# Patient Record
Sex: FEMALE | Race: WHITE | Employment: UNEMPLOYED | URBAN - METROPOLITAN AREA
[De-identification: names, ages, dates, MRNs, and addresses within clinical notes are randomized per-mention and may not be internally consistent; named-entity substitution may affect disease eponyms.]

---

## 2017-02-11 ENCOUNTER — HOSPITAL ENCOUNTER (EMERGENCY)
Age: 82
Discharge: HOME | End: 2017-02-11
Payer: MEDICARE

## 2017-02-11 VITALS
BODY MASS INDEX: 28.35 KG/M2 | HEART RATE: 90 BPM | TEMPERATURE: 97.3 F | SYSTOLIC BLOOD PRESSURE: 141 MMHG | DIASTOLIC BLOOD PRESSURE: 78 MMHG | WEIGHT: 160 LBS | HEIGHT: 63 IN | OXYGEN SATURATION: 96 % | RESPIRATION RATE: 16 BRPM

## 2017-02-11 DIAGNOSIS — N39.0 URINARY TRACT INFECTION WITHOUT HEMATURIA, SITE UNSPECIFIED: Primary | ICD-10-CM

## 2017-02-11 LAB
BACTERIA: ABNORMAL /HPF
BILIRUBIN URINE: NEGATIVE
BLOOD, URINE: NEGATIVE
CLARITY: CLEAR
COLOR: YELLOW
EPITHELIAL CELLS, UA: ABNORMAL /HPF
GLUCOSE URINE: NEGATIVE MG/DL
KETONES, URINE: NEGATIVE MG/DL
LEUKOCYTE ESTERASE, URINE: ABNORMAL
NITRITE, URINE: NEGATIVE
PH UA: 7.5 (ref 5–9)
PROTEIN UA: NEGATIVE MG/DL
RBC UA: ABNORMAL /HPF (ref 0–2)
SPECIFIC GRAVITY UA: 1.01 (ref 1–1.03)
UROBILINOGEN, URINE: 0.2 E.U./DL
WBC UA: ABNORMAL /HPF (ref 0–5)

## 2017-02-11 PROCEDURE — 6370000000 HC RX 637 (ALT 250 FOR IP): Performed by: NURSE PRACTITIONER

## 2017-02-11 PROCEDURE — 6370000000 HC RX 637 (ALT 250 FOR IP): Performed by: STUDENT IN AN ORGANIZED HEALTH CARE EDUCATION/TRAINING PROGRAM

## 2017-02-11 PROCEDURE — 99283 EMERGENCY DEPT VISIT LOW MDM: CPT

## 2017-02-11 PROCEDURE — 81001 URINALYSIS AUTO W/SCOPE: CPT

## 2017-02-11 RX ORDER — NITROFURANTOIN 25; 75 MG/1; MG/1
100 CAPSULE ORAL 2 TIMES DAILY
Qty: 20 CAPSULE | Refills: 0 | Status: SHIPPED | OUTPATIENT
Start: 2017-02-11 | End: 2017-02-21

## 2017-02-11 RX ORDER — PHENAZOPYRIDINE HYDROCHLORIDE 200 MG/1
200 TABLET, FILM COATED ORAL ONCE
Status: COMPLETED | OUTPATIENT
Start: 2017-02-11 | End: 2017-02-11

## 2017-02-11 RX ORDER — PHENAZOPYRIDINE HYDROCHLORIDE 200 MG/1
200 TABLET, FILM COATED ORAL ONCE
Qty: 6 TABLET | Refills: 0 | Status: SHIPPED | OUTPATIENT
Start: 2017-02-11 | End: 2017-02-11

## 2017-02-11 RX ORDER — NITROFURANTOIN 25; 75 MG/1; MG/1
100 CAPSULE ORAL ONCE
Status: COMPLETED | OUTPATIENT
Start: 2017-02-11 | End: 2017-02-11

## 2017-02-11 RX ADMIN — NITROFURANTOIN (MONOHYDRATE/MACROCRYSTALS) 100 MG: 75; 25 CAPSULE ORAL at 09:06

## 2017-02-11 RX ADMIN — PHENAZOPYRIDINE HYDROCHLORIDE 200 MG: 200 TABLET ORAL at 08:12

## 2017-02-11 ASSESSMENT — PAIN DESCRIPTION - DESCRIPTORS: DESCRIPTORS: BURNING

## 2017-02-11 ASSESSMENT — ENCOUNTER SYMPTOMS
NAUSEA: 0
VOMITING: 0
COUGH: 0
SHORTNESS OF BREATH: 0
ABDOMINAL PAIN: 0
DIARRHEA: 0
BACK PAIN: 0

## 2017-02-11 ASSESSMENT — PAIN DESCRIPTION - ORIENTATION: ORIENTATION: MID

## 2017-02-11 ASSESSMENT — PAIN SCALES - GENERAL: PAINLEVEL_OUTOF10: 8

## 2017-02-11 ASSESSMENT — PAIN DESCRIPTION - FREQUENCY: FREQUENCY: CONTINUOUS

## 2017-02-11 ASSESSMENT — PAIN DESCRIPTION - ONSET: ONSET: ON-GOING

## 2017-02-11 ASSESSMENT — PAIN DESCRIPTION - LOCATION: LOCATION: PERINEUM

## 2017-02-11 ASSESSMENT — PAIN DESCRIPTION - PAIN TYPE: TYPE: ACUTE PAIN

## 2017-02-18 ENCOUNTER — HOSPITAL ENCOUNTER (EMERGENCY)
Age: 82
Discharge: HOME | End: 2017-02-18
Payer: MEDICARE

## 2017-02-18 VITALS
OXYGEN SATURATION: 94 % | TEMPERATURE: 97.5 F | HEIGHT: 63 IN | DIASTOLIC BLOOD PRESSURE: 82 MMHG | HEART RATE: 85 BPM | BODY MASS INDEX: 28.35 KG/M2 | WEIGHT: 160 LBS | RESPIRATION RATE: 18 BRPM | SYSTOLIC BLOOD PRESSURE: 135 MMHG

## 2017-02-18 DIAGNOSIS — R30.0 DYSURIA: Primary | ICD-10-CM

## 2017-02-18 LAB
BILIRUBIN URINE: NEGATIVE
BLOOD, URINE: NEGATIVE
CLARITY: CLEAR
COLOR: YELLOW
GLUCOSE URINE: NEGATIVE MG/DL
KETONES, URINE: NEGATIVE MG/DL
LEUKOCYTE ESTERASE, URINE: NEGATIVE
NITRITE, URINE: NEGATIVE
PH UA: 6 (ref 5–9)
PROTEIN UA: NEGATIVE MG/DL
SPECIFIC GRAVITY UA: 1 (ref 1–1.03)
URINE REFLEX TO CULTURE: NORMAL
UROBILINOGEN, URINE: 0.2 E.U./DL

## 2017-02-18 PROCEDURE — 81003 URINALYSIS AUTO W/O SCOPE: CPT

## 2017-02-18 PROCEDURE — 99283 EMERGENCY DEPT VISIT LOW MDM: CPT

## 2017-02-18 RX ORDER — CEPHALEXIN 250 MG/1
250 CAPSULE ORAL 2 TIMES DAILY
Qty: 14 CAPSULE | Refills: 0 | Status: SHIPPED | OUTPATIENT
Start: 2017-02-18 | End: 2017-04-13 | Stop reason: ALTCHOICE

## 2017-02-18 ASSESSMENT — ENCOUNTER SYMPTOMS
SHORTNESS OF BREATH: 0
COUGH: 0
BACK PAIN: 0
ABDOMINAL PAIN: 0

## 2017-02-27 ENCOUNTER — OFFICE VISIT (OUTPATIENT)
Dept: UROLOGY | Age: 82
End: 2017-02-27

## 2017-02-27 VITALS
BODY MASS INDEX: 32.78 KG/M2 | HEIGHT: 63 IN | HEART RATE: 80 BPM | SYSTOLIC BLOOD PRESSURE: 138 MMHG | WEIGHT: 185 LBS | DIASTOLIC BLOOD PRESSURE: 72 MMHG

## 2017-02-27 DIAGNOSIS — R30.0 DYSURIA: Primary | ICD-10-CM

## 2017-02-27 LAB
BILIRUBIN, POC: ABNORMAL
BLOOD URINE, POC: ABNORMAL
CLARITY, POC: CLEAR
COLOR, POC: YELLOW
GLUCOSE URINE, POC: ABNORMAL
KETONES, POC: ABNORMAL
LEUKOCYTE EST, POC: ABNORMAL
NITRITE, POC: ABNORMAL
PH, POC: 7
POST VOID RESIDUAL (PVR): 0 ML
PROTEIN, POC: ABNORMAL
SPECIFIC GRAVITY, POC: 1.02
UROBILINOGEN, POC: 0.2

## 2017-02-27 PROCEDURE — 99203 OFFICE O/P NEW LOW 30 MIN: CPT | Performed by: UROLOGY

## 2017-02-27 PROCEDURE — 81003 URINALYSIS AUTO W/O SCOPE: CPT | Performed by: UROLOGY

## 2017-02-27 PROCEDURE — 51798 US URINE CAPACITY MEASURE: CPT | Performed by: UROLOGY

## 2017-02-27 RX ORDER — BRIMONIDINE TARTRATE 0.15 %
1 DROPS OPHTHALMIC (EYE) 2 TIMES DAILY
Refills: 3 | COMMUNITY
Start: 2017-02-07 | End: 2022-02-10 | Stop reason: SDUPTHER

## 2017-02-27 RX ORDER — DORZOLAMIDE HYDROCHLORIDE AND TIMOLOL MALEATE 20; 5 MG/ML; MG/ML
1 SOLUTION/ DROPS OPHTHALMIC 2 TIMES DAILY
COMMUNITY
Start: 2015-04-10 | End: 2022-02-10 | Stop reason: SDUPTHER

## 2017-02-27 RX ORDER — CLOTRIMAZOLE AND BETAMETHASONE DIPROPIONATE 10; .64 MG/G; MG/G
CREAM TOPICAL
Refills: 0 | COMMUNITY
Start: 2017-02-21 | End: 2017-04-13 | Stop reason: ALTCHOICE

## 2017-02-27 RX ORDER — AMLODIPINE BESYLATE 5 MG/1
TABLET ORAL
Refills: 4 | COMMUNITY
Start: 2016-11-28 | End: 2018-06-01 | Stop reason: SDUPTHER

## 2017-02-27 RX ORDER — AMLODIPINE BESYLATE 5 MG/1
5 TABLET ORAL
COMMUNITY
Start: 2015-05-21 | End: 2017-04-13

## 2017-02-27 RX ORDER — BRIMONIDINE TARTRATE 2 MG/ML
SOLUTION/ DROPS OPHTHALMIC
COMMUNITY
Start: 2015-07-17 | End: 2017-04-13

## 2017-02-27 RX ORDER — LATANOPROST 50 UG/ML
1 SOLUTION/ DROPS OPHTHALMIC
COMMUNITY
Start: 2015-07-17 | End: 2022-02-10 | Stop reason: SDUPTHER

## 2017-02-27 RX ORDER — FLUCONAZOLE 100 MG/1
TABLET ORAL
Refills: 1 | COMMUNITY
Start: 2017-02-21 | End: 2017-04-13

## 2017-05-08 ENCOUNTER — HOSPITAL ENCOUNTER (EMERGENCY)
Age: 82
Discharge: HOME | End: 2017-05-08
Attending: EMERGENCY MEDICINE
Payer: MEDICARE

## 2017-05-08 ENCOUNTER — APPOINTMENT (OUTPATIENT)
Dept: GENERAL RADIOLOGY | Age: 82
End: 2017-05-08
Payer: MEDICARE

## 2017-05-08 VITALS
DIASTOLIC BLOOD PRESSURE: 70 MMHG | RESPIRATION RATE: 16 BRPM | OXYGEN SATURATION: 96 % | TEMPERATURE: 98.6 F | HEIGHT: 63 IN | HEART RATE: 78 BPM | SYSTOLIC BLOOD PRESSURE: 137 MMHG | WEIGHT: 175 LBS | BODY MASS INDEX: 31.01 KG/M2

## 2017-05-08 DIAGNOSIS — F41.1 ANXIETY STATE: ICD-10-CM

## 2017-05-08 DIAGNOSIS — R07.89 ATYPICAL CHEST PAIN: Primary | ICD-10-CM

## 2017-05-08 LAB
ANION GAP SERPL CALCULATED.3IONS-SCNC: 12 MEQ/L (ref 7–13)
APTT: 26 SEC (ref 21.6–35.4)
BUN BLDV-MCNC: 16 MG/DL (ref 8–23)
CALCIUM SERPL-MCNC: 9.1 MG/DL (ref 8.6–10.2)
CHLORIDE BLD-SCNC: 104 MEQ/L (ref 98–107)
CO2: 27 MEQ/L (ref 22–29)
CREAT SERPL-MCNC: 0.65 MG/DL (ref 0.5–0.9)
EKG ATRIAL RATE: 79 BPM
EKG P AXIS: 52 DEGREES
EKG P-R INTERVAL: 152 MS
EKG Q-T INTERVAL: 408 MS
EKG QRS DURATION: 82 MS
EKG QTC CALCULATION (BAZETT): 467 MS
EKG R AXIS: 17 DEGREES
EKG T AXIS: 26 DEGREES
EKG VENTRICULAR RATE: 79 BPM
GFR AFRICAN AMERICAN: >60
GFR NON-AFRICAN AMERICAN: >60
GLUCOSE BLD-MCNC: 104 MG/DL (ref 74–109)
HCT VFR BLD CALC: 43.1 % (ref 37–47)
HEMOGLOBIN: 14.5 G/DL (ref 12–16)
INR BLD: 1
MAGNESIUM: 2.5 MG/DL (ref 1.7–2.3)
MCH RBC QN AUTO: 29.7 PG (ref 27–31.3)
MCHC RBC AUTO-ENTMCNC: 33.7 % (ref 33–37)
MCV RBC AUTO: 88.2 FL (ref 82–100)
PDW BLD-RTO: 14.3 % (ref 11.5–14.5)
PLATELET # BLD: 178 K/UL (ref 130–400)
POTASSIUM SERPL-SCNC: 3.6 MEQ/L (ref 3.5–5.1)
PROTHROMBIN TIME: 10.7 SEC (ref 8.1–13.7)
RBC # BLD: 4.88 M/UL (ref 4.2–5.4)
SODIUM BLD-SCNC: 143 MEQ/L (ref 132–144)
TROPONIN: <0.01 NG/ML (ref 0–0.01)
WBC # BLD: 9.5 K/UL (ref 4.8–10.8)

## 2017-05-08 PROCEDURE — 85610 PROTHROMBIN TIME: CPT

## 2017-05-08 PROCEDURE — 85027 COMPLETE CBC AUTOMATED: CPT

## 2017-05-08 PROCEDURE — 85730 THROMBOPLASTIN TIME PARTIAL: CPT

## 2017-05-08 PROCEDURE — 83735 ASSAY OF MAGNESIUM: CPT

## 2017-05-08 PROCEDURE — 71010 XR CHEST PORTABLE: CPT

## 2017-05-08 PROCEDURE — 36415 COLL VENOUS BLD VENIPUNCTURE: CPT

## 2017-05-08 PROCEDURE — 93005 ELECTROCARDIOGRAM TRACING: CPT

## 2017-05-08 PROCEDURE — 80048 BASIC METABOLIC PNL TOTAL CA: CPT

## 2017-05-08 PROCEDURE — 6370000000 HC RX 637 (ALT 250 FOR IP): Performed by: EMERGENCY MEDICINE

## 2017-05-08 PROCEDURE — 99285 EMERGENCY DEPT VISIT HI MDM: CPT

## 2017-05-08 PROCEDURE — 84484 ASSAY OF TROPONIN QUANT: CPT

## 2017-05-08 RX ORDER — LORAZEPAM 1 MG/1
1 TABLET ORAL EVERY 8 HOURS PRN
Qty: 6 TABLET | Refills: 0 | OUTPATIENT
Start: 2017-05-08 | End: 2017-05-08

## 2017-05-08 RX ORDER — LORAZEPAM 1 MG/1
1 TABLET ORAL EVERY 8 HOURS
Qty: 6 TABLET | Refills: 0 | OUTPATIENT
Start: 2017-05-08 | End: 2017-05-08

## 2017-05-08 RX ORDER — LORAZEPAM 1 MG/1
1 TABLET ORAL ONCE
Status: COMPLETED | OUTPATIENT
Start: 2017-05-08 | End: 2017-05-08

## 2017-05-08 RX ORDER — LORAZEPAM 1 MG/1
1 TABLET ORAL EVERY 8 HOURS PRN
Qty: 6 TABLET | Refills: 0 | Status: SHIPPED | OUTPATIENT
Start: 2017-05-08 | End: 2017-06-07

## 2017-05-08 RX ADMIN — LORAZEPAM 1 MG: 1 TABLET ORAL at 12:31

## 2017-05-08 ASSESSMENT — PAIN SCALES - WONG BAKER: WONGBAKER_NUMERICALRESPONSE: 0

## 2017-05-08 ASSESSMENT — PAIN DESCRIPTION - LOCATION: LOCATION: CHEST

## 2017-05-08 ASSESSMENT — ENCOUNTER SYMPTOMS
SHORTNESS OF BREATH: 0
COUGH: 0
VOMITING: 0

## 2017-05-08 ASSESSMENT — PAIN DESCRIPTION - FREQUENCY: FREQUENCY: CONTINUOUS

## 2017-05-08 ASSESSMENT — PAIN SCALES - GENERAL
PAINLEVEL_OUTOF10: 2
PAINLEVEL_OUTOF10: 0

## 2017-05-08 ASSESSMENT — PAIN DESCRIPTION - DESCRIPTORS: DESCRIPTORS: SORE

## 2017-09-18 ENCOUNTER — OFFICE VISIT (OUTPATIENT)
Dept: FAMILY MEDICINE CLINIC | Age: 82
End: 2017-09-18

## 2017-09-18 VITALS
HEIGHT: 63 IN | BODY MASS INDEX: 30.12 KG/M2 | RESPIRATION RATE: 16 BRPM | DIASTOLIC BLOOD PRESSURE: 64 MMHG | HEART RATE: 76 BPM | TEMPERATURE: 97.8 F | WEIGHT: 170 LBS | SYSTOLIC BLOOD PRESSURE: 122 MMHG

## 2017-09-18 DIAGNOSIS — R30.0 DYSURIA: ICD-10-CM

## 2017-09-18 DIAGNOSIS — Z23 NEED FOR STREPTOCOCCUS PNEUMONIAE AND INFLUENZA VACCINATION: ICD-10-CM

## 2017-09-18 DIAGNOSIS — F41.9 ANXIETY: ICD-10-CM

## 2017-09-18 DIAGNOSIS — I10 ESSENTIAL HYPERTENSION: Primary | ICD-10-CM

## 2017-09-18 LAB
BILIRUBIN, POC: ABNORMAL
BLOOD URINE, POC: ABNORMAL
CLARITY, POC: ABNORMAL
COLOR, POC: ABNORMAL
GLUCOSE URINE, POC: ABNORMAL
KETONES, POC: ABNORMAL
LEUKOCYTE EST, POC: ABNORMAL
NITRITE, POC: ABNORMAL
PH, POC: 6
PROTEIN, POC: ABNORMAL
SPECIFIC GRAVITY, POC: 1.02
UROBILINOGEN, POC: ABNORMAL

## 2017-09-18 PROCEDURE — 81003 URINALYSIS AUTO W/O SCOPE: CPT | Performed by: FAMILY MEDICINE

## 2017-09-18 PROCEDURE — 90662 IIV NO PRSV INCREASED AG IM: CPT | Performed by: FAMILY MEDICINE

## 2017-09-18 PROCEDURE — 99203 OFFICE O/P NEW LOW 30 MIN: CPT | Performed by: FAMILY MEDICINE

## 2017-09-18 PROCEDURE — G0008 ADMIN INFLUENZA VIRUS VAC: HCPCS | Performed by: FAMILY MEDICINE

## 2017-09-18 PROCEDURE — G0009 ADMIN PNEUMOCOCCAL VACCINE: HCPCS | Performed by: FAMILY MEDICINE

## 2017-09-18 PROCEDURE — 90670 PCV13 VACCINE IM: CPT | Performed by: FAMILY MEDICINE

## 2017-09-18 RX ORDER — MOMETASONE FUROATE 1 MG/G
CREAM TOPICAL
Qty: 45 G | Refills: 1 | Status: SHIPPED | OUTPATIENT
Start: 2017-09-18 | End: 2019-05-22 | Stop reason: ALTCHOICE

## 2017-09-18 RX ORDER — LORAZEPAM 0.5 MG/1
0.25 TABLET ORAL 2 TIMES DAILY PRN
Qty: 30 TABLET | Refills: 2 | Status: SHIPPED | OUTPATIENT
Start: 2017-09-18 | End: 2019-01-08

## 2017-09-18 RX ORDER — LORAZEPAM 0.5 MG/1
TABLET ORAL
Refills: 1 | COMMUNITY
Start: 2017-07-29 | End: 2017-09-18 | Stop reason: SDUPTHER

## 2017-09-18 ASSESSMENT — PATIENT HEALTH QUESTIONNAIRE - PHQ9
2. FEELING DOWN, DEPRESSED OR HOPELESS: 0
1. LITTLE INTEREST OR PLEASURE IN DOING THINGS: 0
SUM OF ALL RESPONSES TO PHQ QUESTIONS 1-9: 0
SUM OF ALL RESPONSES TO PHQ9 QUESTIONS 1 & 2: 0

## 2017-09-21 LAB
ORGANISM: ABNORMAL
URINE CULTURE, ROUTINE: ABNORMAL

## 2017-09-22 RX ORDER — CIPROFLOXACIN 250 MG/1
250 TABLET, FILM COATED ORAL 2 TIMES DAILY
Qty: 14 TABLET | Refills: 0 | Status: SHIPPED | OUTPATIENT
Start: 2017-09-22 | End: 2017-09-29

## 2018-06-01 RX ORDER — AMLODIPINE BESYLATE 5 MG/1
TABLET ORAL
Qty: 30 TABLET | Refills: 5 | Status: SHIPPED | OUTPATIENT
Start: 2018-06-01 | End: 2018-10-26 | Stop reason: SDUPTHER

## 2018-07-01 ENCOUNTER — APPOINTMENT (OUTPATIENT)
Dept: GENERAL RADIOLOGY | Age: 83
End: 2018-07-01
Payer: MEDICARE

## 2018-07-01 ENCOUNTER — APPOINTMENT (OUTPATIENT)
Dept: ULTRASOUND IMAGING | Age: 83
End: 2018-07-01
Payer: MEDICARE

## 2018-07-01 ENCOUNTER — HOSPITAL ENCOUNTER (EMERGENCY)
Age: 83
Discharge: HOME | End: 2018-07-01
Payer: MEDICARE

## 2018-07-01 VITALS
DIASTOLIC BLOOD PRESSURE: 68 MMHG | HEIGHT: 63 IN | RESPIRATION RATE: 18 BRPM | OXYGEN SATURATION: 94 % | TEMPERATURE: 97.8 F | WEIGHT: 170 LBS | HEART RATE: 82 BPM | BODY MASS INDEX: 30.12 KG/M2 | SYSTOLIC BLOOD PRESSURE: 128 MMHG

## 2018-07-01 DIAGNOSIS — M71.22 BAKER'S CYST OF KNEE, LEFT: ICD-10-CM

## 2018-07-01 DIAGNOSIS — M79.89 LEFT LEG SWELLING: Primary | ICD-10-CM

## 2018-07-01 PROCEDURE — 99284 EMERGENCY DEPT VISIT MOD MDM: CPT

## 2018-07-01 PROCEDURE — 6370000000 HC RX 637 (ALT 250 FOR IP): Performed by: NURSE PRACTITIONER

## 2018-07-01 PROCEDURE — 73502 X-RAY EXAM HIP UNI 2-3 VIEWS: CPT

## 2018-07-01 PROCEDURE — 73562 X-RAY EXAM OF KNEE 3: CPT

## 2018-07-01 PROCEDURE — 93971 EXTREMITY STUDY: CPT

## 2018-07-01 RX ORDER — HYDROCODONE BITARTRATE AND ACETAMINOPHEN 5; 325 MG/1; MG/1
1 TABLET ORAL ONCE
Status: COMPLETED | OUTPATIENT
Start: 2018-07-01 | End: 2018-07-01

## 2018-07-01 RX ADMIN — HYDROCODONE BITARTRATE AND ACETAMINOPHEN 1 TABLET: 5; 325 TABLET ORAL at 11:34

## 2018-07-01 ASSESSMENT — PAIN DESCRIPTION - ORIENTATION
ORIENTATION: LEFT

## 2018-07-01 ASSESSMENT — PAIN SCALES - GENERAL
PAINLEVEL_OUTOF10: 10
PAINLEVEL_OUTOF10: 8
PAINLEVEL_OUTOF10: 4

## 2018-07-01 ASSESSMENT — ENCOUNTER SYMPTOMS
SHORTNESS OF BREATH: 0
NAUSEA: 0
DIARRHEA: 0
ABDOMINAL PAIN: 0
COUGH: 0
VOMITING: 0

## 2018-07-01 ASSESSMENT — PAIN DESCRIPTION - PAIN TYPE
TYPE: ACUTE PAIN
TYPE: CHRONIC PAIN;ACUTE PAIN
TYPE: ACUTE PAIN

## 2018-07-01 ASSESSMENT — PAIN SCALES - WONG BAKER
WONGBAKER_NUMERICALRESPONSE: 2
WONGBAKER_NUMERICALRESPONSE: 0

## 2018-07-01 ASSESSMENT — PAIN DESCRIPTION - LOCATION
LOCATION: ANKLE
LOCATION: LEG

## 2018-07-30 ENCOUNTER — OFFICE VISIT (OUTPATIENT)
Dept: FAMILY MEDICINE CLINIC | Age: 83
End: 2018-07-30
Payer: MEDICARE

## 2018-07-30 VITALS
TEMPERATURE: 97.7 F | BODY MASS INDEX: 31.89 KG/M2 | HEIGHT: 63 IN | DIASTOLIC BLOOD PRESSURE: 82 MMHG | SYSTOLIC BLOOD PRESSURE: 160 MMHG | RESPIRATION RATE: 16 BRPM | HEART RATE: 84 BPM | WEIGHT: 180 LBS

## 2018-07-30 DIAGNOSIS — M25.562 ACUTE PAIN OF LEFT KNEE: ICD-10-CM

## 2018-07-30 DIAGNOSIS — I10 ESSENTIAL HYPERTENSION: ICD-10-CM

## 2018-07-30 DIAGNOSIS — M71.22 SYNOVIAL CYST OF LEFT POPLITEAL SPACE: Primary | ICD-10-CM

## 2018-07-30 DIAGNOSIS — R73.9 HYPERGLYCEMIA: ICD-10-CM

## 2018-07-30 DIAGNOSIS — M25.462 EFFUSION OF LEFT KNEE: ICD-10-CM

## 2018-07-30 DIAGNOSIS — M71.22 SYNOVIAL CYST OF LEFT POPLITEAL SPACE: ICD-10-CM

## 2018-07-30 LAB
ALBUMIN SERPL-MCNC: 4.1 G/DL (ref 3.9–4.9)
ALP BLD-CCNC: 77 U/L (ref 40–130)
ALT SERPL-CCNC: 11 U/L (ref 0–33)
ANION GAP SERPL CALCULATED.3IONS-SCNC: 15 MEQ/L (ref 7–13)
AST SERPL-CCNC: 12 U/L (ref 0–35)
BILIRUB SERPL-MCNC: 0.3 MG/DL (ref 0–1.2)
BUN BLDV-MCNC: 18 MG/DL (ref 8–23)
CALCIUM SERPL-MCNC: 9.1 MG/DL (ref 8.6–10.2)
CHLORIDE BLD-SCNC: 103 MEQ/L (ref 98–107)
CHOLESTEROL, TOTAL: 219 MG/DL (ref 0–199)
CO2: 26 MEQ/L (ref 22–29)
CREAT SERPL-MCNC: 0.78 MG/DL (ref 0.5–0.9)
GFR AFRICAN AMERICAN: >60
GFR NON-AFRICAN AMERICAN: >60
GLOBULIN: 2.7 G/DL (ref 2.3–3.5)
GLUCOSE BLD-MCNC: 91 MG/DL (ref 74–109)
HBA1C MFR BLD: 5.7 % (ref 4.8–5.9)
HCT VFR BLD CALC: 41.8 % (ref 37–47)
HDLC SERPL-MCNC: 36 MG/DL (ref 40–59)
HEMOGLOBIN: 14.2 G/DL (ref 12–16)
LDL CHOLESTEROL CALCULATED: 144 MG/DL (ref 0–129)
MCH RBC QN AUTO: 30.1 PG (ref 27–31.3)
MCHC RBC AUTO-ENTMCNC: 33.8 % (ref 33–37)
MCV RBC AUTO: 88.9 FL (ref 82–100)
PDW BLD-RTO: 14.1 % (ref 11.5–14.5)
PLATELET # BLD: 216 K/UL (ref 130–400)
POTASSIUM SERPL-SCNC: 3.7 MEQ/L (ref 3.5–5.1)
RBC # BLD: 4.7 M/UL (ref 4.2–5.4)
SEDIMENTATION RATE, ERYTHROCYTE: 21 MM (ref 0–30)
SODIUM BLD-SCNC: 144 MEQ/L (ref 132–144)
TOTAL PROTEIN: 6.8 G/DL (ref 6.4–8.1)
TRIGL SERPL-MCNC: 193 MG/DL (ref 0–200)
URIC ACID, SERUM: 8.4 MG/DL (ref 2.4–5.7)
WBC # BLD: 10 K/UL (ref 4.8–10.8)

## 2018-07-30 PROCEDURE — 99214 OFFICE O/P EST MOD 30 MIN: CPT | Performed by: FAMILY MEDICINE

## 2018-07-30 PROCEDURE — 1111F DSCHRG MED/CURRENT MED MERGE: CPT | Performed by: FAMILY MEDICINE

## 2018-07-31 RX ORDER — COLCHICINE 0.6 MG/1
0.6 TABLET ORAL DAILY
Qty: 20 TABLET | Refills: 1 | Status: SHIPPED | OUTPATIENT
Start: 2018-07-31 | End: 2018-08-01 | Stop reason: SDUPTHER

## 2018-08-01 RX ORDER — COLCHICINE 0.6 MG/1
0.6 TABLET, FILM COATED ORAL DAILY
Qty: 20 TABLET | Refills: 1 | Status: SHIPPED | OUTPATIENT
Start: 2018-08-01 | End: 2018-12-18 | Stop reason: SDUPTHER

## 2018-10-26 RX ORDER — AMLODIPINE BESYLATE 5 MG/1
TABLET ORAL
Qty: 30 TABLET | Refills: 5 | Status: SHIPPED | OUTPATIENT
Start: 2018-10-26 | End: 2018-11-26 | Stop reason: SDUPTHER

## 2018-11-26 RX ORDER — AMLODIPINE BESYLATE 5 MG/1
TABLET ORAL
Qty: 30 TABLET | Refills: 3 | Status: SHIPPED | OUTPATIENT
Start: 2018-11-26 | End: 2018-12-18

## 2018-12-18 ENCOUNTER — OFFICE VISIT (OUTPATIENT)
Dept: FAMILY MEDICINE CLINIC | Age: 83
End: 2018-12-18
Payer: MEDICARE

## 2018-12-18 VITALS
RESPIRATION RATE: 16 BRPM | TEMPERATURE: 98.2 F | SYSTOLIC BLOOD PRESSURE: 122 MMHG | DIASTOLIC BLOOD PRESSURE: 74 MMHG | HEART RATE: 90 BPM

## 2018-12-18 DIAGNOSIS — T50.905A ADVERSE EFFECT OF DRUG, INITIAL ENCOUNTER: ICD-10-CM

## 2018-12-18 DIAGNOSIS — M10.072 ACUTE IDIOPATHIC GOUT INVOLVING TOE OF LEFT FOOT: ICD-10-CM

## 2018-12-18 DIAGNOSIS — I10 ESSENTIAL HYPERTENSION: ICD-10-CM

## 2018-12-18 DIAGNOSIS — I10 ESSENTIAL HYPERTENSION: Primary | ICD-10-CM

## 2018-12-18 LAB
ALBUMIN SERPL-MCNC: 4.4 G/DL (ref 3.9–4.9)
ALP BLD-CCNC: 79 U/L (ref 40–130)
ALT SERPL-CCNC: 14 U/L (ref 0–33)
ANION GAP SERPL CALCULATED.3IONS-SCNC: 15 MEQ/L (ref 7–13)
AST SERPL-CCNC: 16 U/L (ref 0–35)
BASOPHILS ABSOLUTE: 0.1 K/UL (ref 0–0.2)
BASOPHILS RELATIVE PERCENT: 1.7 %
BILIRUB SERPL-MCNC: 0.3 MG/DL (ref 0–1.2)
BUN BLDV-MCNC: 19 MG/DL (ref 8–23)
CALCIUM SERPL-MCNC: 9 MG/DL (ref 8.6–10.2)
CHLORIDE BLD-SCNC: 104 MEQ/L (ref 98–107)
CO2: 26 MEQ/L (ref 22–29)
CREAT SERPL-MCNC: 0.88 MG/DL (ref 0.5–0.9)
EOSINOPHILS ABSOLUTE: 0.2 K/UL (ref 0–0.7)
EOSINOPHILS RELATIVE PERCENT: 2.5 %
GFR AFRICAN AMERICAN: >60
GFR NON-AFRICAN AMERICAN: >60
GLOBULIN: 2.7 G/DL (ref 2.3–3.5)
GLUCOSE BLD-MCNC: 89 MG/DL (ref 74–109)
HCT VFR BLD CALC: 42 % (ref 37–47)
HEMOGLOBIN: 14 G/DL (ref 12–16)
LYMPHOCYTES ABSOLUTE: 1 K/UL (ref 1–4.8)
LYMPHOCYTES RELATIVE PERCENT: 15.4 %
MCH RBC QN AUTO: 30.5 PG (ref 27–31.3)
MCHC RBC AUTO-ENTMCNC: 33.4 % (ref 33–37)
MCV RBC AUTO: 91.2 FL (ref 82–100)
MONOCYTES ABSOLUTE: 0.8 K/UL (ref 0.2–0.8)
MONOCYTES RELATIVE PERCENT: 11.5 %
NEUTROPHILS ABSOLUTE: 4.6 K/UL (ref 1.4–6.5)
NEUTROPHILS RELATIVE PERCENT: 68.9 %
PDW BLD-RTO: 13.8 % (ref 11.5–14.5)
PLATELET # BLD: 198 K/UL (ref 130–400)
POTASSIUM SERPL-SCNC: 3.7 MEQ/L (ref 3.5–5.1)
RBC # BLD: 4.6 M/UL (ref 4.2–5.4)
SODIUM BLD-SCNC: 145 MEQ/L (ref 132–144)
TOTAL PROTEIN: 7.1 G/DL (ref 6.4–8.1)
URIC ACID, SERUM: 7 MG/DL (ref 2.4–5.7)
WBC # BLD: 6.6 K/UL (ref 4.8–10.8)

## 2018-12-18 PROCEDURE — 96372 THER/PROPH/DIAG INJ SC/IM: CPT | Performed by: NURSE PRACTITIONER

## 2018-12-18 PROCEDURE — 99214 OFFICE O/P EST MOD 30 MIN: CPT | Performed by: NURSE PRACTITIONER

## 2018-12-18 RX ORDER — TRIAMCINOLONE ACETONIDE 40 MG/ML
80 INJECTION, SUSPENSION INTRA-ARTICULAR; INTRAMUSCULAR ONCE
Status: COMPLETED | OUTPATIENT
Start: 2018-12-18 | End: 2018-12-18

## 2018-12-18 RX ORDER — COLCHICINE 0.6 MG/1
TABLET, FILM COATED ORAL
Qty: 30 TABLET | Refills: 1 | Status: SHIPPED | OUTPATIENT
Start: 2018-12-18 | End: 2020-02-03 | Stop reason: SDUPTHER

## 2018-12-18 RX ADMIN — TRIAMCINOLONE ACETONIDE 80 MG: 40 INJECTION, SUSPENSION INTRA-ARTICULAR; INTRAMUSCULAR at 14:03

## 2018-12-18 ASSESSMENT — PATIENT HEALTH QUESTIONNAIRE - PHQ9
SUM OF ALL RESPONSES TO PHQ9 QUESTIONS 1 & 2: 0
2. FEELING DOWN, DEPRESSED OR HOPELESS: 0
1. LITTLE INTEREST OR PLEASURE IN DOING THINGS: 0
SUM OF ALL RESPONSES TO PHQ QUESTIONS 1-9: 0
SUM OF ALL RESPONSES TO PHQ QUESTIONS 1-9: 0

## 2018-12-24 ASSESSMENT — ENCOUNTER SYMPTOMS
COLOR CHANGE: 0
CHEST TIGHTNESS: 0
SHORTNESS OF BREATH: 0
WHEEZING: 0
CHOKING: 0
COUGH: 0
ABDOMINAL PAIN: 0
APNEA: 0
ABDOMINAL DISTENTION: 0

## 2018-12-25 ENCOUNTER — APPOINTMENT (OUTPATIENT)
Dept: GENERAL RADIOLOGY | Age: 83
DRG: 192 | End: 2018-12-25
Payer: MEDICARE

## 2018-12-25 ENCOUNTER — HOSPITAL ENCOUNTER (INPATIENT)
Age: 83
LOS: 4 days | Discharge: HOME HEALTH | DRG: 192 | End: 2018-12-29
Attending: EMERGENCY MEDICINE | Admitting: INTERNAL MEDICINE
Payer: MEDICARE

## 2018-12-25 ENCOUNTER — APPOINTMENT (OUTPATIENT)
Dept: CT IMAGING | Age: 83
DRG: 192 | End: 2018-12-25
Payer: MEDICARE

## 2018-12-25 DIAGNOSIS — J44.0 COPD (CHRONIC OBSTRUCTIVE PULMONARY DISEASE) WITH ACUTE BRONCHITIS (HCC): ICD-10-CM

## 2018-12-25 DIAGNOSIS — F41.9 ANXIETY: ICD-10-CM

## 2018-12-25 DIAGNOSIS — J20.9 COPD (CHRONIC OBSTRUCTIVE PULMONARY DISEASE) WITH ACUTE BRONCHITIS (HCC): ICD-10-CM

## 2018-12-25 DIAGNOSIS — J44.1 COPD EXACERBATION (HCC): ICD-10-CM

## 2018-12-25 DIAGNOSIS — J40 BRONCHITIS: Primary | ICD-10-CM

## 2018-12-25 DIAGNOSIS — I10 ESSENTIAL HYPERTENSION: ICD-10-CM

## 2018-12-25 LAB
ALBUMIN SERPL-MCNC: 4.1 G/DL (ref 3.9–4.9)
ALP BLD-CCNC: 80 U/L (ref 40–130)
ALT SERPL-CCNC: 15 U/L (ref 0–33)
ANION GAP SERPL CALCULATED.3IONS-SCNC: 15 MEQ/L (ref 7–13)
AST SERPL-CCNC: 20 U/L (ref 0–35)
BASOPHILS ABSOLUTE: 0.1 K/UL (ref 0–0.2)
BASOPHILS RELATIVE PERCENT: 0.8 %
BILIRUB SERPL-MCNC: 0.4 MG/DL (ref 0–1.2)
BUN BLDV-MCNC: 16 MG/DL (ref 8–23)
CALCIUM SERPL-MCNC: 9 MG/DL (ref 8.6–10.2)
CHLORIDE BLD-SCNC: 104 MEQ/L (ref 98–107)
CO2: 24 MEQ/L (ref 22–29)
CREAT SERPL-MCNC: 0.73 MG/DL (ref 0.5–0.9)
EKG ATRIAL RATE: 87 BPM
EKG P AXIS: 69 DEGREES
EKG P-R INTERVAL: 148 MS
EKG Q-T INTERVAL: 394 MS
EKG QRS DURATION: 84 MS
EKG QTC CALCULATION (BAZETT): 474 MS
EKG R AXIS: 4 DEGREES
EKG T AXIS: -7 DEGREES
EKG VENTRICULAR RATE: 87 BPM
EOSINOPHILS ABSOLUTE: 0.2 K/UL (ref 0–0.7)
EOSINOPHILS RELATIVE PERCENT: 3.1 %
GFR AFRICAN AMERICAN: >60
GFR NON-AFRICAN AMERICAN: >60
GLOBULIN: 2.9 G/DL (ref 2.3–3.5)
GLUCOSE BLD-MCNC: 117 MG/DL (ref 74–109)
HCT VFR BLD CALC: 41.7 % (ref 37–47)
HEMOGLOBIN: 14.2 G/DL (ref 12–16)
LACTIC ACID: 1 MMOL/L (ref 0.5–2.2)
LYMPHOCYTES ABSOLUTE: 1.8 K/UL (ref 1–4.8)
LYMPHOCYTES RELATIVE PERCENT: 22.2 %
MCH RBC QN AUTO: 30.2 PG (ref 27–31.3)
MCHC RBC AUTO-ENTMCNC: 34 % (ref 33–37)
MCV RBC AUTO: 89 FL (ref 82–100)
MONOCYTES ABSOLUTE: 0.7 K/UL (ref 0.2–0.8)
MONOCYTES RELATIVE PERCENT: 8.2 %
NEUTROPHILS ABSOLUTE: 5.3 K/UL (ref 1.4–6.5)
NEUTROPHILS RELATIVE PERCENT: 65.7 %
PDW BLD-RTO: 13.9 % (ref 11.5–14.5)
PLATELET # BLD: 186 K/UL (ref 130–400)
POTASSIUM SERPL-SCNC: 3.5 MEQ/L (ref 3.5–5.1)
PRO-BNP: 380 PG/ML
RBC # BLD: 4.68 M/UL (ref 4.2–5.4)
SODIUM BLD-SCNC: 143 MEQ/L (ref 132–144)
TOTAL PROTEIN: 7 G/DL (ref 6.4–8.1)
TROPONIN: <0.01 NG/ML (ref 0–0.01)
WBC # BLD: 8 K/UL (ref 4.8–10.8)

## 2018-12-25 PROCEDURE — 96374 THER/PROPH/DIAG INJ IV PUSH: CPT

## 2018-12-25 PROCEDURE — 93005 ELECTROCARDIOGRAM TRACING: CPT

## 2018-12-25 PROCEDURE — 6360000002 HC RX W HCPCS: Performed by: EMERGENCY MEDICINE

## 2018-12-25 PROCEDURE — 6370000000 HC RX 637 (ALT 250 FOR IP): Performed by: EMERGENCY MEDICINE

## 2018-12-25 PROCEDURE — 84484 ASSAY OF TROPONIN QUANT: CPT

## 2018-12-25 PROCEDURE — 87040 BLOOD CULTURE FOR BACTERIA: CPT

## 2018-12-25 PROCEDURE — 99285 EMERGENCY DEPT VISIT HI MDM: CPT

## 2018-12-25 PROCEDURE — 83605 ASSAY OF LACTIC ACID: CPT

## 2018-12-25 PROCEDURE — 84443 ASSAY THYROID STIM HORMONE: CPT

## 2018-12-25 PROCEDURE — 80053 COMPREHEN METABOLIC PANEL: CPT

## 2018-12-25 PROCEDURE — 71045 X-RAY EXAM CHEST 1 VIEW: CPT

## 2018-12-25 PROCEDURE — 94640 AIRWAY INHALATION TREATMENT: CPT

## 2018-12-25 PROCEDURE — 2060000000 HC ICU INTERMEDIATE R&B

## 2018-12-25 PROCEDURE — 96375 TX/PRO/DX INJ NEW DRUG ADDON: CPT

## 2018-12-25 PROCEDURE — 84439 ASSAY OF FREE THYROXINE: CPT

## 2018-12-25 PROCEDURE — 85025 COMPLETE CBC W/AUTO DIFF WBC: CPT

## 2018-12-25 PROCEDURE — 36415 COLL VENOUS BLD VENIPUNCTURE: CPT

## 2018-12-25 PROCEDURE — 83880 ASSAY OF NATRIURETIC PEPTIDE: CPT

## 2018-12-25 RX ORDER — NITROGLYCERIN 0.4 MG/1
0.4 TABLET SUBLINGUAL EVERY 5 MIN PRN
Status: DISCONTINUED | OUTPATIENT
Start: 2018-12-25 | End: 2018-12-29 | Stop reason: HOSPADM

## 2018-12-25 RX ORDER — IPRATROPIUM BROMIDE AND ALBUTEROL SULFATE 2.5; .5 MG/3ML; MG/3ML
1 SOLUTION RESPIRATORY (INHALATION) PRN
Status: DISCONTINUED | OUTPATIENT
Start: 2018-12-25 | End: 2018-12-26

## 2018-12-25 RX ORDER — ALBUTEROL SULFATE 2.5 MG/3ML
2.5 SOLUTION RESPIRATORY (INHALATION) 3 TIMES DAILY
Status: DISCONTINUED | OUTPATIENT
Start: 2018-12-26 | End: 2018-12-29 | Stop reason: HOSPADM

## 2018-12-25 RX ORDER — AZITHROMYCIN 500 MG/1
500 TABLET, FILM COATED ORAL ONCE
Status: DISCONTINUED | OUTPATIENT
Start: 2018-12-25 | End: 2018-12-25 | Stop reason: ALTCHOICE

## 2018-12-25 RX ORDER — FUROSEMIDE 10 MG/ML
40 INJECTION INTRAMUSCULAR; INTRAVENOUS DAILY
Status: COMPLETED | OUTPATIENT
Start: 2018-12-26 | End: 2018-12-27

## 2018-12-25 RX ORDER — METHYLPREDNISOLONE SODIUM SUCCINATE 125 MG/2ML
125 INJECTION, POWDER, LYOPHILIZED, FOR SOLUTION INTRAMUSCULAR; INTRAVENOUS ONCE
Status: COMPLETED | OUTPATIENT
Start: 2018-12-25 | End: 2018-12-25

## 2018-12-25 RX ORDER — ONDANSETRON 2 MG/ML
4 INJECTION INTRAMUSCULAR; INTRAVENOUS EVERY 6 HOURS PRN
Status: DISCONTINUED | OUTPATIENT
Start: 2018-12-25 | End: 2018-12-29 | Stop reason: HOSPADM

## 2018-12-25 RX ORDER — SODIUM CHLORIDE 0.9 % (FLUSH) 0.9 %
10 SYRINGE (ML) INJECTION PRN
Status: DISCONTINUED | OUTPATIENT
Start: 2018-12-25 | End: 2018-12-29 | Stop reason: HOSPADM

## 2018-12-25 RX ORDER — SODIUM CHLORIDE 0.9 % (FLUSH) 0.9 %
10 SYRINGE (ML) INJECTION EVERY 12 HOURS SCHEDULED
Status: DISCONTINUED | OUTPATIENT
Start: 2018-12-25 | End: 2018-12-29 | Stop reason: HOSPADM

## 2018-12-25 RX ORDER — FUROSEMIDE 10 MG/ML
40 INJECTION INTRAMUSCULAR; INTRAVENOUS ONCE
Status: COMPLETED | OUTPATIENT
Start: 2018-12-25 | End: 2018-12-25

## 2018-12-25 RX ORDER — AZITHROMYCIN 250 MG/1
TABLET, FILM COATED ORAL
Qty: 4 TABLET | Refills: 0 | Status: SHIPPED | OUTPATIENT
Start: 2018-12-25 | End: 2019-01-04

## 2018-12-25 RX ORDER — FUROSEMIDE 10 MG/ML
40 INJECTION INTRAMUSCULAR; INTRAVENOUS DAILY
Status: CANCELLED | OUTPATIENT
Start: 2018-12-26 | End: 2018-12-29

## 2018-12-25 RX ORDER — SODIUM CHLORIDE 0.9 % (FLUSH) 0.9 %
10 SYRINGE (ML) INJECTION PRN
Status: CANCELLED | OUTPATIENT
Start: 2018-12-25

## 2018-12-25 RX ORDER — GUAIFENESIN 600 MG/1
600 TABLET, EXTENDED RELEASE ORAL DAILY
Status: DISCONTINUED | OUTPATIENT
Start: 2018-12-25 | End: 2018-12-29 | Stop reason: HOSPADM

## 2018-12-25 RX ORDER — METHYLPREDNISOLONE SODIUM SUCCINATE 40 MG/ML
40 INJECTION, POWDER, LYOPHILIZED, FOR SOLUTION INTRAMUSCULAR; INTRAVENOUS DAILY
Status: DISCONTINUED | OUTPATIENT
Start: 2018-12-26 | End: 2018-12-27

## 2018-12-25 RX ORDER — SODIUM CHLORIDE 0.9 % (FLUSH) 0.9 %
10 SYRINGE (ML) INJECTION EVERY 12 HOURS SCHEDULED
Status: CANCELLED | OUTPATIENT
Start: 2018-12-25

## 2018-12-25 RX ORDER — LISINOPRIL 5 MG/1
5 TABLET ORAL DAILY
Status: DISCONTINUED | OUTPATIENT
Start: 2018-12-25 | End: 2018-12-28

## 2018-12-25 RX ORDER — IBUPROFEN 200 MG
200 TABLET ORAL EVERY 6 HOURS PRN
Status: ON HOLD | COMMUNITY
End: 2018-12-29 | Stop reason: HOSPADM

## 2018-12-25 RX ADMIN — METHYLPREDNISOLONE SODIUM SUCCINATE 125 MG: 125 INJECTION, POWDER, FOR SOLUTION INTRAMUSCULAR; INTRAVENOUS at 18:43

## 2018-12-25 RX ADMIN — FUROSEMIDE 40 MG: 10 INJECTION, SOLUTION INTRAMUSCULAR; INTRAVENOUS at 18:45

## 2018-12-25 RX ADMIN — IPRATROPIUM BROMIDE AND ALBUTEROL SULFATE 1 AMPULE: .5; 3 SOLUTION RESPIRATORY (INHALATION) at 21:01

## 2018-12-25 RX ADMIN — IPRATROPIUM BROMIDE AND ALBUTEROL SULFATE 1 AMPULE: .5; 3 SOLUTION RESPIRATORY (INHALATION) at 17:57

## 2018-12-25 ASSESSMENT — ENCOUNTER SYMPTOMS
CHEST TIGHTNESS: 0
SORE THROAT: 0
VOMITING: 0
SHORTNESS OF BREATH: 1
CHOKING: 1
EYE PAIN: 0
ABDOMINAL PAIN: 0
WHEEZING: 1
NAUSEA: 0

## 2018-12-25 ASSESSMENT — PULMONARY FUNCTION TESTS: PEFR_L/MIN: 50

## 2018-12-25 NOTE — PROGRESS NOTES
tablet REORDER     Return in about 4 weeks (around 1/15/2019) for review BP log-  [facility] or [place]. Reviewed with the patient: current clinical status, medications, activities and diet. Side effects, adverse effects of the medication prescribed today, as well as treatment plan/ rationale and result expectations have been discussed with the patient who expresses understanding and desires to proceed. Close follow up to evaluate treatment results and for coordination of care. I have reviewed the patient's medical history in detail and updated the computerized patient record.     Elihu Rubinstein, JULIO - CNP

## 2018-12-26 ENCOUNTER — APPOINTMENT (OUTPATIENT)
Dept: CT IMAGING | Age: 83
DRG: 192 | End: 2018-12-26
Payer: MEDICARE

## 2018-12-26 ENCOUNTER — APPOINTMENT (OUTPATIENT)
Dept: ULTRASOUND IMAGING | Age: 83
DRG: 192 | End: 2018-12-26
Payer: MEDICARE

## 2018-12-26 LAB
ANION GAP SERPL CALCULATED.3IONS-SCNC: 17 MEQ/L (ref 7–13)
BASOPHILS ABSOLUTE: 0 K/UL (ref 0–0.2)
BASOPHILS RELATIVE PERCENT: 0.3 %
BUN BLDV-MCNC: 14 MG/DL (ref 8–23)
CALCIUM SERPL-MCNC: 9 MG/DL (ref 8.6–10.2)
CHLORIDE BLD-SCNC: 100 MEQ/L (ref 98–107)
CHOLESTEROL, TOTAL: 193 MG/DL (ref 0–199)
CO2: 24 MEQ/L (ref 22–29)
CREAT SERPL-MCNC: 0.79 MG/DL (ref 0.5–0.9)
EOSINOPHILS ABSOLUTE: 0 K/UL (ref 0–0.7)
EOSINOPHILS RELATIVE PERCENT: 0.2 %
GFR AFRICAN AMERICAN: >60
GFR NON-AFRICAN AMERICAN: >60
GLUCOSE BLD-MCNC: 161 MG/DL (ref 74–109)
HCT VFR BLD CALC: 41.2 % (ref 37–47)
HDLC SERPL-MCNC: 46 MG/DL (ref 40–59)
HEMOGLOBIN: 14.1 G/DL (ref 12–16)
LDL CHOLESTEROL CALCULATED: 128 MG/DL (ref 0–129)
LV EF: 60 %
LVEF MODALITY: NORMAL
LYMPHOCYTES ABSOLUTE: 0.9 K/UL (ref 1–4.8)
LYMPHOCYTES RELATIVE PERCENT: 10.3 %
MAGNESIUM: 2.3 MG/DL (ref 1.7–2.3)
MCH RBC QN AUTO: 30.4 PG (ref 27–31.3)
MCHC RBC AUTO-ENTMCNC: 34.1 % (ref 33–37)
MCV RBC AUTO: 89.3 FL (ref 82–100)
MONOCYTES ABSOLUTE: 0.1 K/UL (ref 0.2–0.8)
MONOCYTES RELATIVE PERCENT: 0.9 %
NEUTROPHILS ABSOLUTE: 7.7 K/UL (ref 1.4–6.5)
NEUTROPHILS RELATIVE PERCENT: 88.3 %
PDW BLD-RTO: 13.3 % (ref 11.5–14.5)
PLATELET # BLD: 193 K/UL (ref 130–400)
PLATELET SLIDE REVIEW: ADEQUATE
POTASSIUM SERPL-SCNC: 3.2 MEQ/L (ref 3.5–5.1)
RAPID INFLUENZA  B AGN: NEGATIVE
RAPID INFLUENZA A AGN: NEGATIVE
RBC # BLD: 4.62 M/UL (ref 4.2–5.4)
RBC # BLD: NORMAL 10*6/UL
SLIDE REVIEW: ABNORMAL
SODIUM BLD-SCNC: 141 MEQ/L (ref 132–144)
T4 FREE: 1.41 NG/DL (ref 0.93–1.7)
TRIGL SERPL-MCNC: 96 MG/DL (ref 0–200)
TROPONIN: <0.01 NG/ML (ref 0–0.01)
TROPONIN: <0.01 NG/ML (ref 0–0.01)
TSH SERPL DL<=0.05 MIU/L-ACNC: 0.63 UIU/ML (ref 0.27–4.2)
WBC # BLD: 8.7 K/UL (ref 4.8–10.8)

## 2018-12-26 PROCEDURE — 93306 TTE W/DOPPLER COMPLETE: CPT

## 2018-12-26 PROCEDURE — 6370000000 HC RX 637 (ALT 250 FOR IP): Performed by: PHYSICIAN ASSISTANT

## 2018-12-26 PROCEDURE — 80048 BASIC METABOLIC PNL TOTAL CA: CPT

## 2018-12-26 PROCEDURE — 99223 1ST HOSP IP/OBS HIGH 75: CPT | Performed by: INTERNAL MEDICINE

## 2018-12-26 PROCEDURE — 6360000004 HC RX CONTRAST MEDICATION: Performed by: RADIOLOGY

## 2018-12-26 PROCEDURE — 2580000003 HC RX 258: Performed by: NURSE PRACTITIONER

## 2018-12-26 PROCEDURE — 6360000002 HC RX W HCPCS: Performed by: NURSE PRACTITIONER

## 2018-12-26 PROCEDURE — 87633 RESP VIRUS 12-25 TARGETS: CPT

## 2018-12-26 PROCEDURE — G8979 MOBILITY GOAL STATUS: HCPCS

## 2018-12-26 PROCEDURE — 94640 AIRWAY INHALATION TREATMENT: CPT

## 2018-12-26 PROCEDURE — 97162 PT EVAL MOD COMPLEX 30 MIN: CPT

## 2018-12-26 PROCEDURE — 6370000000 HC RX 637 (ALT 250 FOR IP): Performed by: NURSE PRACTITIONER

## 2018-12-26 PROCEDURE — 6370000000 HC RX 637 (ALT 250 FOR IP): Performed by: INTERNAL MEDICINE

## 2018-12-26 PROCEDURE — G8987 SELF CARE CURRENT STATUS: HCPCS

## 2018-12-26 PROCEDURE — 87804 INFLUENZA ASSAY W/OPTIC: CPT

## 2018-12-26 PROCEDURE — 71275 CT ANGIOGRAPHY CHEST: CPT

## 2018-12-26 PROCEDURE — 80061 LIPID PANEL: CPT

## 2018-12-26 PROCEDURE — G8978 MOBILITY CURRENT STATUS: HCPCS

## 2018-12-26 PROCEDURE — G8988 SELF CARE GOAL STATUS: HCPCS

## 2018-12-26 PROCEDURE — 85025 COMPLETE CBC W/AUTO DIFF WBC: CPT

## 2018-12-26 PROCEDURE — 99222 1ST HOSP IP/OBS MODERATE 55: CPT | Performed by: INTERNAL MEDICINE

## 2018-12-26 PROCEDURE — 93970 EXTREMITY STUDY: CPT

## 2018-12-26 PROCEDURE — 97166 OT EVAL MOD COMPLEX 45 MIN: CPT

## 2018-12-26 PROCEDURE — 83735 ASSAY OF MAGNESIUM: CPT

## 2018-12-26 PROCEDURE — 94664 DEMO&/EVAL PT USE INHALER: CPT

## 2018-12-26 PROCEDURE — 2060000000 HC ICU INTERMEDIATE R&B

## 2018-12-26 PROCEDURE — 36415 COLL VENOUS BLD VENIPUNCTURE: CPT

## 2018-12-26 PROCEDURE — 93010 ELECTROCARDIOGRAM REPORT: CPT | Performed by: INTERNAL MEDICINE

## 2018-12-26 PROCEDURE — 84484 ASSAY OF TROPONIN QUANT: CPT

## 2018-12-26 PROCEDURE — 2700000000 HC OXYGEN THERAPY PER DAY

## 2018-12-26 RX ORDER — IPRATROPIUM BROMIDE AND ALBUTEROL SULFATE 2.5; .5 MG/3ML; MG/3ML
1 SOLUTION RESPIRATORY (INHALATION) EVERY 4 HOURS PRN
Status: DISCONTINUED | OUTPATIENT
Start: 2018-12-26 | End: 2018-12-29 | Stop reason: HOSPADM

## 2018-12-26 RX ORDER — LATANOPROST 50 UG/ML
1 SOLUTION/ DROPS OPHTHALMIC DAILY
Status: DISCONTINUED | OUTPATIENT
Start: 2018-12-26 | End: 2018-12-29 | Stop reason: HOSPADM

## 2018-12-26 RX ORDER — BRIMONIDINE TARTRATE 2 MG/ML
1 SOLUTION/ DROPS OPHTHALMIC 2 TIMES DAILY
Status: DISCONTINUED | OUTPATIENT
Start: 2018-12-26 | End: 2018-12-29 | Stop reason: HOSPADM

## 2018-12-26 RX ORDER — POTASSIUM CHLORIDE 20 MEQ/1
40 TABLET, EXTENDED RELEASE ORAL 2 TIMES DAILY WITH MEALS
Status: DISCONTINUED | OUTPATIENT
Start: 2018-12-26 | End: 2018-12-28

## 2018-12-26 RX ADMIN — METHYLPREDNISOLONE SODIUM SUCCINATE 40 MG: 40 INJECTION, POWDER, FOR SOLUTION INTRAMUSCULAR; INTRAVENOUS at 09:57

## 2018-12-26 RX ADMIN — GUAIFENESIN 600 MG: 600 TABLET, EXTENDED RELEASE ORAL at 00:43

## 2018-12-26 RX ADMIN — AZITHROMYCIN MONOHYDRATE 500 MG: 500 INJECTION, POWDER, LYOPHILIZED, FOR SOLUTION INTRAVENOUS at 23:30

## 2018-12-26 RX ADMIN — IOPAMIDOL 100 ML: 612 INJECTION, SOLUTION INTRAVENOUS at 00:17

## 2018-12-26 RX ADMIN — ALBUTEROL SULFATE 2.5 MG: 2.5 SOLUTION RESPIRATORY (INHALATION) at 19:23

## 2018-12-26 RX ADMIN — FUROSEMIDE 40 MG: 10 INJECTION, SOLUTION INTRAMUSCULAR; INTRAVENOUS at 09:57

## 2018-12-26 RX ADMIN — ALBUTEROL SULFATE 2.5 MG: 2.5 SOLUTION RESPIRATORY (INHALATION) at 07:10

## 2018-12-26 RX ADMIN — LISINOPRIL 5 MG: 5 TABLET ORAL at 00:43

## 2018-12-26 RX ADMIN — LATANOPROST 1 DROP: 50 SOLUTION/ DROPS OPHTHALMIC at 15:02

## 2018-12-26 RX ADMIN — Medication 10 ML: at 15:03

## 2018-12-26 RX ADMIN — BRIMONIDINE TARTRATE 1 DROP: 2 SOLUTION OPHTHALMIC at 15:02

## 2018-12-26 RX ADMIN — POTASSIUM CHLORIDE 40 MEQ: 20 TABLET, EXTENDED RELEASE ORAL at 16:52

## 2018-12-26 RX ADMIN — ALBUTEROL SULFATE 2.5 MG: 2.5 SOLUTION RESPIRATORY (INHALATION) at 13:47

## 2018-12-26 RX ADMIN — GUAIFENESIN 600 MG: 600 TABLET, EXTENDED RELEASE ORAL at 09:56

## 2018-12-26 RX ADMIN — Medication 10 ML: at 20:15

## 2018-12-26 RX ADMIN — BRIMONIDINE TARTRATE 1 DROP: 2 SOLUTION OPHTHALMIC at 20:15

## 2018-12-26 RX ADMIN — Medication 10 ML: at 00:43

## 2018-12-26 RX ADMIN — LISINOPRIL 5 MG: 5 TABLET ORAL at 09:57

## 2018-12-26 RX ADMIN — AZITHROMYCIN MONOHYDRATE 500 MG: 500 INJECTION, POWDER, LYOPHILIZED, FOR SOLUTION INTRAVENOUS at 00:43

## 2018-12-26 RX ADMIN — POTASSIUM CHLORIDE 40 MEQ: 20 TABLET, EXTENDED RELEASE ORAL at 09:56

## 2018-12-26 ASSESSMENT — ENCOUNTER SYMPTOMS
SHORTNESS OF BREATH: 1
CHEST TIGHTNESS: 0
STRIDOR: 0
GASTROINTESTINAL NEGATIVE: 1
COUGH: 1
BLOOD IN STOOL: 0
EYES NEGATIVE: 1
WHEEZING: 1
NAUSEA: 0

## 2018-12-27 ENCOUNTER — APPOINTMENT (OUTPATIENT)
Dept: GENERAL RADIOLOGY | Age: 83
DRG: 192 | End: 2018-12-27
Payer: MEDICARE

## 2018-12-27 LAB
ADENOVIRUS SPECIES BE: NOT DETECTED
ADENOVIRUS SPECIES C: NOT DETECTED
ANION GAP SERPL CALCULATED.3IONS-SCNC: 14 MEQ/L (ref 7–13)
BUN BLDV-MCNC: 33 MG/DL (ref 8–23)
CALCIUM SERPL-MCNC: 8.9 MG/DL (ref 8.6–10.2)
CHLORIDE BLD-SCNC: 106 MEQ/L (ref 98–107)
CO2: 25 MEQ/L (ref 22–29)
CREAT SERPL-MCNC: 1.11 MG/DL (ref 0.5–0.9)
GFR AFRICAN AMERICAN: 55.8
GFR NON-AFRICAN AMERICAN: 46.1
GLUCOSE BLD-MCNC: 112 MG/DL (ref 74–109)
HCT VFR BLD CALC: 39.2 % (ref 37–47)
HEMOGLOBIN: 13.4 G/DL (ref 12–16)
HUMAN METAPNEUMOVIRUS PCR: NOT DETECTED
INFLUENZA A BY PCR: NOT DETECTED
INFLUENZA A H1 (2009) PCR: NOT DETECTED
INFLUENZA A H1 (2009) PCR: NOT DETECTED
INFLUENZA A H3 PCR: NOT DETECTED
INFLUENZA B BY PCR: NOT DETECTED
MCH RBC QN AUTO: 30.5 PG (ref 27–31.3)
MCHC RBC AUTO-ENTMCNC: 34.3 % (ref 33–37)
MCV RBC AUTO: 89.1 FL (ref 82–100)
PARAINFLUENZA 2: NOT DETECTED
PARAINFLUENZA 3: NOT DETECTED
PARAINFLUENZA1: NOT DETECTED
PDW BLD-RTO: 13.6 % (ref 11.5–14.5)
PLATELET # BLD: 191 K/UL (ref 130–400)
POTASSIUM SERPL-SCNC: 3.5 MEQ/L (ref 3.5–5.1)
RBC # BLD: 4.4 M/UL (ref 4.2–5.4)
RHINOVIRUS RNA XXX PCR: NOT DETECTED
RSV A AB BY PCR: NOT DETECTED
RSV B AB BY PCR: NOT DETECTED
RVP SOURCE: NORMAL
SODIUM BLD-SCNC: 145 MEQ/L (ref 132–144)
WBC # BLD: 10.3 K/UL (ref 4.8–10.8)

## 2018-12-27 PROCEDURE — 6370000000 HC RX 637 (ALT 250 FOR IP): Performed by: INTERNAL MEDICINE

## 2018-12-27 PROCEDURE — 2060000000 HC ICU INTERMEDIATE R&B

## 2018-12-27 PROCEDURE — G8996 SWALLOW CURRENT STATUS: HCPCS

## 2018-12-27 PROCEDURE — 6360000002 HC RX W HCPCS: Performed by: NURSE PRACTITIONER

## 2018-12-27 PROCEDURE — 99232 SBSQ HOSP IP/OBS MODERATE 35: CPT | Performed by: INTERNAL MEDICINE

## 2018-12-27 PROCEDURE — 94640 AIRWAY INHALATION TREATMENT: CPT

## 2018-12-27 PROCEDURE — 71046 X-RAY EXAM CHEST 2 VIEWS: CPT

## 2018-12-27 PROCEDURE — G8997 SWALLOW GOAL STATUS: HCPCS

## 2018-12-27 PROCEDURE — 6370000000 HC RX 637 (ALT 250 FOR IP): Performed by: NURSE PRACTITIONER

## 2018-12-27 PROCEDURE — 92610 EVALUATE SWALLOWING FUNCTION: CPT

## 2018-12-27 PROCEDURE — 2580000003 HC RX 258: Performed by: NURSE PRACTITIONER

## 2018-12-27 PROCEDURE — 6360000002 HC RX W HCPCS: Performed by: INTERNAL MEDICINE

## 2018-12-27 PROCEDURE — 80048 BASIC METABOLIC PNL TOTAL CA: CPT

## 2018-12-27 PROCEDURE — 2700000000 HC OXYGEN THERAPY PER DAY

## 2018-12-27 PROCEDURE — 85027 COMPLETE CBC AUTOMATED: CPT

## 2018-12-27 PROCEDURE — 36415 COLL VENOUS BLD VENIPUNCTURE: CPT

## 2018-12-27 RX ORDER — METHYLPREDNISOLONE SODIUM SUCCINATE 40 MG/ML
40 INJECTION, POWDER, LYOPHILIZED, FOR SOLUTION INTRAMUSCULAR; INTRAVENOUS EVERY 8 HOURS
Status: DISCONTINUED | OUTPATIENT
Start: 2018-12-27 | End: 2018-12-29 | Stop reason: HOSPADM

## 2018-12-27 RX ORDER — MONTELUKAST SODIUM 10 MG/1
10 TABLET ORAL NIGHTLY
Status: DISCONTINUED | OUTPATIENT
Start: 2018-12-27 | End: 2018-12-29 | Stop reason: HOSPADM

## 2018-12-27 RX ADMIN — BRIMONIDINE TARTRATE 1 DROP: 2 SOLUTION OPHTHALMIC at 09:53

## 2018-12-27 RX ADMIN — BRIMONIDINE TARTRATE 1 DROP: 2 SOLUTION OPHTHALMIC at 20:38

## 2018-12-27 RX ADMIN — MONTELUKAST SODIUM 10 MG: 10 TABLET, FILM COATED ORAL at 20:37

## 2018-12-27 RX ADMIN — AZITHROMYCIN MONOHYDRATE 500 MG: 500 INJECTION, POWDER, LYOPHILIZED, FOR SOLUTION INTRAVENOUS at 23:55

## 2018-12-27 RX ADMIN — ALBUTEROL SULFATE 2.5 MG: 2.5 SOLUTION RESPIRATORY (INHALATION) at 13:43

## 2018-12-27 RX ADMIN — METHYLPREDNISOLONE SODIUM SUCCINATE 40 MG: 40 INJECTION, POWDER, FOR SOLUTION INTRAMUSCULAR; INTRAVENOUS at 18:36

## 2018-12-27 RX ADMIN — POTASSIUM CHLORIDE 40 MEQ: 20 TABLET, EXTENDED RELEASE ORAL at 18:36

## 2018-12-27 RX ADMIN — ALBUTEROL SULFATE 2.5 MG: 2.5 SOLUTION RESPIRATORY (INHALATION) at 19:06

## 2018-12-27 RX ADMIN — Medication 10 ML: at 09:58

## 2018-12-27 RX ADMIN — POTASSIUM CHLORIDE 40 MEQ: 20 TABLET, EXTENDED RELEASE ORAL at 09:52

## 2018-12-27 RX ADMIN — FUROSEMIDE 40 MG: 10 INJECTION, SOLUTION INTRAMUSCULAR; INTRAVENOUS at 09:51

## 2018-12-27 RX ADMIN — LATANOPROST 1 DROP: 50 SOLUTION/ DROPS OPHTHALMIC at 09:53

## 2018-12-27 RX ADMIN — Medication 10 ML: at 20:38

## 2018-12-27 RX ADMIN — METHYLPREDNISOLONE SODIUM SUCCINATE 40 MG: 40 INJECTION, POWDER, FOR SOLUTION INTRAMUSCULAR; INTRAVENOUS at 09:51

## 2018-12-27 RX ADMIN — METHYLPREDNISOLONE SODIUM SUCCINATE 40 MG: 40 INJECTION, POWDER, FOR SOLUTION INTRAMUSCULAR; INTRAVENOUS at 23:55

## 2018-12-27 RX ADMIN — LISINOPRIL 5 MG: 5 TABLET ORAL at 09:52

## 2018-12-27 RX ADMIN — GUAIFENESIN 600 MG: 600 TABLET, EXTENDED RELEASE ORAL at 09:52

## 2018-12-27 RX ADMIN — ALBUTEROL SULFATE 2.5 MG: 2.5 SOLUTION RESPIRATORY (INHALATION) at 07:18

## 2018-12-27 RX ADMIN — IPRATROPIUM BROMIDE AND ALBUTEROL SULFATE 1 AMPULE: 2.5; .5 SOLUTION RESPIRATORY (INHALATION) at 09:58

## 2018-12-27 RX ADMIN — METHYLPREDNISOLONE SODIUM SUCCINATE 40 MG: 40 INJECTION, POWDER, FOR SOLUTION INTRAMUSCULAR; INTRAVENOUS at 09:57

## 2018-12-27 ASSESSMENT — ENCOUNTER SYMPTOMS
EYES NEGATIVE: 1
CHEST TIGHTNESS: 0
COUGH: 1
STRIDOR: 0
NAUSEA: 0
WHEEZING: 0
GASTROINTESTINAL NEGATIVE: 1
SHORTNESS OF BREATH: 0
BLOOD IN STOOL: 0

## 2018-12-27 ASSESSMENT — PAIN SCALES - GENERAL: PAINLEVEL_OUTOF10: 0

## 2018-12-28 LAB
ALBUMIN SERPL-MCNC: 4 G/DL (ref 3.9–4.9)
ALP BLD-CCNC: 65 U/L (ref 40–130)
ALT SERPL-CCNC: 14 U/L (ref 0–33)
ANION GAP SERPL CALCULATED.3IONS-SCNC: 12 MEQ/L (ref 7–13)
AST SERPL-CCNC: 11 U/L (ref 0–35)
BILIRUB SERPL-MCNC: 0.4 MG/DL (ref 0–1.2)
BUN BLDV-MCNC: 33 MG/DL (ref 8–23)
CALCIUM SERPL-MCNC: 9.1 MG/DL (ref 8.6–10.2)
CHLORIDE BLD-SCNC: 103 MEQ/L (ref 98–107)
CO2: 25 MEQ/L (ref 22–29)
CREAT SERPL-MCNC: 0.7 MG/DL (ref 0.5–0.9)
GFR AFRICAN AMERICAN: >60
GFR NON-AFRICAN AMERICAN: >60
GLOBULIN: 2.7 G/DL (ref 2.3–3.5)
GLUCOSE BLD-MCNC: 131 MG/DL (ref 74–109)
HCT VFR BLD CALC: 41.6 % (ref 37–47)
HEMOGLOBIN: 14.1 G/DL (ref 12–16)
MCH RBC QN AUTO: 30.3 PG (ref 27–31.3)
MCHC RBC AUTO-ENTMCNC: 33.9 % (ref 33–37)
MCV RBC AUTO: 89.4 FL (ref 82–100)
PDW BLD-RTO: 13.9 % (ref 11.5–14.5)
PLATELET # BLD: 198 K/UL (ref 130–400)
POTASSIUM SERPL-SCNC: 4.6 MEQ/L (ref 3.5–5.1)
RBC # BLD: 4.65 M/UL (ref 4.2–5.4)
SODIUM BLD-SCNC: 140 MEQ/L (ref 132–144)
TOTAL PROTEIN: 6.7 G/DL (ref 6.4–8.1)
WBC # BLD: 10.9 K/UL (ref 4.8–10.8)

## 2018-12-28 PROCEDURE — 6370000000 HC RX 637 (ALT 250 FOR IP): Performed by: INTERNAL MEDICINE

## 2018-12-28 PROCEDURE — 94760 N-INVAS EAR/PLS OXIMETRY 1: CPT

## 2018-12-28 PROCEDURE — 2060000000 HC ICU INTERMEDIATE R&B

## 2018-12-28 PROCEDURE — 94640 AIRWAY INHALATION TREATMENT: CPT

## 2018-12-28 PROCEDURE — 36415 COLL VENOUS BLD VENIPUNCTURE: CPT

## 2018-12-28 PROCEDURE — 99232 SBSQ HOSP IP/OBS MODERATE 35: CPT | Performed by: INTERNAL MEDICINE

## 2018-12-28 PROCEDURE — 97116 GAIT TRAINING THERAPY: CPT

## 2018-12-28 PROCEDURE — 85027 COMPLETE CBC AUTOMATED: CPT

## 2018-12-28 PROCEDURE — 2580000003 HC RX 258: Performed by: NURSE PRACTITIONER

## 2018-12-28 PROCEDURE — 6360000002 HC RX W HCPCS: Performed by: NURSE PRACTITIONER

## 2018-12-28 PROCEDURE — 80053 COMPREHEN METABOLIC PANEL: CPT

## 2018-12-28 PROCEDURE — 2700000000 HC OXYGEN THERAPY PER DAY

## 2018-12-28 PROCEDURE — 97535 SELF CARE MNGMENT TRAINING: CPT

## 2018-12-28 PROCEDURE — 6360000002 HC RX W HCPCS: Performed by: INTERNAL MEDICINE

## 2018-12-28 PROCEDURE — 6370000000 HC RX 637 (ALT 250 FOR IP): Performed by: NURSE PRACTITIONER

## 2018-12-28 PROCEDURE — 94667 MNPJ CHEST WALL 1ST: CPT

## 2018-12-28 RX ORDER — AZITHROMYCIN 250 MG/1
250 TABLET, FILM COATED ORAL SEE ADMIN INSTRUCTIONS
Qty: 6 TABLET | Refills: 0 | Status: SHIPPED | OUTPATIENT
Start: 2018-12-28 | End: 2019-01-02

## 2018-12-28 RX ORDER — GUAIFENESIN 600 MG/1
600 TABLET, EXTENDED RELEASE ORAL DAILY
Qty: 10 TABLET | Refills: 0 | Status: SHIPPED | OUTPATIENT
Start: 2018-12-29 | End: 2019-03-28

## 2018-12-28 RX ORDER — PREDNISONE 10 MG/1
TABLET ORAL
Qty: 30 TABLET | Refills: 0 | Status: SHIPPED | OUTPATIENT
Start: 2018-12-28 | End: 2019-03-28 | Stop reason: ALTCHOICE

## 2018-12-28 RX ORDER — ASPIRIN 81 MG/1
81 TABLET ORAL DAILY
Status: DISCONTINUED | OUTPATIENT
Start: 2018-12-28 | End: 2018-12-29 | Stop reason: HOSPADM

## 2018-12-28 RX ORDER — POTASSIUM CHLORIDE 20 MEQ/1
20 TABLET, EXTENDED RELEASE ORAL
Status: DISCONTINUED | OUTPATIENT
Start: 2018-12-28 | End: 2018-12-29 | Stop reason: HOSPADM

## 2018-12-28 RX ORDER — LOSARTAN POTASSIUM 25 MG/1
25 TABLET ORAL DAILY
Status: DISCONTINUED | OUTPATIENT
Start: 2018-12-28 | End: 2018-12-29 | Stop reason: HOSPADM

## 2018-12-28 RX ORDER — FUROSEMIDE 40 MG/1
40 TABLET ORAL DAILY
Status: DISCONTINUED | OUTPATIENT
Start: 2018-12-28 | End: 2018-12-29 | Stop reason: HOSPADM

## 2018-12-28 RX ORDER — ALBUTEROL SULFATE 90 UG/1
2 AEROSOL, METERED RESPIRATORY (INHALATION) 4 TIMES DAILY PRN
Qty: 1 INHALER | Refills: 0 | Status: SHIPPED | OUTPATIENT
Start: 2018-12-28 | End: 2019-05-22 | Stop reason: SDUPTHER

## 2018-12-28 RX ADMIN — POTASSIUM CHLORIDE 20 MEQ: 20 TABLET, EXTENDED RELEASE ORAL at 09:30

## 2018-12-28 RX ADMIN — ALBUTEROL SULFATE 2.5 MG: 2.5 SOLUTION RESPIRATORY (INHALATION) at 13:24

## 2018-12-28 RX ADMIN — LATANOPROST 1 DROP: 50 SOLUTION/ DROPS OPHTHALMIC at 09:31

## 2018-12-28 RX ADMIN — ASPIRIN 81 MG: 81 TABLET, COATED ORAL at 09:30

## 2018-12-28 RX ADMIN — BRIMONIDINE TARTRATE 1 DROP: 2 SOLUTION OPHTHALMIC at 09:31

## 2018-12-28 RX ADMIN — METHYLPREDNISOLONE SODIUM SUCCINATE 40 MG: 40 INJECTION, POWDER, FOR SOLUTION INTRAMUSCULAR; INTRAVENOUS at 17:53

## 2018-12-28 RX ADMIN — METHYLPREDNISOLONE SODIUM SUCCINATE 40 MG: 40 INJECTION, POWDER, FOR SOLUTION INTRAMUSCULAR; INTRAVENOUS at 09:31

## 2018-12-28 RX ADMIN — ALBUTEROL SULFATE 2.5 MG: 2.5 SOLUTION RESPIRATORY (INHALATION) at 19:53

## 2018-12-28 RX ADMIN — LISINOPRIL 5 MG: 5 TABLET ORAL at 09:30

## 2018-12-28 RX ADMIN — MONTELUKAST SODIUM 10 MG: 10 TABLET, FILM COATED ORAL at 19:43

## 2018-12-28 RX ADMIN — ALBUTEROL SULFATE 2.5 MG: 2.5 SOLUTION RESPIRATORY (INHALATION) at 07:08

## 2018-12-28 RX ADMIN — Medication 10 ML: at 19:43

## 2018-12-28 RX ADMIN — BRIMONIDINE TARTRATE 1 DROP: 2 SOLUTION OPHTHALMIC at 19:43

## 2018-12-28 RX ADMIN — GUAIFENESIN 600 MG: 600 TABLET, EXTENDED RELEASE ORAL at 09:31

## 2018-12-28 RX ADMIN — Medication 10 ML: at 10:11

## 2018-12-28 RX ADMIN — LOSARTAN POTASSIUM 25 MG: 25 TABLET ORAL at 17:53

## 2018-12-28 RX ADMIN — FUROSEMIDE 40 MG: 40 TABLET ORAL at 09:30

## 2018-12-28 ASSESSMENT — ENCOUNTER SYMPTOMS
SHORTNESS OF BREATH: 1
NAUSEA: 0
VOMITING: 0
CHEST TIGHTNESS: 0
WHEEZING: 0
BLOOD IN STOOL: 0
COUGH: 1
GASTROINTESTINAL NEGATIVE: 1
EYES NEGATIVE: 1
STRIDOR: 0

## 2018-12-28 ASSESSMENT — PAIN SCALES - GENERAL: PAINLEVEL_OUTOF10: 0

## 2018-12-29 ENCOUNTER — APPOINTMENT (OUTPATIENT)
Dept: GENERAL RADIOLOGY | Age: 83
DRG: 192 | End: 2018-12-29
Payer: MEDICARE

## 2018-12-29 VITALS
TEMPERATURE: 98.2 F | HEIGHT: 63 IN | BODY MASS INDEX: 32.8 KG/M2 | SYSTOLIC BLOOD PRESSURE: 144 MMHG | HEART RATE: 83 BPM | RESPIRATION RATE: 20 BRPM | OXYGEN SATURATION: 93 % | WEIGHT: 185.1 LBS | DIASTOLIC BLOOD PRESSURE: 70 MMHG

## 2018-12-29 LAB
ANION GAP SERPL CALCULATED.3IONS-SCNC: 13 MEQ/L (ref 7–13)
BUN BLDV-MCNC: 35 MG/DL (ref 8–23)
CALCIUM SERPL-MCNC: 9 MG/DL (ref 8.6–10.2)
CHLORIDE BLD-SCNC: 102 MEQ/L (ref 98–107)
CO2: 25 MEQ/L (ref 22–29)
CREAT SERPL-MCNC: 0.95 MG/DL (ref 0.5–0.9)
GFR AFRICAN AMERICAN: >60
GFR NON-AFRICAN AMERICAN: 55.2
GLUCOSE BLD-MCNC: 128 MG/DL (ref 74–109)
POTASSIUM SERPL-SCNC: 4.4 MEQ/L (ref 3.5–5.1)
SODIUM BLD-SCNC: 140 MEQ/L (ref 132–144)

## 2018-12-29 PROCEDURE — 6370000000 HC RX 637 (ALT 250 FOR IP): Performed by: INTERNAL MEDICINE

## 2018-12-29 PROCEDURE — 94618 PULMONARY STRESS TESTING: CPT

## 2018-12-29 PROCEDURE — 6360000002 HC RX W HCPCS: Performed by: NURSE PRACTITIONER

## 2018-12-29 PROCEDURE — 36415 COLL VENOUS BLD VENIPUNCTURE: CPT

## 2018-12-29 PROCEDURE — 94664 DEMO&/EVAL PT USE INHALER: CPT

## 2018-12-29 PROCEDURE — 94668 MNPJ CHEST WALL SBSQ: CPT

## 2018-12-29 PROCEDURE — 6370000000 HC RX 637 (ALT 250 FOR IP): Performed by: NURSE PRACTITIONER

## 2018-12-29 PROCEDURE — 80048 BASIC METABOLIC PNL TOTAL CA: CPT

## 2018-12-29 PROCEDURE — 2700000000 HC OXYGEN THERAPY PER DAY

## 2018-12-29 PROCEDURE — 71046 X-RAY EXAM CHEST 2 VIEWS: CPT

## 2018-12-29 PROCEDURE — 99232 SBSQ HOSP IP/OBS MODERATE 35: CPT | Performed by: INTERNAL MEDICINE

## 2018-12-29 PROCEDURE — 2580000003 HC RX 258: Performed by: NURSE PRACTITIONER

## 2018-12-29 PROCEDURE — 94640 AIRWAY INHALATION TREATMENT: CPT

## 2018-12-29 PROCEDURE — 6360000002 HC RX W HCPCS: Performed by: INTERNAL MEDICINE

## 2018-12-29 RX ORDER — ALBUTEROL SULFATE 90 UG/1
2 AEROSOL, METERED RESPIRATORY (INHALATION) EVERY 6 HOURS PRN
Qty: 1 INHALER | Refills: 3 | Status: SHIPPED | OUTPATIENT
Start: 2018-12-29 | End: 2019-05-22 | Stop reason: SDUPTHER

## 2018-12-29 RX ORDER — IPRATROPIUM BROMIDE AND ALBUTEROL SULFATE 2.5; .5 MG/3ML; MG/3ML
1 SOLUTION RESPIRATORY (INHALATION) EVERY 6 HOURS PRN
Qty: 360 ML | Refills: 5 | Status: SHIPPED | OUTPATIENT
Start: 2018-12-29 | End: 2021-01-08 | Stop reason: SDUPTHER

## 2018-12-29 RX ORDER — MONTELUKAST SODIUM 10 MG/1
10 TABLET ORAL NIGHTLY
Qty: 30 TABLET | Refills: 3 | Status: SHIPPED | OUTPATIENT
Start: 2018-12-29 | End: 2019-07-11 | Stop reason: SDUPTHER

## 2018-12-29 RX ORDER — FUROSEMIDE 40 MG/1
40 TABLET ORAL DAILY
Qty: 60 TABLET | Refills: 3 | Status: ON HOLD | OUTPATIENT
Start: 2018-12-30 | End: 2020-07-08

## 2018-12-29 RX ORDER — POTASSIUM CHLORIDE 20 MEQ/1
20 TABLET, EXTENDED RELEASE ORAL
Qty: 60 TABLET | Refills: 3 | Status: SHIPPED | OUTPATIENT
Start: 2018-12-30 | End: 2019-08-27 | Stop reason: SDUPTHER

## 2018-12-29 RX ORDER — LOSARTAN POTASSIUM 25 MG/1
25 TABLET ORAL DAILY
Qty: 30 TABLET | Refills: 3 | Status: SHIPPED | OUTPATIENT
Start: 2018-12-30 | End: 2019-04-29 | Stop reason: SDUPTHER

## 2018-12-29 RX ORDER — ASPIRIN 81 MG/1
81 TABLET ORAL DAILY
Qty: 30 TABLET | Refills: 3 | Status: SHIPPED | OUTPATIENT
Start: 2018-12-30 | End: 2021-01-08 | Stop reason: CLARIF

## 2018-12-29 RX ADMIN — ALBUTEROL SULFATE 2.5 MG: 2.5 SOLUTION RESPIRATORY (INHALATION) at 13:39

## 2018-12-29 RX ADMIN — FUROSEMIDE 40 MG: 40 TABLET ORAL at 09:46

## 2018-12-29 RX ADMIN — METHYLPREDNISOLONE SODIUM SUCCINATE 40 MG: 40 INJECTION, POWDER, FOR SOLUTION INTRAMUSCULAR; INTRAVENOUS at 03:44

## 2018-12-29 RX ADMIN — POTASSIUM CHLORIDE 20 MEQ: 20 TABLET, EXTENDED RELEASE ORAL at 09:46

## 2018-12-29 RX ADMIN — LOSARTAN POTASSIUM 25 MG: 25 TABLET ORAL at 09:46

## 2018-12-29 RX ADMIN — METHYLPREDNISOLONE SODIUM SUCCINATE 40 MG: 40 INJECTION, POWDER, FOR SOLUTION INTRAMUSCULAR; INTRAVENOUS at 14:20

## 2018-12-29 RX ADMIN — ALBUTEROL SULFATE 2.5 MG: 2.5 SOLUTION RESPIRATORY (INHALATION) at 08:43

## 2018-12-29 RX ADMIN — LATANOPROST 1 DROP: 50 SOLUTION/ DROPS OPHTHALMIC at 09:45

## 2018-12-29 RX ADMIN — Medication 10 ML: at 14:21

## 2018-12-29 RX ADMIN — BRIMONIDINE TARTRATE 1 DROP: 2 SOLUTION OPHTHALMIC at 09:45

## 2018-12-29 RX ADMIN — GUAIFENESIN 600 MG: 600 TABLET, EXTENDED RELEASE ORAL at 09:46

## 2018-12-29 RX ADMIN — ASPIRIN 81 MG: 81 TABLET, COATED ORAL at 09:46

## 2018-12-29 ASSESSMENT — ENCOUNTER SYMPTOMS
BLOOD IN STOOL: 0
NAUSEA: 0
SHORTNESS OF BREATH: 1
STRIDOR: 0
GASTROINTESTINAL NEGATIVE: 1
COUGH: 0
CHEST TIGHTNESS: 0
EYES NEGATIVE: 1
WHEEZING: 0

## 2018-12-30 LAB
BLOOD CULTURE, ROUTINE: NORMAL
CULTURE, BLOOD 2: NORMAL

## 2018-12-31 ENCOUNTER — TELEPHONE (OUTPATIENT)
Dept: FAMILY MEDICINE CLINIC | Age: 83
End: 2018-12-31

## 2019-01-07 ENCOUNTER — TELEPHONE (OUTPATIENT)
Dept: FAMILY MEDICINE CLINIC | Age: 84
End: 2019-01-07

## 2019-01-08 ENCOUNTER — OFFICE VISIT (OUTPATIENT)
Dept: FAMILY MEDICINE CLINIC | Age: 84
End: 2019-01-08
Payer: MEDICARE

## 2019-01-08 VITALS
WEIGHT: 180 LBS | OXYGEN SATURATION: 96 % | HEIGHT: 63 IN | RESPIRATION RATE: 12 BRPM | DIASTOLIC BLOOD PRESSURE: 62 MMHG | SYSTOLIC BLOOD PRESSURE: 118 MMHG | BODY MASS INDEX: 31.89 KG/M2 | TEMPERATURE: 97.8 F | HEART RATE: 86 BPM

## 2019-01-08 DIAGNOSIS — J44.0 COPD (CHRONIC OBSTRUCTIVE PULMONARY DISEASE) WITH ACUTE BRONCHITIS (HCC): Primary | ICD-10-CM

## 2019-01-08 DIAGNOSIS — J20.9 COPD (CHRONIC OBSTRUCTIVE PULMONARY DISEASE) WITH ACUTE BRONCHITIS (HCC): Primary | ICD-10-CM

## 2019-01-08 PROCEDURE — 99213 OFFICE O/P EST LOW 20 MIN: CPT | Performed by: FAMILY MEDICINE

## 2019-01-08 ASSESSMENT — ENCOUNTER SYMPTOMS
COUGH: 1
SORE THROAT: 0
GASTROINTESTINAL NEGATIVE: 1
HEARTBURN: 0
RHINORRHEA: 0
TROUBLE SWALLOWING: 0

## 2019-01-08 ASSESSMENT — COPD QUESTIONNAIRES: COPD: 1

## 2019-03-19 ENCOUNTER — TELEPHONE (OUTPATIENT)
Dept: FAMILY MEDICINE CLINIC | Age: 84
End: 2019-03-19

## 2019-03-28 ENCOUNTER — OFFICE VISIT (OUTPATIENT)
Dept: FAMILY MEDICINE CLINIC | Age: 84
End: 2019-03-28
Payer: MEDICARE

## 2019-03-28 ENCOUNTER — TELEPHONE (OUTPATIENT)
Dept: FAMILY MEDICINE CLINIC | Age: 84
End: 2019-03-28

## 2019-03-28 VITALS
BODY MASS INDEX: 31.89 KG/M2 | OXYGEN SATURATION: 96 % | HEART RATE: 83 BPM | SYSTOLIC BLOOD PRESSURE: 124 MMHG | HEIGHT: 63 IN | WEIGHT: 180 LBS | DIASTOLIC BLOOD PRESSURE: 78 MMHG | RESPIRATION RATE: 15 BRPM | TEMPERATURE: 97.9 F

## 2019-03-28 DIAGNOSIS — J40 BRONCHITIS: Primary | ICD-10-CM

## 2019-03-28 PROCEDURE — 99213 OFFICE O/P EST LOW 20 MIN: CPT | Performed by: INTERNAL MEDICINE

## 2019-03-28 ASSESSMENT — ENCOUNTER SYMPTOMS
SHORTNESS OF BREATH: 0
BACK PAIN: 0
ABDOMINAL PAIN: 0
EYE PAIN: 0

## 2019-04-01 DIAGNOSIS — J44.0 COPD (CHRONIC OBSTRUCTIVE PULMONARY DISEASE) WITH ACUTE BRONCHITIS (HCC): Primary | ICD-10-CM

## 2019-04-01 DIAGNOSIS — J20.9 COPD (CHRONIC OBSTRUCTIVE PULMONARY DISEASE) WITH ACUTE BRONCHITIS (HCC): Primary | ICD-10-CM

## 2019-04-01 NOTE — TELEPHONE ENCOUNTER
Order for nocturnal O2 testing sent to [address] [place] [address]  Referral created and routed to Dr. Quinten Mercado

## 2019-04-02 NOTE — TELEPHONE ENCOUNTER
On order, for overnight oximetry, needs marked either on room air or O2 with amt of liters. Also needs face sheet with insur info faxed. Stated they did not receive this.     Fax #: [phone]

## 2019-04-18 ENCOUNTER — TELEPHONE (OUTPATIENT)
Dept: FAMILY MEDICINE CLINIC | Age: 84
End: 2019-04-18

## 2019-04-18 NOTE — TELEPHONE ENCOUNTER
I would recommend starting her on 2 liters of continuous oxygen at bed time and then seeing Dr. Jose Manuel Mejia for follow up.

## 2019-04-23 NOTE — TELEPHONE ENCOUNTER
dont see anything done with this message either, can someone please do what he is advising, and or forward on.

## 2019-04-29 DIAGNOSIS — F41.9 ANXIETY: ICD-10-CM

## 2019-04-29 DIAGNOSIS — I10 ESSENTIAL HYPERTENSION: ICD-10-CM

## 2019-04-29 RX ORDER — LOSARTAN POTASSIUM 25 MG/1
25 TABLET ORAL DAILY
Qty: 90 TABLET | Refills: 1 | Status: SHIPPED | OUTPATIENT
Start: 2019-04-29 | End: 2019-10-14 | Stop reason: SDUPTHER

## 2019-05-01 ENCOUNTER — TELEPHONE (OUTPATIENT)
Dept: FAMILY MEDICINE CLINIC | Age: 84
End: 2019-05-01

## 2019-05-02 ENCOUNTER — TELEPHONE (OUTPATIENT)
Dept: FAMILY MEDICINE CLINIC | Age: 84
End: 2019-05-02

## 2019-05-22 ENCOUNTER — OFFICE VISIT (OUTPATIENT)
Dept: PULMONOLOGY | Age: 84
End: 2019-05-22
Payer: MEDICARE

## 2019-05-22 VITALS
HEIGHT: 63 IN | HEART RATE: 86 BPM | DIASTOLIC BLOOD PRESSURE: 64 MMHG | OXYGEN SATURATION: 94 % | TEMPERATURE: 98 F | RESPIRATION RATE: 16 BRPM | WEIGHT: 186 LBS | BODY MASS INDEX: 32.96 KG/M2 | SYSTOLIC BLOOD PRESSURE: 130 MMHG

## 2019-05-22 DIAGNOSIS — J44.0 COPD (CHRONIC OBSTRUCTIVE PULMONARY DISEASE) WITH ACUTE BRONCHITIS (HCC): Primary | ICD-10-CM

## 2019-05-22 DIAGNOSIS — R09.02 HYPOXIA: ICD-10-CM

## 2019-05-22 DIAGNOSIS — J20.9 COPD (CHRONIC OBSTRUCTIVE PULMONARY DISEASE) WITH ACUTE BRONCHITIS (HCC): Primary | ICD-10-CM

## 2019-05-22 PROCEDURE — 99214 OFFICE O/P EST MOD 30 MIN: CPT | Performed by: INTERNAL MEDICINE

## 2019-05-22 RX ORDER — ALBUTEROL SULFATE 90 UG/1
2 AEROSOL, METERED RESPIRATORY (INHALATION) EVERY 6 HOURS PRN
Qty: 1 INHALER | Refills: 3 | Status: SHIPPED | OUTPATIENT
Start: 2019-05-22 | End: 2019-10-14 | Stop reason: SDUPTHER

## 2019-05-22 ASSESSMENT — ENCOUNTER SYMPTOMS
ABDOMINAL PAIN: 0
SORE THROAT: 0
VOMITING: 0
EYE DISCHARGE: 0
CHEST TIGHTNESS: 0
RHINORRHEA: 1
WHEEZING: 0
SHORTNESS OF BREATH: 1
NAUSEA: 0
TROUBLE SWALLOWING: 0
COUGH: 1
DIARRHEA: 0
SINUS PRESSURE: 0
VOICE CHANGE: 0
EYE ITCHING: 0

## 2019-05-22 NOTE — PROGRESS NOTES
Subjective:     Duane Harris is a 80 y.o. female who complains today of:     Chief Complaint   Patient presents with   [address] from [facility]     f/u after hosp. Pt was seen for bronchitis. f/u per Dr. Edmond Quiroga for COPD. HPI  Patient is using  2 lit with sleep. C/o shortness of breath , worse with exertion. No  Wheezing   C/o dry Cough with  Sputum  No Hemoptysis  No Chest tightness   No Chest pain with radiation  or pleuritic pain  No Fever or chills. C/o  Rhinorrhea and postnasal drip in morning . Using bronchodilator with Dulera 2 puff BID and proair HFA   She had CXR in past show COPD , atelectasis or infiltration both base. No PFT donne     Allergies:  Norvasc [amlodipine besylate]  Past Medical History:   Diagnosis Date    Anxiety     Cancer ([facility] [address]     Glaucoma     Gout     left knee    Hypertension      Past Surgical History:   Procedure Laterality Date    CATARACT REMOVAL WITH IMPLANT Bilateral 2016    COLON SURGERY  2009    polyps    COLONOSCOPY  02/2009    SKIN CANCER EXCISION  2002    graft from neck     History reviewed. No pertinent family history. Social History     Socioeconomic History    Marital status:       Spouse name: Not on file    Number of children: Not on file    Years of education: Not on file    Highest education level: Not on file   Occupational History    Not on file   Social Needs    Financial resource strain: Not on file    Food insecurity:     Worry: Not on file     Inability: Not on file    Transportation needs:     Medical: Not on file     Non-medical: Not on file   Tobacco Use    Smoking status: Never Smoker    Smokeless tobacco: Never Used   Substance and Sexual Activity    Alcohol use: No    Drug use: No    Sexual activity: Not on file   Lifestyle    Physical activity:     Days per week: Not on file     Minutes per session: Not on file    Stress: Not on file   Relationships    Social connections:     Talks on phone: Not on Oropharynx is clear and moist.   Eyes: Pupils are equal, round, and reactive to light. EOM are normal.   Neck: No JVD present. No tracheal deviation present. No thyromegaly present. Cardiovascular: Normal rate and regular rhythm. Exam reveals no gallop and no friction rub. No murmur heard. Pulmonary/Chest: No respiratory distress. She has no wheezes. She has rales (bibasilar ). She exhibits no tenderness. diminished Breath sound bilaterally. Abdominal: She exhibits no distension. There is no tenderness. There is no rebound. Musculoskeletal: Normal range of motion. She exhibits edema (trace ankle pitting ). Lymphadenopathy:     She has no cervical adenopathy. Neurological: She is alert and oriented to person, place, and time. Coordination normal.   Skin: Skin is warm and dry. She is not diaphoretic. Psychiatric: She has a normal mood and affect. Current Outpatient Medications   Medication Sig Dispense Refill    mometasone-formoterol (DULERA) 100-5 MCG/ACT inhaler Inhale 2 puffs into the lungs 2 times daily 1 Inhaler 5    albuterol sulfate HFA (PROAIR HFA) 108 (90 Base) MCG/ACT inhaler Inhale 2 puffs into the lungs every 6 hours as needed for Wheezing 1 Inhaler 3    losartan (COZAAR) 25 MG tablet Take 1 tablet by mouth daily 90 tablet 1    ipratropium-albuterol (DUONEB) 0.5-2.5 (3) MG/3ML SOLN nebulizer solution Inhale 3 mLs into the lungs every 6 hours as needed for Shortness of Breath 360 mL 5    aspirin 81 MG EC tablet Take 1 tablet by mouth daily 30 tablet 3    furosemide (LASIX) 40 MG tablet Take 1 tablet by mouth daily 60 tablet 3    montelukast (SINGULAIR) 10 MG tablet Take 1 tablet by mouth nightly 30 tablet 3    potassium chloride (KLOR-CON M) 20 MEQ extended release tablet Take 1 tablet by mouth daily (with breakfast) 60 tablet 3    COLCRYS 0.6 MG tablet Take 2 tablets PO then 1 PO 1 hour later than 1 PO Q 12H til symptoms resolve.  (Patient taking differently: Take 1.2 mg by mouth daily Take 2 tablets PO then 1 PO 1 hour later than 1 PO Q 12H til symptoms resolve.) 30 tablet 1    latanoprost (XALATAN) 0.005 % ophthalmic solution INSTILL 1 DROP INTO BOTH EYES EVERY DAY      dorzolamide-timolol (COSOPT) 22.3-6.8 MG/ML ophthalmic solution Use 1 Drop in both eyes every 12 hours.  brimonidine (ALPHAGAN P) 0.15 % ophthalmic solution INSTILL 1 DROP INTO BOTH EYES TWICE DAILY. 3     No current facility-administered medications for this visit. Results for orders placed during the hospital encounter of 12/25/18   XR CHEST STANDARD (2 VW)    Narrative EXAMINATION: XR CHEST (2 VW)     CLINICAL HISTORY:  COUGH     COMPARISONS: None     FINDINGS:    Two views of the chest are submitted. The cardiac silhouette is of normal size configuration. The mediastinum is unremarkable. Pulmonary vascular attenuated. Right sided trachea. Small areas atelectasis and or infiltrate in both bases again noted. There is blunting of both costophrenic angle suggesting scarring or trace bilateral pleural effusions. .  No effusions. No Pneumothoraces. There is a diffuse generalized osteopenia superimposed upon a moderate dorsal kyphosis with multilevel degenerative changes. Impression SMALL AREAS ATELECTASIS AND OR INFILTRATE IN BOTH BASES AGAIN NOTED. SCARRING OR TRACE BILATERAL PLEURAL EFFUSIONS  RADIOGRAPHIC FINDINGS SUGGEST UNDERLYING COPD. Correlate clinically. XR CHEST STANDARD (2 VW)    Narrative EXAMINATION: XR CHEST (2 VW)    CLINICAL HISTORY: 80year-old with cough and shortness of breath    COMPARISONS: Chest x-ray 5/8/2017 and chest CT 12/26/2018    FINDINGS: Frontal and lateral views the chest were obtained. Cardiac silhouette is mildly enlarged but stable. There is unfolding thoracic aorta which contains atherosclerotic calcification. Mediastinal contours are stable.  Coarse chronic interstitial   markings are again demonstrated throughout both lungs. There are small linear areas of atelectasis or infiltrate in the lung bases, left greater than right. Upper lung zones are clear. No plain film signs of pleural effusion or pneumothorax. Visualized   bones are intact. Impression BIBASILAR LINEAR AREAS OF ATELECTASIS OR INFILTRATE LEFT GREATER THAN RIGHT. COARSE CHRONIC INTERSTITIAL MARKINGS.   ]  Results for orders placed during the hospital encounter of 12/25/18   XR CHEST PORTABLE    Narrative XR CHEST PORTABLE : 12/25/2018    CLINICAL HISTORY:  sob . COMPARISON: 5/8/2017. A portable upright AP radiograph of the chest was obtained. FINDINGS:    A poor inspiratory volume is present with mild probable right basilar atelectasis. Bronchopneumonia should be excluded clinically. There is no cardiomegaly, significant pleural effusion, vascular congestion, pneumothorax, or displaced fractures identified. Impression POOR INSPIRATION WITH MILD PROBABLE RIGHT BASILAR ATELECTASIS. BRONCHOPNEUMONIA SHOULD BE EXCLUDED CLINICALLY. Assessment/Plan:     1. COPD (chronic obstructive pulmonary disease) with acute bronchitis (HCC)  C/o shortness of breath , worse with exertion. No  Wheezing . C/o dry Cough with  Sputum. She is on Using bronchodilator with Dulera 2 puff BID and proair HFA , she has nebulizer but not using it. She had CXR in past show COPD , atelectasis or infiltration both base. No PFT done . I will request both CXR and PFT, refill for dulera and proair done. 2. Hypoxia  Patient is using  2 lit with sleep. she had overnight pulse oxymetry done recently . She has O2 since dec 2018. Advised to us eO2 with sleep . Return in about 1 month (around 6/19/2019) for COPD, CXR result, pft result.       Stefania Davis MD

## 2019-06-18 ENCOUNTER — HOSPITAL ENCOUNTER (OUTPATIENT)
Dept: GENERAL RADIOLOGY | Age: 84
Discharge: HOME | End: 2019-06-20
Payer: MEDICARE

## 2019-06-18 ENCOUNTER — HOSPITAL ENCOUNTER (OUTPATIENT)
Dept: PULMONOLOGY | Age: 84
Discharge: HOME | End: 2019-06-18
Payer: MEDICARE

## 2019-06-18 DIAGNOSIS — J20.9 COPD (CHRONIC OBSTRUCTIVE PULMONARY DISEASE) WITH ACUTE BRONCHITIS (HCC): ICD-10-CM

## 2019-06-18 DIAGNOSIS — J44.0 COPD (CHRONIC OBSTRUCTIVE PULMONARY DISEASE) WITH ACUTE BRONCHITIS (HCC): ICD-10-CM

## 2019-06-18 PROCEDURE — 94726 PLETHYSMOGRAPHY LUNG VOLUMES: CPT | Performed by: INTERNAL MEDICINE

## 2019-06-18 PROCEDURE — 94060 EVALUATION OF WHEEZING: CPT | Performed by: INTERNAL MEDICINE

## 2019-06-18 PROCEDURE — 94060 EVALUATION OF WHEEZING: CPT

## 2019-06-18 PROCEDURE — 94726 PLETHYSMOGRAPHY LUNG VOLUMES: CPT

## 2019-06-18 PROCEDURE — 94729 DIFFUSING CAPACITY: CPT

## 2019-06-18 PROCEDURE — 71046 X-RAY EXAM CHEST 2 VIEWS: CPT

## 2019-06-18 PROCEDURE — 6360000002 HC RX W HCPCS: Performed by: INTERNAL MEDICINE

## 2019-06-18 PROCEDURE — 94729 DIFFUSING CAPACITY: CPT | Performed by: INTERNAL MEDICINE

## 2019-06-18 RX ORDER — ALBUTEROL SULFATE 2.5 MG/3ML
2.5 SOLUTION RESPIRATORY (INHALATION) ONCE
Status: COMPLETED | OUTPATIENT
Start: 2019-06-18 | End: 2019-06-18

## 2019-06-18 RX ADMIN — ALBUTEROL SULFATE 2.5 MG: 2.5 SOLUTION RESPIRATORY (INHALATION) at 14:10

## 2019-06-20 ENCOUNTER — APPOINTMENT (OUTPATIENT)
Dept: GENERAL RADIOLOGY | Age: 84
End: 2019-06-20
Payer: MEDICARE

## 2019-06-20 ENCOUNTER — HOSPITAL ENCOUNTER (EMERGENCY)
Age: 84
Discharge: HOME | End: 2019-06-20
Attending: STUDENT IN AN ORGANIZED HEALTH CARE EDUCATION/TRAINING PROGRAM
Payer: MEDICARE

## 2019-06-20 VITALS
TEMPERATURE: 97.5 F | HEART RATE: 95 BPM | DIASTOLIC BLOOD PRESSURE: 70 MMHG | WEIGHT: 180 LBS | SYSTOLIC BLOOD PRESSURE: 113 MMHG | HEIGHT: 63 IN | OXYGEN SATURATION: 97 % | BODY MASS INDEX: 31.89 KG/M2 | RESPIRATION RATE: 16 BRPM

## 2019-06-20 DIAGNOSIS — S80.12XA LEG HEMATOMA, LEFT, INITIAL ENCOUNTER: ICD-10-CM

## 2019-06-20 DIAGNOSIS — S93.402A SPRAIN OF LEFT ANKLE, UNSPECIFIED LIGAMENT, INITIAL ENCOUNTER: Primary | ICD-10-CM

## 2019-06-20 PROCEDURE — 73610 X-RAY EXAM OF ANKLE: CPT

## 2019-06-20 PROCEDURE — 99283 EMERGENCY DEPT VISIT LOW MDM: CPT

## 2019-06-20 RX ORDER — ACETAMINOPHEN 500 MG
1000 TABLET ORAL EVERY 6 HOURS PRN
Qty: 120 TABLET | Refills: 0 | Status: SHIPPED | OUTPATIENT
Start: 2019-06-20 | End: 2019-10-14

## 2019-06-20 ASSESSMENT — PAIN DESCRIPTION - PROGRESSION
CLINICAL_PROGRESSION: GRADUALLY WORSENING
CLINICAL_PROGRESSION: GRADUALLY WORSENING

## 2019-06-20 ASSESSMENT — PAIN DESCRIPTION - LOCATION
LOCATION: ANKLE
LOCATION: ANKLE

## 2019-06-20 ASSESSMENT — PAIN DESCRIPTION - DESCRIPTORS
DESCRIPTORS: CONSTANT
DESCRIPTORS: CONSTANT

## 2019-06-20 ASSESSMENT — PAIN SCALES - GENERAL
PAINLEVEL_OUTOF10: 8
PAINLEVEL_OUTOF10: 8

## 2019-06-20 ASSESSMENT — PAIN DESCRIPTION - FREQUENCY
FREQUENCY: CONTINUOUS
FREQUENCY: CONTINUOUS

## 2019-06-20 ASSESSMENT — PAIN DESCRIPTION - ORIENTATION
ORIENTATION: LEFT
ORIENTATION: LEFT

## 2019-06-20 ASSESSMENT — ENCOUNTER SYMPTOMS
COUGH: 0
DIARRHEA: 0
CHEST TIGHTNESS: 0
TROUBLE SWALLOWING: 0
ABDOMINAL PAIN: 0
SHORTNESS OF BREATH: 0
VOMITING: 0
SINUS PRESSURE: 0
BACK PAIN: 0

## 2019-06-20 ASSESSMENT — PAIN SCALES - WONG BAKER: WONGBAKER_NUMERICALRESPONSE: 2

## 2019-06-20 ASSESSMENT — PAIN DESCRIPTION - ONSET: ONSET: SUDDEN

## 2019-06-20 ASSESSMENT — PAIN DESCRIPTION - PAIN TYPE
TYPE: ACUTE PAIN
TYPE: ACUTE PAIN

## 2019-06-20 NOTE — ED PROVIDER NOTES
[address] ED  eMERGENCY dEPARTMENT eNCOUnter      Pt Name: Raudel Aguila  MRN: [id number]  [place] 5/19/1928  Date of evaluation: 6/20/2019  Provider: Aneudy Wynn DO    CHIEF COMPLAINT       Chief Complaint   Patient presents with    Fall     fell out of bed this a.m denies hitting head or LOC ankle pain          HISTORY OF PRESENT ILLNESS   (Location/Symptom, Timing/Onset,Context/Setting, Quality, Duration, Modifying Factors, Severity)  Note limiting factors. Raudel Aguila is a 80 y.o. female who presents to the emergency department with pain that she had rolled her left ankle while getting out of bed. She has a large bruise to her ankle. Patient denies any hip pain. Patient denies any head injury. Patient states that the ankle hurts with movement. Patient states that she walks with a walker and that it hurts with walking. HPI    NursingNotes were reviewed. REVIEW OF SYSTEMS    (2-9 systems for level 4, 10 or more for level 5)     Review of Systems   Constitutional: Negative for activity change, appetite change, chills, fever and unexpected weight change. HENT: Negative for drooling, ear pain, nosebleeds, sinus pressure and trouble swallowing. Respiratory: Negative for cough, chest tightness and shortness of breath. Cardiovascular: Negative for chest pain and leg swelling. Gastrointestinal: Negative for abdominal pain, diarrhea and vomiting. Endocrine: Negative for polydipsia and polyphagia. Genitourinary: Negative for dysuria, flank pain and frequency. Musculoskeletal: Positive for arthralgias ( left ankle). Negative for back pain and myalgias. Skin: Negative for pallor and rash. Neurological: Negative for syncope, weakness and headaches. Hematological: Does not bruise/bleed easily. All other systems reviewed and are negative. Except as noted above the remainder of the review of systems was reviewed and negative.        PAST MEDICAL HISTORY Past Medical History:   Diagnosis Date    Anxiety     Cancer ([facility] [address]     Glaucoma     Gout     left knee    Hypertension          SURGICALHISTORY       Past Surgical History:   Procedure Laterality Date    CATARACT REMOVAL WITH IMPLANT Bilateral 2016    COLON SURGERY  2009    polyps    COLONOSCOPY  02/2009    SKIN CANCER EXCISION  2002    graft from neck         CURRENT MEDICATIONS       Previous Medications    ALBUTEROL SULFATE HFA (PROAIR HFA) 108 (90 BASE) MCG/ACT INHALER    Inhale 2 puffs into the lungs every 6 hours as needed for Wheezing    ASPIRIN 81 MG EC TABLET    Take 1 tablet by mouth daily    BRIMONIDINE (ALPHAGAN P) 0.15 % OPHTHALMIC SOLUTION    INSTILL 1 DROP INTO BOTH EYES TWICE DAILY. COLCRYS 0.6 MG TABLET    Take 2 tablets PO then 1 PO 1 hour later than 1 PO Q 12H til symptoms resolve. DORZOLAMIDE-TIMOLOL (COSOPT) 22.3-6.8 MG/ML OPHTHALMIC SOLUTION    Use 1 Drop in both eyes every 12 hours. FUROSEMIDE (LASIX) 40 MG TABLET    Take 1 tablet by mouth daily    IPRATROPIUM-ALBUTEROL (DUONEB) 0.5-2.5 (3) MG/3ML SOLN NEBULIZER SOLUTION    Inhale 3 mLs into the lungs every 6 hours as needed for Shortness of Breath    LATANOPROST (XALATAN) 0.005 % OPHTHALMIC SOLUTION    INSTILL 1 DROP INTO BOTH EYES EVERY DAY    LOSARTAN (COZAAR) 25 MG TABLET    Take 1 tablet by mouth daily    MOMETASONE-FORMOTEROL (DULERA) 100-5 MCG/ACT INHALER    Inhale 2 puffs into the lungs 2 times daily    MONTELUKAST (SINGULAIR) 10 MG TABLET    Take 1 tablet by mouth nightly    POTASSIUM CHLORIDE (KLOR-CON M) 20 MEQ EXTENDED RELEASE TABLET    Take 1 tablet by mouth daily (with breakfast)       ALLERGIES     Norvasc [amlodipine besylate]    FAMILY HISTORY     No family history on file. SOCIAL HISTORY       Social History     Socioeconomic History    Marital status:       Spouse name: Not on file    Number of children: Not on file    Years of education: Not on file    Highest education level: Not on file   Occupational History    Not on file   Social Needs    Financial resource strain: Not on file    Food insecurity:     Worry: Not on file     Inability: Not on file    Transportation needs:     Medical: Not on file     Non-medical: Not on file   Tobacco Use    Smoking status: Never Smoker    Smokeless tobacco: Never Used   Substance and Sexual Activity    Alcohol use: No    Drug use: No    Sexual activity: Not on file   Lifestyle    Physical activity:     Days per week: Not on file     Minutes per session: Not on file    Stress: Not on file   Relationships    Social connections:     Talks on phone: Not on file     Gets together: Not on file     Attends Yazidi service: Not on file     Active member of club or organization: Not on file     Attends meetings of clubs or organizations: Not on file     Relationship status: Not on file    Intimate partner violence:     Fear of current or ex partner: Not on file     Emotionally abused: Not on file     Physically abused: Not on file     Forced sexual activity: Not on file   Other Topics Concern    Not on file   Social History Narrative    Not on file       SCREENINGS    Chris Coma Scale  Eye Opening: Spontaneous  Best Verbal Response: Oriented  Best Motor Response: Obeys commands  Chris Coma Scale Score: 15 @FLOW([id number])@      PHYSICAL EXAM    (up to 7 for level 4, 8 or more for level 5)     ED Triage Vitals   BP Temp Temp Source Pulse Resp SpO2 Height Weight   06/20/19 1302 06/20/19 1302 06/20/19 1302 06/20/19 1302 06/20/19 1302 06/20/19 1312 06/20/19 1302 06/20/19 1302   113/70 97.5 °F (36.4 °C) Temporal 95 16 97 % 5' 3\" (1.6 m) 180 lb (81.6 kg)       Physical Exam   Constitutional: She is oriented to person, place, and time. She appears well-developed and well-nourished. No distress. HENT:   Head: Normocephalic and atraumatic. Head is without Asif's sign.    Right Ear: External ear normal.   Left Ear: External ear normal.   Nose: Nose normal.   Mouth/Throat: Oropharynx is clear and moist. No oropharyngeal exudate. No mccray sign. No raccoon eyes. Eyes: Pupils are equal, round, and reactive to light. Conjunctivae and EOM are normal. No foreign body present in the right eye. Left eye exhibits no exudate. No scleral icterus. Neck: Normal range of motion. Neck supple. No JVD present. No neck rigidity. No tracheal deviation present. Cardiovascular: Normal rate, regular rhythm, normal heart sounds and intact distal pulses. Exam reveals no gallop, no distant heart sounds and no friction rub. No murmur heard. Pulmonary/Chest: Effort normal and breath sounds normal. No stridor. No respiratory distress. She has no wheezes. She has no rales. She exhibits no tenderness. Abdominal: Soft. Bowel sounds are normal. She exhibits no distension, no pulsatile liver and no ascites. There is no hepatosplenomegaly. There is no tenderness. There is no rebound and no guarding. Musculoskeletal: Normal range of motion. She exhibits no edema or tenderness. Right ankle: She exhibits swelling and ecchymosis. She exhibits normal range of motion and no deformity. Feet:    Lymphadenopathy:        Head (right side): No submental adenopathy present. Head (left side): No submental adenopathy present. Neurological: She is alert and oriented to person, place, and time. She has normal reflexes. No cranial nerve deficit or sensory deficit. She exhibits normal muscle tone. Coordination normal.   Skin: Skin is warm and dry. Capillary refill takes less than 2 seconds. No rash noted. She is not diaphoretic. No erythema. Psychiatric: She has a normal mood and affect. Her behavior is normal. Judgment and thought content normal.   Nursing note and vitals reviewed.       DIAGNOSTIC RESULTS     EKG: All EKG's are interpreted by the Emergency Department Physician who either signs or Co-signsthis chart in the absence of a cardiologist.        RADIOLOGY: Non-plain filmimages such as CT, Ultrasound and MRI are read by the radiologist. [facility] radiographic images are visualized and preliminarily interpreted by the emergency physician with the below findings:    X-ray left ankle: No fracture, no dislocation. Interpretation per the Radiologist below, if available at the time ofthis note:    XR ANKLE LEFT (MIN 3 VIEWS)    (Results Pending)   XR TIBIA FIBULA LEFT (2 VIEWS)    (Results Pending)         ED BEDSIDE ULTRASOUND:   Performed by ED Physician - none    LABS:  Labs Reviewed - No data to display    All other labs were within normal range or not returned as of this dictation. EMERGENCY DEPARTMENT COURSE and DIFFERENTIAL DIAGNOSIS/MDM:   Vitals:    Vitals:    06/20/19 1302 06/20/19 1312 06/20/19 1313   BP: 113/70 113/70 113/70   Pulse: 95 95 95   Resp: 16 16 16   Temp: 97.5 °F (36.4 °C) 97.5 °F (36.4 °C) 97.5 °F (36.4 °C)   TempSrc: Temporal Temporal Tympanic   SpO2:  97% 97%   Weight: 180 lb (81.6 kg)  180 lb (81.6 kg)   Height: 5' 3\" (1.6 m)  5' 3\" (1.6 m)         MDM  Patient received ice pack, Ace wrap, and was able to ambulate with a walker. Patient refuses prescriptions. Refuses pain medicine. CONSULTS:  None    PROCEDURES:  Unless otherwise noted below, none     Procedures    FINAL IMPRESSION      1. Sprain of left ankle, unspecified ligament, initial encounter    2.  Leg hematoma, left, initial encounter          DISPOSITION/PLAN   DISPOSITION Decision To Discharge 06/20/2019 03:01:18 PM      PATIENT REFERRED TO:  Mable Brantley MD  [address]   [facility] [address]    Schedule an appointment as soon as possible for a visit in 1 day      Iva Anton MD  [address] [id number] [facility]  [phone]    Schedule an appointment as soon as possible for a visit   As needed      DISCHARGE MEDICATIONS:  New Prescriptions    ACETAMINOPHEN (APAP EXTRA STRENGTH) 500 MG TABLET    Take 2 tablets by mouth every 6 hours as needed for Pain (Please note that portions of this note were completed with a voice recognition program.  Efforts were made to edit the dictations but occasionally words are mis-transcribed.)    Jimbo Hamilton DO (electronically signed)  Attending Emergency Physician         Jimbo Hamilton DO  06/20/19 [id number]

## 2019-06-20 NOTE — ED NOTES
Pt able to ambulate  With walker   Per family  Patient walking the same  As she does at home      [address] Miguel[address] Cadet RN  06/20/19 [id number]

## 2019-06-21 NOTE — PROCEDURES
Complete pulmonary function testing were done on this 80year-old patient who's height is 63 inches and weight is 180 pounds with no significant smoking history    Indications  Shortness of breath and cough    Spirometry  FVC is 1.52 liters which is 77 % of predicted, FEV1 is 1.22 liters which is 84 % of predicted, FEV1/FVC ratio is 80% which is normal  Flow volume loop appeared normal, FEF 25-75% is 1.12 L/Sec, which is 147 % of predicted  Following bronchodilator therapy no significant improvement noted    Lung volumes  Done by body plethysmography and showed a total lung capacity of 4.35 liters which is 88 % of predicted, residual volume is 2.96 liters which is 115 % of predicted  RV/TLC ratio is 68 % which is mildly increased    Diffusion capacity  Is 9.7 ml/Min/mmHg which is significantly decreased at 54 % of predicted    Specific airway resistance is within normal limits, specific airway conductance is normal as well    Overall impression  Severe diffusion impairment without significant obstructive or restrictive abnormality, rule out pulmonary vascular disease or pulmonary hypertension    Electronically signed by Dequan Camejo MD, FCCP on 6/21/2019 at 10:30 AM

## 2019-07-02 ENCOUNTER — HOSPITAL ENCOUNTER (OUTPATIENT)
Dept: ORTHOPEDIC SURGERY | Age: 84
Discharge: HOME | End: 2019-07-04
Payer: MEDICARE

## 2019-07-02 DIAGNOSIS — M25.572 LEFT ANKLE PAIN, UNSPECIFIED CHRONICITY: ICD-10-CM

## 2019-07-02 PROCEDURE — 73610 X-RAY EXAM OF ANKLE: CPT

## 2019-07-11 ENCOUNTER — OFFICE VISIT (OUTPATIENT)
Dept: PULMONOLOGY | Age: 84
End: 2019-07-11
Payer: MEDICARE

## 2019-07-11 VITALS
TEMPERATURE: 97.8 F | WEIGHT: 180 LBS | RESPIRATION RATE: 16 BRPM | HEART RATE: 64 BPM | OXYGEN SATURATION: 94 % | BODY MASS INDEX: 31.89 KG/M2 | SYSTOLIC BLOOD PRESSURE: 114 MMHG | DIASTOLIC BLOOD PRESSURE: 64 MMHG | HEIGHT: 63 IN

## 2019-07-11 DIAGNOSIS — J44.0 COPD (CHRONIC OBSTRUCTIVE PULMONARY DISEASE) WITH ACUTE BRONCHITIS (HCC): ICD-10-CM

## 2019-07-11 DIAGNOSIS — G47.33 OBSTRUCTIVE SLEEP APNEA: ICD-10-CM

## 2019-07-11 DIAGNOSIS — E66.9 OBESITY (BMI 30-39.9): ICD-10-CM

## 2019-07-11 DIAGNOSIS — R09.02 HYPOXIA: Primary | ICD-10-CM

## 2019-07-11 DIAGNOSIS — J20.9 COPD (CHRONIC OBSTRUCTIVE PULMONARY DISEASE) WITH ACUTE BRONCHITIS (HCC): ICD-10-CM

## 2019-07-11 PROCEDURE — 99214 OFFICE O/P EST MOD 30 MIN: CPT | Performed by: INTERNAL MEDICINE

## 2019-07-11 RX ORDER — IBUPROFEN 600 MG/1
TABLET ORAL
Refills: 1 | Status: ON HOLD | COMMUNITY
Start: 2019-07-02 | End: 2020-07-08

## 2019-07-11 RX ORDER — MONTELUKAST SODIUM 10 MG/1
10 TABLET ORAL NIGHTLY
Qty: 30 TABLET | Refills: 3 | Status: SHIPPED | OUTPATIENT
Start: 2019-07-11 | End: 2019-09-21 | Stop reason: SDUPTHER

## 2019-07-11 ASSESSMENT — ENCOUNTER SYMPTOMS
COUGH: 0
EYE DISCHARGE: 0
VOMITING: 0
RHINORRHEA: 0
ABDOMINAL PAIN: 0
SINUS PRESSURE: 0
EYE ITCHING: 0
WHEEZING: 0
SORE THROAT: 0
VOICE CHANGE: 0
CHEST TIGHTNESS: 0
TROUBLE SWALLOWING: 0
SHORTNESS OF BREATH: 1
NAUSEA: 0
DIARRHEA: 0

## 2019-07-11 NOTE — PROGRESS NOTES
Subjective:     Sugar Van is a 80 y.o. female who complains today of:     Chief Complaint   Patient presents with    Follow-up     f/u for PFT and CXR results. HPI  Patient is on 2 lit with sleep but not compliant with O2 ,   Sh has snoring and takes naps , she will have HST but can not go to sleep lab to r/o FRED . PFT show no obstruction or restriction , decreased DLCO and possible pulmonary hypertension, could be due to FRED. CXR show no active disease. C/o shortness of breath , worse with exertion. No Wheezing   No Cough or  Sputum  No Hemoptysis  No Chest tightness   No Chest pain with radiation  or pleuritic pain  No Fever or chills. No Rhinorrhea and postnasal drip. She is on  bronchodilator with Dulera 2 puff BID and proair HFA               Allergies:  Norvasc [amlodipine besylate]  Past Medical History:   Diagnosis Date    Anxiety     Cancer ([address]     Glaucoma     Gout     left knee    Hypertension     Lung disease     Obesity (BMI 30-39. 9) 7/11/2019    Obstructive sleep apnea 7/11/2019     Past Surgical History:   Procedure Laterality Date    CATARACT REMOVAL WITH IMPLANT Bilateral 2016    COLON SURGERY  2009    polyps    COLONOSCOPY  02/2009    SKIN CANCER EXCISION  2002    graft from neck     History reviewed. No pertinent family history. Social History     Socioeconomic History    Marital status:       Spouse name: Not on file    Number of children: Not on file    Years of education: Not on file    Highest education level: Not on file   Occupational History    Not on file   Social Needs    Financial resource strain: Not on file    Food insecurity:     Worry: Not on file     Inability: Not on file    Transportation needs:     Medical: Not on file     Non-medical: Not on file   Tobacco Use    Smoking status: Never Smoker    Smokeless tobacco: Never Used   Substance and Sexual Activity    Alcohol use: No    Drug use: No    Sexual activity: Not on file   Lifestyle    Physical activity:     Days per week: Not on file     Minutes per session: Not on file    Stress: Not on file   Relationships    Social connections:     Talks on phone: Not on file     Gets together: Not on file     Attends Taoism service: Not on file     Active member of club or organization: Not on file     Attends meetings of clubs or organizations: Not on file     Relationship status: Not on file    Intimate partner violence:     Fear of current or ex partner: Not on file     Emotionally abused: Not on file     Physically abused: Not on file     Forced sexual activity: Not on file   Other Topics Concern    Not on file   Social History Narrative    Not on file         Review of Systems   Constitutional: Negative for chills, diaphoresis, fatigue and fever. HENT: Negative for congestion, mouth sores, nosebleeds, postnasal drip, rhinorrhea, sinus pressure, sneezing, sore throat, trouble swallowing and voice change. Eyes: Negative for discharge, itching and visual disturbance. Respiratory: Positive for shortness of breath. Negative for cough, chest tightness and wheezing. Cardiovascular: Negative for chest pain, palpitations and leg swelling. Gastrointestinal: Negative for abdominal pain, diarrhea, nausea and vomiting. Genitourinary: Negative for difficulty urinating and hematuria. Musculoskeletal: Negative for arthralgias, joint swelling and myalgias. Skin: Negative for rash. Allergic/Immunologic: Negative for environmental allergies and food allergies. Neurological: Negative for dizziness, tremors, weakness and headaches. Psychiatric/Behavioral: Positive for sleep disturbance. Negative for behavioral problems.          Objective:     Vitals:    07/11/19 1253   BP: 114/64   Pulse: 64   Resp: 16   Temp: 97.8 °F (36.6 °C)   TempSrc: Tympanic   SpO2: 94%   Weight: 180 lb (81.6 kg)   Height: 5' 3\" (1.6 m)         Physical Exam   Constitutional: She is oriented to by mouth daily 60 tablet 3    montelukast (SINGULAIR) 10 MG tablet Take 1 tablet by mouth nightly 30 tablet 3    potassium chloride (KLOR-CON M) 20 MEQ extended release tablet Take 1 tablet by mouth daily (with breakfast) 60 tablet 3    COLCRYS 0.6 MG tablet Take 2 tablets PO then 1 PO 1 hour later than 1 PO Q 12H til symptoms resolve. (Patient taking differently: Take 1.2 mg by mouth daily Take 2 tablets PO then 1 PO 1 hour later than 1 PO Q 12H til symptoms resolve.) 30 tablet 1    latanoprost (XALATAN) 0.005 % ophthalmic solution INSTILL 1 DROP INTO BOTH EYES EVERY DAY      dorzolamide-timolol (COSOPT) 22.3-6.8 MG/ML ophthalmic solution Use 1 Drop in both eyes every 12 hours.  brimonidine (ALPHAGAN P) 0.15 % ophthalmic solution INSTILL 1 DROP INTO BOTH EYES TWICE DAILY. 3     No current facility-administered medications for this visit. Results for orders placed during the hospital encounter of 06/18/19   XR CHEST STANDARD (2 VW)    Narrative EXAMINATION: XR CHEST (2 VIEWS):     DATE AND TIME: 6/18/2019 at 1:32 PM.     CLINICAL HISTORY: SHORTNESS OF BREATH. COPD (CHRONIC OBSTRUCTIVE PULMONARY DISEASE) WITH ACUTE BRONCHITIS. COMPARISONS: December 29, 2018. FINDINGS: The heart, mediastinum and pulmonary vasculature are within normal limits. Minimal parenchymal scarring. Lungs otherwise clear. Bones unremarkable. Impression NO ACTIVE LUNG DISEASE.           ]  Results for orders placed during the hospital encounter of 12/25/18   XR CHEST PORTABLE    Narrative XR CHEST PORTABLE : 12/25/2018    CLINICAL HISTORY:  sob . COMPARISON: 5/8/2017. A portable upright AP radiograph of the chest was obtained. FINDINGS:    A poor inspiratory volume is present with mild probable right basilar atelectasis. Bronchopneumonia should be excluded clinically. There is no cardiomegaly, significant pleural effusion, vascular congestion, pneumothorax, or displaced fractures identified. Impression POOR INSPIRATION WITH MILD PROBABLE RIGHT BASILAR ATELECTASIS. BRONCHOPNEUMONIA SHOULD BE EXCLUDED CLINICALLY. Indications  Shortness of breath and cough    Spirometry  FVC is 1.52 liters which is 77 % of predicted, FEV1 is 1.22   liters which is 84 % of predicted, FEV1/FVC ratio is 80% which is   normal  Flow volume loop appeared normal, FEF 25-75% is 1.12 L/Sec, which   is 147 % of predicted  Following bronchodilator therapy no significant improvement noted    Lung volumes  Done by body plethysmography and showed a total lung capacity of   4.35 liters which is 88 % of predicted, residual volume is 2.96   liters which is 115 % of predicted  RV/TLC ratio is 68 % which is mildly increased    Diffusion capacity  Is 9.7 ml/Min/mmHg which is significantly decreased at 54 % of   predicted    Specific airway resistance is within normal limits, specific   airway conductance is normal as well    Overall impression  Severe diffusion impairment without significant obstructive or   restrictive abnormality, rule out pulmonary vascular disease or   pulmonary hypertension    Electronically signed by Brodie Pelayo MD, [facility] on 6/21/2019 at   10:30 AM  Assessment/Plan:     1. Hypoxia  Patient is using  2 lit with sleep. Not every night . she had overnight pulse oxymetry done show severe O2 desaturation  . She has O2 since dec 2018. Advised to us O2 with sleep every night. .    she had CXR show no active disease. PFT show Severe diffusion impairment without significant obstructive or restrictive abnormality, rule out pulmonary vascular disease or   pulmonary hypertension. Both test reviewed. 2. Obesity (BMI 30-39. 9)  Patient patient is advised try to lose weight. obesity related risk explained to the patient ,  Current weight:  180 lb (81.6 kg) Lbs. BMI:  Body mass index is 31.89 kg/m². 3. Obstructive sleep apnea  She has symptoms of sleep apnea and have HST for further evaluation.        Return in about 1

## 2019-07-18 ENCOUNTER — HOSPITAL ENCOUNTER (OUTPATIENT)
Dept: ORTHOPEDIC SURGERY | Age: 84
Discharge: HOME | End: 2019-07-20
Payer: MEDICARE

## 2019-07-18 DIAGNOSIS — S93.402A SPRAIN OF LEFT ANKLE, UNSPECIFIED LIGAMENT, INITIAL ENCOUNTER: ICD-10-CM

## 2019-07-18 PROCEDURE — 73610 X-RAY EXAM OF ANKLE: CPT

## 2019-08-07 DIAGNOSIS — G47.33 OBSTRUCTIVE SLEEP APNEA: ICD-10-CM

## 2019-08-27 RX ORDER — POTASSIUM CHLORIDE 1500 MG/1
TABLET, EXTENDED RELEASE ORAL
Qty: 60 TABLET | Refills: 3 | Status: ON HOLD | OUTPATIENT
Start: 2019-08-27 | End: 2020-07-09 | Stop reason: HOSPADM

## 2019-09-21 DIAGNOSIS — J44.0 COPD (CHRONIC OBSTRUCTIVE PULMONARY DISEASE) WITH ACUTE BRONCHITIS (HCC): ICD-10-CM

## 2019-09-21 DIAGNOSIS — J20.9 COPD (CHRONIC OBSTRUCTIVE PULMONARY DISEASE) WITH ACUTE BRONCHITIS (HCC): ICD-10-CM

## 2019-09-27 RX ORDER — MONTELUKAST SODIUM 10 MG/1
TABLET ORAL
Qty: 30 TABLET | Refills: 3 | Status: SHIPPED | OUTPATIENT
Start: 2019-09-27 | End: 2019-10-14 | Stop reason: SDUPTHER

## 2019-10-14 ENCOUNTER — OFFICE VISIT (OUTPATIENT)
Dept: FAMILY MEDICINE CLINIC | Age: 84
End: 2019-10-14
Payer: MEDICARE

## 2019-10-14 VITALS
TEMPERATURE: 98.5 F | RESPIRATION RATE: 15 BRPM | BODY MASS INDEX: 33.84 KG/M2 | WEIGHT: 191 LBS | HEIGHT: 63 IN | HEART RATE: 88 BPM | SYSTOLIC BLOOD PRESSURE: 130 MMHG | DIASTOLIC BLOOD PRESSURE: 84 MMHG | OXYGEN SATURATION: 94 %

## 2019-10-14 DIAGNOSIS — G47.33 OSA (OBSTRUCTIVE SLEEP APNEA): ICD-10-CM

## 2019-10-14 DIAGNOSIS — I10 ESSENTIAL HYPERTENSION: ICD-10-CM

## 2019-10-14 DIAGNOSIS — E55.9 VITAMIN D DEFICIENCY: ICD-10-CM

## 2019-10-14 DIAGNOSIS — F41.9 ANXIETY: ICD-10-CM

## 2019-10-14 DIAGNOSIS — Z23 ENCOUNTER FOR IMMUNIZATION: ICD-10-CM

## 2019-10-14 DIAGNOSIS — I51.89 DIASTOLIC DYSFUNCTION: Primary | ICD-10-CM

## 2019-10-14 DIAGNOSIS — E87.6 HYPOKALEMIA: ICD-10-CM

## 2019-10-14 PROCEDURE — 90732 PPSV23 VACC 2 YRS+ SUBQ/IM: CPT | Performed by: INTERNAL MEDICINE

## 2019-10-14 PROCEDURE — 90653 IIV ADJUVANT VACCINE IM: CPT | Performed by: INTERNAL MEDICINE

## 2019-10-14 PROCEDURE — G0008 ADMIN INFLUENZA VIRUS VAC: HCPCS | Performed by: INTERNAL MEDICINE

## 2019-10-14 PROCEDURE — 99214 OFFICE O/P EST MOD 30 MIN: CPT | Performed by: INTERNAL MEDICINE

## 2019-10-14 PROCEDURE — G0009 ADMIN PNEUMOCOCCAL VACCINE: HCPCS | Performed by: INTERNAL MEDICINE

## 2019-10-14 RX ORDER — ALBUTEROL SULFATE 90 UG/1
1 AEROSOL, METERED RESPIRATORY (INHALATION) EVERY 6 HOURS PRN
Qty: 1 INHALER | Refills: 3 | Status: SHIPPED | OUTPATIENT
Start: 2019-10-14 | End: 2020-05-13 | Stop reason: SDUPTHER

## 2019-10-14 RX ORDER — MONTELUKAST SODIUM 10 MG/1
TABLET ORAL
Qty: 90 TABLET | Refills: 1 | Status: ON HOLD | OUTPATIENT
Start: 2019-10-14 | End: 2020-07-09 | Stop reason: HOSPADM

## 2019-10-14 RX ORDER — LOSARTAN POTASSIUM 25 MG/1
25 TABLET ORAL DAILY
Qty: 90 TABLET | Refills: 1 | Status: SHIPPED | OUTPATIENT
Start: 2019-10-14 | End: 2020-05-13 | Stop reason: SDUPTHER

## 2019-10-14 RX ORDER — IBUPROFEN 600 MG/1
TABLET ORAL
Qty: 120 TABLET | Refills: 1 | Status: CANCELLED | OUTPATIENT
Start: 2019-10-14

## 2019-10-14 ASSESSMENT — PATIENT HEALTH QUESTIONNAIRE - PHQ9
SUM OF ALL RESPONSES TO PHQ9 QUESTIONS 1 & 2: 0
2. FEELING DOWN, DEPRESSED OR HOPELESS: 0
SUM OF ALL RESPONSES TO PHQ QUESTIONS 1-9: 0
SUM OF ALL RESPONSES TO PHQ QUESTIONS 1-9: 0
1. LITTLE INTEREST OR PLEASURE IN DOING THINGS: 0

## 2019-10-14 ASSESSMENT — ENCOUNTER SYMPTOMS
ABDOMINAL PAIN: 0
BACK PAIN: 0
EYE PAIN: 0
SHORTNESS OF BREATH: 0

## 2019-10-29 DIAGNOSIS — F41.9 ANXIETY: ICD-10-CM

## 2019-10-29 DIAGNOSIS — I51.89 DIASTOLIC DYSFUNCTION: ICD-10-CM

## 2019-10-29 DIAGNOSIS — G47.33 OSA (OBSTRUCTIVE SLEEP APNEA): ICD-10-CM

## 2019-10-29 DIAGNOSIS — I10 ESSENTIAL HYPERTENSION: ICD-10-CM

## 2019-10-29 DIAGNOSIS — E55.9 VITAMIN D DEFICIENCY: ICD-10-CM

## 2019-10-29 DIAGNOSIS — E87.6 HYPOKALEMIA: ICD-10-CM

## 2019-10-29 DIAGNOSIS — Z23 ENCOUNTER FOR IMMUNIZATION: ICD-10-CM

## 2019-10-29 LAB
ALBUMIN SERPL-MCNC: 4.2 G/DL (ref 3.5–4.6)
ALP BLD-CCNC: 87 U/L (ref 40–130)
ALT SERPL-CCNC: 7 U/L (ref 0–33)
ANION GAP SERPL CALCULATED.3IONS-SCNC: 16 MEQ/L (ref 9–15)
AST SERPL-CCNC: 12 U/L (ref 0–35)
BASOPHILS ABSOLUTE: 0.1 K/UL (ref 0–0.2)
BASOPHILS RELATIVE PERCENT: 0.6 %
BILIRUB SERPL-MCNC: 0.3 MG/DL (ref 0.2–0.7)
BUN BLDV-MCNC: 16 MG/DL (ref 8–23)
CALCIUM SERPL-MCNC: 9.3 MG/DL (ref 8.5–9.9)
CHLORIDE BLD-SCNC: 104 MEQ/L (ref 95–107)
CHOLESTEROL, TOTAL: 199 MG/DL (ref 0–199)
CO2: 23 MEQ/L (ref 20–31)
CREAT SERPL-MCNC: 0.89 MG/DL (ref 0.5–0.9)
EOSINOPHILS ABSOLUTE: 0.2 K/UL (ref 0–0.7)
EOSINOPHILS RELATIVE PERCENT: 1.7 %
GFR AFRICAN AMERICAN: >60
GFR NON-AFRICAN AMERICAN: 59.4
GLOBULIN: 3.1 G/DL (ref 2.3–3.5)
GLUCOSE BLD-MCNC: 100 MG/DL (ref 70–99)
HCT VFR BLD CALC: 40.9 % (ref 37–47)
HDLC SERPL-MCNC: 43 MG/DL (ref 40–59)
HEMOGLOBIN: 13.4 G/DL (ref 12–16)
LDL CHOLESTEROL CALCULATED: 128 MG/DL (ref 0–129)
LYMPHOCYTES ABSOLUTE: 1.7 K/UL (ref 1–4.8)
LYMPHOCYTES RELATIVE PERCENT: 16.2 %
MCH RBC QN AUTO: 29.1 PG (ref 27–31.3)
MCHC RBC AUTO-ENTMCNC: 32.7 % (ref 33–37)
MCV RBC AUTO: 88.7 FL (ref 82–100)
MONOCYTES ABSOLUTE: 0.9 K/UL (ref 0.2–0.8)
MONOCYTES RELATIVE PERCENT: 8.2 %
NEUTROPHILS ABSOLUTE: 7.8 K/UL (ref 1.4–6.5)
NEUTROPHILS RELATIVE PERCENT: 73.3 %
PDW BLD-RTO: 15.5 % (ref 11.5–14.5)
PLATELET # BLD: 234 K/UL (ref 130–400)
PLATELET SLIDE REVIEW: ADEQUATE
POTASSIUM SERPL-SCNC: 4.1 MEQ/L (ref 3.4–4.9)
PRO-BNP: 442 PG/ML
RBC # BLD: 4.61 M/UL (ref 4.2–5.4)
SLIDE REVIEW: ABNORMAL
SODIUM BLD-SCNC: 143 MEQ/L (ref 135–144)
TOTAL PROTEIN: 7.3 G/DL (ref 6.3–8)
TRIGL SERPL-MCNC: 139 MG/DL (ref 0–150)
TSH REFLEX: 1.02 UIU/ML (ref 0.44–3.86)
VITAMIN D 25-HYDROXY: 7.4 NG/ML (ref 30–100)
WBC # BLD: 10.7 K/UL (ref 4.8–10.8)

## 2019-11-04 DIAGNOSIS — E55.9 VITAMIN D DEFICIENCY: Primary | ICD-10-CM

## 2019-11-04 RX ORDER — ERGOCALCIFEROL 1.25 MG/1
50000 CAPSULE ORAL WEEKLY
Qty: 12 CAPSULE | Refills: 0 | Status: ON HOLD | OUTPATIENT
Start: 2019-11-04 | End: 2020-07-08

## 2020-02-03 RX ORDER — COLCHICINE 0.6 MG/1
TABLET, FILM COATED ORAL
Qty: 30 TABLET | Refills: 1 | Status: SHIPPED | OUTPATIENT
Start: 2020-02-03 | End: 2020-06-16

## 2020-02-10 ENCOUNTER — TELEPHONE (OUTPATIENT)
Dept: FAMILY MEDICINE CLINIC | Age: 85
End: 2020-02-10

## 2020-02-11 NOTE — TELEPHONE ENCOUNTER
Daughter called, checking on rx for pt. I spoke to the pharmacy and they ran the rx for mitigare. I left message with daughter to let pt know that the script will be filled.

## 2020-02-18 ENCOUNTER — TELEPHONE (OUTPATIENT)
Dept: FAMILY MEDICINE CLINIC | Age: 85
End: 2020-02-18

## 2020-05-13 RX ORDER — ALBUTEROL SULFATE 90 UG/1
1 AEROSOL, METERED RESPIRATORY (INHALATION) EVERY 6 HOURS PRN
Qty: 1 INHALER | Refills: 3 | Status: CANCELLED | OUTPATIENT
Start: 2020-05-13

## 2020-05-13 RX ORDER — ALBUTEROL SULFATE 90 UG/1
1 AEROSOL, METERED RESPIRATORY (INHALATION) EVERY 6 HOURS PRN
Qty: 1 INHALER | Refills: 11 | Status: SHIPPED | OUTPATIENT
Start: 2020-05-13 | End: 2022-02-10 | Stop reason: SDUPTHER

## 2020-05-13 RX ORDER — LOSARTAN POTASSIUM 25 MG/1
25 TABLET ORAL DAILY
Qty: 90 TABLET | Refills: 1 | Status: ON HOLD | OUTPATIENT
Start: 2020-05-13 | End: 2020-11-02

## 2020-06-01 ENCOUNTER — VIRTUAL VISIT (OUTPATIENT)
Dept: FAMILY MEDICINE CLINIC | Age: 85
End: 2020-06-01
Payer: MEDICARE

## 2020-06-01 PROCEDURE — G0438 PPPS, INITIAL VISIT: HCPCS | Performed by: NURSE PRACTITIONER

## 2020-06-01 ASSESSMENT — LIFESTYLE VARIABLES: HOW OFTEN DO YOU HAVE A DRINK CONTAINING ALCOHOL: 0

## 2020-06-01 ASSESSMENT — PATIENT HEALTH QUESTIONNAIRE - PHQ9
SUM OF ALL RESPONSES TO PHQ QUESTIONS 1-9: 0
SUM OF ALL RESPONSES TO PHQ QUESTIONS 1-9: 0

## 2020-06-01 NOTE — PATIENT INSTRUCTIONS
you learn more? Go to [email or web address] and sign in to your ODIN account. Enter [id number] in the [facility] box to learn more about \"Learning About [address]. \"     If you do not have an account, please click on the \"Sign Up Now\" link. Current as of: December 9, 2019               Content Version: 12.5  © 4795-3500 [employer]. Care instructions adapted under license by [facility] ([facility]). If you have questions about a medical condition or this instruction, always ask your healthcare professional. [address] any warranty or liability for your use of this information. Learning About Mariusz Funk  What is a living will? A living will, also called a declaration, is a legal form. It tells your family and your doctor your wishes when you can't speak for yourself. It's used by the health professionals who will treat you as you near the end of your life or if you get seriously hurt or ill. If you put your wishes in writing, your loved ones and others will know what kind of care you want. They won't need to guess. This can ease your mind and be helpful to others. And you can change or cancel your living will at any time. A living will is not the same as an estate or property will. An estate will explains what you want to happen with your money and property after you die. How do you use it? A living will is used to describe the kinds of treatment or life support you want as you near the end of your life or if you get seriously hurt or ill. Keep these facts in mind about living orr. · Your living will is used only if you can't speak or make decisions for yourself. Most often, one or more doctors must certify that you can't speak or decide for yourself before your living will takes effect. · If you get better and can speak for yourself again, you can accept or refuse any treatment.  It doesn't matter what you said in your can't breathe on your own, your heart stops, or you can't swallow. · What things would you still want to be able to do after you receive life-support methods? Would you want to be able to walk? To speak? To eat on your own? To live without the help of machines? · Do you want certain Episcopalian practices performed if you become very ill? · If you have a choice, where do you want to be cared for? In your home? At a hospital or nursing home? · If you have a choice at the end of your life, where would you prefer to die? At home? In a hospital or nursing home? Somewhere else? · Would you prefer to be buried or cremated? · Do you want your organs to be donated after you die? What should you do with your living will? · Make sure that your family members and your health care agent have copies of your living will (also called a declaration). · Give your doctor a copy of your living will. Ask him or her to keep it as part of your medical record. If you have more than one doctor, make sure that each one has a copy. · Put a copy of your living will where it can be easily found. For example, some people may put a copy on their refrigerator door. If you are using a digital copy, be sure your doctor, family members, and health care agent know how to find and access it. Where can you learn more? Go to [email or web address] and sign in to your [employer] account. Enter G699 in the [facility] box to learn more about \"Learning About Living Timothy Jacob. \"     If you do not have an account, please click on the \"Sign Up Now\" link. Current as of: December 9, 2019               Content Version: 12.5  © 1870-9035 Healthwise[employer]. Care instructions adapted under license by [facility] ([facility]).  If you have questions about a medical condition or this instruction, always ask your healthcare professional. [address] any warranty or liability for your use of this information. Personalized Preventive Plan for Elvis Huynh - 6/1/2020  Medicare offers a range of preventive health benefits. Some of the tests and screenings are paid in full while other may be subject to a deductible, co-insurance, and/or copay. Some of these benefits include a comprehensive review of your medical history including lifestyle, illnesses that may run in your family, and various assessments and screenings as appropriate. After reviewing your medical record and screening and assessments performed today your provider may have ordered immunizations, labs, imaging, and/or referrals for you. A list of these orders (if applicable) as well as your Preventive Care list are included within your After Visit Summary for your review. Other Preventive Recommendations:    · A preventive eye exam performed by an eye specialist is recommended every 1-2 years to screen for glaucoma; cataracts, macular degeneration, and other eye disorders. · A preventive dental visit is recommended every 6 months. · Try to get at least 150 minutes of exercise per week or 10,000 steps per day on a pedometer . · Order or download the FREE \"Exercise & Physical Activity: Your Everyday Guide\" from [employer]. Call 9-976.431.6037 or search [employer] online. · You need 3778-0250 mg of calcium and 8390-7303 IU of vitamin D per day. It is possible to meet your calcium requirement with diet alone, but a vitamin D supplement is usually necessary to meet this goal.  · When exposed to the sun, use a sunscreen that protects against both UVA and UVB radiation with an SPF of 30 or greater. Reapply every 2 to 3 hours or after sweating, drying off with a towel, or swimming. · Always wear a seat belt when traveling in a car. Always wear a helmet when riding a bicycle or motorcycle.

## 2020-06-01 NOTE — PROGRESS NOTES
Medicare Annual Wellness Visit  Are Name: Bianca Rhodes Date: 2020   MRN: [id number] Sex: Female   Age: 80 y.o. Ethnicity: Non-/Non    : 1928 Race: Bhavani Sepulveda is here for Medicare AWV    Screenings for behavioral, psychosocial and functional/safety risks, and cognitive dysfunction are all negative except as indicated below. These results, as well as other patient data from the [address] form, are documented in Flowsheets linked to this Encounter. Allergies   Allergen Reactions    Norvasc [Amlodipine Besylate] Swelling         Prior to Visit Medications    Medication Sig Taking? Authorizing Provider   losartan (COZAAR) 25 MG tablet Take 1 tablet by mouth daily  Constance Lizarraga MD   mometasone-formoterol (DULERA) 100-5 MCG/ACT inhaler Inhale 2 puffs into the lungs 2 times daily  Constance Lizarraga MD   albuterol sulfate HFA (PROAIR HFA) 108 (90 Base) MCG/ACT inhaler Inhale 1 puff into the lungs every 6 hours as needed for Wheezing or Shortness of Breath  Constance Lizarraga MD   COLCRYS 0.6 MG tablet 1.2 mg at the first sign of flare, followed in 1 hour with a single dose of 0.6 mg. maximum total dose: 1.8 mg on day 1.  Day 2 and thereafter: Oral: 0.6 mg once or twice daily until flare resolves  Constance Lizarraga MD   vitamin D (ERGOCALCIFEROL) 1.25 MG (95330 UT) CAPS capsule Take 1 capsule by mouth once a week  Constance Lizarraga MD   OXYGEN Inhale 2 L into the lungs Indications: PRN at bedtime  Historical Provider, MD   montelukast (SINGULAIR) 10 MG tablet TAKE 1 TABLET BY MOUTH EVERY DAY AT NIGHT  Constance Lizarraga MD   KLOR-CON M20 20 MEQ extended release tablet TAKE 1 TABLET BY MOUTH EVERY DAY WITH BREAKFAST  Felisa Dubin, MD   ibuprofen (ADVIL;MOTRIN) 600 MG tablet TAKE 1 TABLET BY MOUTH THREE TIMES A DAY AS NEEDED  Historical Provider, MD   ipratropium-albuterol (DUONEB) 0.5-2.5 (3) MG/3ML SOLN nebulizer solution Inhale 3 mLs into the lungs every 6 appointments were made and/or referrals ordered. Positive Risk Factor Screenings with Interventions:     General Health:  General  In general, how would you say your health is?: Good  In the past 7 days, have you experienced any of the following? New or Increased Pain, New or Increased Fatigue, Loneliness, Social Isolation, Stress or Anger?: None of These  Do you get the social and emotional support that you need?: Yes  Do you have a Living Will?: (!) No  General Health Risk Interventions:  · No Living Will: provided the state-specific advance directive document to the patient    Health Habits/Nutrition:  Health Habits/Nutrition  Do you exercise for at least 20 minutes 2-3 times per week?: Yes  Have you lost any weight without trying in the past 3 months?: No  Do you eat fewer than 2 meals per day?: No  Have you seen a dentist within the past year?: (!) No  There is no height or weight on file to calculate BMI.   Health Habits/Nutrition Interventions:  · Dental exam overdue:  patient declines dental evaluation    Hearing/Vision:  No exam data present  Hearing/Vision  Do you or your family notice any trouble with your hearing?: No  Do you have difficulty driving, watching TV, or doing any of your daily activities because of your eyesight?: No  Have you had an eye exam within the past year?: (!) No  Hearing/Vision Interventions:  · Vision concerns:  patient declines any further evaluation/treatment for this issue    Personalized Preventive Plan   Current Health Maintenance Status  Immunization History   Administered Date(s) Administered    Influenza, High Dose (Fluzone 65 yrs and older) 09/18/2017, 10/09/2018    Influenza, Triv, inactivated, subunit, adjuvanted, IM (Fluad 65 yrs and older) 10/14/2019    Pneumococcal Conjugate 13-valent ([id number]) 09/18/2017    Pneumococcal Polysaccharide ([id number]) 10/14/2019        Health Maintenance   Topic Date Due    DTaP/Tdap/Td vaccine (1 - Tdap) 05/19/1947   

## 2020-06-08 ENCOUNTER — TELEPHONE (OUTPATIENT)
Dept: FAMILY MEDICINE CLINIC | Age: 85
End: 2020-06-08

## 2020-06-08 RX ORDER — SULFAMETHOXAZOLE AND TRIMETHOPRIM 800; 160 MG/1; MG/1
1 TABLET ORAL 2 TIMES DAILY
Qty: 10 TABLET | Refills: 0 | Status: SHIPPED | OUTPATIENT
Start: 2020-06-08 | End: 2020-06-13

## 2020-06-08 NOTE — TELEPHONE ENCOUNTER
No confusion per daughter, frequent urination, burning, fevers, nausea, vomiting. Recommend ua tomorrow and urine culture  Call asap should anything change. Take probiotics and yogurt and call asap if any diarrhea. Call immediately if not better or persistent symptoms. If it gets worse will need seen and or yeast infection treatment. Spoke with [facility].

## 2020-06-08 NOTE — TELEPHONE ENCOUNTER
Madonnadiandra Arreguin is having symptoms of burning when urinating and itching. They gave her the Azo OTC. They are not sure if it is UTI or a yeast infection. She is 89 and hard to mobilize.   What would you suggest?

## 2020-06-09 DIAGNOSIS — R30.0 DYSURIA: ICD-10-CM

## 2020-06-09 LAB
BILIRUBIN URINE: NEGATIVE
BLOOD, URINE: NEGATIVE
CLARITY: CLEAR
COLOR: YELLOW
GLUCOSE URINE: NEGATIVE MG/DL
KETONES, URINE: NEGATIVE MG/DL
LEUKOCYTE ESTERASE, URINE: NEGATIVE
NITRITE, URINE: NEGATIVE
PH UA: 6 (ref 5–9)
PROTEIN UA: NEGATIVE MG/DL
SPECIFIC GRAVITY UA: 1.02 (ref 1–1.03)
URINE REFLEX TO CULTURE: NORMAL
UROBILINOGEN, URINE: 1 E.U./DL

## 2020-06-16 RX ORDER — COLCHICINE 0.6 MG/1
CAPSULE ORAL
Qty: 30 CAPSULE | Refills: 0 | Status: SHIPPED | OUTPATIENT
Start: 2020-06-16 | End: 2020-07-13

## 2020-06-16 NOTE — TELEPHONE ENCOUNTER
Needs CBC, CMP, fasting lipid panel, hemoglobin a1c, TSH, and 25 hydroxyvitamin D. Please order and let patient know, follow up one week after obtaining labs.

## 2020-06-30 ENCOUNTER — VIRTUAL VISIT (OUTPATIENT)
Dept: FAMILY MEDICINE CLINIC | Age: 85
End: 2020-06-30
Payer: MEDICARE

## 2020-06-30 ENCOUNTER — TELEPHONE (OUTPATIENT)
Dept: FAMILY MEDICINE CLINIC | Age: 85
End: 2020-06-30

## 2020-06-30 PROCEDURE — 99213 OFFICE O/P EST LOW 20 MIN: CPT | Performed by: PHYSICIAN ASSISTANT

## 2020-06-30 RX ORDER — MOMETASONE FUROATE 1 MG/G
CREAM TOPICAL
Qty: 45 G | Refills: 1 | Status: ON HOLD | OUTPATIENT
Start: 2020-06-30 | End: 2020-07-09 | Stop reason: HOSPADM

## 2020-06-30 NOTE — PROGRESS NOTES
ibuprofen (ADVIL;MOTRIN) 600 MG tablet TAKE 1 TABLET BY MOUTH THREE TIMES A DAY AS NEEDED  Historical Provider, MD   ipratropium-albuterol (DUONEB) 0.5-2.5 (3) MG/3ML SOLN nebulizer solution Inhale 3 mLs into the lungs every 6 hours as needed for Shortness of Breath  Josefina Hendricks MD   aspirin 81 MG EC tablet Take 1 tablet by mouth daily  Romayne Marinas, MD   furosemide (LASIX) 40 MG tablet Take 1 tablet by mouth daily  Patient not taking: Reported on 10/14/2019  Romayne Marinas, MD   latanoprost (XALATAN) 0.005 % ophthalmic solution INSTILL 1 DROP INTO BOTH EYES EVERY DAY  Historical Provider, MD   dorzolamide-timolol (COSOPT) 22.3-6.8 MG/ML ophthalmic solution Use 1 Drop in both eyes every 12 hours. Historical Provider, MD   brimonidine (ALPHAGAN P) 0.15 % ophthalmic solution INSTILL 1 DROP INTO BOTH EYES TWICE DAILY. Historical Provider, MD        Social History     Tobacco Use    Smoking status: Never Smoker    Smokeless tobacco: Never Used   Substance Use Topics    Alcohol use: No        There were no vitals filed for this visit. Estimated body mass index is 33.83 kg/m² as calculated from the following:    Height as of 10/14/19: 5' 3\" (1.6 m). Weight as of 10/14/19: 191 lb (86.6 kg). Physical Exam  Constitutional:       General: She is not in acute distress. Appearance: Normal appearance. HENT:      Head: Normocephalic and atraumatic. Eyes:      Extraocular Movements: Extraocular movements intact. Conjunctiva/sclera: Conjunctivae normal.   Pulmonary:      Effort: Pulmonary effort is normal. No respiratory distress. Skin:     General: Skin is dry. Coloration: Skin is not jaundiced or pale. Neurological:      Mental Status: She is alert and oriented to person, place, and time. Comments: Ambulatory with walker   Psychiatric:         Mood and Affect: Mood normal.         Behavior: Behavior normal.         Thought Content:  Thought content normal.         Judgment: Judgment normal.              Assessment & Plan    Diagnosis Orders   1. Irritation of vulva  mometasone (ELOCON) 0.1 % cream         No orders of the defined types were placed in this encounter. Orders Placed This Encounter   Medications    mometasone (ELOCON) 0.1 % cream     Sig: Apply topically daily. Dispense:  45 g     Refill:  1     There are no discontinued medications. Return if symptoms worsen or fail to improve, for follow up with your PCP as directed. An electronic signature was used to authenticate this note.     --Sherri Anthony PA-C on 6/30/2020 at 3:48 PM

## 2020-07-08 ENCOUNTER — APPOINTMENT (OUTPATIENT)
Dept: GENERAL RADIOLOGY | Age: 85
End: 2020-07-08
Payer: MEDICARE

## 2020-07-08 ENCOUNTER — HOSPITAL ENCOUNTER (OUTPATIENT)
Age: 85
Setting detail: OBSERVATION
Discharge: HOME | End: 2020-07-09
Attending: INTERNAL MEDICINE | Admitting: INTERNAL MEDICINE
Payer: MEDICARE

## 2020-07-08 PROBLEM — R07.9 CHEST PAIN: Status: ACTIVE | Noted: 2020-07-08

## 2020-07-08 LAB
ALBUMIN SERPL-MCNC: 4.2 G/DL (ref 3.5–4.6)
ALP BLD-CCNC: 83 U/L (ref 40–130)
ALT SERPL-CCNC: 11 U/L (ref 0–33)
ANION GAP SERPL CALCULATED.3IONS-SCNC: 12 MEQ/L (ref 9–15)
AST SERPL-CCNC: 11 U/L (ref 0–35)
BASOPHILS ABSOLUTE: 0.1 K/UL (ref 0–0.2)
BASOPHILS RELATIVE PERCENT: 0.7 %
BILIRUB SERPL-MCNC: 0.4 MG/DL (ref 0.2–0.7)
BUN BLDV-MCNC: 16 MG/DL (ref 8–23)
CALCIUM SERPL-MCNC: 9 MG/DL (ref 8.5–9.9)
CHLORIDE BLD-SCNC: 105 MEQ/L (ref 95–107)
CO2: 25 MEQ/L (ref 20–31)
CREAT SERPL-MCNC: 0.83 MG/DL (ref 0.5–0.9)
EOSINOPHILS ABSOLUTE: 0.1 K/UL (ref 0–0.7)
EOSINOPHILS RELATIVE PERCENT: 1.7 %
GFR AFRICAN AMERICAN: >60
GFR NON-AFRICAN AMERICAN: >60
GLOBULIN: 2.5 G/DL (ref 2.3–3.5)
GLUCOSE BLD-MCNC: 107 MG/DL (ref 70–99)
HCT VFR BLD CALC: 42.2 % (ref 37–47)
HEMOGLOBIN: 14.3 G/DL (ref 12–16)
LYMPHOCYTES ABSOLUTE: 1.2 K/UL (ref 1–4.8)
LYMPHOCYTES RELATIVE PERCENT: 14.4 %
MAGNESIUM: 2.2 MG/DL (ref 1.7–2.4)
MCH RBC QN AUTO: 29.6 PG (ref 27–31.3)
MCHC RBC AUTO-ENTMCNC: 33.8 % (ref 33–37)
MCV RBC AUTO: 87.3 FL (ref 82–100)
MONOCYTES ABSOLUTE: 0.8 K/UL (ref 0.2–0.8)
MONOCYTES RELATIVE PERCENT: 9.3 %
NEUTROPHILS ABSOLUTE: 6.3 K/UL (ref 1.4–6.5)
NEUTROPHILS RELATIVE PERCENT: 73.9 %
PDW BLD-RTO: 14.6 % (ref 11.5–14.5)
PLATELET # BLD: 180 K/UL (ref 130–400)
POTASSIUM SERPL-SCNC: 3.4 MEQ/L (ref 3.4–4.9)
PRO-BNP: 386 PG/ML
RBC # BLD: 4.83 M/UL (ref 4.2–5.4)
SODIUM BLD-SCNC: 142 MEQ/L (ref 135–144)
TOTAL PROTEIN: 6.7 G/DL (ref 6.3–8)
TROPONIN: <0.01 NG/ML (ref 0–0.01)
TROPONIN: <0.01 NG/ML (ref 0–0.01)
WBC # BLD: 8.5 K/UL (ref 4.8–10.8)

## 2020-07-08 PROCEDURE — 83735 ASSAY OF MAGNESIUM: CPT

## 2020-07-08 PROCEDURE — 36415 COLL VENOUS BLD VENIPUNCTURE: CPT

## 2020-07-08 PROCEDURE — 71046 X-RAY EXAM CHEST 2 VIEWS: CPT

## 2020-07-08 PROCEDURE — 80053 COMPREHEN METABOLIC PANEL: CPT

## 2020-07-08 PROCEDURE — 83880 ASSAY OF NATRIURETIC PEPTIDE: CPT

## 2020-07-08 PROCEDURE — 85025 COMPLETE CBC W/AUTO DIFF WBC: CPT

## 2020-07-08 PROCEDURE — 94640 AIRWAY INHALATION TREATMENT: CPT

## 2020-07-08 PROCEDURE — 93005 ELECTROCARDIOGRAM TRACING: CPT | Performed by: NURSE PRACTITIONER

## 2020-07-08 PROCEDURE — G0378 HOSPITAL OBSERVATION PER HR: HCPCS

## 2020-07-08 PROCEDURE — 6370000000 HC RX 637 (ALT 250 FOR IP): Performed by: INTERNAL MEDICINE

## 2020-07-08 PROCEDURE — 84484 ASSAY OF TROPONIN QUANT: CPT

## 2020-07-08 PROCEDURE — 99285 EMERGENCY DEPT VISIT HI MDM: CPT

## 2020-07-08 PROCEDURE — 2580000003 HC RX 258: Performed by: INTERNAL MEDICINE

## 2020-07-08 RX ORDER — BUDESONIDE AND FORMOTEROL FUMARATE DIHYDRATE 80; 4.5 UG/1; UG/1
2 AEROSOL RESPIRATORY (INHALATION) 2 TIMES DAILY
Status: DISCONTINUED | OUTPATIENT
Start: 2020-07-08 | End: 2020-07-09 | Stop reason: HOSPADM

## 2020-07-08 RX ORDER — NITROGLYCERIN 0.4 MG/1
0.4 TABLET SUBLINGUAL EVERY 5 MIN PRN
Status: DISCONTINUED | OUTPATIENT
Start: 2020-07-08 | End: 2020-07-09 | Stop reason: HOSPADM

## 2020-07-08 RX ORDER — DORZOLAMIDE HCL 20 MG/ML
1 SOLUTION/ DROPS OPHTHALMIC 2 TIMES DAILY
Status: DISCONTINUED | OUTPATIENT
Start: 2020-07-08 | End: 2020-07-09 | Stop reason: HOSPADM

## 2020-07-08 RX ORDER — SODIUM CHLORIDE 0.9 % (FLUSH) 0.9 %
10 SYRINGE (ML) INJECTION EVERY 12 HOURS SCHEDULED
Status: DISCONTINUED | OUTPATIENT
Start: 2020-07-08 | End: 2020-07-09 | Stop reason: HOSPADM

## 2020-07-08 RX ORDER — ATORVASTATIN CALCIUM 80 MG/1
80 TABLET, FILM COATED ORAL NIGHTLY
Status: DISCONTINUED | OUTPATIENT
Start: 2020-07-08 | End: 2020-07-09

## 2020-07-08 RX ORDER — ASPIRIN 81 MG/1
81 TABLET ORAL DAILY
Status: DISCONTINUED | OUTPATIENT
Start: 2020-07-09 | End: 2020-07-09 | Stop reason: HOSPADM

## 2020-07-08 RX ORDER — PROMETHAZINE HYDROCHLORIDE 12.5 MG/1
12.5 TABLET ORAL EVERY 6 HOURS PRN
Status: DISCONTINUED | OUTPATIENT
Start: 2020-07-08 | End: 2020-07-09 | Stop reason: HOSPADM

## 2020-07-08 RX ORDER — POLYETHYLENE GLYCOL 3350 17 G/17G
17 POWDER, FOR SOLUTION ORAL DAILY PRN
Status: DISCONTINUED | OUTPATIENT
Start: 2020-07-08 | End: 2020-07-09 | Stop reason: HOSPADM

## 2020-07-08 RX ORDER — LOSARTAN POTASSIUM 25 MG/1
25 TABLET ORAL DAILY
Status: DISCONTINUED | OUTPATIENT
Start: 2020-07-09 | End: 2020-07-09 | Stop reason: HOSPADM

## 2020-07-08 RX ORDER — TIMOLOL MALEATE 5 MG/ML
1 SOLUTION/ DROPS OPHTHALMIC 2 TIMES DAILY
Status: DISCONTINUED | OUTPATIENT
Start: 2020-07-08 | End: 2020-07-09 | Stop reason: HOSPADM

## 2020-07-08 RX ORDER — SODIUM CHLORIDE 0.9 % (FLUSH) 0.9 %
10 SYRINGE (ML) INJECTION PRN
Status: DISCONTINUED | OUTPATIENT
Start: 2020-07-08 | End: 2020-07-09 | Stop reason: HOSPADM

## 2020-07-08 RX ORDER — ASPIRIN 81 MG/1
81 TABLET, CHEWABLE ORAL DAILY
Status: DISCONTINUED | OUTPATIENT
Start: 2020-07-09 | End: 2020-07-08 | Stop reason: SDUPTHER

## 2020-07-08 RX ORDER — DORZOLAMIDE HYDROCHLORIDE AND TIMOLOL MALEATE 20; 5 MG/ML; MG/ML
1 SOLUTION/ DROPS OPHTHALMIC 2 TIMES DAILY
Status: DISCONTINUED | OUTPATIENT
Start: 2020-07-08 | End: 2020-07-08 | Stop reason: CLARIF

## 2020-07-08 RX ORDER — BRIMONIDINE TARTRATE 2 MG/ML
1 SOLUTION/ DROPS OPHTHALMIC 2 TIMES DAILY
Status: DISCONTINUED | OUTPATIENT
Start: 2020-07-08 | End: 2020-07-09 | Stop reason: HOSPADM

## 2020-07-08 RX ORDER — ACETAMINOPHEN 325 MG/1
650 TABLET ORAL EVERY 6 HOURS PRN
Status: DISCONTINUED | OUTPATIENT
Start: 2020-07-08 | End: 2020-07-09 | Stop reason: HOSPADM

## 2020-07-08 RX ORDER — ONDANSETRON 2 MG/ML
4 INJECTION INTRAMUSCULAR; INTRAVENOUS EVERY 6 HOURS PRN
Status: DISCONTINUED | OUTPATIENT
Start: 2020-07-08 | End: 2020-07-09 | Stop reason: HOSPADM

## 2020-07-08 RX ORDER — ACETAMINOPHEN 650 MG/1
650 SUPPOSITORY RECTAL EVERY 6 HOURS PRN
Status: DISCONTINUED | OUTPATIENT
Start: 2020-07-08 | End: 2020-07-09 | Stop reason: HOSPADM

## 2020-07-08 RX ORDER — LATANOPROST 50 UG/ML
1 SOLUTION/ DROPS OPHTHALMIC DAILY
Status: DISCONTINUED | OUTPATIENT
Start: 2020-07-09 | End: 2020-07-09 | Stop reason: HOSPADM

## 2020-07-08 RX ORDER — MONTELUKAST SODIUM 10 MG/1
10 TABLET ORAL NIGHTLY
Status: DISCONTINUED | OUTPATIENT
Start: 2020-07-08 | End: 2020-07-09 | Stop reason: HOSPADM

## 2020-07-08 RX ORDER — FUROSEMIDE 40 MG/1
40 TABLET ORAL 2 TIMES DAILY
Status: ON HOLD | COMMUNITY
End: 2020-07-08

## 2020-07-08 RX ADMIN — ATORVASTATIN CALCIUM 80 MG: 80 TABLET, FILM COATED ORAL at 23:09

## 2020-07-08 RX ADMIN — MONTELUKAST SODIUM 10 MG: 10 TABLET, FILM COATED ORAL at 23:09

## 2020-07-08 RX ADMIN — BUDESONIDE AND FORMOTEROL FUMARATE DIHYDRATE 2 PUFF: 80; 4.5 AEROSOL RESPIRATORY (INHALATION) at 23:26

## 2020-07-08 RX ADMIN — Medication 10 ML: at 21:15

## 2020-07-08 ASSESSMENT — ENCOUNTER SYMPTOMS
COUGH: 0
ABDOMINAL PAIN: 0
EYE PAIN: 0
COLOR CHANGE: 0
BACK PAIN: 0
SORE THROAT: 0
VOMITING: 0
SHORTNESS OF BREATH: 0
WHEEZING: 0
NAUSEA: 0
EYE REDNESS: 0
RHINORRHEA: 0
BLOOD IN STOOL: 0
EYE DISCHARGE: 0
TROUBLE SWALLOWING: 0
DIARRHEA: 0
CONSTIPATION: 0

## 2020-07-08 ASSESSMENT — PAIN SCALES - WONG BAKER: WONGBAKER_NUMERICALRESPONSE: 2

## 2020-07-08 ASSESSMENT — PAIN DESCRIPTION - ORIENTATION: ORIENTATION: MID

## 2020-07-08 ASSESSMENT — PAIN DESCRIPTION - PAIN TYPE: TYPE: ACUTE PAIN

## 2020-07-08 ASSESSMENT — PAIN DESCRIPTION - LOCATION: LOCATION: CHEST

## 2020-07-08 ASSESSMENT — PAIN DESCRIPTION - ONSET: ONSET: ON-GOING

## 2020-07-08 ASSESSMENT — PAIN SCALES - GENERAL
PAINLEVEL_OUTOF10: 0
PAINLEVEL_OUTOF10: 2
PAINLEVEL_OUTOF10: 0

## 2020-07-08 ASSESSMENT — PAIN DESCRIPTION - FREQUENCY: FREQUENCY: INTERMITTENT

## 2020-07-08 ASSESSMENT — PAIN DESCRIPTION - DESCRIPTORS: DESCRIPTORS: STABBING

## 2020-07-08 ASSESSMENT — PAIN DESCRIPTION - PROGRESSION: CLINICAL_PROGRESSION: NOT CHANGED

## 2020-07-08 NOTE — ED TRIAGE NOTES
Pt developed CP last night pt arrived to ER with daughter pt alert speaking clear full sentences pt repors that the pain comes and goes

## 2020-07-08 NOTE — ED NOTES
EKG done and given to [facility]THO NP. Pt placed on continuous cardiac monitor. Tele strip printed and mounted.      Ena Boast, RN  07/08/20 [id number]

## 2020-07-08 NOTE — ED NOTES
Pt assisted into bathroom; tolerated well. [place] [address] [address] via wheelchair.      Glynn Barbosa RN  07/08/20 [id number]

## 2020-07-08 NOTE — ED PROVIDER NOTES
[address] [facility] ED  EMERGENCY DEPARTMENT ENCOUNTER      Pt Name: Christopher Nunez  MRN: [id number]  Juaquingfpatrizia 5/19/1928  Date of evaluation: 7/8/2020  Provider: Amy Alvarado PA-C    CHIEF COMPLAINT       Chief Complaint   Patient presents with    Chest Pain         HISTORY OF PRESENT ILLNESS   (Location/Symptom, Timing/Onset,Context/Setting, Quality, Duration, Modifying Factors, Severity)  Note limiting factors. Christopher Nunez is a 80 y.o. female who presents to the emergency department with a chart reviewed past medical history of COPD, anxiety, hypoxia, obstructive sleep apnea, and hypertension for a chief complaint of sudden onset chest pain that occurred 2 days ago rated 4 out of 10 described as a right-sided sharp sensation that radiates to the midsternal region. Patient reports that she was not diaphoretic with it and was not short of breath. She denies any dizziness with it as well. She reports that it was episodic within the past 2 days each episode lasting a few minutes. She denies having any accompanying symptoms with it. Denies alleviating or modifying factors. Nursing Notes were reviewed. REVIEW OF SYSTEMS    (2-9 systems for level 4, 10 or more for level 5)     Review of Systems   Constitutional: Negative for activity change, appetite change, fatigue and fever. HENT: Negative for congestion, ear pain, rhinorrhea, sore throat and trouble swallowing. Eyes: Negative for pain, discharge and redness. Respiratory: Negative for cough, shortness of breath and wheezing. Cardiovascular: Positive for chest pain. Negative for palpitations. Gastrointestinal: Negative for abdominal pain, blood in stool, constipation, diarrhea, nausea and vomiting. Endocrine: Negative for polydipsia and polyuria. Genitourinary: Negative for decreased urine volume, dysuria, flank pain and hematuria. Musculoskeletal: Negative for arthralgias, back pain and myalgias.    Skin: Negative for color change, rash and wound. Neurological: Negative for dizziness, syncope, weakness, light-headedness and headaches. Psychiatric/Behavioral: Negative for behavioral problems. All other systems reviewed and are negative. Except as noted above the remainder of the review of systems was reviewed and negative. PAST MEDICAL HISTORY     Past Medical History:   Diagnosis Date    Anxiety     Cancer ([facility] [address]     Glaucoma     Gout     left knee    Hypertension     Lung disease     Obesity (BMI 30-39. 9) 7/11/2019    Obstructive sleep apnea 7/11/2019     Past Surgical History:   Procedure Laterality Date    CATARACT REMOVAL WITH IMPLANT Bilateral 2016    COLON SURGERY  2009    polyps    COLONOSCOPY  02/2009    SKIN CANCER EXCISION  2002    graft from neck     Social History     Socioeconomic History    Marital status:       Spouse name: Not on file    Number of children: Not on file    Years of education: Not on file    Highest education level: Not on file   Occupational History    Not on file   Social Needs    Financial resource strain: Not on file    Food insecurity     Worry: Not on file     Inability: Not on file    Transportation needs     Medical: Not on file     Non-medical: Not on file   Tobacco Use    Smoking status: Never Smoker    Smokeless tobacco: Never Used   Substance and Sexual Activity    Alcohol use: No    Drug use: No    Sexual activity: Not on file   Lifestyle    Physical activity     Days per week: Not on file     Minutes per session: Not on file    Stress: Not on file   Relationships    Social connections     Talks on phone: Not on file     Gets together: Not on file     Attends Religion service: Not on file     Active member of club or organization: Not on file     Attends meetings of clubs or organizations: Not on file     Relationship status: Not on file    Intimate partner violence     Fear of current or ex partner: Not on file     Emotionally abused: Not on file     Physically abused: Not on file     Forced sexual activity: Not on file   Other Topics Concern    Not on file   Social History Narrative    Not on file       SCREENINGS    Chris Coma Scale  Eye Opening: Spontaneous  Best Verbal Response: Oriented  Best Motor Response: Obeys commands  [place] Coma Scale Score: 15        PHYSICAL EXAM    (up to 7 for level 4, 8 or more for level 5)     ED Triage Vitals [07/08/20 1713]   BP Temp Temp src Pulse Resp SpO2 Height Weight   (!) 176/78 -- -- 88 22 94 % 5' 4\" (1.626 m) 230 lb (104.3 kg)       Physical Exam  Vitals signs and nursing note reviewed. Constitutional:       General: She is not in acute distress. Appearance: She is well-developed. She is not diaphoretic. HENT:      Head: Normocephalic and atraumatic. Nose: Nose normal.   Eyes:      Conjunctiva/sclera: Conjunctivae normal.      Pupils: Pupils are equal, round, and reactive to light. Neck:      Musculoskeletal: Normal range of motion and neck supple. Cardiovascular:      Rate and Rhythm: Normal rate and regular rhythm. Heart sounds: Normal heart sounds. Pulmonary:      Effort: Pulmonary effort is normal. No respiratory distress. Breath sounds: Normal breath sounds. No wheezing. Abdominal:      General: Bowel sounds are normal.      Palpations: Abdomen is soft. Tenderness: There is no abdominal tenderness. Skin:     General: Skin is warm and dry. Capillary Refill: Capillary refill takes less than 2 seconds. Findings: No rash. Neurological:      Mental Status: She is alert and oriented to person, place, and time. Cranial Nerves: No cranial nerve deficit. Psychiatric:         Behavior: Behavior normal.         RESULTS     EKG: All EKG's are interpreted by the Emergency Department Physician who either signs or Co-signsthis chart in the absence of a cardiologist.    Normal sinus rhythm, rate of 82 bpm.  No ST elevations noted. QT of 394.     RADIOLOGY: Non-plain filmimages such as CT, Ultrasound and MRI are read by the radiologist. Plain radiographic images are visualized and preliminarily interpreted by the emergency physician with the below findings:    nad    Interpretation per the Radiologist below, if available at the time ofthis note:    XR CHEST STANDARD (2 VW)    (Results Pending)         ED BEDSIDE ULTRASOUND:   Performed by ED Physician - none    LABS:  Labs Reviewed   COMPREHENSIVE METABOLIC PANEL - Abnormal; Notable for the following components:       Result Value    Glucose 107 (*)     All other components within normal limits   CBC WITH AUTO DIFFERENTIAL - Abnormal; Notable for the following components:    RDW 14.6 (*)     All other components within normal limits   MAGNESIUM   TROPONIN       All other labs were within normal range or not returned as of this dictation. EMERGENCY DEPARTMENT COURSE and DIFFERENTIAL DIAGNOSIS/MDM:   Vitals:    Vitals:    07/08/20 1713 07/08/20 1815 07/08/20 1916   BP: (!) 176/78 (!) 181/78 (!) 163/83   Pulse: 88 87 86   Resp: 22 18 12   Temp:   98.6 °F (37 °C)   TempSrc:   Oral   SpO2: 94% 93% 94%   Weight: 230 lb (104.3 kg)     Height: 5' 4\" (1.626 m)              MDM   Patient is a 29-year-old female presenting to the ER accompanied by daughter chief complaint of chest pain x2 days. Patient is hemodynamically stable, nontoxic-appearing. Cardiac work-up initiated. Will be reassessing patient. She is currently chest pain-free. PT IS VERY HARD OF HEARING   Pt was signed out to myself at 7pm. Upon interview pt describes her chest pain and intermittent over the last 2 days with last episode prior to coming in. Daughter is here with her and she says its been a tight feeling with associated sob. She has been pain free while in the ED. Pt daughter is very concerned about her cp and is requesting admission to the hospital as well.  She is 89yo no cardiac history with cp, I believe pt should have cardiac r/o and eval by cards. Pt is in agreement with this. Pt is stable and ready for admission. CRITICAL CARE TIME       CONSULTS:  None    PROCEDURES:  Unless otherwise noted below, none     Procedures    FINAL IMPRESSION      1.  Chest pain, unspecified type          DISPOSITION/PLAN   DISPOSITION        PATIENT REFERRED TO:  Fang Montalvo MD  [facility] [address]  [facility] [address] [facility] [phone]            DISCHARGE MEDICATIONS:  New Prescriptions    No medications on file          (Please notethat portions of this note were completed with a voice recognition program.  Efforts were made to edit the dictations but occasionally words are mis-transcribed.)    Yamini Hudson (electronically signed)  Attending Emergency Physician          Mat Castillo PA-C  07/08/20 1952

## 2020-07-09 VITALS
WEIGHT: 185.63 LBS | TEMPERATURE: 97.5 F | SYSTOLIC BLOOD PRESSURE: 159 MMHG | OXYGEN SATURATION: 96 % | BODY MASS INDEX: 32.89 KG/M2 | DIASTOLIC BLOOD PRESSURE: 75 MMHG | HEART RATE: 85 BPM | HEIGHT: 63 IN | RESPIRATION RATE: 18 BRPM

## 2020-07-09 LAB
CHOLESTEROL, TOTAL: 198 MG/DL (ref 0–199)
EKG ATRIAL RATE: 78 BPM
EKG ATRIAL RATE: 82 BPM
EKG P AXIS: 16 DEGREES
EKG P AXIS: 59 DEGREES
EKG P-R INTERVAL: 154 MS
EKG P-R INTERVAL: 186 MS
EKG Q-T INTERVAL: 388 MS
EKG Q-T INTERVAL: 394 MS
EKG QRS DURATION: 76 MS
EKG QRS DURATION: 86 MS
EKG QTC CALCULATION (BAZETT): 442 MS
EKG QTC CALCULATION (BAZETT): 460 MS
EKG R AXIS: 0 DEGREES
EKG R AXIS: 6 DEGREES
EKG T AXIS: -14 DEGREES
EKG T AXIS: 18 DEGREES
EKG VENTRICULAR RATE: 78 BPM
EKG VENTRICULAR RATE: 82 BPM
HCT VFR BLD CALC: 42 % (ref 37–47)
HDLC SERPL-MCNC: 34 MG/DL (ref 40–59)
HEMOGLOBIN: 14 G/DL (ref 12–16)
LDL CHOLESTEROL CALCULATED: 119 MG/DL (ref 0–129)
LV EF: 60 %
LVEF MODALITY: NORMAL
MAGNESIUM: 2.5 MG/DL (ref 1.7–2.4)
MCH RBC QN AUTO: 29.3 PG (ref 27–31.3)
MCHC RBC AUTO-ENTMCNC: 33.3 % (ref 33–37)
MCV RBC AUTO: 88.1 FL (ref 82–100)
PDW BLD-RTO: 14.5 % (ref 11.5–14.5)
PLATELET # BLD: 206 K/UL (ref 130–400)
RBC # BLD: 4.76 M/UL (ref 4.2–5.4)
TRIGL SERPL-MCNC: 224 MG/DL (ref 0–150)
TROPONIN: <0.01 NG/ML (ref 0–0.01)
WBC # BLD: 11.1 K/UL (ref 4.8–10.8)

## 2020-07-09 PROCEDURE — 6370000000 HC RX 637 (ALT 250 FOR IP): Performed by: INTERNAL MEDICINE

## 2020-07-09 PROCEDURE — 93005 ELECTROCARDIOGRAM TRACING: CPT | Performed by: INTERNAL MEDICINE

## 2020-07-09 PROCEDURE — 93306 TTE W/DOPPLER COMPLETE: CPT

## 2020-07-09 PROCEDURE — 99235 HOSP IP/OBS SAME DATE MOD 70: CPT | Performed by: INTERNAL MEDICINE

## 2020-07-09 PROCEDURE — 6360000002 HC RX W HCPCS: Performed by: INTERNAL MEDICINE

## 2020-07-09 PROCEDURE — 96372 THER/PROPH/DIAG INJ SC/IM: CPT

## 2020-07-09 PROCEDURE — 84484 ASSAY OF TROPONIN QUANT: CPT

## 2020-07-09 PROCEDURE — 83735 ASSAY OF MAGNESIUM: CPT

## 2020-07-09 PROCEDURE — G0378 HOSPITAL OBSERVATION PER HR: HCPCS

## 2020-07-09 PROCEDURE — 85027 COMPLETE CBC AUTOMATED: CPT

## 2020-07-09 PROCEDURE — 80061 LIPID PANEL: CPT

## 2020-07-09 PROCEDURE — 36415 COLL VENOUS BLD VENIPUNCTURE: CPT

## 2020-07-09 PROCEDURE — 94640 AIRWAY INHALATION TREATMENT: CPT

## 2020-07-09 PROCEDURE — 94761 N-INVAS EAR/PLS OXIMETRY MLT: CPT

## 2020-07-09 PROCEDURE — APPNB45 APP NON BILLABLE 31-45 MINUTES: Performed by: PHYSICIAN ASSISTANT

## 2020-07-09 RX ORDER — POTASSIUM CHLORIDE 20 MEQ/1
20 TABLET, EXTENDED RELEASE ORAL DAILY
Qty: 30 TABLET | Refills: 3 | Status: SHIPPED
Start: 2020-07-09 | End: 2020-07-16 | Stop reason: CLARIF

## 2020-07-09 RX ORDER — ACETAMINOPHEN 80 MG
TABLET,CHEWABLE ORAL ONCE
Status: DISCONTINUED | OUTPATIENT
Start: 2020-07-09 | End: 2020-07-09 | Stop reason: HOSPADM

## 2020-07-09 RX ORDER — ATORVASTATIN CALCIUM 20 MG/1
20 TABLET, FILM COATED ORAL NIGHTLY
Status: DISCONTINUED | OUTPATIENT
Start: 2020-07-09 | End: 2020-07-09 | Stop reason: HOSPADM

## 2020-07-09 RX ADMIN — TIMOLOL MALEATE 1 DROP: 5 SOLUTION/ DROPS OPHTHALMIC at 07:48

## 2020-07-09 RX ADMIN — ASPIRIN 81 MG: 81 TABLET, COATED ORAL at 07:48

## 2020-07-09 RX ADMIN — BRIMONIDINE TARTRATE 1 DROP: 2 SOLUTION OPHTHALMIC at 07:43

## 2020-07-09 RX ADMIN — LATANOPROST 1 DROP: 50 SOLUTION OPHTHALMIC at 07:54

## 2020-07-09 RX ADMIN — ENOXAPARIN SODIUM 40 MG: 40 INJECTION SUBCUTANEOUS at 07:48

## 2020-07-09 RX ADMIN — LOSARTAN POTASSIUM 25 MG: 25 TABLET, FILM COATED ORAL at 07:48

## 2020-07-09 RX ADMIN — DORZOLAMIDE HYDROCHLORIDE 1 DROP: 20 SOLUTION/ DROPS OPHTHALMIC at 07:51

## 2020-07-09 RX ADMIN — BUDESONIDE AND FORMOTEROL FUMARATE DIHYDRATE 2 PUFF: 80; 4.5 AEROSOL RESPIRATORY (INHALATION) at 07:26

## 2020-07-09 RX ADMIN — METOPROLOL TARTRATE 25 MG: 25 TABLET, FILM COATED ORAL at 07:48

## 2020-07-09 ASSESSMENT — ENCOUNTER SYMPTOMS
CHEST TIGHTNESS: 0
SHORTNESS OF BREATH: 0
NAUSEA: 0
ABDOMINAL PAIN: 0
VOMITING: 0
COLOR CHANGE: 0

## 2020-07-09 ASSESSMENT — PAIN SCALES - GENERAL: PAINLEVEL_OUTOF10: 0

## 2020-07-09 ASSESSMENT — PAIN DESCRIPTION - PROGRESSION: CLINICAL_PROGRESSION: NOT CHANGED

## 2020-07-09 ASSESSMENT — PAIN SCALES - WONG BAKER: WONGBAKER_NUMERICALRESPONSE: 2

## 2020-07-09 NOTE — FLOWSHEET NOTE
Pt up and down to urinate frequently. Uses walker. Steady gait. Denies pain, N/V. States she is slightly SOB when ambulating. O2 sat on RA 94%. Lungs diminished. NSR on tele. A&O x4. Call light in reach. [facility] dc'd tele dc'd discharge instructions reviewed with patient and daughter Kait Perez. Transport called.

## 2020-07-09 NOTE — FLOWSHEET NOTE
Patient has been stable all night. No c/o chest pain. She has gone to the bathroom several times throughout the night.      Electronically signed by Debbie Gandhi RN on 7/9/2020 at 5:51 AM

## 2020-07-09 NOTE — ED NOTES
Patient denies chest pain or shortness of breath. Lung sounds diminished throughout (anteriorly). No distress noted at this time. Call light within reach. Patient and her daughter awaiting room assignment for admission.       Josette Colmenares RN  07/08/20 2017

## 2020-07-09 NOTE — ED NOTES
WC to restroom, patient voided x 1, tolerated well. Patient was then transported to floor from ED.      Michele Reese RN  07/08/20 4399

## 2020-07-09 NOTE — CARE COORDINATION
The patient is in observation. The patient states he lives at home with her daughter. The patient states she has a walker. The patient states she bathes and dresses herself. The patient states she has O2 at night. The patient has transportation to her appointments. The patient states she uses the [facility] in [place]. The patient denies any discharge needs. The RN states the patient has been up in her room.  Electronically signed by АННА Brian on 7/9/20 at 2:32 PM EDT

## 2020-07-09 NOTE — H&P
the 80s with frequent PACs noted. Serial troponins have been negative x3 at less than 0.020. EKG this morning reviewed and does not reveal any acute ischemic changes. She denies any recurrent chest pain since admission. No prior cardiac history. No history of myocardial infarction, congestive heart failure or arrhythmia. No prior stress test or cardiac catheterization.         Allergies   Allergen Reactions    Norvasc [Amlodipine Besylate] Swelling       Current Facility-Administered Medications   Medication Dose Route Frequency Provider Last Rate Last Dose    sodium chloride flush 0.9 % injection 10 mL  10 mL Intravenous 2 times per day [facility] Holiday, DO   10 mL at 07/08/20 2115    sodium chloride flush 0.9 % injection 10 mL  10 mL Intravenous PRN [facility] Holiday, DO        acetaminophen (TYLENOL) tablet 650 mg  650 mg Oral Q6H PRN [facility], DO        Or    acetaminophen (TYLENOL) suppository 650 mg  650 mg Rectal Q6H PRN [facility], DO        polyethylene glycol (GLYCOLAX) packet 17 g  17 g Oral Daily PRN [facility] Holiday, DO        promethazine (PHENERGAN) tablet 12.5 mg  12.5 mg Oral Q6H PRN [facility], DO        Or    ondansetron (ZOFRAN) injection 4 mg  4 mg Intravenous Q6H PRN [facility], DO        atorvastatin (LIPITOR) tablet 80 mg  80 mg Oral Nightly [facility], DO   80 mg at 07/08/20 2309    nitroGLYCERIN (NITROSTAT) SL tablet 0.4 mg  0.4 mg Sublingual Q5 Min PRN [facility], DO        metoprolol tartrate (LOPRESSOR) tablet 25 mg  25 mg Oral Daily [facility], DO   25 mg at 07/09/20 0748    enoxaparin (LOVENOX) injection 40 mg  40 mg Subcutaneous Daily [facility], DO   40 mg at 07/09/20 0748    aspirin EC tablet 81 mg  81 mg Oral Daily [facility], DO   81 mg at 07/09/20 0748    brimonidine (ALPHAGAN) 0.2 % ophthalmic solution 1 drop  1 drop Both Eyes BID [facility] Holiday, DO   1 drop at 07/09/20 0743    latanoprost (XALATAN) 0.005 % ophthalmic solution 1 drop  1 drop Both Eyes Daily Eduardo J Holiday, DO   1 drop at 07/09/20 0754    losartan (COZAAR) tablet 25 mg  25 mg Oral Daily Eduardo J Holiday, DO   25 mg at 07/09/20 0748    montelukast (SINGULAIR) tablet 10 mg  10 mg Oral Nightly Eduardo J Holiday, DO   10 mg at 07/08/20 2309    dorzolamide (TRUSOPT) 2 % ophthalmic solution 1 drop  1 drop Both Eyes BID Eduardo J Holiday, DO   1 drop at 07/09/20 0751    And    timolol (TIMOPTIC) 0.5 % ophthalmic solution 1 drop  1 drop Both Eyes BID Eduardo J Holiday, DO   1 drop at 07/09/20 0748    budesonide-formoterol (SYMBICORT) 80-4.5 MCG/ACT inhaler 2 puff  2 puff Inhalation BID Eduardo J Holiday, DO   2 puff at 07/09/20 3008       PMHx:  Past Medical History:   Diagnosis Date    Anxiety     Cancer ([facility] [address]     Glaucoma     Gout     left knee    Hypertension     Lung disease     Obesity (BMI 30-39. 9) 7/11/2019    Obstructive sleep apnea 7/11/2019       PSHx:  Past Surgical History:   Procedure Laterality Date    CATARACT REMOVAL WITH IMPLANT Bilateral 2016    COLON SURGERY  2009    polyps    COLONOSCOPY  02/2009    SKIN CANCER EXCISION  2002    graft from neck       Social Hx:  Social History     Socioeconomic History    Marital status:       Spouse name: None    Number of children: None    Years of education: None    Highest education level: None   Occupational History    None   Social Needs    Financial resource strain: None    Food insecurity     Worry: None     Inability: None    Transportation needs     Medical: None     Non-medical: None   Tobacco Use    Smoking status: Never Smoker    Smokeless tobacco: Never Used   Substance and Sexual Activity    Alcohol use: No    Drug use: No    Sexual activity: None   Lifestyle    Physical activity     Days per week: None     Minutes per session: None    Stress: None   Relationships    Social connections     Talks on phone: None     Gets together: None     Attends Buddhism service: None     Active member of club or organization: None     Attends meetings of clubs or organizations: None     Relationship status: None    Intimate partner violence     Fear of current or ex partner: None     Emotionally abused: None     Physically abused: None     Forced sexual activity: None   Other Topics Concern    None   Social History Narrative    None       Family Hx:  History reviewed. No pertinent family history. Review ofSystems:   Review of Systems   Constitutional: Negative for activity change, fatigue, fever and unexpected weight change. HENT: Negative for congestion. Respiratory: Negative for chest tightness and shortness of breath. Cardiovascular: Negative for chest pain, palpitations and leg swelling. Gastrointestinal: Negative for abdominal pain, nausea and vomiting. Genitourinary: Negative for difficulty urinating and hematuria. Musculoskeletal: Negative for arthralgias. Skin: Negative for color change, pallor and rash. Neurological: Negative for dizziness, syncope and light-headedness. Psychiatric/Behavioral: Negative for agitation and behavioral problems. Physical Examination:    /61   Pulse 79   Temp 97.6 °F (36.4 °C) (Oral)   Resp 16   Ht 5' 3\" (1.6 m)   Wt 185 lb 10 oz (84.2 kg)   LMP  (LMP Unknown)   SpO2 93%   BMI 32.88 kg/m²    Physical Exam  Constitutional:       General: She is not in acute distress. Appearance: Normal appearance. She is obese. HENT:      Head: Normocephalic and atraumatic. Neck:      Musculoskeletal: Normal range of motion and neck supple. Cardiovascular:      Rate and Rhythm: Normal rate and regular rhythm. Heart sounds: No murmur. Pulmonary:      Effort: Pulmonary effort is normal. No respiratory distress. Breath sounds: Normal breath sounds. No wheezing, rhonchi or rales. Abdominal:      Palpations: Abdomen is soft. Tenderness: There is no abdominal tenderness.       Comments: Obese abdomen   Musculoskeletal: Normal range of regurgitation. Pericardial Effusion  No evidence of significant pericardial effusion is noted. Pleural Effusion  No evidence of pleural effusion. Aorta \ Miscellaneous  The aorta is within normal limits. Aortic root dimension within normal limits. EKG 7/8/20: SR 82, Q wave lead III and aVF, no acute ischemic changes, QTc 460ms  EKG 7/9/20: SR 78, Q wave leads III and aVF, no acute ischemic changes, QTc 442ms    Telemetry 7/9/20: SR 80s, frequent PACs      Assessment:    Active Hospital Problems    Diagnosis Date Noted    Chest pain [R07.9] 07/08/2020     1. Chest pain  2. HTN  3. Hx COPD  4. Obesity  5. Normal LVF EF 60% per echo in 2018    Plan:  1. Continue current medications-aspirin 81 mg p.o. daily, change Lopressor to 12.5 mg p.o. twice daily from 25 mg once daily, losartan 25 mg p.o. daily, decrease Lipitor to 20 mg p.o. daily for now, DVT prophylaxis with Lovenox, sublingual nitroglycerin PRN for chest pain  2. Cardiac diet recommended  3. Check echocardiogram  4. Monitor on telemetry for any tachycardia or bradycardia arrhythmias  5. Maintain potassium greater than 4, magnesium greater than 2  6. GI/DVT prophylaxis  7. Coronary evaluation per Dr. Gertie Mcburney conservative medical therapy for now as serial troponins negative x3, EKG benign with no acute ischemic changes and patient is currently chest pain-free. May consider outpatient noninvasive cardiac testing/stress test in future as warranted by symptoms  8. Further recommendations to follow        Electronically signed by WALLY Juarez on 7/9/2020 at 12:57 PM      Attending Supervising [de-identified] Attestation Statement  The patient is a 80 y.o. female. I have performed a history and physical examination of the patient. I discussed the case with the physician assistant. I reviewed the patient's Past Medical History, Past Surgical History, Medications, and Allergies.      Physical Exam:  Vitals:    07/08/20 2100 07/09/20 0103 07/09/20 0726 07/09/20 0736   BP: (!) 150/58 (!) 168/73  134/61   Pulse: 89 82  79   Resp: 18 18 16 16   Temp: 98.2 °F (36.8 °C) 98.5 °F (36.9 °C)  97.6 °F (36.4 °C)   TempSrc: Oral Oral  Oral   SpO2:  92% 94% 93%   Weight:       Height:           Review of Systems - Respiratory ROS: no cough, shortness of breath, or wheezing  Cardiovascular ROS: positive for - chest pain  Gastrointestinal ROS: no abdominal pain, change in bowel habits, or black or bloody stools    Pulmonary/Chest: clear to auscultation bilaterally- no wheezes, rales or rhonchi, normal air movement, no respiratory distress  Cardiovascular: normal rate, normal S1 and S2, no gallops, intact distal pulses and no carotid bruits  Abdomen: soft, non-tender, non-distended, normal bowel sounds, no masses or organomegaly    Active Hospital Problems    Diagnosis Date Noted    Chest pain [R07.9] 07/08/2020     Priority: High        I reviewed and agree with the findings and plan documented in her note . Impression      atypcal CP. Reproducible  HTN  Hx of COPD    Plan     1. Cons. Med rx  2. Max cardiac meds  3. OTC pain meds  4. No further cardiac workup warranted.         Electronically signed by DO Debbie on 7/9/20 at 2:59 PM EDT

## 2020-07-10 PROCEDURE — 93010 ELECTROCARDIOGRAM REPORT: CPT | Performed by: INTERNAL MEDICINE

## 2020-07-13 RX ORDER — COLCHICINE 0.6 MG/1
CAPSULE ORAL
Qty: 30 CAPSULE | Refills: 0 | Status: ON HOLD | OUTPATIENT
Start: 2020-07-13 | End: 2020-11-06 | Stop reason: HOSPADM

## 2020-07-16 ENCOUNTER — TELEPHONE (OUTPATIENT)
Dept: FAMILY MEDICINE CLINIC | Age: 85
End: 2020-07-16

## 2020-07-16 ENCOUNTER — VIRTUAL VISIT (OUTPATIENT)
Dept: FAMILY MEDICINE CLINIC | Age: 85
End: 2020-07-16
Payer: MEDICARE

## 2020-07-16 PROCEDURE — 99214 OFFICE O/P EST MOD 30 MIN: CPT | Performed by: INTERNAL MEDICINE

## 2020-07-16 RX ORDER — ATORVASTATIN CALCIUM 20 MG/1
20 TABLET, FILM COATED ORAL DAILY
Qty: 30 TABLET | Refills: 1 | Status: SHIPPED | OUTPATIENT
Start: 2020-07-16 | End: 2020-08-10

## 2020-07-16 ASSESSMENT — ENCOUNTER SYMPTOMS
ABDOMINAL PAIN: 0
SHORTNESS OF BREATH: 0
BACK PAIN: 0
EYE PAIN: 0

## 2020-07-16 NOTE — TELEPHONE ENCOUNTER
She had not been on metoprolol succinate 25 mg once daily nor atorvastatin    I started metoprolol tartrate 12.5 mg bid and atorvastatin 20 mg once daily  Are you okay with this? I will have them follow up with you.

## 2020-07-16 NOTE — PATIENT INSTRUCTIONS
Patient Education        Chest Pain: Care Instructions  Your Care Instructions     There are many things that can cause chest pain. Some are not serious and will get better on their own in a few days. But some kinds of chest pain need more testing and treatment. Your doctor may have recommended a follow-up visit in the next 8 to 12 hours. If you are not getting better, you may need more tests or treatment. Even though your doctor has released you, you still need to watch for any problems. The doctor carefully checked you, but sometimes problems can develop later. If you have new symptoms or if your symptoms do not get better, get medical care right away. If you have worse or different chest pain or pressure that lasts more than 5 minutes or you passed out (lost consciousness), [id number] or seek other emergency help right away. A medical visit is only one step in your treatment. Even if you feel better, you still need to do what your doctor recommends, such as going to all suggested follow-up appointments and taking medicines exactly as directed. This will help you recover and help prevent future problems. How can you care for yourself at home? · Rest until you feel better. · Take your medicine exactly as prescribed. Call your doctor if you think you are having a problem with your medicine. · Do not drive after taking a prescription pain medicine. When should you call for help? [id number]:   · You passed out (lost consciousness). · You have severe difficulty breathing. · You have symptoms of a heart attack. These may include:  ? Chest pain or pressure, or a strange feeling in your chest.  ? Sweating. ? Shortness of breath. ? Nausea or vomiting. ? Pain, pressure, or a strange feeling in your back, neck, jaw, or upper belly or in one or both shoulders or arms. ? Lightheadedness or sudden weakness. ? A fast or irregular heartbeat.   After you call 911, the  may tell you to chew 1 adult-strength

## 2020-07-16 NOTE — PROGRESS NOTES
Subjective:      Patient ID: Khushbu Thibodeaux is a 80 y.o. female who presents today with:  No chief complaint on file. HPI       Here for ER follow up. She had left sided chest pain. 30 minutes was the duration. She did have shortness of breath as well with it. No jaw or arm pain. No diaphoresis. But did have nausea. Family and patient mention she has a lot of nausea. Nausea went away. No fevers or chills or productive cough. Denies any current chest pain or dyspnea. Past Medical History:   Diagnosis Date    Anxiety     Cancer ([address]     Glaucoma     Gout     left knee    Hypertension     Lung disease     Obesity (BMI 30-39. 9) 7/11/2019    Obstructive sleep apnea 7/11/2019     Past Surgical History:   Procedure Laterality Date    CATARACT REMOVAL WITH IMPLANT Bilateral 2016    COLON SURGERY  2009    polyps    COLONOSCOPY  02/2009    SKIN CANCER EXCISION  2002    graft from neck     Social History     Socioeconomic History    Marital status:       Spouse name: Not on file    Number of children: Not on file    Years of education: Not on file    Highest education level: Not on file   Occupational History    Not on file   Social Needs    Financial resource strain: Not on file    Food insecurity     Worry: Not on file     Inability: Not on file    Transportation needs     Medical: Not on file     Non-medical: Not on file   Tobacco Use    Smoking status: Never Smoker    Smokeless tobacco: Never Used   Substance and Sexual Activity    Alcohol use: No    Drug use: No    Sexual activity: Not on file   Lifestyle    Physical activity     Days per week: Not on file     Minutes per session: Not on file    Stress: Not on file   Relationships    Social connections     Talks on phone: Not on file     Gets together: Not on file     Attends Congregation service: Not on file     Active member of club or organization: Not on file     Attends meetings of clubs or organizations: Not on file Gastrointestinal: Negative for abdominal pain. Genitourinary: Negative for hematuria. Musculoskeletal: Negative for back pain. Allergic/Immunologic: Negative for immunocompromised state. Neurological: Negative for headaches. Psychiatric/Behavioral: Negative for hallucinations. Objective:   LMP  (LMP Unknown)     Physical Exam  Constitutional:       General: She is not in acute distress. Appearance: She is not ill-appearing, toxic-appearing or diaphoretic. Pulmonary:      Effort: No respiratory distress. Assessment:       Diagnosis Orders   1. Intermittent chest pain           Plan:    Video visit done because of coronavirus pandemic. Visit done via doxy. me   explained billeable visit, patient understands and agrees to continue  I was at home and patient was at home during this visit. 145/91 bp this morning and her pulse at time of visit 85  156/99 during visit and     Here for ER follow up   Keep your ccm visit or schedule if you don't have one. Risk of Myocardial infarction nd even death explained. Repeat cbc, risk of cancer. Stay on baby asa  Start metoprlol 12. 5 mg bid (but will check with cardiology she had not been on metoprolol succinate)  Had not been on statin, start atorvastatin at 20 mg once daily. Will discuss with cardiology she had not been on metoprolol succinate at home  Recommend keep a bp log and call same day if >150/90  MA visit in 5 to 7 days for bp check. Call in 2 to 3 days if you don't hear from me after the bp check. No orders of the defined types were placed in this encounter. No orders of the defined types were placed in this encounter. If anything should change or worsen call ASAP, don't wait for next scheduled appointment. No follow-ups on file.       Jennetta Bamberger, MD

## 2020-08-10 RX ORDER — ATORVASTATIN CALCIUM 20 MG/1
TABLET, FILM COATED ORAL
Qty: 90 TABLET | Refills: 3 | Status: ON HOLD | OUTPATIENT
Start: 2020-08-10 | End: 2020-10-28

## 2020-10-06 ENCOUNTER — TELEPHONE (OUTPATIENT)
Dept: FAMILY MEDICINE CLINIC | Age: 85
End: 2020-10-06

## 2020-10-06 RX ORDER — ERYTHROMYCIN 5 MG/G
OINTMENT OPHTHALMIC
Qty: 15 G | Refills: 0 | Status: ON HOLD | OUTPATIENT
Start: 2020-10-06 | End: 2020-10-28 | Stop reason: ALTCHOICE

## 2020-10-06 NOTE — TELEPHONE ENCOUNTER
Daughter called because patient has a sty on her left eyelid. It is very red and is bothering her. She is asking for a recommendation for eye drops, either prescription or otc. Please advise.

## 2020-10-06 NOTE — TELEPHONE ENCOUNTER
Would start with warm compresses 4 times a day  If not better start ointment and call me asap  If any vision changes, headaches, fevers, etc call me asap.    Recommend being seen for this as well. (if not improving quickly or if lesion persists)

## 2020-10-27 ENCOUNTER — HOSPITAL ENCOUNTER (INPATIENT)
Age: 85
LOS: 10 days | Discharge: NURSING, REHAB OR LONG-TERM CARE | DRG: 870 | End: 2020-11-06
Attending: STUDENT IN AN ORGANIZED HEALTH CARE EDUCATION/TRAINING PROGRAM | Admitting: INTERNAL MEDICINE
Payer: MEDICARE

## 2020-10-27 ENCOUNTER — APPOINTMENT (OUTPATIENT)
Dept: GENERAL RADIOLOGY | Age: 85
DRG: 870 | End: 2020-10-27
Payer: MEDICARE

## 2020-10-27 ENCOUNTER — APPOINTMENT (OUTPATIENT)
Dept: CT IMAGING | Age: 85
DRG: 870 | End: 2020-10-27
Payer: MEDICARE

## 2020-10-27 PROBLEM — I47.1 SVT (SUPRAVENTRICULAR TACHYCARDIA) (HCC): Status: ACTIVE | Noted: 2020-10-27

## 2020-10-27 PROBLEM — I47.10 SVT (SUPRAVENTRICULAR TACHYCARDIA): Status: ACTIVE | Noted: 2020-10-27

## 2020-10-27 LAB
ABO/RH: NORMAL
ALBUMIN SERPL-MCNC: 3.8 G/DL (ref 3.5–4.6)
ALP BLD-CCNC: 148 U/L (ref 40–130)
ALT SERPL-CCNC: 12 U/L (ref 0–33)
ANION GAP SERPL CALCULATED.3IONS-SCNC: 11 MEQ/L (ref 9–15)
ANTIBODY SCREEN: NORMAL
APTT: 40.2 SEC (ref 24.4–36.8)
AST SERPL-CCNC: 21 U/L (ref 0–35)
BACTERIA: ABNORMAL /HPF
BASE EXCESS ARTERIAL: -3 (ref -3–3)
BASE EXCESS ARTERIAL: -5 (ref -3–3)
BASOPHILS ABSOLUTE: 0 K/UL (ref 0–0.2)
BASOPHILS RELATIVE PERCENT: 0.4 %
BILIRUB SERPL-MCNC: 0.6 MG/DL (ref 0.2–0.7)
BILIRUBIN URINE: NEGATIVE
BLOOD, URINE: ABNORMAL
BUN BLDV-MCNC: 21 MG/DL (ref 8–23)
C-REACTIVE PROTEIN, HIGH SENSITIVITY: 7.7 MG/L (ref 0–5)
CALCIUM IONIZED: 1.08 MMOL/L (ref 1.12–1.32)
CALCIUM IONIZED: 1.15 MMOL/L (ref 1.12–1.32)
CALCIUM SERPL-MCNC: 8.6 MG/DL (ref 8.5–9.9)
CHLORIDE BLD-SCNC: 105 MEQ/L (ref 95–107)
CLARITY: CLEAR
CO2: 24 MEQ/L (ref 20–31)
COLOR: YELLOW
CREAT SERPL-MCNC: 1.25 MG/DL (ref 0.5–0.9)
D DIMER: >20 MG/L FEU (ref 0–0.5)
EOSINOPHILS ABSOLUTE: 0 K/UL (ref 0–0.7)
EOSINOPHILS RELATIVE PERCENT: 0.5 %
EPITHELIAL CELLS, UA: ABNORMAL /HPF (ref 0–5)
GFR AFRICAN AMERICAN: 32
GFR AFRICAN AMERICAN: 36
GFR AFRICAN AMERICAN: 48.5
GFR NON-AFRICAN AMERICAN: 26
GFR NON-AFRICAN AMERICAN: 30
GFR NON-AFRICAN AMERICAN: 40
GLOBULIN: 2.2 G/DL (ref 2.3–3.5)
GLUCOSE BLD-MCNC: 103 MG/DL (ref 70–99)
GLUCOSE BLD-MCNC: 113 MG/DL (ref 60–115)
GLUCOSE BLD-MCNC: 129 MG/DL (ref 60–115)
GLUCOSE BLD-MCNC: 130 MG/DL (ref 60–115)
GLUCOSE BLD-MCNC: 149 MG/DL
GLUCOSE URINE: NEGATIVE MG/DL
HCO3 ARTERIAL: 20.4 MMOL/L (ref 21–29)
HCO3 ARTERIAL: 22.8 MMOL/L (ref 21–29)
HCT VFR BLD CALC: 39.8 % (ref 37–47)
HEMOGLOBIN: 12 GM/DL (ref 12–16)
HEMOGLOBIN: 13.2 G/DL (ref 12–16)
HEMOGLOBIN: 14 GM/DL (ref 12–16)
HYALINE CASTS: ABNORMAL /HPF (ref 0–5)
INR BLD: 1.4
KETONES, URINE: NEGATIVE MG/DL
LACTATE: 2.76 MMOL/L (ref 0.4–2)
LACTATE: 2.99 MMOL/L (ref 0.4–2)
LEUKOCYTE ESTERASE, URINE: ABNORMAL
LYMPHOCYTES ABSOLUTE: 0.3 K/UL (ref 1–4.8)
LYMPHOCYTES RELATIVE PERCENT: 4.9 %
MAGNESIUM: 1.8 MG/DL (ref 1.7–2.4)
MCH RBC QN AUTO: 29.7 PG (ref 27–31.3)
MCHC RBC AUTO-ENTMCNC: 33.2 % (ref 33–37)
MCV RBC AUTO: 89.6 FL (ref 82–100)
MONOCYTES ABSOLUTE: 0 K/UL (ref 0.2–0.8)
MONOCYTES RELATIVE PERCENT: 0.5 %
NEUTROPHILS ABSOLUTE: 5 K/UL (ref 1.4–6.5)
NEUTROPHILS RELATIVE PERCENT: 93.7 %
NITRITE, URINE: NEGATIVE
O2 SAT, ARTERIAL: 99 % (ref 93–100)
O2 SAT, ARTERIAL: 99 % (ref 93–100)
PCO2 ARTERIAL: 35 MM HG (ref 35–45)
PCO2 ARTERIAL: 46 MM HG (ref 35–45)
PDW BLD-RTO: 14.2 % (ref 11.5–14.5)
PERFORMED ON: ABNORMAL
PH ARTERIAL: 7.31 (ref 7.35–7.45)
PH ARTERIAL: 7.38 (ref 7.35–7.45)
PH UA: 5.5 (ref 5–9)
PLATELET # BLD: 131 K/UL (ref 130–400)
PO2 ARTERIAL: 121 MM HG (ref 75–108)
PO2 ARTERIAL: 162 MM HG (ref 75–108)
POC CHLORIDE: 106 MEQ/L (ref 99–110)
POC CHLORIDE: 108 MEQ/L (ref 99–110)
POC CREATININE: 1.6 MG/DL (ref 0.6–1.2)
POC CREATININE: 1.8 MG/DL (ref 0.6–1.2)
POC FIO2: 100
POC FIO2: 100
POC HEMATOCRIT: 35 % (ref 36–48)
POC HEMATOCRIT: 41 % (ref 36–48)
POC POTASSIUM: 3.8 MEQ/L (ref 3.5–5.1)
POC POTASSIUM: 4.2 MEQ/L (ref 3.5–5.1)
POC SAMPLE TYPE: ABNORMAL
POC SAMPLE TYPE: ABNORMAL
POC SODIUM: 140 MEQ/L (ref 136–145)
POC SODIUM: 142 MEQ/L (ref 136–145)
POTASSIUM SERPL-SCNC: 4.3 MEQ/L (ref 3.4–4.9)
PRO-BNP: 522 PG/ML
PROTEIN UA: NEGATIVE MG/DL
PROTHROMBIN TIME: 17.5 SEC (ref 12.3–14.9)
RBC # BLD: 4.45 M/UL (ref 4.2–5.4)
RBC UA: ABNORMAL /HPF (ref 0–5)
SARS-COV-2, NAAT: NOT DETECTED
SODIUM BLD-SCNC: 140 MEQ/L (ref 135–144)
SPECIFIC GRAVITY UA: 1.01 (ref 1–1.03)
TCO2 ARTERIAL: 21 (ref 22–29)
TCO2 ARTERIAL: 24 (ref 22–29)
TOTAL CK: 94 U/L (ref 0–170)
TOTAL PROTEIN: 6 G/DL (ref 6.3–8)
TROPONIN: <0.01 NG/ML (ref 0–0.01)
URINE REFLEX TO CULTURE: YES
UROBILINOGEN, URINE: 0.2 E.U./DL
WBC # BLD: 5.4 K/UL (ref 4.8–10.8)
WBC UA: ABNORMAL /HPF (ref 0–5)

## 2020-10-27 PROCEDURE — 71045 X-RAY EXAM CHEST 1 VIEW: CPT

## 2020-10-27 PROCEDURE — 36600 WITHDRAWAL OF ARTERIAL BLOOD: CPT

## 2020-10-27 PROCEDURE — 2580000003 HC RX 258

## 2020-10-27 PROCEDURE — 2500000003 HC RX 250 WO HCPCS: Performed by: INTERNAL MEDICINE

## 2020-10-27 PROCEDURE — 94761 N-INVAS EAR/PLS OXIMETRY MLT: CPT

## 2020-10-27 PROCEDURE — U0002 COVID-19 LAB TEST NON-CDC: HCPCS

## 2020-10-27 PROCEDURE — 2500000003 HC RX 250 WO HCPCS

## 2020-10-27 PROCEDURE — 85610 PROTHROMBIN TIME: CPT

## 2020-10-27 PROCEDURE — 93005 ELECTROCARDIOGRAM TRACING: CPT | Performed by: NURSE PRACTITIONER

## 2020-10-27 PROCEDURE — 6360000002 HC RX W HCPCS: Performed by: INTERNAL MEDICINE

## 2020-10-27 PROCEDURE — 71275 CT ANGIOGRAPHY CHEST: CPT

## 2020-10-27 PROCEDURE — 2580000003 HC RX 258: Performed by: INTERNAL MEDICINE

## 2020-10-27 PROCEDURE — 85379 FIBRIN DEGRADATION QUANT: CPT

## 2020-10-27 PROCEDURE — 87040 BLOOD CULTURE FOR BACTERIA: CPT

## 2020-10-27 PROCEDURE — 87149 DNA/RNA DIRECT PROBE: CPT

## 2020-10-27 PROCEDURE — 87077 CULTURE AEROBIC IDENTIFY: CPT

## 2020-10-27 PROCEDURE — 82565 ASSAY OF CREATININE: CPT

## 2020-10-27 PROCEDURE — 6360000002 HC RX W HCPCS: Performed by: STUDENT IN AN ORGANIZED HEALTH CARE EDUCATION/TRAINING PROGRAM

## 2020-10-27 PROCEDURE — 82948 REAGENT STRIP/BLOOD GLUCOSE: CPT

## 2020-10-27 PROCEDURE — 31500 INSERT EMERGENCY AIRWAY: CPT

## 2020-10-27 PROCEDURE — 86141 C-REACTIVE PROTEIN HS: CPT

## 2020-10-27 PROCEDURE — 80053 COMPREHEN METABOLIC PANEL: CPT

## 2020-10-27 PROCEDURE — 82330 ASSAY OF CALCIUM: CPT

## 2020-10-27 PROCEDURE — 2000000000 HC ICU R&B

## 2020-10-27 PROCEDURE — 2500000003 HC RX 250 WO HCPCS: Performed by: STUDENT IN AN ORGANIZED HEALTH CARE EDUCATION/TRAINING PROGRAM

## 2020-10-27 PROCEDURE — 70450 CT HEAD/BRAIN W/O DYE: CPT

## 2020-10-27 PROCEDURE — 05HM33Z INSERTION OF INFUSION DEVICE INTO RIGHT INTERNAL JUGULAR VEIN, PERCUTANEOUS APPROACH: ICD-10-PCS | Performed by: STUDENT IN AN ORGANIZED HEALTH CARE EDUCATION/TRAINING PROGRAM

## 2020-10-27 PROCEDURE — 84295 ASSAY OF SERUM SODIUM: CPT

## 2020-10-27 PROCEDURE — 86850 RBC ANTIBODY SCREEN: CPT

## 2020-10-27 PROCEDURE — 5A1955Z RESPIRATORY VENTILATION, GREATER THAN 96 CONSECUTIVE HOURS: ICD-10-PCS | Performed by: STUDENT IN AN ORGANIZED HEALTH CARE EDUCATION/TRAINING PROGRAM

## 2020-10-27 PROCEDURE — 82803 BLOOD GASES ANY COMBINATION: CPT

## 2020-10-27 PROCEDURE — 84132 ASSAY OF SERUM POTASSIUM: CPT

## 2020-10-27 PROCEDURE — 82435 ASSAY OF BLOOD CHLORIDE: CPT

## 2020-10-27 PROCEDURE — 84484 ASSAY OF TROPONIN QUANT: CPT

## 2020-10-27 PROCEDURE — 0BH17EZ INSERTION OF ENDOTRACHEAL AIRWAY INTO TRACHEA, VIA NATURAL OR ARTIFICIAL OPENING: ICD-10-PCS | Performed by: STUDENT IN AN ORGANIZED HEALTH CARE EDUCATION/TRAINING PROGRAM

## 2020-10-27 PROCEDURE — 83605 ASSAY OF LACTIC ACID: CPT

## 2020-10-27 PROCEDURE — 36556 INSERT NON-TUNNEL CV CATH: CPT

## 2020-10-27 PROCEDURE — 36415 COLL VENOUS BLD VENIPUNCTURE: CPT

## 2020-10-27 PROCEDURE — 51702 INSERT TEMP BLADDER CATH: CPT

## 2020-10-27 PROCEDURE — 6360000002 HC RX W HCPCS

## 2020-10-27 PROCEDURE — 83880 ASSAY OF NATRIURETIC PEPTIDE: CPT

## 2020-10-27 PROCEDURE — 87086 URINE CULTURE/COLONY COUNT: CPT

## 2020-10-27 PROCEDURE — 85025 COMPLETE CBC W/AUTO DIFF WBC: CPT

## 2020-10-27 PROCEDURE — 85014 HEMATOCRIT: CPT

## 2020-10-27 PROCEDURE — 86901 BLOOD TYPING SEROLOGIC RH(D): CPT

## 2020-10-27 PROCEDURE — 81001 URINALYSIS AUTO W/SCOPE: CPT

## 2020-10-27 PROCEDURE — 6360000004 HC RX CONTRAST MEDICATION: Performed by: STUDENT IN AN ORGANIZED HEALTH CARE EDUCATION/TRAINING PROGRAM

## 2020-10-27 PROCEDURE — 96365 THER/PROPH/DIAG IV INF INIT: CPT

## 2020-10-27 PROCEDURE — 82550 ASSAY OF CK (CPK): CPT

## 2020-10-27 PROCEDURE — 86900 BLOOD TYPING SEROLOGIC ABO: CPT

## 2020-10-27 PROCEDURE — 96375 TX/PRO/DX INJ NEW DRUG ADDON: CPT

## 2020-10-27 PROCEDURE — 87186 SC STD MICRODIL/AGAR DIL: CPT

## 2020-10-27 PROCEDURE — 85730 THROMBOPLASTIN TIME PARTIAL: CPT

## 2020-10-27 PROCEDURE — 83735 ASSAY OF MAGNESIUM: CPT

## 2020-10-27 PROCEDURE — 2580000003 HC RX 258: Performed by: STUDENT IN AN ORGANIZED HEALTH CARE EDUCATION/TRAINING PROGRAM

## 2020-10-27 PROCEDURE — 99285 EMERGENCY DEPT VISIT HI MDM: CPT

## 2020-10-27 PROCEDURE — 94002 VENT MGMT INPAT INIT DAY: CPT

## 2020-10-27 RX ORDER — SODIUM CHLORIDE, SODIUM LACTATE, POTASSIUM CHLORIDE, CALCIUM CHLORIDE 600; 310; 30; 20 MG/100ML; MG/100ML; MG/100ML; MG/100ML
INJECTION, SOLUTION INTRAVENOUS
Status: COMPLETED
Start: 2020-10-27 | End: 2020-10-27

## 2020-10-27 RX ORDER — PROPOFOL 10 MG/ML
10 INJECTION, EMULSION INTRAVENOUS ONCE
Status: COMPLETED | OUTPATIENT
Start: 2020-10-27 | End: 2020-10-27

## 2020-10-27 RX ORDER — SODIUM CHLORIDE, SODIUM LACTATE, POTASSIUM CHLORIDE, AND CALCIUM CHLORIDE .6; .31; .03; .02 G/100ML; G/100ML; G/100ML; G/100ML
1000 INJECTION, SOLUTION INTRAVENOUS ONCE
Status: COMPLETED | OUTPATIENT
Start: 2020-10-27 | End: 2020-10-27

## 2020-10-27 RX ORDER — ROCURONIUM BROMIDE 10 MG/ML
INJECTION, SOLUTION INTRAVENOUS
Status: COMPLETED
Start: 2020-10-27 | End: 2020-10-27

## 2020-10-27 RX ORDER — SODIUM CHLORIDE, SODIUM LACTATE, POTASSIUM CHLORIDE, AND CALCIUM CHLORIDE .6; .31; .03; .02 G/100ML; G/100ML; G/100ML; G/100ML
1000 INJECTION, SOLUTION INTRAVENOUS ONCE
Status: DISCONTINUED | OUTPATIENT
Start: 2020-10-27 | End: 2020-10-27 | Stop reason: SDUPTHER

## 2020-10-27 RX ORDER — SODIUM CHLORIDE 0.9 % (FLUSH) 0.9 %
10 SYRINGE (ML) INJECTION PRN
Status: DISCONTINUED | OUTPATIENT
Start: 2020-10-27 | End: 2020-11-06 | Stop reason: HOSPADM

## 2020-10-27 RX ORDER — MAGNESIUM SULFATE IN WATER 40 MG/ML
2 INJECTION, SOLUTION INTRAVENOUS PRN
Status: DISCONTINUED | OUTPATIENT
Start: 2020-10-27 | End: 2020-11-06 | Stop reason: HOSPADM

## 2020-10-27 RX ORDER — POLYETHYLENE GLYCOL 3350 17 G/17G
17 POWDER, FOR SOLUTION ORAL DAILY PRN
Status: DISCONTINUED | OUTPATIENT
Start: 2020-10-27 | End: 2020-10-30

## 2020-10-27 RX ORDER — METOPROLOL TARTRATE 5 MG/5ML
5 INJECTION INTRAVENOUS ONCE
Status: COMPLETED | OUTPATIENT
Start: 2020-10-27 | End: 2020-10-27

## 2020-10-27 RX ORDER — POTASSIUM CHLORIDE 7.45 MG/ML
10 INJECTION INTRAVENOUS PRN
Status: DISCONTINUED | OUTPATIENT
Start: 2020-10-27 | End: 2020-11-06 | Stop reason: HOSPADM

## 2020-10-27 RX ORDER — IPRATROPIUM BROMIDE AND ALBUTEROL SULFATE 2.5; .5 MG/3ML; MG/3ML
1 SOLUTION RESPIRATORY (INHALATION) 4 TIMES DAILY
Status: DISCONTINUED | OUTPATIENT
Start: 2020-10-27 | End: 2020-10-30

## 2020-10-27 RX ORDER — ONDANSETRON 2 MG/ML
4 INJECTION INTRAMUSCULAR; INTRAVENOUS EVERY 6 HOURS PRN
Status: DISCONTINUED | OUTPATIENT
Start: 2020-10-27 | End: 2020-11-06 | Stop reason: HOSPADM

## 2020-10-27 RX ORDER — PROPOFOL 10 MG/ML
100 INJECTION, EMULSION INTRAVENOUS
Status: DISCONTINUED | OUTPATIENT
Start: 2020-10-27 | End: 2020-10-27

## 2020-10-27 RX ORDER — ADENOSINE 3 MG/ML
12 INJECTION, SOLUTION INTRAVENOUS ONCE
Status: COMPLETED | OUTPATIENT
Start: 2020-10-27 | End: 2020-10-27

## 2020-10-27 RX ORDER — ACETAMINOPHEN 650 MG/1
650 SUPPOSITORY RECTAL EVERY 6 HOURS PRN
Status: DISCONTINUED | OUTPATIENT
Start: 2020-10-27 | End: 2020-11-06 | Stop reason: HOSPADM

## 2020-10-27 RX ORDER — LORAZEPAM 2 MG/ML
1 INJECTION INTRAMUSCULAR ONCE
Status: COMPLETED | OUTPATIENT
Start: 2020-10-27 | End: 2020-10-27

## 2020-10-27 RX ORDER — METOPROLOL TARTRATE 5 MG/5ML
5 INJECTION INTRAVENOUS EVERY 6 HOURS PRN
Status: DISCONTINUED | OUTPATIENT
Start: 2020-10-27 | End: 2020-11-06 | Stop reason: HOSPADM

## 2020-10-27 RX ORDER — PROMETHAZINE HYDROCHLORIDE 12.5 MG/1
12.5 TABLET ORAL EVERY 6 HOURS PRN
Status: DISCONTINUED | OUTPATIENT
Start: 2020-10-27 | End: 2020-11-06 | Stop reason: HOSPADM

## 2020-10-27 RX ORDER — 0.9 % SODIUM CHLORIDE 0.9 %
1000 INTRAVENOUS SOLUTION INTRAVENOUS ONCE
Status: COMPLETED | OUTPATIENT
Start: 2020-10-27 | End: 2020-10-27

## 2020-10-27 RX ORDER — POTASSIUM CHLORIDE 20 MEQ/1
40 TABLET, EXTENDED RELEASE ORAL PRN
Status: DISCONTINUED | OUTPATIENT
Start: 2020-10-27 | End: 2020-11-06 | Stop reason: HOSPADM

## 2020-10-27 RX ORDER — SODIUM CHLORIDE 0.9 % (FLUSH) 0.9 %
10 SYRINGE (ML) INJECTION EVERY 12 HOURS SCHEDULED
Status: DISCONTINUED | OUTPATIENT
Start: 2020-10-27 | End: 2020-11-06 | Stop reason: HOSPADM

## 2020-10-27 RX ORDER — ROCURONIUM BROMIDE 10 MG/ML
100 INJECTION, SOLUTION INTRAVENOUS ONCE
Status: COMPLETED | OUTPATIENT
Start: 2020-10-27 | End: 2020-10-27

## 2020-10-27 RX ORDER — ACETAMINOPHEN 325 MG/1
650 TABLET ORAL EVERY 6 HOURS PRN
Status: DISCONTINUED | OUTPATIENT
Start: 2020-10-27 | End: 2020-11-06 | Stop reason: HOSPADM

## 2020-10-27 RX ORDER — SODIUM CHLORIDE 9 MG/ML
INJECTION, SOLUTION INTRAVENOUS
Status: DISCONTINUED
Start: 2020-10-27 | End: 2020-10-27 | Stop reason: WASHOUT

## 2020-10-27 RX ORDER — SODIUM CHLORIDE 9 MG/ML
INJECTION, SOLUTION INTRAVENOUS
Status: COMPLETED
Start: 2020-10-27 | End: 2020-10-27

## 2020-10-27 RX ORDER — PROPOFOL 10 MG/ML
INJECTION, EMULSION INTRAVENOUS
Status: COMPLETED
Start: 2020-10-27 | End: 2020-10-27

## 2020-10-27 RX ORDER — ROCURONIUM BROMIDE 10 MG/ML
50 INJECTION, SOLUTION INTRAVENOUS ONCE
Status: COMPLETED | OUTPATIENT
Start: 2020-10-27 | End: 2020-10-27

## 2020-10-27 RX ADMIN — NOREPINEPHRINE BITARTRATE 2 MCG/MIN: 1 INJECTION INTRAVENOUS at 22:05

## 2020-10-27 RX ADMIN — SODIUM CHLORIDE 1000 ML: 9 INJECTION, SOLUTION INTRAVENOUS at 16:00

## 2020-10-27 RX ADMIN — IOPAMIDOL 100 ML: 612 INJECTION, SOLUTION INTRAVENOUS at 16:41

## 2020-10-27 RX ADMIN — LORAZEPAM 1 MG: 2 INJECTION INTRAMUSCULAR; INTRAVENOUS at 15:51

## 2020-10-27 RX ADMIN — VANCOMYCIN HYDROCHLORIDE 1250 MG: 5 INJECTION, POWDER, LYOPHILIZED, FOR SOLUTION INTRAVENOUS at 23:50

## 2020-10-27 RX ADMIN — PROPOFOL 100 MG: 10 INJECTION, EMULSION INTRAVENOUS at 16:17

## 2020-10-27 RX ADMIN — SODIUM CHLORIDE, POTASSIUM CHLORIDE, SODIUM LACTATE AND CALCIUM CHLORIDE 1000 ML: 600; 310; 30; 20 INJECTION, SOLUTION INTRAVENOUS at 21:37

## 2020-10-27 RX ADMIN — METOPROLOL TARTRATE 5 MG: 5 INJECTION, SOLUTION INTRAVENOUS at 15:15

## 2020-10-27 RX ADMIN — SODIUM CHLORIDE, SODIUM LACTATE, POTASSIUM CHLORIDE, AND CALCIUM CHLORIDE 1000 ML: .6; .31; .03; .02 INJECTION, SOLUTION INTRAVENOUS at 21:37

## 2020-10-27 RX ADMIN — Medication 1000 ML: at 16:00

## 2020-10-27 RX ADMIN — ADENOSINE 12 MG: 3 INJECTION INTRAVENOUS at 14:47

## 2020-10-27 RX ADMIN — PROPOFOL 10 MCG/KG/MIN: 10 INJECTION, EMULSION INTRAVENOUS at 17:17

## 2020-10-27 RX ADMIN — ROCURONIUM BROMIDE 50 MG: 10 INJECTION INTRAVENOUS at 16:17

## 2020-10-27 RX ADMIN — DEXMEDETOMIDINE 0.4 MCG/KG/HR: 100 INJECTION, SOLUTION, CONCENTRATE INTRAVENOUS at 23:49

## 2020-10-27 RX ADMIN — ROCURONIUM BROMIDE 100 MG: 10 INJECTION INTRAVENOUS at 17:41

## 2020-10-27 RX ADMIN — PIPERACILLIN AND TAZOBACTAM 3.38 G: 3; .375 INJECTION, POWDER, LYOPHILIZED, FOR SOLUTION INTRAVENOUS at 17:30

## 2020-10-27 RX ADMIN — ROCURONIUM BROMIDE 50 MG: 10 INJECTION, SOLUTION INTRAVENOUS at 16:17

## 2020-10-27 RX ADMIN — Medication 10 ML: at 22:07

## 2020-10-27 RX ADMIN — ADENOSINE 12 MG: 3 INJECTION INTRAVENOUS at 14:48

## 2020-10-27 ASSESSMENT — PULMONARY FUNCTION TESTS
PIF_VALUE: 22
PIF_VALUE: 19
PIF_VALUE: 17
PIF_VALUE: 19
PIF_VALUE: 19
PIF_VALUE: 20
PIF_VALUE: 18
PIF_VALUE: 23
PIF_VALUE: 20
PIF_VALUE: 17
PIF_VALUE: 20
PIF_VALUE: 19
PIF_VALUE: 20
PIF_VALUE: 19

## 2020-10-27 ASSESSMENT — PAIN SCALES - GENERAL
PAINLEVEL_OUTOF10: 0
PAINLEVEL_OUTOF10: 0

## 2020-10-27 NOTE — ED NOTES
Bed: 04  Expected date: 10/27/20  Expected time:   Means of arrival:   Comments:  Kylee       April L Nancy Moreno RN  10/27/20 [id number]

## 2020-10-27 NOTE — H&P
Lifestyle    Physical activity     Days per week: Not on file     Minutes per session: Not on file    Stress: Not on file   Relationships    Social connections     Talks on phone: Not on file     Gets together: Not on file     Attends [employer] service: Not on file     Active member of club or organization: Not on file     Attends meetings of clubs or organizations: Not on file     Relationship status: Not on file    Intimate partner violence     Fear of current or ex partner: Not on file     Emotionally abused: Not on file     Physically abused: Not on file     Forced sexual activity: Not on file   Other Topics Concern    Not on file   Social History Narrative    Not on file       Family History:   No family history on file. Medications Prior to Admission:    Prior to Admission medications    Medication Sig Start Date End Date Taking? Authorizing Provider   erythromycin [facility]) 5 MG/GM ophthalmic ointment Instill ~1 cm ribbon into affected eye/onto affected area/lesion up to 6 times daily 10/6/20   Ping Mike MD   atorvastatin (LIPITOR) 20 MG tablet TAKE 1 TABLET BY MOUTH EVERY DAY 8/10/20   Ping Mike MD   metoprolol tartrate (LOPRESSOR) 25 MG tablet Take 0.5 tablets by mouth 2 times daily 7/16/20   Ping Mike MD   colchicine (MITIGARE) 0.6 MG capsule TAKE 2 CAPSULES po AT THE FIRST SIGN OF a gout FLARE, MAXIMUM TOTAL DOSE: 2 CAPSULES ON DAY 1. THEN TAKE 1 CAPSULE ONCE OR TWICE DAILY UNTIL FLARE RESOLVES. FOLLOW UP FOR MORE REFILLS.  7/13/20   Ping Mike MD   losartan (COZAAR) 25 MG tablet Take 1 tablet by mouth daily 5/13/20   Ping Mike MD   albuterol sulfate HFA (PROAIR HFA) 108 (90 Base) MCG/ACT inhaler Inhale 1 puff into the lungs every 6 hours as needed for Wheezing or Shortness of Breath 5/13/20   Ping Mike MD   OXYGEN Inhale 2 L into the lungs Indications: PRN at bedtime    Historical Provider, MD   ipratropium-albuterol (DUONEB) 0.5-2.5 (3) MG/3ML SOLN nebulizer solution Inhale 3 mLs into the lungs every 6 hours as needed for Shortness of Breath 18   Clari Zaman MD   aspirin 81 MG EC tablet Take 1 tablet by mouth daily 18   Siena Payan MD   latanoprost (XALATAN) 0.005 % ophthalmic solution INSTILL 1 DROP INTO BOTH EYES EVERY DAY 7/17/15   Historical Provider, MD   dorzolamide-timolol (COSOPT) 22.3-6.8 MG/ML ophthalmic solution Use 1 Drop in both eyes every 12 hours. 4/10/15   Historical Provider, MD   brimonidine (ALPHAGAN P) 0.15 % ophthalmic solution INSTILL 1 DROP INTO BOTH EYES TWICE DAILY. 17   Historical Provider, MD       Allergies:  Norvasc [amlodipine besylate]    REVIEW OF SYSTEMS:  Not able to obtain due to intubation      PHYSICAL EXAM:  Vitals:  /61   Pulse 140   Resp 18   Wt 170 lb (77.1 kg)   LMP  (LMP Unknown)   SpO2 97%   BMI 30.11 kg/m²     Pulse Ox: SpO2  Av.8 %  Min: 94 %  Max: 100 %  Supplemental O2:      CONSTITUTIONAL: Intubated and sedated.   ET tube in good positioning  HEENT: Normocephalic, PERRLA  NECK: no JVD, no LAD  HEART: Tachycardic but regular  LUNGS: Bilateral rhonchi  ABDOMEN: soft/NT/ND, positive BS  MUSCULOSKELETAL: negative for edema, +2 pulses  SKIN: intact without rash or jaundice  NEURO:  CN intact and no focal deficits    DATA:  Recent Results (from the past 24 hour(s))   D-Dimer, Quantitative    Collection Time: 10/27/20  3:30 PM   Result Value Ref Range    D-Dimer, Quant >20.00 (HH) 0.00 - 0.50 mg/L FEU   APTT    Collection Time: 10/27/20  3:30 PM   Result Value Ref Range    aPTT 40.2 (H) 24.4 - 36.8 sec   CBC Auto Differential    Collection Time: 10/27/20  3:30 PM   Result Value Ref Range    WBC 5.4 4.8 - 10.8 K/uL    RBC 4.45 4.20 - 5.40 M/uL    Hemoglobin 13.2 12.0 - 16.0 g/dL    Hematocrit 39.8 37.0 - 47.0 %    MCV 89.6 82.0 - 100.0 fL    MCH 29.7 27.0 - 31.3 pg    MCHC 33.2 33.0 - 37.0 %    RDW 14.2 11.5 - 14.5 %    Platelets 468 729 - 659 K/uL    Neutrophils % 93.7 % Lymphocytes % 4.9 %    Monocytes % 0.5 %    Eosinophils % 0.5 %    Basophils % 0.4 %    Neutrophils Absolute 5.0 1.4 - 6.5 K/uL    Lymphocytes Absolute 0.3 (L) 1.0 - 4.8 K/uL    Monocytes Absolute 0.0 (L) 0.2 - 0.8 K/uL    Eosinophils Absolute 0.0 0.0 - 0.7 K/uL    Basophils Absolute 0.0 0.0 - 0.2 K/uL   CK    Collection Time: 10/27/20  3:30 PM   Result Value Ref Range    Total CK 94 0 - 170 U/L   Brain Natriuretic Peptide    Collection Time: 10/27/20  3:30 PM   Result Value Ref Range    Pro- pg/mL   Comprehensive Metabolic Panel    Collection Time: 10/27/20  3:30 PM   Result Value Ref Range    Sodium 140 135 - 144 mEq/L    Potassium 4.3 3.4 - 4.9 mEq/L    Chloride 105 95 - 107 mEq/L    CO2 24 20 - 31 mEq/L    Anion Gap 11 9 - 15 mEq/L    Glucose 103 (H) 70 - 99 mg/dL    BUN 21 8 - 23 mg/dL    CREATININE 1.25 (H) 0.50 - 0.90 mg/dL    GFR Non-African American 40.0 (L) >60    GFR  48.5 (L) >60    Calcium 8.6 8.5 - 9.9 mg/dL    Total Protein 6.0 (L) 6.3 - 8.0 g/dL    Alb 3.8 3.5 - 4.6 g/dL    Total Bilirubin 0.6 0.2 - 0.7 mg/dL    Alkaline Phosphatase 148 (H) 40 - 130 U/L    ALT 12 0 - 33 U/L    AST 21 0 - 35 U/L    Globulin 2.2 (L) 2.3 - 3.5 g/dL   High sensitivity CRP    Collection Time: 10/27/20  3:30 PM   Result Value Ref Range    CRP High Sensitivity 7.7 (H) 0.0 - 5.0 mg/L   Magnesium    Collection Time: 10/27/20  3:30 PM   Result Value Ref Range    Magnesium 1.8 1.7 - 2.4 mg/dL   Protime-INR    Collection Time: 10/27/20  3:30 PM   Result Value Ref Range    Protime 17.5 (H) 12.3 - 14.9 sec    INR 1.4    Troponin    Collection Time: 10/27/20  3:30 PM   Result Value Ref Range    Troponin <0.010 0.000 - 0.010 ng/mL   TYPE AND SCREEN    Collection Time: 10/27/20  3:30 PM   Result Value Ref Range    ABO/Rh O POS     Antibody Screen NEG    Urinalysis Reflex to Culture    Collection Time: 10/27/20  3:58 PM    Specimen: Urine, clean catch   Result Value Ref Range    Color, UA Yellow Straw/Yellow Clarity, UA Clear Clear    Glucose, Ur Negative Negative mg/dL    Bilirubin Urine Negative Negative    Ketones, Urine Negative Negative mg/dL    Specific Gravity, UA 1.008 1.005 - 1.030    Blood, Urine MODERATE (A) Negative    pH, UA 5.5 5.0 - 9.0    Protein, UA Negative Negative mg/dL    Urobilinogen, Urine 0.2 <2.0 E.U./dL    Nitrite, Urine Negative Negative    Leukocyte Esterase, Urine SMALL (A) Negative    Urine Reflex to Culture Yes    Microscopic Urinalysis    Collection Time: 10/27/20  3:58 PM   Result Value Ref Range    Bacteria, UA FEW (A) Negative /HPF    Hyaline Casts, UA 0-1 0 - 5 /HPF    WBC, UA 10-20 (A) 0 - 5 /HPF    RBC, UA 6-10 (A) 0 - 5 /HPF    Epithelial Cells, UA 0-2 0 - 5 /HPF   POCT Glucose    Collection Time: 10/27/20  4:00 PM   Result Value Ref Range    POC Glucose 129 (H) 60 - 115 mg/dl    Performed on ACCU-CHEK    POCT Glucose    Collection Time: 10/27/20  4:15 PM   Result Value Ref Range    Glucose 149 mg/dL   POCT Arterial    Collection Time: 10/27/20  5:08 PM   Result Value Ref Range    POC Sodium 142 136 - 145 mEq/L    POC Potassium 4.2 3.5 - 5.1 mEq/L    POC Chloride 106 99 - 110 mEq/L    POC Glucose 130 (H) 60 - 115 mg/dl    POC Creatinine 1.6 (H) 0.6 - 1.2 mg/dL    GFR Non-African American 30 (A) >60    GFR  36 (A) >60    Calcium, Ion 1.15 1.12 - 1.32 mmol/L    pH, Arterial 7.308 (L) 7.350 - 7.450    pCO2, Arterial 46 (H) 35 - 45 mm Hg    pO2, Arterial 162 (HH) 75 - 108 mm Hg    HCO3, Arterial 22.8 21.0 - 29.0 mmol/L    Base Excess, Arterial -3 -3 - 3    O2 Sat, Arterial 99 (HH) 93 - 100 %    TCO2, Arterial 24 22 - 29    Lactate 2.99 (H) 0.40 - 2.00 mmol/L    POC Hematocrit 41 36 - 48 %    Hemoglobin 14.0 12.0 - 16.0 gm/dL    FIO2 100.000     Sample Type ART     Performed on SEE BELOW    COVID-19    Collection Time: 10/27/20  6:15 PM   Result Value Ref Range    SARS-CoV-2, NAAT Not Detected Not Detected           ASSESSMENT AND PLAN:  1) acute respiratory failure due to bilateral pneumonia       Intubated in the emergency room. Will start vancomycin and Zosyn, follow-up blood culture and obtain sputum culture, consult critical care    2) SVT-was going in the 150s in the ED. Lopressor IV prn with BP parameters. Cardiologist was contacted by emergency room provider, patient received adenosine and Lopressor IV her rate improved but then worsened again with sedation due to drop in her blood pressure. Consult cardiology, tele.      3)  FRED       Intubated, CPAP will be used once extubated     4)  HTN       Currently hypotensive    DVT PPX         DISCHARGE PLANNING  ICU    Code status:    Electronically signed by Dior Cochran DO on 10/27/20 at 6:54 PM EDT

## 2020-10-27 NOTE — CARE COORDINATION
[facility] Case Management Initial Discharge Assessment    Met with Family and THREE DAUGHTERS to discuss discharge plan. PCP: Kwasi Kee MD                  Date of Last Visit: ABOUT 2 MONTHS AGO    If no PCP, list provided? N/A    Discharge Planning    Living Arrangements: at home dependent on family care    Who do you live with? Susy Jang    Who helps you with your care:  family    If lives at home:     Do you have any barriers navigating in your home? yes - SHE CAN ONLY MAKE IT SHORT DISTANCES WITH A WALKER    Patient can perform ADL? No, SHE CAN SHOWER    Current Services (outpatient and in home) :  None    Dialysis: No    Is transportation available to get to your appointments? Yes    DME Equipment:  yes - WALKER,     Respiratory equipment: PRN/HS Oxygen 2 Liters    Respiratory provider:       Pharmacy:  yes - [facility] with Medication Assistance Program?  No      [facility]    Patient agreeable to SNF/Rehab? TBD    Other discharge needs identified? Other TBD    [place] of choice list provided with basic dialogue that supports the patient's individualized plan of care/goals and shares the quality data associated with the providers. No  NEEDS LISTS OF HHC OR REHAB IF NEEDED    Does Patient Have a High-Risk for Readmission Diagnosis (CHF, PN, MI, COPD)? PNEUMONIA      The plan for Transition of Care is related to the following treatment goals:WILL BE DETERMINED ONCE SHE IS EXTUBATED AND BREATHING ON HER OWN. Initial Discharge Plan? (Note: please see concurrent daily documentation for any updates after initial note). WILL MOST LIKELY NEED REHAB. The Patient and/or patient representative:  was provided with choice of any post-acute providers for care and equipment and agrees with discharge plan  N/A.  PT WAS NOT ABLE TO TALK ABOUT DISCHARGE PLANS AT [address] [place] [address]. Electronically signed by Sim Arenas RN on 10/27/2020 at 4:41 PM

## 2020-10-27 NOTE — ED PROVIDER NOTES
[facility] ED  eMERGENCY dEPARTMENT eNCOUnter      Pt Name: Lord Gunderson  MRN: [id number]  Umesh 5/19/1928  Date of evaluation: 10/27/2020  Provider: Sherif Herrera DO    CHIEF COMPLAINT     No chief complaint on file. HISTORY OF PRESENT ILLNESS   (Location/Symptom, Timing/Onset,Context/Setting, Quality, Duration, Modifying Factors, Severity)  Note limiting factors. Lord Gunderson is a 80 y.o. female who presents to the emergency department with complaints of fast heart rate. Patient's heart rate is 166 beats per minute. Patient appears to be confused. Later during the patient's ER visit the family states that she was not wearing her hearing aids. Patient is uncooperative and fighting with nurses pulling out 3 of her IVs.  Adenosine IV was given by EMS 6 mg and 212 mg Talib's were given as well. Patient has a allergy to calcium channel blocker. Patient was given a beta-blocker but her heart rate came back to the 160s. During the patient's ER stay the patient became more more confused and developed labored breathing. The ER physician spoke with the family and the decision was made to intubate the patient. The ER physician was concerned that the patient could have a pneumonia in the left lower lobe and or she could have atelectasis. The ER physician was also concerned that the patient may have a brain bleed with her abnormal behavior. CAT scan of the head and chest were done and the ER physician ct and spoke with the radiologist felt that this was a pneumonia bilaterally. Patient's daughter helps contribute to history. The history is provided by a relative and the EMS personnel. NursingNotes were reviewed. REVIEW OF SYSTEMS    (2-9 systems for level 4, 10 or more for level 5)     Review of Systems   Unable to perform ROS: Mental status change   Cardiovascular: Positive for palpitations. Psychiatric/Behavioral: Positive for confusion. Except as noted above the remainder of the review of systems was reviewed and negative. PAST MEDICAL HISTORY     Past Medical History:   Diagnosis Date    Anxiety     Cancer ([address]     Glaucoma     Gout     left knee    Hypertension     Lung disease     Obesity (BMI 30-39. 9) 7/11/2019    Obstructive sleep apnea 7/11/2019         SURGICALHISTORY       Past Surgical History:   Procedure Laterality Date    CATARACT REMOVAL WITH IMPLANT Bilateral 2016    COLON SURGERY  2009    polyps    COLONOSCOPY  02/2009    SKIN CANCER EXCISION  2002    graft from neck         CURRENT MEDICATIONS       Previous Medications    ALBUTEROL SULFATE HFA (PROAIR HFA) 108 (90 BASE) MCG/ACT INHALER    Inhale 1 puff into the lungs every 6 hours as needed for Wheezing or Shortness of Breath    ASPIRIN 81 MG EC TABLET    Take 1 tablet by mouth daily    ATORVASTATIN (LIPITOR) 20 MG TABLET    TAKE 1 TABLET BY MOUTH EVERY DAY    BRIMONIDINE (ALPHAGAN P) 0.15 % OPHTHALMIC SOLUTION    INSTILL 1 DROP INTO BOTH EYES TWICE DAILY. COLCHICINE (MITIGARE) 0.6 MG CAPSULE    TAKE 2 CAPSULES po AT THE FIRST SIGN OF a gout FLARE, MAXIMUM TOTAL DOSE: 2 CAPSULES ON DAY 1. THEN TAKE 1 CAPSULE ONCE OR TWICE DAILY UNTIL FLARE RESOLVES. FOLLOW UP FOR MORE REFILLS. DORZOLAMIDE-TIMOLOL (COSOPT) 22.3-6.8 MG/ML OPHTHALMIC SOLUTION    Use 1 Drop in both eyes every 12 hours.     ERYTHROMYCIN (ROMYCIN) 5 MG/GM OPHTHALMIC OINTMENT    Instill ~1 cm ribbon into affected eye/onto affected area/lesion up to 6 times daily    IPRATROPIUM-ALBUTEROL (DUONEB) 0.5-2.5 (3) MG/3ML SOLN NEBULIZER SOLUTION    Inhale 3 mLs into the lungs every 6 hours as needed for Shortness of Breath    LATANOPROST (XALATAN) 0.005 % OPHTHALMIC SOLUTION    INSTILL 1 DROP INTO BOTH EYES EVERY DAY    LOSARTAN (COZAAR) 25 MG TABLET    Take 1 tablet by mouth daily    METOPROLOL TARTRATE (LOPRESSOR) 25 MG TABLET    Take 0.5 tablets by mouth 2 times daily    OXYGEN    Inhale 2 L into the lungs Indications: PRN at bedtime       ALLERGIES     Norvasc [amlodipine besylate]    FAMILY HISTORY     No family history on file. SOCIAL HISTORY       Social History     Socioeconomic History    Marital status:      Spouse name: None    Number of children: None    Years of education: None    Highest education level: None   Occupational History    None   Social Needs    Financial resource strain: None    Food insecurity     Worry: None     Inability: None    Transportation needs     Medical: None     Non-medical: None   Tobacco Use    Smoking status: Never Smoker    Smokeless tobacco: Never Used   Substance and Sexual Activity    Alcohol use: No    Drug use: No    Sexual activity: None   Lifestyle    Physical activity     Days per week: None     Minutes per session: None    Stress: None   Relationships    Social connections     Talks on phone: None     Gets together: None     Attends Scientologist service: None     Active member of club or organization: None     Attends meetings of clubs or organizations: None     Relationship status: None    Intimate partner violence     Fear of current or ex partner: None     Emotionally abused: None     Physically abused: None     Forced sexual activity: None   Other Topics Concern    None   Social History Narrative    None       SCREENINGS      @FLOW([id number])@      PHYSICAL EXAM    (up to 7 for level 4, 8 or more for level 5)     ED Triage Vitals   BP Temp Temp src Pulse Resp SpO2 Height Weight   10/27/20 1515 -- -- 10/27/20 1515 -- 10/27/20 1515 -- 10/27/20 1527   122/79   161  100 %  170 lb (77.1 kg)       Physical Exam  Vitals signs and nursing note reviewed. Constitutional:       General: She is awake. She is in acute distress. Appearance: Normal appearance. She is well-developed. She is obese. She is not ill-appearing, toxic-appearing or diaphoretic. Comments: No photophobia. No phonophobia.    HENT:      Head: Normocephalic and atraumatic. No Asif's sign. Right Ear: External ear normal.      Left Ear: External ear normal.      Nose: Nose normal. No congestion or rhinorrhea. Mouth/Throat:      Mouth: Mucous membranes are moist.      Pharynx: Oropharynx is clear. No oropharyngeal exudate or posterior oropharyngeal erythema. Eyes:      General: No scleral icterus. Right eye: No foreign body or discharge. Left eye: No discharge. Extraocular Movements: Extraocular movements intact. Conjunctiva/sclera: Conjunctivae normal.      Left eye: No exudate. Pupils: Pupils are equal, round, and reactive to light. Neck:      Musculoskeletal: Normal range of motion and neck supple. No neck rigidity. Vascular: No JVD. Trachea: No tracheal deviation. Comments: No meningismus. Cardiovascular:      Rate and Rhythm: Regular rhythm. Tachycardia present. Pulses: Normal pulses. Heart sounds: Normal heart sounds. Heart sounds not distant. No murmur. No friction rub. No gallop. Pulmonary:      Effort: Pulmonary effort is normal. Tachypnea present. No accessory muscle usage, prolonged expiration, respiratory distress or retractions. Breath sounds: No stridor. Examination of the right-lower field reveals decreased breath sounds. Examination of the left-lower field reveals decreased breath sounds. Decreased breath sounds present. No wheezing, rhonchi or rales. Chest:      Chest wall: No tenderness. Abdominal:      General: Abdomen is flat. Bowel sounds are normal. There is no distension. Palpations: Abdomen is soft. There is no mass. Tenderness: There is no abdominal tenderness. There is no right CVA tenderness, left CVA tenderness, guarding or rebound. Hernia: No hernia is present. Musculoskeletal: Normal range of motion. General: No swelling, tenderness, deformity or signs of injury.    Lymphadenopathy:      Head:      Right side of head: No submental adenopathy. Left side of head: No submental adenopathy. Skin:     General: Skin is warm and dry. Capillary Refill: Capillary refill takes less than 2 seconds. Coloration: Skin is not jaundiced or pale. Findings: No bruising, erythema, lesion or rash. Neurological:      Mental Status: She is alert. She is disoriented. Cranial Nerves: No cranial nerve deficit. Sensory: No sensory deficit. Motor: No weakness. Coordination: Coordination normal.      Deep Tendon Reflexes: Reflexes are normal and symmetric. Comments: AOX1   Psychiatric:         Mood and Affect: Mood normal.         Behavior: Behavior normal. Behavior is cooperative. Thought Content: Thought content normal.         Judgment: Judgment normal.         DIAGNOSTIC RESULTS     EKG: All EKG's are interpreted by the Emergency Department Physician who either signs or Co-signsthis chart in the absence of a cardiologist.    EKG: SVT at 166 bpm, wavering in the baseline diffuse artifact, QT interval of 300 ms, no PVCs. RADIOLOGY:   Non-plain filmimages such as CT, Ultrasound and MRI are read by the radiologist. Plain radiographic images are visualized and preliminarily interpreted by the emergency physician with the below findings:    Portable chest x-ray #1: Atelectasis versus infiltrate left lung base, no pneumothorax, no free air. Interpretation per the Radiologist below, if available at the time ofthis note:    XR CHEST PORTABLE   Final Result   ATELECTASIS, INFILTRATE RIGHT LOWER LOBE. TRACE RIGHT PLEURAL EFFUSION. LEFT LOWER LOBE INFILTRATE CONSOLIDATION LEFT PLEURAL EFFUSION. CT HEAD WO CONTRAST   Final Result   Impression:      No acute findings. Mild cerebral atrophy. Chronic ischemic white matter disease.          All CT scans at this facility use dose modulation, iterative reconstruction, and/or weight based dosing when appropriate to reduce radiation dose to as low as reasonably achievable. CTA CHEST W WO CONTRAST   Final Result      ENDOTRACHEAL TUBE IN EXPECTED POSITION. MODERATE CONSOLIDATION/ATELECTASIS OF THE POSTERIOR MID TO LOWER LUNG SANZ, SUGGESTIVE OF BRONCHOPNEUMONIA AND/OR ASPIRATION. NO EVIDENCE OF PULMONARY EMBOLI OR OTHER SIGNIFICANT CHANGE FROM 12/26/2018 IDENTIFIED. XR CHEST PORTABLE   Final Result   LEFT LOWER LOBE INFILTRATE CONSOLIDATION AND SMALL LEFT PLEURAL EFFUSION. ..      XR CHEST PORTABLE    (Results Pending)   Portable chest x-ray #2: Lower lobe infiltrate on the left, no pneumothorax.       ED BEDSIDE ULTRASOUND:   Performed by ED Physician - none    LABS:  Labs Reviewed   D-DIMER, QUANTITATIVE - Abnormal; Notable for the following components:       Result Value    D-Dimer, Quant >20.00 (*)     All other components within normal limits    Narrative:     Jennifer Power tel. [phone],  DIMER results called to and read back by Ghanshyam Gomez, 10/27/2020 17:15, by  Junaid Araiza   APTT - Abnormal; Notable for the following components:    aPTT 40.2 (*)     All other components within normal limits   CBC WITH AUTO DIFFERENTIAL - Abnormal; Notable for the following components:    Lymphocytes Absolute 0.3 (*)     Monocytes Absolute 0.0 (*)     All other components within normal limits   COMPREHENSIVE METABOLIC PANEL - Abnormal; Notable for the following components:    Glucose 103 (*)     CREATININE 1.25 (*)     GFR Non- 40.0 (*)     GFR  48.5 (*)     Total Protein 6.0 (*)     Alkaline Phosphatase 148 (*)     Globulin 2.2 (*)     All other components within normal limits   HIGH SENSITIVITY CRP - Abnormal; Notable for the following components:    CRP High Sensitivity 7.7 (*)     All other components within normal limits   PROTIME-INR - Abnormal; Notable for the following components:    Protime 17.5 (*)     All other components within normal limits   URINE RT REFLEX TO CULTURE - Abnormal; Notable for the following components:    Blood, Urine MODERATE (*)     Leukocyte Esterase, Urine SMALL (*)     All other components within normal limits   MICROSCOPIC URINALYSIS - Abnormal; Notable for the following components:    Bacteria, UA FEW (*)     WBC, UA 10-20 (*)     RBC, UA 6-10 (*)     All other components within normal limits   POCT GLUCOSE - Abnormal; Notable for the following components:    POC Glucose 129 (*)     All other components within normal limits   POCT ARTERIAL - Abnormal; Notable for the following components:    POC Glucose 130 (*)     POC Creatinine 1.6 (*)     GFR Non- 30 (*)     GFR  36 (*)     pH, Arterial 7.308 (*)     pCO2, Arterial 46 (*)     pO2, Arterial 162 (*)     O2 Sat, Arterial 99 (*)     Lactate 2.99 (*)     All other components within normal limits   POCT GLUCOSE - Normal   CULTURE, BLOOD 1   CULTURE, BLOOD 2   CULTURE, URINE   CK   BRAIN NATRIURETIC PEPTIDE   MAGNESIUM   TROPONIN   COVID-19   TYPE AND SCREEN       All other labs were within normal range or not returned as of this dictation. EMERGENCY DEPARTMENT COURSE and DIFFERENTIAL DIAGNOSIS/MDM:   Vitals:    Vitals:    10/27/20 1700 10/27/20 1714 10/27/20 1730 10/27/20 1802   BP: 133/68  105/66 103/61   Pulse: 155  154 140   Resp: 22  (!) 33 18   SpO2:  99% 94% 97%   Weight:               MDM  Patient had SVT EMS and given 6 mg of adenosine. SVT not resolved. Adenosine 12 mg was given twice in the emergency room IV with the physician present and the heart rate slowed down but immediately came back up to the 160s. Patient has allergy to a calcium channel blocker which causes swelling so she was given IV beta-blocker metoprolol and heart rate did not substantially go down. During the patient's course became more more confused she was combative hitting it nurses pulled out 3 IVs and the decision was made with speaking with the family to intubate the patient.   Patient's heart rate then came down after intubation into the 140s. CAT scan of the head was unremarkable the ER physician was concerned she might be a bleed but she was probably hypercarbic. CT of the chest was added and it was found that she has bilateral infiltrates. This was not obvious from the chest x-ray #1 there is atelectasis versus infiltrate. Blood cultures x2 were ordered as well as IV Zosyn. The hospitalist did asked the ED physician to place the central line prior to the patient going to the ICU. There is technical difficulty despite using ultrasound and vascular surgery was consulted to follow the patient. Central line was successfully placed in the right groin. The ED attending did talk with the family about line placement and potential complications. CRITICAL CARE TIME   Total Critical Care time was 88 minutes, excluding separately reportableprocedures. There was a high probability of clinicallysignificant/life threatening deterioration in the patient's condition which required my urgent intervention. CONSULTS:  IP CONSULT TO CARDIOLOGY  IP CONSULT TO HOSPITALIST  IP CONSULT TO VASCULAR SURGERY  IP CONSULT TO PULMONOLOGY  PHARMACY TO DOSE VANCOMYCIN  IP CONSULT TO CARDIOLOGY    PROCEDURES:  Unless otherwise noted below, none     Central Line    Date/Time: 10/27/2020 6:15 PM  Performed by: Ceferino Zhao DO  Authorized by: Ceferino Zhao DO     Consent:     Consent obtained:  Verbal    Consent given by: Patient's daughter. Risks discussed:  Arterial puncture, bleeding, infection and pneumothorax    Alternatives discussed:  No treatment  Pre-procedure details:     Hand hygiene: Hand hygiene performed prior to insertion      Sterile barrier technique: All elements of maximal sterile technique followed      Skin preparation:  2% chlorhexidine    Skin preparation agent: Skin preparation agent completely dried prior to procedure    Sedation:     Sedation type:   Moderate (conscious) sedation (Patient was intubated on propofol already)  Anesthesia (see MAR for exact dosages): Anesthesia method:  None  Procedure details:     Location:  L internal jugular, R internal jugular and R femoral    Site selection rationale:  2 failed attempts even with ultrasound at the internal jugular. Right femoral was done in order to accomplish the procedure. Patient position:  Reverse Trendelenburg    Procedural supplies:  Triple lumen    Catheter size:  7 Fr    Landmarks identified: yes      Ultrasound guidance: yes      Sterile ultrasound techniques: Sterile gel and sterile probe covers were used      Number of attempts:  5 or more    Successful placement: yes (Internal jugular attempts. Successful attempt with femoral.)    Post-procedure details:     Post-procedure:  Dressing applied and line sutured    Assessment:  Blood return through all ports    Patient tolerance of procedure: Tolerated well, no immediate complications  Comments:      Ultrasound was used in the placement of the central line. The vessel was identified. On the right there was a compressible vessel that the attending place the needle in.  Pulsatile blood came out ultrasound shows a very small vessel when later pressure was used in that region. Further attempts to place a right internal jugular were abandoned. Ultrasound was used to placement central line of the left internal jugular. The vessel was cannulated but no blood was aspirated. Multiple views and attempts were utilized. Intubation    Date/Time: 10/27/2020 4:11 PM  Performed by: Felipa Phillips DO  Authorized by: Felipa Phillips DO     Consent:     Consent obtained:  Verbal (The patient's daughter)    Consent given by: Patient's daughter. Risks discussed:  Aspiration and hypoxia    Alternatives discussed:  No treatment  Pre-procedure details:     Patient status:  Unresponsive    Mallampati score:  III    Pretreatment meds: Propofol.     Paralytics:  Rocuronium  Procedure details:

## 2020-10-27 NOTE — CARE COORDINATION
Spoke to family before we thought she was going to need to be transferred out because we did not have beds. Then we found out that we do have an ICU bed for her. They were given the HIPPA code for use while in the hopsital and asked to have one family representative to call in and then inform the rest of the family. There were many questions about her care and what will happen from here. Dr Karishma Cerda explained that she presently has bilateral pneumonia. She does not have advanced directives.

## 2020-10-28 ENCOUNTER — APPOINTMENT (OUTPATIENT)
Dept: ULTRASOUND IMAGING | Age: 85
DRG: 870 | End: 2020-10-28
Payer: MEDICARE

## 2020-10-28 ENCOUNTER — APPOINTMENT (OUTPATIENT)
Dept: CT IMAGING | Age: 85
DRG: 870 | End: 2020-10-28
Payer: MEDICARE

## 2020-10-28 LAB
ANION GAP SERPL CALCULATED.3IONS-SCNC: 17 MEQ/L (ref 9–15)
BANDED NEUTROPHILS RELATIVE PERCENT: 12 % (ref 5–11)
BASOPHILS ABSOLUTE: 0 K/UL (ref 0–0.2)
BASOPHILS RELATIVE PERCENT: 0.1 %
BUN BLDV-MCNC: 26 MG/DL (ref 8–23)
CALCIUM SERPL-MCNC: 7.9 MG/DL (ref 8.5–9.9)
CHLORIDE BLD-SCNC: 106 MEQ/L (ref 95–107)
CO2: 20 MEQ/L (ref 20–31)
CREAT SERPL-MCNC: 1.72 MG/DL (ref 0.5–0.9)
EKG ATRIAL RATE: 124 BPM
EKG ATRIAL RATE: 156 BPM
EKG P-R INTERVAL: 160 MS
EKG Q-T INTERVAL: 300 MS
EKG Q-T INTERVAL: 322 MS
EKG QRS DURATION: 72 MS
EKG QRS DURATION: 82 MS
EKG QTC CALCULATION (BAZETT): 462 MS
EKG QTC CALCULATION (BAZETT): 498 MS
EKG R AXIS: 27 DEGREES
EKG R AXIS: 8 DEGREES
EKG T AXIS: 102 DEGREES
EKG T AXIS: 58 DEGREES
EKG VENTRICULAR RATE: 124 BPM
EKG VENTRICULAR RATE: 166 BPM
EOSINOPHILS ABSOLUTE: 0 K/UL (ref 0–0.7)
EOSINOPHILS RELATIVE PERCENT: 0.1 %
GFR AFRICAN AMERICAN: 33.5
GFR NON-AFRICAN AMERICAN: 27.7
GLUCOSE BLD-MCNC: 131 MG/DL (ref 70–99)
HCT VFR BLD CALC: 37.9 % (ref 37–47)
HCT VFR BLD CALC: 38.3 % (ref 37–47)
HEMOGLOBIN: 12.4 G/DL (ref 12–16)
HEMOGLOBIN: 12.5 G/DL (ref 12–16)
LYMPHOCYTES ABSOLUTE: 0 K/UL (ref 1–4.8)
LYMPHOCYTES RELATIVE PERCENT: 1.2 %
MAGNESIUM: 1.8 MG/DL (ref 1.7–2.4)
MCH RBC QN AUTO: 29.2 PG (ref 27–31.3)
MCH RBC QN AUTO: 29.2 PG (ref 27–31.3)
MCHC RBC AUTO-ENTMCNC: 32.6 % (ref 33–37)
MCHC RBC AUTO-ENTMCNC: 32.6 % (ref 33–37)
MCV RBC AUTO: 89.6 FL (ref 82–100)
MCV RBC AUTO: 89.7 FL (ref 82–100)
METAMYELOCYTES RELATIVE PERCENT: 1 %
MONOCYTES ABSOLUTE: 2 K/UL (ref 0.2–0.8)
MONOCYTES RELATIVE PERCENT: 3.8 %
NEUTROPHILS ABSOLUTE: 47.4 K/UL (ref 1.4–6.5)
NEUTROPHILS RELATIVE PERCENT: 84 %
PDW BLD-RTO: 14.6 % (ref 11.5–14.5)
PDW BLD-RTO: 14.7 % (ref 11.5–14.5)
PLATELET # BLD: 144 K/UL (ref 130–400)
PLATELET # BLD: 151 K/UL (ref 130–400)
PLATELET SLIDE REVIEW: NORMAL
POTASSIUM REFLEX MAGNESIUM: 3.9 MEQ/L (ref 3.4–4.9)
RBC # BLD: 4.23 M/UL (ref 4.2–5.4)
RBC # BLD: 4.27 M/UL (ref 4.2–5.4)
RBC # BLD: NORMAL 10*6/UL
SODIUM BLD-SCNC: 143 MEQ/L (ref 135–144)
WBC # BLD: 48.9 K/UL (ref 4.8–10.8)
WBC # BLD: 52.1 K/UL (ref 4.8–10.8)

## 2020-10-28 PROCEDURE — 94003 VENT MGMT INPAT SUBQ DAY: CPT

## 2020-10-28 PROCEDURE — 99223 1ST HOSP IP/OBS HIGH 75: CPT | Performed by: INTERNAL MEDICINE

## 2020-10-28 PROCEDURE — 2500000003 HC RX 250 WO HCPCS: Performed by: INTERNAL MEDICINE

## 2020-10-28 PROCEDURE — 6360000002 HC RX W HCPCS: Performed by: INTERNAL MEDICINE

## 2020-10-28 PROCEDURE — 85027 COMPLETE CBC AUTOMATED: CPT

## 2020-10-28 PROCEDURE — 85025 COMPLETE CBC W/AUTO DIFF WBC: CPT

## 2020-10-28 PROCEDURE — 99291 CRITICAL CARE FIRST HOUR: CPT | Performed by: INTERNAL MEDICINE

## 2020-10-28 PROCEDURE — 2580000003 HC RX 258: Performed by: PHYSICIAN ASSISTANT

## 2020-10-28 PROCEDURE — 2580000003 HC RX 258: Performed by: INTERNAL MEDICINE

## 2020-10-28 PROCEDURE — 36415 COLL VENOUS BLD VENIPUNCTURE: CPT

## 2020-10-28 PROCEDURE — 93005 ELECTROCARDIOGRAM TRACING: CPT | Performed by: PHYSICIAN ASSISTANT

## 2020-10-28 PROCEDURE — 80048 BASIC METABOLIC PNL TOTAL CA: CPT

## 2020-10-28 PROCEDURE — 2700000000 HC OXYGEN THERAPY PER DAY

## 2020-10-28 PROCEDURE — 93880 EXTRACRANIAL BILAT STUDY: CPT

## 2020-10-28 PROCEDURE — 6360000002 HC RX W HCPCS: Performed by: PHYSICIAN ASSISTANT

## 2020-10-28 PROCEDURE — 83735 ASSAY OF MAGNESIUM: CPT

## 2020-10-28 PROCEDURE — 93010 ELECTROCARDIOGRAM REPORT: CPT | Performed by: INTERNAL MEDICINE

## 2020-10-28 PROCEDURE — 2580000003 HC RX 258

## 2020-10-28 PROCEDURE — 2000000000 HC ICU R&B

## 2020-10-28 PROCEDURE — 74176 CT ABD & PELVIS W/O CONTRAST: CPT

## 2020-10-28 PROCEDURE — APPNB45 APP NON BILLABLE 31-45 MINUTES: Performed by: PHYSICIAN ASSISTANT

## 2020-10-28 RX ORDER — SODIUM CHLORIDE 9 MG/ML
INJECTION, SOLUTION INTRAVENOUS
Status: COMPLETED
Start: 2020-10-28 | End: 2020-10-28

## 2020-10-28 RX ORDER — DIGOXIN 0.25 MG/ML
500 INJECTION INTRAMUSCULAR; INTRAVENOUS ONCE
Status: COMPLETED | OUTPATIENT
Start: 2020-10-28 | End: 2020-10-28

## 2020-10-28 RX ORDER — SODIUM CHLORIDE 9 MG/ML
INJECTION, SOLUTION INTRAVENOUS CONTINUOUS
Status: DISCONTINUED | OUTPATIENT
Start: 2020-10-28 | End: 2020-10-31

## 2020-10-28 RX ORDER — PROPOFOL 10 MG/ML
20 INJECTION, EMULSION INTRAVENOUS
Status: DISCONTINUED | OUTPATIENT
Start: 2020-10-28 | End: 2020-11-01

## 2020-10-28 RX ORDER — ATORVASTATIN CALCIUM 20 MG/1
20 TABLET, FILM COATED ORAL DAILY
COMMUNITY
End: 2021-08-30

## 2020-10-28 RX ORDER — 0.9 % SODIUM CHLORIDE 0.9 %
1000 INTRAVENOUS SOLUTION INTRAVENOUS ONCE
Status: COMPLETED | OUTPATIENT
Start: 2020-10-28 | End: 2020-10-29

## 2020-10-28 RX ADMIN — NOREPINEPHRINE BITARTRATE 20 MCG/MIN: 1 INJECTION INTRAVENOUS at 14:46

## 2020-10-28 RX ADMIN — PIPERACILLIN AND TAZOBACTAM 3.38 G: 3; .375 INJECTION, POWDER, LYOPHILIZED, FOR SOLUTION INTRAVENOUS at 18:10

## 2020-10-28 RX ADMIN — PIPERACILLIN AND TAZOBACTAM 3.38 G: 3; .375 INJECTION, POWDER, LYOPHILIZED, FOR SOLUTION INTRAVENOUS at 01:45

## 2020-10-28 RX ADMIN — DEXTROSE MONOHYDRATE 150 MG: 50 INJECTION, SOLUTION INTRAVENOUS at 17:56

## 2020-10-28 RX ADMIN — PROPOFOL 40 MCG/KG/MIN: 10 INJECTION, EMULSION INTRAVENOUS at 21:39

## 2020-10-28 RX ADMIN — DEXMEDETOMIDINE 0.8 MCG/KG/HR: 100 INJECTION, SOLUTION, CONCENTRATE INTRAVENOUS at 05:32

## 2020-10-28 RX ADMIN — DIGOXIN 500 MCG: 250 INJECTION, SOLUTION INTRAMUSCULAR; INTRAVENOUS; PARENTERAL at 20:12

## 2020-10-28 RX ADMIN — FAMOTIDINE 20 MG: 10 INJECTION INTRAVENOUS at 08:57

## 2020-10-28 RX ADMIN — Medication 10 ML: at 10:00

## 2020-10-28 RX ADMIN — SODIUM CHLORIDE: 9 INJECTION, SOLUTION INTRAVENOUS at 15:40

## 2020-10-28 RX ADMIN — PROPOFOL 20 MCG/KG/MIN: 10 INJECTION, EMULSION INTRAVENOUS at 09:59

## 2020-10-28 RX ADMIN — PROPOFOL 30 MCG/KG/MIN: 10 INJECTION, EMULSION INTRAVENOUS at 14:46

## 2020-10-28 RX ADMIN — SODIUM CHLORIDE 1000 ML: 9 INJECTION, SOLUTION INTRAVENOUS at 20:09

## 2020-10-28 RX ADMIN — PIPERACILLIN AND TAZOBACTAM 3.38 G: 3; .375 INJECTION, POWDER, LYOPHILIZED, FOR SOLUTION INTRAVENOUS at 08:56

## 2020-10-28 RX ADMIN — METOPROLOL TARTRATE 5 MG: 5 INJECTION, SOLUTION INTRAVENOUS at 16:28

## 2020-10-28 ASSESSMENT — PULMONARY FUNCTION TESTS
PIF_VALUE: 10
PIF_VALUE: 15
PIF_VALUE: 20
PIF_VALUE: 12
PIF_VALUE: 12
PIF_VALUE: 20
PIF_VALUE: 21
PIF_VALUE: 8.1
PIF_VALUE: 20
PIF_VALUE: 15
PIF_VALUE: 19
PIF_VALUE: 17
PIF_VALUE: 19
PIF_VALUE: 18
PIF_VALUE: 16
PIF_VALUE: 15
PIF_VALUE: 7.2
PIF_VALUE: 17
PIF_VALUE: 13
PIF_VALUE: 12
PIF_VALUE: 20
PIF_VALUE: 20
PIF_VALUE: 21
PIF_VALUE: 12
PIF_VALUE: 20
PIF_VALUE: 18
PIF_VALUE: 21
PIF_VALUE: 8.5
PIF_VALUE: 20
PIF_VALUE: 20
PIF_VALUE: 22
PIF_VALUE: 15
PIF_VALUE: 19
PIF_VALUE: 20
PIF_VALUE: 20
PIF_VALUE: 9.8
PIF_VALUE: 21
PIF_VALUE: 18
PIF_VALUE: 19
PIF_VALUE: 10
PIF_VALUE: 18
PIF_VALUE: 16
PIF_VALUE: 20
PIF_VALUE: 11
PIF_VALUE: 19
PIF_VALUE: 20
PIF_VALUE: 19
PIF_VALUE: 8.9
PIF_VALUE: 21
PIF_VALUE: 20
PIF_VALUE: 10
PIF_VALUE: 19
PIF_VALUE: 9.69
PIF_VALUE: 19
PIF_VALUE: 15
PIF_VALUE: 14
PIF_VALUE: 20
PIF_VALUE: 20

## 2020-10-28 NOTE — PROGRESS NOTES
Renal Adjustment Per Protocol:     Famotidine 20mg BID changed to Famotidine 20mg daily based on CrCl < 50 ml/min. Recent Labs     10/27/20  1708   CREATININE 1.6*    Estimated Creatinine Clearance: 24 mL/min (A) (based on SCr of 1.6 mg/dL (H)). CHEIKH Medrano Ph.  10/27/2020  10:29 PM

## 2020-10-28 NOTE — ED NOTES
Bedside report given to Tanja Strong RN. Awaiting RT for transport.       Ricarda Brunner, RN  10/27/20 2043

## 2020-10-28 NOTE — PROGRESS NOTES
Consolidated note [id number]: Assessments as charted. Medications administered as ordered. Precedex d/c'd and Propofol initiated. Patient HR fluctuating 120-150's. Dr. Tang Hoyos aware. Discussed Toprol and not given after discussing with Dr. Tang Hoyos. Will await cardiology to see patient. Madison Squires and spoke to patient daughter PRESENCE [facility] (works in [address] [place] [address],[address] records here); updated her and answered all questions. Will continue to monitor. 1545: Dr. Robert Sams here, seen and examined patient. Second covid d/c'd and isolation d/c'd per Dr. Robert Sams. CT ABD ordered. Message sent to Dr. Tang Hoyos asking about IVF for patient. Urine output poor; IVF ordered. NS @ 100cc/hr ordered and hung. Will continue to monitor. 1830: Patient to CT scan via bed with this rn and RT. CT abd done, patient back to ICU. IV Amio given per orders.

## 2020-10-28 NOTE — PROGRESS NOTES
if unable to extubate in next 24 hrs)  Nutrition Education/Counseling:  Education not indicated   Coordination of Nutrition Care:  Continue to monitor while inpatient    Goals:  Initiation of TF if unable to extubate in next 24 hrs       Nutrition Monitoring and Evaluation:     Food/Nutrient Intake Outcomes:  Enteral Nutrition Intake/Tolerance  Physical Signs/Symptoms Outcomes:  Biochemical Data, Weight     Electronically signed by Kyler Ly RD, LD on 10/28/20 at 11:34 AM EDT

## 2020-10-28 NOTE — PROGRESS NOTES
2105- patient arrived to ICU bed 9 with ED RN , RT and this RN. Patient is unresponsive on ventilator. No restraints, no sedation infusing. 2120- BP 74/42 (50) perfect sent to Dr. Gabrielle Jimenez   2127 - no response from Dr. Gabrielle Jimenez yet BP now 69/39 (47) . Rapid response called, . 2245- BP now 119/53 on 10 mcg/min levophed gtt. Patient has become more responsive. Is moving upper and lower extremities. Nodding appropriately when asked questions. Patient is very hard of hearing. 2305- report given to [facility]. Admission documentation requirements partially completed as this RN's shift is now over.

## 2020-10-28 NOTE — CARE COORDINATION
Team ICU rounds done and pt on vent with multiple consultants to see her today. She is on Levo Propof and Precedex at present and heart rate is still elevated. Pt has WBC of 52.1 and Dr. Bryan Lange to see today. Will follow to help determine any needs.

## 2020-10-28 NOTE — CONSULTS
Critical Care Medicine  Consult Note      Reason for consultation: Acute respiratory failure, tachycardia    HISTORY OF PRESENT ILLNESS:    This is a 28-year-old female with history of cancer, gout, hypertension, and obstructive sleep apnea who was brought to the emergency department with complaint of fast heart rate. Patient was found to have severe tachycardia with heart rate in the 150s to 160s initially. She did not respond to adenosine boluses, she developed worsening confusional state less responsiveness, and labored breathing for which she was intubated in the emergency department. An attempt was made to place a central line in both internal jugular veins and were not successful, on the right side there was a report of arterial stick but no evidence of significant hematoma or other complications. Since arrival in ICU patient has remained on the vent, she is more responsive and getting anxious, her WBCs have increased markedly since admission and were rechecked, latest CBC showed WBC of 52,000, was just over 5000 on admission. No clear evidence of septic source but she does have consolidation in the left lower lobe suggestive of pneumonia with right lower lobe atelectasis versus pneumonia as well. This is very unusual to induce this degree of leukocytosis. Past Medical History:        Diagnosis Date    Anxiety     Cancer ([facility] [address]     Glaucoma     Gout     left knee    Hypertension     Lung disease     Obesity (BMI 30-39. 9) 7/11/2019    Obstructive sleep apnea 7/11/2019       Past Surgical History:        Procedure Laterality Date    CATARACT REMOVAL WITH IMPLANT Bilateral 2016    COLON SURGERY  2009    polyps    COLONOSCOPY  02/2009    SKIN CANCER EXCISION  2002    graft from neck       Social History:     reports that she has never smoked. She has never used smokeless tobacco. She reports that she does not drink alcohol or use drugs.     Family History:   No family history on file.    Allergies:  Norvasc [amlodipine besylate]        MEDICATIONS during current hospitalization:    Continuous Infusions:   propofol 30 mcg/kg/min (10/28/20 1008)    norepinephrine 20 mcg/min (10/28/20 1027)       Scheduled Meds:   sodium chloride flush  10 mL Intravenous 2 times per day    enoxaparin  30 mg Subcutaneous Daily    famotidine (PEPCID) injection  20 mg Intravenous Daily    [Held by provider] ipratropium-albuterol  1 ampule Inhalation 4x daily    piperacillin-tazobactam  3.375 g Intravenous Q8H    vancomycin (VANCOCIN) intermittent dosing (placeholder)   Other RX Placeholder    vancomycin  15 mg/kg Intravenous Q36H       PRN Meds:sodium chloride flush, acetaminophen **OR** acetaminophen, polyethylene glycol, promethazine **OR** ondansetron, potassium chloride **OR** potassium alternative oral replacement **OR** potassium chloride, magnesium sulfate, metoprolol        REVIEW OF SYSTEMS:  As in history of present illness  Other 14 point review of system is negative. PHYSICAL EXAM:    Vitals:  /64   Pulse 129   Temp 98.5 °F (36.9 °C) (Oral)   Resp 28   Ht 5' 3\" (1.6 m)   Wt 197 lb 1.5 oz (89.4 kg)   LMP  (LMP Unknown)   SpO2 97%   BMI 34.91 kg/m²   General: Patient is currently sedated, intubated orally, on the vent does not appear to be in any significant distress   Head: Atraumatic , Normocephalic   Eyes: PERRL. No icteric sclera. No conjunctival injection. No discharge   ENT: No nasal  discharge. Pharynx clear. Neck:  Trachea midline. No thyromegaly, no JVD, No cervical adenopathy. Chest : Adequate spontaneous effort, symmetric bilateral excursions  Lung : Diminished breath sounds bilaterally, few rhonchi  Heart[de-identified] Regular rhythm tachycardic rate. No mumur ,  Rub or gallop  ABD: Benign. Non-tender. Non-distended. No masses or organmegaly. Normal bowel sounds.    EXT: Mild pitting edema both lower extremities , No Cyanosis No clubbing  Neuro: no focal weakness  Skin: Warm and dry. No erythema or rash on exposed extremities. .      Data Review  Recent Labs     10/27/20  1530  10/27/20  2321 10/28/20  0600 10/28/20  0720   WBC 5.4  --   --  48.9* 52.1*   HGB 13.2   < > 12.0 12.4 12.5   HCT 39.8  --   --  37.9 38.3     --   --  151 144    < > = values in this interval not displayed. Recent Labs     10/27/20  1530 10/27/20  1615 10/27/20  1708 10/27/20  2321 10/28/20  0600     --   --   --  143   K 4.3  --   --   --  3.9     --   --   --  106   CO2 24  --   --   --  20   BUN 21  --   --   --  26*   CREATININE 1.25*  --  1.6* 1.8* 1.72*   GLUCOSE 103* 149  --   --  131*       MV Settings:     Vent Mode: AC/VC  Vt Ordered: 400 mL  Rate Set: 22 bmp  PEEP/CPAP: 5  Peak Inspiratory Pressure: 17 cmH2O  Mean Airway Pressure: 9.9 cmH20  I:E Ratio: 1:2.00    ABGs:   Recent Labs     10/27/20  1708 10/27/20  2321   PHART 7.308* 7.376   HNR3YRJ 46* 35   PO2ART 162* 121*   YLE2CLM 22.8 20.4*   BEART -3 -5*   N1LIYGXR 99* 99*   CAH9WOB 24 21*     O2 Device: Ventilator  Lab Results   Component Value Date    LACTA 1.0 12/25/2018       Radiology  Ct Head Wo Contrast    Result Date: 10/27/2020  CT Brain Contrast medium:  Not utilized. History: Altered mental status. Tachycardia. Comparison:  None. Findings: Extra-axial spaces:  Normal. Intracranial hemorrhage:  None. Ventricular system: Ventricles mildly enlarged. Sulci mildly prominent Basal Cisterns:  Normal. Cerebral Parenchyma: Bilateral symmetric periventricular areas decreased attenuation. Midline Shift:  None. Cerebellum:  Normal. Paranasal sinuses and mastoid air cells:  Normal. Visualized Orbits: Remote bilateral ocular surgery. Impression: No acute findings. Mild cerebral atrophy. Chronic ischemic white matter disease. All CT scans at this facility use dose modulation, iterative reconstruction, and/or weight based dosing when appropriate to reduce radiation dose to as low as reasonably achievable.     Cta Chest W Wo Contrast    Result Date: 10/27/2020  CTA CHEST W WO CONTRAST: 10/27/2020 CLINICAL HISTORY:  R/o PE, SOB, tachycardia . COMPARISON: 12/26/2018. Spiral enhanced images were obtained of the chest during the infusion of approximately 100 mL of Isovue 370 contrast with pulmonary artery  CTA protocol. Routine and volume rendered images were obtained on a 3-dimensional workstation. All CT scans at this facility use dose modulation, iterative reconstruction, and/or weight based dosing when appropriate to reduce radiation dose to as low as reasonably achievable. FINDINGS: An endotracheal tube is noted with its tip approximately 2 cm superior to the devin. There are no filling defects identified within the pulmonary arterial vasculature to suggest pulmonary emboli. The thoracic aorta is tortuous, and ectatic with mild calcific atherosclerotic plaquing of the arch. There is no dissection. The heart is mildly enlarged. Moderate coronary artery calcifications are present. Moderate bibasilar consolidation/atelectasis is noted of the posterior mid to lower lung fields, worsening inferiorly. There are no worrisome nodules, lymphadenopathy, pleural or pericardial effusions identified. A small calcified granuloma within the inferolateral aspect of the left upper lobe appears unchanged. A minimal compression deformity of the superior endplate of X03 and mild vertebral body wedging of the mid thoracic levels appears chronic. There are no acute fractures identified. Mild to moderate degenerative changes of the thoracic spine are noted. The limited images of the upper abdomen are noncontributory. ENDOTRACHEAL TUBE IN EXPECTED POSITION. MODERATE CONSOLIDATION/ATELECTASIS OF THE POSTERIOR MID TO LOWER LUNG SANZ, SUGGESTIVE OF BRONCHOPNEUMONIA AND/OR ASPIRATION. NO EVIDENCE OF PULMONARY EMBOLI OR OTHER SIGNIFICANT CHANGE FROM 12/26/2018 IDENTIFIED.       Xr Chest Portable    Result Date: 10/28/2020  EXAMINATION: XR CHEST PORTABLE CLINICAL HISTORY: CENTRAL LINE COMPARISONS: OCTOBER 27, 2020, AT 1700 HOURS FINDINGS: Endotracheal tube 2.2 cm superior to devin. Nasogastric tube courses beneath diaphragm. Osseous structures intact. Cardiopericardial silhouette normal. Blunting left costophrenic angle, mildly increased since prior study. Ill-defined area increased opacity left lung base. MILD INTERVAL INCREASE LEFT PLEURAL EFFUSION, WITH LEFT LOWER LUNG ATELECTASIS/PNEUMONIA. Xr Chest Portable    Result Date: 10/27/2020  EXAMINATION: CHEST PORTABLE VIEW-1700 hours  CLINICAL HISTORY: Intubation COMPARISONS: October 27, 2020 1431 hours  FINDINGS: Single  views of the chest is submitted. Interval endotracheal intubation since the prior examination. The tip is at the level of the clavicles. The cardiac silhouette enlarged unchanged configuration. . Pulmonary vascular mildly congested. Right sided trachea. Left lower lobe infiltrate consolidation left pleural effusion. Atelectasis, infiltrate right lower lobe Blunting of the right costophrenic angle suggesting trace right pleural effusion. No Pneumothoraces. ATELECTASIS, INFILTRATE RIGHT LOWER LOBE. TRACE RIGHT PLEURAL EFFUSION. LEFT LOWER LOBE INFILTRATE CONSOLIDATION LEFT PLEURAL EFFUSION. Xr Chest Portable    Result Date: 10/27/2020  EXAMINATION: CHEST PORTABLE [id number]-8996 hours  CLINICAL HISTORY: Short of breath COMPARISONS: July 8, 2020  FINDINGS: Single  views of the chest is submitted. The cardiac silhouette is of normal size configuration. Pulmonary vascular congested. The trachea remains deviated to the right. Left lower lobe infiltrate consolidation and small left pleural effusion. .  No Pneumothoraces. LEFT LOWER LOBE INFILTRATE CONSOLIDATION AND SMALL LEFT PLEURAL EFFUSION. .. Assessment, plan:   1.  Acute respiratory failure associated with sepsis and bilateral lower lobe pneumonia  2. Primarily left lower lobe possibly right lower lobe pneumonia as well cannot rule out aspiration, patient is on broad-spectrum antibiotic coverage with vancomycin and Zosyn, cultures are pending  3. Supraventricular tachycardia, sinus tachycardia due to sepsis versus atrial flutter with 2-1 conduction although patient has had rate varying between 120s to 150s which usually means sinus tach awaiting cardiology opinion  4. Septic shock requiring vasopressor support likely due to pneumonia but other sources such as intra-abdominal source should also be considered given the sudden marked increase in white blood cell count  5. Acute kidney injury associated with sepsis, continue close monitoring, nephrology evaluation if worsening  Overall patient is critically ill with multiorgan system failure as detailed above likely with severe septic state, shock, and evidence of pneumonia, other sources of sepsis cannot be excluded as mentioned before, will continue close monitoring, vent support, wean as tolerated, follow-up chest x-ray in a.m., cultures obtained will continue to follow-up on results, she is on adequate broad-spectrum coverage so far, continue with peptic ulcer disease prophylaxis, hold Lovenox until cleared by vascular surgery given the arterial puncture reported in ER. I spent 39 minutes providing critical care. This time is excluding time spent performing procedures.           Electronically signed by Micha Lange MD, FCCP on 10/28/2020 at 11:26 AM

## 2020-10-28 NOTE — PROGRESS NOTES
Pharmacy Vancomycin Consult     Vancomycin Day: 2  Current Dosin,250 mg Q36H    Temp max:  98.6    Recent Labs     10/27/20  1530 10/28/20  0600   BUN 21 26*       Recent Labs     10/27/20  2321 10/28/20  0600   CREATININE 1.8* 1.72*       Recent Labs     10/28/20  0600 10/28/20  0720   WBC 48.9* 52.1*         Intake/Output Summary (Last 24 hours) at 10/28/2020 1049  Last data filed at 10/28/2020 0618  Gross per 24 hour   Intake 2567.22 ml   Output 1500 ml   Net 1067.22 ml       Culture Date      Source                       Results  10/27/20              Blood x2                    Pending    Ht Readings from Last 1 Encounters:   10/27/20 5' 3\" (1.6 m)        Wt Readings from Last 1 Encounters:   10/27/20 197 lb 1.5 oz (89.4 kg)         Body mass index is 34.91 kg/m². Estimated Creatinine Clearance: 22 mL/min (A) (based on SCr of 1.72 mg/dL (H)). Trough:    Assessment/Plan:  Patient clearance remains stable. Will continue to monitor daily, pending     Esha Bernstein PharmD Candidate  10/28/2020  10:52 AM      Reviewed:   Trough re-timed for prior to 3rd dose @ 2300 on 10/30. Dose 1: 10/27 @ 2350  Dose 2: 10/29 @ 1150  Dose 3: 10/20 @ 2350    Will continue to monitor.    Enid Naidu PharmD

## 2020-10-28 NOTE — PROGRESS NOTES
values in this interval not displayed.      Recent Labs     10/27/20  1530   AST 21   ALT 12   BILITOT 0.6   ALKPHOS 148*       Current Facility-Administered Medications   Medication Dose Route Frequency Provider Last Rate Last Dose    propofol injection  20 mcg/kg/min Intravenous Titrated Choco Storm MD 16.1 mL/hr at 10/28/20 1008 30 mcg/kg/min at 10/28/20 1008    sodium chloride flush 0.9 % injection 10 mL  10 mL Intravenous 2 times per day Tellis Moles, DO   10 mL at 10/28/20 1000    sodium chloride flush 0.9 % injection 10 mL  10 mL Intravenous PRN Tellis Moles, DO        acetaminophen (TYLENOL) tablet 650 mg  650 mg Oral Q6H PRN Tellis Moles, DO        Or    acetaminophen (TYLENOL) suppository 650 mg  650 mg Rectal Q6H PRN Forrestine Quill David, DO        polyethylene glycol (GLYCOLAX) packet 17 g  17 g Oral Daily PRN Tellis Moles, DO        promethazine (PHENERGAN) tablet 12.5 mg  12.5 mg Oral Q6H PRN Forrestine Quill David, DO        Or    ondansetron [facility]) injection 4 mg  4 mg Intravenous Q6H PRN Forrestine Quill David, DO        enoxaparin (LOVENOX) injection 30 mg  30 mg Subcutaneous Daily Tellis Moles, DO   Stopped at 10/28/20 1000    potassium chloride (KLOR-CON M) extended release tablet 40 mEq  40 mEq Oral PRN Tellis Moles, DO        Or    potassium bicarb-citric acid (EFFER-K) effervescent tablet 40 mEq  40 mEq Oral PRN Tellis Moles, DO        Or    potassium chloride 10 mEq/100 mL IVPB (Peripheral Line)  10 mEq Intravenous PRN Tellis Moles, DO        magnesium sulfate 2 g in 50 mL IVPB premix  2 g Intravenous PRN Tellis Moles, DO        famotidine (PEPCID) injection 20 mg  20 mg Intravenous Daily Tellis Moles, DO   20 mg at 10/28/20 0857    [Held by provider] ipratropium-albuterol (DUONEB) nebulizer solution 1 ampule  1 ampule Inhalation 4x daily Tellis Moles, DO        piperacillin-tazobactam (ZOSYN) 3.375 g in dextrose 5 % 50 mL IVPB extended infusion (mini-bag) 3.375 g Intravenous Q8H [facility], DO 12.5 mL/hr at 10/28/20 0856 3.375 g at 10/28/20 0856    metoprolol (LOPRESSOR) injection 5 mg  5 mg Intravenous Q6H PRN [facility], DO        norepinephrine (LEVOPHED) 16 mg in dextrose 5 % 250 mL infusion  2 mcg/min Intravenous Continuous [facility], DO 18.8 mL/hr at 10/28/20 1027 20 mcg/min at 10/28/20 1027    vancomycin (VANCOCIN) intermittent dosing (placeholder)   Other RX Placeholder [facility], DO        vancomycin (VANCOCIN) 1,250 mg in dextrose 5 % 250 mL IVPB  15 mg/kg Intravenous Q36H [facility], DO   Stopped at 10/28/20 0131       Additional work up or/and treatment plan may be added today or then after based on clinical progression. I am managing a portion of pt care. Some medical issues are handled by other specialists. Additional work up and treatment should be done in out pt setting by pt PCP and other out pt providers. In addition to examining and evaluating pt, I spent additional time explaining care, normaland abnormal findings, and treatment plan. All of pt questions were answered. Counseling, diet and education were provided. Case will be discussed with nursing staff when appropriate. Family will be updated if and when appropriate.        Electronically signed by [facility] on 10/28/2020 at 12:42 PM

## 2020-10-28 NOTE — PROGRESS NOTES
Pharmacy Note  Vancomycin Consult    Babita Caballero is a 80 y.o. female started on Vancomycin for pneumonia; consult received from Dr. Daryn Griffin to manage therapy. Also receiving the following antibiotics: Zosyn. Patient Active Problem List   Diagnosis    Essential hypertension    Anxiety    COPD (chronic obstructive pulmonary disease) with acute bronchitis (HCC)    COPD exacerbation (HCC)    Hypoxia    Obesity (BMI 30-39. 9)    Obstructive sleep apnea    Chest pain    SVT (supraventricular tachycardia) (HCC)       Allergies:  Norvasc [amlodipine besylate]     Temp max: unknown    Recent Labs     10/27/20  1530   BUN 21       Recent Labs     10/27/20  1530 10/27/20  1708   CREATININE 1.25* 1.6*       Recent Labs     10/27/20  1530   WBC 5.4         Intake/Output Summary (Last 24 hours) at 10/27/2020 2240  Last data filed at 10/27/2020 2207  Gross per 24 hour   Intake 1073.22 ml   Output 1000 ml   Net 73.22 ml       Culture Date      Source                       Results  10/27/20              blood                          in process    Ht Readings from Last 1 Encounters:   10/27/20 5' 3\" (1.6 m)        Wt Readings from Last 1 Encounters:   10/27/20 197 lb 1.5 oz (89.4 kg)         Body mass index is 34.91 kg/m². Estimated Creatinine Clearance: 24 mL/min (A) (based on SCr of 1.6 mg/dL (H)). Goal Trough Level: 15-20 mcg/mL    Assessment/Plan:  Will initiate Vancomycin 1250 mg IV every 36 hours. Trough prior to 3rd dose 10/31 0000. Timing of trough level will be determined based on culture results, renal function, and clinical response. Thank you for the consult. Will continue to follow. Thompson Lee, SARITHA. Ph.  10/27/2020  10:42 PM

## 2020-10-28 NOTE — PROGRESS NOTES
Pharmacy Dose Adjustment Per Protocol    Damon Barrera is a 80 y.o. female. Recent Labs     10/27/20  1530   BUN 21       Recent Labs     10/27/20  1530 10/27/20  1708   CREATININE 1.25* 1.6*       CrCl cannot be calculated (Unknown ideal weight.). Height:   Ht Readings from Last 1 Encounters:   07/08/20 5' 3\" (1.6 m)     Weight:  Wt Readings from Last 1 Encounters:   10/27/20 170 lb (77.1 kg)     CrCl as calculated is 18.56 mL/min     Drug Ordered Therapeutic Interchange (CrCL ?  30 mL/min)   [x] Enoxaparin 40 mg subq daily Enoxaparin 30 mg subq daily   [] Enoxaparin 1 mg/kg subq BID Enoxaparin ________ (1 mg/kg) subq daily    [] Enoxaparin 30 subq BID Enoxaparin 30 mg subq daily     [address] [facility]  Staff Pharmacist, Gus Pan  10/27/2020  9:30 PM

## 2020-10-28 NOTE — CONSULTS
Infectious Diseases Inpatient Consult Note      Reason for Consult:   Septic shock  Requesting Physician:   Dr Espinal Marta  Primary Care Physician:  Jose Luis Ramírez MD  History Obtained From:   Pt, EPIC    Admit Date: 10/27/2020  Hospital Day: 2      HISTORY OF PRESENT ILLNESS:  This is a 80 y.o. female was admitted to [facility]  from home through ER with acute onset shortness of breath and fast heart rate. In ER she became combative and went into acute respiratory failure, became unresponsive, to be intubated and admitted to ICU. Continue on Levophed at 20 cc. Has a white blood cell count of 52,000 low urine output and elevated creatinine. Bilateral basilar infiltrates by CT of the chest.  Darted on IV Vanco and Zosyn. Not much history is available otherwise. Patient does not have any diarrhea. CHIEF COMPLAINT:       Past Medical History:   Diagnosis Date    Anxiety     Cancer ([address]     Glaucoma     Gout     left knee    Hypertension     Lung disease     Obesity (BMI 30-39. 9) 7/11/2019    Obstructive sleep apnea 7/11/2019       Past Surgical History:   Procedure Laterality Date    CATARACT REMOVAL WITH IMPLANT Bilateral 2016    COLON SURGERY  2009    polyps    COLONOSCOPY  02/2009    SKIN CANCER EXCISION  2002    graft from neck       Current Medications:     sodium chloride flush  10 mL Intravenous 2 times per day    enoxaparin  30 mg Subcutaneous Daily    famotidine (PEPCID) injection  20 mg Intravenous Daily    [Held by provider] ipratropium-albuterol  1 ampule Inhalation 4x daily    piperacillin-tazobactam  3.375 g Intravenous Q8H    vancomycin (VANCOCIN) intermittent dosing (placeholder)   Other RX Placeholder    vancomycin  15 mg/kg Intravenous Q36H       Allergies:  Norvasc [amlodipine besylate]    Social History     Socioeconomic History    Marital status:       Spouse name: Not on file    Number of children: Not on file    Years of education: Not on file    Highest education level: Not on file   Occupational History    Not on file   Social Needs    Financial resource strain: Not on file    Food insecurity     Worry: Not on file     Inability: Not on file    Transportation needs     Medical: Not on file     Non-medical: Not on file   Tobacco Use    Smoking status: Never Smoker    Smokeless tobacco: Never Used   Substance and Sexual Activity    Alcohol use: No    Drug use: No    Sexual activity: Not on file   Lifestyle    Physical activity     Days per week: Not on file     Minutes per session: Not on file    Stress: Not on file   Relationships    Social connections     Talks on phone: Not on file     Gets together: Not on file     Attends Faith service: Not on file     Active member of club or organization: Not on file     Attends meetings of clubs or organizations: Not on file     Relationship status: Not on file    Intimate partner violence     Fear of current or ex partner: Not on file     Emotionally abused: Not on file     Physically abused: Not on file     Forced sexual activity: Not on file   Other Topics Concern    Not on file   Social History Narrative    Not on file         Family History:   No family history on file. Review of Systems  unable to provide ROS because of sedation      Physical Exam  Vitals:    10/28/20 1015 10/28/20 1030 10/28/20 1300 10/28/20 1400   BP: (!) 115/48 (!) 109/45 (!) 105/44 (!) 101/48   Pulse: 145 118 128 143   Resp: 28 26 22 25   Temp:   99.7 °F (37.6 °C) 99.5 °F (37.5 °C)   TempSrc:   Rectal Rectal   SpO2: 98% 97% 97% 97%   Weight:       Height:         General Appearance: Sedated and intubated on assist control 80%, well-developed and well-nourished, in no acute distress  Supple neck  Skin:  no rash.    Head: normocephalic and atraumatic  Eyes: pupils equal, round, and reactive to light,extraocular eye movements intact, conjunctivae normal, anicteric sclerae  ENT: OG to suction, intact ET  Lungs: Assisted ventilation with bibasilar Rales  Heart: nl S1/S2, no murmur, tachycardic  Abdomen: soft, no distention, no H-S-megaly, hypoactive BS  NEUROLOGICAL: Sedated and nonresponsive to verbal stimulation  No leg edema  No erythema, no warmth, no tenderness  Jeannie urine with low urine output        DATA:    Lab Results   Component Value Date    WBC 52.1 (HH) 10/28/2020    HGB 12.5 10/28/2020    HCT 38.3 10/28/2020    MCV 89.7 10/28/2020     10/28/2020     Lab Results   Component Value Date    CREATININE 1.72 (H) 10/28/2020    BUN 26 (H) 10/28/2020     10/28/2020    K 3.9 10/28/2020     10/28/2020    CO2 20 10/28/2020       Hepatic Function Panel:   Lab Results   Component Value Date    ALKPHOS 148 10/27/2020    ALT 12 10/27/2020    AST 21 10/27/2020    PROT 6.0 10/27/2020    BILITOT 0.6 10/27/2020    LABALBU 3.8 10/27/2020       Microbiology:   No results for input(s): BC in the last 72 hours. No results for input(s): Mitzi Adan in the last 72 hours. No results for input(s): LABURIN in the last 72 hours. No results for input(s): WNDABS in the last 72 hours. No results for input(s): CULTRESP in the last 72 hours. Imaging:   CT of the chest  Impression ENDOTRACHEAL TUBE IN EXPECTED POSITION. MODERATE CONSOLIDATION/ATELECTASIS OF THE POSTERIOR MID TO LOWER LUNG SANZ, SUGGESTIVE OF BRONCHOPNEUMONIA AND/OR ASPIRATION. NO EVIDENCE OF PULMONARY EMBOLI OR OTHER SIGNIFICANT CHANGE FROM 12/26/2018 IDENTIFIED. IMPRESSION:    · Septic shock due to community-acquired pneumonia/UTI  · Acute respiratory failure  · Acute kidney injury  · Severe leukemoid reaction rule out intra-abdominal process    Patient Active Problem List   Diagnosis    Essential hypertension    Anxiety    COPD (chronic obstructive pulmonary disease) with acute bronchitis (HCC)    COPD exacerbation (HCC)    Hypoxia    Obesity (BMI 30-39. 9)    Obstructive sleep apnea    Chest pain    SVT (supraventricular tachycardia) ([address] PLAN:  · Vanco and Zosyn for now  · Follow blood culture and urine culture  · CT of abdomen pelvis  · Follow-up CBC CMP  · DC isolation and cancel second COVID-19    Discussed with ISRAEL Reece MD

## 2020-10-28 NOTE — CONSULTS
Consult Note  Patient: Jodi Marion  Unit/Bed: ZC77/PJ27-71  YOB: 1928  MRN: [id number]  Acct: [de-identified]   Admitting Diagnosis: SVT (supraventricular tachycardia) ([facility] [address] [I47.1]  Date:  10/27/2020  Hospital Day: 1      Chief Complaint:  Tachycardia/shortness of breath    History of Present Illness: This is a 41-year-old  female with past medical history difficult for hypertension, COPD and obesity who presented to the ER yesterday with complaints of tachycardia. History is obtained from the electronic medical record as patient is currently intubated and sedated. On presentation to ER yesterday, EKG was completed showing SVT with heart rate of 166. Prior to arrival to the emergency room, EMS treated patient with 2 separate doses of IV adenosine initially 6 mg followed by 12 mg dose. While in the emergency room, patient became more confused, combative and exhibited respiratory distress and decision was made to intubate. 2140, potassium 4.3, chloride 105, total CO2 24, BUN 21, creatinine elevated 1.25, GFR low at 40.0, glucose 103. Troponin negative at less than 0.010. proBNP 522. WBC 5.4, hemoglobin 13.2, hematocrit 39.8, platelets 505. INR 1.4. D-dimer elevated at greater than 20. PA of the chest completed and revealed no evidence of pulmonary emboli, moderate consolidation/atelectasis of the posterior mid to lower lung field suggestive of bronchopneumonia and/or aspiration. See the brain showed no acute findings, mild cerebral atrophy, chronic ischemic white matter disease. Subsequently admitted to the ICU for further evaluation. At time of evaluation today, patient is seen in the ICU currently intubated, on mechanical ventilation and sedated. On 80% FiO2 with PEEP of 5. She is currently on Levophed drip at 20 mcg/min. Most recent blood pressure 105/47. She remains tachycardic with heart rate 130s to 150 range questionable sinus tach versus SVT.   She has significant leukocytosis today with WBC count currently 48.9 compared to 5.4 yesterday. Is on IV antibiotics with Zosyn and vancomycin. ID consult pending. There is no known prior cardiac history. She was recently seen by cardiology during hospital admission in July 2020 after presenting with chest pain. Chest pain was atypical and reproducible at that time. Echocardiogram was completed and revealed normal LV systolic function with ejection fraction of 60% no significant valvular abnormalities.     Allergies   Allergen Reactions    Norvasc [Amlodipine Besylate] Swelling       Current Facility-Administered Medications   Medication Dose Route Frequency Provider Last Rate Last Dose    propofol injection  20 mcg/kg/min Intravenous Titrated Leonard Spivey MD 16.1 mL/hr at 10/28/20 1008 30 mcg/kg/min at 10/28/20 1008    sodium chloride flush 0.9 % injection 10 mL  10 mL Intravenous 2 times per day Elridge Null, DO   10 mL at 10/28/20 1000    sodium chloride flush 0.9 % injection 10 mL  10 mL Intravenous PRN Elridge Null, DO        acetaminophen (TYLENOL) tablet 650 mg  650 mg Oral Q6H PRN Elridge Null, DO        Or    acetaminophen (TYLENOL) suppository 650 mg  650 mg Rectal Q6H PRN Harrison Cagey David, DO        polyethylene glycol (GLYCOLAX) packet 17 g  17 g Oral Daily PRN Elridge Null, DO        promethazine (PHENERGAN) tablet 12.5 mg  12.5 mg Oral Q6H PRN Harrison Cagey David, DO        Or    [facility]) injection 4 mg  4 mg Intravenous Q6H PRN Elridge Null, DO        enoxaparin (LOVENOX) injection 30 mg  30 mg Subcutaneous Daily Elridge Null, DO   Stopped at 10/28/20 1000    potassium chloride (KLOR-CON M) extended release tablet 40 mEq  40 mEq Oral PRN Elridge Null, DO        Or    potassium bicarb-citric acid (EFFER-K) effervescent tablet 40 mEq  40 mEq Oral PRN Elridge Null, DO        Or    potassium chloride 10 mEq/100 mL IVPB (Peripheral Line)  10 mEq Intravenous PRN  Smokeless tobacco: Never Used   Substance and Sexual Activity    Alcohol use: No    Drug use: No    Sexual activity: None   Lifestyle    Physical activity     Days per week: None     Minutes per session: None    Stress: None   Relationships    Social connections     Talks on phone: None     Gets together: None     Attends Christianity service: None     Active member of club or organization: None     Attends meetings of clubs or organizations: None     Relationship status: None    Intimate partner violence     Fear of current or ex partner: None     Emotionally abused: None     Physically abused: None     Forced sexual activity: None   Other Topics Concern    None   Social History Narrative    None       Family Hx:  No family history on file. Review of Systems:   Review of Systems   Unable to perform ROS: Intubated         Physical Examination:    BP (!) 105/44   Pulse 128   Temp 99.7 °F (37.6 °C) (Rectal)   Resp 22   Ht 5' 3\" (1.6 m)   Wt 197 lb 1.5 oz (89.4 kg)   LMP  (LMP Unknown)   SpO2 97%   BMI 34.91 kg/m²    Physical Exam  Constitutional:       Comments: Intubated and sedated   HENT:      Head: Normocephalic and atraumatic. Comments: ET and OG tube in place  Cardiovascular:      Rate and Rhythm: Regular rhythm. Tachycardia present. Heart sounds: No murmur. Pulmonary:      Comments: Intubated and mechanically ventilated  Abdominal:      Palpations: Abdomen is soft. Comments: Hypoactive bowel sounds   Genitourinary:     Comments: Cedeno in place--urine concentrated  Musculoskeletal:      Comments: SCDs in place on bilateral LE   Skin:     General: Skin is warm and dry.    Neurological:      Comments: On vent/sedated   Psychiatric:      Comments: sedated         LABS:  CBC:  Lab Results   Component Value Date    WBC 52.1 10/28/2020    RBC 4.27 10/28/2020    HGB 12.5 10/28/2020    HCT 38.3 10/28/2020    MCV 89.7 10/28/2020    MCH 29.2 10/28/2020    MCHC 32.6 10/28/2020    RDW 14.7 10/28/2020     10/28/2020    MPV 11.6 06/17/2014     CBC with Differential:   Lab Results   Component Value Date    WBC 52.1 10/28/2020    RBC 4.27 10/28/2020    HGB 12.5 10/28/2020    HCT 38.3 10/28/2020     10/28/2020    MCV 89.7 10/28/2020    MCH 29.2 10/28/2020    MCHC 32.6 10/28/2020    RDW 14.7 10/28/2020    BANDSPCT 12 10/28/2020    METASPCT 1 10/28/2020    LYMPHOPCT 1.2 10/28/2020    MONOPCT 3.8 10/28/2020    BASOPCT 0.1 10/28/2020    MONOSABS 2.0 10/28/2020    LYMPHSABS 0.0 10/28/2020    EOSABS 0.0 10/28/2020    BASOSABS 0.0 10/28/2020     CMP:    Lab Results   Component Value Date     10/28/2020    K 3.9 10/28/2020     10/28/2020    CO2 20 10/28/2020    BUN 26 10/28/2020    CREATININE 1.72 10/28/2020    GFRAA 33.5 10/28/2020    LABGLOM 27.7 10/28/2020    GLUCOSE 131 10/28/2020    PROT 6.0 10/27/2020    LABALBU 3.8 10/27/2020    CALCIUM 7.9 10/28/2020    BILITOT 0.6 10/27/2020    ALKPHOS 148 10/27/2020    AST 21 10/27/2020    ALT 12 10/27/2020     BMP:    Lab Results   Component Value Date     10/28/2020    K 3.9 10/28/2020     10/28/2020    CO2 20 10/28/2020    BUN 26 10/28/2020    LABALBU 3.8 10/27/2020    CREATININE 1.72 10/28/2020    CALCIUM 7.9 10/28/2020    GFRAA 33.5 10/28/2020    LABGLOM 27.7 10/28/2020    GLUCOSE 131 10/28/2020     Magnesium:    Lab Results   Component Value Date    MG 1.8 10/28/2020     Troponin:    Lab Results   Component Value Date    TROPONINI <0.010 10/27/2020       Radiology:  Ct Head Wo Contrast    Result Date: 10/27/2020  CT Brain Contrast medium:  Not utilized. History: Altered mental status. Tachycardia. Comparison:  None. Findings: Extra-axial spaces:  Normal. Intracranial hemorrhage:  None. Ventricular system: Ventricles mildly enlarged. Sulci mildly prominent Basal Cisterns:  Normal. Cerebral Parenchyma: Bilateral symmetric periventricular areas decreased attenuation. Midline Shift:  None.  Cerebellum:  Normal. Paranasal images of the upper abdomen are noncontributory. ENDOTRACHEAL TUBE IN EXPECTED POSITION. MODERATE CONSOLIDATION/ATELECTASIS OF THE POSTERIOR MID TO LOWER LUNG SANZ, SUGGESTIVE OF BRONCHOPNEUMONIA AND/OR ASPIRATION. NO EVIDENCE OF PULMONARY EMBOLI OR OTHER SIGNIFICANT CHANGE FROM 12/26/2018 IDENTIFIED. Xr Chest Portable    Result Date: 10/28/2020  EXAMINATION: XR CHEST PORTABLE CLINICAL HISTORY: CENTRAL LINE COMPARISONS: OCTOBER 27, 2020, AT 1700 HOURS FINDINGS: Endotracheal tube 2.2 cm superior to devin. Nasogastric tube courses beneath diaphragm. Osseous structures intact. Cardiopericardial silhouette normal. Blunting left costophrenic angle, mildly increased since prior study. Ill-defined area increased opacity left lung base. MILD INTERVAL INCREASE LEFT PLEURAL EFFUSION, WITH LEFT LOWER LUNG ATELECTASIS/PNEUMONIA. Xr Chest Portable    Result Date: 10/27/2020  EXAMINATION: CHEST PORTABLE VIEW-1700 hours  CLINICAL HISTORY: Intubation COMPARISONS: October 27, 2020 1431 hours  FINDINGS: Single  views of the chest is submitted. Interval endotracheal intubation since the prior examination. The tip is at the level of the clavicles. The cardiac silhouette enlarged unchanged configuration. . Pulmonary vascular mildly congested. Right sided trachea. Left lower lobe infiltrate consolidation left pleural effusion. Atelectasis, infiltrate right lower lobe Blunting of the right costophrenic angle suggesting trace right pleural effusion. No Pneumothoraces. ATELECTASIS, INFILTRATE RIGHT LOWER LOBE. TRACE RIGHT PLEURAL EFFUSION. LEFT LOWER LOBE INFILTRATE CONSOLIDATION LEFT PLEURAL EFFUSION. Xr Chest Portable    Result Date: 10/27/2020  EXAMINATION: CHEST PORTABLE MPIL-5921 hours  CLINICAL HISTORY: Short of breath COMPARISONS: July 8, 2020  FINDINGS: Single  views of the chest is submitted.   The cardiac silhouette is of normal size configuration. Pulmonary vascular congested. The trachea remains deviated to the right. Left lower lobe infiltrate consolidation and small left pleural effusion. .  No Pneumothoraces. LEFT LOWER LOBE INFILTRATE CONSOLIDATION AND SMALL LEFT PLEURAL EFFUSION. .. Echocardiogram 7/9/20:  Conclusions   Summary   Normal mitral valve structure and function. Mild (1+) mitral regurgitation is present. Mildly dilated left atrium. Left ventricular ejection fraction is visually estimated at 60%. Impaired relaxation compatible with diastolic dysfunction. ( reversed E/A   ratio)   Signature   ----------------------------------------------------------------   Electronically signed by Tana Goetz(Interpreting physician)   on 07/10/2020 01:47 PM     EKG 10/28/20: , baseline artifact, QTc 498ms    Telemetry 10/28/20: SVT vs ST 140s-150s      Assessment:    Active Hospital Problems    Diagnosis Date Noted    SVT (supraventricular tachycardia) ([facility] [address] [I47.1] 10/27/2020     1. Acute respiratory failure   2. Sepsis/pneumonia  3. SVT   4. Hx HTN  5. Dyslipidemia  6. RICHARD  7. Obesity  8. Normal LVF EF 60% per echo 7/9/20  9. Mild MR per echo 7/9/20        Plan:  1. ICU management  2. Vent management per pulmonary  3. Wean vasopressors as tolerated  4. No negative chronotropic agent at this time secondary to hypotension requiring vasopressor support  5. Continue IV antibiotics-Zosyn 3.375 g IV every 8 hour and vancomycin 1250 mg IV every 36 hours  6. Monitor on telemetry for any tachycardia or bradycardia arrhythmias  7. Maintain potassium greater than 4, magnesium greater than 2  8. ID consult and recommendations  9. Internal medicine recommendations  10. Pulmonary recommendations  11. Conservative medical therapy from cardiac standpoint secondary to multiple comorbidities.   12. Will follow            Electronically signed by Zaira Johnson

## 2020-10-28 NOTE — CONSULTS
Vascular Consult      Name: Iqra Andrade  MRN: [id number]       Physician Requesting Consult:  Dr. Gumaro Burden    Reason for Consult:   Severo Clarke"    Chief Complaint:     Pneumonia, respiratory failure    History of Present Illness:      Iqra Andrade is a 80 y.o.  female who present to ED on 10/27/20 with complaint of SOB and fast heart rate. Her status deteriorated and she became combative, confused and short of breath, eventually was intubated in the ER. While still in ER, attempts were made to place a central line to the jugular but ER physician felt an \"arterial puncture\" was made during one of the attempts, thus the reason for this consult. Duplex of the neck was done today. Past Medical History:     Past Medical History:   Diagnosis Date    Anxiety     Cancer ([facility] [address]     Glaucoma     Gout     left knee    Hypertension     Lung disease     Obesity (BMI 30-39. 9) 7/11/2019    Obstructive sleep apnea 7/11/2019        Past Surgical History:     Past Surgical History:   Procedure Laterality Date    CATARACT REMOVAL WITH IMPLANT Bilateral 2016    COLON SURGERY  2009    polyps    COLONOSCOPY  02/2009    SKIN CANCER EXCISION  2002    graft from neck        Medications Prior to Admission:       Prior to Admission medications    Medication Sig Start Date End Date Taking? Authorizing Provider   atorvastatin (LIPITOR) 20 MG tablet Take 20 mg by mouth daily   Yes Historical Provider, MD   metoprolol tartrate (LOPRESSOR) 25 MG tablet Take 0.5 tablets by mouth 2 times daily 7/16/20  Yes Abdi Hughes MD   colchicine (MITIGARE) 0.6 MG capsule TAKE 2 CAPSULES po AT THE FIRST SIGN OF a gout FLARE, MAXIMUM TOTAL DOSE: 2 CAPSULES ON DAY 1. THEN TAKE 1 CAPSULE ONCE OR TWICE DAILY UNTIL FLARE RESOLVES. FOLLOW UP FOR MORE REFILLS.  7/13/20  Yes Abdi Hughes MD   losartan (COZAAR) 25 MG tablet Take 1 tablet by mouth daily 5/13/20  Yes Abdi Hughes MD   albuterol sulfate HFA (PROAIR HFA) 108 (90 Base) MCG/ACT inhaler Inhale 1 puff into the lungs every 6 hours as needed for Wheezing or Shortness of Breath 5/13/20  Yes Jostin Munoz MD   OXYGEN Inhale 2 L into the lungs Indications: PRN at bedtime   Yes Historical Provider, MD   ipratropium-albuterol (DUONEB) 0.5-2.5 (3) MG/3ML SOLN nebulizer solution Inhale 3 mLs into the lungs every 6 hours as needed for Shortness of Breath 12/29/18  Yes Choco Storm MD   aspirin 81 MG EC tablet Take 1 tablet by mouth daily 12/30/18  Yes Va Wilson MD   latanoprost (XALATAN) 0.005 % ophthalmic solution INSTILL 1 DROP INTO BOTH EYES EVERY DAY 7/17/15  Yes Historical Provider, MD   dorzolamide-timolol (COSOPT) 22.3-6.8 MG/ML ophthalmic solution Use 1 Drop in both eyes every 12 hours. 4/10/15  Yes Historical Provider, MD   brimonidine (ALPHAGAN P) 0.15 % ophthalmic solution INSTILL 1 DROP INTO BOTH EYES TWICE DAILY. 2/7/17  Yes Historical Provider, MD        Allergies:       Norvasc [amlodipine besylate]    Social History:     Tobacco:    reports that she has never smoked. She has never used smokeless tobacco.  Alcohol:      reports no history of alcohol use. Drug Use:  reports no history of drug use. Family History:     No family history on file. Review of Systems:     Patient unable to provide as she is intubated and sedated.      Physical Exam:     Vitals:  BP (!) 101/48   Pulse 143   Temp 99.5 °F (37.5 °C) (Rectal)   Resp 25   Ht 5' 3\" (1.6 m)   Wt 197 lb 1.5 oz (89.4 kg)   LMP  (LMP Unknown)   SpO2 97%   BMI 34.91 kg/m²       General appearance - intubated and sedated female who appears her stated age  Head - normocephalic and atraumatic  Neck - supple, no localized edema  Chest - rales bilaterally, assisted ventilation  Heart - tachycardic but regular rate and rhythm  Abdomen - soft, obese, right groin line intact  Extremities - no edema to the upper extremities, multiple areas of ecchymosis noted  Pulses: normal radial and brachial pulses      Labs     CBC and BMP reviewed with leukocytosis and reduced renal function    Imaging:   Ultrasound imaging of the neck reviewed by Dr. Manpreet Briones- no hematoma or pseudoaneurysm noted. Assessment:     Geovanna Zavala is a 80 y.o.  female who is intubated and sedated with multiple medical problems in the ICU, intact right groin line. Plan: Will sign off. Please re-consult if needed.

## 2020-10-29 ENCOUNTER — APPOINTMENT (OUTPATIENT)
Dept: INTERVENTIONAL RADIOLOGY/VASCULAR | Age: 85
DRG: 870 | End: 2020-10-29
Payer: MEDICARE

## 2020-10-29 LAB
ANION GAP SERPL CALCULATED.3IONS-SCNC: 14 MEQ/L (ref 9–15)
BASOPHILS ABSOLUTE: 0.1 K/UL (ref 0–0.2)
BASOPHILS RELATIVE PERCENT: 0.3 %
BUN BLDV-MCNC: 25 MG/DL (ref 8–23)
CALCIUM SERPL-MCNC: 7.6 MG/DL (ref 8.5–9.9)
CHLORIDE BLD-SCNC: 111 MEQ/L (ref 95–107)
CO2: 21 MEQ/L (ref 20–31)
CREAT SERPL-MCNC: 1.48 MG/DL (ref 0.5–0.9)
EOSINOPHILS ABSOLUTE: 0.4 K/UL (ref 0–0.7)
EOSINOPHILS RELATIVE PERCENT: 0.9 %
GFR AFRICAN AMERICAN: 39.9
GFR NON-AFRICAN AMERICAN: 33
GLUCOSE BLD-MCNC: 147 MG/DL (ref 70–99)
HCT VFR BLD CALC: 34.8 % (ref 37–47)
HEMOGLOBIN: 11.1 G/DL (ref 12–16)
LV EF: 70 %
LVEF MODALITY: NORMAL
LYMPHOCYTES ABSOLUTE: 0.8 K/UL (ref 1–4.8)
LYMPHOCYTES RELATIVE PERCENT: 1.9 %
MAGNESIUM: 1.9 MG/DL (ref 1.7–2.4)
MCH RBC QN AUTO: 28.7 PG (ref 27–31.3)
MCHC RBC AUTO-ENTMCNC: 32 % (ref 33–37)
MCV RBC AUTO: 89.7 FL (ref 82–100)
MONOCYTES ABSOLUTE: 1.7 K/UL (ref 0.2–0.8)
MONOCYTES RELATIVE PERCENT: 4.3 %
NEUTROPHILS ABSOLUTE: 37.2 K/UL (ref 1.4–6.5)
NEUTROPHILS RELATIVE PERCENT: 92.6 %
PDW BLD-RTO: 14.8 % (ref 11.5–14.5)
PLATELET # BLD: 104 K/UL (ref 130–400)
POTASSIUM REFLEX MAGNESIUM: 4.2 MEQ/L (ref 3.4–4.9)
RBC # BLD: 3.88 M/UL (ref 4.2–5.4)
SODIUM BLD-SCNC: 146 MEQ/L (ref 135–144)
WBC # BLD: 40.2 K/UL (ref 4.8–10.8)

## 2020-10-29 PROCEDURE — 85025 COMPLETE CBC W/AUTO DIFF WBC: CPT

## 2020-10-29 PROCEDURE — 6360000002 HC RX W HCPCS: Performed by: INTERNAL MEDICINE

## 2020-10-29 PROCEDURE — 76937 US GUIDE VASCULAR ACCESS: CPT

## 2020-10-29 PROCEDURE — 80048 BASIC METABOLIC PNL TOTAL CA: CPT

## 2020-10-29 PROCEDURE — 2709999900 IR PICC WO SQ PORT/PUMP > 5 YEARS

## 2020-10-29 PROCEDURE — APPNB30 APP NON BILLABLE TIME 0-30 MINS: Performed by: PHYSICIAN ASSISTANT

## 2020-10-29 PROCEDURE — 2580000003 HC RX 258: Performed by: INTERNAL MEDICINE

## 2020-10-29 PROCEDURE — 99232 SBSQ HOSP IP/OBS MODERATE 35: CPT | Performed by: INTERNAL MEDICINE

## 2020-10-29 PROCEDURE — 99233 SBSQ HOSP IP/OBS HIGH 50: CPT | Performed by: INTERNAL MEDICINE

## 2020-10-29 PROCEDURE — 36573 INSJ PICC RS&I 5 YR+: CPT

## 2020-10-29 PROCEDURE — 2700000000 HC OXYGEN THERAPY PER DAY

## 2020-10-29 PROCEDURE — 2500000003 HC RX 250 WO HCPCS: Performed by: INTERNAL MEDICINE

## 2020-10-29 PROCEDURE — 94003 VENT MGMT INPAT SUBQ DAY: CPT

## 2020-10-29 PROCEDURE — 02HV33Z INSERTION OF INFUSION DEVICE INTO SUPERIOR VENA CAVA, PERCUTANEOUS APPROACH: ICD-10-PCS | Performed by: INTERNAL MEDICINE

## 2020-10-29 PROCEDURE — 93306 TTE W/DOPPLER COMPLETE: CPT

## 2020-10-29 PROCEDURE — 83735 ASSAY OF MAGNESIUM: CPT

## 2020-10-29 PROCEDURE — 36592 COLLECT BLOOD FROM PICC: CPT

## 2020-10-29 PROCEDURE — 2000000000 HC ICU R&B

## 2020-10-29 PROCEDURE — 93010 ELECTROCARDIOGRAM REPORT: CPT | Performed by: INTERNAL MEDICINE

## 2020-10-29 RX ORDER — SODIUM CHLORIDE 0.9 % (FLUSH) 0.9 %
10 SYRINGE (ML) INJECTION EVERY 12 HOURS SCHEDULED
Status: DISCONTINUED | OUTPATIENT
Start: 2020-10-29 | End: 2020-11-06 | Stop reason: HOSPADM

## 2020-10-29 RX ORDER — LIDOCAINE HYDROCHLORIDE 20 MG/ML
5 INJECTION, SOLUTION INFILTRATION; PERINEURAL ONCE
Status: COMPLETED | OUTPATIENT
Start: 2020-10-29 | End: 2020-10-29

## 2020-10-29 RX ORDER — SODIUM CHLORIDE 0.9 % (FLUSH) 0.9 %
10 SYRINGE (ML) INJECTION PRN
Status: DISCONTINUED | OUTPATIENT
Start: 2020-10-29 | End: 2020-11-06 | Stop reason: HOSPADM

## 2020-10-29 RX ORDER — SODIUM CHLORIDE 9 MG/ML
250 INJECTION, SOLUTION INTRAVENOUS ONCE
Status: COMPLETED | OUTPATIENT
Start: 2020-10-29 | End: 2020-10-29

## 2020-10-29 RX ADMIN — PROPOFOL 40 MCG/KG/MIN: 10 INJECTION, EMULSION INTRAVENOUS at 11:20

## 2020-10-29 RX ADMIN — Medication 10 ML: at 08:59

## 2020-10-29 RX ADMIN — PROPOFOL 40 MCG/KG/MIN: 10 INJECTION, EMULSION INTRAVENOUS at 15:50

## 2020-10-29 RX ADMIN — FAMOTIDINE 20 MG: 10 INJECTION INTRAVENOUS at 08:59

## 2020-10-29 RX ADMIN — PROPOFOL 35 MCG/KG/MIN: 10 INJECTION, EMULSION INTRAVENOUS at 01:10

## 2020-10-29 RX ADMIN — SODIUM CHLORIDE: 9 INJECTION, SOLUTION INTRAVENOUS at 15:50

## 2020-10-29 RX ADMIN — SODIUM CHLORIDE: 9 INJECTION, SOLUTION INTRAVENOUS at 07:19

## 2020-10-29 RX ADMIN — Medication 10 ML: at 13:10

## 2020-10-29 RX ADMIN — LIDOCAINE HYDROCHLORIDE 5 ML: 20 INJECTION, SOLUTION INFILTRATION; PERINEURAL at 10:44

## 2020-10-29 RX ADMIN — ENOXAPARIN SODIUM 30 MG: 30 INJECTION SUBCUTANEOUS at 08:58

## 2020-10-29 RX ADMIN — VANCOMYCIN HYDROCHLORIDE 1250 MG: 5 INJECTION, POWDER, LYOPHILIZED, FOR SOLUTION INTRAVENOUS at 13:09

## 2020-10-29 RX ADMIN — PIPERACILLIN AND TAZOBACTAM 3.38 G: 3; .375 INJECTION, POWDER, LYOPHILIZED, FOR SOLUTION INTRAVENOUS at 01:15

## 2020-10-29 RX ADMIN — PIPERACILLIN AND TAZOBACTAM 3.38 G: 3; .375 INJECTION, POWDER, LYOPHILIZED, FOR SOLUTION INTRAVENOUS at 08:58

## 2020-10-29 RX ADMIN — METOPROLOL TARTRATE 5 MG: 5 INJECTION, SOLUTION INTRAVENOUS at 04:28

## 2020-10-29 RX ADMIN — SODIUM CHLORIDE 250 ML: 9 INJECTION, SOLUTION INTRAVENOUS at 10:45

## 2020-10-29 RX ADMIN — NOREPINEPHRINE BITARTRATE 12 MCG/MIN: 1 INJECTION INTRAVENOUS at 06:12

## 2020-10-29 RX ADMIN — PROPOFOL 40 MCG/KG/MIN: 10 INJECTION, EMULSION INTRAVENOUS at 06:11

## 2020-10-29 RX ADMIN — PIPERACILLIN AND TAZOBACTAM 3.38 G: 3; .375 INJECTION, POWDER, LYOPHILIZED, FOR SOLUTION INTRAVENOUS at 17:04

## 2020-10-29 RX ADMIN — Medication 10 ML: at 13:09

## 2020-10-29 RX ADMIN — Medication 10 ML: at 20:37

## 2020-10-29 ASSESSMENT — PULMONARY FUNCTION TESTS
PIF_VALUE: 22
PIF_VALUE: 20
PIF_VALUE: 20
PIF_VALUE: 19
PIF_VALUE: 20
PIF_VALUE: 21
PIF_VALUE: 25
PIF_VALUE: 20
PIF_VALUE: 24
PIF_VALUE: 19
PIF_VALUE: 21
PIF_VALUE: 23
PIF_VALUE: 20
PIF_VALUE: 24
PIF_VALUE: 21
PIF_VALUE: 24
PIF_VALUE: 20
PIF_VALUE: 23
PIF_VALUE: 21
PIF_VALUE: 20
PIF_VALUE: 22
PIF_VALUE: 17
PIF_VALUE: 20
PIF_VALUE: 19
PIF_VALUE: 20
PIF_VALUE: 19
PIF_VALUE: 18
PIF_VALUE: 22
PIF_VALUE: 21
PIF_VALUE: 22
PIF_VALUE: 19
PIF_VALUE: 22
PIF_VALUE: 24
PIF_VALUE: 22
PIF_VALUE: 17

## 2020-10-29 ASSESSMENT — PAIN SCALES - GENERAL: PAINLEVEL_OUTOF10: 0

## 2020-10-29 NOTE — CARE COORDINATION
Social work note:  Spoke with ISRAEL SY. Patient is intubated. She reports patient received a PICC line this date and blood cultures positive for ecoli. Per RN, daughter will visit patient after she gets out of work. BISI/АННА following.  Electronically signed by АННА Hyman on 10/29/2020 at 2:51 PM

## 2020-10-29 NOTE — PROGRESS NOTES
Physician Progress Note      PATIENT:               Luke Mcclellan  CSN #:                  [id number]  :                       1928  ADMIT DATE:       10/27/2020 2:25 PM  DISCH DATE:  RESPONDING  PROVIDER #:        Lynn MONTESINOS DO          QUERY TEXT:    Pt admitted with acute respiratory failure and pneumonia. Pt noted to have   sepsis noted by pulmonologist. If possible, please document in the progress   notes and discharge summary if you are evaluating and /or treating any of the   following: The medical record reflects the following:  Risk Factors: advanced age, FRED  Clinical Indicators: WBC 5.4/48.9/52.1, lactate 2.99/2.76, cxray MODERATE   CONSOLIDATION/ATELECTASIS OF THE POSTERIOR MID TO LOWER LUNG SANZ,   SUGGESTIVE OF BRONCHOPNEUMONIA AND/OR ASPIRATION, RR 26-31, -164  Treatment: ICU admission, intubation, NS 1L, adenosine, duoneb, levophed,   zosyn, vancomycin    Nidhi Mixon BSN, RN, CDS  [phone]  Options provided:  -- Sepsis, present on admission  -- Sepsis, now resolved  -- Sepsis, not present on admission,  -- Sepsis was ruled out  -- Other - I will add my own diagnosis  -- Disagree - Not applicable / Not valid  -- Disagree - Clinically unable to determine / Unknown  -- Refer to Clinical Documentation Reviewer    PROVIDER RESPONSE TEXT:    This patient has sepsis which was present on admission.     Query created by: Ayaz Best on 10/28/2020 12:59 PM      Electronically signed by:  Saúl Maldonado DO 10/29/2020 2:21 PM

## 2020-10-29 NOTE — PROGRESS NOTES
Critical Care Medicine  Progress note      Chief complaint: Acute respiratory failure, tachycardia    HISTORY OF PRESENT ILLNESS:    Patient was seen, examined, and discussed, during multidisciplinary critical care rounds this morning  She remains sedated, intubated orally, on the vent, with gradual improvement in her oxygenation as well as hemodynamic stability, she remains tachycardic but with improved heart rate down into the 110's at this point. She had been evaluated and treated by cardiology with addition of amiodarone, digitalis, and beta-blockers. She appears to be in sinus tachycardia as opposed to atrial flutter. Her white blood cell count has decreased from 52,000 40,000 today. Urine and 1 blood cultures are positive for E. coli. Past Medical History:        Diagnosis Date    Anxiety     Cancer ([address]     Glaucoma     Gout     left knee    Hypertension     Lung disease     Obesity (BMI 30-39. 9) 7/11/2019    Obstructive sleep apnea 7/11/2019       Past Surgical History:        Procedure Laterality Date    CATARACT REMOVAL WITH IMPLANT Bilateral 2016    COLON SURGERY  2009    polyps    COLONOSCOPY  02/2009    SKIN CANCER EXCISION  2002    graft from neck       Social History:     reports that she has never smoked. She has never used smokeless tobacco. She reports that she does not drink alcohol or use drugs. Family History:   No family history on file.     Allergies:  Norvasc [amlodipine besylate]        MEDICATIONS during current hospitalization:    Continuous Infusions:   propofol 40 mcg/kg/min (10/29/20 0150)    sodium chloride 100 mL/hr at 10/29/20 0719    norepinephrine 12 mcg/min (10/29/20 0612)       Scheduled Meds:   sodium chloride flush  10 mL Intravenous 2 times per day    sodium chloride flush  10 mL Intravenous 2 times per day    enoxaparin  30 mg Subcutaneous Daily    famotidine (PEPCID) injection  20 mg Intravenous Daily    [Held by provider] --   --   --  106 111*   CO2 24  --   --   --  20 21   BUN 21  --   --   --  26* 25*   CREATININE 1.25*  --    < > 1.8* 1.72* 1.48*   GLUCOSE 103* 149  --   --  131* 147*    < > = values in this interval not displayed. MV Settings:     Vent Mode: AC/VC  Vt Ordered: 450 mL(DR HILTON CHANGED VT )  Rate Set: 22 bmp  PEEP/CPAP: 5  Peak Inspiratory Pressure: 22 cmH2O  Mean Airway Pressure: 11 cmH20  I:E Ratio: 1:2.30    ABGs:   Recent Labs     10/27/20  1708 10/27/20  2321   PHART 7.308* 7.376   XVR7UIU 46* 35   PO2ART 162* 121*   VEA1DBU 22.8 20.4*   BEART -3 -5*   I1VGKDWH 99* 99*   BTD6SKS 24 21*     O2 Device: Ventilator  Lab Results   Component Value Date    LACTA 1.0 12/25/2018       Radiology  Ct Head Wo Contrast    Result Date: 10/27/2020  CT Brain Contrast medium:  Not utilized. History: Altered mental status. Tachycardia. Comparison:  None. Findings: Extra-axial spaces:  Normal. Intracranial hemorrhage:  None. Ventricular system: Ventricles mildly enlarged. Sulci mildly prominent Basal Cisterns:  Normal. Cerebral Parenchyma: Bilateral symmetric periventricular areas decreased attenuation. Midline Shift:  None. Cerebellum:  Normal. Paranasal sinuses and mastoid air cells:  Normal. Visualized Orbits: Remote bilateral ocular surgery. Impression: No acute findings. Mild cerebral atrophy. Chronic ischemic white matter disease. All CT scans at this facility use dose modulation, iterative reconstruction, and/or weight based dosing when appropriate to reduce radiation dose to as low as reasonably achievable. Cta Chest W Wo Contrast    Result Date: 10/27/2020  CTA CHEST W WO CONTRAST: 10/27/2020 CLINICAL HISTORY:  R/o PE, SOB, tachycardia . COMPARISON: 12/26/2018. Spiral enhanced images were obtained of the chest during the infusion of approximately 100 mL of Isovue 370 contrast with pulmonary artery  CTA protocol. Routine and volume rendered images were obtained on a 3-dimensional workstation.   All CT scans at this facility use dose modulation, iterative reconstruction, and/or weight based dosing when appropriate to reduce radiation dose to as low as reasonably achievable. FINDINGS: An endotracheal tube is noted with its tip approximately 2 cm superior to the devin. There are no filling defects identified within the pulmonary arterial vasculature to suggest pulmonary emboli. The thoracic aorta is tortuous, and ectatic with mild calcific atherosclerotic plaquing of the arch. There is no dissection. The heart is mildly enlarged. Moderate coronary artery calcifications are present. Moderate bibasilar consolidation/atelectasis is noted of the posterior mid to lower lung fields, worsening inferiorly. There are no worrisome nodules, lymphadenopathy, pleural or pericardial effusions identified. A small calcified granuloma within the inferolateral aspect of the left upper lobe appears unchanged. A minimal compression deformity of the superior endplate of P95 and mild vertebral body wedging of the mid thoracic levels appears chronic. There are no acute fractures identified. Mild to moderate degenerative changes of the thoracic spine are noted. The limited images of the upper abdomen are noncontributory. ENDOTRACHEAL TUBE IN EXPECTED POSITION. MODERATE CONSOLIDATION/ATELECTASIS OF THE POSTERIOR MID TO LOWER LUNG SANZ, SUGGESTIVE OF BRONCHOPNEUMONIA AND/OR ASPIRATION. NO EVIDENCE OF PULMONARY EMBOLI OR OTHER SIGNIFICANT CHANGE FROM 12/26/2018 IDENTIFIED. Xr Chest Portable    Result Date: 10/28/2020  EXAMINATION: XR CHEST PORTABLE CLINICAL HISTORY: CENTRAL LINE COMPARISONS: OCTOBER 27, 2020, AT 1700 HOURS FINDINGS: Endotracheal tube 2.2 cm superior to devin. Nasogastric tube courses beneath diaphragm. Osseous structures intact. Cardiopericardial silhouette normal. Blunting left costophrenic angle, mildly increased since prior study. Ill-defined area increased opacity left lung base.      MILD INTERVAL INCREASE LEFT PLEURAL EFFUSION, WITH LEFT LOWER LUNG ATELECTASIS/PNEUMONIA. Xr Chest Portable    Result Date: 10/27/2020  EXAMINATION: CHEST PORTABLE VIEW-1700 hours  CLINICAL HISTORY: Intubation COMPARISONS: October 27, 2020 1431 hours  FINDINGS: Single  views of the chest is submitted. Interval endotracheal intubation since the prior examination. The tip is at the level of the clavicles. The cardiac silhouette enlarged unchanged configuration. . Pulmonary vascular mildly congested. Right sided trachea. Left lower lobe infiltrate consolidation left pleural effusion. Atelectasis, infiltrate right lower lobe Blunting of the right costophrenic angle suggesting trace right pleural effusion. No Pneumothoraces. ATELECTASIS, INFILTRATE RIGHT LOWER LOBE. TRACE RIGHT PLEURAL EFFUSION. LEFT LOWER LOBE INFILTRATE CONSOLIDATION LEFT PLEURAL EFFUSION. Xr Chest Portable    Result Date: 10/27/2020  EXAMINATION: CHEST PORTABLE QZOG-7096 hours  CLINICAL HISTORY: Short of breath COMPARISONS: July 8, 2020  FINDINGS: Single  views of the chest is submitted. The cardiac silhouette is of normal size configuration. Pulmonary vascular congested. The trachea remains deviated to the right. Left lower lobe infiltrate consolidation and small left pleural effusion. .  No Pneumothoraces. LEFT LOWER LOBE INFILTRATE CONSOLIDATION AND SMALL LEFT PLEURAL EFFUSION. .. Assessment, plan:   1. Acute hypoxic and hypercapnic respiratory failure associated with sepsis and bilateral lower lobe atelectasis, improved significantly with invasive ventilatory support  2. Bacteremic E. coli urinary tract infection with septic shock, improving gradually  3.  Primarily left lower lobe possibly right lower lobe pneumonia as well cannot rule out aspiration, patient is on broad-spectrum antibiotic coverage with vancomycin and Zosyn  4. Supraventricular tachycardia, sinus tachycardia due to sepsis  5. Acute kidney injury associated with sepsis, improving today, continue close monitoring.   Patient is showing significant improvement today, will continue current supportive measures, vent support, wean down vasopressors as tolerated, broad-spectrum antibiotic coverage, awaiting sensitivity panel on E. coli, consider weaning off the vent tomorrow if she shows adequate improvement and stabilization          Electronically signed by Fernandez Fry MD, FCCP on 10/29/2020 at 11:09 AM

## 2020-10-29 NOTE — PROGRESS NOTES
Spiritual Care Services     Summary of Visit:  PT was intubated and I asked the nurse if I could go in and pray for her. She obliged and I prayed for PT and anointed her     Spiritual Assessment/Intervention/Outcomes:    Encounter Summary  Services provided to[de-identified] Patient  Referral/Consult From[de-identified] Nurse, Rounding  Continue Visiting: Yes  Complexity of Encounter: High  Length of Encounter: 15 minutes  Spiritual Assessment Completed: Yes  Routine  Type: Initial  Assessment: Tearful     Spiritual/Restorationist  Type: Spiritual support  Assessment: Unable to respond  Intervention: Anointing  Outcome: Did not respond  Sacraments  Sacrament of Sick-Anointing: Anointed     Advance Directives (For Healthcare)  Pre-existing DNR Comfort Care/DNR Arrest/DNI Order: No  Healthcare Directive: No, patient does not have an advance directive for healthcare treatment           Values / Beliefs  Do you have any ethnic, cultural, sacramental, or spiritual [facility] needs you would like us to be aware of while you are in the hospital?: No    Care Plan:        [id number] Krystian Hoffman   Electronically signed by Janice Eddy on 10/29/20 at 3:05 PM EDT     To reach a  for emotional and spiritual support, place an [facility] consult request.   If a  is needed immediately, dial 0 and ask to page the on-call .

## 2020-10-29 NOTE — PROGRESS NOTES
Infectious Diseases Inpatient Progress Note          HISTORY OF PRESENT ILLNESS:  Follow up septic shock with gram-negative federico bacteremia  On IV Vanco and Zosyn, well tolerated. Patient dina to be intubated and sedated, on AC 60%, vasopressors are getting weaned down. Improving urine output with dark serafin urine in the [place]   current Medications:     sodium chloride flush  10 mL Intravenous 2 times per day    sodium chloride flush  10 mL Intravenous 2 times per day    enoxaparin  30 mg Subcutaneous Daily    famotidine (PEPCID) injection  20 mg Intravenous Daily    [Held by provider] ipratropium-albuterol  1 ampule Inhalation 4x daily    piperacillin-tazobactam  3.375 g Intravenous Q8H    vancomycin (VANCOCIN) intermittent dosing (placeholder)   Other RX Placeholder    vancomycin  15 mg/kg Intravenous Q36H       Allergies:  Norvasc [amlodipine besylate]      Review of Systems  unable to provide ROS because of sedation      Physical Exam  Vitals:    10/29/20 1500 10/29/20 1509 10/29/20 1530 10/29/20 1600   BP: (!) 127/50  (!) 121/47 (!) 92/40   Pulse: 119  118 114   Resp: 22  22 22   Temp:    99.5 °F (37.5 °C)   TempSrc:    Rectal   SpO2: 93% 95% 93% 92%   Weight:       Height:         General Appearance: Sedated and intubated on assist control 60%, well-developed and well-nourished, in no acute distress  Supple neck  Skin:  no rash.    Head: normocephalic and atraumatic  Eyes: pupils equal, round, and reactive to light,extraocular eye movements intact, conjunctivae normal, anicteric sclerae  ENT: OG to suction, intact ET  Lungs: Assisted ventilation with bibasilar Rales  Heart: nl S1/S2, no murmur, tachycardic  Abdomen: soft, no distention, no H-S-megaly, hypoactive BS  NEUROLOGICAL: Sedated and nonresponsive to verbal stimulation  No leg edema  No erythema, no warmth, no tenderness  Serafin urine with low urine output     DATA:    Lab Results   Component Value Date    WBC 40.2 (H) 10/29/2020    HGB 11.1 (L) 10/29/2020    HCT 34.8 (L) 10/29/2020    MCV 89.7 10/29/2020     (L) 10/29/2020     Lab Results   Component Value Date    CREATININE 1.48 (H) 10/29/2020    BUN 25 (H) 10/29/2020     (H) 10/29/2020    K 4.2 10/29/2020     (H) 10/29/2020    CO2 21 10/29/2020       Hepatic Function Panel:  Lab Results   Component Value Date    ALKPHOS 148 10/27/2020    ALT 12 10/27/2020    AST 21 10/27/2020    PROT 6.0 10/27/2020    BILITOT 0.6 10/27/2020    LABALBU 3.8 10/27/2020       Microbiology:   Recent Labs     10/27/20  2355   BC Gram stain aerobic bottle  Gram negative rods  1 out of 2 blood cultures  Further results to follow  *  The following organisms and resistance markers have been  tested using nucleic acid testing technology. ORGANISMS:  Escherica coli, Klebisella pneumonia, Klebsiella oxytoca,  Pseudomonas aeruginosa, Enterobacter species, Proteus species,  Citrobacter species, Acinetobacter species. CARBAPENEM (CRE) RESISTANCE MARKERS:  kpc, imp, ndm, vim, oxa  ESBL RESISTANCE MARKER:  ctx-m    POSITIVE for  ID and sensitivity to follow       Recent Labs     10/27/20  2355   BLOODCULT2 No Growth to date. Any change in status will be called. Recent Labs     10/27/20  1445   LABURIN >100,000 CFU/ml  Sensitivity to follow       No results for input(s): WNDABS in the last 72 hours. No results for input(s): CULTRESP in the last 72 hours. Imaging:   CT of abdomen and pelvis was reviewed, no acute process      IMPRESSION:    · Septic shock secondary to gram-negative federico UTI with bacteremia/ CAP  · Acute respiratory failure  · Acute kidney injury  · Severe leukemoid reaction    Patient Active Problem List   Diagnosis    Essential hypertension    Anxiety    COPD (chronic obstructive pulmonary disease) with acute bronchitis (HCC)    COPD exacerbation (HCC)    Hypoxia    Obesity (BMI 30-39. 9)    Obstructive sleep apnea    Chest pain    SVT [address]ra[address]ricular tachycardia) ([facility] [address]    Pneumonia of both lower lobes due to infectious organism       PLAN:  · D/C Vanco  · Continue IV Zosyn  · Follow-up CBC BMP  · Check blood culture and urine culture  · Vent and vasopressor support and weaning        Vinita Nichols MD

## 2020-10-29 NOTE — PROGRESS NOTES
Patient had PICC line placed right upper arm today. Right groin CVC line removed at this time. Pressure held and dressing applied to site. Cath tip intact when line removed. Patient tolerated well. Tube feed ordered and initiated.

## 2020-10-29 NOTE — PROGRESS NOTES
Physician Progress Note    10/29/2020   4:15 PM    Name:  Omar Sawant  MRN:    [id number]     IP Day: 2     Admit Date: 10/27/2020  2:25 PM  PCP: Manasa Menendez MD    Code Status:  Full Code      Assessment and Plan: Active Problems/ diagnosis:     # acute respiratory failure due to bilateral pneumonia/ sepsis        Intubated on admission in the ED. resume vancomycin and Zosyn,  critical care on consult. ID on consult     # UTI  POA        - resume zosyn, appreciate ID help.      # SVT/ ? PAF      got dig yesterday. Remains tachy, sinus tach today, on levophed. Resume IVF, TF. Cardiology seen this morning. Monitor on tele.      # FRED       Intubated, CPAP will be used once extubated      # HTN       monitor. On pressors due to septic shock      DVT PPX    Subjective: Intubated and sedated.      Physical Examination:      Vitals:  BP (!) 127/50   Pulse 119   Temp 99.5 °F (37.5 °C) (Rectal)   Resp 22   Ht 5' 3\" (1.6 m)   Wt 197 lb 1.5 oz (89.4 kg)   LMP  (LMP Unknown)   SpO2 95%   BMI 34.91 kg/m²   Temp (24hrs), Av.6 °F (37.6 °C), Min:98.3 °F (36.8 °C), Max:100 °F (37.8 °C)      General appearance: intubated and sedated   Mental Status: sedated   Lungs: ventilated  Heart: tachycardic but regular   Abdomen: soft, nontender, nondistended, bowel sounds present, no masses  Extremities: no edema, redness   Skin: no gross lesions, rashes    Data:     Labs:  Recent Labs     10/28/20  0720 10/29/20  0530   WBC 52.1* 40.2*   HGB 12.5 11.1*    104*     Recent Labs     10/28/20  0600 10/29/20  0535    146*   K 3.9 4.2    111*   CO2 20 21   BUN 26* 25*   CREATININE 1.72* 1.48*   GLUCOSE 131* 147*     Recent Labs     10/27/20  1530   AST 21   ALT 12   BILITOT 0.6   ALKPHOS 148*       Current Facility-Administered Medications   Medication Dose Route Frequency Provider Last Rate Last Dose    sodium chloride flush 0.9 % injection 10 mL  10 mL Intravenous 2 times per day Jose Tavera David, DO   10 mL at 10/29/20 1310    sodium chloride flush 0.9 % injection 10 mL  10 mL Intravenous PRN Juan Ramon Denney, DO        propofol injection  20 mcg/kg/min Intravenous Titrated Andry Mendez MD 21.5 mL/hr at 10/29/20 1550 40 mcg/kg/min at 10/29/20 1550    0.9 % sodium chloride infusion   Intravenous Continuous Andry Mendez  mL/hr at 10/29/20 1550      sodium chloride flush 0.9 % injection 10 mL  10 mL Intravenous 2 times per day Juan Ramon Denney, DO   10 mL at 10/29/20 1309    sodium chloride flush 0.9 % injection 10 mL  10 mL Intravenous PRN Juan Ramon Dumontt, DO        acetaminophen (TYLENOL) tablet 650 mg  650 mg Oral Q6H PRN Yanethn Jumana, DO        Or    acetaminophen (TYLENOL) suppository 650 mg  650 mg Rectal Q6H PRN [place] Bycarolee Hernandez, DO        polyethylene glycol (GLYCOLAX) packet 17 g  17 g Oral Daily PRN Juan Ramon Dumontt, DO        promethazine (PHENERGAN) tablet 12.5 mg  12.5 mg Oral Q6H PRN [place] Bycarolee Hernandez, DO        Or    ondansetron [facility]) injection 4 mg  4 mg Intravenous Q6H PRN [place] Bycarolee Hernandez, DO        enoxaparin (LOVENOX) injection 30 mg  30 mg Subcutaneous Daily Juan Ramon Dumontt, DO   30 mg at 10/29/20 0858    potassium chloride (KLOR-CON M) extended release tablet 40 mEq  40 mEq Oral PRN Laurelyn Jumana, DO        Or    potassium bicarb-citric acid (EFFER-K) effervescent tablet 40 mEq  40 mEq Oral PRN Laurelyn Jumana, DO        Or    potassium chloride 10 mEq/100 mL IVPB (Peripheral Line)  10 mEq Intravenous PRN Laurelyn Jumana, DO        magnesium sulfate 2 g in 50 mL IVPB premix  2 g Intravenous PRN Juan Ramon Diazquet, DO        famotidine (PEPCID) injection 20 mg  20 mg Intravenous Daily Juan Ramon Denney, DO   20 mg at 10/29/20 0859    [Held by provider] ipratropium-albuterol (DUONEB) nebulizer solution 1 ampule  1 ampule Inhalation 4x daily Juan Ramon Denney DO        piperacillin-tazobactam (ZOSYN) 3.375 g in dextrose 5 % 50 mL IVPB extended infusion (mini-bag) 3.375 g Intravenous Q8H [facility], DO   Stopped at 10/29/20 1258    metoprolol (LOPRESSOR) injection 5 mg  5 mg Intravenous Q6H PRN [facility], DO   5 mg at 10/29/20 0428    norepinephrine (LEVOPHED) 16 mg in dextrose 5 % 250 mL infusion  2 mcg/min Intravenous Continuous Kaleigh Heriberto Sedar, DO 7.5 mL/hr at 10/29/20 1555 8 mcg/min at 10/29/20 1555    vancomycin (VANCOCIN) intermittent dosing (placeholder)   Other RX Placeholder [facility], DO        vancomycin (VANCOCIN) 1,250 mg in dextrose 5 % 250 mL IVPB  15 mg/kg Intravenous Q36H [facility], DO   Stopped at 10/29/20 1513       Additional work up or/and treatment plan may be added today or then after based on clinical progression. I am managing a portion of pt care. Some medical issues are handled by other specialists. Additional work up and treatment should be done in out pt setting by pt PCP and other out pt providers. In addition to examining and evaluating pt, I spent additional time explaining care, normaland abnormal findings, and treatment plan. All of pt questions were answered. Counseling, diet and education were provided. Case will be discussed with nursing staff when appropriate. Family will be updated if and when appropriate.        Electronically signed by [facility],  on 10/29/2020 at 4:15 PM

## 2020-10-29 NOTE — PROGRESS NOTES
Progress Note  Patient: Carlyn Campos  Unit/Bed: BU35/IS83-21  YOB: 1928  MRN: [id number]  Acct: [de-identified]   Admitting Diagnosis: SVT (supraventricular tachycardia) ([facility] [address] [I47.1]  Date:  10/27/2020  Hospital Day: 2    Chief Complaint:  Tachycardia/shortness of breath    Subjective      10/29/20: Patient remains intubated, sedated and on mechanical ventilation. Maintaining adequate SPO2 most recently at 97% on 70% FiO2 and PEEP of 5. Given RICHARD and severe sepsis, patient initiated on IV fluids with normal saline at 100 mL/h yesterday. Renal function improving with creatinine this morning of 1.48 compared to 1.72 yesterday. Patient tachycardic yesterday with heart rate consistently in 140s to 150s range questionable SVT versus a flutter RVR. She received amiodarone 150 mg IV x1 yesterday afternoon. Given persistent tachycardia yesterday evening, she was also treated with digoxin 0.5 mg IV x1 and has also received 2 separate doses of as needed IV Lopressor. Currently on telemetry she is sinus tachycardia with heart rate in the 110s. Telemetry alarms reviewed show brief 2.5 to 3-second episodes of what appears to be P A. fib RVR with heart rate in the 160s at 2143 yesterday and again at 1:48 AM this morning. 1 of 2 cultures positive for E. coli. Urine culture also positive for E. coli. ID following and patient is on Zosyn and vancomycin. WBC count mildly improved to 40.2 today compared to 52.1 yesterday. She remains on Levophed which is being weaned down currently at 11mcg/minute. 10/28/20: This is a 70-year-old  female with past medical history difficult for hypertension, COPD and obesity who presented to the ER yesterday with complaints of tachycardia. History is obtained from the electronic medical record as patient is currently intubated and sedated. On presentation to ER yesterday, EKG was completed showing SVT with heart rate of 166.   Prior to arrival to the emergency room, EMS treated patient with 2 separate doses of IV adenosine initially 6 mg followed by 12 mg dose. While in the emergency room, patient became more confused, combative and exhibited respiratory distress and decision was made to intubate. 2140, potassium 4.3, chloride 105, total CO2 24, BUN 21, creatinine elevated 1.25, GFR low at 40.0, glucose 103. Troponin negative at less than 0.010. proBNP 522. WBC 5.4, hemoglobin 13.2, hematocrit 39.8, platelets 707. INR 1.4. D-dimer elevated at greater than 20. PA of the chest completed and revealed no evidence of pulmonary emboli, moderate consolidation/atelectasis of the posterior mid to lower lung field suggestive of bronchopneumonia and/or aspiration. See the brain showed no acute findings, mild cerebral atrophy, chronic ischemic white matter disease. Subsequently admitted to the ICU for further evaluation.     At time of evaluation today, patient is seen in the ICU currently intubated, on mechanical ventilation and sedated. On 80% FiO2 with PEEP of 5. She is currently on Levophed drip at 20 mcg/min. Most recent blood pressure 105/47. She remains tachycardic with heart rate 130s to 150 range questionable sinus tach versus SVT. She has significant leukocytosis today with WBC count currently 48.9 compared to 5.4 yesterday. Is on IV antibiotics with Zosyn and vancomycin. ID consult pending.     There is no known prior cardiac history. She was recently seen by cardiology during hospital admission in July 2020 after presenting with chest pain. Chest pain was atypical and reproducible at that time. Echocardiogram was completed and revealed normal LV systolic function with ejection fraction of 60% no significant valvular abnormalities.     Review of Systems:   Review of Systems   Unable to perform ROS: Intubated         Physical Examination:    BP (!) 139/45   Pulse 116   Temp 99.5 °F (37.5 °C) (Oral)   Resp 22   Ht 5' 3\" (1.6 m)   Wt 197 lb 1.5 oz (89.4 kg)   LMP  (LMP Unknown)   SpO2 96%   BMI 34.91 kg/m²    Physical Exam  Constitutional:       Comments: Intubated and sedated   HENT:      Head: Normocephalic and atraumatic. Comments: ET tube in place  Cardiovascular:      Rate and Rhythm: Regular rhythm. Tachycardia present. Heart sounds: No murmur. Pulmonary:      Effort: No respiratory distress. Breath sounds: No wheezing, rhonchi or rales. Comments: Intubated on vent  Abdominal:      Palpations: Abdomen is soft. Comments: Hypoactive BS   Musculoskeletal:      Comments: SCDs in place on bilateral LE   Skin:     General: Skin is warm and dry.    Neurological:      Comments: sedated         LABS:  CBC:   Lab Results   Component Value Date    WBC 40.2 10/29/2020    RBC 3.88 10/29/2020    HGB 11.1 10/29/2020    HCT 34.8 10/29/2020    MCV 89.7 10/29/2020    MCH 28.7 10/29/2020    MCHC 32.0 10/29/2020    RDW 14.8 10/29/2020     10/29/2020    MPV 11.6 06/17/2014     CBC with Differential:   Lab Results   Component Value Date    WBC 40.2 10/29/2020    RBC 3.88 10/29/2020    HGB 11.1 10/29/2020    HCT 34.8 10/29/2020     10/29/2020    MCV 89.7 10/29/2020    MCH 28.7 10/29/2020    MCHC 32.0 10/29/2020    RDW 14.8 10/29/2020    BANDSPCT 12 10/28/2020    METASPCT 1 10/28/2020    LYMPHOPCT 1.9 10/29/2020    MONOPCT 4.3 10/29/2020    BASOPCT 0.3 10/29/2020    MONOSABS 1.7 10/29/2020    LYMPHSABS 0.8 10/29/2020    EOSABS 0.4 10/29/2020    BASOSABS 0.1 10/29/2020     CMP:    Lab Results   Component Value Date     10/29/2020    K 4.2 10/29/2020     10/29/2020    CO2 21 10/29/2020    BUN 25 10/29/2020    CREATININE 1.48 10/29/2020    GFRAA 39.9 10/29/2020    LABGLOM 33.0 10/29/2020    GLUCOSE 147 10/29/2020    PROT 6.0 10/27/2020    LABALBU 3.8 10/27/2020    CALCIUM 7.6 10/29/2020    BILITOT 0.6 10/27/2020    ALKPHOS 148 10/27/2020    AST 21 10/27/2020    ALT 12 10/27/2020     BMP:    Lab Results   Component Value Date     10/29/2020    K 4.2 10/29/2020     10/29/2020    CO2 21 10/29/2020    BUN 25 10/29/2020    LABALBU 3.8 10/27/2020    CREATININE 1.48 10/29/2020    CALCIUM 7.6 10/29/2020    GFRAA 39.9 10/29/2020    LABGLOM 33.0 10/29/2020    GLUCOSE 147 10/29/2020     Magnesium:    Lab Results   Component Value Date    MG 1.9 10/29/2020     Troponin:    Lab Results   Component Value Date    TROPONINI <0.010 10/27/2020       Radiology:  Ct Abdomen Pelvis Wo Contrast Additional Contrast? Radiologist Recommendation    Result Date: 10/28/2020  EXAMINATION: CT ABDOMEN PELVIS WO CONTRAST HISTORY:  septic shock  COMPARISON: CT abdomen and pelvis from August 17, 2011 TECHNIQUE: Contiguous axial CT sections of the abdomen and pelvis. No IV contrast administered. Unenhanced imaging is limited for the evaluation of some intra-abdominal and pelvic pathologies. All CT scans at this facility use dose modulation, iterative reconstruction, and/or weight based dosing when appropriate to reduce radiation dose to as low as reasonably achievable. FINDINGS  Lung bases: There is atelectasis and consolidation in the visualized portions of the lung bases. The heart and pericardium are unremarkable. There is no pericardial effusion. Liver: The liver is normal in size and attenuation without  focal lesions. There are no focal solid or cystic lesions. There is no intra or extrahepatic bile duct dilatation. Gallbladder: There are numerous calcified stones layering to the dependent portion of the gallbladder without surrounding inflammatory changes. There is distention of the gallbladder. Spleen: There are no focal lesions or calcifications in the spleen. There is no splenomegaly  Pancreas: The pancreas is normal in size and attenuation without focal lesions or dilatation of the pancreatic duct. Kidneys: There are no solid or cystic lesions. There is no hydronephrosis.   There is a 9 mm calcified stone in the inferior pole of the right kidney. There is no hydroureter. Adrenal glands are negative. Bowel: There is no bowel dilatation or evidence of diverticulitis. Appendix: There  is no CT evidence for appendicitis. Nodes: No lymphadenopathy. Aorta: No aneurysm  Peritoneum: No free fluid or free air. Pelvis: There are no solid or cystic lesions. There is a Cedeno catheter in place. Abdominal wall: The abdominal wall is intact. Bones : There are no acute osseous changes. Soft tissues: The soft tissues are unremarkable. No acute intra-abdominal changes. There is no free air or free fluid, there are no fluid collections. The visualized portions of the lung bases demonstrate bilateral atelectasis and consolidations in the dependent portions. There are calcified stones in the gallbladder without evidence of cholecystitis There is a 9 mm right intrarenal stone in the inferior pole, there is no hydronephrosis or hydroureter on either side. Ct Head Wo Contrast    Result Date: 10/27/2020  CT Brain Contrast medium:  Not utilized. History: Altered mental status. Tachycardia. Comparison:  None. Findings: Extra-axial spaces:  Normal. Intracranial hemorrhage:  None. Ventricular system: Ventricles mildly enlarged. Sulci mildly prominent Basal Cisterns:  Normal. Cerebral Parenchyma: Bilateral symmetric periventricular areas decreased attenuation. Midline Shift:  None. Cerebellum:  Normal. Paranasal sinuses and mastoid air cells:  Normal. Visualized Orbits: Remote bilateral ocular surgery. Impression: No acute findings. Mild cerebral atrophy. Chronic ischemic white matter disease. All CT scans at this facility use dose modulation, iterative reconstruction, and/or weight based dosing when appropriate to reduce radiation dose to as low as reasonably achievable. Cta Chest W Wo Contrast    Result Date: 10/27/2020  CTA CHEST W WO CONTRAST: 10/27/2020 CLINICAL HISTORY:  R/o PE, SOB, tachycardia . COMPARISON: 12/26/2018.  Spiral enhanced images were obtained of the chest during the infusion of approximately 100 mL of Isovue 370 contrast with pulmonary artery  CTA protocol. Routine and volume rendered images were obtained on a 3-dimensional workstation. All CT scans at this facility use dose modulation, iterative reconstruction, and/or weight based dosing when appropriate to reduce radiation dose to as low as reasonably achievable. FINDINGS: An endotracheal tube is noted with its tip approximately 2 cm superior to the devin. There are no filling defects identified within the pulmonary arterial vasculature to suggest pulmonary emboli. The thoracic aorta is tortuous, and ectatic with mild calcific atherosclerotic plaquing of the arch. There is no dissection. The heart is mildly enlarged. Moderate coronary artery calcifications are present. Moderate bibasilar consolidation/atelectasis is noted of the posterior mid to lower lung fields, worsening inferiorly. There are no worrisome nodules, lymphadenopathy, pleural or pericardial effusions identified. A small calcified granuloma within the inferolateral aspect of the left upper lobe appears unchanged. A minimal compression deformity of the superior endplate of H80 and mild vertebral body wedging of the mid thoracic levels appears chronic. There are no acute fractures identified. Mild to moderate degenerative changes of the thoracic spine are noted. The limited images of the upper abdomen are noncontributory. ENDOTRACHEAL TUBE IN EXPECTED POSITION. MODERATE CONSOLIDATION/ATELECTASIS OF THE POSTERIOR MID TO LOWER LUNG SANZ, SUGGESTIVE OF BRONCHOPNEUMONIA AND/OR ASPIRATION. NO EVIDENCE OF PULMONARY EMBOLI OR OTHER SIGNIFICANT CHANGE FROM 12/26/2018 IDENTIFIED. Xr Chest Portable    Result Date: 10/28/2020  EXAMINATION: XR CHEST PORTABLE CLINICAL HISTORY: CENTRAL LINE COMPARISONS: OCTOBER 27, 2020, AT 1700 HOURS FINDINGS: Endotracheal tube 2.2 cm superior to devin.  Nasogastric tube courses beneath diaphragm. Osseous structures intact. Cardiopericardial silhouette normal. Blunting left costophrenic angle, mildly increased since prior study. Ill-defined area increased opacity left lung base. MILD INTERVAL INCREASE LEFT PLEURAL EFFUSION, WITH LEFT LOWER LUNG ATELECTASIS/PNEUMONIA. Xr Chest Portable    Result Date: 10/27/2020  EXAMINATION: CHEST PORTABLE VIEW-1700 hours  CLINICAL HISTORY: Intubation COMPARISONS: October 27, 2020 1431 hours  FINDINGS: Single  views of the chest is submitted. Interval endotracheal intubation since the prior examination. The tip is at the level of the clavicles. The cardiac silhouette enlarged unchanged configuration. . Pulmonary vascular mildly congested. Right sided trachea. Left lower lobe infiltrate consolidation left pleural effusion. Atelectasis, infiltrate right lower lobe Blunting of the right costophrenic angle suggesting trace right pleural effusion. No Pneumothoraces. ATELECTASIS, INFILTRATE RIGHT LOWER LOBE. TRACE RIGHT PLEURAL EFFUSION. LEFT LOWER LOBE INFILTRATE CONSOLIDATION LEFT PLEURAL EFFUSION. Xr Chest Portable    Result Date: 10/27/2020  EXAMINATION: CHEST PORTABLE XVDH-1897 hours  CLINICAL HISTORY: Short of breath COMPARISONS: July 8, 2020  FINDINGS: Single  views of the chest is submitted. The cardiac silhouette is of normal size configuration. Pulmonary vascular congested. The trachea remains deviated to the right. Left lower lobe infiltrate consolidation and small left pleural effusion. .  No Pneumothoraces. LEFT LOWER LOBE INFILTRATE CONSOLIDATION AND SMALL LEFT PLEURAL EFFUSION. ..     Us Carotid Artery Bilateral    Result Date: 10/28/2020  History:  Arterial access during central line placement Technique:  US CAROTID ARTERY BILATERAL Comparison: No prior Findings: No pseudoaneurysm seen. Doppler findings: ARTERIAL BLOOD FLOW VELOCITY RIGHT PS                                               Prox CCA 77cm/s             Mid CCA 80cm/s              Dist CCA 61cm/s              Prox ICA cm/s              Mid ICA cm/s              Dist ICA cm/s              Prox ECA cm/s              Dist VERT cm/s              Bulb ICA/CCA LEFT PS Prox CCA 133cm/s              Mid CCA 103cm/s              Dist CCA 107cm/s              Prox ICA 75cm/s              Mid ICA 91cm/s              Dist ICA 90cm/s              Prox ECA 171cm/s              Prox VERT 53cm/s              Bulb ICA/CCA 0.8     Impression: 1. The patient is on a ventilator and the examination is limited. The right external carotid , internal carotid and vertebral arteries are not visualized. 2.  No hemodynamic significant stenosis seen on the left 3. No evidence of pseudoaneurysm Validated velocity measurements with angiographic measurements, velocity criteria are extrapolated from diameter data as defined by the Society of radiologist in ultrasound consensus conference radiology 2003; 229; 340-346.         Echocardiogram 7/9/20:  Conclusions   Summary   Normal mitral valve structure and function.   Mild (1+) mitral regurgitation is present.  Keaton Chand dilated left atrium.   Left ventricular ejection fraction is visually estimated at 60%.  Impaired relaxation compatible with diastolic dysfunction.  ( reversed E/A   ratio)   Signature   ----------------------------------------------------------------   Electronically signed by Tristan Goetz(Interpreting physician)   on 07/10/2020 01:47 PM      EKG 10/28/20: , baseline artifact, QTc 498ms     Telemetry 10/28/20: SVT vs ST 140s-150s  Telemetry 10/29/20: ST 110s; brief 2.5-3 second runs of PAF with HR 160s at 21:43 yesterday and 1:48am today        Assessment:    Active Hospital Problems    Diagnosis Date Noted    Pneumonia of both lower lobes due to infectious organism [J18.9] Priority: High    SVT (supraventricular tachycardia) (HCC) [I47.1] 10/27/2020     1. Acute respiratory failure   2. Sepsis/pneumonia  3. SVT/PA-fib RVR--currently sinus tach   4. Hx HTN--currently hypotensive on levophed  5. Dyslipidemia  6. RICHARD--improving with IV fluids  7. Obesity  8. Normal LVF EF 60% per echo 7/9/20  9. Mild MR per echo 7/9/20  10. Bacteremia--1/2 positive BC for E. Coli  11. UTI     Plan:  1. ICU management  2. Vent management per pulmonary  3. Wean vasopressors as tolerated--Levophed down to 11 mcg/min today  4. PRN IV Lopressor for tachycardia while patient remains on vasopressors  5. Consider initiation of full dose anticoagulation if recurrent/sustained episodes of A. fib in future  6. Continue IV antibiotics-Zosyn 3.375 g IV every 8 hour and vancomycin 1250 mg IV every 36 hours  7. Repeat echocardiogram  8. Monitor on telemetry for any tachycardia or bradycardia arrhythmias  9. Maintain potassium greater than 4, magnesium greater than 2  10. ID consult and recommendations--1 of 2 blood cultures positive for E. coli, urine culture positive for E. coli. Patient on IV antibiotics with Zosyn and vancomycin  11. Internal medicine recommendations  12. Pulmonary recommendations  13. Conservative medical therapy from cardiac standpoint secondary to multiple comorbidities. 15. Will follow      Electronically signed by WALLY Pineda on 10/29/2020 at 10:03 AM        Attending Supervising [de-identified] Attestation Statement  The patient is a 80 y.o. female. I have performed a history and physical examination of the patient. I discussed the case with the physician assistant. I reviewed the patient's Past Medical History, Past Surgical History, Medications, and Allergies.      Physical Exam:  Vitals:    10/29/20 1630 10/29/20 1700 10/29/20 1730 10/29/20 1800   BP: (!) 123/47 (!) 133/49 (!) 120/44 (!) 117/42   Pulse: 117 118 116 115   Resp: 22 22 22 22   Temp:       TempSrc:       SpO2: 94% 93% 92% 91%   Weight:       Height:             Pulmonary/Chest: decreased breath sounds noted  Cardiovascular: normal rate, normal S1 and S2, no gallops, intact distal pulses and no carotid bruits  Abdomen: soft, non-tender, non-distended, normal bowel sounds, no masses or organomegaly    Active Hospital Problems    Diagnosis Date Noted    Pneumonia of both lower lobes due to infectious organism [J18.9]      Priority: High    SVT (supraventricular tachycardia) ([address] [I47.1] 10/27/2020     Priority: Low        I reviewed and agree with the findings and plan documented in her note . Impression     Sinus tach  PSVT  Severe sepsis  PNA  VDRF  Normal LVF  Septic shock  Hx of HTN        Plan     1. IVF  2. Wean pressors off as tolerated. 3. As needed IV Lopressor as tolerated  4. Digoxin PRN  If needed. 5. Monitor on telemetry  6. Keep potassium greater than 4, magnesium greater than 2  7. GI/DVT prophylaxis  8. Consider limited echo. 9. Further recommendations to follow.     Thank you for allowing me to participate in the care of your patient, please don't hesitate to contact me if you have any further questions.     Electronically signed by Joette Sandhoff, DO on 10/29/20 at 6:24 PM EDT

## 2020-10-29 NOTE — PROGRESS NOTES
0730 Assumed care of patient. Received report from ED, RN. Am assessment as charted. 0930 Rounds completed with team; new orders obtained for restraints and PICC line. 1000: Spoke to patient daughter Karon Segal, who works here in medical records. She has HIPAA code; consent obtained for PICC placement. 1015: Patient to specials via bed with this RN and RT for PICC line placement. 1130: Returned to room, IV moved to Right upper arm PICC line. Oral care. 1330: No changes at this time. 1530: Right groin CVC line removed, pressure held. Dressing applied. Patient daughter Maty Ansari at bedside, had HIPAA code. Updated. Dr Melina Pablo rounded. No changes or new orders. Safety maintained.

## 2020-10-29 NOTE — PROGRESS NOTES
Provides: 480 ml, 688 kcals ( + propofoll kcals), 94 g protein, ~800 ml free water       Anthropometric Measures:  · Height: 5' 3\" (160 cm)  · Current Body Weight: 197 lb (89.4 kg)   · Admission Body Weight: 170 lb (77.1 kg)(stated)    · [facility]: 187 lb [facility]84.8 kg[facility]     · Ideal Body Weight: 115 lbs; % Ideal Body Weight   >100%  · BMI: 34.9   · BMI Categories: Obese Class 1 (BMI 30.0-34. 9)       Nutrition Diagnosis:   · Inadequate oral intake related to impaired respiratory function as evidenced by intubation      Nutrition Interventions:   Food and/or Nutrient Delivery:  Start Tube Feeding(Begin low calorie/high protien TF @ 20 ml/hr x 24 hrs via OG, Water flush 100 ml, 4 x daily.)  Nutrition Education/Counseling:  Education not indicated   Coordination of Nutrition Care:  Continue to monitor while inpatient    Goals:  Initiation and tolerance of TF       Nutrition Monitoring and Evaluation:     Food/Nutrient Intake Outcomes:  Enteral Nutrition Intake/Tolerance  Physical Signs/Symptoms Outcomes:  GI Status, Fluid Status or Edema, Hemodynamic Status, Weight, Biochemical Data     Discharge Planning:     Too soon to determine     Electronically signed by Jada Cardona RD, LD on 10/29/20 at 11:06 AM EDT

## 2020-10-30 ENCOUNTER — APPOINTMENT (OUTPATIENT)
Dept: GENERAL RADIOLOGY | Age: 85
DRG: 870 | End: 2020-10-30
Payer: MEDICARE

## 2020-10-30 LAB
ANION GAP SERPL CALCULATED.3IONS-SCNC: 12 MEQ/L (ref 9–15)
BASE EXCESS ARTERIAL: -2 (ref -3–3)
BASOPHILS ABSOLUTE: 0.1 K/UL (ref 0–0.2)
BASOPHILS RELATIVE PERCENT: 0.4 %
BLOOD CULTURE, ROUTINE: ABNORMAL
BUN BLDV-MCNC: 18 MG/DL (ref 8–23)
CALCIUM IONIZED: 1.21 MMOL/L (ref 1.12–1.32)
CALCIUM SERPL-MCNC: 7.4 MG/DL (ref 8.5–9.9)
CHLORIDE BLD-SCNC: 109 MEQ/L (ref 95–107)
CO2: 20 MEQ/L (ref 20–31)
CREAT SERPL-MCNC: 1 MG/DL (ref 0.5–0.9)
EOSINOPHILS ABSOLUTE: 0.1 K/UL (ref 0–0.7)
EOSINOPHILS RELATIVE PERCENT: 0.6 %
GFR AFRICAN AMERICAN: 46
GFR AFRICAN AMERICAN: >60
GFR NON-AFRICAN AMERICAN: 38
GFR NON-AFRICAN AMERICAN: 51.8
GLUCOSE BLD-MCNC: 143 MG/DL (ref 60–115)
GLUCOSE BLD-MCNC: 178 MG/DL (ref 70–99)
HCO3 ARTERIAL: 22.3 MMOL/L (ref 21–29)
HCT VFR BLD CALC: 26.7 % (ref 37–47)
HEMOGLOBIN: 8.5 GM/DL (ref 12–16)
HEMOGLOBIN: 8.8 G/DL (ref 12–16)
LACTATE: 1.23 MMOL/L (ref 0.4–2)
LYMPHOCYTES ABSOLUTE: 0.8 K/UL (ref 1–4.8)
LYMPHOCYTES RELATIVE PERCENT: 3.4 %
MAGNESIUM: 1.9 MG/DL (ref 1.7–2.4)
MCH RBC QN AUTO: 29.5 PG (ref 27–31.3)
MCHC RBC AUTO-ENTMCNC: 32.8 % (ref 33–37)
MCV RBC AUTO: 89.8 FL (ref 82–100)
MONOCYTES ABSOLUTE: 0.6 K/UL (ref 0.2–0.8)
MONOCYTES RELATIVE PERCENT: 2.7 %
NEUTROPHILS ABSOLUTE: 20.5 K/UL (ref 1.4–6.5)
NEUTROPHILS RELATIVE PERCENT: 92.9 %
O2 SAT, ARTERIAL: 99 % (ref 93–100)
ORGANISM: ABNORMAL
PCO2 ARTERIAL: 33 MM HG (ref 35–45)
PDW BLD-RTO: 14.7 % (ref 11.5–14.5)
PERFORMED ON: ABNORMAL
PH ARTERIAL: 7.43 (ref 7.35–7.45)
PLATELET # BLD: 82 K/UL (ref 130–400)
PLATELET SLIDE REVIEW: ABNORMAL
PO2 ARTERIAL: 109 MM HG (ref 75–108)
POC CHLORIDE: 110 MEQ/L (ref 99–110)
POC CREATININE: 1.3 MG/DL (ref 0.6–1.2)
POC FIO2: 60
POC HEMATOCRIT: 25 % (ref 36–48)
POC POTASSIUM: 3.5 MEQ/L (ref 3.5–5.1)
POC SAMPLE TYPE: ABNORMAL
POC SODIUM: 145 MEQ/L (ref 136–145)
POTASSIUM REFLEX MAGNESIUM: 3.3 MEQ/L (ref 3.4–4.9)
RBC # BLD: 2.97 M/UL (ref 4.2–5.4)
RBC # BLD: NORMAL 10*6/UL
SODIUM BLD-SCNC: 141 MEQ/L (ref 135–144)
TCO2 ARTERIAL: 23 (ref 22–29)
URINE CULTURE, ROUTINE: ABNORMAL
WBC # BLD: 22 K/UL (ref 4.8–10.8)

## 2020-10-30 PROCEDURE — 84132 ASSAY OF SERUM POTASSIUM: CPT

## 2020-10-30 PROCEDURE — 6360000002 HC RX W HCPCS: Performed by: INTERNAL MEDICINE

## 2020-10-30 PROCEDURE — 99232 SBSQ HOSP IP/OBS MODERATE 35: CPT | Performed by: INTERNAL MEDICINE

## 2020-10-30 PROCEDURE — 82435 ASSAY OF BLOOD CHLORIDE: CPT

## 2020-10-30 PROCEDURE — 83735 ASSAY OF MAGNESIUM: CPT

## 2020-10-30 PROCEDURE — 94003 VENT MGMT INPAT SUBQ DAY: CPT

## 2020-10-30 PROCEDURE — 2700000000 HC OXYGEN THERAPY PER DAY

## 2020-10-30 PROCEDURE — 84295 ASSAY OF SERUM SODIUM: CPT

## 2020-10-30 PROCEDURE — 2000000000 HC ICU R&B

## 2020-10-30 PROCEDURE — 71045 X-RAY EXAM CHEST 1 VIEW: CPT

## 2020-10-30 PROCEDURE — 82565 ASSAY OF CREATININE: CPT

## 2020-10-30 PROCEDURE — 83605 ASSAY OF LACTIC ACID: CPT

## 2020-10-30 PROCEDURE — 82803 BLOOD GASES ANY COMBINATION: CPT

## 2020-10-30 PROCEDURE — 82330 ASSAY OF CALCIUM: CPT

## 2020-10-30 PROCEDURE — 6370000000 HC RX 637 (ALT 250 FOR IP): Performed by: INTERNAL MEDICINE

## 2020-10-30 PROCEDURE — 85014 HEMATOCRIT: CPT

## 2020-10-30 PROCEDURE — 2500000003 HC RX 250 WO HCPCS: Performed by: INTERNAL MEDICINE

## 2020-10-30 PROCEDURE — 85025 COMPLETE CBC W/AUTO DIFF WBC: CPT

## 2020-10-30 PROCEDURE — 2580000003 HC RX 258: Performed by: INTERNAL MEDICINE

## 2020-10-30 PROCEDURE — 99233 SBSQ HOSP IP/OBS HIGH 50: CPT | Performed by: INTERNAL MEDICINE

## 2020-10-30 PROCEDURE — 36600 WITHDRAWAL OF ARTERIAL BLOOD: CPT

## 2020-10-30 PROCEDURE — APPNB30 APP NON BILLABLE TIME 0-30 MINS: Performed by: PHYSICIAN ASSISTANT

## 2020-10-30 PROCEDURE — 80048 BASIC METABOLIC PNL TOTAL CA: CPT

## 2020-10-30 RX ORDER — POLYETHYLENE GLYCOL 3350 17 G/17G
17 POWDER, FOR SOLUTION ORAL DAILY
Status: DISCONTINUED | OUTPATIENT
Start: 2020-10-30 | End: 2020-11-06 | Stop reason: HOSPADM

## 2020-10-30 RX ORDER — CHLORHEXIDINE GLUCONATE 0.12 MG/ML
15 RINSE ORAL 2 TIMES DAILY
Status: DISCONTINUED | OUTPATIENT
Start: 2020-10-30 | End: 2020-11-06 | Stop reason: HOSPADM

## 2020-10-30 RX ORDER — IPRATROPIUM BROMIDE AND ALBUTEROL SULFATE 2.5; .5 MG/3ML; MG/3ML
1 SOLUTION RESPIRATORY (INHALATION) EVERY 4 HOURS PRN
Status: DISCONTINUED | OUTPATIENT
Start: 2020-10-30 | End: 2020-11-06 | Stop reason: HOSPADM

## 2020-10-30 RX ADMIN — ENOXAPARIN SODIUM 30 MG: 30 INJECTION SUBCUTANEOUS at 08:15

## 2020-10-30 RX ADMIN — Medication 10 ML: at 08:25

## 2020-10-30 RX ADMIN — PIPERACILLIN AND TAZOBACTAM 3.38 G: 3; .375 INJECTION, POWDER, LYOPHILIZED, FOR SOLUTION INTRAVENOUS at 01:42

## 2020-10-30 RX ADMIN — POLYETHYLENE GLYCOL 3350 17 G: 17 POWDER, FOR SOLUTION ORAL at 11:37

## 2020-10-30 RX ADMIN — FAMOTIDINE 20 MG: 10 INJECTION INTRAVENOUS at 08:15

## 2020-10-30 RX ADMIN — SODIUM CHLORIDE: 9 INJECTION, SOLUTION INTRAVENOUS at 15:59

## 2020-10-30 RX ADMIN — CHLORHEXIDINE GLUCONATE 15 ML: 1.2 RINSE ORAL at 11:37

## 2020-10-30 RX ADMIN — SODIUM CHLORIDE: 9 INJECTION, SOLUTION INTRAVENOUS at 01:44

## 2020-10-30 RX ADMIN — PROPOFOL 30 MCG/KG/MIN: 10 INJECTION, EMULSION INTRAVENOUS at 22:33

## 2020-10-30 RX ADMIN — PROPOFOL 30 MCG/KG/MIN: 10 INJECTION, EMULSION INTRAVENOUS at 10:34

## 2020-10-30 RX ADMIN — PROPOFOL 30 MCG/KG/MIN: 10 INJECTION, EMULSION INTRAVENOUS at 15:59

## 2020-10-30 RX ADMIN — CEFTRIAXONE SODIUM 1 G: 1 INJECTION, POWDER, FOR SOLUTION INTRAMUSCULAR; INTRAVENOUS at 11:36

## 2020-10-30 RX ADMIN — DOCUSATE SODIUM 100 MG: 50 LIQUID ORAL at 11:37

## 2020-10-30 RX ADMIN — ACETAMINOPHEN 650 MG: 325 TABLET, FILM COATED ORAL at 20:43

## 2020-10-30 RX ADMIN — PROPOFOL 40 MCG/KG/MIN: 10 INJECTION, EMULSION INTRAVENOUS at 00:36

## 2020-10-30 RX ADMIN — CHLORHEXIDINE GLUCONATE 15 ML: 1.2 RINSE ORAL at 20:43

## 2020-10-30 RX ADMIN — POTASSIUM BICARBONATE 40 MEQ: 782 TABLET, EFFERVESCENT ORAL at 11:28

## 2020-10-30 RX ADMIN — PROPOFOL 40 MCG/KG/MIN: 10 INJECTION, EMULSION INTRAVENOUS at 05:15

## 2020-10-30 RX ADMIN — DOCUSATE SODIUM 100 MG: 50 LIQUID ORAL at 20:43

## 2020-10-30 RX ADMIN — PIPERACILLIN AND TAZOBACTAM 3.38 G: 3; .375 INJECTION, POWDER, LYOPHILIZED, FOR SOLUTION INTRAVENOUS at 08:15

## 2020-10-30 ASSESSMENT — PULMONARY FUNCTION TESTS
PIF_VALUE: 23
PIF_VALUE: 24
PIF_VALUE: 24
PIF_VALUE: 22
PIF_VALUE: 36
PIF_VALUE: 29
PIF_VALUE: 28
PIF_VALUE: 38
PIF_VALUE: 22
PIF_VALUE: 24
PIF_VALUE: 26
PIF_VALUE: 30
PIF_VALUE: 23
PIF_VALUE: 22
PIF_VALUE: 34
PIF_VALUE: 24
PIF_VALUE: 22
PIF_VALUE: 24
PIF_VALUE: 42
PIF_VALUE: 28
PIF_VALUE: 24
PIF_VALUE: 23
PIF_VALUE: 24
PIF_VALUE: 32
PIF_VALUE: 23
PIF_VALUE: 34
PIF_VALUE: 25
PIF_VALUE: 27
PIF_VALUE: 27
PIF_VALUE: 25
PIF_VALUE: 27
PIF_VALUE: 30

## 2020-10-30 ASSESSMENT — PAIN SCALES - GENERAL
PAINLEVEL_OUTOF10: 0

## 2020-10-30 NOTE — PROGRESS NOTES
PHARMACY NOTE:   ICU Rounds Attended (10-15 minutes in patient room):    Pt diagnosis: SVT    IV Fluids: decreased NS to 50mL/mhr    Renal:   Recent Labs     10/29/20  0535 10/30/20  0510 10/30/20  0514   CREATININE 1.48* 1.3* 1.00*    Estimated Creatinine Clearance: 38 mL/min (A) (based on SCr of 1 mg/dL (H)). Antimicrobial Therapy:   Day 1: ceftriaxone 1g IV Q24 hours based, had 4 days of Zosyn, changed d/t thrombocytopenia   Cultures E. Coli in blood, urine    ID on consult: no    Recent Labs     10/28/20  0600 10/28/20  0720 10/29/20  0530 10/30/20  0600   WBC 48.9* 52.1* 40.2* 22.0*           Pressors:   norepinephrine   Sedation:   Propofol   Drips: none     Insulin Therapy (goal: 140-180): no coverage at this time   Recent Labs     10/28/20  0600 10/29/20  0535 10/30/20  0514   GLUCOSE 131* 147* 178*       Steroid Therapy: none   Stress Ulcer Prophylaxis:    Famotidine 20mg IV daily   On at home: no    DVT Prophylaxis/Anticoagulant Therapy: Lovenox 30mg SQ daily (low PLT), monitoring   Recent Labs     10/28/20  0720 10/29/20  0530 10/30/20  0600    104* 82*     No results for input(s): INR in the last 72 hours.       Bowel Regimen:   Colace added BID   Miralax changed to daily     IV to PO: none at this time    Diet (NPO, tube feeds, TPN): tube feeds    Oxygen Therapy: intubated      Follow up/Changes:   Decreased NS to 50mL/lhr   Changed zosyn to rocephin therapy d/t thrombocytopenia   Bowel regimen added   Monitoring PLT with Lovenox        Luz LockeD   10/30/2020 3:59 PM

## 2020-10-30 NOTE — PROGRESS NOTES
Progress Note  Patient: [facility]  Unit/Bed: LC80/VU99-12  YOB: 1928  MRN: [id number]  Acct: [de-identified]   Admitting Diagnosis: SVT (supraventricular tachycardia) ([facility] [address] [I47.1]  Date:  10/27/2020  Hospital Day: 3    Chief Complaint:  Tachycardia/shortness of breath    Subjective      10/30/20: Remains intubated and mechanically ventilated, on sedation. FiO2 down to 55% with PEEP of 5. Maintaining adequate SPO2. Weaning Levophed and patient currently down to 2 mcg/min from as high as 20 mcg/min 2 days ago. Remains on IV fluids with normal saline at 50 mL/h. Has had 1250 mL urine output in the past 24 hours. Hypokalemic this morning with potassium of 3.3 and this was replaced. Renal function improving with creatinine of 1.0 with GFR of 51.8 and BUN of 18. WBC count improving to 22 from as high as 52 on 10/28/2020. Remains on IV antibiotics with Rocephin 1 g IV every 24 hours. Patient anemic with hemoglobin this morning of 8.8 compared to 13.2 on presentation and now thrombocytopenic with platelet count of 82 compared to platelets of 678 on presentation. Heart rate much improved. Currently on telemetry she sinus rhythm with heart rate in the 90s. Telemetry alarms reviewed show approximately 2.2-second episode of PSVT at 2010 yesterday. Questionable episode of PAF versus sinus rhythm with PACs noted on telemetry alarms at 2037 yesterday. 10/29/20: Patient remains intubated, sedated and on mechanical ventilation. Maintaining adequate SPO2 most recently at 97% on 70% FiO2 and PEEP of 5. Given RICHARD and severe sepsis, patient initiated on IV fluids with normal saline at 100 mL/h yesterday. Renal function improving with creatinine this morning of 1.48 compared to 1.72 yesterday. Patient tachycardic yesterday with heart rate consistently in 140s to 150s range questionable SVT versus a flutter RVR. She received amiodarone 150 mg IV x1 yesterday afternoon.  Given persistent tachycardia further evaluation.     At time of evaluation today, patient is seen in the ICU currently intubated, on mechanical ventilation and sedated. On 80% FiO2 with PEEP of 5. She is currently on Levophed drip at 20 mcg/min. Most recent blood pressure 105/47. She remains tachycardic with heart rate 130s to 150 range questionable sinus tach versus SVT. She has significant leukocytosis today with WBC count currently 48.9 compared to 5.4 yesterday. Is on IV antibiotics with Zosyn and vancomycin. ID consult pending.     There is no known prior cardiac history. She was recently seen by cardiology during hospital admission in July 2020 after presenting with chest pain. Chest pain was atypical and reproducible at that time. Echocardiogram was completed and revealed normal LV systolic function with ejection fraction of 60% no significant valvular abnormalities. Review of Systems:   Review of Systems   Unable to perform ROS: Intubated         Physical Examination:    BP (!) 114/37   Pulse 95   Temp 99.7 °F (37.6 °C) (Rectal)   Resp 19   Ht 5' 3\" (1.6 m)   Wt 197 lb 1.5 oz (89.4 kg)   LMP  (LMP Unknown)   SpO2 94%   BMI 34.91 kg/m²    Physical Exam  Constitutional:       Comments: Intubated and sedated   HENT:      Head: Normocephalic and atraumatic. Comments: ET tube in place  Cardiovascular:      Rate and Rhythm: Regular rhythm. Tachycardia present. Heart sounds: No murmur. Pulmonary:      Effort: No respiratory distress. Breath sounds: No wheezing, rhonchi or rales. Comments: Intubated on vent  Abdominal:      Palpations: Abdomen is soft. Comments: Hypoactive BS   Musculoskeletal:      Comments: SCDs in place on bilateral LE   Skin:     General: Skin is warm and dry.    Neurological:      Comments: sedated         LABS:  CBC:   Lab Results   Component Value Date    WBC 22.0 10/30/2020    RBC 2.97 10/30/2020    HGB 8.8 10/30/2020    HCT 26.7 10/30/2020    MCV 89.8 10/30/2020    MCH 29.5 10/30/2020    MCHC 32.8 10/30/2020    RDW 14.7 10/30/2020    PLT 82 10/30/2020    MPV 11.6 06/17/2014     CBC with Differential:   Lab Results   Component Value Date    WBC 22.0 10/30/2020    RBC 2.97 10/30/2020    HGB 8.8 10/30/2020    HCT 26.7 10/30/2020    PLT 82 10/30/2020    MCV 89.8 10/30/2020    MCH 29.5 10/30/2020    MCHC 32.8 10/30/2020    RDW 14.7 10/30/2020    BANDSPCT 12 10/28/2020    METASPCT 1 10/28/2020    LYMPHOPCT 3.4 10/30/2020    MONOPCT 2.7 10/30/2020    BASOPCT 0.4 10/30/2020    MONOSABS 0.6 10/30/2020    LYMPHSABS 0.8 10/30/2020    EOSABS 0.1 10/30/2020    BASOSABS 0.1 10/30/2020     CMP:    Lab Results   Component Value Date     10/30/2020    K 3.3 10/30/2020     10/30/2020    CO2 20 10/30/2020    BUN 18 10/30/2020    CREATININE 1.00 10/30/2020    GFRAA >60.0 10/30/2020    LABGLOM 51.8 10/30/2020    GLUCOSE 178 10/30/2020    PROT 6.0 10/27/2020    LABALBU 3.8 10/27/2020    CALCIUM 7.4 10/30/2020    BILITOT 0.6 10/27/2020    ALKPHOS 148 10/27/2020    AST 21 10/27/2020    ALT 12 10/27/2020     BMP:    Lab Results   Component Value Date     10/30/2020    K 3.3 10/30/2020     10/30/2020    CO2 20 10/30/2020    BUN 18 10/30/2020    LABALBU 3.8 10/27/2020    CREATININE 1.00 10/30/2020    CALCIUM 7.4 10/30/2020    GFRAA >60.0 10/30/2020    LABGLOM 51.8 10/30/2020    GLUCOSE 178 10/30/2020     Magnesium:    Lab Results   Component Value Date    MG 1.9 10/30/2020     Troponin:    Lab Results   Component Value Date    TROPONINI <0.010 10/27/2020       Radiology:  Ct Abdomen Pelvis Wo Contrast Additional Contrast? Radiologist Recommendation    Result Date: 10/28/2020  EXAMINATION: CT ABDOMEN PELVIS WO CONTRAST HISTORY:  septic shock  COMPARISON: CT abdomen and pelvis from August 17, 2011 TECHNIQUE: Contiguous axial CT sections of the abdomen and pelvis. No IV contrast administered.  Unenhanced imaging is limited for the evaluation of some intra-abdominal and pelvic pathologies. All CT scans at this facility use dose modulation, iterative reconstruction, and/or weight based dosing when appropriate to reduce radiation dose to as low as reasonably achievable. FINDINGS  Lung bases: There is atelectasis and consolidation in the visualized portions of the lung bases. The heart and pericardium are unremarkable. There is no pericardial effusion. Liver: The liver is normal in size and attenuation without  focal lesions. There are no focal solid or cystic lesions. There is no intra or extrahepatic bile duct dilatation. Gallbladder: There are numerous calcified stones layering to the dependent portion of the gallbladder without surrounding inflammatory changes. There is distention of the gallbladder. Spleen: There are no focal lesions or calcifications in the spleen. There is no splenomegaly  Pancreas: The pancreas is normal in size and attenuation without focal lesions or dilatation of the pancreatic duct. Kidneys: There are no solid or cystic lesions. There is no hydronephrosis. There is a 9 mm calcified stone in the inferior pole of the right kidney. There is no hydroureter. Adrenal glands are negative. Bowel: There is no bowel dilatation or evidence of diverticulitis. Appendix: There  is no CT evidence for appendicitis. Nodes: No lymphadenopathy. Aorta: No aneurysm  Peritoneum: No free fluid or free air. Pelvis: There are no solid or cystic lesions. There is a Cedeno catheter in place. Abdominal wall: The abdominal wall is intact. Bones : There are no acute osseous changes. Soft tissues: The soft tissues are unremarkable. No acute intra-abdominal changes. There is no free air or free fluid, there are no fluid collections. The visualized portions of the lung bases demonstrate bilateral atelectasis and consolidations in the dependent portions.  There are calcified stones in the gallbladder without evidence of cholecystitis There is a 9 mm right intrarenal stone in the inferior pole, there is no hydronephrosis or hydroureter on either side. Ct Head Wo Contrast    Result Date: 10/27/2020  CT Brain Contrast medium:  Not utilized. History: Altered mental status. Tachycardia. Comparison:  None. Findings: Extra-axial spaces:  Normal. Intracranial hemorrhage:  None. Ventricular system: Ventricles mildly enlarged. Sulci mildly prominent Basal Cisterns:  Normal. Cerebral Parenchyma: Bilateral symmetric periventricular areas decreased attenuation. Midline Shift:  None. Cerebellum:  Normal. Paranasal sinuses and mastoid air cells:  Normal. Visualized Orbits: Remote bilateral ocular surgery. Impression: No acute findings. Mild cerebral atrophy. Chronic ischemic white matter disease. All CT scans at this facility use dose modulation, iterative reconstruction, and/or weight based dosing when appropriate to reduce radiation dose to as low as reasonably achievable. Cta Chest W Wo Contrast    Result Date: 10/27/2020  CTA CHEST W WO CONTRAST: 10/27/2020 CLINICAL HISTORY:  R/o PE, SOB, tachycardia . COMPARISON: 12/26/2018. Spiral enhanced images were obtained of the chest during the infusion of approximately 100 mL of Isovue 370 contrast with pulmonary artery  CTA protocol. Routine and volume rendered images were obtained on a 3-dimensional workstation. All CT scans at this facility use dose modulation, iterative reconstruction, and/or weight based dosing when appropriate to reduce radiation dose to as low as reasonably achievable. FINDINGS: An endotracheal tube is noted with its tip approximately 2 cm superior to the devin. There are no filling defects identified within the pulmonary arterial vasculature to suggest pulmonary emboli. The thoracic aorta is tortuous, and ectatic with mild calcific atherosclerotic plaquing of the arch. There is no dissection. The heart is mildly enlarged. Moderate coronary artery calcifications are present.  Moderate bibasilar Blunting of the right costophrenic angle suggesting trace right pleural effusion. No Pneumothoraces. ATELECTASIS, INFILTRATE RIGHT LOWER LOBE. TRACE RIGHT PLEURAL EFFUSION. LEFT LOWER LOBE INFILTRATE CONSOLIDATION LEFT PLEURAL EFFUSION. Xr Chest Portable    Result Date: 10/27/2020  EXAMINATION: CHEST PORTABLE [id number] hours  CLINICAL HISTORY: Short of breath COMPARISONS: July 8, 2020  FINDINGS: Single  views of the chest is submitted. The cardiac silhouette is of normal size configuration. Pulmonary vascular congested. The trachea remains deviated to the right. Left lower lobe infiltrate consolidation and small left pleural effusion. .  No Pneumothoraces. LEFT LOWER LOBE INFILTRATE CONSOLIDATION AND SMALL LEFT PLEURAL EFFUSION. .. Us Carotid Artery Bilateral    Result Date: 10/28/2020  History:  Arterial access during central line placement Technique:  US CAROTID ARTERY BILATERAL Comparison: No prior Findings: No pseudoaneurysm seen. Doppler findings: ARTERIAL BLOOD FLOW VELOCITY RIGHT PS                                               Prox CCA 77cm/s             Mid CCA 80cm/s              Dist CCA 61cm/s              Prox ICA cm/s              Mid ICA cm/s              Dist ICA cm/s              Prox ECA cm/s              Dist VERT cm/s              Bulb ICA/CCA LEFT PS Prox CCA 133cm/s              Mid CCA 103cm/s              Dist CCA 107cm/s              Prox ICA 75cm/s              Mid ICA 91cm/s              Dist ICA 90cm/s              Prox ECA 171cm/s              Prox VERT 53cm/s              Bulb ICA/CCA 0.8     Impression: 1. The patient is on a ventilator and the examination is limited. The right external carotid , internal carotid and vertebral arteries are not visualized. 2.  No hemodynamic significant stenosis seen on the left 3.  No evidence of pseudoaneurysm Validated velocity measurements with angiographic measurements, velocity criteria are extrapolated from diameter data as defined by the Society of radiologist in ultrasound consensus conference radiology 2003; 229; 340-346.           Echocardiogram 10/29/20:  Conclusions   Summary   Normal left ventricular systolic function, no regional wall motion   abnormalities, estimated ejection fraction of 70%. Normal left ventricular size and function. Mild concentric left ventricular hypertrophy. Impaired relaxation compatible with diastolic dysfunction. ( reversed E/A   ratio)   No evidence of mitral regurgitation. No evidence of mitral valve stenosis. No evidence of aortic valve regurgitation . No evidence of aortic valve stenosis. Signature   ----------------------------------------------------------------   Electronically signed by Royanne Klinefelter.DO(Interpreting   physician) on 10/29/2020 06:35 PM       EKG 10/28/20: , baseline artifact, QTc 498ms     Telemetry 10/28/20: SVT vs ST 140s-150s  Telemetry 10/29/20: ST 110s; brief 2.5-3 second runs of PAF with HR 160s at 21:43 yesterday and 1:48am today  Telemetry 10/30/20: SR 90s; 2.2 seconds of PSVT at 20:10 yesterday; ? SR with PACs vs PAF at 20:37 yesterday        Assessment:    Active Hospital Problems    Diagnosis Date Noted    Pneumonia of both lower lobes due to infectious organism [J18.9]      Priority: High    SVT (supraventricular tachycardia) ([address] [I47.1] 10/27/2020     1. Acute respiratory failure   2. Sepsis/pneumonia  3. SVT/PA-fib RVR--currently SR  4. Hx HTN--currently hypotensive on levophed  5. Dyslipidemia  6. RICHARD--improving with IV fluids  7. Obesity  8. Normal LVF EF 60% per echo 7/9/20  9. Mild MR per echo 7/9/20  10. Bacteremia--1/2 positive BC for E. Coli  11. UTI  12. Normal LVF EF 70% per echo 10/29/20   13. Anemia--Hgb 8.8 on 10/30/20 from 13.2 on 10/27/20  14.  Thrombocytopenia--plts 88 on 10/30/20     Plan:  1. ICU management  2. Vent management per pulmonary  3. Wean vasopressors as tolerated--Levophed down to 2 mcg/min today  4. PRN IV Lopressor for tachycardia while patient remains on vasopressors  5. Consider initiation of full dose anticoagulation if recurrent/sustained episodes of A. fib in future. Not ideal candidate at this time secondary to worsening anemia/thrombocytopenia. 6. Monitor H&H and platelets closely  7. Continue IV antibiotics-Rocephin 1 g IV every 24 hour  8. Monitor on telemetry for any tachycardia or bradycardia arrhythmias  9. Maintain potassium greater than 4, magnesium greater than 2  10. ID consult and recommendations--1 of 2 blood cultures positive for E. coli, urine culture positive for E. coli. Patient on IV antibiotics with Zosyn and vancomycin  11. Internal medicine recommendations  12. Pulmonary recommendations  13. Conservative medical therapy from cardiac standpoint secondary to multiple comorbidities. 15. Will follow      Electronically signed by WALLY Pop on 10/30/2020 at 11:56 AM      Attending Supervising [de-identified] Attestation Statement  The patient is a 80 y.o. female. I have performed a history and physical examination of the patient. I discussed the case with the physician assistant. I reviewed the patient's Past Medical History, Past Surgical History, Medications, and Allergies.      Physical Exam:  Vitals:    10/30/20 1000 10/30/20 1100 10/30/20 1200 10/30/20 1245   BP: (!) 119/35 (!) 114/37 (!) 112/40    Pulse: 96 95 93 94   Resp: 14 19 16    Temp:   99.3 °F (37.4 °C)    TempSrc:   Rectal    SpO2: 94% 94% 91%    Weight:       Height:               Pulmonary/Chest: clear to auscultation bilaterally- no wheezes, rales or rhonchi, normal air movement, no respiratory distress  Cardiovascular: normal rate, normal S1 and S2, no gallops, intact distal pulses and no carotid bruits  Abdomen: soft, non-tender, non-distended, normal bowel sounds, no masses or organomegaly    Active Hospital Problems    Diagnosis Date Noted    Pneumonia of both lower lobes due to infectious organism [J18.9]      Priority: High    SVT (supraventricular tachycardia) ([facility] [address] [I47.1] 10/27/2020     Priority: Low        I reviewed and agree with the findings and plan documented in her note . Impression     Sinus tach  PSVT  No significant Afib episodes. Severe sepsis  PNA  VDRF  Normal LVF  Septic shock  Hx of HTN        Plan     1. ICU supportive care and management. 2. IVF  3. Wean pressors off as tolerated. 4. As needed IV Lopressor as tolerated  5. Digoxin PRN  If needed. 6. Monitor on telemetry  7. Keep potassium greater than 4, magnesium greater than 2  8. GI/DVT prophylaxis  9.  Further recommendations to follow.     Thank you for allowing me to participate in the care of your patient, please don't hesitate to contact me if you have any further questions.     Electronically signed by Ricardo Obrien DO on 10/30/20 at 2:18 PM EDT

## 2020-10-30 NOTE — PROGRESS NOTES
7a-7p Patient remains on vent. Sedated with propofol. Tolerating tube feeding. Portable CxR done this am. Labs drawn and sent.

## 2020-10-30 NOTE — PROGRESS NOTES
Critical Care Medicine  Progress note      Chief complaint: Acute respiratory failure, tachycardia    HISTORY OF PRESENT ILLNESS:    Patient was seen, examined, and discussed, during multidisciplinary critical care rounds this morning  Patient remains sedated, intubated, on the vent, difficult to arouse this morning, we are attempting to wean down sedation to allow arousability. She is stable otherwise, still requiring minimal dose of vasopressor for blood pressure control but generally improving. Oxygenation is gradually improving as well she is down to 55% FiO2. Chest x-ray continued show left lower lobe consolidation atelectasis/pneumonia. Blood culture is growing E. coli, similar to urine culture. Her white cell count is improved significantly, platelet counts is dropping though which could be related to sepsis, she is also on Zosyn which I will change to Rocephin given the sensitivity panel on her E. coli. We will continue with Zosyn before and continue monitoring platelet counts. Past Medical History:        Diagnosis Date    Anxiety     Cancer ([address]     Glaucoma     Gout     left knee    Hypertension     Lung disease     Obesity (BMI 30-39. 9) 7/11/2019    Obstructive sleep apnea 7/11/2019       Past Surgical History:        Procedure Laterality Date    CATARACT REMOVAL WITH IMPLANT Bilateral 2016    COLON SURGERY  2009    polyps    COLONOSCOPY  02/2009    SKIN CANCER EXCISION  2002    graft from neck       Social History:     reports that she has never smoked. She has never used smokeless tobacco. She reports that she does not drink alcohol or use drugs. Family History:   No family history on file.     Allergies:  Norvasc [amlodipine besylate]        MEDICATIONS during current hospitalization:    Continuous Infusions:   propofol 30 mcg/kg/min (10/30/20 1034)    sodium chloride 50 mL/hr at 10/30/20 0942    norepinephrine 2 mcg/min (10/30/20 1035)       Scheduled Meds:  [address] [place] [address]  104*  --  82*      Recent Labs     10/28/20  0600 10/29/20  0535 10/30/20  0510 10/30/20  0514    146*  --  141   K 3.9 4.2  --  3.3*    111*  --  109*   CO2 20 21  --  20   BUN 26* 25*  --  18   CREATININE 1.72* 1.48* 1.3* 1.00*   GLUCOSE 131* 147*  --  178*       MV Settings:     Vent Mode: AC/VC  Vt Ordered: 500 mL  Rate Set: 14 bmp  PEEP/CPAP: 5  Peak Inspiratory Pressure: 24 cmH2O  Mean Airway Pressure: 8.5 cmH20  I:E Ratio: 1:4.40    ABGs:   Recent Labs     10/27/20  1708 10/27/20  2321 10/30/20  0510   PHART 7.308* 7.376 7.435   BQU8FFS 46* 35 33*   PO2ART 162* 121* 109*   ZUF5TDD 22.8 20.4* 22.3   BEART -3 -5* -2   W6WHUUHS 99* 99* 99*   QOP2RZJ 24 21* 23     O2 Device: Ventilator  Lab Results   Component Value Date    LACTA 1.0 12/25/2018       Radiology  Ct Head Wo Contrast    Result Date: 10/27/2020  CT Brain Contrast medium:  Not utilized. History: Altered mental status. Tachycardia. Comparison:  None. Findings: Extra-axial spaces:  Normal. Intracranial hemorrhage:  None. Ventricular system: Ventricles mildly enlarged. Sulci mildly prominent Basal Cisterns:  Normal. Cerebral Parenchyma: Bilateral symmetric periventricular areas decreased attenuation. Midline Shift:  None. Cerebellum:  Normal. Paranasal sinuses and mastoid air cells:  Normal. Visualized Orbits: Remote bilateral ocular surgery. Impression: No acute findings. Mild cerebral atrophy. Chronic ischemic white matter disease. All CT scans at this facility use dose modulation, iterative reconstruction, and/or weight based dosing when appropriate to reduce radiation dose to as low as reasonably achievable. Cta Chest W Wo Contrast    Result Date: 10/27/2020  CTA CHEST W WO CONTRAST: 10/27/2020 CLINICAL HISTORY:  R/o PE, SOB, tachycardia . COMPARISON: 12/26/2018. Spiral enhanced images were obtained of the chest during the infusion of approximately 100 mL of Isovue 370 contrast with pulmonary artery  CTA protocol. Routine and volume rendered images were obtained on a 3-dimensional workstation. All CT scans at this facility use dose modulation, iterative reconstruction, and/or weight based dosing when appropriate to reduce radiation dose to as low as reasonably achievable. FINDINGS: An endotracheal tube is noted with its tip approximately 2 cm superior to the devin. There are no filling defects identified within the pulmonary arterial vasculature to suggest pulmonary emboli. The thoracic aorta is tortuous, and ectatic with mild calcific atherosclerotic plaquing of the arch. There is no dissection. The heart is mildly enlarged. Moderate coronary artery calcifications are present. Moderate bibasilar consolidation/atelectasis is noted of the posterior mid to lower lung fields, worsening inferiorly. There are no worrisome nodules, lymphadenopathy, pleural or pericardial effusions identified. A small calcified granuloma within the inferolateral aspect of the left upper lobe appears unchanged. A minimal compression deformity of the superior endplate of P96 and mild vertebral body wedging of the mid thoracic levels appears chronic. There are no acute fractures identified. Mild to moderate degenerative changes of the thoracic spine are noted. The limited images of the upper abdomen are noncontributory. ENDOTRACHEAL TUBE IN EXPECTED POSITION. MODERATE CONSOLIDATION/ATELECTASIS OF THE POSTERIOR MID TO LOWER LUNG SANZ, SUGGESTIVE OF BRONCHOPNEUMONIA AND/OR ASPIRATION. NO EVIDENCE OF PULMONARY EMBOLI OR OTHER SIGNIFICANT CHANGE FROM 12/26/2018 IDENTIFIED. Xr Chest Portable    Result Date: 10/28/2020  EXAMINATION: XR CHEST PORTABLE CLINICAL HISTORY: CENTRAL LINE COMPARISONS: OCTOBER 27, 2020, AT 1700 HOURS FINDINGS: Endotracheal tube 2.2 cm superior to devin. Nasogastric tube courses beneath diaphragm. Osseous structures intact.  Cardiopericardial silhouette normal. Blunting left costophrenic angle, mildly increased since prior study. Ill-defined area increased opacity left lung base. MILD INTERVAL INCREASE LEFT PLEURAL EFFUSION, WITH LEFT LOWER LUNG ATELECTASIS/PNEUMONIA. Xr Chest Portable    Result Date: 10/27/2020  EXAMINATION: CHEST PORTABLE VIEW-1700 hours  CLINICAL HISTORY: Intubation COMPARISONS: October 27, 2020 1431 hours  FINDINGS: Single  views of the chest is submitted. Interval endotracheal intubation since the prior examination. The tip is at the level of the clavicles. The cardiac silhouette enlarged unchanged configuration. . Pulmonary vascular mildly congested. Right sided trachea. Left lower lobe infiltrate consolidation left pleural effusion. Atelectasis, infiltrate right lower lobe Blunting of the right costophrenic angle suggesting trace right pleural effusion. No Pneumothoraces. ATELECTASIS, INFILTRATE RIGHT LOWER LOBE. TRACE RIGHT PLEURAL EFFUSION. LEFT LOWER LOBE INFILTRATE CONSOLIDATION LEFT PLEURAL EFFUSION. Xr Chest Portable    Result Date: 10/27/2020  EXAMINATION: CHEST PORTABLE EFTI-1503 hours  CLINICAL HISTORY: Short of breath COMPARISONS: July 8, 2020  FINDINGS: Single  views of the chest is submitted. The cardiac silhouette is of normal size configuration. Pulmonary vascular congested. The trachea remains deviated to the right. Left lower lobe infiltrate consolidation and small left pleural effusion. .  No Pneumothoraces. LEFT LOWER LOBE INFILTRATE CONSOLIDATION AND SMALL LEFT PLEURAL EFFUSION. .. Assessment, plan:   1.  Acute hypoxic and hypercapnic respiratory failure associated with sepsis and bilateral lower lobe atelectasis, improved significantly with invasive ventilatory support, adjusted her vent settings further by decreasing her rate to 14 and increasing tidal volume to 500 she tolerated that very well, she is alkalotic at this time and will need to adjust minute volume for that, given her persistent atelectasis I increase the volume to help recruit dependent segments  2. Bacteremic E. coli urinary tract infection with septic shock, improving gradually  3. Thrombocytopenia likely associated with severe sepsis and bacteremia, possible drug-induced more likely antibiotics, this was changed to Rocephin, will observe closely if she continues to drop will consider stopping Lovenox and switching to a different anticoagulant  4. Basilar/mainly left lower lobe consolidation possible pneumonia versus atelectasis or both  5. Supraventricular tachycardia, sinus tachycardia due to sepsis, improved significantly with appropriate management of sepsis  6.  Acute kidney injury associated with sepsis, resolving  Patient is steadily improving but still requiring invasive ventilatory support, will wean down sedation gradually keep comfortable but more arousable, continue with Rocephin for E. coli sepsis, wean off vasopressors, continue weaning down FiO2, repeat gases tomorrow and consider weaning to extubate if she continues to improve          Electronically signed by Fernandez Fry MD, FCCP on 10/30/2020 at 11:03 AM

## 2020-10-30 NOTE — PROGRESS NOTES
0900- Rounds done. Propofol decreased to 35 mcg.  IV fluids decreased to 50 ml/hr  1030- Propofol decreased to 30 mcg

## 2020-10-30 NOTE — PROGRESS NOTES
Physician Progress Note    10/30/2020   1:46 PM    Name:  Alayna Osorio  MRN:    [id number]     IP Day: 3     Admit Date: 10/27/2020  2:25 PM  PCP: Sobeida Silver MD    Code Status:  Full Code      Assessment and Plan: Active Problems/ diagnosis:     # acute respiratory failure due to bilateral pneumonia/ sepsis        Intubated on admission in the ED.   currently on Rocephin,  critical care on consult. ID on consult     #Severe sepsis/septic shock due to E. coli bacteremia due to UTI  POA        - resume Rocephin, appreciate ID help. Requiring only 2 mcg/min of Levophed.     # SVT/ ? PAF      got dig 10/29. Remains tachy, sinus tach today, on levophed. Resume IVF, TF. Cardiology seen this morning. Monitor on tele.      # FRED       Intubated, CPAP will be used once extubated      # HTN       monitor. On pressors due to septic shock      DVT PPX    Subjective: Intubated and sedated.      Physical Examination:      Vitals:  BP (!) 112/40   Pulse 94   Temp 99.3 °F (37.4 °C) (Rectal)   Resp 16   Ht 5' 3\" (1.6 m)   Wt 197 lb 1.5 oz (89.4 kg)   LMP  (LMP Unknown)   SpO2 91%   BMI 34.91 kg/m²   Temp (24hrs), Av.6 °F (37.6 °C), Min:99.3 °F (37.4 °C), Max:100 °F (37.8 °C)      General appearance: intubated and sedated   Mental Status: sedated   Lungs: ventilated  Heart: tachycardic but regular   Abdomen: soft, nontender, nondistended, bowel sounds present, no masses  Extremities: no edema, redness   Skin: no gross lesions, rashes    Data:     Labs:  Recent Labs     10/29/20  0530 10/30/20  0510 10/30/20  0600   WBC 40.2*  --  22.0*   HGB 11.1* 8.5* 8.8*   *  --  82*     Recent Labs     10/29/20  0535 10/30/20  0510 10/30/20  0514   *  --  141   K 4.2  --  3.3*   *  --  109*   CO2 21  --  20   BUN 25*  --  18   CREATININE 1.48* 1.3* 1.00*   GLUCOSE 147*  --  178*     Recent Labs     10/27/20  1530   AST 21   ALT 12   BILITOT 0.6   ALKPHOS 148*       Current Facility-Administered Medications   Medication Dose Route Frequency Provider Last Rate Last Dose    chlorhexidine (PERIDEX) 0.12 % solution 15 mL  15 mL Mouth/Throat BID Sara Ornelas MD   15 mL at 10/30/20 1137    polyethylene glycol (GLYCOLAX) packet 17 g  17 g Oral Daily Sara Ornelas MD   17 g at 10/30/20 1137    docusate (COLACE) 50 MG/5ML liquid 100 mg  100 mg Oral BID Sara Ornelas MD   100 mg at 10/30/20 1137    cefTRIAXone (ROCEPHIN) 1 g IVPB in 50 mL D5W minibag  1 g Intravenous Q24H Sara Ornelas MD   Stopped at 10/30/20 1210    ipratropium-albuterol (DUONEB) nebulizer solution 1 ampule  1 ampule Inhalation Q4H PRN Sara Ornelas MD        sodium chloride flush 0.9 % injection 10 mL  10 mL Intravenous 2 times per day Azalea Priest DO   10 mL at 10/30/20 0825    sodium chloride flush 0.9 % injection 10 mL  10 mL Intravenous PRN Azalea Priest DO        propofol injection  20 mcg/kg/min Intravenous Titrated Sara Ornelas MD 16.1 mL/hr at 10/30/20 1034 30 mcg/kg/min at 10/30/20 1034    0.9 % sodium chloride infusion   Intravenous Continuous Sara Ornelas MD 50 mL/hr at 10/30/20 0942      sodium chloride flush 0.9 % injection 10 mL  10 mL Intravenous 2 times per day Azalea Priest DO   10 mL at 10/29/20 1309    sodium chloride flush 0.9 % injection 10 mL  10 mL Intravenous PRN Azalea Priest DO        acetaminophen (TYLENOL) tablet 650 mg  650 mg Oral Q6H PRN Azalea Priest DO        Or    acetaminophen (TYLENOL) suppository 650 mg  650 mg Rectal Q6H PRN Anupama Hernandez DO        promethazine (PHENERGAN) tablet 12.5 mg  12.5 mg Oral Q6H PRN Anupama Hernandez DO        Or    ondansetron [facility]) injection 4 mg  4 mg Intravenous Q6H PRN Anupama Hernandez DO        enoxaparin (LOVENOX) injection 30 mg  30 mg Subcutaneous Daily Azalea Priest DO   30 mg at 10/30/20 0815    potassium chloride (KLOR-CON M) extended release tablet 40 mEq  40 mEq Oral PRN Azalea Priest DO        Or    potassium bicarb-citric acid (EFFER-K) effervescent tablet 40 mEq  40 mEq Oral PRN Kasia Rivas, DO   40 mEq at 10/30/20 1128    Or    potassium chloride 10 mEq/100 mL IVPB (Peripheral Line)  10 mEq Intravenous PRN Kasia Rivas, DO        magnesium sulfate 2 g in 50 mL IVPB premix  2 g Intravenous PRN Kasia Rivas, DO        famotidine (PEPCID) injection 20 mg  20 mg Intravenous Daily Kasia Rivas, DO   20 mg at 10/30/20 0815    metoprolol (LOPRESSOR) injection 5 mg  5 mg Intravenous Q6H PRN Kasia Rivas, DO   5 mg at 10/29/20 0428    norepinephrine (LEVOPHED) 16 mg in dextrose 5 % 250 mL infusion  2 mcg/min Intravenous Continuous Hermenia Mumtaz Sedar, DO 1.9 mL/hr at 10/30/20 1035 2 mcg/min at 10/30/20 1035       Additional work up or/and treatment plan may be added today or then after based on clinical progression. I am managing a portion of pt care. Some medical issues are handled by other specialists. Additional work up and treatment should be done in out pt setting by pt PCP and other out pt providers. In addition to examining and evaluating pt, I spent additional time explaining care, normaland abnormal findings, and treatment plan. All of pt questions were answered. Counseling, diet and education were provided. Case will be discussed with nursing staff when appropriate. Family will be updated if and when appropriate.        Electronically signed by Kasia Rivas DO on 10/30/2020 at 1:46 PM

## 2020-10-30 NOTE — PROGRESS NOTES
I assumed care f this patient at 1120 from PAGE Jones RN. New medications administered as ordered. Potassium supplemented. Oral care completed. Daughter \"Alla\" updated.  Electronically signed by Addison Carias RN on 10/30/2020 at 12:20 PM

## 2020-10-31 ENCOUNTER — APPOINTMENT (OUTPATIENT)
Dept: GENERAL RADIOLOGY | Age: 85
DRG: 870 | End: 2020-10-31
Payer: MEDICARE

## 2020-10-31 LAB
ANION GAP SERPL CALCULATED.3IONS-SCNC: 9 MEQ/L (ref 9–15)
BASE EXCESS ARTERIAL: 0 (ref -3–3)
BASOPHILS ABSOLUTE: 0.1 K/UL (ref 0–0.2)
BASOPHILS RELATIVE PERCENT: 0.7 %
BUN BLDV-MCNC: 23 MG/DL (ref 8–23)
CALCIUM IONIZED: 1.26 MMOL/L (ref 1.12–1.32)
CALCIUM SERPL-MCNC: 8.1 MG/DL (ref 8.5–9.9)
CHLORIDE BLD-SCNC: 113 MEQ/L (ref 95–107)
CO2: 23 MEQ/L (ref 20–31)
CREAT SERPL-MCNC: 0.94 MG/DL (ref 0.5–0.9)
EOSINOPHILS ABSOLUTE: 0.2 K/UL (ref 0–0.7)
EOSINOPHILS RELATIVE PERCENT: 1.4 %
GFR AFRICAN AMERICAN: 51
GFR AFRICAN AMERICAN: >60
GFR NON-AFRICAN AMERICAN: 42
GFR NON-AFRICAN AMERICAN: 55.6
GLUCOSE BLD-MCNC: 135 MG/DL (ref 70–99)
GLUCOSE BLD-MCNC: 142 MG/DL (ref 60–115)
HCO3 ARTERIAL: 24.6 MMOL/L (ref 21–29)
HCT VFR BLD CALC: 27.5 % (ref 37–47)
HEMOGLOBIN: 8.1 GM/DL (ref 12–16)
HEMOGLOBIN: 8.8 G/DL (ref 12–16)
LACTATE: 0.62 MMOL/L (ref 0.4–2)
LYMPHOCYTES ABSOLUTE: 0.7 K/UL (ref 1–4.8)
LYMPHOCYTES RELATIVE PERCENT: 5.5 %
MAGNESIUM: 2.1 MG/DL (ref 1.7–2.4)
MCH RBC QN AUTO: 28.8 PG (ref 27–31.3)
MCHC RBC AUTO-ENTMCNC: 32 % (ref 33–37)
MCV RBC AUTO: 89.8 FL (ref 82–100)
MONOCYTES ABSOLUTE: 0.7 K/UL (ref 0.2–0.8)
MONOCYTES RELATIVE PERCENT: 5.7 %
NEUTROPHILS ABSOLUTE: 10.4 K/UL (ref 1.4–6.5)
NEUTROPHILS RELATIVE PERCENT: 86.7 %
O2 SAT, ARTERIAL: 99 % (ref 93–100)
PCO2 ARTERIAL: 39 MM HG (ref 35–45)
PDW BLD-RTO: 14.5 % (ref 11.5–14.5)
PERFORMED ON: ABNORMAL
PH ARTERIAL: 7.41 (ref 7.35–7.45)
PLATELET # BLD: 86 K/UL (ref 130–400)
PO2 ARTERIAL: 120 MM HG (ref 75–108)
POC CHLORIDE: 112 MEQ/L (ref 99–110)
POC CREATININE: 1.2 MG/DL (ref 0.6–1.2)
POC FIO2: 55
POC HEMATOCRIT: 24 % (ref 36–48)
POC POTASSIUM: 3.5 MEQ/L (ref 3.5–5.1)
POC SAMPLE TYPE: ABNORMAL
POC SODIUM: 146 MEQ/L (ref 136–145)
POTASSIUM REFLEX MAGNESIUM: 3.7 MEQ/L (ref 3.4–4.9)
RBC # BLD: 3.06 M/UL (ref 4.2–5.4)
SODIUM BLD-SCNC: 145 MEQ/L (ref 135–144)
TCO2 ARTERIAL: 26 (ref 22–29)
WBC # BLD: 12 K/UL (ref 4.8–10.8)

## 2020-10-31 PROCEDURE — 83605 ASSAY OF LACTIC ACID: CPT

## 2020-10-31 PROCEDURE — 82435 ASSAY OF BLOOD CHLORIDE: CPT

## 2020-10-31 PROCEDURE — 83735 ASSAY OF MAGNESIUM: CPT

## 2020-10-31 PROCEDURE — 2580000003 HC RX 258: Performed by: INTERNAL MEDICINE

## 2020-10-31 PROCEDURE — 2700000000 HC OXYGEN THERAPY PER DAY

## 2020-10-31 PROCEDURE — 36600 WITHDRAWAL OF ARTERIAL BLOOD: CPT

## 2020-10-31 PROCEDURE — 99291 CRITICAL CARE FIRST HOUR: CPT | Performed by: INTERNAL MEDICINE

## 2020-10-31 PROCEDURE — 71045 X-RAY EXAM CHEST 1 VIEW: CPT

## 2020-10-31 PROCEDURE — 80048 BASIC METABOLIC PNL TOTAL CA: CPT

## 2020-10-31 PROCEDURE — 2500000003 HC RX 250 WO HCPCS: Performed by: INTERNAL MEDICINE

## 2020-10-31 PROCEDURE — 82330 ASSAY OF CALCIUM: CPT

## 2020-10-31 PROCEDURE — 85025 COMPLETE CBC W/AUTO DIFF WBC: CPT

## 2020-10-31 PROCEDURE — 84295 ASSAY OF SERUM SODIUM: CPT

## 2020-10-31 PROCEDURE — 6370000000 HC RX 637 (ALT 250 FOR IP): Performed by: INTERNAL MEDICINE

## 2020-10-31 PROCEDURE — 94003 VENT MGMT INPAT SUBQ DAY: CPT

## 2020-10-31 PROCEDURE — 6360000002 HC RX W HCPCS: Performed by: INTERNAL MEDICINE

## 2020-10-31 PROCEDURE — 84132 ASSAY OF SERUM POTASSIUM: CPT

## 2020-10-31 PROCEDURE — 99232 SBSQ HOSP IP/OBS MODERATE 35: CPT | Performed by: INTERNAL MEDICINE

## 2020-10-31 PROCEDURE — 2000000000 HC ICU R&B

## 2020-10-31 PROCEDURE — 82803 BLOOD GASES ANY COMBINATION: CPT

## 2020-10-31 PROCEDURE — 85014 HEMATOCRIT: CPT

## 2020-10-31 PROCEDURE — 82565 ASSAY OF CREATININE: CPT

## 2020-10-31 RX ADMIN — FAMOTIDINE 20 MG: 10 INJECTION INTRAVENOUS at 08:20

## 2020-10-31 RX ADMIN — Medication 10 ML: at 08:21

## 2020-10-31 RX ADMIN — PROPOFOL 15 MCG/KG/MIN: 10 INJECTION, EMULSION INTRAVENOUS at 20:00

## 2020-10-31 RX ADMIN — PROPOFOL 30 MCG/KG/MIN: 10 INJECTION, EMULSION INTRAVENOUS at 03:02

## 2020-10-31 RX ADMIN — CEFTRIAXONE SODIUM 1 G: 1 INJECTION, POWDER, FOR SOLUTION INTRAMUSCULAR; INTRAVENOUS at 08:20

## 2020-10-31 RX ADMIN — CHLORHEXIDINE GLUCONATE 15 ML: 1.2 RINSE ORAL at 19:46

## 2020-10-31 RX ADMIN — ENOXAPARIN SODIUM 40 MG: 40 INJECTION SUBCUTANEOUS at 08:20

## 2020-10-31 RX ADMIN — PROPOFOL 10 MCG/KG/MIN: 10 INJECTION, EMULSION INTRAVENOUS at 09:59

## 2020-10-31 RX ADMIN — DOCUSATE SODIUM 100 MG: 50 LIQUID ORAL at 08:20

## 2020-10-31 RX ADMIN — CHLORHEXIDINE GLUCONATE 15 ML: 1.2 RINSE ORAL at 08:20

## 2020-10-31 RX ADMIN — POLYETHYLENE GLYCOL 3350 17 G: 17 POWDER, FOR SOLUTION ORAL at 08:20

## 2020-10-31 RX ADMIN — DOCUSATE SODIUM 100 MG: 50 LIQUID ORAL at 19:46

## 2020-10-31 ASSESSMENT — PAIN SCALES - GENERAL
PAINLEVEL_OUTOF10: 0

## 2020-10-31 ASSESSMENT — PULMONARY FUNCTION TESTS
PIF_VALUE: 31
PIF_VALUE: 31
PIF_VALUE: 27
PIF_VALUE: 32
PIF_VALUE: 30
PIF_VALUE: 29
PIF_VALUE: 6.4
PIF_VALUE: 34
PIF_VALUE: 33
PIF_VALUE: 38
PIF_VALUE: 24
PIF_VALUE: 31
PIF_VALUE: 34
PIF_VALUE: 27
PIF_VALUE: 31
PIF_VALUE: 29
PIF_VALUE: 41
PIF_VALUE: 29
PIF_VALUE: 51
PIF_VALUE: 31
PIF_VALUE: 30

## 2020-10-31 NOTE — PROGRESS NOTES
Pulmonary ICU Progress Note    PRIMARY SERVICE: Pulmonary Disease    INTERVAL HPI: Patient seen and examined at bedside, Interval Notes, orders reviewed. Nursing notes noted    Patient is on vent Support assist control rate of 14 tidal volume 500 FiO2 40% PEEP of 5. She was weaning this morning with CPAP and pressure support. Respiratory rate went up to mid 30s. Patient appears very anxious. So she was placed back on a vent support. She is on 40% FiO2. And O2 saturation is 98%. She is not having any fever. She had ABG shows pH of 7.40 with PCO2 39 PO2 120 saturation 99 bicarb 24. 6. she  is on sedation with diprivan drip. No vomiting or diarrhea. Review of Systems   as above        Intake/Output Summary (Last 24 hours) at 10/31/2020 1059  Last data filed at 10/31/2020 0800  Gross per 24 hour   Intake 3198 ml   Output 1150 ml   Net 2048 ml       Vitals:  BP (!) 142/53   Pulse 90   Temp 98.8 °F (37.1 °C) (Oral)   Resp 18   Ht 5' 3\" (1.6 m)   Wt 197 lb 1.5 oz (89.4 kg)   LMP  (LMP Unknown)   SpO2 98%   BMI 34.91 kg/m²   EXAM:  General: Orally intubated, anxious, tachypneic at times. Head: Atraumatic ,Normocephalic   Eyes: PERRL. No sclera icterus. No conjunctival injection. No discharge   ENT: No nasal  discharge. Pharynx clear. Neck:  Trachea midline. No thyromegaly, no JVD, No cervical adenopathy. Resp : Normal effort,  No accessory muscle use. No Rales. No wheezing. No rhonchi. CV: Normal  rate. Regular rhythm. No mumur ,  Rub or gallop  ABD: Non-tender. Non-distended. No masses. Noorganmegaly. Normal bowel sounds. No hernia.   EXT: No Pitting, No Cyanosis No clubbing    ABG:     Lab Results   Component Value Date    PHART 7.408 10/31/2020    SEL0RSS 39 10/31/2020    PO2ART 120 10/31/2020    WTH4FSF 24.6 10/31/2020    BEART 0 10/31/2020    O4RLXKSR 99 10/31/2020     Lab Results   Component Value Date    LACTA 1.0 12/25/2018     O2 Device: Ventilator    MV Settings:     Vent Mode: (S) AC/VC  Vt liver is normal in size and attenuation without  focal lesions. There are no focal solid or cystic lesions. There is no intra or extrahepatic bile duct dilatation. Gallbladder: There are numerous calcified stones layering to the dependent portion of the gallbladder without surrounding inflammatory changes. There is distention of the gallbladder. Spleen: There are no focal lesions or calcifications in the spleen. There is no splenomegaly  Pancreas: The pancreas is normal in size and attenuation without focal lesions or dilatation of the pancreatic duct. Kidneys: There are no solid or cystic lesions. There is no hydronephrosis. There is a 9 mm calcified stone in the inferior pole of the right kidney. There is no hydroureter. Adrenal glands are negative. Bowel: There is no bowel dilatation or evidence of diverticulitis. Appendix: There  is no CT evidence for appendicitis. Nodes: No lymphadenopathy. Aorta: No aneurysm  Peritoneum: No free fluid or free air. Pelvis: There are no solid or cystic lesions. There is a Cedeno catheter in place. Abdominal wall: The abdominal wall is intact. Bones : There are no acute osseous changes. Soft tissues: The soft tissues are unremarkable. No acute intra-abdominal changes. There is no free air or free fluid, there are no fluid collections. The visualized portions of the lung bases demonstrate bilateral atelectasis and consolidations in the dependent portions. There are calcified stones in the gallbladder without evidence of cholecystitis There is a 9 mm right intrarenal stone in the inferior pole, there is no hydronephrosis or hydroureter on either side. Ct Head Wo Contrast    Result Date: 10/27/2020  CT Brain Contrast medium:  Not utilized. History: Altered mental status. Tachycardia. Comparison:  None. Findings: Extra-axial spaces:  Normal. Intracranial hemorrhage:  None. Ventricular system: Ventricles mildly enlarged.  Sulci mildly prominent Basal Cisterns: appears chronic. There are no acute fractures identified. Mild to moderate degenerative changes of the thoracic spine are noted. The limited images of the upper abdomen are noncontributory. ENDOTRACHEAL TUBE IN EXPECTED POSITION. MODERATE CONSOLIDATION/ATELECTASIS OF THE POSTERIOR MID TO LOWER LUNG SANZ, SUGGESTIVE OF BRONCHOPNEUMONIA AND/OR ASPIRATION. NO EVIDENCE OF PULMONARY EMBOLI OR OTHER SIGNIFICANT CHANGE FROM 12/26/2018 IDENTIFIED. Xr Chest Portable    Result Date: 10/30/2020  Exam: XR CHEST PORTABLE History: Respiratory failure Technique: AP portable view of the chest obtained. Comparison: Portable chest radiograph and CT chest from October 27, 2020 Findings: Suboptimal inspiration. Right upper extremity PICC is present and terminates in the SVC. Endotracheal tube and enteric tube remain. Atherosclerotic calcification of the thoracic aorta. Heart size is at the upper limits of normal. No significant interval change in bilateral infiltrate and/or atelectasis given suboptimal inspiration. No acute osseous abnormality. No significant interval change in bibasilar infiltrate and/or atelectasis. Xr Chest Portable    Result Date: 10/28/2020  EXAMINATION: XR CHEST PORTABLE CLINICAL HISTORY: CENTRAL LINE COMPARISONS: OCTOBER 27, 2020, AT 1700 HOURS FINDINGS: Endotracheal tube 2.2 cm superior to devin. Nasogastric tube courses beneath diaphragm. Osseous structures intact. Cardiopericardial silhouette normal. Blunting left costophrenic angle, mildly increased since prior study. Ill-defined area increased opacity left lung base. MILD INTERVAL INCREASE LEFT PLEURAL EFFUSION, WITH LEFT LOWER LUNG ATELECTASIS/PNEUMONIA. Xr Chest Portable    Result Date: 10/27/2020  EXAMINATION: CHEST PORTABLE VIEW-1700 hours  CLINICAL HISTORY: Intubation COMPARISONS: October 27, 2020 1431 hours  FINDINGS: Single  views of the chest is submitted. Interval endotracheal intubation since the prior examination.  The tip is at the level of the clavicles. The cardiac silhouette enlarged unchanged configuration. . Pulmonary vascular mildly congested. Right sided trachea. Left lower lobe infiltrate consolidation left pleural effusion. Atelectasis, infiltrate right lower lobe Blunting of the right costophrenic angle suggesting trace right pleural effusion. No Pneumothoraces. ATELECTASIS, INFILTRATE RIGHT LOWER LOBE. TRACE RIGHT PLEURAL EFFUSION. LEFT LOWER LOBE INFILTRATE CONSOLIDATION LEFT PLEURAL EFFUSION. Xr Chest Portable    Result Date: 10/27/2020  EXAMINATION: CHEST PORTABLE [facility]-1678 hours  CLINICAL HISTORY: Short of breath COMPARISONS: July 8, 2020  FINDINGS: Single  views of the chest is submitted. The cardiac silhouette is of normal size configuration. Pulmonary vascular congested. The trachea remains deviated to the right. Left lower lobe infiltrate consolidation and small left pleural effusion. .  No Pneumothoraces. LEFT LOWER LOBE INFILTRATE CONSOLIDATION AND SMALL LEFT PLEURAL EFFUSION. .. Ir Picc Wo Sq Port/pump > 5 Years    Result Date: 10/29/2020  PERIPHERALLY INSERTED CENTRAL CATHETER (PICC) PLACEMENT WITH ULTRASOUND GUIDANCE CLINICAL HISTORY:  REQUIRES PROLONGED INTRAVENOUS ANTIBIOTICS FOR PNEUMONIA. After discussing the procedure and possible complications with the patient, informed consent was obtained. The patient was placed on the Special Procedures table. The right upper extremity was sterilely prepared using a maximal sterile barrier technique which includes cap, mask, sterile gown, sterile gloves, sterile full-body drape, hand hygiene and 2% chlorhexidine for cutaneous antisepsis. A sterile ultrasound technique with sterile gel and sterile probe covers was also utilized.  A pre-procedure time out was performed in order to assure the correct patient and procedure. Local anesthetic was administered. A peripheral vein was accessed with sonographic guidance. A sonographic spot image was obtained for documentation. A guidewire was advanced into the vein with fluoroscopic guidance and a sheath was placed over the guidewire. A 5-Macedonian triple-lumen PICC was advanced through the sheath, up the arm and into the central vasculature. It was positioned appropriately. The  sheath was removed. The catheter was shown to aspirate and infuse properly. The flange of the catheter was affixed to the arm using a PICC securement device. A spot image of the chest showed the tip of the PICC line to lie in the superior vena cava. The patient tolerated the procedure well and without complications. Number of films: 4 Fluoroscopy time: 10.9 seconds CONCLUSION: SUCCESSFUL RIGHT PICC PLACEMENT WITHOUT IMMEDIATE COMPLICATIONS. Us Carotid Artery Bilateral    Result Date: 10/28/2020  History:  Arterial access during central line placement Technique:  US CAROTID ARTERY BILATERAL Comparison: No prior Findings: No pseudoaneurysm seen. Doppler findings: ARTERIAL BLOOD FLOW VELOCITY RIGHT PS                                               Prox CCA 77cm/s             Mid CCA 80cm/s              Dist CCA 61cm/s              Prox ICA cm/s              Mid ICA cm/s              Dist ICA cm/s              Prox ECA cm/s              Dist VERT cm/s              Bulb ICA/CCA LEFT PS Prox CCA 133cm/s              Mid CCA 103cm/s              Dist CCA 107cm/s              Prox ICA 75cm/s              Mid ICA 91cm/s              Dist ICA 90cm/s              Prox ECA 171cm/s              Prox VERT 53cm/s              Bulb ICA/CCA 0.8     Impression: 1. The patient is on a ventilator and the examination is limited. The right external carotid , internal carotid and vertebral arteries are not visualized. 2.  No hemodynamic significant stenosis seen on the left 3.  No evidence of pseudoaneurysm Validated velocity measurements with angiographic measurements, velocity criteria are extrapolated from diameter data as defined by the Society of radiologist in ultrasound consensus conference radiology 2003; 229; 340-346. Results:  CBC:   Recent Labs     10/29/20  0530  10/30/20  0600 10/31/20  0526 10/31/20  0600   WBC 40.2*  --  22.0*  --  12.0*   HGB 11.1*   < > 8.8* 8.1* 8.8*   HCT 34.8*  --  26.7*  --  27.5*   MCV 89.7  --  89.8  --  89.8   *  --  82*  --  86*    < > = values in this interval not displayed.     :   Recent Labs     10/29/20  0535  10/30/20  0514 10/31/20  0526 10/31/20  0600   *  --  141  --  145*   K 4.2  --  3.3*  --  3.7   *  --  109*  --  113*   CO2 21  --  20  --  23   BUN 25*  --  18  --  23   CREATININE 1.48*   < > 1.00* 1.2 0.94*    < > = values in this interval not displayed. Assessment:  1. Acute hypoxic and hypercapnic respiratory failure associated with sepsis  2.  E. coli bacteremia with UTI with septic shock  3. Thrombocytopenia  4. Bibasilar atelectasis versus pneumonia  5. Acute kidney injury improving  6. Leukocytosis, improving    Patient is on CPAP and pressure support and respiratory rate increased to mid 30s and patient looks anxious so patient was placed back on a vent support. Will try again weaning this evening or in the morning and extubate if she tolerated. Overall she is clinically improving she was on Levophed which stopped. Discussed with RN chest x-ray done yesterday showed no significant interval change with bibasilar infiltration and atelectasis. Will request chest x-ray today. She  has Cedeno  catheter , t is on DVT and GI prophylaxis  She  has restraints which needed to prevent self-harm  Critical care time spent reviewing labs/films, examining patient, collaborating with otherphysicians but excluding procedures for life threatening organ failure is 38minutes.       SIGNATURE: Rio Joyner MD, FCCP

## 2020-10-31 NOTE — PROGRESS NOTES
7931: stopped propofol to place pt on SBT. No fever, did have fever throught he night for 7p-7a.     0900: pt on cpap mode, tolerating well    0956: placed pt back on a/c reps intot eh lower 30's. Per sunday leave ot on vent one more day. She does get rather anxiety ridden when fully awake.

## 2020-10-31 NOTE — PROGRESS NOTES
Physician Progress Note    10/31/2020   1:41 PM    Name:  Emmett Frankel  MRN:    [id number]     IP Day: 4     Admit Date: 10/27/2020  2:25 PM  PCP: Jhonatan Tadeo MD    Code Status:  Full Code      Assessment and Plan: Active Problems/ diagnosis:     # acute respiratory failure due to bilateral pneumonia/ sepsis        Intubated on admission in the ED.   currently on antibiotic as per ID,  critical care on consult. #Severe sepsis/septic shock due to E. coli bacteremia due to UTI  POA        - resume Rocephin, appreciate ID help. Requiring only 2 mcg/min of Levophed.     # SVT/ ? PAF      got dig 10/29. Remains tachy, sinus tach today, on levophed. Resume IVF, TF. Cardiology seen this morning. Monitor on tele.      # FRED       Intubated, CPAP will be used once extubated      # HTN       monitor. On pressors due to septic shock      DVT PPX    Subjective: Intubated and sedated. Physical Examination:      Vitals:  BP (!) 142/53   Pulse 90   Temp 98.7 °F (37.1 °C) (Oral)   Resp 21   Ht 5' 3\" (1.6 m)   Wt 197 lb 1.5 oz (89.4 kg)   LMP  (LMP Unknown)   SpO2 97%   BMI 34.91 kg/m²   Temp (24hrs), Av.5 °F (37.5 °C), Min:98.6 °F (37 °C), Max:100.6 °F (38.1 °C)      General appearance: intubated and sedated   Mental Status: sedated   Lungs: ventilated  Heart: regular   Abdomen: soft, nontender, nondistended, bowel sounds present, no masses  Extremities: no edema, redness   Skin: no gross lesions, rashes    Data:     Labs:  Recent Labs     10/30/20  0600 10/31/20  0526 10/31/20  06   WBC 22.0*  --  12.0*   HGB 8.8* 8.1* 8.8*   PLT 82*  --  86*     Recent Labs     10/30/20  0514 10/31/20  0526 10/31/20  06     --  145*   K 3.3*  --  3.7   *  --  113*   CO2 20  --  23   BUN 18  --  23   CREATININE 1.00* 1.2 0.94*   GLUCOSE 178*  --  135*     No results for input(s): AST, ALT, ALB, BILITOT, ALKPHOS in the last 72 hours.     Current Facility-Administered Medications   Medication Dose Route Frequency Provider Last Rate Last Dose    enoxaparin (LOVENOX) injection 40 mg  40 mg Subcutaneous Daily Pitney Yaritza, DO   40 mg at 10/31/20 0820    chlorhexidine (PERIDEX) 0.12 % solution 15 mL  15 mL Mouth/Throat BID Robert Corrales MD   15 mL at 10/31/20 0820    polyethylene glycol (GLYCOLAX) packet 17 g  17 g Oral Daily Robert Corrales MD   17 g at 10/31/20 0820    docusate (COLACE) 50 MG/5ML liquid 100 mg  100 mg Oral BID Robert Corrales MD   100 mg at 10/31/20 0820    cefTRIAXone (ROCEPHIN) 1 g IVPB in 50 mL D5W minibag  1 g Intravenous Q24H Robert Corrales MD   Stopped at 10/31/20 0850    ipratropium-albuterol (DUONEB) nebulizer solution 1 ampule  1 ampule Inhalation Q4H PRN Robert Corrales MD        sodium chloride flush 0.9 % injection 10 mL  10 mL Intravenous 2 times per day Pitjerel Vigil, DO   10 mL at 10/31/20 0904    sodium chloride flush 0.9 % injection 10 mL  10 mL Intravenous PRN Tameka Vigil, DO        propofol injection  20 mcg/kg/min Intravenous Titrated Robert Corrales MD 5.4 mL/hr at 10/31/20 0959 10 mcg/kg/min at 10/31/20 0959    sodium chloride flush 0.9 % injection 10 mL  10 mL Intravenous 2 times per day Tameka Vigil, DO   10 mL at 10/31/20 8589    sodium chloride flush 0.9 % injection 10 mL  10 mL Intravenous PRN Pitjerel Yaritza, DO        acetaminophen (TYLENOL) tablet 650 mg  650 mg Oral Q6H PRN Pitney Yaritza, DO   650 mg at 10/30/20 2043    Or    acetaminophen (TYLENOL) suppository 650 mg  650 mg Rectal Q6H PRN Pitney Yaritza, DO        promethazine (PHENERGAN) tablet 12.5 mg  12.5 mg Oral Q6H PRN Alline Pacer David, DO        Or    ondansetron [facility]) injection 4 mg  4 mg Intravenous Q6H PRN Pitney Yaritza, DO        potassium chloride (KLOR-CON M) extended release tablet 40 mEq  40 mEq Oral PRN Pitney Yaritza, DO        Or    potassium bicarb-citric acid (EFFER-K) effervescent tablet 40 mEq  40 mEq Oral PRN Nithin Hernandez DO   40 mEq at 10/30/20 1128 Or    potassium chloride 10 mEq/100 mL IVPB (Peripheral Line)  10 mEq Intravenous PRN Pitney Yaritza, DO        magnesium sulfate 2 g in 50 mL IVPB premix  2 g Intravenous PRN Pitney Yaritza, DO        famotidine (PEPCID) injection 20 mg  20 mg Intravenous Daily Tameka Vigil, DO   20 mg at 10/31/20 0820    metoprolol (LOPRESSOR) injection 5 mg  5 mg Intravenous Q6H PRN Tameka Bhagates, DO   5 mg at 10/29/20 0428    norepinephrine (LEVOPHED) 16 mg in dextrose 5 % 250 mL infusion  2 mcg/min Intravenous Continuous Bethanne Mayor Sedar, DO   Stopped at 10/31/20 0115       Additional work up or/and treatment plan may be added today or then after based on clinical progression. I am managing a portion of pt care. Some medical issues are handled by other specialists. Additional work up and treatment should be done in out pt setting by pt PCP and other out pt providers. In addition to examining and evaluating pt, I spent additional time explaining care, normaland abnormal findings, and treatment plan. All of pt questions were answered. Counseling, diet and education were provided. Case will be discussed with nursing staff when appropriate. Family will be updated if and when appropriate.        Electronically signed by Tameka Vigil DO on 10/31/2020 at 1:41 PM

## 2020-10-31 NOTE — CARE COORDINATION
Anticipate attempt to extubate on 11/1/20. Per patients CMI she lives with the daughter and is dependent on family for her needs. The plan for discharge was to be home with [place] [id number] or Rehab. Will need PT/OT to assess functional needs but do anticipate the need for placement. CM will continue to follow for placement needs.  Electronically signed by Darryn Finley RN on 10/31/2020 at 3:07 PM

## 2020-10-31 NOTE — PROGRESS NOTES
Pharmacy Dose Adjustment Per Protocol    Yoana Andrews is a 80 y.o. female. Recent Labs     10/30/20  0514 10/31/20  0600   BUN 18 23       Recent Labs     10/31/20  0526 10/31/20  0600   CREATININE 1.2 0.94*       Estimated Creatinine Clearance: 41 mL/min (A) (based on SCr of 0.94 mg/dL (H)). Height:   Ht Readings from Last 1 Encounters:   10/27/20 5' 3\" (1.6 m)     Weight:  Wt Readings from Last 1 Encounters:   10/29/20 197 lb 1.5 oz (89.4 kg)         Drug Ordered Therapeutic Interchange (CrCL ?  30 mL/min)   [] Enoxaparin 40 mg subq daily Enoxaparin 30 mg subq daily   [] Enoxaparin 1 mg/kg subq BID Enoxaparin ________ (1 mg/kg) subq daily    [] Enoxaparin 30 subq BID Enoxaparin 30 mg subq daily     Renal function has improved to to CrCl = 41 ml/min   Increase lovenox to 40 mg once daily

## 2020-10-31 NOTE — PROGRESS NOTES
1900- received bedside report and handoff of care from day shift nurse. Repositioned patient and performed skin assessment. patient is sedated on vent at this time. 2220- spoke with Dr. Yesenia Stone about W9N neuro checks, changed to q shift    0100- patient given complete bed bath, all linens changed.

## 2020-11-01 LAB
ANION GAP SERPL CALCULATED.3IONS-SCNC: 10 MEQ/L (ref 9–15)
ANISOCYTOSIS: ABNORMAL
BASE EXCESS ARTERIAL: 1 (ref -3–3)
BASOPHILS ABSOLUTE: 0.1 K/UL (ref 0–0.2)
BASOPHILS RELATIVE PERCENT: 1 %
BUN BLDV-MCNC: 21 MG/DL (ref 8–23)
CALCIUM IONIZED: 1.24 MMOL/L (ref 1.12–1.32)
CALCIUM SERPL-MCNC: 8.2 MG/DL (ref 8.5–9.9)
CHLORIDE BLD-SCNC: 110 MEQ/L (ref 95–107)
CO2: 23 MEQ/L (ref 20–31)
CREAT SERPL-MCNC: 0.82 MG/DL (ref 0.5–0.9)
EOSINOPHILS ABSOLUTE: 0.1 K/UL (ref 0–0.7)
EOSINOPHILS RELATIVE PERCENT: 1 %
GFR AFRICAN AMERICAN: >60
GFR AFRICAN AMERICAN: >60
GFR NON-AFRICAN AMERICAN: 52
GFR NON-AFRICAN AMERICAN: >60
GLUCOSE BLD-MCNC: 127 MG/DL (ref 70–99)
GLUCOSE BLD-MCNC: 131 MG/DL (ref 60–115)
HCO3 ARTERIAL: 25.5 MMOL/L (ref 21–29)
HCT VFR BLD CALC: 28.4 % (ref 37–47)
HEMOGLOBIN: 11.9 GM/DL (ref 12–16)
HEMOGLOBIN: 9.3 G/DL (ref 12–16)
LACTATE: 0.52 MMOL/L (ref 0.4–2)
LYMPHOCYTES ABSOLUTE: 0.7 K/UL (ref 1–4.8)
LYMPHOCYTES RELATIVE PERCENT: 10 %
MAGNESIUM: 2.2 MG/DL (ref 1.7–2.4)
MCH RBC QN AUTO: 29.3 PG (ref 27–31.3)
MCHC RBC AUTO-ENTMCNC: 32.7 % (ref 33–37)
MCV RBC AUTO: 89.6 FL (ref 82–100)
MICROCYTES: ABNORMAL
MONOCYTES ABSOLUTE: 0.6 K/UL (ref 0.2–0.8)
MONOCYTES RELATIVE PERCENT: 9.4 %
NEUTROPHILS ABSOLUTE: 5.5 K/UL (ref 1.4–6.5)
NEUTROPHILS RELATIVE PERCENT: 79 %
O2 SAT, ARTERIAL: 96 % (ref 93–100)
PCO2 ARTERIAL: 40 MM HG (ref 35–45)
PDW BLD-RTO: 14.1 % (ref 11.5–14.5)
PERFORMED ON: ABNORMAL
PH ARTERIAL: 7.41 (ref 7.35–7.45)
PLATELET # BLD: 91 K/UL (ref 130–400)
PLATELET SLIDE REVIEW: ABNORMAL
PO2 ARTERIAL: 82 MM HG (ref 75–108)
POC CHLORIDE: 109 MEQ/L (ref 99–110)
POC CREATININE: 1 MG/DL (ref 0.6–1.2)
POC FIO2: 35
POC HEMATOCRIT: 35 % (ref 36–48)
POC POTASSIUM: 3.4 MEQ/L (ref 3.5–5.1)
POC SAMPLE TYPE: ABNORMAL
POC SODIUM: 144 MEQ/L (ref 136–145)
POTASSIUM REFLEX MAGNESIUM: 3.7 MEQ/L (ref 3.4–4.9)
RBC # BLD: 3.18 M/UL (ref 4.2–5.4)
SODIUM BLD-SCNC: 143 MEQ/L (ref 135–144)
TCO2 ARTERIAL: 27 (ref 22–29)
WBC # BLD: 6.9 K/UL (ref 4.8–10.8)

## 2020-11-01 PROCEDURE — 83735 ASSAY OF MAGNESIUM: CPT

## 2020-11-01 PROCEDURE — 2060000000 HC ICU INTERMEDIATE R&B

## 2020-11-01 PROCEDURE — 94761 N-INVAS EAR/PLS OXIMETRY MLT: CPT

## 2020-11-01 PROCEDURE — 84295 ASSAY OF SERUM SODIUM: CPT

## 2020-11-01 PROCEDURE — 83605 ASSAY OF LACTIC ACID: CPT

## 2020-11-01 PROCEDURE — 6370000000 HC RX 637 (ALT 250 FOR IP): Performed by: INTERNAL MEDICINE

## 2020-11-01 PROCEDURE — 85014 HEMATOCRIT: CPT

## 2020-11-01 PROCEDURE — 84132 ASSAY OF SERUM POTASSIUM: CPT

## 2020-11-01 PROCEDURE — 2500000003 HC RX 250 WO HCPCS: Performed by: INTERNAL MEDICINE

## 2020-11-01 PROCEDURE — 2580000003 HC RX 258: Performed by: INTERNAL MEDICINE

## 2020-11-01 PROCEDURE — 82435 ASSAY OF BLOOD CHLORIDE: CPT

## 2020-11-01 PROCEDURE — 99291 CRITICAL CARE FIRST HOUR: CPT | Performed by: INTERNAL MEDICINE

## 2020-11-01 PROCEDURE — 94003 VENT MGMT INPAT SUBQ DAY: CPT

## 2020-11-01 PROCEDURE — 82803 BLOOD GASES ANY COMBINATION: CPT

## 2020-11-01 PROCEDURE — 80048 BASIC METABOLIC PNL TOTAL CA: CPT

## 2020-11-01 PROCEDURE — 82330 ASSAY OF CALCIUM: CPT

## 2020-11-01 PROCEDURE — 36600 WITHDRAWAL OF ARTERIAL BLOOD: CPT

## 2020-11-01 PROCEDURE — 82565 ASSAY OF CREATININE: CPT

## 2020-11-01 PROCEDURE — 6360000002 HC RX W HCPCS: Performed by: INTERNAL MEDICINE

## 2020-11-01 PROCEDURE — 99232 SBSQ HOSP IP/OBS MODERATE 35: CPT | Performed by: INTERNAL MEDICINE

## 2020-11-01 PROCEDURE — 85025 COMPLETE CBC W/AUTO DIFF WBC: CPT

## 2020-11-01 PROCEDURE — 2700000000 HC OXYGEN THERAPY PER DAY

## 2020-11-01 RX ORDER — MAGNESIUM HYDROXIDE 1200 MG/15ML
LIQUID ORAL
Status: DISPENSED
Start: 2020-11-01 | End: 2020-11-02

## 2020-11-01 RX ORDER — ATORVASTATIN CALCIUM 20 MG/1
20 TABLET, FILM COATED ORAL DAILY
Status: DISCONTINUED | OUTPATIENT
Start: 2020-11-01 | End: 2020-11-06 | Stop reason: HOSPADM

## 2020-11-01 RX ORDER — ALBUTEROL SULFATE 90 UG/1
1 AEROSOL, METERED RESPIRATORY (INHALATION) EVERY 6 HOURS PRN
Status: DISCONTINUED | OUTPATIENT
Start: 2020-11-01 | End: 2020-11-06 | Stop reason: HOSPADM

## 2020-11-01 RX ORDER — ACETAMINOPHEN 80 MG
TABLET,CHEWABLE ORAL ONCE
Status: DISCONTINUED | OUTPATIENT
Start: 2020-11-01 | End: 2020-11-06 | Stop reason: HOSPADM

## 2020-11-01 RX ORDER — ASPIRIN 81 MG/1
81 TABLET ORAL DAILY
Status: DISCONTINUED | OUTPATIENT
Start: 2020-11-01 | End: 2020-11-05

## 2020-11-01 RX ORDER — BRIMONIDINE TARTRATE 2 MG/ML
1 SOLUTION/ DROPS OPHTHALMIC 2 TIMES DAILY
Status: DISCONTINUED | OUTPATIENT
Start: 2020-11-01 | End: 2020-11-06 | Stop reason: HOSPADM

## 2020-11-01 RX ORDER — DORZOLAMIDE HCL 20 MG/ML
1 SOLUTION/ DROPS OPHTHALMIC 2 TIMES DAILY
Status: DISCONTINUED | OUTPATIENT
Start: 2020-11-01 | End: 2020-11-06 | Stop reason: HOSPADM

## 2020-11-01 RX ORDER — LOSARTAN POTASSIUM 50 MG/1
25 TABLET ORAL DAILY
Status: DISCONTINUED | OUTPATIENT
Start: 2020-11-01 | End: 2020-11-06 | Stop reason: HOSPADM

## 2020-11-01 RX ORDER — DORZOLAMIDE HYDROCHLORIDE AND TIMOLOL MALEATE 20; 5 MG/ML; MG/ML
1 SOLUTION/ DROPS OPHTHALMIC 2 TIMES DAILY
Status: DISCONTINUED | OUTPATIENT
Start: 2020-11-01 | End: 2020-11-01 | Stop reason: RX

## 2020-11-01 RX ORDER — TIMOLOL MALEATE 5 MG/ML
1 SOLUTION/ DROPS OPHTHALMIC 2 TIMES DAILY
Status: DISCONTINUED | OUTPATIENT
Start: 2020-11-01 | End: 2020-11-06 | Stop reason: HOSPADM

## 2020-11-01 RX ADMIN — ENOXAPARIN SODIUM 40 MG: 40 INJECTION SUBCUTANEOUS at 08:05

## 2020-11-01 RX ADMIN — LOSARTAN POTASSIUM 25 MG: 50 TABLET, FILM COATED ORAL at 15:13

## 2020-11-01 RX ADMIN — BRIMONIDINE TARTRATE 1 DROP: 2 SOLUTION OPHTHALMIC at 20:49

## 2020-11-01 RX ADMIN — CHLORHEXIDINE GLUCONATE 15 ML: 1.2 RINSE ORAL at 08:04

## 2020-11-01 RX ADMIN — ATORVASTATIN CALCIUM 20 MG: 20 TABLET, FILM COATED ORAL at 15:13

## 2020-11-01 RX ADMIN — Medication 10 ML: at 08:05

## 2020-11-01 RX ADMIN — TIMOLOL MALEATE 1 DROP: 5 SOLUTION/ DROPS OPHTHALMIC at 20:49

## 2020-11-01 RX ADMIN — CEFTRIAXONE SODIUM 1 G: 1 INJECTION, POWDER, FOR SOLUTION INTRAMUSCULAR; INTRAVENOUS at 08:04

## 2020-11-01 RX ADMIN — TIMOLOL MALEATE 1 DROP: 5 SOLUTION/ DROPS OPHTHALMIC at 15:18

## 2020-11-01 RX ADMIN — DOCUSATE SODIUM 100 MG: 50 LIQUID ORAL at 08:04

## 2020-11-01 RX ADMIN — DORZOLAMIDE HYDROCHLORIDE 1 DROP: 20 SOLUTION/ DROPS OPHTHALMIC at 20:49

## 2020-11-01 RX ADMIN — POLYETHYLENE GLYCOL 3350 17 G: 17 POWDER, FOR SOLUTION ORAL at 08:05

## 2020-11-01 RX ADMIN — PROPOFOL 15 MCG/KG/MIN: 10 INJECTION, EMULSION INTRAVENOUS at 04:29

## 2020-11-01 RX ADMIN — Medication 10 ML: at 20:50

## 2020-11-01 RX ADMIN — FAMOTIDINE 20 MG: 10 INJECTION INTRAVENOUS at 08:05

## 2020-11-01 RX ADMIN — DORZOLAMIDE HYDROCHLORIDE 1 DROP: 20 SOLUTION/ DROPS OPHTHALMIC at 15:12

## 2020-11-01 RX ADMIN — ASPIRIN 81 MG: 81 TABLET, COATED ORAL at 15:13

## 2020-11-01 RX ADMIN — METOPROLOL TARTRATE 12.5 MG: 25 TABLET, FILM COATED ORAL at 15:13

## 2020-11-01 RX ADMIN — CHLORHEXIDINE GLUCONATE 15 ML: 1.2 RINSE ORAL at 20:49

## 2020-11-01 RX ADMIN — BRIMONIDINE TARTRATE 1 DROP: 2 SOLUTION OPHTHALMIC at 15:12

## 2020-11-01 ASSESSMENT — PAIN SCALES - WONG BAKER
WONGBAKER_NUMERICALRESPONSE: 0

## 2020-11-01 ASSESSMENT — PAIN SCALES - GENERAL
PAINLEVEL_OUTOF10: 0

## 2020-11-01 ASSESSMENT — PULMONARY FUNCTION TESTS
PIF_VALUE: 27
PIF_VALUE: 24
PIF_VALUE: 24
PIF_VALUE: 25
PIF_VALUE: 24
PIF_VALUE: 26
PIF_VALUE: 27
PIF_VALUE: 24
PIF_VALUE: 26

## 2020-11-01 NOTE — PROGRESS NOTES
cmH20  I:E Ratio: 1:4.40    DIET TUBE FEED CONTINUOUS/CYCLIC NPO; Low Calorie High Protein (Vial HP); Orogastric; 20; 45; 24    IV:    propofol 15 mcg/kg/min (11/01/20 0429)    norepinephrine Stopped (10/31/20 0115)       Medications:  Scheduled Meds:   enoxaparin  40 mg Subcutaneous Daily    chlorhexidine  15 mL Mouth/Throat BID    polyethylene glycol  17 g Oral Daily    docusate  100 mg Oral BID    cefTRIAXone (ROCEPHIN) IV  1 g Intravenous Q24H    sodium chloride flush  10 mL Intravenous 2 times per day    sodium chloride flush  10 mL Intravenous 2 times per day    famotidine (PEPCID) injection  20 mg Intravenous Daily       PRN Meds:  ipratropium-albuterol, sodium chloride flush, sodium chloride flush, acetaminophen **OR** acetaminophen, promethazine **OR** ondansetron, potassium chloride **OR** potassium alternative oral replacement **OR** potassium chloride, magnesium sulfate, metoprolol        Radiology      Ct Abdomen Pelvis Wo Contrast Additional Contrast? Radiologist Recommendation    Result Date: 10/28/2020  EXAMINATION: CT ABDOMEN PELVIS WO CONTRAST HISTORY:  septic shock  COMPARISON: CT abdomen and pelvis from August 17, 2011 TECHNIQUE: Contiguous axial CT sections of the abdomen and pelvis. No IV contrast administered. Unenhanced imaging is limited for the evaluation of some intra-abdominal and pelvic pathologies. All CT scans at this facility use dose modulation, iterative reconstruction, and/or weight based dosing when appropriate to reduce radiation dose to as low as reasonably achievable. FINDINGS  Lung bases: There is atelectasis and consolidation in the visualized portions of the lung bases. The heart and pericardium are unremarkable. There is no pericardial effusion. Liver: The liver is normal in size and attenuation without  focal lesions. There are no focal solid or cystic lesions. There is no intra or extrahepatic bile duct dilatation. Gallbladder:  There are numerous calcified stones layering to the dependent portion of the gallbladder without surrounding inflammatory changes. There is distention of the gallbladder. Spleen: There are no focal lesions or calcifications in the spleen. There is no splenomegaly  Pancreas: The pancreas is normal in size and attenuation without focal lesions or dilatation of the pancreatic duct. Kidneys: There are no solid or cystic lesions. There is no hydronephrosis. There is a 9 mm calcified stone in the inferior pole of the right kidney. There is no hydroureter. Adrenal glands are negative. Bowel: There is no bowel dilatation or evidence of diverticulitis. Appendix: There  is no CT evidence for appendicitis. Nodes: No lymphadenopathy. Aorta: No aneurysm  Peritoneum: No free fluid or free air. Pelvis: There are no solid or cystic lesions. There is a Cedeno catheter in place. Abdominal wall: The abdominal wall is intact. Bones : There are no acute osseous changes. Soft tissues: The soft tissues are unremarkable. No acute intra-abdominal changes. There is no free air or free fluid, there are no fluid collections. The visualized portions of the lung bases demonstrate bilateral atelectasis and consolidations in the dependent portions. There are calcified stones in the gallbladder without evidence of cholecystitis There is a 9 mm right intrarenal stone in the inferior pole, there is no hydronephrosis or hydroureter on either side. Ct Head Wo Contrast    Result Date: 10/27/2020  CT Brain Contrast medium:  Not utilized. History: Altered mental status. Tachycardia. Comparison:  None. Findings: Extra-axial spaces:  Normal. Intracranial hemorrhage:  None. Ventricular system: Ventricles mildly enlarged. Sulci mildly prominent Basal Cisterns:  Normal. Cerebral Parenchyma: Bilateral symmetric periventricular areas decreased attenuation. Midline Shift:  None.  Cerebellum:  Normal. Paranasal sinuses and mastoid air cells:  Normal. Visualized Orbits: Remote bilateral ocular surgery. Impression: No acute findings. Mild cerebral atrophy. Chronic ischemic white matter disease. All CT scans at this facility use dose modulation, iterative reconstruction, and/or weight based dosing when appropriate to reduce radiation dose to as low as reasonably achievable. Cta Chest W Wo Contrast    Result Date: 10/27/2020  CTA CHEST W WO CONTRAST: 10/27/2020 CLINICAL HISTORY:  R/o PE, SOB, tachycardia . COMPARISON: 12/26/2018. Spiral enhanced images were obtained of the chest during the infusion of approximately 100 mL of Isovue 370 contrast with pulmonary artery  CTA protocol. Routine and volume rendered images were obtained on a 3-dimensional workstation. All CT scans at this facility use dose modulation, iterative reconstruction, and/or weight based dosing when appropriate to reduce radiation dose to as low as reasonably achievable. FINDINGS: An endotracheal tube is noted with its tip approximately 2 cm superior to the devin. There are no filling defects identified within the pulmonary arterial vasculature to suggest pulmonary emboli. The thoracic aorta is tortuous, and ectatic with mild calcific atherosclerotic plaquing of the arch. There is no dissection. The heart is mildly enlarged. Moderate coronary artery calcifications are present. Moderate bibasilar consolidation/atelectasis is noted of the posterior mid to lower lung fields, worsening inferiorly. There are no worrisome nodules, lymphadenopathy, pleural or pericardial effusions identified. A small calcified granuloma within the inferolateral aspect of the left upper lobe appears unchanged. A minimal compression deformity of the superior endplate of A25 and mild vertebral body wedging of the mid thoracic levels appears chronic. There are no acute fractures identified. Mild to moderate degenerative changes of the thoracic spine are noted. The limited images of the upper abdomen are noncontributory. ENDOTRACHEAL TUBE IN EXPECTED POSITION. MODERATE CONSOLIDATION/ATELECTASIS OF THE POSTERIOR MID TO LOWER LUNG SANZ, SUGGESTIVE OF BRONCHOPNEUMONIA AND/OR ASPIRATION. NO EVIDENCE OF PULMONARY EMBOLI OR OTHER SIGNIFICANT CHANGE FROM 12/26/2018 IDENTIFIED. Xr Chest Portable    Result Date: 10/30/2020  Exam: XR CHEST PORTABLE History: Respiratory failure Technique: AP portable view of the chest obtained. Comparison: Portable chest radiograph and CT chest from October 27, 2020 Findings: Suboptimal inspiration. Right upper extremity PICC is present and terminates in the SVC. Endotracheal tube and enteric tube remain. Atherosclerotic calcification of the thoracic aorta. Heart size is at the upper limits of normal. No significant interval change in bilateral infiltrate and/or atelectasis given suboptimal inspiration. No acute osseous abnormality. No significant interval change in bibasilar infiltrate and/or atelectasis. Xr Chest Portable    Result Date: 10/28/2020  EXAMINATION: XR CHEST PORTABLE CLINICAL HISTORY: CENTRAL LINE COMPARISONS: OCTOBER 27, 2020, AT 1700 HOURS FINDINGS: Endotracheal tube 2.2 cm superior to devin. Nasogastric tube courses beneath diaphragm. Osseous structures intact. Cardiopericardial silhouette normal. Blunting left costophrenic angle, mildly increased since prior study. Ill-defined area increased opacity left lung base. MILD INTERVAL INCREASE LEFT PLEURAL EFFUSION, WITH LEFT LOWER LUNG ATELECTASIS/PNEUMONIA. Xr Chest Portable    Result Date: 10/27/2020  EXAMINATION: CHEST PORTABLE VIEW-1700 hours  CLINICAL HISTORY: Intubation COMPARISONS: October 27, 2020 1431 hours  FINDINGS: Single  views of the chest is submitted. Interval endotracheal intubation since the prior examination. The tip is at the level of the clavicles. The cardiac silhouette enlarged unchanged configuration. . Pulmonary vascular mildly congested. Right sided trachea.  Left lower lobe infiltrate consolidation left pleural effusion. Atelectasis, infiltrate right lower lobe Blunting of the right costophrenic angle suggesting trace right pleural effusion. No Pneumothoraces. ATELECTASIS, INFILTRATE RIGHT LOWER LOBE. TRACE RIGHT PLEURAL EFFUSION. LEFT LOWER LOBE INFILTRATE CONSOLIDATION LEFT PLEURAL EFFUSION. Xr Chest Portable    Result Date: 10/27/2020  EXAMINATION: CHEST PORTABLE BDVC-0512 hours  CLINICAL HISTORY: Short of breath COMPARISONS: July 8, 2020  FINDINGS: Single  views of the chest is submitted. The cardiac silhouette is of normal size configuration. Pulmonary vascular congested. The trachea remains deviated to the right. Left lower lobe infiltrate consolidation and small left pleural effusion. .  No Pneumothoraces. LEFT LOWER LOBE INFILTRATE CONSOLIDATION AND SMALL LEFT PLEURAL EFFUSION. .. Ir Picc Wo Sq Port/pump > 5 Years    Result Date: 10/29/2020  PERIPHERALLY INSERTED CENTRAL CATHETER (PICC) PLACEMENT WITH ULTRASOUND GUIDANCE CLINICAL HISTORY:  REQUIRES PROLONGED INTRAVENOUS ANTIBIOTICS FOR PNEUMONIA. After discussing the procedure and possible complications with the patient, informed consent was obtained. The patient was placed on the Special Procedures table. The right upper extremity was sterilely prepared using a maximal sterile barrier technique which includes cap, mask, sterile gown, sterile gloves, sterile full-body drape, hand hygiene and 2% chlorhexidine for cutaneous antisepsis. A sterile ultrasound technique with sterile gel and sterile probe covers was also utilized. A pre-procedure time out was performed in order to assure the correct patient and procedure. Local anesthetic was administered. A peripheral vein was accessed with sonographic guidance. A sonographic spot image was obtained for documentation.   A guidewire was advanced into the vein with fluoroscopic guidance and a sheath was placed over the guidewire. A 5-[place] triple-lumen PICC was advanced through the sheath, up the arm and into the central vasculature. It was positioned appropriately. The  sheath was removed. The catheter was shown to aspirate and infuse properly. The flange of the catheter was affixed to the arm using a PICC securement device. A spot image of the chest showed the tip of the PICC line to lie in the superior vena cava. The patient tolerated the procedure well and without complications. Number of films: 4 Fluoroscopy time: 10.9 seconds CONCLUSION: SUCCESSFUL RIGHT PICC PLACEMENT WITHOUT IMMEDIATE COMPLICATIONS. Us Carotid Artery Bilateral    Result Date: 10/28/2020  History:  Arterial access during central line placement Technique:  US CAROTID ARTERY BILATERAL Comparison: No prior Findings: No pseudoaneurysm seen. Doppler findings: ARTERIAL BLOOD FLOW VELOCITY RIGHT PS                                               Prox CCA 77cm/s             Mid CCA 80cm/s              Dist CCA 61cm/s              Prox ICA cm/s              Mid ICA cm/s              Dist ICA cm/s              Prox ECA cm/s              Dist VERT cm/s              Bulb ICA/CCA LEFT PS Prox CCA 133cm/s              Mid CCA 103cm/s              Dist CCA 107cm/s              Prox ICA 75cm/s              Mid ICA 91cm/s              Dist ICA 90cm/s              Prox ECA 171cm/s              Prox VERT 53cm/s              Bulb ICA/CCA 0.8     Impression: 1. The patient is on a ventilator and the examination is limited. The right external carotid , internal carotid and vertebral arteries are not visualized. 2.  No hemodynamic significant stenosis seen on the left 3.  No evidence of pseudoaneurysm Validated velocity measurements with angiographic measurements, velocity criteria are extrapolated from diameter data as defined by the Society of radiologist in ultrasound consensus conference radiology 2003; [phone]. Results:  CBC:   Recent Labs     10/30/20  0600  10/31/20  0600 11/01/20  0600 11/01/20  0903   WBC 22.0*  --  12.0* 6.9  --    HGB 8.8*   < > 8.8* 9.3* 11.9*   HCT 26.7*  --  27.5* 28.4*  --    MCV 89.8  --  89.8 89.6  --    PLT 82*  --  86* 91*  --     < > = values in this interval not displayed.     :   Recent Labs     10/30/20  0514  10/31/20  0600 11/01/20  0600 11/01/20  0903     --  145* 143  --    K 3.3*  --  3.7 3.7  --    *  --  113* 110*  --    CO2 20  --  23 23  --    BUN 18  --  23 21  --    CREATININE 1.00*   < > 0.94* 0.82 1.0    < > = values in this interval not displayed. Assessment: This is a critically ill patient at risk of deterioration / death , needing close ICU monitoring and intervention due to below noted problems     1. Acute hypoxic and hypercapnic respiratory failure associated with sepsis  2.  E. coli bacteremia with UTI with septic shock  3. Thrombocytopenia  4. Bibasilar atelectasis versus pneumonia  5. Acute kidney injury improving  6. Leukocytosis, improving    Patient is on CPAP and pressure support and did well. ABG shows pH 7.41 PCO2 40 PO2 82 saturation 96 bicarb 25.5. Decided to extubate patient today on 4 L. Levophed stopped. Discussed with RN and respiratory therapist.  Chest x-ray yesterday shows small bilateral pleural effusion and likely underlying atelectasis or infiltration. She  has Cedeno  catheter , she is on on DVT and GI prophylaxis. Critical care time spent reviewing labs/films, examining patient, collaborating with otherphysicians but excluding procedures for life threatening organ failure is 36minutes.       SIGNATURE: Bonnie Mullins MD, [facility]

## 2020-11-01 NOTE — PROGRESS NOTES
Melisa Lan   Facility/Department: [facility]  Speech Language Pathology    Flo Carson  5/19/1928  [id number]    Date: 11/1/2020      Speech Therapy attempted to see Flo Carson on this date for a/an:    Clinical Bedside Swallow Evaluation    Pt was unable to be seen due to: Other: Pt was extubated this morning around 9:51. Wait 24 hours for completion of BSE. Dat Paz is aware.         Electronically signed by WILFRID Murillo on 11/1/20 at 12:34 PM EST

## 2020-11-01 NOTE — PROGRESS NOTES
Physician Progress Note    2020   10:22 AM    Name:  Alayna Osorio  MRN:    [id number]     IP Day: 5     Admit Date: 10/27/2020  2:25 PM  PCP: Sobeida Silver MD    Code Status:  Full Code      Assessment and Plan: Active Problems/ diagnosis:     # acute respiratory failure due to bilateral pneumonia/ sepsis      much better extubated .   currently on antibiotic as per ID,  critical care on consult. #Severe sepsis/septic shock due to E. coli bacteremia due to UTI  POA        - resume Rocephin, appreciate ID help. Off pressors. Doing much better     # acute met encephalopathy due to above      - monitor, tx  Above, initiate PT/OT. Redirect asa needed      # SVT/ ? PAF      got dig 10/29. Remains tachy, sinus tach now, off levophed. Cardiology is following. Monitor on tele.      # FRED        CPAP while sleeping     # HTN       monitor. Off pressors. Mildly hypertensive after extubation.      DVT PPX    Subjective:     Confused, dtr at bedside. Updated. Extubated this morning to NC on 4 L.      Physical Examination:      Vitals:  BP (!) 157/55   Pulse 94   Temp 99.4 °F (37.4 °C) (Oral)   Resp 29   Ht 5' 3\" (1.6 m)   Wt 203 lb 7.8 oz (92.3 kg)   LMP  (LMP Unknown)   SpO2 97%   BMI 36.05 kg/m²   Temp (24hrs), Av.9 °F (37.2 °C), Min:98.4 °F (36.9 °C), Max:99.6 °F (37.6 °C)      General appearance:confused, just got extubated   Mental Status: confused   Lungs: clear bilat, no wheezing  Heart: regular rate and rhythm   Abdomen: soft, nontender, nondistended, bowel sounds present, no masses  Extremities: +edema bhoth hands and arms no edema in legs, no  redness   Skin: no gross lesions, rashes  + driver     Data:     Labs:  Recent Labs     10/31/20  0620  0620  0903   WBC 12.0* 6.9  --    HGB 8.8* 9.3* 11.9*   PLT 86* 91*  --      Recent Labs     10/31/20  0620  0620  0903   * 143  --    K 3.7 3.7  --    * 110*  --    CO2 23 23  --    BUN 23 21  --    CREATININE 0.94* 0.82 1.0   GLUCOSE 135* 127*  --      No results for input(s): AST, ALT, ALB, BILITOT, ALKPHOS in the last 72 hours.     Current Facility-Administered Medications   Medication Dose Route Frequency Provider Last Rate Last Dose    enoxaparin (LOVENOX) injection 40 mg  40 mg Subcutaneous Daily Pitney Yaritza, DO   40 mg at 11/01/20 0805    chlorhexidine (PERIDEX) 0.12 % solution 15 mL  15 mL Mouth/Throat BID Sara Ornelas MD   15 mL at 11/01/20 0804    polyethylene glycol (GLYCOLAX) packet 17 g  17 g Oral Daily Sara Ornelas MD   17 g at 11/01/20 0805    docusate (COLACE) 50 MG/5ML liquid 100 mg  100 mg Oral BID Sara Ornelas MD   100 mg at 11/01/20 0804    cefTRIAXone (ROCEPHIN) 1 g IVPB in 50 mL D5W minibag  1 g Intravenous Q24H Sara Ornelas  mL/hr at 11/01/20 0804 1 g at 11/01/20 0804    ipratropium-albuterol (DUONEB) nebulizer solution 1 ampule  1 ampule Inhalation Q4H PRN Sara Ornelas MD        sodium chloride flush 0.9 % injection 10 mL  10 mL Intravenous 2 times per day Tameka Vigil, DO   10 mL at 11/01/20 0805    sodium chloride flush 0.9 % injection 10 mL  10 mL Intravenous PRN Tameka Bhagates, DO        propofol injection  20 mcg/kg/min Intravenous Titrated Saar Ornelas MD 8 mL/hr at 11/01/20 0429 15 mcg/kg/min at 11/01/20 0429    sodium chloride flush 0.9 % injection 10 mL  10 mL Intravenous 2 times per day Tameka Vigil, DO   10 mL at 11/01/20 0805    sodium chloride flush 0.9 % injection 10 mL  10 mL Intravenous PRN Pitney Yaritza, DO        acetaminophen (TYLENOL) tablet 650 mg  650 mg Oral Q6H PRN Pitney Yaritza, DO   650 mg at 10/30/20 2043    Or    acetaminophen (TYLENOL) suppository 650 mg  650 mg Rectal Q6H PRN Pitney Yaritza, DO        promethazine (PHENERGAN) tablet 12.5 mg  12.5 mg Oral Q6H PRN Anupama Hernandez, DO        Or    ondansetron [facility]) injection 4 mg  4 mg Intravenous Q6H PRN Anupama Hernandez, DO        potassium chloride [facility]) extended release tablet 40 mEq  40 mEq Oral PRN Carlisle Barban, DO        Or    potassium bicarb-citric acid (EFFER-K) effervescent tablet 40 mEq  40 mEq Oral PRN Carlisle Barban, DO   40 mEq at 10/30/20 1128    Or    potassium chloride 10 mEq/100 mL IVPB (Peripheral Line)  10 mEq Intravenous PRN Mario Alberto Barban, DO        magnesium sulfate 2 g in 50 mL IVPB premix  2 g Intravenous PRN Mario Alberto Evansan, DO        famotidine (PEPCID) injection 20 mg  20 mg Intravenous Daily Mario Alberto Evansan, DO   20 mg at 11/01/20 0805    metoprolol (LOPRESSOR) injection 5 mg  5 mg Intravenous Q6H PRN Mario Alberto Rocha, DO   5 mg at 10/29/20 0428    norepinephrine (LEVOPHED) 16 mg in dextrose 5 % 250 mL infusion  2 mcg/min Intravenous Continuous Durwood Boards Sedar, DO   Stopped at 10/31/20 0115       Additional work up or/and treatment plan may be added today or then after based on clinical progression. I am managing a portion of pt care. Some medical issues are handled by other specialists. Additional work up and treatment should be done in out pt setting by pt PCP and other out pt providers. In addition to examining and evaluating pt, I spent additional time explaining care, normaland abnormal findings, and treatment plan. All of pt questions were answered. Counseling, diet and education were provided. Case will be discussed with nursing staff when appropriate. Family will be updated if and when appropriate.        Electronically signed by Mario Alberto Rocha DO on 11/1/2020 at 10:22 AM

## 2020-11-01 NOTE — PROGRESS NOTES
0725: stopped sedation for cpap trial    0741: pt awake but slightly lethargic, placed on cpap trial for sbt. 4625: pt tolerating cpap trial well, minimal secretions were suctioned form ETT prior to SBT. Pt is slightly anxiety ridden, called family member to please come sit with her for comfort during the SBT.    0827: perfectserve to dr. Meeta Palumbo to ask if he wanted a cxr for today, notify him she has been on cpap since 0741 am, and if he wants ABG's. Also updated on vitals including tidal volumes. All WNL    0845: call to RT for ABG order by Meeta Palumbo. Family at bedside for comfort. Pt anxiety high    0915: extubated and placed on 4 l nc with humidity d/t stuffy nose, pt breaths through mouth mostly. Is suppose to be on 2l nc at night for apnea. She is noncompliant with this per daughters. Placed majo arms up on 2 pillows each to reduce swelling, pt cleaned from BM was half diarrhea and half formed soft tan bm. Protective barrier and new mepilex applied.

## 2020-11-02 ENCOUNTER — TELEPHONE (OUTPATIENT)
Dept: NEUROLOGY | Age: 85
End: 2020-11-02

## 2020-11-02 ENCOUNTER — APPOINTMENT (OUTPATIENT)
Dept: MRI IMAGING | Age: 85
DRG: 870 | End: 2020-11-02
Payer: MEDICARE

## 2020-11-02 LAB — CULTURE, BLOOD 2: NORMAL

## 2020-11-02 PROCEDURE — 99232 SBSQ HOSP IP/OBS MODERATE 35: CPT | Performed by: INTERNAL MEDICINE

## 2020-11-02 PROCEDURE — 70551 MRI BRAIN STEM W/O DYE: CPT

## 2020-11-02 PROCEDURE — 6360000002 HC RX W HCPCS: Performed by: INTERNAL MEDICINE

## 2020-11-02 PROCEDURE — 2580000003 HC RX 258: Performed by: INTERNAL MEDICINE

## 2020-11-02 PROCEDURE — 97163 PT EVAL HIGH COMPLEX 45 MIN: CPT

## 2020-11-02 PROCEDURE — 99233 SBSQ HOSP IP/OBS HIGH 50: CPT | Performed by: INTERNAL MEDICINE

## 2020-11-02 PROCEDURE — 2700000000 HC OXYGEN THERAPY PER DAY

## 2020-11-02 PROCEDURE — 92610 EVALUATE SWALLOWING FUNCTION: CPT

## 2020-11-02 PROCEDURE — 99221 1ST HOSP IP/OBS SF/LOW 40: CPT | Performed by: PSYCHIATRY & NEUROLOGY

## 2020-11-02 PROCEDURE — 97167 OT EVAL HIGH COMPLEX 60 MIN: CPT

## 2020-11-02 PROCEDURE — 2060000000 HC ICU INTERMEDIATE R&B

## 2020-11-02 PROCEDURE — 2500000003 HC RX 250 WO HCPCS: Performed by: INTERNAL MEDICINE

## 2020-11-02 PROCEDURE — 51702 INSERT TEMP BLADDER CATH: CPT

## 2020-11-02 RX ORDER — LORAZEPAM 2 MG/ML
1 INJECTION INTRAMUSCULAR
Status: ACTIVE | OUTPATIENT
Start: 2020-11-02 | End: 2020-11-02

## 2020-11-02 RX ORDER — LOSARTAN POTASSIUM 25 MG/1
TABLET ORAL
Qty: 90 TABLET | Refills: 0 | Status: SHIPPED | OUTPATIENT
Start: 2020-11-02 | End: 2021-03-10

## 2020-11-02 RX ADMIN — Medication 10 ML: at 09:00

## 2020-11-02 RX ADMIN — BRIMONIDINE TARTRATE 1 DROP: 2 SOLUTION OPHTHALMIC at 09:00

## 2020-11-02 RX ADMIN — DORZOLAMIDE HYDROCHLORIDE 1 DROP: 20 SOLUTION/ DROPS OPHTHALMIC at 09:00

## 2020-11-02 RX ADMIN — AMPICILLIN SODIUM 2 G: 2 INJECTION, POWDER, FOR SOLUTION INTRAMUSCULAR; INTRAVENOUS at 17:36

## 2020-11-02 RX ADMIN — CEFTRIAXONE SODIUM 1 G: 1 INJECTION, POWDER, FOR SOLUTION INTRAMUSCULAR; INTRAVENOUS at 08:59

## 2020-11-02 RX ADMIN — TIMOLOL MALEATE 1 DROP: 5 SOLUTION/ DROPS OPHTHALMIC at 09:00

## 2020-11-02 RX ADMIN — TIMOLOL MALEATE 1 DROP: 5 SOLUTION/ DROPS OPHTHALMIC at 23:14

## 2020-11-02 RX ADMIN — BRIMONIDINE TARTRATE 1 DROP: 2 SOLUTION OPHTHALMIC at 23:14

## 2020-11-02 RX ADMIN — DORZOLAMIDE HYDROCHLORIDE 1 DROP: 20 SOLUTION/ DROPS OPHTHALMIC at 23:14

## 2020-11-02 RX ADMIN — FAMOTIDINE 20 MG: 10 INJECTION INTRAVENOUS at 08:59

## 2020-11-02 RX ADMIN — Medication 10 ML: at 08:59

## 2020-11-02 RX ADMIN — ENOXAPARIN SODIUM 40 MG: 40 INJECTION SUBCUTANEOUS at 08:59

## 2020-11-02 RX ADMIN — Medication 10 ML: at 23:14

## 2020-11-02 ASSESSMENT — PAIN SCALES - GENERAL
PAINLEVEL_OUTOF10: 0
PAINLEVEL_OUTOF10: 0
PAINLEVEL_OUTOF10: 3
PAINLEVEL_OUTOF10: 0

## 2020-11-02 ASSESSMENT — PAIN SCALES - WONG BAKER: WONGBAKER_NUMERICALRESPONSE: 2

## 2020-11-02 ASSESSMENT — ENCOUNTER SYMPTOMS
GASTROINTESTINAL NEGATIVE: 1
RESPIRATORY NEGATIVE: 1

## 2020-11-02 NOTE — PROGRESS NOTES
Physical Therapy Med Surg Initial Assessment  [facility]Department[facility]  [facility]       NAME: Karie Urrutia  : 1928 (79 y.o.)  MRN: [id number]  CODE STATUS: Full Code    Date of Service: 2020    Patient Diagnosis(es): SVT (supraventricular tachycardia) ([facility] [I47.1]   No chief complaint on file. Patient Active Problem List    Diagnosis Date Noted    Pneumonia of both lower lobes due to infectious organism      Priority: High    SVT (supraventricular tachycardia) ([address] 10/27/2020    Chest pain 2020    Obesity (BMI 30-39.9) 2019    Obstructive sleep apnea 2019    Hypoxia 2019    COPD exacerbation ([facility]     COPD [facility]chronic obstructive pulmonary disease[facility]    Anxiety 2017    Essential hypertension         Past Medical History:   Diagnosis Date    Anxiety     [facility]     Glaucoma     Gout     left knee    Hypertension     Lung disease     Obesity (BMI 30-39. 9) 2019    Obstructive sleep apnea 2019     Past Surgical History:   Procedure Laterality Date    CATARACT REMOVAL WITH IMPLANT Bilateral 2016    COLON SURGERY      polyps    COLONOSCOPY  2009    SKIN CANCER EXCISION      graft from neck       Chart Reviewed: Yes  Patient assessed for rehabilitation services?: Yes  Family / Caregiver Present: No  General Comment  Comments: Pt supine in bed upon arrival; PT/OT co-eval; pt soiled and therapy staff completed care; pt exhibiting inattention to L side demonstrated by pt decreased reps of looking toward the L when therapist talking from L side of pt and demonstrated by decreased responses to light tough to L LE    Restrictions:  Restrictions/Precautions: Fall Risk(NPO)     SUBJECTIVE: Subjective: pt with minimal verbalizations- pt stated \"where did she go\" and \"I didn't get anything to eat\"    Pain  Pre Treatment Pain Screening  Pain at present: 0  Intervention List: Patient able to continue with treatment;Nurse/physician notified    Post Treatment Pain Screening:   Pain Assessment  Pain Level: 3(pt making grimicing faces during LE PROM/ AAROM- smiling facial expressions when movement stopped)    Prior Level of Function:  Social/Functional History  Lives With: Daughter(pt unable to verbalize PLOF and prior living setting- information obtained per  notes)  Receives Help From: Family  ADL Assistance: Needs assistance  Homemaking Assistance: Needs assistance  Homemaking Responsibilities: No  Ambulation Assistance: Needs assistance  Transfer Assistance: Needs assistance  Active : No    OBJECTIVE:        Cognition:  Overall Orientation Status: Impaired  Orientation Level: Disoriented to place, Disoriented to time, Disoriented to situation(pt made eye contact when caregivers state her name)  Follows Commands: Impaired(Pt requires max assistance to follow 1 step commands)    Observation/Palpation  Observation: hematomas B UEs; dry skin B LEs  Edema: 3+ B UEs; 2+ B LEs    ROM:  RLE General AROM: impaired due to prolonged time in bed and soft tissue approximation  LLE General AROM: impaired due to prolonged time in bed and soft tissue approximation    Strength:  Strength RLE  Comment: grossly 2-/5 after several attempts to completed and with increased effort exhibited by pt  Strength LLE  Comment: grossly 2-/5 after several attempts to completed and with increased effort exhibited by pt    Neuro:  Balance  Sitting - Static: (unable to assess due to lack of active trunk movement and increased assistance to complete rolling in bed)  Sitting - Dynamic: (unable to assess due to lack of active trunk movement and increased assistance to complete rolling in bed)     Motor Control  Gross Motor?: WFL  Sensation  Overall Sensation Status: WFL(difficult to assess due to minimal verbalizations from pt- pt responsive to light touch B LEs)    Bed mobility  Rolling to Left: 2 Person assistance;Maximum assistance  Rolling to Right: 2 Person assistance;Maximum assistance  Supine to Sit: Unable to assess(unable to assess sit to supine and supine to sit transfers due to pt with difficulty following instructions and maximal assistance of 2 to complete rolling in bed and due to pt exhibiting minimal active trunk and LE movements)  Sit to Supine: Unable to assess    Transfers  Sit to Stand: Unable to assess(unsafe to assess at eval)  Stand to sit: Unable to assess(unsafe to assess at eval)    Ambulation  Ambulation?: No(unsafe to assess at eval)              Activity Tolerance  Activity Tolerance: Patient Tolerated treatment well;Patient limited by fatigue;Patient limited by endurance; Patient limited by cognitive status  PT Equipment Recommendations  Equipment Needed: Yes  Mobility Devices: Wheelchair;Hospital Bed          PT Education  PT Education: Goals;Transfer Training;Equipment;PT Role;Plan of Care;General Safety    ASSESSMENT:   Body structures, Functions, Activity limitations: Decreased functional mobility ; Decreased safe awareness;Decreased balance;Decreased ADL status; Decreased cognition;Decreased coordination;Decreased ROM; Decreased endurance;Decreased strength;Decreased sensation;Decreased posture  History: high  Exam: high  Clinical Presentation: high    Barriers to Learning: increased processing time    DISCHARGE RECOMMENDATIONS:  Discharge Recommendations: Patient would benefit from continued therapy after discharge    Assessment: Pt exhibiting poor ability to follow instructions, minimal verbalizations, inattention to L side, decreased trunk and B LE ROM/ strength, and requiring increased assistance for all active movement and for functional mobility of rolling. PT recommending continued therapy for safe return to PLOF at home with family.   REQUIRES PT FOLLOW UP: Yes      PLAN OF CARE:  Plan  Times per week: 3-6  Current Treatment Recommendations: Strengthening, Transfer Training, Endurance Training, Neuromuscular Re-education, Cognitive Reorientation, Patient/Caregiver Education & Training, ROM, Wheelchair Mobility Training, Manual Therapy - Soft Tissue Mobilization, Pain Management, Equipment Evaluation, Education, & procurement, Balance Training, Home Exercise Program, Modalities, Functional Mobility Training, Safety Education & Training, Positioning  Safety Devices  Type of devices: Bed alarm in place, Call light within reach    Goals:  Short term goals  Short term goal 1: Pt will demonstrate rolling R/L in bed mod indep with use of rails to allow for pressure relief to avoid wounds and for care. Short term goal 2: Pt will demonstrate sitting edge  of bed without % of the time with good steadiness to prepare pt for sit to stand transfers  Long term goals  Long term goal 1: Pt will demonstrate bed mobility SBA  Long term goal 2: Pt will demonstrate transfers SBA to allow for safe return home with family  Long term goal 3: Pt and pt's family with demonstrate indep with HEP for carryover to increased strength for return to OF. [facility] (6 CLICK) BASIC MOBILITY  AM-PAC Inpatient Mobility Raw Score : 6    Therapy Time:   Individual   Time In 1350   Time Out 1407   Minutes 17           [id number], 11/02/20 at 4:24 PM         Definitions for assistance levels  Independent = pt does not require any physical supervision or assistance from another person for activity completion. Device may be needed.   Stand by assistance = pt requires verbal cues or instructions from another person, close to but not touching, to perform the activity  Minimal assistance= pt performs 75% or more of the activity; assistance is required to complete the activity  Moderate assistance= pt performs 50% of the activity; assistance is required to complete the activity  Maximal assistance = pt performs 25% of the activity; assistance is required to complete the activity  Dependent = pt requires total physical assistance to accomplish the task

## 2020-11-02 NOTE — PROGRESS NOTES
Pulmonary Progress Note    PRIMARY SERVICE: Pulmonary Disease    INTERVAL HPI: Patient seen and examined at bedside, Interval Notes, orders reviewed. Nursing notes noted    Patient was on vent Support extubated and transferred to floor yesterday. .  She is doing okay on O2 via nasal cannula. She is on 4 L O2 and O2 saturation is 99%. She is not having any fever. No cough or sputum production. No vomiting or diarrhea. She is comfortable in bed. Not answering much of any question    Review of Systems   as above        Intake/Output Summary (Last 24 hours) at 11/2/2020 0836  Last data filed at 11/2/2020 5592  Gross per 24 hour   Intake 11 ml   Output 1800 ml   Net -1789 ml       Vitals:  BP (!) 131/55   Pulse 85   Temp 98.5 °F (36.9 °C) (Oral)   Resp 16   Ht 5' 3\" (1.6 m)   Wt 205 lb 0.4 oz (93 kg)   LMP  (LMP Unknown)   SpO2 99%   BMI 36.32 kg/m²   EXAM:  General: Alert awake, comfortable in bed   head: Atraumatic ,Normocephalic   Eyes: PERRL. No sclera icterus. No conjunctival injection. No discharge   ENT: No nasal  discharge. Pharynx clear. Oral thrush+  Neck:  Trachea midline. No thyromegaly, no JVD, No cervical adenopathy. Resp : Normal effort,  No accessory muscle use. No Rales. No wheezing. No rhonchi. CV: Normal  rate. Regular rhythm. No mumur ,  Rub or gallop  ABD: Non-tender. Non-distended. No masses. Noorganmegaly. Normal bowel sounds. No hernia.   EXT: No Pitting, No Cyanosis No clubbing    ABG:     Lab Results   Component Value Date    PHART 7.413 11/01/2020    UIP1ADR 40 11/01/2020    PO2ART 82 11/01/2020    UIE6ARN 25.5 11/01/2020    BEART 1 11/01/2020    Q0MZBVGZ 96 11/01/2020     Lab Results   Component Value Date    LACTA 1.0 12/25/2018     O2 Device: Nasal cannula  O2 Flow Rate (L/min): 4 L/min    MV Settings:     Vent Mode: (S) CPAP  Vt Ordered: 500 mL  Rate Set: 14 bmp  FiO2 : 40 %  PEEP/CPAP: (S) 5  Pressure Support: (S) 5 cmH20  Peak Inspiratory Pressure: 27 cmH2O  Mean Airway Pressure: 11 cmH20  I:E Ratio: 1:4.40    DIET TUBE FEED CONTINUOUS/CYCLIC NPO; Low Calorie High Protein (Vial HP); Orogastric; 20; 45; 24    IV:       Medications:  Scheduled Meds:   aspirin  81 mg Oral Daily    atorvastatin  20 mg Oral Daily    brimonidine  1 drop Both Eyes BID    losartan  25 mg Oral Daily    metoprolol tartrate  12.5 mg Oral BID    pill splitter   Does not apply Once    dorzolamide  1 drop Both Eyes BID    timolol  1 drop Both Eyes BID    enoxaparin  40 mg Subcutaneous Daily    chlorhexidine  15 mL Mouth/Throat BID    polyethylene glycol  17 g Oral Daily    docusate  100 mg Oral BID    cefTRIAXone (ROCEPHIN) IV  1 g Intravenous Q24H    sodium chloride flush  10 mL Intravenous 2 times per day    sodium chloride flush  10 mL Intravenous 2 times per day    famotidine (PEPCID) injection  20 mg Intravenous Daily       PRN Meds:  albuterol sulfate HFA, ipratropium-albuterol, sodium chloride flush, sodium chloride flush, acetaminophen **OR** acetaminophen, promethazine **OR** ondansetron, potassium chloride **OR** potassium alternative oral replacement **OR** potassium chloride, magnesium sulfate, metoprolol        Radiology      Ct Abdomen Pelvis Wo Contrast Additional Contrast? Radiologist Recommendation    Result Date: 10/28/2020  EXAMINATION: CT ABDOMEN PELVIS WO CONTRAST HISTORY:  septic shock  COMPARISON: CT abdomen and pelvis from August 17, 2011 TECHNIQUE: Contiguous axial CT sections of the abdomen and pelvis. No IV contrast administered. Unenhanced imaging is limited for the evaluation of some intra-abdominal and pelvic pathologies. All CT scans at this facility use dose modulation, iterative reconstruction, and/or weight based dosing when appropriate to reduce radiation dose to as low as reasonably achievable. FINDINGS  Lung bases: There is atelectasis and consolidation in the visualized portions of the lung bases. The heart and pericardium are unremarkable.  There is no mildly prominent Basal Cisterns:  Normal. Cerebral Parenchyma: Bilateral symmetric periventricular areas decreased attenuation. Midline Shift:  None. Cerebellum:  Normal. Paranasal sinuses and mastoid air cells:  Normal. Visualized Orbits: Remote bilateral ocular surgery. Impression: No acute findings. Mild cerebral atrophy. Chronic ischemic white matter disease. All CT scans at this facility use dose modulation, iterative reconstruction, and/or weight based dosing when appropriate to reduce radiation dose to as low as reasonably achievable. Cta Chest W Wo Contrast    Result Date: 10/27/2020  CTA CHEST W WO CONTRAST: 10/27/2020 CLINICAL HISTORY:  R/o PE, SOB, tachycardia . COMPARISON: 12/26/2018. Spiral enhanced images were obtained of the chest during the infusion of approximately 100 mL of Isovue 370 contrast with pulmonary artery  CTA protocol. Routine and volume rendered images were obtained on a 3-dimensional workstation. All CT scans at this facility use dose modulation, iterative reconstruction, and/or weight based dosing when appropriate to reduce radiation dose to as low as reasonably achievable. FINDINGS: An endotracheal tube is noted with its tip approximately 2 cm superior to the devin. There are no filling defects identified within the pulmonary arterial vasculature to suggest pulmonary emboli. The thoracic aorta is tortuous, and ectatic with mild calcific atherosclerotic plaquing of the arch. There is no dissection. The heart is mildly enlarged. Moderate coronary artery calcifications are present. Moderate bibasilar consolidation/atelectasis is noted of the posterior mid to lower lung fields, worsening inferiorly. There are no worrisome nodules, lymphadenopathy, pleural or pericardial effusions identified. A small calcified granuloma within the inferolateral aspect of the left upper lobe appears unchanged.  A minimal compression deformity of the superior endplate of F24 and mild vertebral body wedging of the mid thoracic levels appears chronic. There are no acute fractures identified. Mild to moderate degenerative changes of the thoracic spine are noted. The limited images of the upper abdomen are noncontributory. ENDOTRACHEAL TUBE IN EXPECTED POSITION. MODERATE CONSOLIDATION/ATELECTASIS OF THE POSTERIOR MID TO LOWER LUNG SANZ, SUGGESTIVE OF BRONCHOPNEUMONIA AND/OR ASPIRATION. NO EVIDENCE OF PULMONARY EMBOLI OR OTHER SIGNIFICANT CHANGE FROM 12/26/2018 IDENTIFIED. Xr Chest Portable    Result Date: 10/30/2020  Exam: XR CHEST PORTABLE History: Respiratory failure Technique: AP portable view of the chest obtained. Comparison: Portable chest radiograph and CT chest from October 27, 2020 Findings: Suboptimal inspiration. Right upper extremity PICC is present and terminates in the SVC. Endotracheal tube and enteric tube remain. Atherosclerotic calcification of the thoracic aorta. Heart size is at the upper limits of normal. No significant interval change in bilateral infiltrate and/or atelectasis given suboptimal inspiration. No acute osseous abnormality. No significant interval change in bibasilar infiltrate and/or atelectasis. Xr Chest Portable    Result Date: 10/28/2020  EXAMINATION: XR CHEST PORTABLE CLINICAL HISTORY: CENTRAL LINE COMPARISONS: OCTOBER 27, 2020, AT 1700 HOURS FINDINGS: Endotracheal tube 2.2 cm superior to devin. Nasogastric tube courses beneath diaphragm. Osseous structures intact. Cardiopericardial silhouette normal. Blunting left costophrenic angle, mildly increased since prior study. Ill-defined area increased opacity left lung base. MILD INTERVAL INCREASE LEFT PLEURAL EFFUSION, WITH LEFT LOWER LUNG ATELECTASIS/PNEUMONIA. Xr Chest Portable    Result Date: 10/27/2020  EXAMINATION: CHEST PORTABLE VIEW-1700 hours  CLINICAL HISTORY: Intubation COMPARISONS: October 27, 2020 1431 hours  FINDINGS: Single  views of the chest is submitted.  Interval endotracheal intubation since the prior examination. The tip is at the level of the clavicles. The cardiac silhouette enlarged unchanged configuration. . Pulmonary vascular mildly congested. Right sided trachea. Left lower lobe infiltrate consolidation left pleural effusion. Atelectasis, infiltrate right lower lobe Blunting of the right costophrenic angle suggesting trace right pleural effusion. No Pneumothoraces. ATELECTASIS, INFILTRATE RIGHT LOWER LOBE. TRACE RIGHT PLEURAL EFFUSION. LEFT LOWER LOBE INFILTRATE CONSOLIDATION LEFT PLEURAL EFFUSION. Xr Chest Portable    Result Date: 10/27/2020  EXAMINATION: CHEST PORTABLE [id number]-9709 hours  CLINICAL HISTORY: Short of breath COMPARISONS: July 8, 2020  FINDINGS: Single  views of the chest is submitted. The cardiac silhouette is of normal size configuration. Pulmonary vascular congested. The trachea remains deviated to the right. Left lower lobe infiltrate consolidation and small left pleural effusion. .  No Pneumothoraces. LEFT LOWER LOBE INFILTRATE CONSOLIDATION AND SMALL LEFT PLEURAL EFFUSION. .. Ir Picc Wo Sq Port/pump > 5 Years    Result Date: 10/29/2020  PERIPHERALLY INSERTED CENTRAL CATHETER (PICC) PLACEMENT WITH ULTRASOUND GUIDANCE CLINICAL HISTORY:  REQUIRES PROLONGED INTRAVENOUS ANTIBIOTICS FOR PNEUMONIA. After discussing the procedure and possible complications with the patient, informed consent was obtained. The patient was placed on the Special Procedures table. The right upper extremity was sterilely prepared using a maximal sterile barrier technique which includes cap, mask, sterile gown, sterile gloves, sterile full-body drape, hand hygiene and 2% chlorhexidine for cutaneous antisepsis. A sterile ultrasound technique with sterile gel and sterile probe covers was also utilized.  A pre-procedure time out was performed in order to assure the correct patient and procedure. Local anesthetic was administered. A peripheral vein was accessed with sonographic guidance. A sonographic spot image was obtained for documentation. A guidewire was advanced into the vein with fluoroscopic guidance and a sheath was placed over the guidewire. A 5-Mexican triple-lumen PICC was advanced through the sheath, up the arm and into the central vasculature. It was positioned appropriately. The  sheath was removed. The catheter was shown to aspirate and infuse properly. The flange of the catheter was affixed to the arm using a PICC securement device. A spot image of the chest showed the tip of the PICC line to lie in the superior vena cava. The patient tolerated the procedure well and without complications. Number of films: 4 Fluoroscopy time: 10.9 seconds CONCLUSION: SUCCESSFUL RIGHT PICC PLACEMENT WITHOUT IMMEDIATE COMPLICATIONS. Us Carotid Artery Bilateral    Result Date: 10/28/2020  History:  Arterial access during central line placement Technique:  US CAROTID ARTERY BILATERAL Comparison: No prior Findings: No pseudoaneurysm seen. Doppler findings: ARTERIAL BLOOD FLOW VELOCITY RIGHT PS                                               Prox CCA 77cm/s             Mid CCA 80cm/s              Dist CCA 61cm/s              Prox ICA cm/s              Mid ICA cm/s              Dist ICA cm/s              Prox ECA cm/s              Dist VERT cm/s              Bulb ICA/CCA LEFT PS Prox CCA 133cm/s              Mid CCA 103cm/s              Dist CCA 107cm/s              Prox ICA 75cm/s              Mid ICA 91cm/s              Dist ICA 90cm/s              Prox ECA 171cm/s              Prox VERT 53cm/s              Bulb ICA/CCA 0.8     Impression: 1. The patient is on a ventilator and the examination is limited. The right external carotid , internal carotid and vertebral arteries are not visualized.  2.  No hemodynamic significant stenosis seen on the left 3. No evidence of pseudoaneurysm Validated velocity measurements with angiographic measurements, velocity criteria are extrapolated from diameter data as defined by the Society of radiologist in ultrasound consensus conference radiology 2003; 229; 340-346. Results:  CBC:   Recent Labs     10/31/20  0600 11/01/20  0600 11/01/20  0903   WBC 12.0* 6.9  --    HGB 8.8* 9.3* 11.9*   HCT 27.5* 28.4*  --    MCV 89.8 89.6  --    PLT 86* 91*  --      :   Recent Labs     10/31/20  0600 11/01/20  0600 11/01/20  0903   * 143  --    K 3.7 3.7  --    * 110*  --    CO2 23 23  --    BUN 23 21  --    CREATININE 0.94* 0.82 1.0         Assessment    1. Acute hypoxic and hypercapnic respiratory failure associated with sepsis, improved  2.  E. coli bacteremia with UTI with septic shock  3. Thrombocytopenia  4. Bibasilar atelectasis versus pneumonia  5. Acute kidney injury improving  6. Leukocytosis, improving    Patient is on 4 L O2 via nasal cannula O2 saturation 99%. She has no new problem post extubation. Chest x-ray yesterday shows small bilateral pleural effusion and likely underlying atelectasis or infiltration. She is on IV Rocephin 1 g every 24 hourly.   Antibiotic as per ID.    SIGNATURE: Larene Favre, MD, [facility]

## 2020-11-02 NOTE — DISCHARGE INSTR - COC
Continuity of Care Form    Patient Name: Sarai Shaffer   :  1928  MRN:  [id number]    Admit date:  10/27/2020  Discharge date:  20    Code Status Order: Full Code   Advance Directives:   Advance Care Flowsheet Documentation       Date/Time Healthcare Directive Type of Healthcare Directive Copy in [address] Agent's Name Healthcare Agent's Phone Number    10/27/20 3827  No, patient does not have an advance directive for healthcare treatment -- -- -- -- --            Admitting Physician:  No admitting provider for patient encounter. PCP: Zan Kim MD    Discharging Nurse: Richie Manning RN    [address] Unit/Room#: T585/K610-08  Discharging Unit Phone Number: [phone]    Emergency Contact:   Extended Emergency Contact Information  Primary Emergency Contact: Blima [facility] [address] [facility] Phone: [phone]  Relation: Child  Secondary Emergency Contact:  Marlyn Aguilar  Address: [facility] [address], [address] [place] [address] [address] [facility] Phone: [phone]  Mobile Phone: [phone]  Relation: Child    Past Surgical History:  Past Surgical History:   Procedure Laterality Date    CATARACT REMOVAL WITH IMPLANT Bilateral 2016    COLON SURGERY  2009    polyps    COLONOSCOPY  2009    SKIN CANCER EXCISION      graft from neck       Immunization History:   Immunization History   Administered Date(s) Administered    Influenza, High Dose (Fluzone 65 yrs and older) 2017, 10/09/2018    Influenza, Triv, inactivated, subunit, adjuvanted, IM (Fluad 65 yrs and older) 10/14/2019    Pneumococcal Conjugate 13-valent ([id number]) 2017    Pneumococcal Polysaccharide ([id number]) 10/14/2019       Active Problems:  Patient Active Problem List   Diagnosis Code    Essential hypertension I10    Anxiety F41.9    COPD (chronic obstructive pulmonary disease) with acute bronchitis ([facility] [address] J44.0, J20.9    COPD exacerbation ([facility] [address] J44.1    Hypoxia R09.02    Obesity (BMI 30-39. 9) E66.9    Obstructive sleep apnea G47.33    Chest pain R07.9    SVT (supraventricular tachycardia) ([facility]) I47.1    Pneumonia of both lower lobes due to infectious organism J18.9       Isolation/Infection:   Isolation            No Isolation          Patient Infection Status       Infection Onset Added Last Indicated Last Indicated By Review Planned Expiration Resolved Resolved By    None active    Resolved    COVID-19 Rule Out 10/28/20 10/28/20 10/28/20 COVID-19 (Ordered)   10/28/20 Isaiah Nunn RN    2 negative COVID's. Electronically signed by Isaiah Nunn RN on 10/28/20 at 3:48 PM EDT       COVID-19 Rule Out 10/27/20 10/27/20 10/27/20 COVID-19 (Ordered)   10/27/20 Rule-Out Test Resulted            Nurse Assessment:  Last Vital Signs: BP (!) 131/55   Pulse 85   Temp 98.5 °F (36.9 °C) (Oral)   Resp 16   Ht 5' 3\" (1.6 m)   Wt 205 lb 0.4 oz (93 kg)   LMP  (LMP Unknown)   SpO2 99%   BMI 36.32 kg/m²     Last documented pain score (0-10 scale): Pain Level: 0  Last Weight:   Wt Readings from Last 1 Encounters:   11/02/20 205 lb 0.4 oz (93 kg)     Mental Status:  alert and nonverbal speaks occationally    IV Access:  - None    Nursing Mobility/ADLs:  Walking   Dependent  Transfer  Dependent  Bathing  Dependent  Dressing  Dependent  Toileting  Dependent  Feeding  Dependent  Med Admin  Dependent  Med Delivery   NPO Peg only for medication    Wound Care Documentation and Therapy:        Elimination:  Continence:   · Bowel: Yes and No  · Bladder: driver  Urinary Catheter: Insertion Date: 10/22/20 and ***   Colostomy/Ileostomy/Ileal Conduit: {YES / XD:[id number]}       Date of Last BM: 11/6/20      Intake/Output Summary (Last 24 hours) at 11/2/2020 1213  Last data filed at 11/2/2020 0900  Gross per 24 hour   Intake 31 ml   Output 1800 ml   Net -1769 ml     I/O last 3 completed shifts:   In: 286 [I.V.:41; NG/GT:245]  Out: 1800 [Urine:1800]    Safety EST      PHYSICIAN SIGNATURE:  {Esignature:[id number]}

## 2020-11-02 NOTE — TELEPHONE ENCOUNTER
Daughter called and missed you during rounds, she would like to speak with you about her.  Please call her at [phone] Lobo)     Thanks Krupa Dunne

## 2020-11-02 NOTE — PROGRESS NOTES
Progress Note  Date:2020       TYSN:[id number]  Patient Name:Trudy Limon     Date of Birth:[birth year]     Age:89 y.o. Patient was seen and evaluated. Spoke with nursing and daughter at bedside. Will get speech and swallow evaluation for possible dysphagia. Will get neurology evaluation for encephalopathy. ROS: could not be obtained bc of acuity of medical condition  Subjective    Subjective   Review of Systems  Objective         Vitals Last 24 Hours:  TEMPERATURE:  Temp  Av.6 °F (37 °C)  Min: 98.5 °F (36.9 °C)  Max: 98.8 °F (37.1 °C)  RESPIRATIONS RANGE: Resp  Av.8  Min: 16  Max: 28  PULSE OXIMETRY RANGE: SpO2  Av.1 %  Min: 96 %  Max: 100 %  PULSE RANGE: Pulse  Av  Min: 83  Max: 91  BLOOD PRESSURE RANGE: Systolic (17IYZ), GRACIE:130 , Min:125 , ZVN:163   ; Diastolic (02CRE), DLM:59, Min:44, Max:56    I/O (24Hr): Intake/Output Summary (Last 24 hours) at 2020 1100  Last data filed at 2020 0900  Gross per 24 hour   Intake 31 ml   Output 1800 ml   Net -1769 ml     Objective:  General Appearance:  Ill-appearing. Vital signs: (most recent): Blood pressure (!) 131/55, pulse 85, temperature 98.5 °F (36.9 °C), temperature source Oral, resp. rate 16, height 5' 3\" (1.6 m), weight 205 lb 0.4 oz (93 kg), SpO2 99 %, not currently breastfeeding. HEENT: Normal HEENT exam.    Lungs:  (Decrease BS)  Heart: S1 normal and S2 normal.    Abdomen: Abdomen is soft. Bowel sounds are normal.     Extremities: There is no deformity. (+ trace edema)  Pulses: Distal pulses are intact. Neurological: Patient is alert. Skin:  Warm and dry.       Labs/Imaging/Diagnostics    Labs:  CBC:  Recent Labs     10/31/20  0600 20  0600 20  0903   WBC 12.0* 6.9  --    RBC 3.06* 3.18*  --    HGB 8.8* 9.3* 11.9*   HCT 27.5* 28.4*  --    MCV 89.8 89.6  --    RDW 14.5 14.1  --    PLT 86* 91*  --      CHEMISTRIES:  Recent Labs     10/31/20  0600 20  0600 20  0903   * 143 --    K 3.7 3.7  --    * 110*  --    CO2 23 23  --    BUN 23 21  --    CREATININE 0.94* 0.82 1.0   GLUCOSE 135* 127*  --    MG 2.1 2.2  --      PT/INR:No results for input(s): PROTIME, INR in the last 72 hours. APTT:No results for input(s): APTT in the last 72 hours. LIVER PROFILE:No results for input(s): AST, ALT, BILIDIR, BILITOT, ALKPHOS in the last 72 hours. Imaging Last 24 Hours:  Xr Chest Portable    Result Date: 10/31/2020  Exam: XR CHEST PORTABLE History:  Bibasilar pneumonia Technique: AP portable view of the chest obtained. Comparison: Chest x-ray from October 30, 2020 and chest CT from October 27, 2020 Findings: There is an NG tube coursing towards the stomach. The patient remains intubated, and there is dilatation of the trachea towards the right side by the ectatic aorta known from the CT angiogram from October 27, 2020. The cardiomediastinal silhouette is within normal limits. There are mild increased markings throughout both lungs without areas of consolidation likely due to chronic interstitial pulmonary disease. There are small bilateral pleural effusions. Bones of the thorax appear intact. There are small bilateral pleural effusions and likely underlying atelectasis versus infiltrate.      Assessment//Plan           Hospital Problems           Last Modified POA    Pneumonia of both lower lobes due to infectious organism 10/28/2020 Yes    SVT (supraventricular tachycardia) ([address] 10/27/2020 Yes        Assessment & Plan  1) acute respiratory failure due to b/l PNA and sepsis  2) severe sepsis due tto UTI  3) encephalopathy metabolic  Neuro eval  4) HTN stable  5) dysphagia  Speech and swallow eval  6) SVT   Resolved  Needs SNIF for rehab  Spoke with care coordination about the plan  Electronically signed by Jurgen Patricio MD on 11/2/20 at 11:00 AM EST

## 2020-11-02 NOTE — CONSULTS
Subjective:      Patient ID: Karie Urrutia is a 80 y.o. female who presents today for encephalopathy. HPI 80 a right-handed female was admitted to the emergency room and then into the intensive care unit and intubated. Patient appeared to have respiratory dysfunction and diagnosed with E. coli sepsis. We are consulted as patient is quite encephalopathic at this time. She is not able to perform review of system and I was told that she was functional before. No seizures are reported. Medical records from the ICU were reviewed. Review of system could not be done as patient is very encephalopathic and very hard of hearing  Past Medical History:   Diagnosis Date    Anxiety     Cancer ([address]     Glaucoma     Gout     left knee    Hypertension     Lung disease     Obesity (BMI 30-39. 9) 7/11/2019    Obstructive sleep apnea 7/11/2019     Past Surgical History:   Procedure Laterality Date    CATARACT REMOVAL WITH IMPLANT Bilateral 2016    COLON SURGERY  2009    polyps    COLONOSCOPY  02/2009    SKIN CANCER EXCISION  2002    graft from neck     Social History     Socioeconomic History    Marital status:       Spouse name: Not on file    Number of children: Not on file    Years of education: Not on file    Highest education level: Not on file   Occupational History    Not on file   Social Needs    Financial resource strain: Not on file    Food insecurity     Worry: Not on file     Inability: Not on file    Transportation needs     Medical: Not on file     Non-medical: Not on file   Tobacco Use    Smoking status: Never Smoker    Smokeless tobacco: Never Used   Substance and Sexual Activity    Alcohol use: No    Drug use: No    Sexual activity: Not on file   Lifestyle    Physical activity     Days per week: Not on file     Minutes per session: Not on file    Stress: Not on file   Relationships    Social connections     Talks on phone: Not on file     Gets together: Not on file Henri Gilbert MD   Stopped at 11/02/20 0858    polyethylene glycol (GLYCOLAX) packet 17 g  17 g Oral Daily Guadalupe Stewart MD   Stopped at 11/02/20 0858    docusate (COLACE) 50 MG/5ML liquid 100 mg  100 mg Oral BID Guadalupe Stewart MD   Stopped at 11/02/20 0858    cefTRIAXone (ROCEPHIN) 1 g IVPB in 50 mL D5W minibag  1 g Intravenous Q24H Guadalupe Stewart MD   Stopped at 11/02/20 0930    ipratropium-albuterol (DUONEB) nebulizer solution 1 ampule  1 ampule Inhalation Q4H PRN Guadalupe Stewart MD        sodium chloride flush 0.9 % injection 10 mL  10 mL Intravenous 2 times per day [facility], DO   10 mL at 11/02/20 0859    sodium chloride flush 0.9 % injection 10 mL  10 mL Intravenous PRN [facility], DO        sodium chloride flush 0.9 % injection 10 mL  10 mL Intravenous 2 times per day [facility], DO   10 mL at 11/02/20 0900    sodium chloride flush 0.9 % injection 10 mL  10 mL Intravenous PRN [facility], DO        acetaminophen (TYLENOL) tablet 650 mg  650 mg Oral Q6H PRN [facility], DO   650 mg at 10/30/20 2043    Or    acetaminophen (TYLENOL) suppository 650 mg  650 mg Rectal Q6H PRN [facility], DO        promethazine (PHENERGAN) tablet 12.5 mg  12.5 mg Oral Q6H PRN Jose Hernandez, DO        Or    ondansetron [facility]) injection 4 mg  4 mg Intravenous Q6H PRN [facility], DO        potassium chloride (KLOR-CON M) extended release tablet 40 mEq  40 mEq Oral PRN [facility], DO        Or    potassium bicarb-citric acid (EFFER-K) effervescent tablet 40 mEq  40 mEq Oral PRN [facility], DO   40 mEq at 10/30/20 1128    Or    potassium chloride 10 mEq/100 mL IVPB (Peripheral Line)  10 mEq Intravenous PRN [facility], DO        magnesium sulfate 2 g in 50 mL IVPB premix  2 g Intravenous PRN [facility], DO        famotidine (PEPCID) injection 20 mg  20 mg Intravenous Daily [facility], DO   20 mg at 11/02/20 0859    metoprolol (LOPRESSOR) injection 5 mg  5 mg Intravenous Q6H PRN Orlena Bevel, DO   5 mg at 10/29/20 0428        Objective:   BP (!) 131/55   Pulse 85   Temp 98.5 °F (36.9 °C) (Oral)   Resp 16   Ht 5' 3\" (1.6 m)   Wt 205 lb 0.4 oz (93 kg)   LMP  (LMP Unknown)   SpO2 99%   BMI 36.32 kg/m²     Physical Exam patient is awake and aphasic and would not answer any questions. Neck is supple there are meningeal signs. She does gestures but would not follow any other commands. Pupils are equal reactive. She is hard of hearing so I am not quite sure how much this is contributing. She does withdraw her extremities to deep pain uppers more the lowers she is areflexic throughout. We were not able to walk her. Cardiovascular system S1 and S2 are normal no murmurs appreciated no carotid bruits    Ct Abdomen Pelvis Wo Contrast Additional Contrast? Radiologist Recommendation    Result Date: 10/28/2020  EXAMINATION: CT ABDOMEN PELVIS WO CONTRAST HISTORY:  septic shock  COMPARISON: CT abdomen and pelvis from August 17, 2011 TECHNIQUE: Contiguous axial CT sections of the abdomen and pelvis. No IV contrast administered. Unenhanced imaging is limited for the evaluation of some intra-abdominal and pelvic pathologies. All CT scans at this facility use dose modulation, iterative reconstruction, and/or weight based dosing when appropriate to reduce radiation dose to as low as reasonably achievable. FINDINGS  Lung bases: There is atelectasis and consolidation in the visualized portions of the lung bases. The heart and pericardium are unremarkable. There is no pericardial effusion. Liver: The liver is normal in size and attenuation without  focal lesions. There are no focal solid or cystic lesions. There is no intra or extrahepatic bile duct dilatation. Gallbladder: There are numerous calcified stones layering to the dependent portion of the gallbladder without surrounding inflammatory changes. There is distention of the gallbladder. Spleen:  There are no focal modulation, iterative reconstruction, and/or weight based dosing when appropriate to reduce radiation dose to as low as reasonably achievable. Cta Chest W Wo Contrast    Result Date: 10/27/2020  CTA CHEST W WO CONTRAST: 10/27/2020 CLINICAL HISTORY:  R/o PE, SOB, tachycardia . COMPARISON: 12/26/2018. Spiral enhanced images were obtained of the chest during the infusion of approximately 100 mL of Isovue 370 contrast with pulmonary artery  CTA protocol. Routine and volume rendered images were obtained on a 3-dimensional workstation. All CT scans at this facility use dose modulation, iterative reconstruction, and/or weight based dosing when appropriate to reduce radiation dose to as low as reasonably achievable. FINDINGS: An endotracheal tube is noted with its tip approximately 2 cm superior to the devin. There are no filling defects identified within the pulmonary arterial vasculature to suggest pulmonary emboli. The thoracic aorta is tortuous, and ectatic with mild calcific atherosclerotic plaquing of the arch. There is no dissection. The heart is mildly enlarged. Moderate coronary artery calcifications are present. Moderate bibasilar consolidation/atelectasis is noted of the posterior mid to lower lung fields, worsening inferiorly. There are no worrisome nodules, lymphadenopathy, pleural or pericardial effusions identified. A small calcified granuloma within the inferolateral aspect of the left upper lobe appears unchanged. A minimal compression deformity of the superior endplate of L49 and mild vertebral body wedging of the mid thoracic levels appears chronic. There are no acute fractures identified. Mild to moderate degenerative changes of the thoracic spine are noted. The limited images of the upper abdomen are noncontributory. ENDOTRACHEAL TUBE IN EXPECTED POSITION. MODERATE CONSOLIDATION/ATELECTASIS OF THE POSTERIOR MID TO LOWER LUNG SANZ, SUGGESTIVE OF BRONCHOPNEUMONIA AND/OR ASPIRATION.  NO EVIDENCE OF PULMONARY EMBOLI OR OTHER SIGNIFICANT CHANGE FROM 12/26/2018 IDENTIFIED. Xr Chest Portable    Result Date: 10/28/2020  EXAMINATION: XR CHEST PORTABLE CLINICAL HISTORY: CENTRAL LINE COMPARISONS: OCTOBER 27, 2020, AT 1700 HOURS FINDINGS: Endotracheal tube 2.2 cm superior to devin. Nasogastric tube courses beneath diaphragm. Osseous structures intact. Cardiopericardial silhouette normal. Blunting left costophrenic angle, mildly increased since prior study. Ill-defined area increased opacity left lung base. MILD INTERVAL INCREASE LEFT PLEURAL EFFUSION, WITH LEFT LOWER LUNG ATELECTASIS/PNEUMONIA. Xr Chest Portable    Result Date: 10/27/2020  EXAMINATION: CHEST PORTABLE VIEW-1700 hours  CLINICAL HISTORY: Intubation COMPARISONS: October 27, 2020 1431 hours  FINDINGS: Single  views of the chest is submitted. Interval endotracheal intubation since the prior examination. The tip is at the level of the clavicles. The cardiac silhouette enlarged unchanged configuration. . Pulmonary vascular mildly congested. Right sided trachea. Left lower lobe infiltrate consolidation left pleural effusion. Atelectasis, infiltrate right lower lobe Blunting of the right costophrenic angle suggesting trace right pleural effusion. No Pneumothoraces. ATELECTASIS, INFILTRATE RIGHT LOWER LOBE. TRACE RIGHT PLEURAL EFFUSION. LEFT LOWER LOBE INFILTRATE CONSOLIDATION LEFT PLEURAL EFFUSION. Xr Chest Portable    Result Date: 10/27/2020  EXAMINATION: CHEST PORTABLE [id number] hours  CLINICAL HISTORY: Short of breath COMPARISONS: July 8, 2020  FINDINGS: Single  views of the chest is submitted. The cardiac silhouette is of normal size configuration. Pulmonary vascular congested. The trachea remains deviated to the right. Left lower lobe infiltrate consolidation and small left pleural effusion. .  No Pneumothoraces. LEFT LOWER LOBE INFILTRATE CONSOLIDATION AND SMALL LEFT PLEURAL EFFUSION. .. Us Carotid Artery Bilateral    Result Date: 10/28/2020  History:  Arterial access during central line placement Technique:  US CAROTID ARTERY BILATERAL Comparison: No prior Findings: No pseudoaneurysm seen. Doppler findings: ARTERIAL BLOOD FLOW VELOCITY RIGHT PS                                               Prox CCA 77cm/s             Mid CCA 80cm/s              Dist CCA 61cm/s              Prox ICA cm/s              Mid ICA cm/s              Dist ICA cm/s              Prox ECA cm/s              Dist VERT cm/s              Bulb ICA/CCA LEFT PS Prox CCA 133cm/s              Mid CCA 103cm/s              Dist CCA 107cm/s              Prox ICA 75cm/s              Mid ICA 91cm/s              Dist ICA 90cm/s              Prox ECA 171cm/s              Prox VERT 53cm/s              Bulb ICA/CCA 0.8     Impression: 1. The patient is on a ventilator and the examination is limited. The right external carotid , internal carotid and vertebral arteries are not visualized. 2.  No hemodynamic significant stenosis seen on the left 3. No evidence of pseudoaneurysm Validated velocity measurements with angiographic measurements, velocity criteria are extrapolated from diameter data as defined by the Society of radiologist in ultrasound consensus conference radiology 2003; 229; 340-346.        Lab Results   Component Value Date    WBC 6.9 11/01/2020    RBC 3.18 11/01/2020    HGB 11.9 11/01/2020    HCT 28.4 11/01/2020    MCV 89.6 11/01/2020    MCH 29.3 11/01/2020    MCHC 32.7 11/01/2020    RDW 14.1 11/01/2020    PLT 91 11/01/2020    MPV 11.6 06/17/2014     Lab Results   Component Value Date     11/01/2020    K 3.7 11/01/2020     11/01/2020    CO2 23 11/01/2020    BUN 21 11/01/2020    CREATININE 1.0 11/01/2020    CREATININE 0.82 11/01/2020    GFRAA >60 11/01/2020    LABGLOM 52 11/01/2020    GLUCOSE 127 11/01/2020

## 2020-11-02 NOTE — PROGRESS NOTES
Infectious Disease Progress Note       11/1/2020    Patient is a followup regarding  · Septic shock secondary to gram-negative federico UTI with bacteremia/ CAP  · Acute respiratory failure  · Acute kidney injury  · Severe leukemoid reaction    Better today. Extubated. Moving to [facility]    Lab Results   Component Value Date    WBC 6.9 11/01/2020    HGB 11.9 (L) 11/01/2020    HCT 28.4 (L) 11/01/2020    MCV 89.6 11/01/2020    PLT 91 (L) 11/01/2020     Lab Results   Component Value Date     11/01/2020    K 3.7 11/01/2020     11/01/2020    CO2 23 11/01/2020    BUN 21 11/01/2020    CREATININE 1.0 11/01/2020    CREATININE 0.82 11/01/2020    GLUCOSE 127 11/01/2020    CALCIUM 8.2 11/01/2020        WBC trends are being monitored. Antibiotic doses are being adjusted per most recent renal labs. Vitals:    11/01/20 2157   BP: (!) 133/56   Pulse: 84   Resp: 19   Temp:    SpO2: 99%           Patient Active Problem List   Diagnosis    Essential hypertension    Anxiety    COPD (chronic obstructive pulmonary disease) with acute bronchitis (HCC)    COPD exacerbation (HCC)    Hypoxia    Obesity (BMI 30-39. 9)    Obstructive sleep apnea    Chest pain    SVT (supraventricular tachycardia) (HCC)    Pneumonia of both lower lobes due to infectious organism     Skin: no new rashes   HEENT: Neck is supple  Heart: S1 S2  Lungs: clear bilaterally  Abdomen: soft, ND, NTTP, +BS  Extrem:+no asymmetry of the upper or lower extremities        ASSESSMENT:  · Septic shock secondary to gram-negative federico E coli UTI with bacteremia/ CAP  · Acute respiratory failure  · Acute kidney injury  · Severe leukemoid reaction - better      PLAN:  · Continue IV Zosyn  · Ok for [facility]      Imaging and labs were reviewed per medical records and any ID pertinent labs were also addressed  Time spent in total on this chart >25 min    Kae Benoit DO

## 2020-11-02 NOTE — PROGRESS NOTES
yesterday evening, she was also treated with digoxin 0.5 mg IV x1 and has also received 2 separate doses of as needed IV Lopressor. Currently on telemetry she is sinus tachycardia with heart rate in the 110s. Telemetry alarms reviewed show brief 2.5 to 3-second episodes of what appears to be P A. fib RVR with heart rate in the 160s at 2143 yesterday and again at 1:48 AM this morning. 1 of 2 cultures positive for E. coli. Urine culture also positive for E. coli. ID following and patient is on Zosyn and vancomycin. WBC count mildly improved to 40.2 today compared to 52.1 yesterday. She remains on Levophed which is being weaned down currently at 11mcg/minute. 10/28/20: This is a 20-year-old  female with past medical history difficult for hypertension, COPD and obesity who presented to the ER yesterday with complaints of tachycardia. History is obtained from the electronic medical record as patient is currently intubated and sedated. On presentation to ER yesterday, EKG was completed showing SVT with heart rate of 166. Prior to arrival to the emergency room, EMS treated patient with 2 separate doses of IV adenosine initially 6 mg followed by 12 mg dose. While in the emergency room, patient became more confused, combative and exhibited respiratory distress and decision was made to intubate. 2140, potassium 4.3, chloride 105, total CO2 24, BUN 21, creatinine elevated 1.25, GFR low at 40.0, glucose 103. Troponin negative at less than 0.010. proBNP 522. WBC 5.4, hemoglobin 13.2, hematocrit 39.8, platelets 602. INR 1.4. D-dimer elevated at greater than 20. PA of the chest completed and revealed no evidence of pulmonary emboli, moderate consolidation/atelectasis of the posterior mid to lower lung field suggestive of bronchopneumonia and/or aspiration. See the brain showed no acute findings, mild cerebral atrophy, chronic ischemic white matter disease.   Subsequently admitted to the ICU for further evaluation.     At time of evaluation today, patient is seen in the ICU currently intubated, on mechanical ventilation and sedated. On 80% FiO2 with PEEP of 5. She is currently on Levophed drip at 20 mcg/min. Most recent blood pressure 105/47. She remains tachycardic with heart rate 130s to 150 range questionable sinus tach versus SVT. She has significant leukocytosis today with WBC count currently 48.9 compared to 5.4 yesterday. Is on IV antibiotics with Zosyn and vancomycin. ID consult pending.     There is no known prior cardiac history. She was recently seen by cardiology during hospital admission in July 2020 after presenting with chest pain. Chest pain was atypical and reproducible at that time. Echocardiogram was completed and revealed normal LV systolic function with ejection fraction of 60% no significant valvular abnormalities. 11-2-20: non verbal to my questions. HD stable. Hgb is better. Review of Systems:   Review of Systems   Unable to perform ROS: Intubated         Physical Examination:    BP (!) 131/55   Pulse 85   Temp 98.5 °F (36.9 °C) (Oral)   Resp 16   Ht 5' 3\" (1.6 m)   Wt 205 lb 0.4 oz (93 kg)   LMP  (LMP Unknown)   SpO2 99%   BMI 36.32 kg/m²    Physical Exam  Constitutional:       Comments: Intubated and sedated   HENT:      Head: Normocephalic and atraumatic. Comments: ET tube in place  Cardiovascular:      Rate and Rhythm: Regular rhythm. Tachycardia present. Heart sounds: No murmur. Pulmonary:      Effort: No respiratory distress. Breath sounds: No wheezing, rhonchi or rales. Comments: Intubated on vent  Abdominal:      Palpations: Abdomen is soft. Comments: Hypoactive BS   Musculoskeletal:      Comments: SCDs in place on bilateral LE   Skin:     General: Skin is warm and dry.    Neurological:      Comments: sedated         LABS:  CBC:   Lab Results   Component Value Date    WBC 6.9 11/01/2020    RBC 3.18 11/01/2020    HGB 11.9 11/01/2020    HCT 28.4 11/01/2020    MCV 89.6 11/01/2020    MCH 29.3 11/01/2020    MCHC 32.7 11/01/2020    RDW 14.1 11/01/2020    PLT 91 11/01/2020    MPV 11.6 06/17/2014     CBC with Differential:   Lab Results   Component Value Date    WBC 6.9 11/01/2020    RBC 3.18 11/01/2020    HGB 11.9 11/01/2020    HCT 28.4 11/01/2020    PLT 91 11/01/2020    MCV 89.6 11/01/2020    MCH 29.3 11/01/2020    MCHC 32.7 11/01/2020    RDW 14.1 11/01/2020    BANDSPCT 12 10/28/2020    METASPCT 1 10/28/2020    LYMPHOPCT 10.0 11/01/2020    MONOPCT 9.4 11/01/2020    BASOPCT 1.0 11/01/2020    MONOSABS 0.6 11/01/2020    LYMPHSABS 0.7 11/01/2020    EOSABS 0.1 11/01/2020    BASOSABS 0.1 11/01/2020     CMP:    Lab Results   Component Value Date     11/01/2020    K 3.7 11/01/2020     11/01/2020    CO2 23 11/01/2020    BUN 21 11/01/2020    CREATININE 1.0 11/01/2020    CREATININE 0.82 11/01/2020    GFRAA >60 11/01/2020    LABGLOM 52 11/01/2020    GLUCOSE 127 11/01/2020    PROT 6.0 10/27/2020    LABALBU 3.8 10/27/2020    CALCIUM 8.2 11/01/2020    BILITOT 0.6 10/27/2020    ALKPHOS 148 10/27/2020    AST 21 10/27/2020    ALT 12 10/27/2020     BMP:    Lab Results   Component Value Date     11/01/2020    K 3.7 11/01/2020     11/01/2020    CO2 23 11/01/2020    BUN 21 11/01/2020    LABALBU 3.8 10/27/2020    CREATININE 1.0 11/01/2020    CREATININE 0.82 11/01/2020    CALCIUM 8.2 11/01/2020    GFRAA >60 11/01/2020    LABGLOM 52 11/01/2020    GLUCOSE 127 11/01/2020     Magnesium:    Lab Results   Component Value Date    MG 2.2 11/01/2020     Troponin:    Lab Results   Component Value Date    TROPONINI <0.010 10/27/2020       Radiology:  Ct Abdomen Pelvis Wo Contrast Additional Contrast? Radiologist Recommendation    Result Date: 10/28/2020  EXAMINATION: CT ABDOMEN PELVIS WO CONTRAST HISTORY:  septic shock  COMPARISON: CT abdomen and pelvis from August 17, 2011 TECHNIQUE: Contiguous axial CT sections of the abdomen and pelvis.   No IV contrast administered. Unenhanced imaging is limited for the evaluation of some intra-abdominal and pelvic pathologies. All CT scans at this facility use dose modulation, iterative reconstruction, and/or weight based dosing when appropriate to reduce radiation dose to as low as reasonably achievable. FINDINGS  Lung bases: There is atelectasis and consolidation in the visualized portions of the lung bases. The heart and pericardium are unremarkable. There is no pericardial effusion. Liver: The liver is normal in size and attenuation without  focal lesions. There are no focal solid or cystic lesions. There is no intra or extrahepatic bile duct dilatation. Gallbladder: There are numerous calcified stones layering to the dependent portion of the gallbladder without surrounding inflammatory changes. There is distention of the gallbladder. Spleen: There are no focal lesions or calcifications in the spleen. There is no splenomegaly  Pancreas: The pancreas is normal in size and attenuation without focal lesions or dilatation of the pancreatic duct. Kidneys: There are no solid or cystic lesions. There is no hydronephrosis. There is a 9 mm calcified stone in the inferior pole of the right kidney. There is no hydroureter. Adrenal glands are negative. Bowel: There is no bowel dilatation or evidence of diverticulitis. Appendix: There  is no CT evidence for appendicitis. Nodes: No lymphadenopathy. Aorta: No aneurysm  Peritoneum: No free fluid or free air. Pelvis: There are no solid or cystic lesions. There is a Cedeno catheter in place. Abdominal wall: The abdominal wall is intact. Bones : There are no acute osseous changes. Soft tissues: The soft tissues are unremarkable. No acute intra-abdominal changes. There is no free air or free fluid, there are no fluid collections. The visualized portions of the lung bases demonstrate bilateral atelectasis and consolidations in the dependent portions.  There are calcified stones in the gallbladder without evidence of cholecystitis There is a 9 mm right intrarenal stone in the inferior pole, there is no hydronephrosis or hydroureter on either side. Ct Head Wo Contrast    Result Date: 10/27/2020  CT Brain Contrast medium:  Not utilized. History: Altered mental status. Tachycardia. Comparison:  None. Findings: Extra-axial spaces:  Normal. Intracranial hemorrhage:  None. Ventricular system: Ventricles mildly enlarged. Sulci mildly prominent Basal Cisterns:  Normal. Cerebral Parenchyma: Bilateral symmetric periventricular areas decreased attenuation. Midline Shift:  None. Cerebellum:  Normal. Paranasal sinuses and mastoid air cells:  Normal. Visualized Orbits: Remote bilateral ocular surgery. Impression: No acute findings. Mild cerebral atrophy. Chronic ischemic white matter disease. All CT scans at this facility use dose modulation, iterative reconstruction, and/or weight based dosing when appropriate to reduce radiation dose to as low as reasonably achievable. Cta Chest W Wo Contrast    Result Date: 10/27/2020  CTA CHEST W WO CONTRAST: 10/27/2020 CLINICAL HISTORY:  R/o PE, SOB, tachycardia . COMPARISON: 12/26/2018. Spiral enhanced images were obtained of the chest during the infusion of approximately 100 mL of Isovue 370 contrast with pulmonary artery  CTA protocol. Routine and volume rendered images were obtained on a 3-dimensional workstation. All CT scans at this facility use dose modulation, iterative reconstruction, and/or weight based dosing when appropriate to reduce radiation dose to as low as reasonably achievable. FINDINGS: An endotracheal tube is noted with its tip approximately 2 cm superior to the devin. There are no filling defects identified within the pulmonary arterial vasculature to suggest pulmonary emboli. The thoracic aorta is tortuous, and ectatic with mild calcific atherosclerotic plaquing of the arch. There is no dissection.  The heart is infiltrate consolidation left pleural effusion. Atelectasis, infiltrate right lower lobe Blunting of the right costophrenic angle suggesting trace right pleural effusion. No Pneumothoraces. ATELECTASIS, INFILTRATE RIGHT LOWER LOBE. TRACE RIGHT PLEURAL EFFUSION. LEFT LOWER LOBE INFILTRATE CONSOLIDATION LEFT PLEURAL EFFUSION. Xr Chest Portable    Result Date: 10/27/2020  EXAMINATION: CHEST PORTABLE [id number] hours  CLINICAL HISTORY: Short of breath COMPARISONS: July 8, 2020  FINDINGS: Single  views of the chest is submitted. The cardiac silhouette is of normal size configuration. Pulmonary vascular congested. The trachea remains deviated to the right. Left lower lobe infiltrate consolidation and small left pleural effusion. .  No Pneumothoraces. LEFT LOWER LOBE INFILTRATE CONSOLIDATION AND SMALL LEFT PLEURAL EFFUSION. .. Us Carotid Artery Bilateral    Result Date: 10/28/2020  History:  Arterial access during central line placement Technique:  US CAROTID ARTERY BILATERAL Comparison: No prior Findings: No pseudoaneurysm seen. Doppler findings: ARTERIAL BLOOD FLOW VELOCITY RIGHT PS                                               Prox CCA 77cm/s             Mid CCA 80cm/s              Dist CCA 61cm/s              Prox ICA cm/s              Mid ICA cm/s              Dist ICA cm/s              Prox ECA cm/s              Dist VERT cm/s              Bulb ICA/CCA LEFT PS Prox CCA 133cm/s              Mid CCA 103cm/s              Dist CCA 107cm/s              Prox ICA 75cm/s              Mid ICA 91cm/s              Dist ICA 90cm/s              Prox ECA 171cm/s              Prox VERT 53cm/s              Bulb ICA/CCA 0.8     Impression: 1. The patient is on a ventilator and the examination is limited.  The right external carotid , internal carotid and vertebral arteries are not visualized. 2.  No hemodynamic significant stenosis seen on the left 3. No evidence of pseudoaneurysm Validated velocity measurements with angiographic measurements, velocity criteria are extrapolated from diameter data as defined by the Society of radiologist in ultrasound consensus conference radiology 2003; 229; 340-346.           Echocardiogram 10/29/20:  Conclusions   Summary   Normal left ventricular systolic function, no regional wall motion   abnormalities, estimated ejection fraction of 70%. Normal left ventricular size and function. Mild concentric left ventricular hypertrophy. Impaired relaxation compatible with diastolic dysfunction. ( reversed E/A   ratio)   No evidence of mitral regurgitation. No evidence of mitral valve stenosis. No evidence of aortic valve regurgitation . No evidence of aortic valve stenosis. Signature   ----------------------------------------------------------------   Electronically signed by [place].DO(Interpreting   physician) on 10/29/2020 06:35 PM       EKG 10/28/20: , baseline artifact, QTc 498ms     Telemetry 10/28/20: SVT vs ST 140s-150s  Telemetry 10/29/20: ST 110s; brief 2.5-3 second runs of PAF with HR 160s at 21:43 yesterday and 1:48am today  Telemetry 10/30/20: SR 90s; 2.2 seconds of PSVT at 20:10 yesterday; ? SR with PACs vs PAF at 20:37 yesterday      Impression     Sinus tach- resolved. PSVT  No significant Afib episodes. Severe sepsis  PNA  VDRF  Normal LVF  Septic shock  Hx of HTN        Plan         1. As needed IV Lopressor as tolerated  2. Monitor on telemetry  3. Keep potassium greater than 4, magnesium greater than 2  4. GI/DVT prophylaxis  5. No further cardiac recs at this time. Will sign off.  Please call with questions.      Thank you for allowing me to participate in the care of your patient, please don't hesitate to contact me if you have any further questions.     Electronically signed by Rola Bruce DO on 11/2/2020 at 1:44 PM

## 2020-11-02 NOTE — PROGRESS NOTES
Comprehensive Nutrition Assessment    Type and Reason for Visit:  Reassess, Consult(TF order and manage)    Nutrition Recommendations/Plan:   If in alignment with goals of care, obtain EN access to start tube feeding. Recommend 1.5 with fiber formula (Jevity 1.5) at 20 ml/hr  Increase rate as tolerated to a goal of 40 ml/hr x 24 hrs with 120 ml water flush Q 4 hrs    Nutrition Assessment:  Pt was extubated and remains at high nutrition risk. Encephalopic and not verbalizing, faled BSE. Recommend obtain EN access to start TF in alignment with goal of care, recommendations provided    Malnutrition Assessment:  Malnutrition Status: At risk for malnutrition (Comment)    Context:  Acute Illness     Findings of the 6 clinical characteristics of malnutrition:  Energy Intake:  Unable to assess  Weight Loss:  No significant weight loss     Body Fat Loss:  Unable to assess     Muscle Mass Loss:  Unable to assess    Fluid Accumulation:  Unable to assess     Strength:  Not Performed    Estimated Daily Nutrient Needs:  Energy (kcal):  2677-9390 (kg x 15-18); Weight Used for Energy Requirements:  Usual(84.8 kg)     Protein (g):  62-73 gm (kg IBW x 1.2-1.4); Weight Used for Protein Requirements:  Ideal(52.2 kg)        Fluid (ml/day):  ~1500 ml; Method Used for Fluid Requirements:  1 ml/kcal      Nutrition Related Findings:  vent (10/28-11/1), Tf at goal (10/29-11/1).  encephalopic, not verbalizing, failed BSE. labsmeds reviewed, colace BID, I/O: 286/1800 (+ 4 L), 1-2+ generalized edema, PICC line, BM 11/2      Wounds:  None       Current Nutrition Therapies:    Diet NPO Effective Now    Anthropometric Measures:  · Height: 5' 3\" (160 cm)  · Current Body Weight: 205 lb (93 kg)(11/2-bedscale)   · Admission Body Weight: 170 lb (77.1 kg)(stated)    · Usual Body Weight: 187 lb (84.8 kg)(7/8/20-bedscale)     · Ideal Body Weight: 115 lbs; % Ideal Body Weight  > 100%   · BMI: 36.3  · BMI Categories: Obese Class 2 (BMI 35.0 -39.9)       Nutrition Diagnosis:   · Inadequate oral intake related to cognitive or neurological impairment as evidenced by NPO or clear liquid status due to medical condition, swallow study results    Nutrition Interventions:   Food and/or Nutrient Delivery:  (If in alignment with goals of care, obtain EN access to start tube feeding. Recommend 1.5 with fiber formula (Jevity 1.5) at 20 ml/hr, increase rate as tolerated to a goal of 40 ml/hr x 24 hrs with 120 ml water flush Q 4 hrs)  Nutrition Education/Counseling:  Education not indicated   Coordination of Nutrition Care:  Continue to monitor while inpatient    Goals:  New goal with extubation: EN vs ability to start po       Nutrition Monitoring and Evaluation:   Food/Nutrient Intake Outcomes:  Diet Advancement/Tolerance  Physical Signs/Symptoms Outcomes:  Chewing or Swallowing, Biochemical Data, Weight     Discharge Planning:     Too soon to determine     Electronically signed by Areli Palmer RD, LD on 11/2/20 at 2:45 PM EST

## 2020-11-02 NOTE — CARE COORDINATION
Per the hospitalist, the patient will need a SNF. The LSW left a message for the patient's daughter, Khurram Johnston, regarding the discharge plan. Electronically signed by АННА Noriega on 11/2/20 at 11:59 AM EST    The patient's daughter, Khurram Johnston, called the LSW back. The patient's family would like [facility] as the discharge plan. The LSW faxed the face sheet to Samara Gomez.  Electronically signed by АННА Noriega on 11/2/20 at 12:14 PM EST

## 2020-11-02 NOTE — PLAN OF CARE
BSE completed.
Nutrition Problem #1: Inadequate oral intake  Intervention: Food and/or Nutrient Delivery: (If in alignment with goals of care, obtain EN access to start tube feeding.   Recommend 1.5 with fiber formula (Jevity 1.5) at 20 ml/hr, increase rate as tolerated to a goal of 40 ml/hr x 24 hrs with 120 ml water flush Q 4 hrs)  Nutritional Goals: New goal with extubation: EN vs ability to start po
Nutrition Problem #1: Inadequate oral intake  Intervention: Food and/or Nutrient Delivery: (Recommend start TF if unable to extubate in next 24 hrs)  Nutritional Goals: Initiation of TF if unable to extubate in next 24 hrs
Nutrition Problem #1: Inadequate oral intake  Intervention: Food and/or Nutrient Delivery: Start Tube Feeding(Begin low calorie/high protien TF @ 20 ml/hr x 24 hrs via OG, Water flush 100 ml, 4 x daily.)  Nutritional Goals: Initiation and tolerance of TF
Ongoing.
Reviewed, ongoing. Continue present plan of care.
improve  Outcome: Ongoing     Problem: Pain:  Goal: Pain level will decrease  Description: Pain level will decrease  Outcome: Ongoing  Goal: Recognizes and communicates pain  Description: Recognizes and communicates pain  Outcome: Ongoing  Goal: Control of acute pain  Description: Control of acute pain  Outcome: Ongoing  Goal: Control of chronic pain  Description: Control of chronic pain  Outcome: Ongoing     Problem: Skin Integrity - Impaired:  Goal: Will show no infection signs and symptoms  Description: Will show no infection signs and symptoms  Outcome: Ongoing  Goal: Absence of new skin breakdown  Description: Absence of new skin breakdown  Outcome: Ongoing     Problem: Sleep Pattern Disturbance:  Goal: Appears well-rested  Description: Appears well-rested  Outcome: Ongoing     Problem: Tissue Perfusion, Altered:  Goal: Circulatory function within specified parameters  Description: Circulatory function within specified parameters  Outcome: Ongoing     Problem: Tissue Perfusion - Cardiopulmonary, Altered:  Goal: Absence of angina  Description: Absence of angina  Outcome: Ongoing  Goal: Hemodynamic stability will improve  Description: Hemodynamic stability will improve  Outcome: Ongoing

## 2020-11-02 NOTE — PROGRESS NOTES
[facility]   [facility]  Speech Language Pathology  Clinical Bedside Swallow Evaluation    NAME:Trudy Amaya  : 1928 (63 y.o.)   MRN: [id number]  ROOM: [facility]/A000-11  ADMISSION DATE: 10/27/2020  PATIENT DIAGNOSIS(ES): SVT (supraventricular tachycardia) ([facility] [I47.1]  No chief complaint on file. Patient Active Problem List    Diagnosis Date Noted    Pneumonia of both lower lobes due to infectious organism      Priority: High    SVT (supraventricular tachycardia) ([address] 10/27/2020    Chest pain 2020    Obesity (BMI 30-39.9) 2019    Obstructive sleep apnea 2019    Hypoxia 2019    COPD exacerbation ([facility]     COPD (chronic obstructive pulmonary disease) with acute bronchitis ([address] 2018    Anxiety 2017    Essential hypertension      Past Medical History:   Diagnosis Date    Anxiety     Cancer ([facility]     Glaucoma     Gout     left knee    Hypertension     Lung disease     Obesity (BMI 30-39. 9) 2019    Obstructive sleep apnea 2019     Past Surgical History:   Procedure Laterality Date    CATARACT REMOVAL WITH IMPLANT Bilateral 2016    COLON SURGERY  2009    polyps    COLONOSCOPY  2009    SKIN CANCER EXCISION      graft from neck     Allergies   Allergen Reactions    Norvasc [Amlodipine Besylate] Swelling       DATE ONSET: 10/27/2020    Date of Evaluation: 2020   Evaluating Therapist: Maricruz Conway SLP    Recommended Diet and Intervention  Diet Solids Recommendation: NPO  Liquid Consistency Recommendation: NPO  Recommended Form of Meds: Via alternative means of nutrition  Recommendations: NPO;Dysphagia treatment; Modified barium swallow study;Consider alternative nutrition(Paticipated in MBS when pt is deemed more appropriate by treating SLP.)  Therapeutic Interventions: Oral care, Therapeutic PO trials with SLP, Oral motor exercises, Tongue base strengthening, Patient/Family education, Effortful swallow, Pharyngeal exercises, Laryngeal exercises, Stephanie    Compensatory Swallowing Strategies  Compensatory Swallowing Strategies: Effortful swallow    Reason for Referral  Cihlango Luo was referred for a bedside swallow evaluation to assess the efficiency of her swallow function, identify signs and symptoms of aspiration and make recommendations regarding safe dietary consistencies, effective compensatory strategies, and safe eating environment. General  Chart Reviewed: Yes  Subjective  Subjective: RN stated she was lethargic and weak this morning. Behavior/Cognition: Alert; Doesn't follow directions  Respiratory Status: O2 via nasual cannula  O2 Device: Nasal cannula  Liters of Oxygen: 4 L  Communication Observation: Non-verbal  Follows Directions: None  Dentition: Adequate  Patient Positioning: Upright in bed  Consistencies Administered: Dysphagia Pureed (Dysphagia I); Honey - teaspoon    Current Diet level:  Current Diet : NPO  Current Liquid Diet : NPO    Oral Motor Deficits  Oral/Motor  Oral Motor: Exceptions(Pt unable to follow commands. Noted generalized oral motor weakness.)  Labial Strength: Reduced(Unable to close mouth around spoon.)  Labial Sensation: Reduced  Labial Coordination: Reduced  Lingual Strength: Reduced  Lingual Sensation: Reduced  Lingual Coordination: Reduced    Oral Phase Dysfunction  Oral Phase  Oral Phase: Exceptions  Oral Phase Dysfunction  Decreased Anterior to Posterior Transit: All  Suspected Premature Bolus Loss: Honey - teaspoon  Lingual/Palatal Residue: Puree  Oral Phase  Oral Phase - Comment: Pt presents with severe oral phase dysphagia characterized by general oral motor weakness, decreased A-P transit, suspected premature bolus loss, and lingual residue of pureed consistencies. Pt given 1/8 of teaspoon for both pureed and HTL. Pt held pureed bolus with no lingual manipulation in mouth. SLP required to perform oral care in order to remove bolus.   SLP used moistened toothette in order to clean oral cavity. Indicators of Pharyngeal Phase Dysfunction   Pharyngeal Phase  Pharyngeal Phase: Exceptions  Indicators of Pharyngeal Phase Dysfunction  Delayed Swallow: All  Decreased Laryngeal Elevation: All  Pharyngeal Phase   Pharyngeal: Suspected sever pharyngeal stage dysphagia characterized by a delayed swallow and decreased hyolaryngeal elevation. Impression  Dysphagia Diagnosis: Severe pharyngeal stage dysphagia; Severe oral stage dysphagia  Dysphagia Impression : Pt presents with severe oral dysphagia and suspected severe pharyngeal dysphagia. Dysphagia Outcome Severity Scale: Level 1: Severe dysphagia- NPO. Unable to tolerate any PO safely     Treatment Plan  Requires SLP Intervention: Yes  Duration/Frequency of Treatment: reassess daily  D/C Recommendations: To be determined       Treatment/Goals  Short-term Goals  Timeframe for Short-term Goals: 2 weeks  Goal 1: Pt will complete oral motor ROM and strengthening exercises with 70% accuracy, given cues as needed, in order to strengthen lingual/labial/buccal musculature to promote safety and efficiency of oral phase of swallow and decrease risk for pocketing. Goal 2: Pt will complete lingual exercises that promote anterior to posterior propulsion of bolus and improve tongue base retraction with 80% accuracy in order to strengthen the muscles of the swallow to decrease risk of aspiration and to increase ability to safely handle the least restrictive diet level. Goal 3: Pt will complete pharyngeal strengthening exercises (such as the Stephanie, Effortful swallow, etc) with __% acc in order to strengthen and establish and more effective swallow. Goal 4: Pt will tolerate 5/5 PO trials of puree and HTL with no overt s/s of aspiration observed. Goal 5: Pt will participated in [facility] when deemed appropriate by treating SLP, to assess swallowing structures to determine possible upgrade of diet and future goals.   Long-term Goals  Timeframe for Long-term Goals: 2 weeks  Goal 1: Pt will tolerate the recommended diet level with no s/s of aspiration. Dysphagia Goals: The patient will tolerate recommended diet without observed clinical signs of aspiration, The patient will tolerate instrumental swallowing procedure    Prognosis  Prognosis  Prognosis for safe diet advancement: poor  Barriers to reach goals: severity of dysphagia  Individuals consulted  Consulted and agree with results and recommendations: Patient;RN(Willian)    Education  Patient Education: Pt educated on results of BSE. Patient Education Response: Needs reinforcement; No evidence of learning  Safety Devices in place: Yes  Type of devices: Bed alarm in place;Call light within reach    Pain Assessment:  Initial Assessment:  Patient demonstrated no s/s of pain. Re-assessment:  Patient demonstrated no s/s of pain.        Therapy Time  SLP Individual Minutes  Time In: 1005  Time Out: [id number] [address]  Minutes: 15              Signature: Electronically signed by WILFRID Desai on 11/2/2020 at 10:44 AM

## 2020-11-02 NOTE — PROGRESS NOTES
[facility] OCCUPATIONAL THERAPY EVALUATION - ACUTE     NAME: Sarai hSaffer  : 1928 (22 y.o.)  MRN: [id number]  CODE STATUS: Full Code  Room: B4/V269-35    Date of Service: 2020    Patient Diagnosis(es): [facility]cardia) [facility] [I47.1]   No chief complaint on file. Patient Active Problem List    Diagnosis Date Noted    Pneumonia of both lower lobes due to infectious organism      Priority: High    SVT (supraventricular tachycardia) ([facility] 10/27/2020    Chest pain 2020    Obesity (BMI 30-39.9) 2019    Obstructive sleep apnea 2019    Hypoxia 2019    COPD exacerbation ([facility]     COPD (chronic obstructive pulmonary disease) with acute bronchitis [facility] 2018    Anxiety 2017    Essential hypertension         Past Medical History:   Diagnosis Date    Anxiety     Cancer ([facility]     Glaucoma     Gout     left knee    Hypertension     Lung disease     Obesity (BMI 30-39. 9) 2019    Obstructive sleep apnea 2019     Past Surgical History:   Procedure Laterality Date    CATARACT REMOVAL WITH IMPLANT Bilateral 2016    COLON SURGERY  2009    polyps    COLONOSCOPY  2009    SKIN CANCER EXCISION      graft from neck        Restrictions  Restrictions/Precautions: Fall Risk(NPO)     Safety Devices: Safety Devices  Safety Devices in place: Yes  Type of devices: All fall risk precautions in place        Subjective  Pre Treatment Pain Screening  Pain at present: 2  Scale Used:  Faces  Intervention List: Patient able to continue with treatment, Nurse/Physician notified  Comments / Details: RN notified of discomfort with mobility, as well as pt's one statement 'I haven't eaten yet'    Pain Reassessment:   Pain Assessment  Patient Currently in Pain: Other (comment)(Pt. unable to verbalize pain; does state 'ow' with some LE movements but otherwise appears comfortable without grimacing)  Pain Assessment: Faces  Brady-Baker Pain Rating: Hurts a little bit(Grimacing with movements, smiling at rest)       Prior Level of Function:  Social/Functional History  Lives With: Daughter(Information per chart; pt. unable to state any PLOF)  Receives Help From: Family  ADL Assistance: Needs assistance  Homemaking Assistance: Needs assistance  Homemaking Responsibilities: No  Ambulation Assistance: Needs assistance  Transfer Assistance: Needs assistance  Active : No  Additional Comments: Information gathered per chart; daughter not present and pt. unable to verbalize    OBJECTIVE:     Orientation Status:  Orientation  Overall Orientation Status: Impaired  Orientation Level: (Pt. unable to answer orientation questions)    Observation:  Observation/Palpation  Posture: Fair(Kyphotic, forward flexed neck)  Observation: hematomas B UEs; dry skin B LEs, pleasant and attentive but unable to answer questions or converse    Cognition Status:  Cognition  Overall Cognitive Status: Exceptions  Arousal/Alertness: Delayed responses to stimuli  Following Commands: Inconsistently follows commands  Attention Span: Attends with cues to redirect, Difficulty attending to directions, Difficulty dividing attention  Memory: Decreased recall of biographical Information, Decreased recall of precautions, Decreased recall of recent events, Decreased short term memory, Decreased long term memory(Based on inability to answer questions, and vague, nonsensical speech when talking)  Safety Judgement: Decreased awareness of need for safety, Decreased awareness of need for assistance  Problem Solving: Assistance required to generate solutions, Assistance required to correct errors made, Decreased awareness of errors, Assistance required to identify errors made, Assistance required to implement solutions  Insights: Not aware of deficits  Initiation: Requires cues for all  Sequencing: Requires cues for all  Cognition Comment: Significant increased processing time, decreased awareness of deficits. Unable to state that she was soiled on therapist arrival    Perception Status:  Perception  Overall Perceptual Status: WFL    Sensation Status:  Sensation  Overall Sensation Status: WFL(difficult to assess due to minimal verbalizations from pt- Appears to be WFL, reactive to light touch BUE and BLE)    Vision and Hearing Status:  Vision  Vision: Within Functional Limits(Appears WFL)  Hearing  Hearing: Within functional limits(Appears WFL)     ROM:   LUE PROM (degrees)  LUE PROM: WFL  LUE AROM (degrees)  LUE General AROM: Limited AROM; appears to be [facility] at shoulder but pt. unable to actively assist with lifting UEs to demonstrate except briefly functionally during mobility. Left Hand AROM (degrees)  Left Hand AROM: WFL  Left Hand General AROM: Limited by edema  RUE PROM (degrees)  RUE PROM: WFL  RUE AROM (degrees)  RUE General AROM: Limited AROM; appears to be [facility] at shoulder but pt. unable to actively assist with lifting UEs to demonstrate except briefly functionally during mobility. Right Hand AROM (degrees)  Right Hand AROM: WFL  Right Hand General AROM: Limited by edema    Strength:  LUE Strength  Gross LUE Strength: Exceptions to [facility]  L Hand General: 2+/5  LUE Strength Comment: Decreased strength functionally, unable to actively follow commands for MMT  RUE Strength  Gross RUE Strength: Exceptions to [facility]  R Hand General: 2+/5  RUE Strength Comment: Decreased strength functionally, unable to actively follow commands for MMT    Coordination, Tone, Quality of Movement:    Tone RUE  RUE Tone: Normotonic  Tone LUE  LUE Tone: Normotonic  Coordination  Movements Are Fluid And Coordinated: No  Coordination and Movement description: Fine motor impairments, Gross motor impairments, Ataxia, Decreased speed, Decreased accuracy, Right UE, Left UE    Hand Dominance:  Hand Dominance  Hand Dominance: (Pt. unable to state)    ADL Status:  ADL  Feeding: NPO, Dependent/Total  Grooming: Dependent/Total  UE Bathing: Dependent/Total  LE Bathing: Dependent/Total  UE Dressing: Dependent/Total  LE Dressing: Dependent/Total  Toileting: Dependent/Total  Additional Comments: Simulated ADLs as above. Pt. significantly limited in her ability to sequence or follow commands. Therapist performs hygiene following bowel accident; decreased ability to sequence rolling for hygiene. Toilet Transfers  Toilet Transfer: Unable to assess  Toilet Transfers Comments: Anticipate dependent based on other mobility       Therapy key for assistance levels -   Independent = Pt. is able to perform task with no assistance but may require a device   Stand by assistance = Pt. does not perform task at an independent level but does not need physical assistance, requires verbal cues  Minimal, Moderate, Maximal Assistance = Pt. requires physical assistance (25%, 50%, 75% assist from helper) for task but is able to actively participate in task   Dependent = Pt. requires total assistance with task and is not able to actively participate with task completion     Functional Mobility:  Functional Mobility  Functional Mobility Comments: Unable to assess  Transfers  Sit to stand: Unable to assess  Stand to sit: Unable to assess  Transfer Comments: Pt unable to come to EOB to attempt standing    Bed Mobility  Bed mobility  Rolling to Left: Maximum assistance  Rolling to Right: Maximum assistance  Supine to Sit: Unable to assess  Sit to Supine: Unable to assess  Comment: Significant assistance needed to initiate mobility as well as cues and encouragement.  Does attempt to assist but is significantly limited in grasp and sequencing    Seated and Standing Balance:  Balance  Sitting Balance: Unable to assess(comment)(Unable to come to EOB this date)  Standing Balance: Unable to assess(comment)    Functional Endurance:  Activity Tolerance  Activity Tolerance: Patient Tolerated treatment well    D/C Recommendations:  OT D/C RECOMMENDATIONS  REQUIRES OT FOLLOW UP: Home Management Training, Cognitive/Perceptual Training, Positioning, Neuromuscular Re-education    Goals:   Patient will:    - Improve functional endurance to tolerate/complete 30 mins of ADL's  - Be Mod A in UB ADLs   - Be Mod A in LB ADLs  - Be Mod A in ADL transfers without LOB  - Be Mod A in toileting tasks  - Improve B hand fine motor coordination to [facility] in order to manage clothing fasteners/self-care containers in a timely manner  - Improve B UE strength and endurance to 3/5 in order to participate in self-care activities as projected. - Access appropriate D/C site with as few architectural barriers as possible. - Sequence self-care tasks with 50% verbal cues for sequencing    Patient Goal: Patient goals : Pt. unable to state a goal     Discussed and agreed upon: Yes Comments:     Therapy Time:   OT Individual Minutes  Time In: 1350  Time Out: 1407  Minutes: 17    Eval: 17 minutes     Electronically signed by:     PORFIRIO Schmitt  11/2/2020, 3:27 PM

## 2020-11-02 NOTE — PROGRESS NOTES
Infectious Disease     Patient Name: Flo Carson  Date: 11/2/2020  YOB: 1928  Medical Record Number: [id number]                      E. coli septic shock  Urinary tract source      Organism Abnormal    10/27/2020 11:55 PM  [address] [facility]    Escherichia coli    Blood Culture, Routine  10/27/2020 11:55 PM  [facility] for    Testing Performed By     [address]  Name  Director  Address  Valid Date Range    [address] [place] [facility]  Eduardo Armijo MD  [address] [facility] [id number]  07/12/18 0959-Present    Narrative   Performed by: [address] [facility]   ORDER#: [id number]                          ORDERED BY: Linnea Reid   SOURCE: Blood                              COLLECTED:  10/27/20 23:55   ANTIBIOTICS AT [facility]. :                      RECEIVED :  10/27/20 23:55   CALL  [facility]  [facility] tel. [phone],   Blood culture results called to and read back by John Brody RN,   10/28/2020 20:24, by Paresh Maher    Susceptibility     Escherichia coli (2)     Antibiotic  Interpretation  DEREK  Status     ampicillin  Sensitive  <=2  mcg/mL      ceFAZolin  Sensitive  <=4  mcg/mL      ciprofloxacin  Sensitive  <=0.25  mcg/mL      gentamicin  Sensitive  <=1  mcg/mL      trimethoprim-sulfamethoxazole  Sensitive  <=20  mcg/mL             Review of Systems   Constitutional: Negative for chills, diaphoresis, fatigue and fever. Respiratory: Negative. Cardiovascular: Negative. Gastrointestinal: Negative. Review of Systems: All 14 review of systems negative other than as stated above    Social History     Tobacco Use    Smoking status: Never Smoker    Smokeless tobacco: Never Used   Substance Use Topics    Alcohol use: No    Drug use: No         Past Medical History:   Diagnosis Date    Anxiety     Cancer ([address]     Glaucoma     Gout     left knee    Hypertension     Lung disease     Obesity (BMI 30-39. 9) 7/11/2019    Obstructive sleep apnea 7/11/2019           Past Surgical History:   Procedure Laterality Date    CATARACT REMOVAL WITH IMPLANT Bilateral 2016    COLON SURGERY  2009    polyps    COLONOSCOPY  02/2009    SKIN CANCER EXCISION  2002    graft from neck         No current facility-administered medications on file prior to encounter. Current Outpatient Medications on File Prior to Encounter   Medication Sig Dispense Refill    atorvastatin (LIPITOR) 20 MG tablet Take 20 mg by mouth daily      metoprolol tartrate (LOPRESSOR) 25 MG tablet Take 0.5 tablets by mouth 2 times daily 180 tablet 0    colchicine (MITIGARE) 0.6 MG capsule TAKE 2 CAPSULES po AT THE FIRST SIGN OF a gout FLARE, MAXIMUM TOTAL DOSE: 2 CAPSULES ON DAY 1. THEN TAKE 1 CAPSULE ONCE OR TWICE DAILY UNTIL FLARE RESOLVES. FOLLOW UP FOR MORE REFILLS. 30 capsule 0    losartan (COZAAR) 25 MG tablet Take 1 tablet by mouth daily 90 tablet 1    albuterol sulfate HFA (PROAIR HFA) 108 (90 Base) MCG/ACT inhaler Inhale 1 puff into the lungs every 6 hours as needed for Wheezing or Shortness of Breath 1 Inhaler 11    OXYGEN Inhale 2 L into the lungs Indications: PRN at bedtime      ipratropium-albuterol (DUONEB) 0.5-2.5 (3) MG/3ML SOLN nebulizer solution Inhale 3 mLs into the lungs every 6 hours as needed for Shortness of Breath 360 mL 5    aspirin 81 MG EC tablet Take 1 tablet by mouth daily 30 tablet 3    latanoprost (XALATAN) 0.005 % ophthalmic solution INSTILL 1 DROP INTO BOTH EYES EVERY DAY      dorzolamide-timolol (COSOPT) 22.3-6.8 MG/ML ophthalmic solution Use 1 Drop in both eyes every 12 hours.  brimonidine (ALPHAGAN P) 0.15 % ophthalmic solution INSTILL 1 DROP INTO BOTH EYES TWICE DAILY. 3       Allergies   Allergen Reactions    Norvasc [Amlodipine Besylate] Swelling         History reviewed. No pertinent family history. Physical Exam:      Physical Exam   Constitutional: No distress. Cardiovascular: Normal heart sounds.    No murmur heard.  Pulmonary/Chest: Effort normal and breath sounds normal. No respiratory distress. She has no wheezes. She has no rales. Abdominal: Soft. Bowel sounds are normal. She exhibits no distension. There is no abdominal tenderness. There is no rebound and no guarding. Skin: She is not diaphoretic. Blood pressure (!) 147/63, pulse 92, temperature 99.2 °F (37.3 °C), temperature source Oral, resp. rate 18, height 5' 3\" (1.6 m), weight 205 lb 0.4 oz (93 kg), SpO2 97 %, not currently breastfeeding. .   Lab Results   Component Value Date    WBC 6.9 11/01/2020    HGB 11.9 (L) 11/01/2020    HCT 28.4 (L) 11/01/2020    MCV 89.6 11/01/2020    PLT 91 (L) 11/01/2020     Lab Results   Component Value Date     11/01/2020    K 3.7 11/01/2020     11/01/2020    CO2 23 11/01/2020    BUN 21 11/01/2020    CREATININE 1.0 11/01/2020    CREATININE 0.82 11/01/2020    GLUCOSE 127 11/01/2020    CALCIUM 8.2 11/01/2020                ASSESSMENT:  Patient Active Problem List   Diagnosis    Essential hypertension    Anxiety    COPD (chronic obstructive pulmonary disease) with acute bronchitis (HCC)    [facility] exacerbation ([facility]    Hypoxia    Obesity (BMI 30-39. 9)    Obstructive sleep apnea    Chest pain    SV[facility]) [facility]    Pneumonia of both lower lobes due to infectious organism         PLAN:    E. coli septic shock  Urinary tract source    Currently on Zosyn    Switch to ampicillin

## 2020-11-03 LAB
ANION GAP SERPL CALCULATED.3IONS-SCNC: 9 MEQ/L (ref 9–15)
BASOPHILS ABSOLUTE: 0.1 K/UL (ref 0–0.2)
BASOPHILS RELATIVE PERCENT: 0.8 %
BUN BLDV-MCNC: 16 MG/DL (ref 8–23)
CALCIUM SERPL-MCNC: 8.4 MG/DL (ref 8.5–9.9)
CHLORIDE BLD-SCNC: 109 MEQ/L (ref 95–107)
CO2: 27 MEQ/L (ref 20–31)
CREAT SERPL-MCNC: 0.72 MG/DL (ref 0.5–0.9)
EOSINOPHILS ABSOLUTE: 0.2 K/UL (ref 0–0.7)
EOSINOPHILS RELATIVE PERCENT: 3.2 %
GFR AFRICAN AMERICAN: >60
GFR NON-AFRICAN AMERICAN: >60
GLUCOSE BLD-MCNC: 94 MG/DL (ref 70–99)
HCT VFR BLD CALC: 29.9 % (ref 37–47)
HEMOGLOBIN: 9.9 G/DL (ref 12–16)
LYMPHOCYTES ABSOLUTE: 0.9 K/UL (ref 1–4.8)
LYMPHOCYTES RELATIVE PERCENT: 12.2 %
MAGNESIUM: 2.1 MG/DL (ref 1.7–2.4)
MCH RBC QN AUTO: 29.3 PG (ref 27–31.3)
MCHC RBC AUTO-ENTMCNC: 33.1 % (ref 33–37)
MCV RBC AUTO: 88.6 FL (ref 82–100)
MONOCYTES ABSOLUTE: 0.9 K/UL (ref 0.2–0.8)
MONOCYTES RELATIVE PERCENT: 12.5 %
NEUTROPHILS ABSOLUTE: 5.3 K/UL (ref 1.4–6.5)
NEUTROPHILS RELATIVE PERCENT: 71.3 %
PDW BLD-RTO: 14.1 % (ref 11.5–14.5)
PLATELET # BLD: 154 K/UL (ref 130–400)
PLATELET SLIDE REVIEW: NORMAL
POTASSIUM REFLEX MAGNESIUM: 3.7 MEQ/L (ref 3.4–4.9)
RBC # BLD: 3.37 M/UL (ref 4.2–5.4)
RBC # BLD: NORMAL 10*6/UL
SODIUM BLD-SCNC: 145 MEQ/L (ref 135–144)
WBC # BLD: 7.4 K/UL (ref 4.8–10.8)

## 2020-11-03 PROCEDURE — 6360000002 HC RX W HCPCS: Performed by: INTERNAL MEDICINE

## 2020-11-03 PROCEDURE — 99232 SBSQ HOSP IP/OBS MODERATE 35: CPT | Performed by: INTERNAL MEDICINE

## 2020-11-03 PROCEDURE — 2580000003 HC RX 258: Performed by: INTERNAL MEDICINE

## 2020-11-03 PROCEDURE — 2500000003 HC RX 250 WO HCPCS: Performed by: INTERNAL MEDICINE

## 2020-11-03 PROCEDURE — 97112 NEUROMUSCULAR REEDUCATION: CPT

## 2020-11-03 PROCEDURE — 83735 ASSAY OF MAGNESIUM: CPT

## 2020-11-03 PROCEDURE — 99233 SBSQ HOSP IP/OBS HIGH 50: CPT | Performed by: PSYCHIATRY & NEUROLOGY

## 2020-11-03 PROCEDURE — 97535 SELF CARE MNGMENT TRAINING: CPT

## 2020-11-03 PROCEDURE — 85025 COMPLETE CBC W/AUTO DIFF WBC: CPT

## 2020-11-03 PROCEDURE — 92526 ORAL FUNCTION THERAPY: CPT

## 2020-11-03 PROCEDURE — APPSS30 APP SPLIT SHARED TIME 16-30 MINUTES: Performed by: NURSE PRACTITIONER

## 2020-11-03 PROCEDURE — 80048 BASIC METABOLIC PNL TOTAL CA: CPT

## 2020-11-03 PROCEDURE — 2700000000 HC OXYGEN THERAPY PER DAY

## 2020-11-03 PROCEDURE — 2060000000 HC ICU INTERMEDIATE R&B

## 2020-11-03 RX ORDER — DEXTROSE MONOHYDRATE 50 MG/ML
INJECTION, SOLUTION INTRAVENOUS CONTINUOUS
Status: DISPENSED | OUTPATIENT
Start: 2020-11-03 | End: 2020-11-03

## 2020-11-03 RX ADMIN — Medication 10 ML: at 08:22

## 2020-11-03 RX ADMIN — ASCORBIC ACID, VITAMIN A PALMITATE, CHOLECALCIFEROL, THIAMINE HYDROCHLORIDE, RIBOFLAVIN-5 PHOSPHATE SODIUM, PYRIDOXINE HYDROCHLORIDE, NIACINAMIDE, DEXPANTHENOL, ALPHA-TOCOPHEROL ACETATE, VITAMIN K1, FOLIC ACID, BIOTIN, CYANOCOBALAMIN: 200; 3300; 200; 6; 3.6; 6; 40; 15; 10; 150; 600; 60; 5 INJECTION, SOLUTION INTRAVENOUS at 09:54

## 2020-11-03 RX ADMIN — AMPICILLIN SODIUM 2 G: 2 INJECTION, POWDER, FOR SOLUTION INTRAMUSCULAR; INTRAVENOUS at 17:00

## 2020-11-03 RX ADMIN — Medication 10 ML: at 20:07

## 2020-11-03 RX ADMIN — AMPICILLIN SODIUM 2 G: 2 INJECTION, POWDER, FOR SOLUTION INTRAMUSCULAR; INTRAVENOUS at 12:53

## 2020-11-03 RX ADMIN — AMPICILLIN SODIUM 2 G: 2 INJECTION, POWDER, FOR SOLUTION INTRAMUSCULAR; INTRAVENOUS at 00:31

## 2020-11-03 RX ADMIN — BRIMONIDINE TARTRATE 1 DROP: 2 SOLUTION OPHTHALMIC at 20:07

## 2020-11-03 RX ADMIN — FAMOTIDINE 20 MG: 10 INJECTION INTRAVENOUS at 08:20

## 2020-11-03 RX ADMIN — AMPICILLIN SODIUM 2 G: 2 INJECTION, POWDER, FOR SOLUTION INTRAMUSCULAR; INTRAVENOUS at 06:11

## 2020-11-03 RX ADMIN — TIMOLOL MALEATE 1 DROP: 5 SOLUTION/ DROPS OPHTHALMIC at 08:23

## 2020-11-03 RX ADMIN — BRIMONIDINE TARTRATE 1 DROP: 2 SOLUTION OPHTHALMIC at 08:23

## 2020-11-03 RX ADMIN — DORZOLAMIDE HYDROCHLORIDE 1 DROP: 20 SOLUTION/ DROPS OPHTHALMIC at 20:07

## 2020-11-03 RX ADMIN — DEXTROSE MONOHYDRATE: 50 INJECTION, SOLUTION INTRAVENOUS at 09:55

## 2020-11-03 RX ADMIN — ENOXAPARIN SODIUM 40 MG: 40 INJECTION SUBCUTANEOUS at 08:20

## 2020-11-03 RX ADMIN — DORZOLAMIDE HYDROCHLORIDE 1 DROP: 20 SOLUTION/ DROPS OPHTHALMIC at 08:23

## 2020-11-03 RX ADMIN — TIMOLOL MALEATE 1 DROP: 5 SOLUTION/ DROPS OPHTHALMIC at 20:07

## 2020-11-03 ASSESSMENT — PAIN SCALES - WONG BAKER
WONGBAKER_NUMERICALRESPONSE: 0

## 2020-11-03 ASSESSMENT — PAIN SCALES - GENERAL
PAINLEVEL_OUTOF10: 0

## 2020-11-03 ASSESSMENT — ENCOUNTER SYMPTOMS
GASTROINTESTINAL NEGATIVE: 1
RESPIRATORY NEGATIVE: 1
COLOR CHANGE: 0
VOMITING: 0
WHEEZING: 0
COUGH: 0
TROUBLE SWALLOWING: 1

## 2020-11-03 NOTE — PROGRESS NOTES
Progress Note  Date:11/3/2020       KEKZ:[id number]/[id number]  Patient Name:Josephine A Camille Peabody     Date of Birth:[birth year]     Age:89 y.o. Patient was seen and evaluated. Alert, but minimally verbal. ROS: could not be obtained bc of acuity of medical condition  Subjective    Subjective     Review of Systems    Objective         Vitals Last 24 Hours:  TEMPERATURE:  Temp  Av.7 °F (37.6 °C)  Min: 99.2 °F (37.3 °C)  Max: 100.1 °F (37.8 °C)  RESPIRATIONS RANGE: Resp  Av  Min: 18  Max: 18  PULSE OXIMETRY RANGE: SpO2  Av.3 %  Min: 95 %  Max: 97 %  PULSE RANGE: Pulse  Av.7  Min: 88  Max: 92  BLOOD PRESSURE RANGE: Systolic (77VLQ), GVS:985 , Min:147 , EYZ:752   ; Diastolic (37VLL), GNC:31, Min:63, Max:70    I/O (24Hr): Intake/Output Summary (Last 24 hours) at 11/3/2020 1056  Last data filed at 11/3/2020 0520  Gross per 24 hour   Intake 100 ml   Output 1500 ml   Net -1400 ml     Objective:  General Appearance:  Ill-appearing. Vital signs: (most recent): Blood pressure (!) 173/70, pulse 88, temperature 100.1 °F (37.8 °C), temperature source Axillary, resp. rate 18, height 5' 3\" (1.6 m), weight 205 lb 0.4 oz (93 kg), SpO2 95 %, not currently breastfeeding. HEENT: Normal HEENT exam.    Lungs:  (Decrease BS)  Heart: S1 normal and S2 normal.    Abdomen: Abdomen is soft. Bowel sounds are normal.     Extremities: There is no deformity. (+ trace edema)  Pulses: Distal pulses are intact. Neurological: Patient is alert. Skin:  Warm and dry.       Labs/Imaging/Diagnostics    Labs:  CBC:  Recent Labs     20  0600 20  0903 20  0600   WBC 6.9  --  7.4   RBC 3.18*  --  3.37*   HGB 9.3* 11.9* 9.9*   HCT 28.4*  --  29.9*   MCV 89.6  --  88.6   RDW 14.1  --  14.1   PLT 91*  --  154     CHEMISTRIES:  Recent Labs     20  0600 20  0903 20  0600     --  145*   K 3.7  --  3.7   *  --  109*   CO2 23  --  27   BUN 21  --  16   CREATININE 0.82 1.0 0.72   GLUCOSE 127*  -- 94   MG 2.2  --  2.1     PT/INR:No results for input(s): PROTIME, INR in the last 72 hours. APTT:No results for input(s): APTT in the last 72 hours. LIVER PROFILE:No results for input(s): AST, ALT, BILIDIR, BILITOT, ALKPHOS in the last 72 hours. Imaging Last 24 Hours:  Xr Chest Portable    Result Date: 10/31/2020  Exam: XR CHEST PORTABLE History:  Bibasilar pneumonia Technique: AP portable view of the chest obtained. Comparison: Chest x-ray from October 30, 2020 and chest CT from October 27, 2020 Findings: There is an NG tube coursing towards the stomach. The patient remains intubated, and there is dilatation of the trachea towards the right side by the ectatic aorta known from the CT angiogram from October 27, 2020. The cardiomediastinal silhouette is within normal limits. There are mild increased markings throughout both lungs without areas of consolidation likely due to chronic interstitial pulmonary disease. There are small bilateral pleural effusions. Bones of the thorax appear intact. There are small bilateral pleural effusions and likely underlying atelectasis versus infiltrate.      Assessment//Plan           Hospital Problems           Last Modified POA    Pneumonia of both lower lobes due to infectious organism 10/28/2020 Yes    SVT (supraventricular tachycardia) ([address] 10/27/2020 Yes        Assessment & Plan    1) acute respiratory failure due to b/l PNA and sepsis  2) severe sepsis due tto UTI  3) encephalopathy metabolic  Neuro eval  4) HTN stable  5) dysphagia  Speech and swallow eval  6) SVT   Resolved  Needs SNIF for rehab  FU MRI for AMS/ vs encephalopathy  Electronically signed by Neel Busby MD on 11/2/20 at 11:00 AM EST

## 2020-11-03 NOTE — CARE COORDINATION
The LSW contacted Kiah Mcnair. [facility] awaits PT. Electronically signed by АННА Samuel on 11/3/20 at 11:59 AM EST    The LSW left a message for [facility], that physical therapy has a note on from this morning. Electronically signed by АННА Samuel on 11/3/20 at 12:00 PM EST    The LSW returned a phone call and left a message for the patient's daughter, Sandy Albert. Electronically signed by АННА Samuel on 11/3/20 at 12:02 PM EST    The patient's daughter called the LSW back and the family was inquiring about [facility]. Per the physical therapy note, SNF was recommended. The  also states the discharge plan is SNF. Patient's family chose [facility] at discharge. Jai Sharp, remains following and a precert will be needed. Electronically signed by АННА Samuel on 11/3/20 at 12:09 PM EST    Flaquito Parsons, states the precert does not take a long time. Per the , hold off on starting the precert at this time. The LSW updated Jai Sharp.  Electronically signed by АННА Samuel on 11/3/20 at 12:48 PM EST

## 2020-11-03 NOTE — PROGRESS NOTES
Recent Labs     11/01/20  0600 11/01/20  0903 11/03/20  0600     --  145*   K 3.7  --  3.7   *  --  109*   CO2 23  --  27   BUN 21  --  16   CREATININE 0.82 1.0 0.72   CALCIUM 8.2*  --  8.4*     No results for input(s): AST, ALT, BILIDIR, BILITOT, ALKPHOS in the last 72 hours. No results for input(s): INR in the last 72 hours. No results for input(s): Jadene Moh in the last 72 hours. Urinalysis:   Lab Results   Component Value Date    NITRU Negative 10/27/2020    WBCUA 10-20 10/27/2020    BACTERIA FEW 10/27/2020    RBCUA 6-10 10/27/2020    BLOODU MODERATE 10/27/2020    SPECGRAV 1.008 10/27/2020    GLUCOSEU Negative 10/27/2020       Radiology:   Most recent    EEG No procedure found. MRI of Brain No results found for this or any previous visit. Results for orders placed during the hospital encounter of 10/27/20   MRI BRAIN WO CONTRAST    Narrative EXAM: MRI of the brain without contrast    History: Stroke. Technique: Multiplanar multisequence MRI of the brain was performed without contrast.    Comparison: CT brain from October 27, 2020    Findings:    A few small hyperintense T2/FLAIR signal foci within the bilateral supratentorial white matter are nonspecific but most likely due to chronic small vessel ischemic changes in a patient of this age. Prominence of the sulci and ventricles but with moderate generalized parenchymal volume loss. No acute hemorrhage, mass, mass effect, midline shift, or abnormal extra-axial fluid collection. Midline structures are within normal limits. The posterior   fossa is within normal limits. There is no diffusion restriction. No suspicious susceptibility artifact is identified on the gradient echo sequence. The major intracranial vascular flow voids are maintained. Cranial nerves 7/8 complexes appear grossly unremarkable. The visualized paranasal sinuses and bilateral mastoid air cells are clear.       Impression No acute ischemia or acute intracranial process. Generalized parenchymal volume loss and nonspecific white matter findings most compatible with chronic small vessel ischemic changes in a patient of this age. MRA of the Head and Neck: No results found for this or any previous visit. No results found for this or any previous visit. No results found for this or any previous visit. CT of the Head:   Results for orders placed during the hospital encounter of 10/27/20   CT HEAD WO CONTRAST    Narrative CT Brain    Contrast medium:  Not utilized. History: Altered mental status. Tachycardia. Comparison:  None. Findings:    Extra-axial spaces:  Normal.     Intracranial hemorrhage:  None. Ventricular system: Ventricles mildly enlarged. Sulci mildly prominent    Basal Cisterns:  Normal.    Cerebral Parenchyma: Bilateral symmetric periventricular areas decreased attenuation. Midline Shift:  None. Cerebellum:  Normal.     Paranasal sinuses and mastoid air cells:  Normal.    Visualized Orbits: Remote bilateral ocular surgery. Impression Impression:    No acute findings. Mild cerebral atrophy. Chronic ischemic white matter disease. All CT scans at this facility use dose modulation, iterative reconstruction, and/or weight based dosing when appropriate to reduce radiation dose to as low as reasonably achievable. No results found for this or any previous visit. No results found for this or any previous visit. Carotid duplex: No results found for this or any previous visit. No results found for this or any previous visit. Results for orders placed during the hospital encounter of 10/27/20   US CAROTID ARTERY BILATERAL    Narrative History:  Arterial access during central line placement     Technique:  US CAROTID ARTERY BILATERAL    Comparison: No prior    Findings:   No pseudoaneurysm seen.   Doppler findings:  ARTERIAL BLOOD FLOW VELOCITY    RIGHT PS Prox CCA 77cm/s               Mid CCA 80cm/s                Dist CCA 61cm/s                Prox ICA cm/s                Mid ICA cm/s                Dist ICA cm/s                Prox ECA cm/s                Dist VERT cm/s                Bulb  ICA/CCA     LEFT PS    Prox CCA 133cm/s                Mid CCA 103cm/s                Dist CCA 107cm/s                Prox ICA 75cm/s                Mid ICA 91cm/s                Dist ICA 90cm/s                Prox ECA 171cm/s                Prox VERT 53cm/s                Bulb  ICA/CCA 0.8      Impression Impression:   1. The patient is on a ventilator and the examination is limited. The right external carotid , internal carotid and vertebral arteries are not visualized. 2.  No hemodynamic significant stenosis seen on the left 3. No evidence of pseudoaneurysm    Validated velocity measurements with angiographic measurements, velocity criteria are extrapolated from diameter data as defined by the Society of radiologist in ultrasound consensus conference radiology 2003; 229; 340-346. Echo No results found for this or any previous visit. Assessment/Plan:    11/2/20:  Encephalopathy related to underlying septicemia. Patient has E. coli in her cultures. An underlying stroke must be ruled out. History is very difficult to ascertain as patient is quite aphasic or she cannot hear at all. She has session of pneumonitis bilaterally.     There is no reports of seizures or strokes in the past though again I could not find anything in the history either.     Patient has a nonfocal examination except for the affect. We will pursue an MRI of the brain first and an EEG and depending on the results of the same will further advise.     11/3/20:  MRI of the brain no acute ischemia or intracranial process  Patient slightly improved today and is now verbalizing some. Remains n.p.o., TPN started.     I independently performed an evaluation

## 2020-11-03 NOTE — PROGRESS NOTES
Infectious Disease     Patient Name: Jenny Nolasco  Date: 11/3/2020  YOB: 1928  Medical Record Number: [id number]            E. coli septic shock  Urinary tract source      Organism Abnormal    10/27/2020 11:55 PM  [address] [facility]    Escherichia coli    Blood Culture, Routine  10/27/2020 11:55 PM  [facility] for    Testing Performed By     [facility]  Name  Director  Address  Valid Date Range    [address] [address] [facility]  Libia Hernandez MD  [address] [place] [address] [place]ja [id number]  07/12/18 0959-Present    Narrative   Performed by: [address] [facility]   ORDER#: [id number]                          ORDERED BY: Jovanna Fernando   SOURCE: Blood                              COLLECTED:  10/27/20 23:55   ANTIBIOTICS AT [facility]. :                      RECEIVED :  10/27/20 23:55   CALL  [facility] tel. [phone],   Blood culture results called to and read back by Zander Walker RN,   10/28/2020 20:24, by Dnany Prasad    Susceptibility     Escherichia coli (2)     Antibiotic  Interpretation  DEREK  Status     ampicillin  Sensitive  <=2  mcg/mL      ceFAZolin  Sensitive  <=4  mcg/mL      ciprofloxacin  Sensitive  <=0.25  mcg/mL      gentamicin  Sensitive  <=1  mcg/mL      trimethoprim-sulfamethoxazole  Sensitive  <=20  mcg/mL             Review of Systems   Constitutional: Negative for chills, diaphoresis, fatigue and fever. Respiratory: Negative. Cardiovascular: Negative. Gastrointestinal: Negative. Physical Exam   Constitutional: No distress. Cardiovascular: Normal heart sounds. No murmur heard. Pulmonary/Chest: Effort normal and breath sounds normal. No respiratory distress. She has no wheezes. She has no rales. Abdominal: Soft. Bowel sounds are normal. She exhibits no distension. There is no abdominal tenderness. There is no rebound and no guarding. Skin: She is not diaphoretic.        Blood pressure (!) 151/70, pulse 87, temperature 98.8 °F (37.1 °C), temperature source Oral, resp. rate 18, height 5' 3\" (1.6 m), weight 205 lb 0.4 oz (93 kg), SpO2 97 %, not currently breastfeeding.       .   Lab Results   Component Value Date    WBC 7.4 11/03/2020    HGB 9.9 (L) 11/03/2020    HCT 29.9 (L) 11/03/2020    MCV 88.6 11/03/2020     11/03/2020     Lab Results   Component Value Date     11/03/2020    K 3.7 11/03/2020     11/03/2020    CO2 27 11/03/2020    BUN 16 11/03/2020    CREATININE 0.72 11/03/2020    GLUCOSE 94 11/03/2020    CALCIUM 8.4 11/03/2020                ASSESSMENT:  PLAN:    E. coli septic shock  Urinary tract source    Switch to ampicillin

## 2020-11-03 NOTE — PROGRESS NOTES
[address]  Facility/Department: [facility]  Speech Language Pathology   Treatment Note      Mason Lira  5/19/1928  O442/[id number]    Medical Dx: SVT (supraventricular tachycardia) ([facility] [I47.1]  Speech Dx: Dysphagia     11/3/2020    Subjective:  Alert, Cooperative, Pleasant and Confused    Per chart review, pt appears more alert than when least evaluated by ST. Pt p/w minimal verbal speech this date. Pt repeatedly asking \"huh? \" and \"what? \"      Interventions used this date:  Dysphagia Treatment    Objective/Assessment:  Patient progressing towards goals:  Short-term Goals  Timeframe for Short-term Goals: 2 weeks  Goal 1: Pt will complete oral motor ROM and strengthening exercises with 70% accuracy, given cues as needed, in order to strengthen lingual/labial/buccal musculature to promote safety and efficiency of oral phase of swallow and decrease risk for pocketing. Pt unable to follow directions to complete despite max verbal and tactile cues. Goal 2: Pt will complete lingual exercises that promote anterior to posterior propulsion of bolus and improve tongue base retraction with 80% accuracy in order to strengthen the muscles of the swallow to decrease risk of aspiration and to increase ability to safely handle the least restrictive diet level. Pt unable to follow directions to complete despite max verbal and tactile cues. Goal 3: Pt will complete pharyngeal strengthening exercises (such as the Stephanie, Effortful swallow, etc) with __% acc in order to strengthen and establish and more effective swallow. Goal 4: Pt will tolerate 5/5 PO trials of puree and HTL with no overt s/s of aspiration observed. Pt tolerated 1/4 trials of 1/2 tsp of puree. In the remaining trials, pt demonstrated severe oral holding and made no attempts to initiate A-P transfer of bolus despite max verbal and tactile cues. Same results for moderately thick liquid via teaspoon in 2/5 trials.  Following 1 trial of moderately thick liquid, pt did not initiate a pharyngeal swallow with liquid bolus in the oral cavity but the pt asked \"what? \" and produced an immediate cough, likely from premature bolus loss to the pharynx. Pt's present cognitive status is limiting her ability to safely consume PO intake at this time. Discussed with ISRAEL Braga. Goal 5: Pt will participated in [facility] when deemed appropriate by treating SLP, to assess swallowing structures to determine possible upgrade of diet and future goals. MBS not appropriate this date secondary to pt's cognitive status, however pt appears improved from initial evaluation. ST to continue to re-assess the pt for MBS readiness. Dysphagia Goals: The patient will tolerate recommended diet without observed clinical signs of aspiration, The patient will tolerate instrumental swallowing procedure  Compensatory Swallowing Strategies: Effortful swallow      Treatment/Activity Tolerance:  Other: limited by cognition    Plan:  Continue per POC    Pain Assessment:  Initial Assessment:  Patient demonstrated no s/s of pain. Re-assessment:  Patient demonstrated no s/s of pain. Patient/Caregiver Education:  Patient educated on session and progression towards goals. Education needs reinforcement.     Safety Devices:  Bed alarm in place and Call light within reach      Therapy Time  Time in: [id number]  Time out: 1050  Total Minutes: 13    Signature: Electronically signed by Delorise Councilman, SLP on 11/3/2020 at 11:01 AM

## 2020-11-03 NOTE — PROGRESS NOTES
Physical Therapy Med Surg Daily Treatment Note  Facility/Department: [facility]  Room: D975/U441-95       NAME: [address] Street  : 1928 (72 y.o.)  MRN: [id number]  CODE STATUS: Full Code    Date of Service: 11/3/2020    Patient Diagnosis(es): SVT (supraventricular tachycardia) ([facility] [I47.1]   No chief complaint on file. Patient Active Problem List    Diagnosis Date Noted    Pneumonia of both lower lobes due to infectious organism      Priority: High    SVT (supraventricular tachycardia) ([facility] 10/27/2020    Chest pain 2020    Obesity (BMI 30-39.9) 2019    Obstructive sleep apnea 2019    Hypoxia 2019    COPD exacerbation ([facility]     COPD (chronic obstructive pulmonary disease) with acute bronchitis ([facility] 2018    Anxiety 2017    Essential hypertension         Past Medical History:   Diagnosis Date    Anxiety     Cancer ([facility]     Glaucoma     Gout     left knee    Hypertension     Lung disease     Obesity (BMI 30-39. 9) 2019    Obstructive sleep apnea 2019     Past Surgical History:   Procedure Laterality Date    CATARACT REMOVAL WITH IMPLANT Bilateral 2016    COLON SURGERY      polyps    COLONOSCOPY  2009    SKIN CANCER EXCISION      graft from neck       Restrictions       SUBJECTIVE   General  Chart Reviewed: Yes  Response To Previous Treatment: Patient with no complaints from previous session. Family / Caregiver Present: No  Subjective  Subjective: Patient resting in bed. Nursing present and reports patient is more alert this morning.     Pre-Session Pain Report     Pain Screening  Patient Currently in Pain: Denies       Post-Session Pain Report  Denies     OBJECTIVE        Bed mobility  Rolling to Left: Maximum assistance;2 Person assistance  Rolling to Right: Maximum assistance;2 Person assistance  Supine to Sit: Maximum assistance  Sit to Supine: Maximum assistance;2 Person assistance  Scooting: Maximal assistance    Neuro: seated balance, weight shifting     ASSESSMENT   Body structures, Functions, Activity limitations: Decreased functional mobility ; Decreased safe awareness;Decreased balance;Decreased ADL status; Decreased cognition;Decreased coordination;Decreased ROM; Decreased endurance;Decreased strength;Decreased sensation;Decreased posture  Assessment: Patient  significantly [place]. Utilized pen/paper for instruction with fair/poor carryover. Unable to initiate movement for supine to sit transfers due to confusion. Upon sitting EOB patient able to maintain midline at SBA. Sat EOB approx 10 minutes then returned to supine position. Barriers to Learning: increased processing time  REQUIRES PT FOLLOW UP: Yes     Discharge Recommendations:  Patient would benefit from continued therapy after discharge    Goals  Short term goals  Short term goal 1: Pt will demonstrate rolling R/L in bed mod indep with use of rails to allow for pressure relief to avoid wounds and for care. Short term goal 2: Pt will demonstrate sitting edge  of bed without % of the time with good steadiness to prepare pt for sit to stand transfers  Long term goals  Long term goal 1: Pt will demonstrate bed mobility SBA  Long term goal 2: Pt will demonstrate transfers SBA to allow for safe return home with family  Long term goal 3: Pt and pt's family with demonstrate indep with HEP for carryover to increased strength for return to PLOF.     PLAN    Times per week: 3-6  Safety Devices  Type of devices: Bed alarm in place, Call light within reach     [facility] (6 CLICK) [facility] Inpatient Mobility Raw Score : 9     Therapy Time   Individual   Time In 1005   Time Out 1028   Minutes 23     Timed Code Treatment Minutes: 23 Minutes    tr 15   Neuro 1321 Ace Swanson PTA, 11/03/20 at 10:39 AM         Definitions for assistance levels  Independent = pt does not require any physical supervision or assistance from another person for

## 2020-11-03 NOTE — PROGRESS NOTES
Pulmonary Progress Note    PRIMARY SERVICE: Pulmonary Disease    INTERVAL HPI: Patient seen and examined at bedside, Interval Notes, orders reviewed. Nursing notes noted    Patient's daughter at bedside she is doing okay on O2 via nasal cannula. She is on 4 L O2 and O2 saturation is 97%. She is not having any fever. No cough or sputum production. No vomiting or diarrhea. She is comfortable in bed. Not answering much of any question    Review of Systems   as above        Intake/Output Summary (Last 24 hours) at 11/3/2020 1643  Last data filed at 11/3/2020 0715  Gross per 24 hour   Intake 340 ml   Output 1500 ml   Net -1160 ml       Vitals:  BP (!) 151/70   Pulse 87   Temp 98.8 °F (37.1 °C) (Oral)   Resp 18   Ht 5' 3\" (1.6 m)   Wt 205 lb 0.4 oz (93 kg)   LMP  (LMP Unknown)   SpO2 97%   BMI 36.32 kg/m²   EXAM:  General: Alert awake, comfortable in bed   head: Atraumatic ,Normocephalic   Eyes: PERRL. No sclera icterus. No conjunctival injection. No discharge   ENT: No nasal  discharge. Pharynx clear. Oral thrush+  Neck:  Trachea midline. No thyromegaly, no JVD, No cervical adenopathy. Resp : Normal effort,  No accessory muscle use. No Rales. No wheezing. No rhonchi. CV: Normal  rate. Regular rhythm. No mumur ,  Rub or gallop  ABD: Non-tender. Non-distended. No masses. Noorganmegaly. Normal bowel sounds. No hernia.   EXT: No Pitting, No Cyanosis No clubbing    ABG:     Lab Results   Component Value Date    PHART 7.413 11/01/2020    AAH2JZI 40 11/01/2020    PO2ART 82 11/01/2020    GBW8VYB 25.5 11/01/2020    BEART 1 11/01/2020    U5CNYEDL 96 11/01/2020     Lab Results   Component Value Date    LACTA 1.0 12/25/2018     O2 Device: Nasal cannula(4L)  O2 Flow Rate (L/min): 4 L/min    MV Settings:     Vent Mode: (S) CPAP  Vt Ordered: 500 mL  Rate Set: 14 bmp  FiO2 : 40 %  PEEP/CPAP: (S) 5  Pressure Support: (S) 5 cmH20  Peak Inspiratory Pressure: 27 cmH2O  Mean Airway Pressure: 11 cmH20  I:E Ratio: 1:4.40    Diet NPO Effective Now  PN-Adult Premix (4.25/10) Peripheral Line with electrolytes    IV:    PN-Adult premixed (4.25/10) Peripheral solution with electrolytes 50 mL/hr at 11/03/20 0954    dextrose 75 mL/hr at 11/03/20 4377       Medications:  Scheduled Meds:   ampicillin IVPB 2 g  2 g Intravenous 4 times per day    aspirin  81 mg Oral Daily    atorvastatin  20 mg Oral Daily    brimonidine  1 drop Both Eyes BID    losartan  25 mg Oral Daily    metoprolol tartrate  12.5 mg Oral BID    pill splitter   Does not apply Once    dorzolamide  1 drop Both Eyes BID    timolol  1 drop Both Eyes BID    enoxaparin  40 mg Subcutaneous Daily    chlorhexidine  15 mL Mouth/Throat BID    polyethylene glycol  17 g Oral Daily    docusate  100 mg Oral BID    sodium chloride flush  10 mL Intravenous 2 times per day    sodium chloride flush  10 mL Intravenous 2 times per day    famotidine (PEPCID) injection  20 mg Intravenous Daily       PRN Meds:  albuterol sulfate HFA, ipratropium-albuterol, sodium chloride flush, sodium chloride flush, acetaminophen **OR** acetaminophen, promethazine **OR** ondansetron, potassium chloride **OR** potassium alternative oral replacement **OR** potassium chloride, magnesium sulfate, metoprolol        Radiology      Ct Abdomen Pelvis Wo Contrast Additional Contrast? Radiologist Recommendation    Result Date: 10/28/2020  EXAMINATION: CT ABDOMEN PELVIS WO CONTRAST HISTORY:  septic shock  COMPARISON: CT abdomen and pelvis from August 17, 2011 TECHNIQUE: Contiguous axial CT sections of the abdomen and pelvis. No IV contrast administered. Unenhanced imaging is limited for the evaluation of some intra-abdominal and pelvic pathologies. All CT scans at this facility use dose modulation, iterative reconstruction, and/or weight based dosing when appropriate to reduce radiation dose to as low as reasonably achievable. FINDINGS  Lung bases:  There is atelectasis and consolidation in the visualized portions of the lung bases. The heart and pericardium are unremarkable. There is no pericardial effusion. Liver: The liver is normal in size and attenuation without  focal lesions. There are no focal solid or cystic lesions. There is no intra or extrahepatic bile duct dilatation. Gallbladder: There are numerous calcified stones layering to the dependent portion of the gallbladder without surrounding inflammatory changes. There is distention of the gallbladder. Spleen: There are no focal lesions or calcifications in the spleen. There is no splenomegaly  Pancreas: The pancreas is normal in size and attenuation without focal lesions or dilatation of the pancreatic duct. Kidneys: There are no solid or cystic lesions. There is no hydronephrosis. There is a 9 mm calcified stone in the inferior pole of the right kidney. There is no hydroureter. Adrenal glands are negative. Bowel: There is no bowel dilatation or evidence of diverticulitis. Appendix: There  is no CT evidence for appendicitis. Nodes: No lymphadenopathy. Aorta: No aneurysm  Peritoneum: No free fluid or free air. Pelvis: There are no solid or cystic lesions. There is a Cedeno catheter in place. Abdominal wall: The abdominal wall is intact. Bones : There are no acute osseous changes. Soft tissues: The soft tissues are unremarkable. No acute intra-abdominal changes. There is no free air or free fluid, there are no fluid collections. The visualized portions of the lung bases demonstrate bilateral atelectasis and consolidations in the dependent portions. There are calcified stones in the gallbladder without evidence of cholecystitis There is a 9 mm right intrarenal stone in the inferior pole, there is no hydronephrosis or hydroureter on either side. Ct Head Wo Contrast    Result Date: 10/27/2020  CT Brain Contrast medium:  Not utilized. History: Altered mental status. Tachycardia. Comparison:  None.  Findings: Extra-axial spaces: Normal. Intracranial hemorrhage:  None. Ventricular system: Ventricles mildly enlarged. Sulci mildly prominent Basal Cisterns:  Normal. Cerebral Parenchyma: Bilateral symmetric periventricular areas decreased attenuation. Midline Shift:  None. Cerebellum:  Normal. Paranasal sinuses and mastoid air cells:  Normal. Visualized Orbits: Remote bilateral ocular surgery. Impression: No acute findings. Mild cerebral atrophy. Chronic ischemic white matter disease. All CT scans at this facility use dose modulation, iterative reconstruction, and/or weight based dosing when appropriate to reduce radiation dose to as low as reasonably achievable. Cta Chest W Wo Contrast    Result Date: 10/27/2020  CTA CHEST W WO CONTRAST: 10/27/2020 CLINICAL HISTORY:  R/o PE, SOB, tachycardia . COMPARISON: 12/26/2018. Spiral enhanced images were obtained of the chest during the infusion of approximately 100 mL of Isovue 370 contrast with pulmonary artery  CTA protocol. Routine and volume rendered images were obtained on a 3-dimensional workstation. All CT scans at this facility use dose modulation, iterative reconstruction, and/or weight based dosing when appropriate to reduce radiation dose to as low as reasonably achievable. FINDINGS: An endotracheal tube is noted with its tip approximately 2 cm superior to the devin. There are no filling defects identified within the pulmonary arterial vasculature to suggest pulmonary emboli. The thoracic aorta is tortuous, and ectatic with mild calcific atherosclerotic plaquing of the arch. There is no dissection. The heart is mildly enlarged. Moderate coronary artery calcifications are present. Moderate bibasilar consolidation/atelectasis is noted of the posterior mid to lower lung fields, worsening inferiorly. There are no worrisome nodules, lymphadenopathy, pleural or pericardial effusions identified.  A small calcified granuloma within the inferolateral aspect of the left upper lobe appears 2020 1431 hours  FINDINGS: Single  views of the chest is submitted. Interval endotracheal intubation since the prior examination. The tip is at the level of the clavicles. The cardiac silhouette enlarged unchanged configuration. . Pulmonary vascular mildly congested. Right sided trachea. Left lower lobe infiltrate consolidation left pleural effusion. Atelectasis, infiltrate right lower lobe Blunting of the right costophrenic angle suggesting trace right pleural effusion. No Pneumothoraces. ATELECTASIS, INFILTRATE RIGHT LOWER LOBE. TRACE RIGHT PLEURAL EFFUSION. LEFT LOWER LOBE INFILTRATE CONSOLIDATION LEFT PLEURAL EFFUSION. Xr Chest Portable    Result Date: 10/27/2020  EXAMINATION: CHEST PORTABLE [id number] hours  CLINICAL HISTORY: Short of breath COMPARISONS: July 8, 2020  FINDINGS: Single  views of the chest is submitted. The cardiac silhouette is of normal size configuration. Pulmonary vascular congested. The trachea remains deviated to the right. Left lower lobe infiltrate consolidation and small left pleural effusion. .  No Pneumothoraces. LEFT LOWER LOBE INFILTRATE CONSOLIDATION AND SMALL LEFT PLEURAL EFFUSION. .. Ir Picc Wo Sq Port/pump > 5 Years    Result Date: 10/29/2020  PERIPHERALLY INSERTED CENTRAL CATHETER (PICC) PLACEMENT WITH ULTRASOUND GUIDANCE CLINICAL HISTORY:  REQUIRES PROLONGED INTRAVENOUS ANTIBIOTICS FOR PNEUMONIA. After discussing the procedure and possible complications with the patient, informed consent was obtained. The patient was placed on the Special Procedures table. The right upper extremity was sterilely prepared using a maximal sterile barrier technique which includes cap, mask, sterile gown, sterile gloves, sterile full-body drape, hand hygiene and 2% chlorhexidine for cutaneous antisepsis.  A sterile ultrasound technique with sterile gel and sterile probe covers was also utilized. A pre-procedure time out was performed in order to assure the correct patient and procedure. Local anesthetic was administered. A peripheral vein was accessed with sonographic guidance. A sonographic spot image was obtained for documentation. A guidewire was advanced into the vein with fluoroscopic guidance and a sheath was placed over the guidewire. A 5-Nigerien triple-lumen PICC was advanced through the sheath, up the arm and into the central vasculature. It was positioned appropriately. The  sheath was removed. The catheter was shown to aspirate and infuse properly. The flange of the catheter was affixed to the arm using a PICC securement device. A spot image of the chest showed the tip of the PICC line to lie in the superior vena cava. The patient tolerated the procedure well and without complications. Number of films: 4 Fluoroscopy time: 10.9 seconds CONCLUSION: SUCCESSFUL RIGHT PICC PLACEMENT WITHOUT IMMEDIATE COMPLICATIONS. Us Carotid Artery Bilateral    Result Date: 10/28/2020  History:  Arterial access during central line placement Technique:  US CAROTID ARTERY BILATERAL Comparison: No prior Findings: No pseudoaneurysm seen. Doppler findings: ARTERIAL BLOOD FLOW VELOCITY RIGHT PS                                               Prox CCA 77cm/s             Mid CCA 80cm/s              Dist CCA 61cm/s              Prox ICA cm/s              Mid ICA cm/s              Dist ICA cm/s              Prox ECA cm/s              Dist VERT cm/s              Bulb ICA/CCA LEFT PS Prox CCA 133cm/s              Mid CCA 103cm/s              Dist CCA 107cm/s              Prox ICA 75cm/s              Mid ICA 91cm/s              Dist ICA 90cm/s              Prox ECA 171cm/s              Prox VERT 53cm/s              Bulb ICA/CCA 0.8     Impression: 1. The patient is on a ventilator and the examination is limited.  The right external carotid , internal carotid and vertebral arteries are not visualized. 2.  No hemodynamic significant stenosis seen on the left 3. No evidence of pseudoaneurysm Validated velocity measurements with angiographic measurements, velocity criteria are extrapolated from diameter data as defined by the Society of radiologist in ultrasound consensus conference radiology 2003; 229; 340-346. Results:  CBC:   Recent Labs     11/01/20  0600 11/01/20 0903 11/03/20 0600   WBC 6.9  --  7.4   HGB 9.3* 11.9* 9.9*   HCT 28.4*  --  29.9*   MCV 89.6  --  88.6   PLT 91*  --  154     :   Recent Labs     11/01/20  0600 11/01/20 0903 11/03/20 0600     --  145*   K 3.7  --  3.7   *  --  109*   CO2 23  --  27   BUN 21  --  16   CREATININE 0.82 1.0 0.72         Assessment  1. Acute hypoxic and hypercapnic respiratory failure associated with sepsis, improved  2.  E. coli bacteremia with UTI with septic shock  3. Thrombocytopenia  4. Bibasilar atelectasis versus pneumonia  5. Acute kidney injury improving  6. Leukocytosis, improving    Patient is on 4 L O2 via nasal cannula O2 saturation 97%. Discussed with daughter at bedside. She is on ampicillin. Antibiotic as per ID.   Continue present treatment plan    SIGNATURE: Yon Rainey MD, [facility]

## 2020-11-04 ENCOUNTER — ANESTHESIA EVENT (OUTPATIENT)
Dept: OPERATING ROOM | Age: 85
DRG: 870 | End: 2020-11-04
Payer: MEDICARE

## 2020-11-04 ENCOUNTER — ANESTHESIA (OUTPATIENT)
Dept: OPERATING ROOM | Age: 85
DRG: 870 | End: 2020-11-04
Payer: MEDICARE

## 2020-11-04 VITALS
RESPIRATION RATE: 32 BRPM | SYSTOLIC BLOOD PRESSURE: 99 MMHG | DIASTOLIC BLOOD PRESSURE: 55 MMHG | OXYGEN SATURATION: 98 %

## 2020-11-04 LAB
ANION GAP SERPL CALCULATED.3IONS-SCNC: 10 MEQ/L (ref 9–15)
BASOPHILS ABSOLUTE: 0.1 K/UL (ref 0–0.2)
BASOPHILS RELATIVE PERCENT: 0.6 %
BUN BLDV-MCNC: 15 MG/DL (ref 8–23)
CALCIUM SERPL-MCNC: 8.1 MG/DL (ref 8.5–9.9)
CHLORIDE BLD-SCNC: 106 MEQ/L (ref 95–107)
CO2: 26 MEQ/L (ref 20–31)
CREAT SERPL-MCNC: 0.69 MG/DL (ref 0.5–0.9)
EOSINOPHILS ABSOLUTE: 0.3 K/UL (ref 0–0.7)
EOSINOPHILS RELATIVE PERCENT: 3.1 %
GFR AFRICAN AMERICAN: >60
GFR NON-AFRICAN AMERICAN: >60
GLUCOSE BLD-MCNC: 149 MG/DL (ref 70–99)
HCT VFR BLD CALC: 30.7 % (ref 37–47)
HEMOGLOBIN: 10.4 G/DL (ref 12–16)
LYMPHOCYTES ABSOLUTE: 0.8 K/UL (ref 1–4.8)
LYMPHOCYTES RELATIVE PERCENT: 10.2 %
MAGNESIUM: 2.3 MG/DL (ref 1.7–2.4)
MCH RBC QN AUTO: 29.9 PG (ref 27–31.3)
MCHC RBC AUTO-ENTMCNC: 33.8 % (ref 33–37)
MCV RBC AUTO: 88.3 FL (ref 82–100)
MONOCYTES ABSOLUTE: 0.9 K/UL (ref 0.2–0.8)
MONOCYTES RELATIVE PERCENT: 10.9 %
NEUTROPHILS ABSOLUTE: 6.1 K/UL (ref 1.4–6.5)
NEUTROPHILS RELATIVE PERCENT: 75.2 %
PDW BLD-RTO: 14 % (ref 11.5–14.5)
PLATELET # BLD: 204 K/UL (ref 130–400)
POTASSIUM REFLEX MAGNESIUM: 3.3 MEQ/L (ref 3.4–4.9)
RBC # BLD: 3.48 M/UL (ref 4.2–5.4)
SODIUM BLD-SCNC: 142 MEQ/L (ref 135–144)
WBC # BLD: 8.2 K/UL (ref 4.8–10.8)

## 2020-11-04 PROCEDURE — 80048 BASIC METABOLIC PNL TOTAL CA: CPT

## 2020-11-04 PROCEDURE — 6360000002 HC RX W HCPCS: Performed by: COLON & RECTAL SURGERY

## 2020-11-04 PROCEDURE — 99232 SBSQ HOSP IP/OBS MODERATE 35: CPT | Performed by: INTERNAL MEDICINE

## 2020-11-04 PROCEDURE — 2580000003 HC RX 258: Performed by: COLON & RECTAL SURGERY

## 2020-11-04 PROCEDURE — 6360000002 HC RX W HCPCS: Performed by: NURSE ANESTHETIST, CERTIFIED REGISTERED

## 2020-11-04 PROCEDURE — 6360000002 HC RX W HCPCS: Performed by: INTERNAL MEDICINE

## 2020-11-04 PROCEDURE — 3700000001 HC ADD 15 MINUTES (ANESTHESIA): Performed by: COLON & RECTAL SURGERY

## 2020-11-04 PROCEDURE — 2700000000 HC OXYGEN THERAPY PER DAY

## 2020-11-04 PROCEDURE — 99221 1ST HOSP IP/OBS SF/LOW 40: CPT | Performed by: COLON & RECTAL SURGERY

## 2020-11-04 PROCEDURE — 43246 EGD PLACE GASTROSTOMY TUBE: CPT | Performed by: COLON & RECTAL SURGERY

## 2020-11-04 PROCEDURE — 83735 ASSAY OF MAGNESIUM: CPT

## 2020-11-04 PROCEDURE — 3609013300 HC EGD TUBE PLACEMENT: Performed by: COLON & RECTAL SURGERY

## 2020-11-04 PROCEDURE — 85025 COMPLETE CBC W/AUTO DIFF WBC: CPT

## 2020-11-04 PROCEDURE — 0DH63UZ INSERTION OF FEEDING DEVICE INTO STOMACH, PERCUTANEOUS APPROACH: ICD-10-PCS | Performed by: COLON & RECTAL SURGERY

## 2020-11-04 PROCEDURE — 2580000003 HC RX 258: Performed by: NURSE ANESTHETIST, CERTIFIED REGISTERED

## 2020-11-04 PROCEDURE — 7100000000 HC PACU RECOVERY - FIRST 15 MIN: Performed by: COLON & RECTAL SURGERY

## 2020-11-04 PROCEDURE — 2060000000 HC ICU INTERMEDIATE R&B

## 2020-11-04 PROCEDURE — 2580000003 HC RX 258: Performed by: INTERNAL MEDICINE

## 2020-11-04 PROCEDURE — 36415 COLL VENOUS BLD VENIPUNCTURE: CPT

## 2020-11-04 PROCEDURE — 2500000003 HC RX 250 WO HCPCS: Performed by: INTERNAL MEDICINE

## 2020-11-04 PROCEDURE — 3E0G76Z INTRODUCTION OF NUTRITIONAL SUBSTANCE INTO UPPER GI, VIA NATURAL OR ARTIFICIAL OPENING: ICD-10-PCS | Performed by: COLON & RECTAL SURGERY

## 2020-11-04 PROCEDURE — 3700000000 HC ANESTHESIA ATTENDED CARE: Performed by: COLON & RECTAL SURGERY

## 2020-11-04 PROCEDURE — 6370000000 HC RX 637 (ALT 250 FOR IP): Performed by: COLON & RECTAL SURGERY

## 2020-11-04 PROCEDURE — 99285 EMERGENCY DEPT VISIT HI MDM: CPT

## 2020-11-04 PROCEDURE — 7100000001 HC PACU RECOVERY - ADDTL 15 MIN: Performed by: COLON & RECTAL SURGERY

## 2020-11-04 RX ORDER — SODIUM CHLORIDE 0.9 % (FLUSH) 0.9 %
10 SYRINGE (ML) INJECTION EVERY 12 HOURS SCHEDULED
Status: DISCONTINUED | OUTPATIENT
Start: 2020-11-04 | End: 2020-11-04 | Stop reason: HOSPADM

## 2020-11-04 RX ORDER — HYDROCODONE BITARTRATE AND ACETAMINOPHEN 5; 325 MG/1; MG/1
2 TABLET ORAL PRN
Status: DISCONTINUED | OUTPATIENT
Start: 2020-11-04 | End: 2020-11-04 | Stop reason: HOSPADM

## 2020-11-04 RX ORDER — HYDROCODONE BITARTRATE AND ACETAMINOPHEN 5; 325 MG/1; MG/1
1 TABLET ORAL PRN
Status: DISCONTINUED | OUTPATIENT
Start: 2020-11-04 | End: 2020-11-04 | Stop reason: HOSPADM

## 2020-11-04 RX ORDER — ONDANSETRON 2 MG/ML
4 INJECTION INTRAMUSCULAR; INTRAVENOUS
Status: DISCONTINUED | OUTPATIENT
Start: 2020-11-04 | End: 2020-11-04 | Stop reason: HOSPADM

## 2020-11-04 RX ORDER — SODIUM CHLORIDE 9 MG/ML
INJECTION, SOLUTION INTRAVENOUS CONTINUOUS PRN
Status: DISCONTINUED | OUTPATIENT
Start: 2020-11-04 | End: 2020-11-04 | Stop reason: SDUPTHER

## 2020-11-04 RX ORDER — FENTANYL CITRATE 50 UG/ML
50 INJECTION, SOLUTION INTRAMUSCULAR; INTRAVENOUS EVERY 10 MIN PRN
Status: DISCONTINUED | OUTPATIENT
Start: 2020-11-04 | End: 2020-11-06 | Stop reason: HOSPADM

## 2020-11-04 RX ORDER — SODIUM CHLORIDE 9 MG/ML
INJECTION, SOLUTION INTRAVENOUS CONTINUOUS
Status: DISCONTINUED | OUTPATIENT
Start: 2020-11-04 | End: 2020-11-04

## 2020-11-04 RX ORDER — METOCLOPRAMIDE HYDROCHLORIDE 5 MG/ML
10 INJECTION INTRAMUSCULAR; INTRAVENOUS
Status: DISCONTINUED | OUTPATIENT
Start: 2020-11-04 | End: 2020-11-04 | Stop reason: HOSPADM

## 2020-11-04 RX ORDER — LIDOCAINE HYDROCHLORIDE 10 MG/ML
1 INJECTION, SOLUTION EPIDURAL; INFILTRATION; INTRACAUDAL; PERINEURAL
Status: DISCONTINUED | OUTPATIENT
Start: 2020-11-04 | End: 2020-11-04 | Stop reason: HOSPADM

## 2020-11-04 RX ORDER — SODIUM CHLORIDE 0.9 % (FLUSH) 0.9 %
10 SYRINGE (ML) INJECTION PRN
Status: DISCONTINUED | OUTPATIENT
Start: 2020-11-04 | End: 2020-11-04 | Stop reason: HOSPADM

## 2020-11-04 RX ORDER — POTASSIUM CHLORIDE 7.45 MG/ML
10 INJECTION INTRAVENOUS
Status: COMPLETED | OUTPATIENT
Start: 2020-11-04 | End: 2020-11-04

## 2020-11-04 RX ORDER — SODIUM CHLORIDE, SODIUM LACTATE, POTASSIUM CHLORIDE, CALCIUM CHLORIDE 600; 310; 30; 20 MG/100ML; MG/100ML; MG/100ML; MG/100ML
INJECTION, SOLUTION INTRAVENOUS
Status: DISPENSED
Start: 2020-11-04 | End: 2020-11-05

## 2020-11-04 RX ORDER — DIPHENHYDRAMINE HYDROCHLORIDE 50 MG/ML
12.5 INJECTION INTRAMUSCULAR; INTRAVENOUS
Status: ACTIVE | OUTPATIENT
Start: 2020-11-04 | End: 2020-11-04

## 2020-11-04 RX ORDER — MEPERIDINE HYDROCHLORIDE 25 MG/ML
12.5 INJECTION INTRAMUSCULAR; INTRAVENOUS; SUBCUTANEOUS EVERY 5 MIN PRN
Status: DISCONTINUED | OUTPATIENT
Start: 2020-11-04 | End: 2020-11-04 | Stop reason: HOSPADM

## 2020-11-04 RX ORDER — PROPOFOL 10 MG/ML
INJECTION, EMULSION INTRAVENOUS PRN
Status: DISCONTINUED | OUTPATIENT
Start: 2020-11-04 | End: 2020-11-04 | Stop reason: SDUPTHER

## 2020-11-04 RX ORDER — SODIUM CHLORIDE 9 MG/ML
INJECTION, SOLUTION INTRAVENOUS
Status: COMPLETED
Start: 2020-11-04 | End: 2020-11-04

## 2020-11-04 RX ADMIN — BRIMONIDINE TARTRATE 1 DROP: 2 SOLUTION OPHTHALMIC at 19:55

## 2020-11-04 RX ADMIN — TIMOLOL MALEATE 1 DROP: 5 SOLUTION/ DROPS OPHTHALMIC at 19:55

## 2020-11-04 RX ADMIN — DOCUSATE SODIUM 100 MG: 50 LIQUID ORAL at 19:55

## 2020-11-04 RX ADMIN — Medication 10 ML: at 19:56

## 2020-11-04 RX ADMIN — SODIUM CHLORIDE: 9 INJECTION, SOLUTION INTRAVENOUS at 14:15

## 2020-11-04 RX ADMIN — AMPICILLIN SODIUM 2 G: 2 INJECTION, POWDER, FOR SOLUTION INTRAMUSCULAR; INTRAVENOUS at 00:32

## 2020-11-04 RX ADMIN — FAMOTIDINE 20 MG: 10 INJECTION INTRAVENOUS at 09:47

## 2020-11-04 RX ADMIN — AMPICILLIN SODIUM 2 G: 2 INJECTION, POWDER, FOR SOLUTION INTRAMUSCULAR; INTRAVENOUS at 05:37

## 2020-11-04 RX ADMIN — DORZOLAMIDE HYDROCHLORIDE 1 DROP: 20 SOLUTION/ DROPS OPHTHALMIC at 09:48

## 2020-11-04 RX ADMIN — AMPICILLIN SODIUM 2 G: 2 INJECTION, POWDER, FOR SOLUTION INTRAMUSCULAR; INTRAVENOUS at 23:43

## 2020-11-04 RX ADMIN — POTASSIUM CHLORIDE 10 MEQ: 7.46 INJECTION, SOLUTION INTRAVENOUS at 09:47

## 2020-11-04 RX ADMIN — AMPICILLIN SODIUM 2 G: 2 INJECTION, POWDER, FOR SOLUTION INTRAMUSCULAR; INTRAVENOUS at 17:05

## 2020-11-04 RX ADMIN — DORZOLAMIDE HYDROCHLORIDE 1 DROP: 20 SOLUTION/ DROPS OPHTHALMIC at 19:55

## 2020-11-04 RX ADMIN — PROPOFOL 150 MG: 10 INJECTION, EMULSION INTRAVENOUS at 14:21

## 2020-11-04 RX ADMIN — BRIMONIDINE TARTRATE 1 DROP: 2 SOLUTION OPHTHALMIC at 09:48

## 2020-11-04 RX ADMIN — TIMOLOL MALEATE 1 DROP: 5 SOLUTION/ DROPS OPHTHALMIC at 09:48

## 2020-11-04 RX ADMIN — AMPICILLIN SODIUM 2 G: 2 INJECTION, POWDER, FOR SOLUTION INTRAMUSCULAR; INTRAVENOUS at 12:20

## 2020-11-04 RX ADMIN — METOPROLOL TARTRATE 12.5 MG: 25 TABLET, FILM COATED ORAL at 19:55

## 2020-11-04 RX ADMIN — POTASSIUM CHLORIDE 10 MEQ: 7.46 INJECTION, SOLUTION INTRAVENOUS at 10:43

## 2020-11-04 ASSESSMENT — PULMONARY FUNCTION TESTS
PIF_VALUE: 0
PIF_VALUE: 1
PIF_VALUE: 0
PIF_VALUE: 1
PIF_VALUE: 0
PIF_VALUE: 1
PIF_VALUE: 1

## 2020-11-04 ASSESSMENT — PAIN SCALES - WONG BAKER
WONGBAKER_NUMERICALRESPONSE: 0

## 2020-11-04 ASSESSMENT — ENCOUNTER SYMPTOMS
RESPIRATORY NEGATIVE: 1
GASTROINTESTINAL NEGATIVE: 1

## 2020-11-04 NOTE — PROGRESS NOTES
Infectious Disease     Patient Name: Juan Kirkpatrick  Date: 11/4/2020  YOB: 1928  Medical Record Number: [id number]            E. coli septic shock  Urinary tract source      Organism Abnormal    10/27/2020 11:55 PM  [address] [facility]    Escherichia coli    Blood Culture, Routine  10/27/2020 11:55 PM  [address] [facility] for      Escherichia coli (2)     Antibiotic  Interpretation  DEREK  Status     ampicillin  Sensitive  <=2  mcg/mL      ceFAZolin  Sensitive  <=4  mcg/mL      ciprofloxacin  Sensitive  <=0.25  mcg/mL      gentamicin  Sensitive  <=1  mcg/mL      trimethoprim-sulfamethoxazole  Sensitive  <=20  mcg/mL             Review of Systems   Respiratory: Negative. Cardiovascular: Negative. Gastrointestinal: Negative. Physical Exam   Constitutional: No distress. Cardiovascular: Normal heart sounds. No murmur heard. Pulmonary/Chest: Effort normal and breath sounds normal. No respiratory distress. She has no wheezes. She has no rales. Abdominal: Soft. Bowel sounds are normal. She exhibits no distension. There is no abdominal tenderness. There is no rebound and no guarding. Skin: She is not diaphoretic. Blood pressure (!) 130/52, pulse 90, temperature 98.2 °F (36.8 °C), temperature source Oral, resp. rate 15, height 5' 3\" (1.6 m), weight 205 lb 0.4 oz (93 kg), SpO2 96 %, not currently breastfeeding.       .   Lab Results   Component Value Date    WBC 8.2 11/04/2020    HGB 10.4 (L) 11/04/2020    HCT 30.7 (L) 11/04/2020    MCV 88.3 11/04/2020     11/04/2020     Lab Results   Component Value Date     11/04/2020    K 3.3 11/04/2020     11/04/2020    CO2 26 11/04/2020    BUN 15 11/04/2020    CREATININE 0.69 11/04/2020    GLUCOSE 149 11/04/2020    CALCIUM 8.1 11/04/2020                ASSESSMENT:  PLAN:    E. coli septic shock  Urinary tract source     ampicillin

## 2020-11-04 NOTE — PROGRESS NOTES
1615 Patient returned from surgery with PEG tube in place. Dressing and binder clean, dry, & intact. Abdomen soft and non-distended. VS stable. Patient awake and alert. Difficult to assess orientation as it has been all day due to cognitive status and Kashia.

## 2020-11-04 NOTE — BRIEF OP NOTE
Department of General Surgery - Adult  Surgical Service general surgery  Brief Operative Report      Pre-operative Diagnosis: Inability to eat    Post-operative Diagnosis: Same    Procedure: EGD, percutaneous endoscopic gastrostomy tube    Surgeon: Nhi Bauman     Assistant(s): Alton Barrera    Anesthesia:  Local anesthesia with sedation    Estimated blood loss: 25    Total Urine Output: Not recorded     Drains: None    Specimens: None    Implants: 20 Armenian gastrostomy tube    Findings: Normal upper endoscopic anatomy    Complications: None    Condition:  stable    See dictated operative report for full details.

## 2020-11-04 NOTE — PROGRESS NOTES
[address] patient's daughter Julia Natarajan to let her know that Dr. Yajaira Timmons can do PEG placement today and we will need consents signed. Lovenox was held for anticipation of procedure today. 1200 Obtained consent from patient's daughters for PEG placement. 1315 Pt. To pre-op via bed.

## 2020-11-04 NOTE — CONSULTS
Department of General Surgery - Adult  Surgical Service general surgery  Attending Consult Note      Reason for Consult: PEG tube      CHIEF COMPLAINT: Inability to eat    History Obtained From:  patient, family member -daughter, electronic medical record    HISTORY OF PRESENT ILLNESS:                The patient is a 80 y.o. female who presents with inability to eat. She has failed modified barium swallows. I was asked to place a PEG tube for assistance with long-term feeding. Risks and benefits were explained to her daughter who wished to proceed. Consent obtained. Past Medical History:        Diagnosis Date    Anxiety     Cancer ([address]     Glaucoma     Gout     left knee    Hypertension     Lung disease     Obesity (BMI 30-39. 9) 7/11/2019    Obstructive sleep apnea 7/11/2019     Past Surgical History:        Procedure Laterality Date    CATARACT REMOVAL WITH IMPLANT Bilateral 2016    COLON SURGERY  2009    polyps    COLONOSCOPY  02/2009    SKIN CANCER EXCISION  2002    graft from neck     Current Medications:   Current Facility-Administered Medications: PN-Adult Premix (4.25/10) Peripheral Line with electrolytes, , Intravenous, Continuous TPN  0.9 % sodium chloride infusion, , Intravenous, Continuous  sodium chloride flush 0.9 % injection 10 mL, 10 mL, Intravenous, 2 times per day  sodium chloride flush 0.9 % injection 10 mL, 10 mL, Intravenous, PRN  lidocaine PF 1 % injection 1 mL, 1 mL, Intradermal, Once PRN  fentaNYL (SUBLIMAZE) injection 50 mcg, 50 mcg, Intravenous, Q10 Min PRN  HYDROmorphone (DILAUDID) injection 0.5 mg, 0.5 mg, Intravenous, Q10 Min PRN  HYDROcodone-acetaminophen (NORCO) 5-325 MG per tablet 1 tablet, 1 tablet, Oral, PRN **OR** HYDROcodone-acetaminophen (NORCO) 5-325 MG per tablet 2 tablet, 2 tablet, Oral, PRN  diphenhydrAMINE (BENADRYL) injection 12.5 mg, 12.5 mg, Intravenous, Once PRN  ondansetron (ZOFRAN) injection 4 mg, 4 mg, Intravenous, Once PRN  metoclopramide effervescent tablet 40 mEq, 40 mEq, Oral, PRN **OR** potassium chloride 10 mEq/100 mL IVPB (Peripheral Line), 10 mEq, Intravenous, PRN  magnesium sulfate 2 g in 50 mL IVPB premix, 2 g, Intravenous, PRN  famotidine (PEPCID) injection 20 mg, 20 mg, Intravenous, Daily  metoprolol (LOPRESSOR) injection 5 mg, 5 mg, Intravenous, Q6H PRN  Allergies:  Norvasc [amlodipine besylate]    Social History:   TOBACCO:   reports that she has never smoked. She has never used smokeless tobacco.  ETOH:   reports no history of alcohol use. DRUGS:   reports no history of drug use. Family History:   History reviewed. No pertinent family history. REVIEW OF SYSTEMS:    Review of systems not obtained due to patient factors - mental status    PHYSICAL EXAM:    VITALS:  /62   Pulse 88   Temp 97.5 °F (36.4 °C) (Temporal)   Resp 22   Ht 5' 3\" (1.6 m)   Wt 205 lb 0.4 oz (93 kg)   LMP  (LMP Unknown)   SpO2 98%   BMI 36.32 kg/m²   CONSTITUTIONAL: Awake alert  ENT:  Normocephalic, without obvious abnormality, atraumatic, sinuses nontender on palpation, external ears without lesions, oral pharynx with moist mucus membranes, tonsils without erythema or exudates, gums normal and good dentition. NECK:  Supple, symmetrical, trachea midline, no adenopathy, thyroid symmetric, not enlarged and no tenderness, skin normal  HEMATOLOGIC/LYMPHATICS:  no cervical lymphadenopathy  BACK:  Symmetric, no curvature, spinous processes are non-tender on palpation, paraspinous muscles are non-tender on palpation, no costal vertebral tenderness  LUNGS:  No increased work of breathing, good air exchange, clear to auscultation bilaterally, no crackles or wheezing  CARDIOVASCULAR: Normal rate  ABDOMEN: Abdomen soft and nondistended no abdominal wall hernias  MUSCULOSKELETAL:  There is no redness, warmth, or swelling of the joints. Full range of motion noted. Motor strength is 5 out of 5 all extremities bilaterally.   Tone is normal.  NEUROLOGIC: Chivo Muhammad

## 2020-11-04 NOTE — ANESTHESIA PRE PROCEDURE
Department of Anesthesiology  Preprocedure Note       Name:  Argentina Villeda   Age:  80 y.o.  :  1928                                          MRN:  [id number]         Date:  2020      Surgeon: Beni De Souza):  Nicolas Zavala MD    Procedure: Procedure(s):  EGD PEG TUBE PLACEMENT    Medications prior to admission:   Prior to Admission medications    Medication Sig Start Date End Date Taking? Authorizing Provider   atorvastatin (LIPITOR) 20 MG tablet Take 20 mg by mouth daily   Yes Historical Provider, MD   metoprolol tartrate (LOPRESSOR) 25 MG tablet Take 0.5 tablets by mouth 2 times daily 20  Yes Rosaura Khanna MD   colchicine (MITIGARE) 0.6 MG capsule TAKE 2 CAPSULES po AT THE FIRST SIGN OF a gout FLARE, MAXIMUM TOTAL DOSE: 2 CAPSULES ON DAY 1. THEN TAKE 1 CAPSULE ONCE OR TWICE DAILY UNTIL FLARE RESOLVES. FOLLOW UP FOR MORE REFILLS. 20  Yes Rosaura Khanna MD   albuterol sulfate HFA (PROAIR HFA) 108 (90 Base) MCG/ACT inhaler Inhale 1 puff into the lungs every 6 hours as needed for Wheezing or Shortness of Breath 20  Yes Rosaura Khanna MD   OXYGEN Inhale 2 L into the lungs Indications: PRN at bedtime   Yes Historical Provider, MD   ipratropium-albuterol (DUONEB) 0.5-2.5 (3) MG/3ML SOLN nebulizer solution Inhale 3 mLs into the lungs every 6 hours as needed for Shortness of Breath 18  Yes Gonzalo Milan MD   aspirin 81 MG EC tablet Take 1 tablet by mouth daily 18  Yes Efra Gaspar MD   latanoprost (XALATAN) 0.005 % ophthalmic solution INSTILL 1 DROP INTO BOTH EYES EVERY DAY 7/17/15  Yes Historical Provider, MD   dorzolamide-timolol (COSOPT) 22.3-6.8 MG/ML ophthalmic solution Use 1 Drop in both eyes every 12 hours. 4/10/15  Yes Historical Provider, MD   brimonidine (ALPHAGAN P) 0.15 % ophthalmic solution INSTILL 1 DROP INTO BOTH EYES TWICE DAILY.  17  Yes Historical Provider, MD   losartan (COZAAR) 25 MG tablet TAKE 1 TABLET BY MOUTH EVERY DAY 20   Dimitrios Honeycutt Lucio Seay MD       Current medications:    Current Facility-Administered Medications   Medication Dose Route Frequency Provider Last Rate Last Dose    PN-Adult Premix (4.25/10) Peripheral Line with electrolytes   Intravenous Continuous TPN Neel Busby MD 75 mL/hr at 11/04/20 0949      fentaNYL (SUBLIMAZE) injection 50 mcg  50 mcg Intravenous Q10 Min PRN Rand León MD        HYDROmorphone (DILAUDID) injection 0.5 mg  0.5 mg Intravenous Q10 Min PRN Rand León MD        HYDROcodone-acetaminophen [facility]) 5-325 MG per tablet 1 tablet  1 tablet Oral PRN Rand León MD        Or    HYDROcodone-acetaminophen [facility]) 5-325 MG per tablet 2 tablet  2 tablet Oral PRN Rand León MD        diphenhydrAMINE (BENADRYL) injection 12.5 mg  12.5 mg Intravenous Once PRN Rand León MD        ondansetron [facility]) injection 4 mg  4 mg Intravenous Once PRN Rand León MD        metoclopramide [facility]) injection 10 mg  10 mg Intravenous Once PRN Rand León MD        meperidine (DEMEROL) injection 12.5 mg  12.5 mg Intravenous Q5 Min PRN Rand León MD        ampicillin (OMNIPEN) 2 g in sodium chloride 0.9 % 100 mL IVPB  2 g Intravenous 4 times per day Ирина Miguel MD   Stopped at 11/04/20 1253    albuterol sulfate  (90 Base) MCG/ACT inhaler 1 puff  1 puff Inhalation Q6H PRN Fabiola Perch, DO        aspirin EC tablet 81 mg  81 mg Oral Daily Fabiola Perch, DO   Stopped at 11/02/20 0857    atorvastatin (LIPITOR) tablet 20 mg  20 mg Oral Daily Fabiola Perch, DO   Stopped at 11/02/20 0857    brimonidine (ALPHAGAN) 0.2 % ophthalmic solution 1 drop  1 drop Both Eyes BID Fabiola Perch, DO   1 drop at 11/04/20 0948    losartan (COZAAR) tablet 25 mg  25 mg Oral Daily Fabiola Perch, DO   Stopped at 11/02/20 0858    metoprolol tartrate (LOPRESSOR) tablet 12.5 mg  12.5 mg Oral BID Fabiola Perch, DO   Stopped at 11/01/20 2020    pill splitter   Does not apply Once Juan Ramon Denney, DO        dorzolamide (TRUSOPT) 2 % ophthalmic solution 1 drop  1 drop Both Eyes BID Juan Rmaon Denney, DO   1 drop at 11/04/20 0948    timolol (TIMOPTIC) 0.5 % ophthalmic solution 1 drop  1 drop Both Eyes BID Juan Ramon Denney, DO   1 drop at 11/04/20 0948    enoxaparin (LOVENOX) injection 40 mg  40 mg Subcutaneous Daily Juan Ramon Denney DO   Stopped at 11/04/20 0951    chlorhexidine (PERIDEX) 0.12 % solution 15 mL  15 mL Mouth/Throat BID Andry Mendez MD   Stopped at 11/02/20 0858    polyethylene glycol (GLYCOLAX) packet 17 g  17 g Oral Daily Andry Mendez MD   Stopped at 11/02/20 0858    docusate (COLACE) 50 MG/5ML liquid 100 mg  100 mg Oral BID Andry Mendez MD   Stopped at 11/02/20 0858    ipratropium-albuterol (DUONEB) nebulizer solution 1 ampule  1 ampule Inhalation Q4H PRN Andry Mendez MD        sodium chloride flush 0.9 % injection 10 mL  10 mL Intravenous 2 times per day Juan Ramon Denney, DO   10 mL at 11/03/20 2007    sodium chloride flush 0.9 % injection 10 mL  10 mL Intravenous PRN Juan Ramon Denney, DO        sodium chloride flush 0.9 % injection 10 mL  10 mL Intravenous 2 times per day Juan Ramon Denney DO   10 mL at 11/03/20 2391    sodium chloride flush 0.9 % injection 10 mL  10 mL Intravenous PRN Juan Ramon Denney, DO        acetaminophen (TYLENOL) tablet 650 mg  650 mg Oral Q6H PRN Juan Ramon Denney, DO   650 mg at 10/30/20 2043    Or    acetaminophen (TYLENOL) suppository 650 mg  650 mg Rectal Q6H PRN Juan Ramon Denney, DO        promethazine (PHENERGAN) tablet 12.5 mg  12.5 mg Oral Q6H PRN Snyder Scottie Hernandez DO        Or    ondansetron [facility]) injection 4 mg  4 mg Intravenous Q6H PRN Laurelyn Jumana, DO        potassium chloride (KLOR-CON M) extended release tablet 40 mEq  40 mEq Oral PRN Juan Ramon Denney, DO        Or    potassium bicarb-citric acid (EFFER-K) effervescent tablet 40 mEq  40 mEq Oral PRN Kei Hernandez, DO   40 mEq at 10/30/20 1128    Or    potassium chloride 10 mEq/100 mL IVPB (Peripheral Line)  10 mEq Intravenous PRN Pitney Yaritza, DO        magnesium sulfate 2 g in 50 mL IVPB premix  2 g Intravenous PRN Pitney Yaritza, DO        famotidine (PEPCID) injection 20 mg  20 mg Intravenous Daily Pitney Yaritza, DO   20 mg at 11/04/20 0947    metoprolol (LOPRESSOR) injection 5 mg  5 mg Intravenous Q6H PRN Pitney Yaritza, DO   5 mg at 10/29/20 7324       Allergies: Allergies   Allergen Reactions    Norvasc [Amlodipine Besylate] Swelling       Problem List:    Patient Active Problem List   Diagnosis Code    Essential hypertension I10    Anxiety F41.9    COPD (chronic obstructive pulmonary disease) with acute bronchitis ([facility] [address] J44.0, J20.9    COPD exacerbation ([facility] [address] J44.1    Hypoxia R09.02    Obesity (BMI 30-39. 9) E66.9    Obstructive sleep apnea G47.33    Chest pain R07.9    SVT (supraventricular tachycardia) (HCC) I47.1    Pneumonia of both lower lobes due to infectious organism J18.9       Past Medical History:        Diagnosis Date    Anxiety     Cancer ([facility] [address]     Glaucoma     Gout     left knee    Hypertension     Lung disease     Obesity (BMI 30-39. 9) 7/11/2019    Obstructive sleep apnea 7/11/2019       Past Surgical History:        Procedure Laterality Date    CATARACT REMOVAL WITH IMPLANT Bilateral 2016    COLON SURGERY  2009    polyps    COLONOSCOPY  02/2009    SKIN CANCER EXCISION  2002    graft from neck       Social History:    Social History     Tobacco Use    Smoking status: Never Smoker    Smokeless tobacco: Never Used   Substance Use Topics    Alcohol use:  No                                Counseling given: Not Answered      Vital Signs (Current):   Vitals:    11/03/20 0830 11/03/20 1330 11/03/20 1949 11/04/20 0659   BP:  (!) 151/70 (!) 161/75 (!) 156/82   Pulse: 86 87 91 94   Resp:  18 18 18   Temp:  98.8 °F (37.1 °C) 98.8 °F (37.1 °C) 98.1 °F (36.7 °C)   TempSrc:  Oral Oral Oral SpO2:  97% 95% 96%   Weight:       Height:                                                  BP Readings from Last 3 Encounters:   11/04/20 (!) 156/82   07/09/20 (!) 159/75   10/14/19 130/84       NPO Status:                                                                                 BMI:   Wt Readings from Last 3 Encounters:   11/02/20 205 lb 0.4 oz (93 kg)   07/08/20 185 lb 10 oz (84.2 kg)   10/14/19 191 lb (86.6 kg)     Body mass index is 36.32 kg/m². CBC:   Lab Results   Component Value Date    WBC 8.2 11/04/2020    RBC 3.48 11/04/2020    HGB 10.4 11/04/2020    HCT 30.7 11/04/2020    MCV 88.3 11/04/2020    RDW 14.0 11/04/2020     11/04/2020       CMP:   Lab Results   Component Value Date     11/04/2020    K 3.3 11/04/2020     11/04/2020    CO2 26 11/04/2020    BUN 15 11/04/2020    CREATININE 0.69 11/04/2020    GFRAA >60.0 11/04/2020    LABGLOM >60.0 11/04/2020    GLUCOSE 149 11/04/2020    PROT 6.0 10/27/2020    CALCIUM 8.1 11/04/2020    BILITOT 0.6 10/27/2020    ALKPHOS 148 10/27/2020    AST 21 10/27/2020    ALT 12 10/27/2020       POC Tests: No results for input(s): POCGLU, POCNA, POCK, POCCL, POCBUN, POCHEMO, POCHCT in the last 72 hours.     Coags:   Lab Results   Component Value Date    PROTIME 17.5 10/27/2020    INR 1.4 10/27/2020    APTT 40.2 10/27/2020       HCG (If Applicable): No results found for: PREGTESTUR, PREGSERUM, HCG, HCGQUANT     ABGs:   Lab Results   Component Value Date    PHART 7.413 11/01/2020    PO2ART 82 11/01/2020    TZH0VMB 40 11/01/2020    FCX8ETU 25.5 11/01/2020    BEART 1 11/01/2020    L7RXVWYV 96 11/01/2020        Type & Screen (If Applicable):  No results found for: LABABO, LABRH    Drug/Infectious Status (If Applicable):  No results found for: HIV, HEPCAB    COVID-19 Screening (If Applicable):   Lab Results   Component Value Date    COVID19 Not Detected 10/27/2020         Anesthesia Evaluation  Patient summary reviewed and Nursing notes reviewed no history of anesthetic complications:   Airway: Mallampati: II  TM distance: >3 FB   Neck ROM: full  Mouth opening: > = 3 FB Dental: normal exam         Pulmonary:normal exam    (+) pneumonia:  COPD:                             Cardiovascular:    (+) hypertension:,       ECG reviewed                        Neuro/Psych:   Negative Neuro/Psych ROS              GI/Hepatic/Renal:   (+) morbid obesity          Endo/Other: Negative Endo/Other ROS                    Abdominal:           Vascular: negative vascular ROS. Anesthesia Plan      MAC     ASA 3           MIPS: Prophylactic antiemetics administered. Anesthetic plan and risks discussed with patient. Plan discussed with CRNA.     Attending anesthesiologist reviewed and agrees with Pre Eval content            Adrian Lott MD   11/4/2020

## 2020-11-04 NOTE — CARE COORDINATION
Per the RN, the patient to obtain a peg tube possibly later today. The LSW updated [place], [address]. The patient will need a precert for [facility]. The LSW faxed updated notes to [facility].  Electronically signed by АННА Coronel on 11/4/20 at 10:42 AM EST

## 2020-11-04 NOTE — PROGRESS NOTES
[facility]   Facility/Department: [address]  Speech Language Pathology    Karie Urrutia  5/19/1928  [id number]    Date: 11/4/2020      Speech Therapy attempted to see Karie Urrutia on this date for a/an:    Treatment    Pt was unable to be seen due to: Other: Pt had PEG tube placed this afternoon.  Re-attempt 11/5/20        Electronically signed by WILFRID Sparks on 11/4/20 at 3:57 PM EST

## 2020-11-04 NOTE — PROGRESS NOTES
Scant white secrections suctioned from mouth following moist cough. Pt awake, SAO2 100% on 3L NC. Abdomen soft, PEG intact.

## 2020-11-04 NOTE — PROGRESS NOTES
Progress Note  Date:2020       WXPV:[id number]  Patient Name:Trudy Sanchez     Date of Birth:[birth year]     Age:89 y.o. Patient was seen and evaluated. Alert, but minimally verbal. Spoke with nursing about the plan ROS: could not be obtained bc of acuity of medical condition  Subjective    Subjective     Review of Systems    Objective         Vitals Last 24 Hours:  TEMPERATURE:  Temp  Av.5 °F (36.9 °C)  Min: 98.1 °F (36.7 °C)  Max: 98.8 °F (37.1 °C)  RESPIRATIONS RANGE: Resp  Av  Min: 18  Max: 18  PULSE OXIMETRY RANGE: SpO2  Av.5 %  Min: 95 %  Max: 96 %  PULSE RANGE: Pulse  Av.5  Min: 91  Max: 94  BLOOD PRESSURE RANGE: Systolic (85XXY), FHM:583 , Min:156 , FHE:162   ; Diastolic (25FAT), QFS:26, Min:75, Max:82    I/O (24Hr): Intake/Output Summary (Last 24 hours) at 2020 1336  Last data filed at 2020 1253  Gross per 24 hour   Intake 400 ml   Output 1850 ml   Net -1450 ml     Objective:  General Appearance:  Ill-appearing. Vital signs: (most recent): Blood pressure (!) 156/82, pulse 94, temperature 98.1 °F (36.7 °C), temperature source Oral, resp. rate 18, height 5' 3\" (1.6 m), weight 205 lb 0.4 oz (93 kg), SpO2 96 %, not currently breastfeeding. HEENT: Normal HEENT exam.    Lungs:  (Decrease BS)  Heart: S1 normal and S2 normal.    Abdomen: Abdomen is soft. Bowel sounds are normal.     Extremities: There is no deformity. (+ trace edema)  Pulses: Distal pulses are intact. Neurological: Patient is alert. Skin:  Warm and dry.       Labs/Imaging/Diagnostics    Labs:  CBC:  Recent Labs     20  0600 20  0602   WBC 7.4 8.2   RBC 3.37* 3.48*   HGB 9.9* 10.4*   HCT 29.9* 30.7*   MCV 88.6 88.3   RDW 14.1 14.0    204     CHEMISTRIES:  Recent Labs     20  0600 20  0602   * 142   K 3.7 3.3*   * 106   CO2 27 26   BUN 16 15   CREATININE 0.72 0.69   GLUCOSE 94 149*   MG 2.1 2.3     PT/INR:No results for input(s): PROTIME, INR in the last 72 hours. APTT:No results for input(s): APTT in the last 72 hours. LIVER PROFILE:No results for input(s): AST, ALT, BILIDIR, BILITOT, ALKPHOS in the last 72 hours. Imaging Last 24 Hours:  Xr Chest Portable    Result Date: 10/31/2020  Exam: XR CHEST PORTABLE History:  Bibasilar pneumonia Technique: AP portable view of the chest obtained. Comparison: Chest x-ray from October 30, 2020 and chest CT from October 27, 2020 Findings: There is an NG tube coursing towards the stomach. The patient remains intubated, and there is dilatation of the trachea towards the right side by the ectatic aorta known from the CT angiogram from October 27, 2020. The cardiomediastinal silhouette is within normal limits. There are mild increased markings throughout both lungs without areas of consolidation likely due to chronic interstitial pulmonary disease. There are small bilateral pleural effusions. Bones of the thorax appear intact. There are small bilateral pleural effusions and likely underlying atelectasis versus infiltrate.      Assessment//Plan           Hospital Problems           Last Modified POA    Pneumonia of both lower lobes due to infectious organism 10/28/2020 Yes    SVT (supraventricular tachycardia) ([address] 10/27/2020 Yes        Assessment & Plan    1) acute respiratory failure due to b/l PNA and sepsis  2) severe sepsis due tto UTI  3) encephalopathy metabolic  Neuro eval  4) HTN stable  5) dysphagia  Failed swallow  Need peg tube  6) SVT   Resolved  Needs SNIF for rehab  MRI did not show acute changes  Electronically signed by Jovany Hernandez MD on 11/2/20 at 11:00 AM EST

## 2020-11-04 NOTE — PROGRESS NOTES
Pulmonary Progress Note    PRIMARY SERVICE: Pulmonary Disease    INTERVAL HPI: Patient seen and examined at bedside, Interval Notes, orders reviewed. Nursing notes noted    Patient is comfortable in bed. She failed swallow eval and going to have PEG tube placement. She is on 4 L O2 and O2 saturation is 96%. She is not having any fever. No cough or sputum production. No vomiting or diarrhea. She is comfortable in bed. Not answering much of any question    Review of Systems   as above        Intake/Output Summary (Last 24 hours) at 11/4/2020 1024  Last data filed at 11/4/2020 9017  Gross per 24 hour   Intake 100 ml   Output 1850 ml   Net -1750 ml       Vitals:  BP (!) 156/82   Pulse 94   Temp 98.1 °F (36.7 °C) (Oral)   Resp 18   Ht 5' 3\" (1.6 m)   Wt 205 lb 0.4 oz (93 kg)   LMP  (LMP Unknown)   SpO2 96%   BMI 36.32 kg/m²   EXAM:  General: Alert awake, comfortable in bed   head: Atraumatic ,Normocephalic   Eyes: PERRL. No sclera icterus. No conjunctival injection. No discharge   ENT: No nasal  discharge. Pharynx clear. Oral thrush+  Neck:  Trachea midline. No thyromegaly, no JVD, No cervical adenopathy. Resp : Normal effort,  No accessory muscle use. No Rales. No wheezing. No rhonchi. CV: Normal  rate. Regular rhythm. No mumur ,  Rub or gallop  ABD: Non-tender. Non-distended. No masses. Noorganmegaly. Normal bowel sounds. No hernia.   EXT: No Pitting, No Cyanosis No clubbing    ABG:     Lab Results   Component Value Date    PHART 7.413 11/01/2020    RBG5FLK 40 11/01/2020    PO2ART 82 11/01/2020    EFP4GBN 25.5 11/01/2020    BEART 1 11/01/2020    B1ZPOWZM 96 11/01/2020     Lab Results   Component Value Date    LACTA 1.0 12/25/2018     O2 Device: Nasal cannula  O2 Flow Rate (L/min): 4 L/min    MV Settings:     Vent Mode: (S) CPAP  Vt Ordered: 500 mL  Rate Set: 14 bmp  FiO2 : 40 %  PEEP/CPAP: (S) 5  Pressure Support: (S) 5 cmH20  Peak Inspiratory Pressure: 27 cmH2O  Mean Airway Pressure: 11 cmH20  I:E Ratio: 1:4.40    Diet NPO Effective Now  PN-Adult Premix (4.25/10) Peripheral Line with electrolytes    IV:    PN-Adult premixed (4.25/10) Peripheral solution with electrolytes 75 mL/hr at 11/04/20 0949       Medications:  Scheduled Meds:   potassium chloride  10 mEq Intravenous Q1H    ampicillin IVPB 2 g  2 g Intravenous 4 times per day    aspirin  81 mg Oral Daily    atorvastatin  20 mg Oral Daily    brimonidine  1 drop Both Eyes BID    losartan  25 mg Oral Daily    metoprolol tartrate  12.5 mg Oral BID    pill splitter   Does not apply Once    dorzolamide  1 drop Both Eyes BID    timolol  1 drop Both Eyes BID    enoxaparin  40 mg Subcutaneous Daily    chlorhexidine  15 mL Mouth/Throat BID    polyethylene glycol  17 g Oral Daily    docusate  100 mg Oral BID    sodium chloride flush  10 mL Intravenous 2 times per day    sodium chloride flush  10 mL Intravenous 2 times per day    famotidine (PEPCID) injection  20 mg Intravenous Daily       PRN Meds:  albuterol sulfate HFA, ipratropium-albuterol, sodium chloride flush, sodium chloride flush, acetaminophen **OR** acetaminophen, promethazine **OR** ondansetron, potassium chloride **OR** potassium alternative oral replacement **OR** potassium chloride, magnesium sulfate, metoprolol        Radiology      Ct Abdomen Pelvis Wo Contrast Additional Contrast? Radiologist Recommendation    Result Date: 10/28/2020  EXAMINATION: CT ABDOMEN PELVIS WO CONTRAST HISTORY:  septic shock  COMPARISON: CT abdomen and pelvis from August 17, 2011 TECHNIQUE: Contiguous axial CT sections of the abdomen and pelvis. No IV contrast administered. Unenhanced imaging is limited for the evaluation of some intra-abdominal and pelvic pathologies. All CT scans at this facility use dose modulation, iterative reconstruction, and/or weight based dosing when appropriate to reduce radiation dose to as low as reasonably achievable. FINDINGS  Lung bases:  There is atelectasis and consolidation in the visualized portions of the lung bases. The heart and pericardium are unremarkable. There is no pericardial effusion. Liver: The liver is normal in size and attenuation without  focal lesions. There are no focal solid or cystic lesions. There is no intra or extrahepatic bile duct dilatation. Gallbladder: There are numerous calcified stones layering to the dependent portion of the gallbladder without surrounding inflammatory changes. There is distention of the gallbladder. Spleen: There are no focal lesions or calcifications in the spleen. There is no splenomegaly  Pancreas: The pancreas is normal in size and attenuation without focal lesions or dilatation of the pancreatic duct. Kidneys: There are no solid or cystic lesions. There is no hydronephrosis. There is a 9 mm calcified stone in the inferior pole of the right kidney. There is no hydroureter. Adrenal glands are negative. Bowel: There is no bowel dilatation or evidence of diverticulitis. Appendix: There  is no CT evidence for appendicitis. Nodes: No lymphadenopathy. Aorta: No aneurysm  Peritoneum: No free fluid or free air. Pelvis: There are no solid or cystic lesions. There is a Cedeno catheter in place. Abdominal wall: The abdominal wall is intact. Bones : There are no acute osseous changes. Soft tissues: The soft tissues are unremarkable. No acute intra-abdominal changes. There is no free air or free fluid, there are no fluid collections. The visualized portions of the lung bases demonstrate bilateral atelectasis and consolidations in the dependent portions. There are calcified stones in the gallbladder without evidence of cholecystitis There is a 9 mm right intrarenal stone in the inferior pole, there is no hydronephrosis or hydroureter on either side. Ct Head Wo Contrast    Result Date: 10/27/2020  CT Brain Contrast medium:  Not utilized. History: Altered mental status. Tachycardia. Comparison:  None.  Findings: Extra-axial spaces:  Normal. Intracranial hemorrhage:  None. Ventricular system: Ventricles mildly enlarged. Sulci mildly prominent Basal Cisterns:  Normal. Cerebral Parenchyma: Bilateral symmetric periventricular areas decreased attenuation. Midline Shift:  None. Cerebellum:  Normal. Paranasal sinuses and mastoid air cells:  Normal. Visualized Orbits: Remote bilateral ocular surgery. Impression: No acute findings. Mild cerebral atrophy. Chronic ischemic white matter disease. All CT scans at this facility use dose modulation, iterative reconstruction, and/or weight based dosing when appropriate to reduce radiation dose to as low as reasonably achievable. Cta Chest W Wo Contrast    Result Date: 10/27/2020  CTA CHEST W WO CONTRAST: 10/27/2020 CLINICAL HISTORY:  R/o PE, SOB, tachycardia . COMPARISON: 12/26/2018. Spiral enhanced images were obtained of the chest during the infusion of approximately 100 mL of Isovue 370 contrast with pulmonary artery  CTA protocol. Routine and volume rendered images were obtained on a 3-dimensional workstation. All CT scans at this facility use dose modulation, iterative reconstruction, and/or weight based dosing when appropriate to reduce radiation dose to as low as reasonably achievable. FINDINGS: An endotracheal tube is noted with its tip approximately 2 cm superior to the devin. There are no filling defects identified within the pulmonary arterial vasculature to suggest pulmonary emboli. The thoracic aorta is tortuous, and ectatic with mild calcific atherosclerotic plaquing of the arch. There is no dissection. The heart is mildly enlarged. Moderate coronary artery calcifications are present. Moderate bibasilar consolidation/atelectasis is noted of the posterior mid to lower lung fields, worsening inferiorly. There are no worrisome nodules, lymphadenopathy, pleural or pericardial effusions identified.  A small calcified granuloma within the inferolateral aspect of the left upper lobe appears unchanged. A minimal compression deformity of the superior endplate of I14 and mild vertebral body wedging of the mid thoracic levels appears chronic. There are no acute fractures identified. Mild to moderate degenerative changes of the thoracic spine are noted. The limited images of the upper abdomen are noncontributory. ENDOTRACHEAL TUBE IN EXPECTED POSITION. MODERATE CONSOLIDATION/ATELECTASIS OF THE POSTERIOR MID TO LOWER LUNG SANZ, SUGGESTIVE OF BRONCHOPNEUMONIA AND/OR ASPIRATION. NO EVIDENCE OF PULMONARY EMBOLI OR OTHER SIGNIFICANT CHANGE FROM 12/26/2018 IDENTIFIED. Xr Chest Portable    Result Date: 10/30/2020  Exam: XR CHEST PORTABLE History: Respiratory failure Technique: AP portable view of the chest obtained. Comparison: Portable chest radiograph and CT chest from October 27, 2020 Findings: Suboptimal inspiration. Right upper extremity PICC is present and terminates in the SVC. Endotracheal tube and enteric tube remain. Atherosclerotic calcification of the thoracic aorta. Heart size is at the upper limits of normal. No significant interval change in bilateral infiltrate and/or atelectasis given suboptimal inspiration. No acute osseous abnormality. No significant interval change in bibasilar infiltrate and/or atelectasis. Xr Chest Portable    Result Date: 10/28/2020  EXAMINATION: XR CHEST PORTABLE CLINICAL HISTORY: CENTRAL LINE COMPARISONS: OCTOBER 27, 2020, AT 1700 HOURS FINDINGS: Endotracheal tube 2.2 cm superior to devin. Nasogastric tube courses beneath diaphragm. Osseous structures intact. Cardiopericardial silhouette normal. Blunting left costophrenic angle, mildly increased since prior study. Ill-defined area increased opacity left lung base. MILD INTERVAL INCREASE LEFT PLEURAL EFFUSION, WITH LEFT LOWER LUNG ATELECTASIS/PNEUMONIA.     Xr Chest Portable    Result Date: 10/27/2020  EXAMINATION: CHEST PORTABLE VIEW-1700 hours  CLINICAL HISTORY: Intubation COMPARISONS: October 27, 2020 1431 hours  FINDINGS: Single  views of the chest is submitted. Interval endotracheal intubation since the prior examination. The tip is at the level of the clavicles. The cardiac silhouette enlarged unchanged configuration. . Pulmonary vascular mildly congested. Right sided trachea. Left lower lobe infiltrate consolidation left pleural effusion. Atelectasis, infiltrate right lower lobe Blunting of the right costophrenic angle suggesting trace right pleural effusion. No Pneumothoraces. ATELECTASIS, INFILTRATE RIGHT LOWER LOBE. TRACE RIGHT PLEURAL EFFUSION. LEFT LOWER LOBE INFILTRATE CONSOLIDATION LEFT PLEURAL EFFUSION. Xr Chest Portable    Result Date: 10/27/2020  EXAMINATION: CHEST PORTABLE [id number]-1491 hours  CLINICAL HISTORY: Short of breath COMPARISONS: July 8, 2020  FINDINGS: Single  views of the chest is submitted. The cardiac silhouette is of normal size configuration. Pulmonary vascular congested. The trachea remains deviated to the right. Left lower lobe infiltrate consolidation and small left pleural effusion. .  No Pneumothoraces. LEFT LOWER LOBE INFILTRATE CONSOLIDATION AND SMALL LEFT PLEURAL EFFUSION. .. Ir Picc Wo Sq Port/pump > 5 Years    Result Date: 10/29/2020  PERIPHERALLY INSERTED CENTRAL CATHETER (PICC) PLACEMENT WITH ULTRASOUND GUIDANCE CLINICAL HISTORY:  REQUIRES PROLONGED INTRAVENOUS ANTIBIOTICS FOR PNEUMONIA. After discussing the procedure and possible complications with the patient, informed consent was obtained. The patient was placed on the Special Procedures table. The right upper extremity was sterilely prepared using a maximal sterile barrier technique which includes cap, mask, sterile gown, sterile gloves, sterile full-body drape, hand hygiene and 2% chlorhexidine for cutaneous antisepsis. A sterile ultrasound technique with sterile gel and sterile probe covers was also utilized. A pre-procedure time out was performed in order to assure the correct patient and procedure. Local anesthetic was administered. A peripheral vein was accessed with sonographic guidance. A sonographic spot image was obtained for documentation. A guidewire was advanced into the vein with fluoroscopic guidance and a sheath was placed over the guidewire. A 5-Cymraes triple-lumen PICC was advanced through the sheath, up the arm and into the central vasculature. It was positioned appropriately. The  sheath was removed. The catheter was shown to aspirate and infuse properly. The flange of the catheter was affixed to the arm using a PICC securement device. A spot image of the chest showed the tip of the PICC line to lie in the superior vena cava. The patient tolerated the procedure well and without complications. Number of films: 4 Fluoroscopy time: 10.9 seconds CONCLUSION: SUCCESSFUL RIGHT PICC PLACEMENT WITHOUT IMMEDIATE COMPLICATIONS. Us Carotid Artery Bilateral    Result Date: 10/28/2020  History:  Arterial access during central line placement Technique:  US CAROTID ARTERY BILATERAL Comparison: No prior Findings: No pseudoaneurysm seen. Doppler findings: ARTERIAL BLOOD FLOW VELOCITY RIGHT PS                                               Prox CCA 77cm/s             Mid CCA 80cm/s              Dist CCA 61cm/s              Prox ICA cm/s              Mid ICA cm/s              Dist ICA cm/s              Prox ECA cm/s              Dist VERT cm/s              Bulb ICA/CCA LEFT PS Prox CCA 133cm/s              Mid CCA 103cm/s              Dist CCA 107cm/s              Prox ICA 75cm/s              Mid ICA 91cm/s              Dist ICA 90cm/s              Prox ECA 171cm/s              Prox VERT 53cm/s              Bulb ICA/CCA 0.8     Impression: 1. The patient is on a ventilator and the examination is limited.  The right external carotid , internal carotid and vertebral arteries are not visualized. 2.  No hemodynamic significant stenosis seen on the left 3. No evidence of pseudoaneurysm Validated velocity measurements with angiographic measurements, velocity criteria are extrapolated from diameter data as defined by the Society of radiologist in ultrasound consensus conference radiology 2003; 229; 340-346. Results:  CBC:   Recent Labs     11/03/20 0600 11/04/20  0602   WBC 7.4 8.2   HGB 9.9* 10.4*   HCT 29.9* 30.7*   MCV 88.6 88.3    204     :   Recent Labs     11/03/20 0600 11/04/20  0602   * 142   K 3.7 3.3*   * 106   CO2 27 26   BUN 16 15   CREATININE 0.72 0.69         Assessment  1. Acute hypoxic and hypercapnic respiratory failure, improved on 4 L O2  2.  E. coli bacteremia with UTI with septic shock  3. Thrombocytopenia  4. Bibasilar atelectasis versus pneumonia  5. Acute kidney injury improving  6. Leukocytosis, improving    Patient is on 4 L O2 via nasal cannula O2 saturation 97%. Discussed with daughter at bedside. She is on ampicillin. Antibiotic as per ID. She failed swallow eval going to have a PEG tube placement. Continue present treatment plan.      SIGNATURE: Bonnie Mullins MD, [facility]

## 2020-11-04 NOTE — PROGRESS NOTES
Pt's moist cough productive of small amount white secretions, suctioned from mouth woth tonsil tip . Mouth swab provided. Few faint rhonchi auscultated in anterior fields, fdiminished bases. SAO2 99% on 3l NC. Pt responding to verbal stimuli , no verbal response. Abdomen soft, peg tube intact with clean dressing. Binder on.

## 2020-11-05 LAB
ANION GAP SERPL CALCULATED.3IONS-SCNC: 12 MEQ/L (ref 9–15)
ANISOCYTOSIS: ABNORMAL
BASOPHILS ABSOLUTE: 0.1 K/UL (ref 0–0.2)
BASOPHILS RELATIVE PERCENT: 1 %
BUN BLDV-MCNC: 16 MG/DL (ref 8–23)
CALCIUM SERPL-MCNC: 8.5 MG/DL (ref 8.5–9.9)
CHLORIDE BLD-SCNC: 106 MEQ/L (ref 95–107)
CO2: 27 MEQ/L (ref 20–31)
CREAT SERPL-MCNC: 0.71 MG/DL (ref 0.5–0.9)
EOSINOPHILS ABSOLUTE: 0.1 K/UL (ref 0–0.7)
EOSINOPHILS RELATIVE PERCENT: 1 %
GFR AFRICAN AMERICAN: >60
GFR NON-AFRICAN AMERICAN: >60
GLUCOSE BLD-MCNC: 109 MG/DL (ref 60–115)
GLUCOSE BLD-MCNC: 119 MG/DL (ref 70–99)
GLUCOSE BLD-MCNC: 122 MG/DL (ref 60–115)
HCT VFR BLD CALC: 30.9 % (ref 37–47)
HEMOGLOBIN: 10.2 G/DL (ref 12–16)
LYMPHOCYTES ABSOLUTE: 1 K/UL (ref 1–4.8)
LYMPHOCYTES RELATIVE PERCENT: 11 %
MAGNESIUM: 2.3 MG/DL (ref 1.7–2.4)
MCH RBC QN AUTO: 29.2 PG (ref 27–31.3)
MCHC RBC AUTO-ENTMCNC: 32.8 % (ref 33–37)
MCV RBC AUTO: 88.9 FL (ref 82–100)
METAMYELOCYTES RELATIVE PERCENT: 1 %
MONOCYTES ABSOLUTE: 0.6 K/UL (ref 0.2–0.8)
MONOCYTES RELATIVE PERCENT: 6.6 %
NEUTROPHILS ABSOLUTE: 7 K/UL (ref 1.4–6.5)
NEUTROPHILS RELATIVE PERCENT: 79 %
PDW BLD-RTO: 14.4 % (ref 11.5–14.5)
PERFORMED ON: ABNORMAL
PERFORMED ON: NORMAL
PLATELET # BLD: 222 K/UL (ref 130–400)
PLATELET SLIDE REVIEW: NORMAL
POTASSIUM REFLEX MAGNESIUM: 3.9 MEQ/L (ref 3.4–4.9)
RBC # BLD: 3.48 M/UL (ref 4.2–5.4)
SODIUM BLD-SCNC: 145 MEQ/L (ref 135–144)
WBC # BLD: 8.7 K/UL (ref 4.8–10.8)

## 2020-11-05 PROCEDURE — 6370000000 HC RX 637 (ALT 250 FOR IP): Performed by: INTERNAL MEDICINE

## 2020-11-05 PROCEDURE — 2500000003 HC RX 250 WO HCPCS: Performed by: COLON & RECTAL SURGERY

## 2020-11-05 PROCEDURE — 99232 SBSQ HOSP IP/OBS MODERATE 35: CPT | Performed by: INTERNAL MEDICINE

## 2020-11-05 PROCEDURE — 97535 SELF CARE MNGMENT TRAINING: CPT

## 2020-11-05 PROCEDURE — 51702 INSERT TEMP BLADDER CATH: CPT

## 2020-11-05 PROCEDURE — 6360000002 HC RX W HCPCS: Performed by: COLON & RECTAL SURGERY

## 2020-11-05 PROCEDURE — 2060000000 HC ICU INTERMEDIATE R&B

## 2020-11-05 PROCEDURE — 2580000003 HC RX 258: Performed by: COLON & RECTAL SURGERY

## 2020-11-05 PROCEDURE — 99233 SBSQ HOSP IP/OBS HIGH 50: CPT | Performed by: PSYCHIATRY & NEUROLOGY

## 2020-11-05 PROCEDURE — 94761 N-INVAS EAR/PLS OXIMETRY MLT: CPT

## 2020-11-05 PROCEDURE — 80048 BASIC METABOLIC PNL TOTAL CA: CPT

## 2020-11-05 PROCEDURE — 92526 ORAL FUNCTION THERAPY: CPT

## 2020-11-05 PROCEDURE — 6370000000 HC RX 637 (ALT 250 FOR IP): Performed by: COLON & RECTAL SURGERY

## 2020-11-05 PROCEDURE — 85025 COMPLETE CBC W/AUTO DIFF WBC: CPT

## 2020-11-05 PROCEDURE — 83735 ASSAY OF MAGNESIUM: CPT

## 2020-11-05 PROCEDURE — 94640 AIRWAY INHALATION TREATMENT: CPT

## 2020-11-05 PROCEDURE — 36415 COLL VENOUS BLD VENIPUNCTURE: CPT

## 2020-11-05 PROCEDURE — 99221 1ST HOSP IP/OBS SF/LOW 40: CPT | Performed by: NURSE PRACTITIONER

## 2020-11-05 PROCEDURE — 2700000000 HC OXYGEN THERAPY PER DAY

## 2020-11-05 RX ORDER — ACYCLOVIR 200 MG/1
200 CAPSULE ORAL 4 TIMES DAILY
Status: DISCONTINUED | OUTPATIENT
Start: 2020-11-05 | End: 2020-11-06 | Stop reason: HOSPADM

## 2020-11-05 RX ORDER — ASPIRIN 81 MG/1
81 TABLET, CHEWABLE ORAL DAILY
Status: DISCONTINUED | OUTPATIENT
Start: 2020-11-05 | End: 2020-11-06 | Stop reason: HOSPADM

## 2020-11-05 RX ADMIN — FAMOTIDINE 20 MG: 10 INJECTION INTRAVENOUS at 10:38

## 2020-11-05 RX ADMIN — ATORVASTATIN CALCIUM 20 MG: 20 TABLET, FILM COATED ORAL at 10:39

## 2020-11-05 RX ADMIN — POTASSIUM BICARBONATE 40 MEQ: 782 TABLET, EFFERVESCENT ORAL at 01:02

## 2020-11-05 RX ADMIN — CHLORHEXIDINE GLUCONATE 15 ML: 1.2 RINSE ORAL at 21:46

## 2020-11-05 RX ADMIN — BRIMONIDINE TARTRATE 1 DROP: 2 SOLUTION OPHTHALMIC at 21:46

## 2020-11-05 RX ADMIN — ASPIRIN 81 MG: 81 TABLET, CHEWABLE ORAL at 10:39

## 2020-11-05 RX ADMIN — IPRATROPIUM BROMIDE AND ALBUTEROL SULFATE 1 AMPULE: .5; 3 SOLUTION RESPIRATORY (INHALATION) at 04:21

## 2020-11-05 RX ADMIN — CHLORHEXIDINE GLUCONATE 15 ML: 1.2 RINSE ORAL at 10:38

## 2020-11-05 RX ADMIN — DORZOLAMIDE HYDROCHLORIDE 1 DROP: 20 SOLUTION/ DROPS OPHTHALMIC at 21:46

## 2020-11-05 RX ADMIN — Medication 10 ML: at 21:46

## 2020-11-05 RX ADMIN — BRIMONIDINE TARTRATE 1 DROP: 2 SOLUTION OPHTHALMIC at 10:44

## 2020-11-05 RX ADMIN — TIMOLOL MALEATE 1 DROP: 5 SOLUTION/ DROPS OPHTHALMIC at 21:46

## 2020-11-05 RX ADMIN — DOCUSATE SODIUM 100 MG: 50 LIQUID ORAL at 21:46

## 2020-11-05 RX ADMIN — AMPICILLIN SODIUM 2 G: 2 INJECTION, POWDER, FOR SOLUTION INTRAMUSCULAR; INTRAVENOUS at 13:24

## 2020-11-05 RX ADMIN — TIMOLOL MALEATE 1 DROP: 5 SOLUTION/ DROPS OPHTHALMIC at 10:45

## 2020-11-05 RX ADMIN — ACYCLOVIR 200 MG: 200 CAPSULE ORAL at 21:45

## 2020-11-05 RX ADMIN — ENOXAPARIN SODIUM 40 MG: 40 INJECTION SUBCUTANEOUS at 10:39

## 2020-11-05 RX ADMIN — AMPICILLIN SODIUM 2 G: 2 INJECTION, POWDER, FOR SOLUTION INTRAMUSCULAR; INTRAVENOUS at 05:50

## 2020-11-05 RX ADMIN — Medication 10 ML: at 10:41

## 2020-11-05 RX ADMIN — Medication 10 ML: at 10:40

## 2020-11-05 RX ADMIN — METOPROLOL TARTRATE 12.5 MG: 25 TABLET, FILM COATED ORAL at 10:39

## 2020-11-05 RX ADMIN — METOPROLOL TARTRATE 12.5 MG: 25 TABLET, FILM COATED ORAL at 21:46

## 2020-11-05 RX ADMIN — LOSARTAN POTASSIUM 25 MG: 50 TABLET, FILM COATED ORAL at 10:39

## 2020-11-05 RX ADMIN — Medication 10 ML: at 21:47

## 2020-11-05 RX ADMIN — AMPICILLIN SODIUM 2 G: 2 INJECTION, POWDER, FOR SOLUTION INTRAMUSCULAR; INTRAVENOUS at 18:17

## 2020-11-05 RX ADMIN — DORZOLAMIDE HYDROCHLORIDE 1 DROP: 20 SOLUTION/ DROPS OPHTHALMIC at 10:45

## 2020-11-05 ASSESSMENT — PAIN SCALES - WONG BAKER
WONGBAKER_NUMERICALRESPONSE: 0

## 2020-11-05 ASSESSMENT — ENCOUNTER SYMPTOMS
COUGH: 0
TROUBLE SWALLOWING: 1
VOMITING: 0
GASTROINTESTINAL NEGATIVE: 1
WHEEZING: 0
COLOR CHANGE: 0
RESPIRATORY NEGATIVE: 1

## 2020-11-05 ASSESSMENT — PAIN SCALES - GENERAL
PAINLEVEL_OUTOF10: 0

## 2020-11-05 NOTE — PROGRESS NOTES
[facility]  [facility]  Speech Language Pathology   Treatment Note      Chilango Person  5/19/1928  [id number]    Medical Dx: SVT (supraventricular tachycardia) ([address] [I47.1]  Speech Dx: Dysphagia     11/5/2020    Subjective:  Alert and Confused       Interventions used this date:  Dysphagia Treatment    Objective/Assessment:  Patient progressing towards goals:  Short-term Goals  Timeframe for Short-term Goals: 2 weeks    Goal 1: Pt will complete oral motor ROM and strengthening exercises with 70% accuracy, given cues as needed, in order to strengthen lingual/labial/buccal musculature to promote safety and efficiency of oral phase of swallow and decrease risk for pocketing. Pt unable to follow directions to complete despite max verbal and tactile cues. Goal 2: Pt will complete lingual exercises that promote anterior to posterior propulsion of bolus and improve tongue base retraction with 80% accuracy in order to strengthen the muscles of the swallow to decrease risk of aspiration and to increase ability to safely handle the least restrictive diet level. Pt unable to follow directions to complete despite max verbal and tactile cues. Goal 3: Pt will complete pharyngeal strengthening exercises (such as the Stephanie, Effortful swallow, etc) with __% acc in order to strengthen and establish and more effective swallow. Pt unable to follow directions to complete despite max verbal and tactile cues. Goal 4: Pt will tolerate 5/5 PO trials of puree and HTL with no overt s/s of aspiration observed. Pt tolerated 1 trial of 1/2 tsp of puree. In the remaining trials, pt did not accept bolus from spoon and kept her teeth clenched. SLP offered additional PO options and pt continued to clench teeth. Pt's present cognitive status is limiting her ability to safely consume PO intake at this time. Continued NPO is recommended.      Goal 5: Pt will participated in [facility] when deemed appropriate

## 2020-11-05 NOTE — PROGRESS NOTES
Comprehensive Nutrition Assessment    Type and Reason for Visit:  Reassess(new TF)    Nutrition Recommendations/Plan:   Recommend increase in Standard with Fiber TF ( Jevity 1.2) to 50 ml/hr x 24 hrs via PEG  Can transition to bolus schedule 300 ml, 4 x daily a needed  Continue with water flush 100 ml, 6 x daily    Nutrition Assessment:  PEG placed 11/4, Recommend TF change to 1. 5 with Fiber to better meet needs    Malnutrition Assessment:  Malnutrition Status: At risk for malnutrition (Comment)    Context:  Acute Illness     Findings of the 6 clinical characteristics of malnutrition:  Energy Intake:  Unable to assess  Weight Loss:  No significant weight loss     Body Fat Loss:  Unable to assess     Muscle Mass Loss:  Unable to assess    Fluid Accumulation:  Unable to assess     Strength:  Not Performed    Estimated Daily Nutrient Needs:  Energy (kcal):  5624-5295 (kg x 15-18); Weight Used for Energy Requirements:  Usual(84.8 kg)     Protein (g):  62-73 gm (kg IBW x 1.2-1.4);  Weight Used for Protein Requirements:  Ideal(52.2 kg)        Fluid (ml/day):  ~1500 ml; Method Used for Fluid Requirements:  1 ml/kcal      Nutrition Related Findings:  PEG 11/4 due to dysphagia, I/O = 1010/1200, TF currently infusing at goal rate, delivers < 85% estimated energy/protein needs, labs noted, meds reviewd, BM 11/5, 2+ generalized edema, anticipate wt los as edema resolves      Wounds:  None       Current Nutrition Therapies:    Current Tube Feeding (TF) Orders:  · Feeding Route: PEG  · Formula: Standard w/Fiber @ 45 ML/HR  · Schedule: Continuous  · Water Flushes: 100 ml, every 4 hrs ( 600 ml)  · Current TF & Flush Orders Provides: 1296 kcals, 60 g protien, 1471 ml free water  · Goal TF & Flush Orders Provides: 1296 kcals, 60 g protien, 1471 ml free water      Anthropometric Measures:  · Height: 5' 3\" (160 cm)  · Current Body Weight: 190 lb (86.2 kg)((11/5), svitlana 190-205# during admission, 2+ gen edema)   · Admission Body Weight: 170 lb (77.1 kg)(stated)    · [facility]84.8 kg[facility]     · Ideal Body Weight: 115 lbs; % Ideal Body Weight   >100%  · BMI: 33.7   · BMI Categories: Obese Class 2 (BMI 35.0 -39.9)       Nutrition Diagnosis:   · Inadequate oral intake related to cognitive or neurological impairment as evidenced by NPO or clear liquid status due to medical condition, swallow study results      Nutrition Interventions:   Food and/or Nutrient Delivery:  Modify Tube Feeding(Rec increase TF to 50 ml//hr, bolus 300 ml, 4 x daily)  Nutrition Education/Counseling:  Education not indicated   Coordination of Nutrition Care:  Continue to monitor while inpatient    Goals:  EN to deliver kcals/protein within range of estimated needs       Nutrition Monitoring and Evaluation:     Food/Nutrient Intake Outcomes:  Enteral Nutrition Intake/Tolerance  Physical Signs/Symptoms Outcomes:  Biochemical Data, GI Status, Weight, Skin, Fluid Status or Edema     Discharge Planning:    Enteral Nutrition     Electronically signed by Sandie Joseph RD, LD on 11/5/20 at 10:49 AM EST

## 2020-11-05 NOTE — PROGRESS NOTES
General: She is not in acute distress. Appearance: She is ill-appearing. She is not diaphoretic. HENT:      Head: Normocephalic and atraumatic. Eyes:      Pupils: Pupils are equal, round, and reactive to light. Cardiovascular:      Rate and Rhythm: Normal rate and regular rhythm. Pulmonary:      Effort: Pulmonary effort is normal. No respiratory distress. Breath sounds: Normal breath sounds. Abdominal:      General: There is distension. Skin:     General: Skin is warm and dry. Neurological:      General: No focal deficit present. Mental Status: She is alert. She is disoriented. Cranial Nerves: No dysarthria or facial asymmetry. Motor: No tremor or seizure activity.          BP (!) 141/61   Pulse 92   Temp 97.7 °F (36.5 °C) (Oral)   Resp 22   Ht 5' 3\" (1.6 m)   Wt 190 lb 11.2 oz (86.5 kg)   LMP  (LMP Unknown)   SpO2 97%   BMI 33.78 kg/m²         Medications:  Reviewed    Infusion Medications:     Scheduled Medications:    aspirin  81 mg Oral Daily    ampicillin IVPB 2 g  2 g Intravenous 4 times per day    atorvastatin  20 mg Oral Daily    brimonidine  1 drop Both Eyes BID    losartan  25 mg Oral Daily    metoprolol tartrate  12.5 mg Oral BID    pill splitter   Does not apply Once    dorzolamide  1 drop Both Eyes BID    timolol  1 drop Both Eyes BID    enoxaparin  40 mg Subcutaneous Daily    chlorhexidine  15 mL Mouth/Throat BID    polyethylene glycol  17 g Oral Daily    docusate  100 mg Oral BID    sodium chloride flush  10 mL Intravenous 2 times per day    sodium chloride flush  10 mL Intravenous 2 times per day    famotidine (PEPCID) injection  20 mg Intravenous Daily     PRN Meds: fentanNYL, HYDROmorphone, albuterol sulfate HFA, ipratropium-albuterol, sodium chloride flush, sodium chloride flush, acetaminophen **OR** acetaminophen, promethazine **OR** ondansetron, potassium chloride **OR** potassium alternative oral replacement **OR** potassium chloride, magnesium sulfate, metoprolol    Labs:   Recent Labs     11/03/20  0600 11/04/20  0602 11/05/20  0600   WBC 7.4 8.2 8.7   HGB 9.9* 10.4* 10.2*   HCT 29.9* 30.7* 30.9*    204 222     Recent Labs     11/03/20  0600 11/04/20  0602 11/05/20  0600   * 142 145*   K 3.7 3.3* 3.9   * 106 106   CO2 27 26 27   BUN 16 15 16   CREATININE 0.72 0.69 0.71   CALCIUM 8.4* 8.1* 8.5     No results for input(s): AST, ALT, BILIDIR, BILITOT, ALKPHOS in the last 72 hours. No results for input(s): INR in the last 72 hours. No results for input(s): [place] in the last 72 hours. Urinalysis:   Lab Results   Component Value Date    NITRU Negative 10/27/2020    WBCUA 10-20 10/27/2020    BACTERIA FEW 10/27/2020    RBCUA 6-10 10/27/2020    BLOODU MODERATE 10/27/2020    SPECGRAV 1.008 10/27/2020    GLUCOSEU Negative 10/27/2020       Radiology:   Most recent    EEG No procedure found. MRI of Brain No results found for this or any previous visit. Results for orders placed during the hospital encounter of 10/27/20   MRI BRAIN WO CONTRAST    Narrative EXAM: MRI of the brain without contrast    History: Stroke. Technique: Multiplanar multisequence MRI of the brain was performed without contrast.    Comparison: CT brain from October 27, 2020    Findings:    A few small hyperintense T2/FLAIR signal foci within the bilateral supratentorial white matter are nonspecific but most likely due to chronic small vessel ischemic changes in a patient of this age. Prominence of the sulci and ventricles but with moderate generalized parenchymal volume loss. No acute hemorrhage, mass, mass effect, midline shift, or abnormal extra-axial fluid collection. Midline structures are within normal limits. The posterior   fossa is within normal limits. There is no diffusion restriction. No suspicious susceptibility artifact is identified on the gradient echo sequence.       The major intracranial vascular flow voids are maintained. Cranial nerves 7/8 complexes appear grossly unremarkable. The visualized paranasal sinuses and bilateral mastoid air cells are clear. Impression No acute ischemia or acute intracranial process. Generalized parenchymal volume loss and nonspecific white matter findings most compatible with chronic small vessel ischemic changes in a patient of this age. MRA of the Head and Neck: No results found for this or any previous visit. No results found for this or any previous visit. No results found for this or any previous visit. CT of the Head:   Results for orders placed during the hospital encounter of 10/27/20   CT HEAD WO CONTRAST    Narrative CT Brain    Contrast medium:  Not utilized. History: Altered mental status. Tachycardia. Comparison:  None. Findings:    Extra-axial spaces:  Normal.     Intracranial hemorrhage:  None. Ventricular system: Ventricles mildly enlarged. Sulci mildly prominent    Basal Cisterns:  Normal.    Cerebral Parenchyma: Bilateral symmetric periventricular areas decreased attenuation. Midline Shift:  None. Cerebellum:  Normal.     Paranasal sinuses and mastoid air cells:  Normal.    Visualized Orbits: Remote bilateral ocular surgery. Impression Impression:    No acute findings. Mild cerebral atrophy. Chronic ischemic white matter disease. All CT scans at this facility use dose modulation, iterative reconstruction, and/or weight based dosing when appropriate to reduce radiation dose to as low as reasonably achievable. No results found for this or any previous visit. No results found for this or any previous visit. Carotid duplex: No results found for this or any previous visit. No results found for this or any previous visit.   Results for orders placed during the hospital encounter of 10/27/20   US CAROTID ARTERY BILATERAL    Narrative History:  Arterial access during central line placement     Technique:  US CAROTID ARTERY BILATERAL    Comparison: No prior    Findings:   No pseudoaneurysm seen. Doppler findings:  ARTERIAL BLOOD FLOW VELOCITY    RIGHT PS                                                   Prox CCA 77cm/s               Mid CCA 80cm/s                Dist CCA 61cm/s                Prox ICA cm/s                Mid ICA cm/s                Dist ICA cm/s                Prox ECA cm/s                Dist VERT cm/s                Bulb  ICA/CCA     LEFT PS    Prox CCA 133cm/s                Mid CCA 103cm/s                Dist CCA 107cm/s                Prox ICA 75cm/s                Mid ICA 91cm/s                Dist ICA 90cm/s                Prox ECA 171cm/s                Prox VERT 53cm/s                Bulb  ICA/CCA 0.8      Impression Impression:   1. The patient is on a ventilator and the examination is limited. The right external carotid , internal carotid and vertebral arteries are not visualized. 2.  No hemodynamic significant stenosis seen on the left 3. No evidence of pseudoaneurysm    Validated velocity measurements with angiographic measurements, velocity criteria are extrapolated from diameter data as defined by the Society of radiologist in ultrasound consensus conference radiology 2003; 229; 340-346. Echo No results found for this or any previous visit. Assessment/Plan:    11/2/20:  Encephalopathy related to underlying septicemia. Patient has E. coli in her cultures. An underlying stroke must be ruled out. History is very difficult to ascertain as patient is quite aphasic or she cannot hear at all. She has session of pneumonitis bilaterally.     There is no reports of seizures or strokes in the past though again I could not find anything in the history either.     Patient has a nonfocal examination except for the affect.   We will pursue an MRI of the brain first and an EEG and depending on the results of the same will further advise.     11/3/20:  MRI

## 2020-11-05 NOTE — PROGRESS NOTES
Spiritual Care Services     Summary of Visit:  PT could not talk but she followed when I did the sign of the cross. I prayed for her. I asked one of the student nurses to help her. Spiritual Assessment/Intervention/Outcomes:    Encounter Summary  Services provided to[de-identified] Patient  Referral/Consult From[de-identified] Other , Family  Support System: Children  Continue Visiting: Yes  Complexity of Encounter: High  Length of Encounter: 15 minutes  Spiritual Assessment Completed: Yes  Routine  Type: Initial  Assessment: Tearful     Spiritual/Confucianism  Type: Spiritual support  Assessment: Anxious, Approachable  Intervention: Prayer, Nurtured hope  Outcome: Less anxious, less agitated  Sacraments  Sacrament of Sick-Anointing: Anointed     Advance Directives (For Healthcare)  Pre-existing DNR Comfort Care/DNR Arrest/DNI Order: No  Healthcare Directive: Yes, patient has an advance directive for healthcare treatment           Values / Beliefs  Do you have any ethnic, cultural, sacramental, or spiritual Taoist needs you would like us to be aware of while you are in the hospital?: No    Care Plan:        [id number] [address]   Electronically signed by Venancio Crooks on 11/5/20 at 4:46 PM EST     To reach a  for emotional and spiritual support, place an [facility] consult request.   If a  is needed immediately, dial 0 and ask to page the on-call .

## 2020-11-05 NOTE — PROGRESS NOTES
Recommendations:  Patient would benefit from continued therapy after discharge    Goals  Short term goals  Short term goal 1: Pt will demonstrate rolling R/L in bed mod indep with use of rails to allow for pressure relief to avoid wounds and for care. Short term goal 2: Pt will demonstrate sitting edge  of bed without % of the time with good steadiness to prepare pt for sit to stand transfers  Long term goals  Long term goal 1: Pt will demonstrate bed mobility SBA  Long term goal 2: Pt will demonstrate transfers SBA to allow for safe return home with family  Long term goal 3: Pt and pt's family with demonstrate indep with HEP for carryover to increased strength for return to PLOF. PLAN    Times per week: 3-6  Safety Devices  Type of devices: Bed alarm in place, Call light within reach, Left in bed     [facility] (6 CLICK) [facility] [place] 28 Inpatient Mobility Raw Score : 9   Therapy Time   Individual   Time In 1505   Time Out 1513   Minutes [address], 11/05/20 at 3:17 PM         Definitions for assistance levels  Independent = pt does not require any physical supervision or assistance from another person for activity completion. Device may be needed.   Stand by assistance = pt requires verbal cues or instructions from another person, close to but not touching, to perform the activity  Minimal assistance= pt performs 75% or more of the activity; assistance is required to complete the activity  Moderate assistance= pt performs 50% of the activity; assistance is required to complete the activity  Maximal assistance = pt performs 25% of the activity; assistance is required to complete the activity  Dependent = pt requires total physical assistance to accomplish the task

## 2020-11-05 NOTE — DISCHARGE SUMMARY
Discharge Summary    Date: 11/5/2020  Patient Name: Chilango Luo YOB: 1928 Age: 80 y.o. Admit Date: 10/27/2020  Discharge Date: 11/5/2020  Discharge Condition: [address]    Admission Diagnosis  SVT (supraventricular tachycardia) (HCC) (I47.1)     Discharge Diagnosis  Active Problems: Pneumonia of both lower lobes due to infectious organism SVT (supraventricular tachycardia) (HCC) Unable to eatResolved Problems: * No resolved hospital problems. [facility] Stay  Narrative of Hospital Course:   Admitted initially with SVT, found to be septic. Was intubated in the ED. Is being treated for UTI and pneumonia. She got extubated on 11/1. She is doing better. PT/OT. On broad-spectrum antibiotic. Infectious is following. Has a PICC line in the right side. Failed multiple times of speech and swallow. Patient encouraged gastric tube for dysphagia. Neuro was consulted for altered mental status MRI was negative for acute CVA. Consultants:  IP CONSULT TO CARDIOLOGYIP CONSULT TO HOSPITALISTIP CONSULT TO VASCULAR SURGERYIP CONSULT TO PULMONOLOGYPHARMACY TO DOSE VANCOMYCINIP CONSULT TO CARDIOLOGYIP CONSULT TO [address] CONSULT TO INFECTIOUS DISEASESIP CONSULT TO [address] CONSULT TO NEUROLOGYIP CONSULT TO COLORECTAL SURGERYIP CONSULT TO PALLIATIVE CARE    Surgeries/procedures Performed:       Treatments:    Antibiotics    Other    Discharge Plan/Disposition:  Home    Hospital/Incidental Findings Requiring Follow Up:    Patient Instructions:    Diet:    Activity:Activity as Tolerated  For number of days (if applicable): Other Instructions:    Provider Follow-Up:   No follow-ups on file. Significant Diagnostic Studies:    Recent Labs:  Admission on 10/27/2020No results displayed because visit has over 200 results. ------------    Radiology last 7 days:  Xr Chest PortableResult Date: 10/31/2020There are small bilateral pleural effusions and likely underlying atelectasis versus infiltrate.  Nara Reynoso Chest PortableResult Date: 10/30/2020No significant interval change in bibasilar infiltrate and/or atelectasis. Mri Brain Wo ContrastResult Date: 11/3/2020No acute ischemia or acute intracranial process. Generalized parenchymal volume loss and nonspecific white matter findings most compatible with chronic small vessel ischemic changes in a patient of this age. [unfilled]    Discharge Medications    Current Discharge Medication List    Current Discharge Medication List    Current Discharge Medication ListCONTINUE these medications which have NOT CHANGEDatorvastatin (LIPITOR) 20 MG tabletTake 20 mg by mouth dailymetoprolol tartrate (LOPRESSOR) 25 MG tabletTake 0.5 tablets by mouth 2 times dailyQty: 180 tablet Refills: 0Associated Diagnoses:Intermittent chest paincolchicine (MITIGARE) 0.6 MG capsuleTAKE 2 CAPSULES po AT THE FIRST SIGN OF a gout FLARE, MAXIMUM TOTAL DOSE: 2 CAPSULES ON DAY 1. THEN TAKE 1 CAPSULE ONCE OR TWICE DAILY UNTIL FLARE RESOLVES. FOLLOW UP FOR MORE REFILLS. Qty: 30 capsule Refills: 0Associated Diagnoses:Acute idiopathic gout involving toe of left footalbuterol sulfate HFA (PROAIR HFA) 108 (90 Base) MCG/ACT inhalerInhale 1 puff into the lungs every 6 hours as needed for Wheezing or Shortness of BreathQty: 1 Inhaler Refills: 11Associated Diagnoses:Diastolic dysfunction; Essential hypertension; Anxiety; Hypokalemia; FRED (obstructive sleep apnea);  Encounter for immunizationOXYGENInhale 2 L into the lungs Indications: PRN at bedtimeipratropium-albuterol (DUONEB) 0.5-2.5 (3) MG/3ML SOLN nebulizer solutionInhale 3 mLs into the lungs every 6 hours as needed for Shortness of BreathQty: 360 mL Refills: 5aspirin 81 MG EC tabletTake 1 tablet by mouth dailyQty: 30 tablet Refills: 3Associated Diagnoses:Anxietylatanoprost (XALATAN) 0.005 % ophthalmic solutionINSTILL 1 DROP INTO BOTH EYES EVERY DAYdorzolamide-timolol (COSOPT) 22.3-6.8 MG/ML ophthalmic solutionUse 1 Drop in both eyes every 12 hours.brimonidine (ALPHAGAN P) 0.15 % ophthalmic solutionINSTILL 1 DROP INTO BOTH EYES TWICE DAILY. Refills: 3losartan (COZAAR) 25 MG tabletTAKE 1 TABLET BY MOUTH EVERY DAYQty: 90 tablet Refills: 0Associated Diagnoses:Essential hypertension; Anxiety; Diastolic dysfunction; Hypokalemia; FRED (obstructive sleep apnea); Encounter for immunization    Current Discharge Medication List    Time Spent on Discharge:E] minutes were spent in patient examination, evaluation, counseling as well as medication reconciliation, prescriptions for required medications, discharge plan, and follow up.     Electronically signed by Shay García MD on 11/5/20 at 11:39 AM EST   Overtime on dc summary was 45 min

## 2020-11-05 NOTE — CARE COORDINATION
The LSW updated [address], the patient is a possible discharge for today. [facility] will start the precert today. Electronically signed by АННА Malhotra on 11/5/20 at 10:58 AM EST    The LSW and RN met with the patient's daughter. The daughter is aware await the precert for [facility]. The LSW faxed all updated notes to [facility]. The patient's insurance was inquiring about the tube feeding note(s). Electronically signed by АННА Malhotra on 11/5/20 at 1:44 PM EST    [id number] sent. Electronically signed by АННА Malhotra on 11/5/20 at 1:44 PM EST    Per [facility], the patient will need a negative covid within 72 hours prior to discharge. The LSW updated the RN and .  Electronically signed by АННА Malhotra on 11/5/20 at 3:14 PM EST

## 2020-11-05 NOTE — PROGRESS NOTES
Pulmonary Progress Note    PRIMARY SERVICE: Pulmonary Disease    INTERVAL HPI: Patient seen and examined at bedside, Interval Notes, orders reviewed. Nursing notes noted    Patient is comfortable in bed. Patient had PEG tube placed yesterday. She is on 3 L O2 and O2 saturation is 98%. She is not having any fever. No cough or sputum production. No vomiting or diarrhea. Review of Systems   as above        Intake/Output Summary (Last 24 hours) at 11/5/2020 1103  Last data filed at 11/5/2020 0640  Gross per 24 hour   Intake 910 ml   Output 700 ml   Net 210 ml       Vitals:  BP (!) 128/52   Pulse 92   Temp 98 °F (36.7 °C) (Oral)   Resp 17   Ht 5' 3\" (1.6 m)   Wt 190 lb 11.2 oz (86.5 kg)   LMP  (LMP Unknown)   SpO2 98%   BMI 33.78 kg/m²   EXAM:  General: Alert awake, comfortable in bed   head: Atraumatic ,Normocephalic   Eyes: PERRL. No sclera icterus. No conjunctival injection. No discharge   ENT: No nasal  discharge. Pharynx clear. Oral thrush+  Neck:  Trachea midline. No thyromegaly, no JVD, No cervical adenopathy. Resp : Normal effort,  No accessory muscle use. No Rales. Few scattered rhonchi  CV: Normal  rate. Regular rhythm. No mumur ,  Rub or gallop  ABD: Non-tender. Non-distended. No masses. Noorganmegaly. Normal bowel sounds. No hernia.   EXT: No Pitting, No Cyanosis No clubbing    ABG:     Lab Results   Component Value Date    PHART 7.413 11/01/2020    QTA9INH 40 11/01/2020    PO2ART 82 11/01/2020    WRH7ZSW 25.5 11/01/2020    BEART 1 11/01/2020    I7FOPSQI 96 11/01/2020     Lab Results   Component Value Date    LACTA 1.0 12/25/2018     O2 Device: Nasal cannula  O2 Flow Rate (L/min): 3 L/min    MV Settings:     Vent Mode: (S) CPAP  Vt Ordered: 500 mL  Rate Set: 14 bmp  FiO2 : 40 %  PEEP/CPAP: (S) 5  Pressure Support: (S) 5 cmH20  Peak Inspiratory Pressure: 27 cmH2O  Mean Airway Pressure: 11 cmH20  I:E Ratio: 1:4.40    DIET TUBE FEED CONTINUOUS/CYCLIC NPO; STANDARD WITH FIBER; Gastrostomy; 20; are no focal solid or cystic lesions. There is no intra or extrahepatic bile duct dilatation. Gallbladder: There are numerous calcified stones layering to the dependent portion of the gallbladder without surrounding inflammatory changes. There is distention of the gallbladder. Spleen: There are no focal lesions or calcifications in the spleen. There is no splenomegaly  Pancreas: The pancreas is normal in size and attenuation without focal lesions or dilatation of the pancreatic duct. Kidneys: There are no solid or cystic lesions. There is no hydronephrosis. There is a 9 mm calcified stone in the inferior pole of the right kidney. There is no hydroureter. Adrenal glands are negative. Bowel: There is no bowel dilatation or evidence of diverticulitis. Appendix: There  is no CT evidence for appendicitis. Nodes: No lymphadenopathy. Aorta: No aneurysm  Peritoneum: No free fluid or free air. Pelvis: There are no solid or cystic lesions. There is a Cedeno catheter in place. Abdominal wall: The abdominal wall is intact. Bones : There are no acute osseous changes. Soft tissues: The soft tissues are unremarkable. No acute intra-abdominal changes. There is no free air or free fluid, there are no fluid collections. The visualized portions of the lung bases demonstrate bilateral atelectasis and consolidations in the dependent portions. There are calcified stones in the gallbladder without evidence of cholecystitis There is a 9 mm right intrarenal stone in the inferior pole, there is no hydronephrosis or hydroureter on either side. Ct Head Wo Contrast    Result Date: 10/27/2020  CT Brain Contrast medium:  Not utilized. History: Altered mental status. Tachycardia. Comparison:  None. Findings: Extra-axial spaces:  Normal. Intracranial hemorrhage:  None. Ventricular system: Ventricles mildly enlarged.  Sulci mildly prominent Basal Cisterns:  Normal. Cerebral Parenchyma: Bilateral symmetric periventricular areas decreased attenuation. Midline Shift:  None. Cerebellum:  Normal. Paranasal sinuses and mastoid air cells:  Normal. Visualized Orbits: Remote bilateral ocular surgery. Impression: No acute findings. Mild cerebral atrophy. Chronic ischemic white matter disease. All CT scans at this facility use dose modulation, iterative reconstruction, and/or weight based dosing when appropriate to reduce radiation dose to as low as reasonably achievable. Cta Chest W Wo Contrast    Result Date: 10/27/2020  CTA CHEST W WO CONTRAST: 10/27/2020 CLINICAL HISTORY:  R/o PE, SOB, tachycardia . COMPARISON: 12/26/2018. Spiral enhanced images were obtained of the chest during the infusion of approximately 100 mL of Isovue 370 contrast with pulmonary artery  CTA protocol. Routine and volume rendered images were obtained on a 3-dimensional workstation. All CT scans at this facility use dose modulation, iterative reconstruction, and/or weight based dosing when appropriate to reduce radiation dose to as low as reasonably achievable. FINDINGS: An endotracheal tube is noted with its tip approximately 2 cm superior to the devin. There are no filling defects identified within the pulmonary arterial vasculature to suggest pulmonary emboli. The thoracic aorta is tortuous, and ectatic with mild calcific atherosclerotic plaquing of the arch. There is no dissection. The heart is mildly enlarged. Moderate coronary artery calcifications are present. Moderate bibasilar consolidation/atelectasis is noted of the posterior mid to lower lung fields, worsening inferiorly. There are no worrisome nodules, lymphadenopathy, pleural or pericardial effusions identified. A small calcified granuloma within the inferolateral aspect of the left upper lobe appears unchanged. A minimal compression deformity of the superior endplate of Y11 and mild vertebral body wedging of the mid thoracic levels appears chronic. There are no acute fractures identified.  Mild to moderate degenerative changes of the thoracic spine are noted. The limited images of the upper abdomen are noncontributory. ENDOTRACHEAL TUBE IN EXPECTED POSITION. MODERATE CONSOLIDATION/ATELECTASIS OF THE POSTERIOR MID TO LOWER LUNG SANZ, SUGGESTIVE OF BRONCHOPNEUMONIA AND/OR ASPIRATION. NO EVIDENCE OF PULMONARY EMBOLI OR OTHER SIGNIFICANT CHANGE FROM 12/26/2018 IDENTIFIED. Xr Chest Portable    Result Date: 10/30/2020  Exam: XR CHEST PORTABLE History: Respiratory failure Technique: AP portable view of the chest obtained. Comparison: Portable chest radiograph and CT chest from October 27, 2020 Findings: Suboptimal inspiration. Right upper extremity PICC is present and terminates in the SVC. Endotracheal tube and enteric tube remain. Atherosclerotic calcification of the thoracic aorta. Heart size is at the upper limits of normal. No significant interval change in bilateral infiltrate and/or atelectasis given suboptimal inspiration. No acute osseous abnormality. No significant interval change in bibasilar infiltrate and/or atelectasis. Xr Chest Portable    Result Date: 10/28/2020  EXAMINATION: XR CHEST PORTABLE CLINICAL HISTORY: CENTRAL LINE COMPARISONS: OCTOBER 27, 2020, AT 1700 HOURS FINDINGS: Endotracheal tube 2.2 cm superior to devin. Nasogastric tube courses beneath diaphragm. Osseous structures intact. Cardiopericardial silhouette normal. Blunting left costophrenic angle, mildly increased since prior study. Ill-defined area increased opacity left lung base. MILD INTERVAL INCREASE LEFT PLEURAL EFFUSION, WITH LEFT LOWER LUNG ATELECTASIS/PNEUMONIA. Xr Chest Portable    Result Date: 10/27/2020  EXAMINATION: CHEST PORTABLE VIEW-1700 hours  CLINICAL HISTORY: Intubation COMPARISONS: October 27, 2020 1431 hours  FINDINGS: Single  views of the chest is submitted. Interval endotracheal intubation since the prior examination. The tip is at the level of the clavicles.   The cardiac silhouette A peripheral vein was accessed with sonographic guidance. A sonographic spot image was obtained for documentation. A guidewire was advanced into the vein with fluoroscopic guidance and a sheath was placed over the guidewire. A 5-Sami triple-lumen PICC was advanced through the sheath, up the arm and into the central vasculature. It was positioned appropriately. The  sheath was removed. The catheter was shown to aspirate and infuse properly. The flange of the catheter was affixed to the arm using a PICC securement device. A spot image of the chest showed the tip of the PICC line to lie in the superior vena cava. The patient tolerated the procedure well and without complications. Number of films: 4 Fluoroscopy time: 10.9 seconds CONCLUSION: SUCCESSFUL RIGHT PICC PLACEMENT WITHOUT IMMEDIATE COMPLICATIONS. Us Carotid Artery Bilateral    Result Date: 10/28/2020  History:  Arterial access during central line placement Technique:  US CAROTID ARTERY BILATERAL Comparison: No prior Findings: No pseudoaneurysm seen. Doppler findings: ARTERIAL BLOOD FLOW VELOCITY RIGHT PS                                               Prox CCA 77cm/s             Mid CCA 80cm/s              Dist CCA 61cm/s              Prox ICA cm/s              Mid ICA cm/s              Dist ICA cm/s              Prox ECA cm/s              Dist VERT cm/s              Bulb ICA/CCA LEFT PS Prox CCA 133cm/s              Mid CCA 103cm/s              Dist CCA 107cm/s              Prox ICA 75cm/s              Mid ICA 91cm/s              Dist ICA 90cm/s              Prox ECA 171cm/s              Prox VERT 53cm/s              Bulb ICA/CCA 0.8     Impression: 1. The patient is on a ventilator and the examination is limited. The right external carotid , internal carotid and vertebral arteries are not visualized. 2.  No hemodynamic significant stenosis seen on the left 3.  No evidence of pseudoaneurysm Validated velocity measurements with angiographic measurements, velocity criteria are extrapolated from diameter data as defined by the Society of radiologist in ultrasound consensus conference radiology 2003; 229; 340-346. Results:  CBC:   Recent Labs     11/03/20  0600 11/04/20 0602 11/05/20 0600   WBC 7.4 8.2 8.7   HGB 9.9* 10.4* 10.2*   HCT 29.9* 30.7* 30.9*   MCV 88.6 88.3 88.9    204 222     :   Recent Labs     11/03/20 0600 11/04/20 0602 11/05/20 0600   * 142 145*   K 3.7 3.3* 3.9   * 106 106   CO2 27 26 27   BUN 16 15 16   CREATININE 0.72 0.69 0.71         Assessment  1. Acute hypoxic and hypercapnic respiratory failure, improved on 4 L O2  2.  E. coli bacteremia with UTI with septic shock  3. Thrombocytopenia  4. Bibasilar atelectasis versus pneumonia  5. Acute kidney injury improving  6. Dysphagia status post PEG tube    Patient is on 43 L O2 via nasal cannula O2 saturation 98%. Discussed with daughter at bedside. She is on ampicillin. Antibiotic as per ID. She had PEG tube placed. Continue present treatment plan.      SIGNATURE: Rolf Nava MD, [facility]

## 2020-11-05 NOTE — OP NOTE
Krystle Watts [address]                      [address], [address] [facility]                                OPERATIVE REPORT    PATIENT NAME: Blaire Page                 :        1928  MED REC NO:   [id number]                            ROOM:       W179  ACCOUNT NO:   [de-identified]                           ADMIT DATE: 10/27/2020  PROVIDER:     Abhishek Frazier MD    DATE OF PROCEDURE:  2020    PREOPERATIVE DIAGNOSIS:  Inability to eat. POSTOPERATIVE DIAGNOSIS:  Inability to eat. PROCEDURE:  1.  EGD. 2.  Percutaneous endoscopic gastrostomy tube. SURGEON:  Abhishek Frazier MD    ASSISTANT:  Mr. Abiodun Nunez. SEDATION:  MAC with local.    ESTIMATED BLOOD LOSS:  Minimal.    SPECIMENS:  None. COMPLICATIONS:  None. INDICATIONS:  A 80-year-old female with inability to eat. Risks and  benefits of EGD with PEG tube were explained to her daughters. They  wished to proceed. Consent obtained. Risks of infection, bleeding,  damage to the surrounding intestines all discussed. PROCEDURE:  She was taken to the endoscopy suite and placed in the  supine position. Sedation was given using MAC. Bite block was placed. Time-out was taken for appropriate verification. Gastroscopy showed normal esophagus down into the stomach and the  duodenum which was normal.    An adequate site on the anterior abdominal wall using transillumination  and pressure was identified. The area was prepped and draped with a  ChloraPrep-containing solution. The area was locally anesthetized. A Seldinger needle was inserted  directly into her stomach under direct visualization. A snare was used  to bring wire out transorally. The gastrostomy tube was attached to the  wire and brought out transabdominally. The tube was assembled properly. Repeat gastroscopy showed the tube in good position with no bleeding and  under no tension. The stomach was desufflated.   The remainder of stomach and esophagus  were normal.  The vocal cords were normal in appearance. The procedure ended at this time. Dressings were applied. Abdominal  binder was placed as well.         Jg Rico MD    D: 11/04/2020 14:47:39       T: 11/04/2020 14:54:13     [id number]  Job#: [id number]     Doc#: [id number]    CC:

## 2020-11-05 NOTE — CONSULTS
Palliative Care Consult Note  Patient: Chantale León  Gender: female  YOB: 1928  Unit/Bed: N993/A381-04  CodeStatus: Full Code  Inpatient Treatment Team: Treatment Team: Attending Provider: Blu De La O MD; Consulting Physician: Markus Valenzuela MD; Consulting Physician: Emily Walker MD; Consulting Physician: Pritesh Andrade MD; : Marah Hendricks, RN; Consulting Physician: Danie Marquez MD; Surgeon: Danie Marquez MD; Nursing Student: Mariana Villalpando; Patient Care Tech: Raji Laguna; : АННА Oliva; Utilization Reviewer: Christine Kat, RN; Registered Nurse: Jozef Cox RN; Registered Nurse: Mallorie Suazo RN  Admit Date:  10/27/2020    Chief Complaint: altered mental status      History of Presenting Illness:      Chantale León is a 80 y.o. female on hospital day 9 with a history of  FRED, gout,COPD, Obesity, HTN, anxiety. Patient presented to the E.D on 10/27 with complaints of tachycardia. She was confused ,agitated and developed labored breathing. Patient was intubated. CXR and CT scan of head done. Per daughter prior admission patient was alert and oriented x3, able to ambulate with walker and lived with daughter Nikky Raymond. CXR upon admission showed bilateral pneumonia. She was started on Vanco and Zosyn and blood culture and sputum culture was ordered -both came back positive for E.coli. Consulted I.D and atb was changed to ampicillin which she is currently on and at discharge to continue with amoxicillin 500 mg QIDx 2 weeks as per ID treatment plan      Patient presented alert today but not answering any questions. She was extubated on 11/1. ST eval done on 11/2 and patient is currently NPO. She is currently on TF via peg tube  for nutrition. Failed swallow eval.Currently on oxygen 4L via N/C. No fever, cough or sputum production. Patient is planned to be discharged to [facility].      Spoke with daughter in regards of palliative PRN Kinga Amaro MD        HYDROmorphone (DILAUDID) injection 0.5 mg  0.5 mg Intravenous Q10 Min PRN Kinga Amaro MD        ampicillin (OMNIPEN) 2 g in sodium chloride 0.9 % 100 mL IVPB  2 g Intravenous 4 times per day Kinga Amaro MD   Stopped at 11/05/20 1442    albuterol sulfate  (90 Base) MCG/ACT inhaler 1 puff  1 puff Inhalation Q6H PRN Kinga Amaro MD        atorvastatin (LIPITOR) tablet 20 mg  20 mg Oral Daily Kinga Amaro MD   20 mg at 11/05/20 1039    brimonidine (ALPHAGAN) 0.2 % ophthalmic solution 1 drop  1 drop Both Eyes BID Kinga Amaro MD   1 drop at 11/05/20 1044    losartan (COZAAR) tablet 25 mg  25 mg Oral Daily Kinga Amaro MD   25 mg at 11/05/20 1039    metoprolol tartrate (LOPRESSOR) tablet 12.5 mg  12.5 mg Oral BID Kinga Amaro MD   12.5 mg at 11/05/20 1039    pill splitter   Does not apply Once Kinga Amaro MD        dorzolamide (TRUSOPT) 2 % ophthalmic solution 1 drop  1 drop Both Eyes BID Kinga Amaro MD   1 drop at 11/05/20 1045    timolol (TIMOPTIC) 0.5 % ophthalmic solution 1 drop  1 drop Both Eyes BID Kinga Amaro MD   1 drop at 11/05/20 1045    enoxaparin (LOVENOX) injection 40 mg  40 mg Subcutaneous Daily Kinga Amaro MD   40 mg at 11/05/20 1039    chlorhexidine (PERIDEX) 0.12 % solution 15 mL  15 mL Mouth/Throat BID Kinga Amaro MD   15 mL at 11/05/20 1038    polyethylene glycol (GLYCOLAX) packet 17 g  17 g Oral Daily Kinga Amaro MD   Stopped at 11/02/20 0858    docusate (COLACE) 50 MG/5ML liquid 100 mg  100 mg Oral BID Kinga Amaro MD   100 mg at 11/04/20 1955    ipratropium-albuterol (DUONEB) nebulizer solution 1 ampule  1 ampule Inhalation Q4H PRN Kinga Amaro MD   1 ampule at 11/05/20 0421    sodium chloride flush 0.9 % injection 10 mL  10 mL Intravenous 2 times per day Kinga Amaro MD   10 mL at 11/05/20 1040    sodium chloride flush 0.9 % injection 10 mL  10 mL Intravenous PRN Kenya Coburn MD        sodium chloride flush 0.9 % injection 10 mL  10 mL Intravenous 2 times per day Kenya Coburn MD   10 mL at 11/05/20 1041    sodium chloride flush 0.9 % injection 10 mL  10 mL Intravenous PRN Kenya Coburn MD        acetaminophen (TYLENOL) tablet 650 mg  650 mg Oral Q6H PRN Kenya Coburn MD   650 mg at 10/30/20 2043    Or    acetaminophen (TYLENOL) suppository 650 mg  650 mg Rectal Q6H PRN Kenya Coburn MD        promethazine (PHENERGAN) tablet 12.5 mg  12.5 mg Oral Q6H PRN Kenya Coburn MD        Or    ondansetron [facility]) injection 4 mg  4 mg Intravenous Q6H PRN Kenya Coburn MD        potassium chloride (KLOR-CON M) extended release tablet 40 mEq  40 mEq Oral PRN Kenya Coburn MD        Or    potassium bicarb-citric acid (EFFER-K) effervescent tablet 40 mEq  40 mEq Oral PRN Kenya Coburn MD   40 mEq at 11/05/20 0102    Or    potassium chloride 10 mEq/100 mL IVPB (Peripheral Line)  10 mEq Intravenous PRN Kenya Coburn MD        magnesium sulfate 2 g in 50 mL IVPB premix  2 g Intravenous PRN Kenya Coburn MD        famotidine (PEPCID) injection 20 mg  20 mg Intravenous Daily Kenya Coburn MD   20 mg at 11/05/20 1038    metoprolol (LOPRESSOR) injection 5 mg  5 mg Intravenous Q6H PRN Kenya Coburn MD   5 mg at 10/29/20 0428       History: PMHx:  Past Medical History:   Diagnosis Date    Anxiety     Cancer ([facility] [address]     Glaucoma     Gout     left knee    Hypertension     Lung disease     Obesity (BMI 30-39. 9) 7/11/2019    Obstructive sleep apnea 7/11/2019       PSHx:  Past Surgical History:   Procedure Laterality Date    CATARACT REMOVAL WITH IMPLANT Bilateral 2016    COLON SURGERY  2009    polyps    COLONOSCOPY  02/2009    GASTROSTOMY TUBE PLACEMENT N/A 11/4/2020    EGD PEG TUBE PLACEMENT performed by Kenya Coburn MD at [place] [id number]  2002    graft from neck       Social Hx:  Social History     Socioeconomic History    Marital status:      Spouse name: None    Number of children: None    Years of education: None    Highest education level: None   Occupational History    None   Social Needs    Financial resource strain: None    Food insecurity     Worry: None     Inability: None    Transportation needs     Medical: None     Non-medical: None   Tobacco Use    Smoking status: Never Smoker    Smokeless tobacco: Never Used   Substance and Sexual Activity    Alcohol use: No    Drug use: No    Sexual activity: None   Lifestyle    Physical activity     Days per week: None     Minutes per session: None    Stress: None   Relationships    Social connections     Talks on phone: None     Gets together: None     Attends Qu[employer] service: None     Active member of club or organization: None     Attends meetings of clubs or organizations: None     Relationship status: None    Intimate partner violence     Fear of current or ex partner: None     Emotionally abused: None     Physically abused: None     Forced sexual activity: None   Other Topics Concern    None   Social History Narrative    None       Family Hx:  History reviewed. No pertinent family history.     LABS: Reviewed     CBC:  Lab Results   Component Value Date    WBC 8.7 11/05/2020    RBC 3.48 11/05/2020    HGB 10.2 11/05/2020    HCT 30.9 11/05/2020    MCV 88.9 11/05/2020    MCH 29.2 11/05/2020    MCHC 32.8 11/05/2020    RDW 14.4 11/05/2020     11/05/2020    MPV 11.6 06/17/2014     CBC with Differential:   Lab Results   Component Value Date    WBC 8.7 11/05/2020    RBC 3.48 11/05/2020    HGB 10.2 11/05/2020    HCT 30.9 11/05/2020     11/05/2020    MCV 88.9 11/05/2020    MCH 29.2 11/05/2020    MCHC 32.8 11/05/2020    RDW 14.4 11/05/2020    BANDSPCT 12 10/28/2020    METASPCT 1 11/05/2020    LYMPHOPCT 11.0 11/05/2020 MONOPCT 6.6 11/05/2020    BASOPCT 1.0 11/05/2020    MONOSABS 0.6 11/05/2020    LYMPHSABS 1.0 11/05/2020    EOSABS 0.1 11/05/2020    BASOSABS 0.1 11/05/2020     CMP:    Lab Results   Component Value Date     11/05/2020    K 3.9 11/05/2020     11/05/2020    CO2 27 11/05/2020    BUN 16 11/05/2020    CREATININE 0.71 11/05/2020    GFRAA >60.0 11/05/2020    LABGLOM >60.0 11/05/2020    GLUCOSE 119 11/05/2020    PROT 6.0 10/27/2020    LABALBU 3.8 10/27/2020    CALCIUM 8.5 11/05/2020    BILITOT 0.6 10/27/2020    ALKPHOS 148 10/27/2020    AST 21 10/27/2020    ALT 12 10/27/2020     BMP:    Lab Results   Component Value Date     11/05/2020    K 3.9 11/05/2020     11/05/2020    CO2 27 11/05/2020    BUN 16 11/05/2020    LABALBU 3.8 10/27/2020    CREATININE 0.71 11/05/2020    CALCIUM 8.5 11/05/2020    GFRAA >60.0 11/05/2020    LABGLOM >60.0 11/05/2020    GLUCOSE 119 11/05/2020     TSH:   Lab Results   Component Value Date    TSH 0.627 12/25/2018     Vitamin B12 and Folate: No components found for: FOLIC,  W18  Urinalysis:   Lab Results   Component Value Date    NITRU Negative 10/27/2020    WBCUA 10-20 10/27/2020    BACTERIA FEW 10/27/2020    RBCUA 6-10 10/27/2020    BLOODU MODERATE 10/27/2020    SPECGRAV 1.008 10/27/2020    GLUCOSEU Negative 10/27/2020           FUNCTIONAL ADL´S:     Independent: [  ] Eating, [   ] Dressing, [   ] Transferring, [   ] [place]ha Loop, [   ] Bathing, [   ] Continence  Dependent   : [  x] Eating, [   ] Dressing, [   ] Transferring, [   ] H[place]ha Loop, [   ] Virgle Keys, [ x  ] Continence  W. assistant : [  ] Eating, [  x ] Dressing, [  x ] Transferring, [  x ] Toileting, [ x  ] Bathing, [   ] Continence    Radiology: Reviewed      No results found. Assessment and plan:  1.  Acute respiratory failure d/t bilateral pneumonia/sepsis  -consulted pulmonology  -consulted I.D-treated with IV atb initially  -plan to be discharged on PO amoxicillin 500mg QID x 2 weeks-as per I.D.  2 SVT d/t sepsis  -consulted cardiology-treated with adenosine and Lopressor IV  -Echo -LV systolic function with EF 60%  3. Sepsis due to bacteremia-E.coli, UTI and CAP  -consulted I.D.  -WBC -52,000 on 10/28  -blood culture-E. Coli  -urine culture-E.coli  -CT abdomen pelvis  -ID consulted  -will be discharged on P.O atb x 2 weeks. 4. RICHARD associated with sepsis  -improved  5. Metabolic encephalopathy  -neuro consulted  -MRI ordered -no acute ischemia or intracranial process  6. Palliative care encounter  -discussed with daughter palliative care concept /philosophy  -patient to be discharged to [facility] today  -daughter in agreement for patient to be followed by palliative care as outpatient  -Advance Care Planning  Discussed goals of care with patient/daughter. Explained in extensive detail nuances between full code, DNR CCA and DNR CC. Patient has made the decision to be Full COde      -Goals of Care Discussion:  Disease process and goals of treatment were discussed in basic terms. We discussed the palliative care philosophy in light of those goals. We discussed all care options contingent on treatment response and QOL. Much active listening, presence, and emotional support were given.          Electronically signed by JULIO Reyes NP on 11/5/2020 at 3:54 PM

## 2020-11-05 NOTE — PROGRESS NOTES
Infectious Disease     Patient Name: Chilango Person  Date: 11/5/2020  YOB: 1928  Medical Record Number: [id number]            E. coli septic shock  Urinary tract source      Organism Abnormal    10/27/2020 11:55 PM  [address] [facility]    Escherichia coli    Blood Culture, Routine  10/27/2020 11:55 PM  [facility] for      Escherichia coli (2)     Antibiotic  Interpretation  DEREK  Status     ampicillin  Sensitive  <=2  mcg/mL      ceFAZolin  Sensitive  <=4  mcg/mL      ciprofloxacin  Sensitive  <=0.25  mcg/mL      gentamicin  Sensitive  <=1  mcg/mL      trimethoprim-sulfamethoxazole  Sensitive  <=20  mcg/mL             Review of Systems   Respiratory: Negative. Cardiovascular: Negative. Gastrointestinal: Negative. Physical Exam   Constitutional: No distress. Cardiovascular: Normal heart sounds. No murmur heard. Pulmonary/Chest: Effort normal and breath sounds normal. No respiratory distress. She has no wheezes. She has no rales. Abdominal: Soft. Bowel sounds are normal. She exhibits no distension. There is no abdominal tenderness. There is no rebound and no guarding. Skin: She is not diaphoretic. Blood pressure (!) 141/61, pulse 92, temperature 97.7 °F (36.5 °C), temperature source Oral, resp. rate 22, height 5' 3\" (1.6 m), weight 190 lb 11.2 oz (86.5 kg), SpO2 97 %, not currently breastfeeding.       .   Lab Results   Component Value Date    WBC 8.7 11/05/2020    HGB 10.2 (L) 11/05/2020    HCT 30.9 (L) 11/05/2020    MCV 88.9 11/05/2020     11/05/2020     Lab Results   Component Value Date     11/05/2020    K 3.9 11/05/2020     11/05/2020    CO2 27 11/05/2020    BUN 16 11/05/2020    CREATININE 0.71 11/05/2020    GLUCOSE 119 11/05/2020    CALCIUM 8.5 11/05/2020                ASSESSMENT:  PLAN:    E. coli septic shock  Urinary tract source     ampicillin    At dc 2 weeks po amoxicillin 500mg qid

## 2020-11-05 NOTE — PROGRESS NOTES
recall of recent events, Decreased short term memory, Decreased long term memory(Based on inability to answer questions, and vague, nonsensical speech when talking)  Safety Judgement: Decreased awareness of need for safety, Decreased awareness of need for assistance  Problem Solving: Assistance required to generate solutions, Assistance required to correct errors made, Decreased awareness of errors, Assistance required to identify errors made, Assistance required to implement solutions  Insights: Not aware of deficits  Initiation: Requires cues for all  Sequencing: Requires cues for all  Cognition Comment: Significant increased processing time, decreased awareness of deficits.  Unable to state that she was soiled on therapist arrival      Treatment consisted of:  ADL Training    Assessment/Discharge Disposition:     Performance deficits / Impairments: Decreased functional mobility , Decreased ADL status, Decreased strength, Decreased endurance, Decreased balance, Decreased high-level IADLs, Decreased fine motor control, Decreased coordination, Decreased safe awareness, Decreased cognition  Prognosis: Good  Discharge Recommendations: Continue to assess pending progress  History: Pt's medical history is complex  Exam: Pt. has 10 performance deficits  Assistance / Modification: Pt. requires total A        Plan:  Continue OT per POC    Goals/Plan:    Improve ADL [place]    Minutes:    OT Individual Minutes  Time In: 4183  Time Out: 9602  Minutes: 8    ADL trainin minutes    Electronically signed by:    Elham Harden  2020, 9:52 AM     Electronically signed by LUIS Ramsey on 20 at 9:58 AM EST

## 2020-11-06 VITALS
HEIGHT: 63 IN | WEIGHT: 190.7 LBS | HEART RATE: 85 BPM | RESPIRATION RATE: 20 BRPM | SYSTOLIC BLOOD PRESSURE: 154 MMHG | OXYGEN SATURATION: 95 % | DIASTOLIC BLOOD PRESSURE: 99 MMHG | BODY MASS INDEX: 33.79 KG/M2 | TEMPERATURE: 98.3 F

## 2020-11-06 LAB
ANION GAP SERPL CALCULATED.3IONS-SCNC: 8 MEQ/L (ref 9–15)
BASOPHILS ABSOLUTE: 0.1 K/UL (ref 0–0.2)
BASOPHILS RELATIVE PERCENT: 1.4 %
BUN BLDV-MCNC: 16 MG/DL (ref 8–23)
CALCIUM SERPL-MCNC: 8.2 MG/DL (ref 8.5–9.9)
CHLORIDE BLD-SCNC: 108 MEQ/L (ref 95–107)
CO2: 28 MEQ/L (ref 20–31)
CREAT SERPL-MCNC: 0.76 MG/DL (ref 0.5–0.9)
EOSINOPHILS ABSOLUTE: 0.2 K/UL (ref 0–0.7)
EOSINOPHILS RELATIVE PERCENT: 2.3 %
GFR AFRICAN AMERICAN: >60
GFR NON-AFRICAN AMERICAN: >60
GLUCOSE BLD-MCNC: 121 MG/DL (ref 70–99)
GLUCOSE BLD-MCNC: 125 MG/DL (ref 60–115)
GLUCOSE BLD-MCNC: 130 MG/DL (ref 60–115)
GLUCOSE BLD-MCNC: 130 MG/DL (ref 60–115)
HCT VFR BLD CALC: 31.5 % (ref 37–47)
HEMOGLOBIN: 10.6 G/DL (ref 12–16)
LYMPHOCYTES ABSOLUTE: 1.1 K/UL (ref 1–4.8)
LYMPHOCYTES RELATIVE PERCENT: 11.4 %
MAGNESIUM: 2.3 MG/DL (ref 1.7–2.4)
MCH RBC QN AUTO: 29.9 PG (ref 27–31.3)
MCHC RBC AUTO-ENTMCNC: 33.4 % (ref 33–37)
MCV RBC AUTO: 89.3 FL (ref 82–100)
MONOCYTES ABSOLUTE: 0.7 K/UL (ref 0.2–0.8)
MONOCYTES RELATIVE PERCENT: 7.7 %
NEUTROPHILS ABSOLUTE: 7.2 K/UL (ref 1.4–6.5)
NEUTROPHILS RELATIVE PERCENT: 77.2 %
PDW BLD-RTO: 14.2 % (ref 11.5–14.5)
PERFORMED ON: ABNORMAL
PLATELET # BLD: 278 K/UL (ref 130–400)
POTASSIUM REFLEX MAGNESIUM: 4 MEQ/L (ref 3.4–4.9)
RBC # BLD: 3.53 M/UL (ref 4.2–5.4)
SARS-COV-2, NAAT: NOT DETECTED
SODIUM BLD-SCNC: 144 MEQ/L (ref 135–144)
WBC # BLD: 9.3 K/UL (ref 4.8–10.8)

## 2020-11-06 PROCEDURE — 92526 ORAL FUNCTION THERAPY: CPT

## 2020-11-06 PROCEDURE — 90686 IIV4 VACC NO PRSV 0.5 ML IM: CPT | Performed by: INTERNAL MEDICINE

## 2020-11-06 PROCEDURE — U0002 COVID-19 LAB TEST NON-CDC: HCPCS

## 2020-11-06 PROCEDURE — 6360000002 HC RX W HCPCS: Performed by: COLON & RECTAL SURGERY

## 2020-11-06 PROCEDURE — 85025 COMPLETE CBC W/AUTO DIFF WBC: CPT

## 2020-11-06 PROCEDURE — 6370000000 HC RX 637 (ALT 250 FOR IP): Performed by: INTERNAL MEDICINE

## 2020-11-06 PROCEDURE — 2500000003 HC RX 250 WO HCPCS: Performed by: COLON & RECTAL SURGERY

## 2020-11-06 PROCEDURE — G0008 ADMIN INFLUENZA VIRUS VAC: HCPCS | Performed by: INTERNAL MEDICINE

## 2020-11-06 PROCEDURE — 6360000002 HC RX W HCPCS: Performed by: INTERNAL MEDICINE

## 2020-11-06 PROCEDURE — 2580000003 HC RX 258: Performed by: COLON & RECTAL SURGERY

## 2020-11-06 PROCEDURE — 80048 BASIC METABOLIC PNL TOTAL CA: CPT

## 2020-11-06 PROCEDURE — 6370000000 HC RX 637 (ALT 250 FOR IP): Performed by: COLON & RECTAL SURGERY

## 2020-11-06 PROCEDURE — 97110 THERAPEUTIC EXERCISES: CPT

## 2020-11-06 PROCEDURE — 2580000003 HC RX 258: Performed by: INTERNAL MEDICINE

## 2020-11-06 PROCEDURE — 36592 COLLECT BLOOD FROM PICC: CPT

## 2020-11-06 PROCEDURE — 94640 AIRWAY INHALATION TREATMENT: CPT

## 2020-11-06 PROCEDURE — 83735 ASSAY OF MAGNESIUM: CPT

## 2020-11-06 PROCEDURE — 99232 SBSQ HOSP IP/OBS MODERATE 35: CPT | Performed by: INTERNAL MEDICINE

## 2020-11-06 PROCEDURE — 2700000000 HC OXYGEN THERAPY PER DAY

## 2020-11-06 PROCEDURE — 94761 N-INVAS EAR/PLS OXIMETRY MLT: CPT

## 2020-11-06 RX ORDER — ACYCLOVIR 200 MG/5ML
200 SUSPENSION ORAL EVERY 6 HOURS
Qty: 1 BOTTLE | Refills: 0 | Status: SHIPPED | OUTPATIENT
Start: 2020-11-06 | End: 2020-11-13

## 2020-11-06 RX ADMIN — CHLORHEXIDINE GLUCONATE 15 ML: 1.2 RINSE ORAL at 09:24

## 2020-11-06 RX ADMIN — INFLUENZA A VIRUS A/VICTORIA/2454/2019 IVR-207 (H1N1) ANTIGEN (PROPIOLACTONE INACTIVATED), INFLUENZA A VIRUS A/HONG KONG/2671/2019 IVR-208 (H3N2) ANTIGEN (PROPIOLACTONE INACTIVATED), INFLUENZA B VIRUS B/VICTORIA/705/2018 BVR-11 ANTIGEN (PROPIOLACTONE INACTIVATED), INFLUENZA B VIRUS B/PHUKET/3073/2013 BVR-1B ANTIGEN (PROPIOLACTONE INACTIVATED) 0.5 ML: 15; 15; 15; 15 INJECTION, SUSPENSION INTRAMUSCULAR at 15:41

## 2020-11-06 RX ADMIN — TIMOLOL MALEATE 1 DROP: 5 SOLUTION/ DROPS OPHTHALMIC at 09:27

## 2020-11-06 RX ADMIN — LOSARTAN POTASSIUM 25 MG: 50 TABLET, FILM COATED ORAL at 09:24

## 2020-11-06 RX ADMIN — AMPICILLIN SODIUM 2 G: 2 INJECTION, POWDER, FOR SOLUTION INTRAMUSCULAR; INTRAVENOUS at 13:12

## 2020-11-06 RX ADMIN — Medication 10 ML: at 09:25

## 2020-11-06 RX ADMIN — ACYCLOVIR 200 MG: 200 CAPSULE ORAL at 09:23

## 2020-11-06 RX ADMIN — DORZOLAMIDE HYDROCHLORIDE 1 DROP: 20 SOLUTION/ DROPS OPHTHALMIC at 09:27

## 2020-11-06 RX ADMIN — IPRATROPIUM BROMIDE AND ALBUTEROL SULFATE 1 AMPULE: .5; 3 SOLUTION RESPIRATORY (INHALATION) at 06:02

## 2020-11-06 RX ADMIN — BRIMONIDINE TARTRATE 1 DROP: 2 SOLUTION OPHTHALMIC at 09:27

## 2020-11-06 RX ADMIN — ENOXAPARIN SODIUM 40 MG: 40 INJECTION SUBCUTANEOUS at 09:24

## 2020-11-06 RX ADMIN — Medication 10 ML: at 09:26

## 2020-11-06 RX ADMIN — ACYCLOVIR 200 MG: 200 CAPSULE ORAL at 13:13

## 2020-11-06 RX ADMIN — AMPICILLIN SODIUM 2 G: 2 INJECTION, POWDER, FOR SOLUTION INTRAMUSCULAR; INTRAVENOUS at 05:57

## 2020-11-06 RX ADMIN — ATORVASTATIN CALCIUM 20 MG: 20 TABLET, FILM COATED ORAL at 09:24

## 2020-11-06 RX ADMIN — AMPICILLIN SODIUM 2 G: 2 INJECTION, POWDER, FOR SOLUTION INTRAMUSCULAR; INTRAVENOUS at 00:05

## 2020-11-06 RX ADMIN — METOPROLOL TARTRATE 12.5 MG: 25 TABLET, FILM COATED ORAL at 09:24

## 2020-11-06 RX ADMIN — FAMOTIDINE 20 MG: 10 INJECTION INTRAVENOUS at 09:24

## 2020-11-06 RX ADMIN — ASPIRIN 81 MG: 81 TABLET, CHEWABLE ORAL at 09:23

## 2020-11-06 ASSESSMENT — ENCOUNTER SYMPTOMS
RESPIRATORY NEGATIVE: 1
GASTROINTESTINAL NEGATIVE: 1

## 2020-11-06 ASSESSMENT — PAIN SCALES - GENERAL: PAINLEVEL_OUTOF10: 0

## 2020-11-06 NOTE — CARE COORDINATION
[facility], the patient is precerted for the SNF. The LSW updated the patient's RN and . Covid test was ordered. Electronically signed by АННА Tucker on 11/6/20 at 11:32 AM EST    The patient's daughter, Db Dickson, verbally consented to the IMM and expressed an understanding of the medicare appeal process. The patient's family is aware the patient will discharge to [facility] today. Electronically signed by АННА Tucker on 11/6/20 at 12:02 PM EST    The LSW set the discharge by cot (patient is on O2, new peg tube) at 3 pm to [facility]. The patient, her daughter Db Dickson, RN and [address] [place] [address], were all notified of the discharge time.  Electronically signed by АННА Tucker on 11/6/20 at 12:13 PM EST

## 2020-11-06 NOTE — PROGRESS NOTES
Physical Therapy Med Surg Daily Treatment Note  Facility/Department: [facility] TELEMETRY  Room: C7/V568-56       NAME: Rizwan Muller  : 1928 (73 y.o.)  MRN: [id number]  CODE STATUS: Full Code    Date of Service: 2020    Patient Diagnosis(es): SV[facility] (supraventricular tachycardia) ([facility] [I47.1]   No chief complaint on file. Patient Active Problem List    Diagnosis Date Noted    Pneumonia of both lower lobes due to infectious organism      Priority: High    Encephalopathy acute     Aphasia     Unable to eat     SVT (supraventricular tachycardia) ([facility] 10/27/2020    Chest pain 2020    Obesity (BMI 30-39.9) 2019    Obstructive sleep apnea 2019    Hypoxia 2019    COPD exacerbation (HCC)     COPD (chronic obstructive pulmonary disease) with acute bronchitis ([facility] 2018    Anxiety 2017    Essential hypertension         Past Medical History:   Diagnosis Date    Anxiety     Cancer ([facility]     Glaucoma     Gout     left knee    Hypertension     Lung disease     Obesity (BMI 30-39. 9) 2019    Obstructive sleep apnea 2019     Past Surgical History:   Procedure Laterality Date    CATARACT REMOVAL WITH IMPLANT Bilateral 2016    COLON SURGERY  2009    polyps    COLONOSCOPY  2009    GASTROSTOMY TUBE PLACEMENT N/A 2020    EGD PEG TUBE PLACEMENT performed by Elham Delcid MD at [address] [address]      graft from neck       Restrictions  Restrictions/Precautions: Fall Risk(NPO)    SUBJECTIVE   General  Chart Reviewed: Yes  Family / Caregiver Present: No  Subjective  Subjective: Patient resting in bed. Nursing present and reports patient is more alert this morning.     Pre-Session Pain Report  Pre Treatment Pain Screening  Pain at present: 0  Scale Used: Numeric Score  Intervention List: Patient able to continue with treatment  Pain Screening  Patient Currently in Pain: No       Post-Session Pain Report  Pain Assessment  Pain Assessment: Faces  Pain Level: 0         OBJECTIVE        Bed mobility  Comment: pt declines to complete. max encouragement given. Exercises  Quad Sets: x 10  Heelslides: x 10 AAROM  Hip Abduction: x 10 AAROM  Ankle Pumps: x 10  Comments: pt with poor effort on supine therex. Activity Tolerance  Activity Tolerance: Patient limited by fatigue;Patient limited by endurance; Patient limited by cognitive status          ASSESSMENT   Assessment: Pt requiring much encouragement and cueing to complete task. Pt declined any bed mobility this date. Discharge Recommendations:  Patient would benefit from continued therapy after discharge    Goals  Short term goals  Short term goal 1: Pt will demonstrate rolling R/L in bed mod indep with use of rails to allow for pressure relief to avoid wounds and for care. Short term goal 2: Pt will demonstrate sitting edge  of bed without % of the time with good steadiness to prepare pt for sit to stand transfers  Long term goals  Long term goal 1: Pt will demonstrate bed mobility SBA  Long term goal 2: Pt will demonstrate transfers SBA to allow for safe return home with family  Long term goal 3: Pt and pt's family with demonstrate indep with HEP for carryover to increased strength for return to [facility]. PLAN    Times per week: 3-6  Safety Devices  Type of devices: Bed alarm in place, Call light within reach, Left in bed     AMPAC (6 CLICK) BASIC MOBILITY  AM-PAC Inpatient Mobility Raw Score : 9      Therapy Time   Individual   Time In 1048   Time Out 1104   Minutes 16      Therex: [address], [place], 11/06/20 at 1:01 PM         Definitions for assistance levels  Independent = pt does not require any physical supervision or assistance from another person for activity completion. Device may be needed.   Stand by assistance = pt requires verbal cues or instructions from another person, close to but not touching, to perform the activity  Minimal assistance= pt performs 75% or more of the activity; assistance is required to complete the activity  Moderate assistance= pt performs 50% of the activity; assistance is required to complete the activity  Maximal assistance = pt performs 25% of the activity; assistance is required to complete the activity  Dependent = pt requires total physical assistance to accomplish the task

## 2020-11-06 NOTE — PROGRESS NOTES
Pulmonary Progress Note    PRIMARY SERVICE: Pulmonary Disease    INTERVAL HPI: Patient seen and examined at bedside, Interval Notes, orders reviewed. Nursing notes noted    Patient is comfortable in bed. Discussed with daughter at bedside patient is going to nursing home today. She is on 3 L O2 and O2 saturation is 95%. She is not having any fever. No cough or sputum production. No vomiting or diarrhea. Review of Systems   as above        Intake/Output Summary (Last 24 hours) at 11/6/2020 1539  Last data filed at 11/6/2020 0602  Gross per 24 hour   Intake 400 ml   Output 1025 ml   Net -625 ml       Vitals:  BP (!) 154/99   Pulse 85   Temp 98.3 °F (36.8 °C) (Oral)   Resp 20   Ht 5' 3\" (1.6 m)   Wt 190 lb 11.2 oz (86.5 kg)   LMP  (LMP Unknown)   SpO2 95%   BMI 33.78 kg/m²   EXAM:  General: Alert awake, comfortable in bed   head: Atraumatic ,Normocephalic   Eyes: PERRL. No sclera icterus. No conjunctival injection. No discharge   ENT: No nasal  discharge. Pharynx clear. Oral thrush+  Neck:  Trachea midline. No thyromegaly, no JVD, No cervical adenopathy. Resp : Normal effort,  No accessory muscle use. No Rales. Few scattered rhonchi  CV: Normal  rate. Regular rhythm. No mumur ,  Rub or gallop  ABD: Non-tender. Non-distended. No masses. Noorganmegaly. Normal bowel sounds. No hernia.   EXT: No Pitting, No Cyanosis No clubbing    ABG:     Lab Results   Component Value Date    PHART 7.413 11/01/2020    MMV7MNV 40 11/01/2020    PO2ART 82 11/01/2020    GUJ1HDK 25.5 11/01/2020    BEART 1 11/01/2020    N8DJJKFF 96 11/01/2020     Lab Results   Component Value Date    LACTA 1.0 12/25/2018     O2 Device: Nasal cannula  O2 Flow Rate (L/min): 4 L/min    MV Settings:     Vent Mode: (S) CPAP  Vt Ordered: 500 mL  Rate Set: 14 bmp  FiO2 : 40 %  PEEP/CPAP: (S) 5  Pressure Support: (S) 5 cmH20  Peak Inspiratory Pressure: 27 cmH2O  Mean Airway Pressure: 11 cmH20  I:E Ratio: 1:4.40    DIET TUBE FEED CONTINUOUS/CYCLIC NPO; STANDARD WITH FIBER; Gastrostomy; 20; 45; 24    IV:       Medications:  Scheduled Meds:   influenza virus vaccine  0.5 mL Intramuscular Prior to discharge    aspirin  81 mg Oral Daily    acyclovir  200 mg Oral 4x Daily    ampicillin IV  2 g Intravenous 4 times per day    atorvastatin  20 mg Oral Daily    brimonidine  1 drop Both Eyes BID    losartan  25 mg Oral Daily    metoprolol tartrate  12.5 mg Oral BID    pill splitter   Does not apply Once    dorzolamide  1 drop Both Eyes BID    timolol  1 drop Both Eyes BID    enoxaparin  40 mg Subcutaneous Daily    chlorhexidine  15 mL Mouth/Throat BID    polyethylene glycol  17 g Oral Daily    docusate  100 mg Oral BID    sodium chloride flush  10 mL Intravenous 2 times per day    sodium chloride flush  10 mL Intravenous 2 times per day    famotidine (PEPCID) injection  20 mg Intravenous Daily       PRN Meds:  fentanNYL, HYDROmorphone, albuterol sulfate HFA, ipratropium-albuterol, sodium chloride flush, sodium chloride flush, acetaminophen **OR** acetaminophen, promethazine **OR** ondansetron, potassium chloride **OR** potassium alternative oral replacement **OR** potassium chloride, magnesium sulfate, metoprolol        Radiology      Ct Abdomen Pelvis Wo Contrast Additional Contrast? Radiologist Recommendation    Result Date: 10/28/2020  EXAMINATION: CT ABDOMEN PELVIS WO CONTRAST HISTORY:  septic shock  COMPARISON: CT abdomen and pelvis from August 17, 2011 TECHNIQUE: Contiguous axial CT sections of the abdomen and pelvis. No IV contrast administered. Unenhanced imaging is limited for the evaluation of some intra-abdominal and pelvic pathologies. All CT scans at this facility use dose modulation, iterative reconstruction, and/or weight based dosing when appropriate to reduce radiation dose to as low as reasonably achievable. FINDINGS  Lung bases: There is atelectasis and consolidation in the visualized portions of the lung bases.  The heart and pericardium are unremarkable. There is no pericardial effusion. Liver: The liver is normal in size and attenuation without  focal lesions. There are no focal solid or cystic lesions. There is no intra or extrahepatic bile duct dilatation. Gallbladder: There are numerous calcified stones layering to the dependent portion of the gallbladder without surrounding inflammatory changes. There is distention of the gallbladder. Spleen: There are no focal lesions or calcifications in the spleen. There is no splenomegaly  Pancreas: The pancreas is normal in size and attenuation without focal lesions or dilatation of the pancreatic duct. Kidneys: There are no solid or cystic lesions. There is no hydronephrosis. There is a 9 mm calcified stone in the inferior pole of the right kidney. There is no hydroureter. Adrenal glands are negative. Bowel: There is no bowel dilatation or evidence of diverticulitis. Appendix: There  is no CT evidence for appendicitis. Nodes: No lymphadenopathy. Aorta: No aneurysm  Peritoneum: No free fluid or free air. Pelvis: There are no solid or cystic lesions. There is a Cedeno catheter in place. Abdominal wall: The abdominal wall is intact. Bones : There are no acute osseous changes. Soft tissues: The soft tissues are unremarkable. No acute intra-abdominal changes. There is no free air or free fluid, there are no fluid collections. The visualized portions of the lung bases demonstrate bilateral atelectasis and consolidations in the dependent portions. There are calcified stones in the gallbladder without evidence of cholecystitis There is a 9 mm right intrarenal stone in the inferior pole, there is no hydronephrosis or hydroureter on either side. Ct Head Wo Contrast    Result Date: 10/27/2020  CT Brain Contrast medium:  Not utilized. History: Altered mental status. Tachycardia. Comparison:  None. Findings: Extra-axial spaces:  Normal. Intracranial hemorrhage:  None.  Ventricular system: Ventricles mildly enlarged. Sulci mildly prominent Basal Cisterns:  Normal. Cerebral Parenchyma: Bilateral symmetric periventricular areas decreased attenuation. Midline Shift:  None. Cerebellum:  Normal. Paranasal sinuses and mastoid air cells:  Normal. Visualized Orbits: Remote bilateral ocular surgery. Impression: No acute findings. Mild cerebral atrophy. Chronic ischemic white matter disease. All CT scans at this facility use dose modulation, iterative reconstruction, and/or weight based dosing when appropriate to reduce radiation dose to as low as reasonably achievable. Cta Chest W Wo Contrast    Result Date: 10/27/2020  CTA CHEST W WO CONTRAST: 10/27/2020 CLINICAL HISTORY:  R/o PE, SOB, tachycardia . COMPARISON: 12/26/2018. Spiral enhanced images were obtained of the chest during the infusion of approximately 100 mL of Isovue 370 contrast with pulmonary artery  CTA protocol. Routine and volume rendered images were obtained on a 3-dimensional workstation. All CT scans at this facility use dose modulation, iterative reconstruction, and/or weight based dosing when appropriate to reduce radiation dose to as low as reasonably achievable. FINDINGS: An endotracheal tube is noted with its tip approximately 2 cm superior to the devin. There are no filling defects identified within the pulmonary arterial vasculature to suggest pulmonary emboli. The thoracic aorta is tortuous, and ectatic with mild calcific atherosclerotic plaquing of the arch. There is no dissection. The heart is mildly enlarged. Moderate coronary artery calcifications are present. Moderate bibasilar consolidation/atelectasis is noted of the posterior mid to lower lung fields, worsening inferiorly. There are no worrisome nodules, lymphadenopathy, pleural or pericardial effusions identified. A small calcified granuloma within the inferolateral aspect of the left upper lobe appears unchanged.  A minimal compression deformity of the superior endplate of A88 and mild vertebral body wedging of the mid thoracic levels appears chronic. There are no acute fractures identified. Mild to moderate degenerative changes of the thoracic spine are noted. The limited images of the upper abdomen are noncontributory. ENDOTRACHEAL TUBE IN EXPECTED POSITION. MODERATE CONSOLIDATION/ATELECTASIS OF THE POSTERIOR MID TO LOWER LUNG SANZ, SUGGESTIVE OF BRONCHOPNEUMONIA AND/OR ASPIRATION. NO EVIDENCE OF PULMONARY EMBOLI OR OTHER SIGNIFICANT CHANGE FROM 12/26/2018 IDENTIFIED. Xr Chest Portable    Result Date: 10/30/2020  Exam: XR CHEST PORTABLE History: Respiratory failure Technique: AP portable view of the chest obtained. Comparison: Portable chest radiograph and CT chest from October 27, 2020 Findings: Suboptimal inspiration. Right upper extremity PICC is present and terminates in the SVC. Endotracheal tube and enteric tube remain. Atherosclerotic calcification of the thoracic aorta. Heart size is at the upper limits of normal. No significant interval change in bilateral infiltrate and/or atelectasis given suboptimal inspiration. No acute osseous abnormality. No significant interval change in bibasilar infiltrate and/or atelectasis. Xr Chest Portable    Result Date: 10/28/2020  EXAMINATION: XR CHEST PORTABLE CLINICAL HISTORY: CENTRAL LINE COMPARISONS: OCTOBER 27, 2020, AT 1700 HOURS FINDINGS: Endotracheal tube 2.2 cm superior to devin. Nasogastric tube courses beneath diaphragm. Osseous structures intact. Cardiopericardial silhouette normal. Blunting left costophrenic angle, mildly increased since prior study. Ill-defined area increased opacity left lung base. MILD INTERVAL INCREASE LEFT PLEURAL EFFUSION, WITH LEFT LOWER LUNG ATELECTASIS/PNEUMONIA.     Xr Chest Portable    Result Date: 10/27/2020  EXAMINATION: CHEST PORTABLE VIEW-1700 hours  CLINICAL HISTORY: Intubation COMPARISONS: October 27, 2020 1431 hours  FINDINGS: Single  views of the chest is submitted. Interval endotracheal intubation since the prior examination. The tip is at the level of the clavicles. The cardiac silhouette enlarged unchanged configuration. . Pulmonary vascular mildly congested. Right sided trachea. Left lower lobe infiltrate consolidation left pleural effusion. Atelectasis, infiltrate right lower lobe Blunting of the right costophrenic angle suggesting trace right pleural effusion. No Pneumothoraces. ATELECTASIS, INFILTRATE RIGHT LOWER LOBE. TRACE RIGHT PLEURAL EFFUSION. LEFT LOWER LOBE INFILTRATE CONSOLIDATION LEFT PLEURAL EFFUSION. Xr Chest Portable    Result Date: 10/27/2020  EXAMINATION: CHEST PORTABLE MHFQ-1713 hours  CLINICAL HISTORY: Short of breath COMPARISONS: July 8, 2020  FINDINGS: Single  views of the chest is submitted. The cardiac silhouette is of normal size configuration. Pulmonary vascular congested. The trachea remains deviated to the right. Left lower lobe infiltrate consolidation and small left pleural effusion. .  No Pneumothoraces. LEFT LOWER LOBE INFILTRATE CONSOLIDATION AND SMALL LEFT PLEURAL EFFUSION. .. Ir Picc Wo Sq Port/pump > 5 Years    Result Date: 10/29/2020  PERIPHERALLY INSERTED CENTRAL CATHETER (PICC) PLACEMENT WITH ULTRASOUND GUIDANCE CLINICAL HISTORY:  REQUIRES PROLONGED INTRAVENOUS ANTIBIOTICS FOR PNEUMONIA. After discussing the procedure and possible complications with the patient, informed consent was obtained. The patient was placed on the Special Procedures table. The right upper extremity was sterilely prepared using a maximal sterile barrier technique which includes cap, mask, sterile gown, sterile gloves, sterile full-body drape, hand hygiene and 2% chlorhexidine for cutaneous antisepsis.  A sterile ultrasound technique with sterile gel and sterile probe covers was also utilized. A pre-procedure time out was performed in order to assure the correct patient and procedure. Local anesthetic was administered. A peripheral vein was accessed with sonographic guidance. A sonographic spot image was obtained for documentation. A guidewire was advanced into the vein with fluoroscopic guidance and a sheath was placed over the guidewire. A 5-Mexican triple-lumen PICC was advanced through the sheath, up the arm and into the central vasculature. It was positioned appropriately. The  sheath was removed. The catheter was shown to aspirate and infuse properly. The flange of the catheter was affixed to the arm using a PICC securement device. A spot image of the chest showed the tip of the PICC line to lie in the superior vena cava. The patient tolerated the procedure well and without complications. Number of films: 4 Fluoroscopy time: 10.9 seconds CONCLUSION: SUCCESSFUL RIGHT PICC PLACEMENT WITHOUT IMMEDIATE COMPLICATIONS. Us Carotid Artery Bilateral    Result Date: 10/28/2020  History:  Arterial access during central line placement Technique:  US CAROTID ARTERY BILATERAL Comparison: No prior Findings: No pseudoaneurysm seen. Doppler findings: ARTERIAL BLOOD FLOW VELOCITY RIGHT PS                                               Prox CCA 77cm/s             Mid CCA 80cm/s              Dist CCA 61cm/s              Prox ICA cm/s              Mid ICA cm/s              Dist ICA cm/s              Prox ECA cm/s              Dist VERT cm/s              Bulb ICA/CCA LEFT PS Prox CCA 133cm/s              Mid CCA 103cm/s              Dist CCA 107cm/s              Prox ICA 75cm/s              Mid ICA 91cm/s              Dist ICA 90cm/s              Prox ECA 171cm/s              Prox VERT 53cm/s              Bulb ICA/CCA 0.8     Impression: 1. The patient is on a ventilator and the examination is limited.  The right external carotid , internal carotid and vertebral arteries are not visualized. 2.  No hemodynamic significant stenosis seen on the left 3. No evidence of pseudoaneurysm Validated velocity measurements with angiographic measurements, velocity criteria are extrapolated from diameter data as defined by the Society of radiologist in ultrasound consensus conference radiology 2003; 229; 340-346. Results:  CBC:   Recent Labs     11/04/20  0602 11/05/20  0600 11/06/20  0555   WBC 8.2 8.7 9.3   HGB 10.4* 10.2* 10.6*   HCT 30.7* 30.9* 31.5*   MCV 88.3 88.9 89.3    222 278     :   Recent Labs     11/04/20  0602 11/05/20  0600 11/06/20  0559    145* 144   K 3.3* 3.9 4.0    106 108*   CO2 26 27 28   BUN 15 16 16   CREATININE 0.69 0.71 0.76         Assessment  1. Acute hypoxic and hypercapnic respiratory failure, improved on 4 L O2  2.  E. coli bacteremia with UTI with septic shock  3. Thrombocytopenia  4. Bibasilar atelectasis versus pneumonia  5. Acute kidney injury improving  6. Dysphagia status post PEG tube    Discussed with daughter at bedside. She is is on 4 L O2 via nasal cannula O2 saturation 98%. For dysphagia she has a PEG tube placed. She is on tube feeding. Antibiotic as per ID. She is going to nursing home today.    SIGNATURE: Onesimo Wellington MD, [facility]

## 2020-11-06 NOTE — PROGRESS NOTES
Progress Note  Date:2020       LNYX:[id number]  Patient Name:Trudy Aquino     Date of Birth:[birth year]     Age:89 y.o. Patient was seen and evaluated. Alert, but minimally verbal. Spoke with nursing about the plan, mental status has improved compared to before   ROS: could not be obtained bc of acuity of medical condition  Subjective    Subjective     Review of Systems    Objective         Vitals Last 24 Hours:  TEMPERATURE:  Temp  Av.5 °F (36.9 °C)  Min: 98.3 °F (36.8 °C)  Max: 98.6 °F (37 °C)  RESPIRATIONS RANGE: Resp  Av.5  Min: 20  Max: 21  PULSE OXIMETRY RANGE: SpO2  Av.7 %  Min: 95 %  Max: 97 %  PULSE RANGE: Pulse  Av  Min: 88  Max: 90  BLOOD PRESSURE RANGE: Systolic (25PRH), YOS:754 , Min:151 , NRO:628   ; Diastolic (77AAY), EQO:91, Min:56, Max:99    I/O (24Hr): Intake/Output Summary (Last 24 hours) at 2020 1010  Last data filed at 2020 0602  Gross per 24 hour   Intake 400 ml   Output 1025 ml   Net -625 ml     Objective:  General Appearance:  Ill-appearing. Vital signs: (most recent): Blood pressure (!) 154/99, pulse 88, temperature 98.3 °F (36.8 °C), temperature source Oral, resp. rate 20, height 5' 3\" (1.6 m), weight 190 lb 11.2 oz (86.5 kg), SpO2 95 %, not currently breastfeeding. HEENT: Normal HEENT exam.    Lungs:  (Decrease BS)  Heart: S1 normal and S2 normal.    Abdomen: Abdomen is soft. Bowel sounds are normal.     Extremities: There is no deformity. (+ trace edema)  Pulses: Distal pulses are intact. Neurological: Patient is alert. Skin:  Warm and dry.       Labs/Imaging/Diagnostics    Labs:  CBC:  Recent Labs     20  0602 20  0600 20  0555   WBC 8.2 8.7 9.3   RBC 3.48* 3.48* 3.53*   HGB 10.4* 10.2* 10.6*   HCT 30.7* 30.9* 31.5*   MCV 88.3 88.9 89.3   RDW 14.0 14.4 14.2    222 278     CHEMISTRIES:  Recent Labs     20  0602 20  0600 20  0559    145* 144   K 3.3* 3.9 4.0    106 108*   CO2 26 27 28   BUN 15 16 16   CREATININE 0.69 0.71 0.76   GLUCOSE 149* 119* 121*   MG 2.3 2.3 2.3     PT/INR:No results for input(s): PROTIME, INR in the last 72 hours. APTT:No results for input(s): APTT in the last 72 hours. LIVER PROFILE:No results for input(s): AST, ALT, BILIDIR, BILITOT, ALKPHOS in the last 72 hours. Imaging Last 24 Hours:  Xr Chest Portable    Result Date: 10/31/2020  Exam: XR CHEST PORTABLE History:  Bibasilar pneumonia Technique: AP portable view of the chest obtained. Comparison: Chest x-ray from October 30, 2020 and chest CT from October 27, 2020 Findings: There is an NG tube coursing towards the stomach. The patient remains intubated, and there is dilatation of the trachea towards the right side by the ectatic aorta known from the CT angiogram from October 27, 2020. The cardiomediastinal silhouette is within normal limits. There are mild increased markings throughout both lungs without areas of consolidation likely due to chronic interstitial pulmonary disease. There are small bilateral pleural effusions. Bones of the thorax appear intact. There are small bilateral pleural effusions and likely underlying atelectasis versus infiltrate.      Assessment//Plan           Hospital Problems           Last Modified POA    Pneumonia of both lower lobes due to infectious organism 10/28/2020 Yes    SVT (supraventricular tachycardia) ([address] 10/27/2020 Yes    Unable to eat 11/4/2020 Yes    Encephalopathy acute 11/5/2020 Yes    Aphasia 11/5/2020 Yes        Assessment & Plan    1) acute respiratory failure due to b/l PNA and sepsis  2) severe sepsis due tto UTI  abx change to po    3) encephalopathy metabolic  Neuro eval  4) HTN stable  5) dysphagia  Failed swallow  S/p peg tube  6) SVT   Resolved  Needs SNIF for rehab  MRI did not show acute changes  Awaiting for precert  Electronically signed by Tristan Hermosillo MD on 11/2/20 at 11:00 AM EST

## 2020-11-06 NOTE — FLOWSHEET NOTE
1900 Assumed care of pt from [facility].    2010 Pt leaving floor via cart for MRI. 2117 Pt returned to floor from MRI. 2130 PM assessment completed. Assessment limited due to pt's inability to respond. 2314 PM medications completed.  All PO medications held as pt has not yet passes swallow eval.
1900 Assumed care of pt from [facility].    2009 PM assessment and medications completed. Lip moisturizer applied. Pt is resting quietly. Will continue to monitor.
1930 assumed care of pt, update received    2478-7959 assessment complete, see flowsheet. Remains on vent, easily awoken with hands on care, follows commands, does continue to appear anxious when awake but less so compared to last night. MP ST 140s. Trace pitting edema to legs and hands. LS dim, oral care done no secretions noted. abd s/nt bs +, OG to LIWS scant clear brown drainage. [place] CD yellow, appears clearer compared to this AM. Levo titrated per [facility]    perfectserve sent to Dr. Liisa Peabody re: persistent elevated HR after amio and lopressor this afternoon, orders received. IV bolus hung and dig given, HR dropped to 120s ST.    2025 received call from lab of critical result + BC, message sent to Dr. Herminio Damon, aware. 2200 repositioned, MP remains ST 120s. Weaning levo as able. UO picking up, will continue to monitor. _Electronically signed by Riley Dugan RN on 10/28/2020 at 10:15 PM    [id number] no change from previous assessment. Complete bath and linen change done. Oral care completed. EKG done. Vss, MP remains ST 120s. I/O's done, UO picked up after IV bolus. 0200 repositioned, continuing to wean levo. Vss. _Electronically signed by Riley Dugan RN on 10/29/2020 at 2:49 AM     0340 propofol increased per [facility]. Will monitor. 0415 no change to assessment, labs drawn and sent, repositioned, oral care done.  Pt tolerated well. _Electronically signed by Riley Dugan RN on 10/29/2020 at 4:39 AM     [id number] I/o's done, HR 110s ST since metoprolol given at 0430. _Electronically signed by Riley Dugan RN on 10/29/2020 at 6:05 AM
2000 - Pt's PEG tube assessed for placement, no residual noted and given 30 ml water flush along with meds. Pt's assessment complete. Pt cannot form words but does groan. Pt does follow commands with arms and legs. 2200 - Pt's tube feed started at 20 mL/hr per orders. 0000 - Pt given 30 mL of free water flush. Pt is now attempting to get out of bed and states \"help me up\". Pt cannot state her name or where she is at. Pt's bed alarm active. 0100 - Pt's K replaced per orders for a K of 3.3.    0200 - 0 residual noted, pt's tube feed increased to 45 ml/hr which is the ordered goal rate. 0330 - Pt bathed, gown changed, complete bed change, mouth swabbed, lip moisturizer applied, diaper changed. Pt's heel mepalexes changed as well.    0400 - PT given 30 ml free water flush. 0 residual noted. Pt's mouth swabbed multiple times during shift and lip moisturizer also applied multiple times during shift. Pt was also turned multiple times during shift. Pt's morning labs drawn via PICC.
ASSUMED CARE OF PT. NO CHANGES FROM AM ASSESSMENT. ALARM ON.
PERFECT SERVED DR. BACA REG: COVID TEST .
Picc line and tele d/c'd ptient's daughter at bedside and aware of discharge to snf today.
Report given to Marylene Spice at [facility]. Patient D/C'd per [facility] to SNF.
Talita Denise RN on 10/28/2020 at 6:59 AM     [id number] report to Carter Carter RN. Received critical result of WBC 52. 1. repositioned and skin inspected. 0745 perfect serve to Dr. Sabas Galvan re: WBC/critical result.  _Electronically signed by Gerson Page RN on 10/28/2020 at 7:47 AM

## 2020-11-09 ENCOUNTER — OFFICE VISIT (OUTPATIENT)
Dept: GERIATRIC MEDICINE | Age: 85
End: 2020-11-09
Payer: MEDICARE

## 2020-11-09 PROCEDURE — 99305 1ST NF CARE MODERATE MDM 35: CPT | Performed by: INTERNAL MEDICINE

## 2020-11-14 ENCOUNTER — OFFICE VISIT (OUTPATIENT)
Dept: GERIATRIC MEDICINE | Age: 85
End: 2020-11-14
Payer: MEDICARE

## 2020-11-14 PROCEDURE — 99309 SBSQ NF CARE MODERATE MDM 30: CPT | Performed by: NURSE PRACTITIONER

## 2020-11-24 ENCOUNTER — OFFICE VISIT (OUTPATIENT)
Dept: GERIATRIC MEDICINE | Age: 85
End: 2020-11-24
Payer: MEDICARE

## 2020-11-24 PROCEDURE — 99316 NF DSCHRG MGMT 30 MIN+: CPT | Performed by: NURSE PRACTITIONER

## 2020-12-02 ENCOUNTER — TELEPHONE (OUTPATIENT)
Dept: FAMILY MEDICINE CLINIC | Age: 85
End: 2020-12-02

## 2020-12-02 PROBLEM — R09.02 HYPOXIA: Status: RESOLVED | Noted: 2019-05-22 | Resolved: 2020-12-02

## 2020-12-02 PROBLEM — I47.1 SVT (SUPRAVENTRICULAR TACHYCARDIA) (HCC): Status: RESOLVED | Noted: 2020-10-27 | Resolved: 2020-12-02

## 2020-12-02 PROBLEM — I47.10 SVT (SUPRAVENTRICULAR TACHYCARDIA): Status: RESOLVED | Noted: 2020-10-27 | Resolved: 2020-12-02

## 2020-12-02 NOTE — TELEPHONE ENCOUNTER
Venus from [facility] called office stating pt will be d/c from [facility].   Asking Dr Brayden Barrientos to follow for skilled nursing home care, PT and OT and an aid.    conf future vv scheduled 12/8/2020

## 2020-12-02 NOTE — PROGRESS NOTES
[address]      Dashawn Potts  is a 80 y.o. in the NF being seen for a f/u of   Chief Complaint   Patient presents with    Other     rehab discharge PNA< UTI resp failure       11/24/2020    HPI patient was here for PT OT rehabilitation with a PEG tube after she had a hospitalization was found to be septic she had a UTI and pneumonia and she had to be intubated while hospitalized. Initially with SVT, found to be septic. Was intubated in the ED. Is being treated for UTI and pneumonia. Known UTI resolved she did tolerate the antibiotics well without any superinfection she was on a tube feed via her PEG her PEG is still in and she does not pull on it or try to remove it she is now on a puréed diet nectar thick liquids she is up with a walker with assist she is a very heavy assist with her transfers and she is using a wheelchair for most of her locomotion  The daughters are bringing her home under their care and she will have home health care she will need PEG care until the PEG is removed if she does maintain her weight for several months there is no reason to leave the PEG tube and    Past Medical History:   Diagnosis Date    Anxiety     Cancer ([facility]     Glaucoma     Gout     left knee    Hypertension     Lung disease     Obesity (BMI 30-39. 9) 7/11/2019    Obstructive sleep apnea 7/11/2019     Past Surgical History:   Procedure Laterality Date    CATARACT REMOVAL WITH IMPLANT Bilateral 2016    COLON SURGERY  2009    polyps    COLONOSCOPY  02/2009    GASTROSTOMY TUBE PLACEMENT N/A 11/4/2020    EGD [facility]    graft from neck     No family history on file. Social History     Socioeconomic History    Marital status:       Spouse name: Not on file    Number of children: Not on file    Years of education: Not on file    Highest education level: Not on file   Occupational History    Not on file   Social Needs    Financial resource strain: Not on file    Food insecurity     Worry: Not on file     Inability: Not on file    Transportation needs     Medical: Not on file     Non-medical: Not on file   Tobacco Use    Smoking status: Never Smoker    Smokeless tobacco: Never Used   Substance and Sexual Activity    Alcohol use: No    Drug use: No    Sexual activity: Not on file   Lifestyle    Physical activity     Days per week: Not on file     Minutes per session: Not on file    Stress: Not on file   Relationships    Social connections     Talks on phone: Not on file     Gets together: Not on file     Attends Latter-day service: Not on file     Active member of club or organization: Not on file     Attends meetings of clubs or organizations: Not on file     Relationship status: Not on file    Intimate partner violence     Fear of current or ex partner: Not on file     Emotionally abused: Not on file     Physically abused: Not on file     Forced sexual activity: Not on file   Other Topics Concern    Not on file   Social History Narrative    Not on file       Allergies: Norvasc [amlodipine besylate]  NF MEDICATIONS REVIEWED  Albuterol inhaler 2 puffs every 6 hours as needed for wheezing   aspirin 81 mg daily  Atorvastatin 20 mg daily  Brimonidine 0.15% 1 drop both eyes twice daily  Combivent 20/100 inhalation every 6 hours as needed for shortness of breath  Cosopt solution 22.3/6.81 drop both eyes twice daily  Latanoprost solution 0.005% 1 drop both eyes daily  Losartan 25 mg daily  Metoprolol 25 mg tablet half tablet twice daily    ROS:   Constitutional: There are no reports of behavioral issues, change in appetite, fever, or weakness. Respiratory: No SOB   Cardiovascular: No CP  GI: No N/V/D.  PEG remains patent for flush  : no reports of dysuria  Extremities: No reports of pain issues, no edema  Rehabilitation: Participated in rehabilitation and is able to perform ADLs, toilet self, obtain nutrition and remove self from home in an emergency. Requests home health therapy at discharge. Physical exam:   VSS  Blood pressure 132/78  Temperature 97.6 pulse 74  Respiratory rate 16  Weight 183 pounds  BUN 29  Creatinine 1.1  Hemoglobin 12  Hematocrit 36    Constitutional: Awake and alert sitting up in room. Cardiovascular: Regular rateregular  -c/r  Respiratory: LCTA  GI: abdomen NT ND, binder is up over the breast it was reapplied and nursing was instructed once again to make sure the binder stays over her belly or do not wear the binder  : no suprapubic tenderness  Extremities: no edema, DP pulses 2+/4  Mobility: is able to transfer in out of bed to chair with assistance    ASSESSMENT:     Diagnosis Orders   1. Unable to eat     2. COPD (chronic obstructive pulmonary disease) with acute bronchitis ([address]     3. Pneumonia of both lower lobes due to infectious organism     4. Sepsis secondary to UTI ([address]     5. SVT (supraventricular tachycardia) ([address]     6. Hypoxia     7. Encephalopathy acute         PLAN:   1. Her dysphagia is now improved a course she is extubated were not using the PEG much meds are still sometimes being used put down the tube but she is taking orals now and her medications should all be switched to orals and possibility of any extended release or time-released can be reconsidered consider taking the PEG out if she is stable weight for 3 months  2. & 3. Her COPD is stable at this time the exacerbation is over and her pneumonia has resolved  4. Resolved  5. Beta-blocker she has a good rate  6. Hypoxia has resolved  7. The acute encephalopathy has resolved but she does still have some underlying forgetfulness but she is quite high functioning she is conversational she follows commands well    Return PCP one week. Total time taken for discharging this patient: 35 minutes.   Time was taken to review chart, discuss plans with discharge planner, nursing, reconciling medications, discussing plan answering questions with patient. Pertinent POC, labs, have been reviewed  Discharge patient from facility when arrangements are made. Home Health Agency with PT/OT and Nursing if needed. Same medications and Send medications with patient. F/U with PCP. JULIO Hernandez-CNP  Referral to Home Health:   Documentation of Face to Face Encounter   Certification statement    Name: [address]: Craig Blake  Date: 2020                   : 1928    I completed a face-to-face encounter on 2020 with the above named patient. In this encounter a medical condition was addressed, which is the primary reason for home health care. Based on my findings, the following services are medically necessary (Check all the apply):    [x] Skilled Nursing [] Speech Language Pathology [x] Physical Therapy    [] MSW  [x] [address])  [x] Occupational Therapy    The following specific clinical findings (not the diagnosis) support the need for skilled services as selected above:    RN for medication management  Therapy for strengthening and increase in endurance to reduce general weakness and improve balance for ataxic gait. Nurse to teach family to manage PEG care. The follow findings support that this patient is homebound including the need for any assistance of others and he use of Assistive Devices - what makes leaving home a considerable and taxing effort:    Using wheel chair that causes leaving home taxing and needs assistance of one to leave home. Certification for [address]:  Based on the above findings, I certify that this patient meets the criteria for being homebound and has the skilled need for intermittent care as indicated above. The patient is under my care and I have intitated the request for the Plan of Care.   A physician and/or non-physician practitioner and collaborating physician will follow this patient and periodically review the POC.     Signature:      Date: 11/24/2020    Dr. Phyllis Mckeon collaborator        Adan Yip, DNP, MSN, RN, GNP-BC, NP-C  Adult/Rey Nurse Practitioner  [address], [facility]  Department of Geriatrics  8:30am-4:30pm   [phone]  After hours Answering service [phone]

## 2020-12-03 ENCOUNTER — NURSE TRIAGE (OUTPATIENT)
Dept: OTHER | Facility: CLINIC | Age: 85
End: 2020-12-03

## 2020-12-03 NOTE — TELEPHONE ENCOUNTER
Got home from her nursing home. Bilateral leg swelling from knee down with blisters. Red. Some are scabbing. Pus drainage. [place] Unsure how long this has been going on. Could not put shoes on. Left is worse then right. Reason for Disposition   Looks like a boil, infected sore, deep ulcer, or other infected rash (spreading redness, pus)    Answer Assessment - Initial Assessment Questions  1. ONSET: \"When did the swelling start? \" (e.g., minutes, hours, days)        Unsure    2. LOCATION: \"What part of the leg is swollen? \"  \"Are both legs swollen or just one leg? \"        Both     3. SEVERITY: \"How bad is the swelling? \" (e.g., localized; mild, moderate, severe)   - Localized - small area of swelling localized to one leg   - MILD pedal edema - swelling limited to foot and ankle, pitting edema < 1/4 inch (6 mm) deep, rest and elevation eliminate most or all swelling   - MODERATE edema - swelling of lower leg to knee, pitting edema > 1/4 inch (6 mm) deep, rest and elevation only partially reduce swelling   - SEVERE edema - swelling extends above knee, facial or hand swelling present         Moderate    4. REDNESS: \"Does the swelling look red or infected? \"        Yes    5. PAIN: \"Is the swelling painful to touch? \" If so, ask: \"How painful is it? \"   (Scale 1-10; mild, moderate or severe)        10/10    6. FEVER: \"Do you have a fever? \" If so, ask: \"What is it, how was it measured, and when did it start? \"         No    7. CAUSE: \"What do you think is causing the leg swelling? \"        Unsure    8. MEDICAL HISTORY: \"Do you have a history of heart failure, kidney disease, liver failure, or cancer? \"        No    9. RECURRENT SYMPTOM: \"Have you had leg swelling before? \" If so, ask: \"When was the last time? \" \"What happened that time? \"        Yes but not this bad. 10. OTHER SYMPTOMS: \"Do you have any other symptoms? \" (e.g., chest pain, difficulty breathing)          See above    11.  PREGNANCY: \"Is there any chance you are

## 2020-12-04 ENCOUNTER — VIRTUAL VISIT (OUTPATIENT)
Dept: FAMILY MEDICINE CLINIC | Age: 85
End: 2020-12-04
Payer: MEDICARE

## 2020-12-04 PROCEDURE — 99214 OFFICE O/P EST MOD 30 MIN: CPT | Performed by: NURSE PRACTITIONER

## 2020-12-04 RX ORDER — FUROSEMIDE 20 MG/1
20 TABLET ORAL DAILY
Qty: 4 TABLET | Refills: 0 | Status: SHIPPED | OUTPATIENT
Start: 2020-12-04 | End: 2021-01-08 | Stop reason: CLARIF

## 2020-12-04 RX ORDER — CEPHALEXIN 500 MG/1
500 CAPSULE ORAL 3 TIMES DAILY
Qty: 30 CAPSULE | Refills: 0 | Status: SHIPPED | OUTPATIENT
Start: 2020-12-04 | End: 2020-12-14

## 2020-12-04 ASSESSMENT — ENCOUNTER SYMPTOMS
SHORTNESS OF BREATH: 0
COLOR CHANGE: 1
WHEEZING: 0

## 2020-12-04 NOTE — PATIENT INSTRUCTIONS
Patient Education        Cellulitis: Care Instructions  Your Care Instructions     Cellulitis is a skin infection caused by bacteria, most often strep or staph. It often occurs after a break in the skin from a scrape, cut, bite, or puncture, or after a rash. Cellulitis may be treated without doing tests to find out what caused it. But your doctor may do tests, if needed, to look for a specific bacteria, like methicillin-resistant Staphylococcus aureus (MRSA). The doctor has checked you carefully, but problems can develop later. If you notice any problems or new symptoms, get medical treatment right away. Follow-up care is a key part of your treatment and safety. Be sure to make and go to all appointments, and call your doctor if you are having problems. It's also a good idea to know your test results and keep a list of the medicines you take. How can you care for yourself at home? · Take your antibiotics as directed. Do not stop taking them just because you feel better. You need to take the full course of antibiotics. · Prop up the infected area on pillows to reduce pain and swelling. Try to keep the area above the level of your heart as often as you can. · If your doctor told you how to care for your wound, follow your doctor's instructions. If you did not get instructions, follow this general advice:  ? Wash the wound with clean water 2 times a day. Don't use hydrogen peroxide or alcohol, which can slow healing. ? You may cover the wound with a thin layer of petroleum jelly, such as Vaseline, and a nonstick bandage. ? Apply more petroleum jelly and replace the bandage as needed. · Be safe with medicines. Take pain medicines exactly as directed. ? If the doctor gave you a prescription medicine for pain, take it as prescribed. ? If you are not taking a prescription pain medicine, ask your doctor if you can take an over-the-counter medicine.   To prevent cellulitis in the future  · Try to prevent cuts, scrapes, or other injuries to your skin. Cellulitis most often occurs where there is a break in the skin. · If you get a scrape, cut, mild burn, or bite, wash the wound with clean water as soon as you can to help avoid infection. Don't use hydrogen peroxide or alcohol, which can slow healing. · If you have swelling in your legs (edema), support stockings and good skin care may help prevent leg sores and cellulitis. · Take care of your feet, especially if you have diabetes or other conditions that increase the risk of infection. Wear shoes and socks. Do not go barefoot. If you have athlete's foot or other skin problems on your feet, talk to your doctor about how to treat them. When should you call for help? Call your doctor now or seek immediate medical care if:    · You have signs that your infection is getting worse, such as:  ? Increased pain, swelling, warmth, or redness. ? Red streaks leading from the area. ? Pus draining from the area. ? A fever.     · You get a rash. Watch closely for changes in your health, and be sure to contact your doctor if:    · You do not get better as expected. Where can you learn more? Go to https://[employer].Vupen. org and sign in to your eXelate account. Enter A044 in the [facility] box to learn more about \"Cellulitis: Care Instructions. \"     If you do not have an account, please click on the \"Sign Up Now\" link. Current as of: July 2, 2020               Content Version: 12.6  © 2006-2020 [employer]. Care instructions adapted under license by [facility] ([facility]). If you have questions about a medical condition or this instruction, always ask your healthcare professional. [place] [address] any warranty or liability for your use of this information. Patient Education        Leg and Ankle Edema: Care Instructions  Your Care Instructions  Swelling in the legs, ankles, and feet is called edema.  It is common after you sit or stand for a while. Long plane flights or car rides often cause swelling in the legs and feet. You may also have swelling if you have to stand for long periods of time at your job. Problems with the veins in the legs (varicose veins) and changes in hormones can also cause swelling. Sometimes the swelling in the ankles and feet is caused by a more serious problem, such as heart failure, infection, blood clots, or liver or kidney disease. Follow-up care is a key part of your treatment and safety. Be sure to make and go to all appointments, and call your doctor if you are having problems. It's also a good idea to know your test results and keep a list of the medicines you take. How can you care for yourself at home? · If your doctor gave you medicine, take it as prescribed. Call your doctor if you think you are having a problem with your medicine. · Whenever you are resting, raise your legs up. Try to keep the swollen area higher than the level of your heart. · Take breaks from standing or sitting in one position. ? Walk around to increase the blood flow in your lower legs. ? Move your feet and ankles often while you stand, or tighten and relax your leg muscles. · Wear support stockings. Put them on in the morning, before swelling gets worse. · Eat a balanced diet. Lose weight if you need to. · Limit the amount of salt (sodium) in your diet. Salt holds fluid in the body and may increase swelling. When should you call for help? Call 911 anytime you think you may need emergency care. For example, call if:    · You have symptoms of a blood clot in your lung (called a pulmonary embolism). These may include:  ? Sudden chest pain. ? Trouble breathing. ? Coughing up blood. Call your doctor now or seek immediate medical care if:    · You have signs of a blood clot, such as:  ? Pain in your calf, back of the knee, thigh, or groin. ?  Redness and swelling in your leg or groin.     · You have symptoms of infection, such as:  ? Increased pain, swelling, warmth, or redness. ? Red streaks or pus. ? A fever. Watch closely for changes in your health, and be sure to contact your doctor if:    · Your swelling is getting worse.     · You have new or worsening pain in your legs.     · You do not get better as expected. Where can you learn more? Go to https://[employer] Event B[employer]picH2i Technologieseb.Tytanium Ideas. org and sign in to your SocialCom account. Enter P639 in the [employer] box to learn more about \"Leg and Ankle Edema: Care Instructions. \"     If you do not have an account, please click on the \"Sign Up Now\" link. Current as of: June 26, 2019               Content Version: 12.6  © 0698-3394 [employer]. Care instructions adapted under license by [facility] ([facility]). If you have questions about a medical condition or this instruction, always ask your healthcare professional. [place] [address] any warranty or liability for your use of this information.

## 2020-12-04 NOTE — PROGRESS NOTES
on file   Relationships    Social connections     Talks on phone: Not on file     Gets together: Not on file     Attends Sabianist service: Not on file     Active member of club or organization: Not on file     Attends meetings of clubs or organizations: Not on file     Relationship status: Not on file    Intimate partner violence     Fear of current or ex partner: Not on file     Emotionally abused: Not on file     Physically abused: Not on file     Forced sexual activity: Not on file   Other Topics Concern    Not on file   Social History Narrative    Not on file     Current Outpatient Medications on File Prior to Visit   Medication Sig Dispense Refill    losartan (COZAAR) 25 MG tablet TAKE 1 TABLET BY MOUTH EVERY DAY 90 tablet 0    atorvastatin (LIPITOR) 20 MG tablet Take 20 mg by mouth daily      metoprolol tartrate (LOPRESSOR) 25 MG tablet Take 0.5 tablets by mouth 2 times daily 180 tablet 0    albuterol sulfate HFA (PROAIR HFA) 108 (90 Base) MCG/ACT inhaler Inhale 1 puff into the lungs every 6 hours as needed for Wheezing or Shortness of Breath 1 Inhaler 11    OXYGEN Inhale 2 L into the lungs Indications: PRN at bedtime      ipratropium-albuterol (DUONEB) 0.5-2.5 (3) MG/3ML SOLN nebulizer solution Inhale 3 mLs into the lungs every 6 hours as needed for Shortness of Breath 360 mL 5    aspirin 81 MG EC tablet Take 1 tablet by mouth daily 30 tablet 3    latanoprost (XALATAN) 0.005 % ophthalmic solution INSTILL 1 DROP INTO BOTH EYES EVERY DAY      dorzolamide-timolol (COSOPT) 22.3-6.8 MG/ML ophthalmic solution Use 1 Drop in both eyes every 12 hours.  brimonidine (ALPHAGAN P) 0.15 % ophthalmic solution INSTILL 1 DROP INTO BOTH EYES TWICE DAILY. 3     No current facility-administered medications on file prior to visit. Allergies:  Norvasc [amlodipine besylate]    Review of Systems   Constitutional: Negative for chills, fatigue and fever. HENT: Negative for congestion.     Respiratory: Negative for shortness of breath and wheezing. Cardiovascular: Positive for leg swelling. Negative for chest pain and palpitations. Skin: Positive for color change and rash. Negative for pallor and wound. Neurological: Negative for dizziness. Objective:   LMP  (LMP Unknown)     Physical Exam  Constitutional:       General: She is awake. She is not in acute distress. Appearance: Normal appearance. She is not ill-appearing or diaphoretic. HENT:      Head: Normocephalic. Right Ear: External ear normal.      Left Ear: External ear normal.      Nose: Nose normal.      Mouth/Throat:      Lips: Pink. Mouth: Mucous membranes are moist.   Pulmonary:      Effort: Pulmonary effort is normal. No respiratory distress. Skin:     Coloration: Skin is not ashen, cyanotic, jaundiced, mottled, pale or sallow. Findings: Erythema and rash present. Rash is vesicular. Neurological:      Mental Status: She is alert and oriented to person, place, and time. Psychiatric:         Mood and Affect: Mood normal.         Speech: Speech normal.         Behavior: Behavior normal. Behavior is cooperative. Assessment:          Diagnosis Orders   1. Cellulitis of left lower extremity  cephALEXin (KEFLEX) 500 MG capsule   2. Leg swelling  furosemide (LASIX) 20 MG tablet       Plan:      No orders of the defined types were placed in this encounter. Orders Placed This Encounter   Medications    cephALEXin (KEFLEX) 500 MG capsule     Sig: Take 1 capsule by mouth 3 times daily for 10 days     Dispense:  30 capsule     Refill:  0    furosemide (LASIX) 20 MG tablet     Sig: Take 1 tablet by mouth daily     Dispense:  4 tablet     Refill:  0       Return as scheduled with PCP. Encouraged keeping legs elevated. Medication as prescribed. Encouraged outlining erythematous area with marker so that we can tell if there is improvement.  Continue to monitor and f/u sooner if any worsening or ER if located at their home. Provider was located at their _X__ home or        ____ office. --JULIO Purdy CNP on 12/4/2020 at 9:53 AM    An electronic signature was used to authenticate this note.

## 2020-12-08 ENCOUNTER — VIRTUAL VISIT (OUTPATIENT)
Dept: FAMILY MEDICINE CLINIC | Age: 85
End: 2020-12-08
Payer: MEDICARE

## 2020-12-08 ENCOUNTER — TELEPHONE (OUTPATIENT)
Dept: FAMILY MEDICINE CLINIC | Age: 85
End: 2020-12-08

## 2020-12-08 VITALS
HEART RATE: 87 BPM | DIASTOLIC BLOOD PRESSURE: 74 MMHG | BODY MASS INDEX: 33.13 KG/M2 | WEIGHT: 187 LBS | SYSTOLIC BLOOD PRESSURE: 116 MMHG | TEMPERATURE: 98 F | RESPIRATION RATE: 18 BRPM

## 2020-12-08 PROCEDURE — 99214 OFFICE O/P EST MOD 30 MIN: CPT | Performed by: INTERNAL MEDICINE

## 2020-12-08 ASSESSMENT — ENCOUNTER SYMPTOMS
ABDOMINAL PAIN: 0
SHORTNESS OF BREATH: 0
EYE PAIN: 0
BACK PAIN: 0

## 2020-12-08 NOTE — TELEPHONE ENCOUNTER
Does vna have speech therapy  She needs eval about peg tube and dysphagia  Call Kimberley Croft [phone]

## 2020-12-08 NOTE — PROGRESS NOTES
Subjective:      Patient ID: Kartik Mclaughlin is a 80 y.o. female who presents today with:  No chief complaint on file. HPI   Hospital follow up  Admitted for pneumonia and psvt and sepsis  No longer has picc line. Developed cellulitis after which has improved with keflex and lasix  She has \"feeding tube\"  But they aren't using it. No right sided back pain  Urinating okay  No fevers, no chills  Breathing is good per patient  Legs are better. SVT AND paf  9 mm right kidney stone  Mild anemia    Past Medical History:   Diagnosis Date    Anxiety     Cancer ([facility] [address]     Glaucoma     Gout     left knee    Hypertension     Lung disease     Obesity (BMI 30-39. 9) 7/11/2019    Obstructive sleep apnea 7/11/2019     Past Surgical History:   Procedure Laterality Date    CATARACT REMOVAL WITH IMPLANT Bilateral 2016    COLON SURGERY  2009    polyps    COLONOSCOPY  02/2009    GASTROSTOMY TUBE PLACEMENT N/A 11/4/2020    EGD PEG TUBE PLACEMENT performed by Baudilio Walker MD at [facility]  2002    graft from neck     Social History     Socioeconomic History    Marital status:       Spouse name: Not on file    Number of children: Not on file    Years of education: Not on file    Highest education level: Not on file   Occupational History    Not on file   Social Needs    Financial resource strain: Not on file    Food insecurity     Worry: Not on file     Inability: Not on file    Transportation needs     Medical: Not on file     Non-medical: Not on file   Tobacco Use    Smoking status: Never Smoker    Smokeless tobacco: Never Used   Substance and Sexual Activity    Alcohol use: No    Drug use: No    Sexual activity: Not on file   Lifestyle    Physical activity     Days per week: Not on file     Minutes per session: Not on file    Stress: Not on file   Relationships    Social connections     Talks on phone: Not on file     Gets together: Not on file     Attends Congregational service: Not on file     Active member of club or organization: Not on file     Attends meetings of clubs or organizations: Not on file     Relationship status: Not on file    Intimate partner violence     Fear of current or ex partner: Not on file     Emotionally abused: Not on file     Physically abused: Not on file     Forced sexual activity: Not on file   Other Topics Concern    Not on file   Social History Narrative    Not on file     Allergies   Allergen Reactions    Norvasc [Amlodipine Besylate] Swelling     Current Outpatient Medications on File Prior to Visit   Medication Sig Dispense Refill    cephALEXin (KEFLEX) 500 MG capsule Take 1 capsule by mouth 3 times daily for 10 days 30 capsule 0    furosemide (LASIX) 20 MG tablet Take 1 tablet by mouth daily 4 tablet 0    losartan (COZAAR) 25 MG tablet TAKE 1 TABLET BY MOUTH EVERY DAY 90 tablet 0    atorvastatin (LIPITOR) 20 MG tablet Take 20 mg by mouth daily      metoprolol tartrate (LOPRESSOR) 25 MG tablet Take 0.5 tablets by mouth 2 times daily 180 tablet 0    albuterol sulfate HFA (PROAIR HFA) 108 (90 Base) MCG/ACT inhaler Inhale 1 puff into the lungs every 6 hours as needed for Wheezing or Shortness of Breath 1 Inhaler 11    OXYGEN Inhale 2 L into the lungs Indications: PRN at bedtime      ipratropium-albuterol (DUONEB) 0.5-2.5 (3) MG/3ML SOLN nebulizer solution Inhale 3 mLs into the lungs every 6 hours as needed for Shortness of Breath 360 mL 5    aspirin 81 MG EC tablet Take 1 tablet by mouth daily 30 tablet 3    latanoprost (XALATAN) 0.005 % ophthalmic solution INSTILL 1 DROP INTO BOTH EYES EVERY DAY      dorzolamide-timolol (COSOPT) 22.3-6.8 MG/ML ophthalmic solution Use 1 Drop in both eyes every 12 hours.  brimonidine (ALPHAGAN P) 0.15 % ophthalmic solution INSTILL 1 DROP INTO BOTH EYES TWICE DAILY. 3     No current facility-administered medications on file prior to visit.         I have personally reviewed the ROS, PMH, PFH, and social history     Review of Systems   Constitutional: Negative for chills and fever. HENT: Negative for congestion. Eyes: Negative for pain. Respiratory: Negative for shortness of breath. Cardiovascular: Negative for chest pain. Gastrointestinal: Negative for abdominal pain. Genitourinary: Negative for hematuria. Musculoskeletal: Negative for back pain. Allergic/Immunologic: Negative for immunocompromised state. Neurological: Negative for headaches. Psychiatric/Behavioral: Negative for hallucinations. Objective:   LMP  (LMP Unknown)     Physical Exam    Unable to perform given telephone visit. Assessment:       Diagnosis Orders   1. History of pneumonia  XR CHEST (2 VW)    C-Reactive Protein    Alkaline Phosphatase, Isoenzymes    Comprehensive Metabolic Panel    CBC Auto Differential   2. Dysphagia, unspecified type  External Referral to Speech Therapy    C-Reactive Protein    Alkaline Phosphatase, Isoenzymes    Comprehensive Metabolic Panel    CBC Auto Differential   3. History of UTI  Urine Reflex to Culture    C-Reactive Protein    Alkaline Phosphatase, Isoenzymes    Comprehensive Metabolic Panel    CBC Auto Differential   4. Hematuria, unspecified type  Urine Reflex to Culture    C-Reactive Protein    Alkaline Phosphatase, Isoenzymes    Comprehensive Metabolic Panel    CBC Auto Differential   5. Anemia, unspecified type  C-Reactive Protein    Alkaline Phosphatase, Isoenzymes    Comprehensive Metabolic Panel    CBC Auto Differential   6. Elevated serum creatinine  C-Reactive Protein    Alkaline Phosphatase, Isoenzymes    Comprehensive Metabolic Panel    CBC Auto Differential   7. Nephrolithiasis  Michele Tinoco MD, Urology, [place]   8. SVT (supraventricular tachycardia) ([facility] [address]  [facility] Aurea Martinez , Invasive Cardiology, [place]   9.  History of atrial fibrillation  [facility], [place], Invasive Cardiology, [place]         Plan:   Telephone visit done because of coronavirus pandemic. Time spent 17 MINUTES   explained billeable visit, patient understands and agrees to continue  I was at home and patient was at home during this visit. Keep using peg tube until speech therapy. F/u cxr risk of cancer. Use peg tube until told otherwise through speech therpay. See orders and TE. Urology referral.     Orders Placed This Encounter   Procedures    XR CHEST (2 VW)     Standing Status:   Future     Standing Expiration Date:   12/8/2021    Urine Reflex to Culture     Standing Status:   Future     Standing Expiration Date:   12/8/2021     Order Specific Question:   SPECIFY(EX-CATH,MIDSTREAM,CYSTO,ETC)?      Answer:   MID STREAM    C-Reactive Protein     Standing Status:   Future     Standing Expiration Date:   12/10/2021    Alkaline Phosphatase, Isoenzymes     Standing Status:   Future     Standing Expiration Date:   12/10/2021    Comprehensive Metabolic Panel     Standing Status:   Future     Standing Expiration Date:   12/10/2021    CBC Auto Differential     Standing Status:   Future     Standing Expiration Date:   12/10/2021    External Referral to Speech Therapy     Referral Priority:   Routine     Referral Type:   Eval and Treat     Referral Reason:   Specialty Services Required     Requested Specialty:   Speech Pathology     Number of Visits Requested:   Johanna Vitale MD, Urology, [facility]     Referral Priority:   Routine     Referral Type:   Eval and Treat     Referral Reason:   Specialty Services Required     Referred to Provider:   Alia Richter MD     Requested Specialty:   Urology     [facility]its Requested:   [facility] [place], [place]     Referral Priority:   Routine     Referral Type:   Eval and Treat     Referral Reason:   Specialty Services Required     Referred to Provider:   Ruslan Platt DO     Requested Specialty:   Cardiology     Number of Visits Requested:   1     No orders of the defined

## 2020-12-09 NOTE — PROGRESS NOTES
[facility]  [address] [facility]", [address]  11/14/2020    [address]  is a 80 y.o. in the [place] being seen for a f/u of   Chief Complaint   Patient presents with    Other     Tube feed and oral intake        HPI here for rehab, was intubated after resp failure in hospital, had to have TF for nuorishment, Is now taking po foods with ST and doing well, trying to wean off TF. Being seen for rehab stay     Past Medical History:   Diagnosis Date    Anxiety     Cancer ([address]     Glaucoma     Gout     left knee    Hypertension     Lung disease     Obesity (BMI 30-39. 9) 7/11/2019    Obstructive sleep apnea 7/11/2019     Past Surgical History:   Procedure Laterality Date    CATARACT REMOVAL WITH IMPLANT Bilateral 2016    COLON SURGERY  2009    polyps    COLONOSCOPY  02/2009    GASTROSTOMY TUBE PLACEMENT N/A 11/4/2020    EGD PEG TUBE PLACEMENT performed by Saranya Yap MD at [facility] [address]  2002    graft from neck     No family history on file. Social History     Socioeconomic History    Marital status:       Spouse name: Not on file    Number of children: Not on file    Years of education: Not on file    Highest education level: Not on file   Occupational History    Not on file   Social Needs    Financial resource strain: Not on file    Food insecurity     Worry: Not on file     Inability: Not on file    Transportation needs     Medical: Not on file     Non-medical: Not on file   Tobacco Use    Smoking status: Never Smoker    Smokeless tobacco: Never Used   Substance and Sexual Activity    Alcohol use: No    Drug use: No    Sexual activity: Not on file   Lifestyle    Physical activity     Days per week: Not on file     Minutes per session: Not on file    Stress: Not on file   Relationships    Social connections     Talks on phone: Not on file     Gets together: Not on file     Attends Judaism service: Not on file     Active improve. Pertinent POC, labs, have been reviewed, continue same. Encourage fluids and good nutrition. Stress fall prevention strategies.     Jenna Dodge, APRN-CNP      Jenna Dodge, DNP, MSN, RN, GNP-BC, NP-C  Adult/Rey Nurse Practitioner  [address], [place]  Department of Geriatrics  8:30am-4:30pm   [phone]  After hours Answering service [phone]

## 2020-12-10 ENCOUNTER — TELEPHONE (OUTPATIENT)
Dept: FAMILY MEDICINE CLINIC | Age: 85
End: 2020-12-10

## 2020-12-10 LAB
ANION GAP SERPL CALCULATED.3IONS-SCNC: 14 MEQ/L (ref 9–15)
BUN BLDV-MCNC: 15 MG/DL (ref 8–23)
CALCIUM SERPL-MCNC: 8.9 MG/DL (ref 8.5–9.9)
CHLORIDE BLD-SCNC: 107 MEQ/L (ref 95–107)
CO2: 23 MEQ/L (ref 20–31)
CREAT SERPL-MCNC: 0.92 MG/DL (ref 0.5–0.9)
GFR AFRICAN AMERICAN: >60
GFR NON-AFRICAN AMERICAN: 57
GLUCOSE BLD-MCNC: 107 MG/DL (ref 70–99)
POTASSIUM SERPL-SCNC: 3.8 MEQ/L (ref 3.4–4.9)
SODIUM BLD-SCNC: 144 MEQ/L (ref 135–144)

## 2020-12-10 NOTE — TELEPHONE ENCOUNTER
Noted, make sure she gets message    ALSO make sure VNA gets order for speech therapy if you need anything else from me let me know  Did order for speech need faxed?

## 2020-12-10 NOTE — TELEPHONE ENCOUNTER
Please fax lab orders to [facility] should be vna  She also needs re assessment with speech    After review al of notes  She has history of atrial fib/svt and at least  Discussion with cardiology is warrants to consider blood thinners to reduce risk of stroke    Urology referral sent for large stone, no intervention may be needed if not causes problems, but it is a large stone    Thank you

## 2020-12-14 ENCOUNTER — TELEPHONE (OUTPATIENT)
Dept: UROLOGY | Age: 85
End: 2020-12-14

## 2020-12-14 NOTE — TELEPHONE ENCOUNTER
----- Message from Eun Tavera sent at 12/14/2020 11:10 AM EST -----  PT has appt dr Ivelisse Carter. It takes 3 people to get her in the car. Her daughter wants to know if this can be a VV?   Ph # to call  back is [phone]  Jose Canales)

## 2020-12-16 ENCOUNTER — TELEPHONE (OUTPATIENT)
Dept: FAMILY MEDICINE CLINIC | Age: 85
End: 2020-12-16

## 2020-12-16 NOTE — TELEPHONE ENCOUNTER
Margy, nurse  with [address], called. The patient is in need of an enteral piston syringe- 60 ml catheter tip. She needs it to flush with. Patient has been using the same one she had at the hospital 2 weeks ago.   Please fax order to:  [facility] at [phone]  Attn: [facility]

## 2020-12-18 ENCOUNTER — VIRTUAL VISIT (OUTPATIENT)
Dept: UROLOGY | Age: 85
End: 2020-12-18
Payer: MEDICARE

## 2020-12-18 LAB
ALBUMIN SERPL-MCNC: 3.9 G/DL (ref 3.5–4.6)
ALP BLD-CCNC: 78 U/L (ref 40–130)
ALT SERPL-CCNC: 9 U/L (ref 0–33)
ANION GAP SERPL CALCULATED.3IONS-SCNC: 12 MEQ/L (ref 9–15)
AST SERPL-CCNC: 12 U/L (ref 0–35)
BASOPHILS ABSOLUTE: 0 K/UL (ref 0–0.2)
BASOPHILS RELATIVE PERCENT: 0.7 %
BILIRUB SERPL-MCNC: 0.4 MG/DL (ref 0.2–0.7)
BILIRUBIN URINE: NEGATIVE
BLOOD, URINE: NEGATIVE
BUN BLDV-MCNC: 9 MG/DL (ref 8–23)
C-REACTIVE PROTEIN: 6.9 MG/L (ref 0–5)
CALCIUM SERPL-MCNC: 9.3 MG/DL (ref 8.5–9.9)
CHLORIDE BLD-SCNC: 103 MEQ/L (ref 95–107)
CLARITY: CLEAR
CO2: 24 MEQ/L (ref 20–31)
COLOR: YELLOW
CREAT SERPL-MCNC: 0.76 MG/DL (ref 0.5–0.9)
EOSINOPHILS ABSOLUTE: 0.2 K/UL (ref 0–0.7)
EOSINOPHILS RELATIVE PERCENT: 2.7 %
GFR AFRICAN AMERICAN: >60
GFR NON-AFRICAN AMERICAN: >60
GLOBULIN: 2.6 G/DL (ref 2.3–3.5)
GLUCOSE BLD-MCNC: 95 MG/DL (ref 70–99)
GLUCOSE URINE: NEGATIVE MG/DL
HCT VFR BLD CALC: 35.9 % (ref 37–47)
HEMOGLOBIN: 11.9 G/DL (ref 12–16)
KETONES, URINE: NEGATIVE MG/DL
LEUKOCYTE ESTERASE, URINE: NEGATIVE
LYMPHOCYTES ABSOLUTE: 1.2 K/UL (ref 1–4.8)
LYMPHOCYTES RELATIVE PERCENT: 17.3 %
MCH RBC QN AUTO: 29.3 PG (ref 27–31.3)
MCHC RBC AUTO-ENTMCNC: 33.1 % (ref 33–37)
MCV RBC AUTO: 88.5 FL (ref 82–100)
MONOCYTES ABSOLUTE: 0.7 K/UL (ref 0.2–0.8)
MONOCYTES RELATIVE PERCENT: 10.8 %
NEUTROPHILS ABSOLUTE: 4.7 K/UL (ref 1.4–6.5)
NEUTROPHILS RELATIVE PERCENT: 68.5 %
NITRITE, URINE: NEGATIVE
PDW BLD-RTO: 15.1 % (ref 11.5–14.5)
PH UA: 7 (ref 5–9)
PLATELET # BLD: 204 K/UL (ref 130–400)
POTASSIUM SERPL-SCNC: 4 MEQ/L (ref 3.4–4.9)
PROTEIN UA: NEGATIVE MG/DL
RBC # BLD: 4.06 M/UL (ref 4.2–5.4)
SODIUM BLD-SCNC: 139 MEQ/L (ref 135–144)
SPECIFIC GRAVITY UA: 1.01 (ref 1–1.03)
TOTAL PROTEIN: 6.5 G/DL (ref 6.3–8)
UROBILINOGEN, URINE: 0.2 E.U./DL
WBC # BLD: 6.9 K/UL (ref 4.8–10.8)

## 2020-12-18 PROCEDURE — 99441 PR PHYS/QHP TELEPHONE EVALUATION 5-10 MIN: CPT | Performed by: UROLOGY

## 2020-12-18 NOTE — PROGRESS NOTES
Urology  Reason for virtual visit incidental right renal calculus  Patient at home communicating via daughter  Dr Den Brar in the office  6 mm stone noted to be in the right lower pole incidentally on CT of the abdomen pelvis  Patient asymptomatic  Review of CAT scan shows that stone was also there in 2009 with no change in size  Position has changed  Our plan is to not treat the stone at this time and to watch  Daughter was given clues as to what to look for as far as renal colic  No further follow-up with [facility] since patient is nonambulatory  Greater than 5 less than 10-minute virtual visit over telephone  Usman Estrella MD

## 2020-12-20 LAB — URINE CULTURE, ROUTINE: NORMAL

## 2020-12-23 ENCOUNTER — TELEPHONE (OUTPATIENT)
Dept: FAMILY MEDICINE CLINIC | Age: 85
End: 2020-12-23

## 2020-12-23 NOTE — TELEPHONE ENCOUNTER
Speech therapist Heaven Srivastava M.S.   [address] [facility] with [address] [place] [address],[address]   [phone]. ....... Peg tube removal recommenndation. Place referral for above and contact vna  Diagnosis dysphagia  Let patient/family know ([facility])  Thanks.

## 2020-12-24 LAB
ALK PHOS OTHER CALC: 0 U/L
ALK PHOSPHATASE: 80 U/L (ref 40–120)
ALKALINE PHOSPHATASE BONE FRACTION: 28 U/L (ref 0–55)
ALKALINE PHOSPHATASE LIVER FRACTION: 52 U/L (ref 0–94)

## 2021-01-08 ENCOUNTER — VIRTUAL VISIT (OUTPATIENT)
Dept: FAMILY MEDICINE CLINIC | Age: 86
End: 2021-01-08
Payer: MEDICARE

## 2021-01-08 ENCOUNTER — TELEPHONE (OUTPATIENT)
Dept: FAMILY MEDICINE CLINIC | Age: 86
End: 2021-01-08

## 2021-01-08 DIAGNOSIS — Z93.1 STATUS POST INSERTION OF PERCUTANEOUS ENDOSCOPIC GASTROSTOMY (PEG) TUBE (HCC): Primary | ICD-10-CM

## 2021-01-08 DIAGNOSIS — R13.10 DYSPHAGIA, UNSPECIFIED TYPE: ICD-10-CM

## 2021-01-08 DIAGNOSIS — I50.32 CHRONIC DIASTOLIC HEART FAILURE (HCC): ICD-10-CM

## 2021-01-08 DIAGNOSIS — I10 ESSENTIAL HYPERTENSION: Primary | ICD-10-CM

## 2021-01-08 PROCEDURE — 99214 OFFICE O/P EST MOD 30 MIN: CPT | Performed by: INTERNAL MEDICINE

## 2021-01-08 RX ORDER — IPRATROPIUM BROMIDE AND ALBUTEROL SULFATE 2.5; .5 MG/3ML; MG/3ML
1 SOLUTION RESPIRATORY (INHALATION) EVERY 6 HOURS PRN
Qty: 360 ML | Refills: 5 | Status: SHIPPED | OUTPATIENT
Start: 2021-01-08 | End: 2021-01-22 | Stop reason: SDUPTHER

## 2021-01-08 ASSESSMENT — ENCOUNTER SYMPTOMS
ABDOMINAL PAIN: 0
BACK PAIN: 0
SHORTNESS OF BREATH: 0
EYE PAIN: 0

## 2021-01-08 NOTE — PROGRESS NOTES
Subjective:      Patient ID: Ancelmo Guaman is a 80 y.o. female who presents today with:  No chief complaint on file. HPI      Off lasix  No swelling or weight gain  Family is watching her. Copd  On dulera  Sees pulm  Most recent echo of 70 percent  Diastolic dysfuction  \"legs are half the size they were\"  No calf pain  They are not red   htn  On arb and beta blocker. hpl  On statin  Female sex  Chronic compliant. bp today was 140/82 (just took her meds)  Normally it runs   138/76, 135/70 are more typical for her blood pressure wise. Past Medical History:   Diagnosis Date    Anxiety     Cancer ([address]     Glaucoma     Gout     left knee    Hypertension     Lung disease     Obesity (BMI 30-39. 9) 7/11/2019    Obstructive sleep apnea 7/11/2019     Past Surgical History:   Procedure Laterality Date    CATARACT REMOVAL WITH IMPLANT Bilateral 2016    COLON SURGERY  2009    polyps    COLONOSCOPY  02/2009    GASTROSTOMY TUBE PLACEMENT N/A 11/4/2020    EGD PEG TUBE PLACEMENT performed by Augustina Lesch, MD at [facility]  2002    graft from neck     Social History     Socioeconomic History    Marital status:       Spouse name: Not on file    Number of children: Not on file    Years of education: Not on file    Highest education level: Not on file   Occupational History    Not on file   Social Needs    Financial resource strain: Not on file    Food insecurity     Worry: Not on file     Inability: Not on file    Transportation needs     Medical: Not on file     Non-medical: Not on file   Tobacco Use    Smoking status: Never Smoker    Smokeless tobacco: Never Used   Substance and Sexual Activity    Alcohol use: No    Drug use: No    Sexual activity: Not on file   Lifestyle    Physical activity     Days per week: Not on file     Minutes per session: Not on file    Stress: Not on file   Relationships    Social connections     Talks on phone: Not on file Gastrointestinal: Negative for abdominal pain. Genitourinary: Negative for hematuria. Musculoskeletal: Negative for back pain. Allergic/Immunologic: Negative for immunocompromised state. Neurological: Negative for headaches. Psychiatric/Behavioral: Negative for hallucinations. Objective:   LMP  (LMP Unknown)     Physical Exam  Constitutional:       General: She is not in acute distress. Appearance: She is not ill-appearing, toxic-appearing or diaphoretic. Pulmonary:      Effort: No respiratory distress. Assessment:       Diagnosis Orders   1. Essential hypertension     2. Chronic diastolic heart failure ([address]     3. Dysphagia, unspecified type           Plan:    Video visit done because of coronavirus pandemic. Visit done via [employer]. me   explained billeable visit, patient understands and agrees to continue  I was at home and patient was at home during this visit. Sleep study? (see te)   Continue chronic medications  Might increase losartan to 1.5 tablets daily caution given her age. Controlled off diuretics. See orders. Needs swallow study, see TE. No orders of the defined types were placed in this encounter. Orders Placed This Encounter   Medications    ipratropium-albuterol (DUONEB) 0.5-2.5 (3) MG/3ML SOLN nebulizer solution     Sig: Inhale 3 mLs into the lungs every 6 hours as needed for Shortness of Breath ((or wheezing))     Dispense:  360 mL     Refill:  5   Never completed sleep study  Call if weight gain more than 2 to 3 pounds in 48 hours or if more than 4 to 5 pounds at all of weight gain. Had flu shot 2020   If her bp go any higher call me. Given her age will defer increasing. If bp go higher than 140/80 on average to let me know. Risk of Singulair health disorders explained  If anything should change or worsen call ASAP, don't wait for next scheduled appointment. Return in 3 months (on 4/8/2021) for Chronic condition management/appointment, worsening symptoms, call ASAP for appointment.       Tori Love MD

## 2021-01-08 NOTE — TELEPHONE ENCOUNTER
Needs swallow test  Call [facility]  Does this have to be through [facility]? Need to get peg out if possible.

## 2021-01-09 NOTE — PATIENT INSTRUCTIONS

## 2021-01-10 DIAGNOSIS — R07.9 INTERMITTENT CHEST PAIN: ICD-10-CM

## 2021-01-11 ENCOUNTER — TELEPHONE (OUTPATIENT)
Dept: FAMILY MEDICINE CLINIC | Age: 86
End: 2021-01-11

## 2021-01-11 NOTE — TELEPHONE ENCOUNTER
Called to follow up with patient regarding follow up appt per 1/8 VV w/Dr. Tomasz Esparza. Talked with daughter, Tete Peers, and scheduled 3 month follow up appt for 4/7. Tete Peers asked if there was anything regarding removal of feeding tube for [facility].. I did not see any information in encounter notes to regarding this subject. Tete Martin may be reached at [phone].

## 2021-01-11 NOTE — TELEPHONE ENCOUNTER
There is a telephone encounter from the same day as her visit that addressed this  Please follow up on this.

## 2021-01-13 NOTE — TELEPHONE ENCOUNTER
If Syed Morejon passed her and no need for peg tube then can be removed  Verify this please  Thanks.

## 2021-01-13 NOTE — TELEPHONE ENCOUNTER
Needs speech therapy okay before I have her sent to surgeon for removal  If she needs seen here and if this can't be done at home let me know  General surgery referral sent for peg tube removal  Contact patient and vna   If any questions let me know.

## 2021-01-13 NOTE — TELEPHONE ENCOUNTER
Spoke to [facility], she believes that PEG tube should be removed. She also believes that scope/swallow test is not necessary due to patient eating and using thickener willingly.  She recommends that we reach out to the provider who placed the tube to request his thoughts on removal

## 2021-01-14 ENCOUNTER — TELEPHONE (OUTPATIENT)
Dept: FAMILY MEDICINE CLINIC | Age: 86
End: 2021-01-14

## 2021-01-14 NOTE — TELEPHONE ENCOUNTER
Need to review it again (print for me please)  Make sure she has an appt with dr Aneudy Obrien and let patient/family know.

## 2021-01-14 NOTE — TELEPHONE ENCOUNTER
She had a sleep study ordered through pulmonary  They can call to schedule this if any issues let me know. It will probably need a new order does she want to complete this?

## 2021-01-16 ENCOUNTER — TELEPHONE (OUTPATIENT)
Dept: FAMILY MEDICINE CLINIC | Age: 86
End: 2021-01-16

## 2021-01-19 ENCOUNTER — OFFICE VISIT (OUTPATIENT)
Dept: SURGERY | Age: 86
End: 2021-01-19
Payer: MEDICARE

## 2021-01-19 VITALS
HEIGHT: 63 IN | HEART RATE: 64 BPM | WEIGHT: 187 LBS | BODY MASS INDEX: 33.13 KG/M2 | TEMPERATURE: 97.7 F | OXYGEN SATURATION: 95 %

## 2021-01-19 DIAGNOSIS — K94.20 GASTROSTOMY COMPLICATION (HCC): Primary | ICD-10-CM

## 2021-01-19 PROCEDURE — 99213 OFFICE O/P EST LOW 20 MIN: CPT | Performed by: COLON & RECTAL SURGERY

## 2021-01-19 ASSESSMENT — ENCOUNTER SYMPTOMS
CONSTIPATION: 0
DIARRHEA: 0
VOMITING: 0
ABDOMINAL PAIN: 0
CHEST TIGHTNESS: 0

## 2021-01-19 NOTE — PROGRESS NOTES
Attends meetings of clubs or organizations: Not on file     Relationship status: Not on file    Intimate partner violence     Fear of current or ex partner: Not on file     Emotionally abused: Not on file     Physically abused: Not on file     Forced sexual activity: Not on file   Other Topics Concern    Not on file   Social History Narrative    Not on file     History reviewed. No pertinent family history. Allergies:  Norvasc [amlodipine besylate]    Review of Systems   Constitutional: Negative for activity change, appetite change and unexpected weight change. HENT: Negative for congestion. Respiratory: Negative for chest tightness. Gastrointestinal: Negative for abdominal pain, constipation, diarrhea and vomiting. Genitourinary: Negative for difficulty urinating. Musculoskeletal: Negative for arthralgias. Neurological: Negative for dizziness and headaches. Hematological: Does not bruise/bleed easily. Psychiatric/Behavioral: Negative for agitation. Objective:    Pulse 64   Temp 97.7 °F (36.5 °C) (Temporal)   Ht 5' 3\" (1.6 m)   Wt 187 lb (84.8 kg)   LMP  (LMP Unknown)   SpO2 95%   BMI 33.13 kg/m²     Physical Exam  Constitutional:       Appearance: She is well-developed. HENT:      Head: Normocephalic and atraumatic. Eyes:      Pupils: Pupils are equal, round, and reactive to light. Neck:      Musculoskeletal: Normal range of motion and neck supple. Cardiovascular:      Rate and Rhythm: Normal rate and regular rhythm. Heart sounds: Normal heart sounds. Pulmonary:      Effort: Pulmonary effort is normal. No respiratory distress. Breath sounds: Normal breath sounds. No wheezing. Abdominal:      General: There is no distension. Palpations: There is no mass. Tenderness: There is no abdominal tenderness. There is no guarding or rebound. Hernia: No hernia is present. Comments: Gastrostomy tube intact without any skin changes. In the office the tube was removed with gentle traction. Dressings were applied. No significant drainage noted. Musculoskeletal: Normal range of motion. Skin:     General: Skin is warm and dry. Coloration: Skin is not pale. Findings: No erythema or rash. Neurological:      Mental Status: She is alert and oriented to person, place, and time. Psychiatric:         Behavior: Behavior normal.         Judgment: Judgment normal.              Assessment/Plan:          Diagnosis Orders   1. Gastrostomy complication ([address]       Wound care instructions were given. She will return back to see me in the office if there is any way I can be of further assistance in the future. Please note this report has beenpartially produced using speech recognition software and may cause contain errors related to that system including grammar, punctuation and spelling as well as words and phrases that may seem inappropriate.  If there arequestions or concerns please feel free to contact me to clarify

## 2021-01-22 RX ORDER — IPRATROPIUM BROMIDE AND ALBUTEROL SULFATE 2.5; .5 MG/3ML; MG/3ML
1 SOLUTION RESPIRATORY (INHALATION) EVERY 6 HOURS PRN
Qty: 360 ML | Refills: 11 | Status: SHIPPED | OUTPATIENT
Start: 2021-01-22 | End: 2022-02-10 | Stop reason: SDUPTHER

## 2021-03-10 DIAGNOSIS — E87.6 HYPOKALEMIA: ICD-10-CM

## 2021-03-10 DIAGNOSIS — I51.89 DIASTOLIC DYSFUNCTION: ICD-10-CM

## 2021-03-10 DIAGNOSIS — G47.33 OSA (OBSTRUCTIVE SLEEP APNEA): ICD-10-CM

## 2021-03-10 DIAGNOSIS — I10 ESSENTIAL HYPERTENSION: ICD-10-CM

## 2021-03-10 DIAGNOSIS — Z23 ENCOUNTER FOR IMMUNIZATION: ICD-10-CM

## 2021-03-10 DIAGNOSIS — F41.9 ANXIETY: ICD-10-CM

## 2021-03-10 RX ORDER — LOSARTAN POTASSIUM 25 MG/1
TABLET ORAL
Qty: 90 TABLET | Refills: 3 | Status: SHIPPED | OUTPATIENT
Start: 2021-03-10 | End: 2021-08-04 | Stop reason: DRUGHIGH

## 2021-03-18 DIAGNOSIS — Z23 ENCOUNTER FOR IMMUNIZATION: ICD-10-CM

## 2021-03-18 DIAGNOSIS — F41.9 ANXIETY: ICD-10-CM

## 2021-03-18 DIAGNOSIS — I51.89 DIASTOLIC DYSFUNCTION: ICD-10-CM

## 2021-03-18 DIAGNOSIS — G47.33 OSA (OBSTRUCTIVE SLEEP APNEA): ICD-10-CM

## 2021-03-18 DIAGNOSIS — I10 ESSENTIAL HYPERTENSION: ICD-10-CM

## 2021-03-18 DIAGNOSIS — E87.6 HYPOKALEMIA: ICD-10-CM

## 2021-03-18 RX ORDER — MONTELUKAST SODIUM 10 MG/1
TABLET ORAL
Qty: 90 TABLET | Refills: 1 | OUTPATIENT
Start: 2021-03-18

## 2021-03-22 NOTE — TELEPHONE ENCOUNTER
There is a risk it can cause or worsen depression and certain other mental health disorders  Is she okay with that risk?

## 2021-04-07 ENCOUNTER — TELEPHONE (OUTPATIENT)
Dept: FAMILY MEDICINE CLINIC | Age: 86
End: 2021-04-07

## 2021-04-07 ENCOUNTER — VIRTUAL VISIT (OUTPATIENT)
Dept: FAMILY MEDICINE CLINIC | Age: 86
End: 2021-04-07
Payer: MEDICARE

## 2021-04-07 DIAGNOSIS — I10 ESSENTIAL HYPERTENSION: Primary | ICD-10-CM

## 2021-04-07 DIAGNOSIS — E78.5 HYPERLIPIDEMIA, UNSPECIFIED HYPERLIPIDEMIA TYPE: ICD-10-CM

## 2021-04-07 PROCEDURE — 99443 PR PHYS/QHP TELEPHONE EVALUATION 21-30 MIN: CPT | Performed by: INTERNAL MEDICINE

## 2021-04-07 RX ORDER — MONTELUKAST SODIUM 10 MG/1
10 TABLET ORAL NIGHTLY PRN
Qty: 90 TABLET | Refills: 3 | Status: SHIPPED | OUTPATIENT
Start: 2021-04-07 | End: 2022-07-03

## 2021-04-07 ASSESSMENT — ENCOUNTER SYMPTOMS
SHORTNESS OF BREATH: 0
ABDOMINAL PAIN: 0
EYE PAIN: 0
BACK PAIN: 0

## 2021-04-07 ASSESSMENT — PATIENT HEALTH QUESTIONNAIRE - PHQ9
SUM OF ALL RESPONSES TO PHQ QUESTIONS 1-9: 0
2. FEELING DOWN, DEPRESSED OR HOPELESS: 0
1. LITTLE INTEREST OR PLEASURE IN DOING THINGS: 0
SUM OF ALL RESPONSES TO PHQ9 QUESTIONS 1 & 2: 0

## 2021-04-07 NOTE — PROGRESS NOTES
Subjective:      Patient ID: Fabricio Whitaker is a 80 y.o. female who presents today with:  No chief complaint on file. HPI       138/83  Most current bp. Last night it was 146/98.   htn  Here for follow up. Past Medical History:   Diagnosis Date    Anxiety     Cancer ([address]     Glaucoma     Gout     left knee    Hypertension     Lung disease     Obesity (BMI 30-39. 9) 7/11/2019    Obstructive sleep apnea 7/11/2019     Past Surgical History:   Procedure Laterality Date    CATARACT REMOVAL WITH IMPLANT Bilateral 2016    COLON SURGERY  2009    polyps    COLONOSCOPY  02/2009    GASTROSTOMY TUBE PLACEMENT N/A 11/4/2020    EGD PEG TUBE PLACEMENT performed by Raz Adamson MD at [address] [address]  2002    graft from neck     Social History     Socioeconomic History    Marital status:       Spouse name: Not on file    Number of children: Not on file    Years of education: Not on file    Highest education level: Not on file   Occupational History    Not on file   Social Needs    Financial resource strain: Not on file    Food insecurity     Worry: Not on file     Inability: Not on file    Transportation needs     Medical: Not on file     Non-medical: Not on file   Tobacco Use    Smoking status: Never Smoker    Smokeless tobacco: Never Used   Substance and Sexual Activity    Alcohol use: No    Drug use: No    Sexual activity: Not on file   Lifestyle    Physical activity     Days per week: Not on file     Minutes per session: Not on file    Stress: Not on file   Relationships    Social connections     Talks on phone: Not on file     Gets together: Not on file     Attends [employer] service: Not on file     Active member of club or organization: Not on file     Attends meetings of clubs or organizations: Not on file     Relationship status: Not on file    Intimate partner violence     Fear of current or ex partner: Not on file     Emotionally abused: Not on file Physically abused: Not on file     Forced sexual activity: Not on file   Other Topics Concern    Not on file   Social History Narrative    Not on file     Allergies   Allergen Reactions    Norvasc [Amlodipine Besylate] Swelling     Current Outpatient Medications on File Prior to Visit   Medication Sig Dispense Refill    losartan (COZAAR) 25 MG tablet TAKE 1 TABLET BY MOUTH EVERY DAY 90 tablet 3    ipratropium-albuterol (DUONEB) 0.5-2.5 (3) MG/3ML SOLN nebulizer solution Inhale 3 mLs into the lungs every 6 hours as needed for Shortness of Breath ((or wheezing)) 360 mL 11    metoprolol tartrate (LOPRESSOR) 25 MG tablet TAKE 1/2 TABLET BY MOUTH TWICE A DAY 90 tablet 3    DULERA 100-5 MCG/ACT inhaler INHALE 2 PUFFS BY MOUTH TWICE A DAY 3 Inhaler 0    atorvastatin (LIPITOR) 20 MG tablet Take 20 mg by mouth daily      albuterol sulfate HFA (PROAIR HFA) 108 (90 Base) MCG/ACT inhaler Inhale 1 puff into the lungs every 6 hours as needed for Wheezing or Shortness of Breath 1 Inhaler 11    OXYGEN Inhale 2 L into the lungs Indications: PRN at bedtime      latanoprost (XALATAN) 0.005 % ophthalmic solution INSTILL 1 DROP INTO BOTH EYES EVERY DAY      dorzolamide-timolol (COSOPT) 22.3-6.8 MG/ML ophthalmic solution Use 1 Drop in both eyes every 12 hours.  brimonidine (ALPHAGAN P) 0.15 % ophthalmic solution INSTILL 1 DROP INTO BOTH EYES TWICE DAILY. 3     No current facility-administered medications on file prior to visit. I have personally reviewed the ROS, PMH, PFH, and social history     Review of Systems   Constitutional: Negative for chills and fever. HENT: Negative for congestion. Eyes: Negative for pain. Respiratory: Negative for shortness of breath. Cardiovascular: Negative for chest pain. Gastrointestinal: Negative for abdominal pain. Genitourinary: Negative for hematuria. Musculoskeletal: Negative for back pain. Allergic/Immunologic: Negative for immunocompromised state. MBS risk of aspiration even death explained. Patient refusing. Refusing any follow up preventative care for cancer screening. (even if something were abnormal prior)   If anything should change or worsen call ASAP, don't wait for next scheduled appointment. Return in about 4 months (around 8/7/2021) for Chronic condition management/appointment, worsening symptoms, call ASAP for appointment.       Joyce Castillo MD

## 2021-04-07 NOTE — TELEPHONE ENCOUNTER
She followed up with Leanne Calero for peg tube removal. He must have thought it was not necessary to order

## 2021-04-08 DIAGNOSIS — M19.90 OSTEOARTHRITIS, UNSPECIFIED OSTEOARTHRITIS TYPE, UNSPECIFIED SITE: Primary | ICD-10-CM

## 2021-04-27 ENCOUNTER — TELEPHONE (OUTPATIENT)
Dept: FAMILY MEDICINE CLINIC | Age: 86
End: 2021-04-27

## 2021-04-27 NOTE — TELEPHONE ENCOUNTER
Patient's daughter is calling in stating that she was contacted by [facility]. The insurance company is no longer covering the Farwell. Daughter states that the insurance will cover either Advair or Symbicort. Please send a new script for whichever one best replaces the Farwell.

## 2021-04-27 NOTE — TELEPHONE ENCOUNTER
The Wendy Thompson is no longer covered by her insurance. Can you send something else in it's place to [address] [place] [address]

## 2021-08-04 ENCOUNTER — VIRTUAL VISIT (OUTPATIENT)
Dept: FAMILY MEDICINE CLINIC | Age: 86
End: 2021-08-04
Payer: MEDICARE

## 2021-08-04 ENCOUNTER — TELEPHONE (OUTPATIENT)
Dept: FAMILY MEDICINE CLINIC | Age: 86
End: 2021-08-04

## 2021-08-04 DIAGNOSIS — L30.9 DERMATITIS: Primary | ICD-10-CM

## 2021-08-04 DIAGNOSIS — I10 ESSENTIAL HYPERTENSION: ICD-10-CM

## 2021-08-04 DIAGNOSIS — I87.8 VENOUS STASIS: ICD-10-CM

## 2021-08-04 DIAGNOSIS — Z23 ENCOUNTER FOR IMMUNIZATION: ICD-10-CM

## 2021-08-04 PROCEDURE — 99214 OFFICE O/P EST MOD 30 MIN: CPT | Performed by: INTERNAL MEDICINE

## 2021-08-04 RX ORDER — LOSARTAN POTASSIUM 25 MG/1
25 TABLET ORAL EVERY 12 HOURS
Qty: 60 TABLET | Refills: 0 | Status: SHIPPED | OUTPATIENT
Start: 2021-08-04 | End: 2021-08-26

## 2021-08-04 RX ORDER — ZOSTER VACCINE RECOMBINANT, ADJUVANTED 50 MCG/0.5
0.5 KIT INTRAMUSCULAR SEE ADMIN INSTRUCTIONS
Qty: 0.5 ML | Refills: 0 | Status: SHIPPED | OUTPATIENT
Start: 2021-08-04 | End: 2022-01-31

## 2021-08-04 RX ORDER — MOMETASONE FUROATE 1 MG/G
CREAM TOPICAL
Qty: 45 G | Refills: 0 | Status: SHIPPED | OUTPATIENT
Start: 2021-08-04 | End: 2022-02-10 | Stop reason: CLARIF

## 2021-08-04 ASSESSMENT — PATIENT HEALTH QUESTIONNAIRE - PHQ9
SUM OF ALL RESPONSES TO PHQ QUESTIONS 1-9: 0
SUM OF ALL RESPONSES TO PHQ QUESTIONS 1-9: 0
1. LITTLE INTEREST OR PLEASURE IN DOING THINGS: 0
2. FEELING DOWN, DEPRESSED OR HOPELESS: 0
SUM OF ALL RESPONSES TO PHQ QUESTIONS 1-9: 0
SUM OF ALL RESPONSES TO PHQ9 QUESTIONS 1 & 2: 0

## 2021-08-04 ASSESSMENT — ENCOUNTER SYMPTOMS
BACK PAIN: 0
ABDOMINAL PAIN: 0
EYE PAIN: 0
SHORTNESS OF BREATH: 0

## 2021-08-04 NOTE — PROGRESS NOTES
Subjective:      Patient ID: Clemente Walton is a 80 y.o. female who presents today with:  No chief complaint on file. HPI     Won't wear compression stockings. Actually swelling is better. 150/86, pulse 75 (aug 1)   Aug 2 (149/93) pulse 80  Later august 2nd, 144/102, pulse 79   8/3/21 730 pm bp was 139/85  This is on losartan 37.5 mg once daily. Past Medical History:   Diagnosis Date    Anxiety     Cancer ([address]     Glaucoma     Gout     left knee    Hypertension     Lung disease     Obesity (BMI 30-39. 9) 7/11/2019    Obstructive sleep apnea 7/11/2019     Past Surgical History:   Procedure Laterality Date    CATARACT REMOVAL WITH IMPLANT Bilateral 2016    COLON SURGERY  2009    polyps    COLONOSCOPY  02/2009    GASTROSTOMY TUBE PLACEMENT N/A 11/4/2020    EGD PEG TUBE PLACEMENT performed by Betty Serrano MD at [address] [address]  2002    graft from neck     Social History     Socioeconomic History    Marital status:      Spouse name: Not on file    Number of children: Not on file    Years of education: Not on file    Highest education level: Not on file   Occupational History    Not on file   Tobacco Use    Smoking status: Never Smoker    Smokeless tobacco: Never Used   Substance and Sexual Activity    Alcohol use: No    Drug use: No    Sexual activity: Not on file   Other Topics Concern    Not on file   Social History Narrative    Not on file     Social Determinants of Health     Financial Resource Strain:     Difficulty of Paying Living Expenses:    Food Insecurity:     Worried About Running Out of Food in the Last Year:     [address] in the Last Year:    Transportation Needs:     Lack of Transportation (Medical):      Lack of Transportation (Non-Medical):    Physical Activity:     Days of Exercise per Week:     Minutes of Exercise per Session:    Stress:     Feeling of Stress :    Social Connections:     Frequency of Communication with Friends and Family:     Frequency of Social Gatherings with Friends and Family:     Attends [employer] Services:     Active Member of Clubs or Organizations:     Attends Club or Organization Meetings:     Marital Status:    Intimate Partner Violence:     Fear of Current or Ex-Partner:     Emotionally Abused:     Physically Abused:     Sexually Abused: Allergies   Allergen Reactions    Norvasc [Amlodipine Besylate] Swelling     Current Outpatient Medications on File Prior to Visit   Medication Sig Dispense Refill    fluticasone-salmeterol (ADVAIR DISKUS) 250-50 MCG/DOSE AEPB Inhale 1 puff into the lungs every 12 hours 180 each 3    Handicap Placard MISC by Does not apply route Good for 5 years 1 each 0    montelukast (SINGULAIR) 10 MG tablet Take 1 tablet by mouth nightly as needed (allergies) 90 tablet 3    ipratropium-albuterol (DUONEB) 0.5-2.5 (3) MG/3ML SOLN nebulizer solution Inhale 3 mLs into the lungs every 6 hours as needed for Shortness of Breath ((or wheezing)) 360 mL 11    metoprolol tartrate (LOPRESSOR) 25 MG tablet TAKE 1/2 TABLET BY MOUTH TWICE A DAY 90 tablet 3    atorvastatin (LIPITOR) 20 MG tablet Take 20 mg by mouth daily      albuterol sulfate HFA (PROAIR HFA) 108 (90 Base) MCG/ACT inhaler Inhale 1 puff into the lungs every 6 hours as needed for Wheezing or Shortness of Breath 1 Inhaler 11    OXYGEN Inhale 2 L into the lungs Indications: PRN at bedtime      latanoprost (XALATAN) 0.005 % ophthalmic solution INSTILL 1 DROP INTO BOTH EYES EVERY DAY (Patient not taking: Reported on 8/4/2021)      dorzolamide-timolol (COSOPT) 22.3-6.8 MG/ML ophthalmic solution Use 1 Drop in both eyes every 12 hours. (Patient not taking: Reported on 8/4/2021)      brimonidine (ALPHAGAN P) 0.15 % ophthalmic solution INSTILL 1 DROP INTO BOTH EYES TWICE DAILY. (Patient not taking: Reported on 8/4/2021)  3     No current facility-administered medications on file prior to visit.        I have remove at night, pharmacy to fit. Dispense:  1 each     Refill:  0    mometasone (ELOCON) 0.1 % cream     Sig: Apply thin layer topically every 12 hours to rash on hand and legs. Dispense:  45 g     Refill:  0    zoster recombinant adjuvanted vaccine (SHINGRIX) 50 MCG/0.5ML SUSR injection     Sig: Inject 0.5 mLs into the muscle See Admin Instructions 1 dose now and repeat in 2-6 months     Dispense:  0.5 mL     Refill:  0    losartan (COZAAR) 25 MG tablet     Sig: Take 1 tablet by mouth every 12 hours     Dispense:  60 tablet     Refill:  0   if any redness, pain, new or worsening, sx call immedaitely. If you get cp, ha, vision changes, dyspnea, arm pain, etc go to ER. (Risk of uncontrolled HTN)   Call me in a few bp readings. Sleep study?  (Never completed) REFUSING   F/p with opto  Risk of eye damage, etc.   Singulair mentally health disorders explained  They would like to stick with it. Offered mucinex and allegra instead. Sleep apnea, can lead to heart problems, death, etc.    Any s/s of hypotension stop taking and call immediately. Explained risk of angioedema, including that it can be fatal, call 911 immed. If any swelling or lips or face. Understands life threatening risk of not doing bw. If anything should change or worsen call ASAP, don't wait for next scheduled appointment. Return for worsening symptoms, call ASAP for appointment, Chronic condition management/appointment.       Mable Mora MD

## 2021-08-04 NOTE — TELEPHONE ENCOUNTER
Marthan Linda Lind babak  [phone]  Eye doctor  Does she need seen?   She stopped taking eye drops due to pain

## 2021-08-04 NOTE — PATIENT INSTRUCTIONS
Patient Education        losartan  Pronunciation:  tabatha ANGIE tan  Brand:  Cozaar  What is the most important information I should know about losartan? Do not use if you are pregnant, and tell your doctor right away if you become pregnant. Losartan can cause injury or death to the unborn baby during your second or third trimester. If you have diabetes, do not use losartan together with any medication that contains aliskiren (a blood pressure medicine). What is losartan? Losartan is an angiotensin II receptor antagonist (sometimes called an ARB blocker). Losartan is used to treat high blood pressure (hypertension) in adults and children who are at least 10years old. It is also used to lower the risk of stroke in certain people with heart disease. Losartan is also used to slow long-term kidney damage in people with type 2 diabetes who also have high blood pressure. Losartan may also be used for purposes not listed in this medication guide. What should I discuss with my healthcare provider before taking losartan? You should not use losartan if you are allergic to it. If you have diabetes, do not use losartan together with any medication that contains aliskiren (a blood pressure medicine). You may also need to avoid taking losartan with aliskiren if you have kidney disease. Do not use if you are pregnant, and tell your doctor right away if you become pregnant. Losartan can cause injury or death to the unborn baby if you take the medicine during your second or third trimester. Tell your doctor if you have ever had:  · kidney disease;  · liver disease;  · congestive heart failure;  · an electrolyte imbalance (such as high levels of potassium in your blood);  · if you are on a low-salt diet; or  · if you are dehydrated. You should not breast-feed while using this medicine. Losartan is not approved for use by anyone younger than 10years old. How should I take losartan?   Follow all directions on your prescription label and read all medication guides or instruction sheets. Your doctor may occasionally change your dose. Use the medicine exactly as directed. You may take losartan with or without food. Call your doctor if you are sick with vomiting or diarrhea, or if you are sweating more than usual. You can easily become dehydrated while taking losartan. This can lead to very low blood pressure, a serious electrolyte imbalance, or kidney failure. Your blood pressure will need to be checked often and you may need other blood and urine tests. It may take 3 to 6 weeks before your blood pressure is under control. For best results, keep using the medicine as directed. Talk with your doctor if your condition does not improve after 3 weeks of treatment. If you have high blood pressure, keep using this medicine even if you feel well. High blood pressure often has no symptoms. You may need to use blood pressure medicine for the rest of your life. Store at room temperature away from moisture, heat, and light. What happens if I miss a dose? Take the medicine as soon as you can, but skip the missed dose if it is almost time for your next dose. Do not take two doses at one time. What happens if I overdose? Seek emergency medical attention or call the Poison Help line at 1-861.995.2191. What should I avoid while taking losartan? Drinking alcohol can further lower your blood pressure and may increase certain side effects of losartan. Do not use potassium supplements or salt substitutes, unless your doctor has told you to. Avoid getting up too fast from a sitting or lying position, or you may feel dizzy. What are the possible side effects of losartan? Get emergency medical help if you have signs of an allergic reaction: hives; difficult breathing; swelling of your face, lips, tongue, or throat.   Call your doctor at once if you have:  · a light-headed feeling, like you might pass out;  · pain or burning when you urinate;  · high potassium level --nausea, weakness, tingly feeling, chest pain, irregular heartbeats, loss of movement; or  · kidney problems --little or no urination, rapid weight gain, painful or difficult urination, swelling in your hands, feet, or ankles. Common side effects may include:  · dizziness;  · back pain; or  · cold symptoms such as stuffy nose, sneezing, sore throat. This is not a complete list of side effects and others may occur. Call your doctor for medical advice about side effects. You may report side effects to FDA at 3-621-FDA-9350. What other drugs will affect losartan? Tell your doctor about all your other medicines, especially:  · a diuretic or \"water pill\";  · other blood pressure medications;  · lithium; or  · NSAIDs (nonsteroidal anti-inflammatory drugs) --aspirin, ibuprofen (Advil, Motrin), naproxen (Aleve), celecoxib, diclofenac, indomethacin, meloxicam, and others. This list is not complete. Other drugs may affect losartan, including prescription and over-the-counter medicines, vitamins, and herbal products. Not all possible drug interactions are listed here. Where can I get more information? Your pharmacist can provide more information about losartan. Remember, keep this and all other medicines out of the reach of children, never share your medicines with others, and use this medication only for the indication prescribed. Every effort has been made to ensure that the information provided by [address] is accurate, up-to-date, and complete, but no guarantee is made to that effect. Drug information contained herein may be time sensitive. [facility] information has been compiled for use by healthcare practitioners and consumers in the [place] and therefore [facility] does not warrant that uses outside of the [place] are appropriate, unless specifically indicated otherwise.  [facility]'s drug information does not endorse drugs, diagnose patients or recommend therapy. [facility]'s drug information is an informational resource designed to assist licensed healthcare practitioners in caring for their patients and/or to serve consumers viewing this service as a supplement to, and not a substitute for, the expertise, skill, knowledge and judgment of healthcare practitioners. The absence of a warning for a given drug or drug combination in no way should be construed to indicate that the drug or drug combination is safe, effective or appropriate for any given patient. [facility] does not assume any responsibility for any aspect of healthcare administered with the aid of information [facility] provides. The information contained herein is not intended to cover all possible uses, directions, precautions, warnings, drug interactions, allergic reactions, or adverse effects. If you have questions about the drugs you are taking, check with your doctor, nurse or pharmacist.  Copyright 6149-6209 [address]. Version: 16.01. Revision date: 4/10/2019. Care instructions adapted under license by [facility] ([facility]). If you have questions about a medical condition or this instruction, always ask your healthcare professional. [place] [address] any warranty or liability for your use of this information.

## 2021-08-10 PROBLEM — I87.2 VENOUS STASIS DERMATITIS OF BOTH LOWER EXTREMITIES: Status: ACTIVE | Noted: 2021-08-10

## 2021-08-26 RX ORDER — LOSARTAN POTASSIUM 25 MG/1
25 TABLET ORAL EVERY 12 HOURS
Qty: 60 TABLET | Refills: 0 | Status: SHIPPED | OUTPATIENT
Start: 2021-08-26 | End: 2021-09-23

## 2021-08-30 DIAGNOSIS — R07.9 CHEST PAIN, UNSPECIFIED: ICD-10-CM

## 2021-08-30 RX ORDER — ATORVASTATIN CALCIUM 20 MG/1
TABLET, FILM COATED ORAL
Qty: 90 TABLET | Refills: 0 | Status: SHIPPED | OUTPATIENT
Start: 2021-08-30 | End: 2021-10-29

## 2021-09-23 RX ORDER — LOSARTAN POTASSIUM 25 MG/1
25 TABLET ORAL EVERY 12 HOURS
Qty: 30 TABLET | Refills: 0 | Status: SHIPPED | OUTPATIENT
Start: 2021-09-23 | End: 2021-10-08

## 2021-10-08 RX ORDER — LOSARTAN POTASSIUM 25 MG/1
25 TABLET ORAL EVERY 12 HOURS
Qty: 30 TABLET | Refills: 0 | Status: SHIPPED | OUTPATIENT
Start: 2021-10-08 | End: 2021-10-18

## 2021-10-18 RX ORDER — LOSARTAN POTASSIUM 25 MG/1
25 TABLET ORAL EVERY 12 HOURS
Qty: 14 TABLET | Refills: 0 | Status: SHIPPED | OUTPATIENT
Start: 2021-10-18 | End: 2021-11-16 | Stop reason: SDUPTHER

## 2021-10-29 DIAGNOSIS — R07.9 CHEST PAIN, UNSPECIFIED: ICD-10-CM

## 2021-10-29 RX ORDER — ATORVASTATIN CALCIUM 20 MG/1
TABLET, FILM COATED ORAL
Qty: 90 TABLET | Refills: 0 | Status: SHIPPED | OUTPATIENT
Start: 2021-10-29 | End: 2022-02-08

## 2021-11-15 RX ORDER — LOSARTAN POTASSIUM 25 MG/1
25 TABLET ORAL EVERY 12 HOURS
Qty: 14 TABLET | Refills: 0 | Status: CANCELLED | OUTPATIENT
Start: 2021-11-15

## 2021-11-15 NOTE — TELEPHONE ENCOUNTER
requesting medication refill. Rx requested:  Requested Prescriptions      No prescriptions requested or ordered in this encounter       Last Office Visit:   8/4/2021    Last Tox screen:        Last Medication contract:        Next Visit Date:  No future appointments.

## 2021-11-16 NOTE — TELEPHONE ENCOUNTER
Last OV: 08/04/2021 Problem: Falls - Risk of:  Goal: Will remain free from falls  Outcome: Met This Shift     Problem: Falls - Risk of:  Goal: Absence of physical injury  Outcome: Met This Shift     Problem: Pain:  Goal: Pain level will decrease  Outcome: Met This Shift     Problem: Pain:  Goal: Control of acute pain  Outcome: Met This Shift     Problem: Pain:  Goal: Control of chronic pain  Outcome: Met This Shift

## 2021-11-17 ENCOUNTER — TELEPHONE (OUTPATIENT)
Dept: FAMILY MEDICINE CLINIC | Age: 86
End: 2021-11-17

## 2021-11-17 RX ORDER — LOSARTAN POTASSIUM 25 MG/1
25 TABLET ORAL EVERY 12 HOURS
Qty: 180 TABLET | Refills: 3 | Status: SHIPPED | OUTPATIENT
Start: 2021-11-17

## 2021-11-17 NOTE — TELEPHONE ENCOUNTER
Patient daughter called in with   BP readings for the last 3 days   11/15 - 139/85 pulse 89  11/16 - 148/90 pulse 79  11/17- 134/74 pulse 77    Patient is also checking the status on her blood pressure medication losartan potasium please add refills to the RX as well.  Patient is all out Please advise

## 2021-12-21 ENCOUNTER — HOSPITAL ENCOUNTER (EMERGENCY)
Age: 86
Discharge: HOME | End: 2021-12-21
Payer: MEDICARE

## 2021-12-21 ENCOUNTER — APPOINTMENT (OUTPATIENT)
Dept: CT IMAGING | Age: 86
End: 2021-12-21
Payer: MEDICARE

## 2021-12-21 VITALS
HEART RATE: 106 BPM | SYSTOLIC BLOOD PRESSURE: 135 MMHG | DIASTOLIC BLOOD PRESSURE: 64 MMHG | BODY MASS INDEX: 33.13 KG/M2 | OXYGEN SATURATION: 95 % | WEIGHT: 180 LBS | RESPIRATION RATE: 20 BRPM | TEMPERATURE: 100.6 F | HEIGHT: 62 IN

## 2021-12-21 DIAGNOSIS — E86.0 DEHYDRATION: ICD-10-CM

## 2021-12-21 DIAGNOSIS — R91.1 PULMONARY NODULE, RIGHT: ICD-10-CM

## 2021-12-21 DIAGNOSIS — S09.90XA CLOSED HEAD INJURY, INITIAL ENCOUNTER: Primary | ICD-10-CM

## 2021-12-21 LAB
ALBUMIN SERPL-MCNC: 4.3 G/DL (ref 3.5–4.6)
ALP BLD-CCNC: 125 U/L (ref 40–130)
ALT SERPL-CCNC: 12 U/L (ref 0–33)
ANION GAP SERPL CALCULATED.3IONS-SCNC: 15 MEQ/L (ref 9–15)
APTT: 25.4 SEC (ref 24.4–36.8)
AST SERPL-CCNC: 23 U/L (ref 0–35)
BACTERIA: NEGATIVE /HPF
BASOPHILS ABSOLUTE: 0 K/UL (ref 0–0.2)
BASOPHILS RELATIVE PERCENT: 0.3 %
BILIRUB SERPL-MCNC: 0.8 MG/DL (ref 0.2–0.7)
BILIRUBIN URINE: NEGATIVE
BLOOD, URINE: ABNORMAL
BUN BLDV-MCNC: 17 MG/DL (ref 8–23)
CALCIUM SERPL-MCNC: 9.3 MG/DL (ref 8.5–9.9)
CHLORIDE BLD-SCNC: 103 MEQ/L (ref 95–107)
CLARITY: CLEAR
CO2: 23 MEQ/L (ref 20–31)
COLOR: YELLOW
CREAT SERPL-MCNC: 1.09 MG/DL (ref 0.5–0.9)
EKG ATRIAL RATE: 142 BPM
EKG P AXIS: 0 DEGREES
EKG P-R INTERVAL: 126 MS
EKG Q-T INTERVAL: 280 MS
EKG QRS DURATION: 74 MS
EKG QTC CALCULATION (BAZETT): 430 MS
EKG R AXIS: 178 DEGREES
EKG T AXIS: 50 DEGREES
EKG VENTRICULAR RATE: 142 BPM
EOSINOPHILS ABSOLUTE: 0 K/UL (ref 0–0.7)
EOSINOPHILS RELATIVE PERCENT: 0.2 %
EPITHELIAL CELLS, UA: ABNORMAL /HPF (ref 0–5)
GFR AFRICAN AMERICAN: 46
GFR AFRICAN AMERICAN: 56.6
GFR NON-AFRICAN AMERICAN: 38
GFR NON-AFRICAN AMERICAN: 46.8
GLOBULIN: 3.2 G/DL (ref 2.3–3.5)
GLUCOSE BLD-MCNC: 109 MG/DL (ref 70–99)
GLUCOSE URINE: NEGATIVE MG/DL
HCT VFR BLD CALC: 44 % (ref 37–47)
HEMOGLOBIN: 14.5 G/DL (ref 12–16)
HYALINE CASTS: ABNORMAL /HPF (ref 0–5)
INR BLD: 1.2
KETONES, URINE: NEGATIVE MG/DL
LEUKOCYTE ESTERASE, URINE: ABNORMAL
LYMPHOCYTES ABSOLUTE: 0.3 K/UL (ref 1–4.8)
LYMPHOCYTES RELATIVE PERCENT: 5.7 %
MCH RBC QN AUTO: 28.9 PG (ref 27–31.3)
MCHC RBC AUTO-ENTMCNC: 33 % (ref 33–37)
MCV RBC AUTO: 87.7 FL (ref 82–100)
MONOCYTES ABSOLUTE: 0 K/UL (ref 0.2–0.8)
MONOCYTES RELATIVE PERCENT: 0.5 %
NEUTROPHILS ABSOLUTE: 5.5 K/UL (ref 1.4–6.5)
NEUTROPHILS RELATIVE PERCENT: 93.3 %
NITRITE, URINE: NEGATIVE
PDW BLD-RTO: 14.3 % (ref 11.5–14.5)
PERFORMED ON: ABNORMAL
PH UA: 6 (ref 5–9)
PLATELET # BLD: 123 K/UL (ref 130–400)
POC CREATININE: 1.3 MG/DL (ref 0.6–1.2)
POC SAMPLE TYPE: ABNORMAL
POTASSIUM SERPL-SCNC: 5.1 MEQ/L (ref 3.4–4.9)
PROTEIN UA: NEGATIVE MG/DL
PROTHROMBIN TIME: 15 SEC (ref 12.3–14.9)
RBC # BLD: 5.02 M/UL (ref 4.2–5.4)
RBC UA: ABNORMAL /HPF (ref 0–5)
SARS-COV-2, NAAT: NOT DETECTED
SODIUM BLD-SCNC: 141 MEQ/L (ref 135–144)
SPECIFIC GRAVITY UA: 1.01 (ref 1–1.03)
TOTAL CK: 116 U/L (ref 0–170)
TOTAL PROTEIN: 7.5 G/DL (ref 6.3–8)
TROPONIN: <0.01 NG/ML (ref 0–0.01)
URINE REFLEX TO CULTURE: ABNORMAL
UROBILINOGEN, URINE: 0.2 E.U./DL
WBC # BLD: 5.9 K/UL (ref 4.8–10.8)
WBC UA: ABNORMAL /HPF (ref 0–5)

## 2021-12-21 PROCEDURE — 2580000003 HC RX 258: Performed by: PHYSICIAN ASSISTANT

## 2021-12-21 PROCEDURE — 72125 CT NECK SPINE W/O DYE: CPT

## 2021-12-21 PROCEDURE — 6830039000 HC L3 TRAUMA ALERT

## 2021-12-21 PROCEDURE — 36415 COLL VENOUS BLD VENIPUNCTURE: CPT

## 2021-12-21 PROCEDURE — 85730 THROMBOPLASTIN TIME PARTIAL: CPT

## 2021-12-21 PROCEDURE — 6360000002 HC RX W HCPCS: Performed by: PHYSICIAN ASSISTANT

## 2021-12-21 PROCEDURE — 80053 COMPREHEN METABOLIC PANEL: CPT

## 2021-12-21 PROCEDURE — 96374 THER/PROPH/DIAG INJ IV PUSH: CPT

## 2021-12-21 PROCEDURE — 70450 CT HEAD/BRAIN W/O DYE: CPT

## 2021-12-21 PROCEDURE — 71260 CT THORAX DX C+: CPT

## 2021-12-21 PROCEDURE — 96375 TX/PRO/DX INJ NEW DRUG ADDON: CPT

## 2021-12-21 PROCEDURE — 93005 ELECTROCARDIOGRAM TRACING: CPT | Performed by: PHYSICIAN ASSISTANT

## 2021-12-21 PROCEDURE — 6360000004 HC RX CONTRAST MEDICATION: Performed by: PHYSICIAN ASSISTANT

## 2021-12-21 PROCEDURE — 82550 ASSAY OF CK (CPK): CPT

## 2021-12-21 PROCEDURE — 84484 ASSAY OF TROPONIN QUANT: CPT

## 2021-12-21 PROCEDURE — 81001 URINALYSIS AUTO W/SCOPE: CPT

## 2021-12-21 PROCEDURE — 85610 PROTHROMBIN TIME: CPT

## 2021-12-21 PROCEDURE — 87635 SARS-COV-2 COVID-19 AMP PRB: CPT

## 2021-12-21 PROCEDURE — 96361 HYDRATE IV INFUSION ADD-ON: CPT

## 2021-12-21 PROCEDURE — 99285 EMERGENCY DEPT VISIT HI MDM: CPT

## 2021-12-21 PROCEDURE — 85025 COMPLETE CBC W/AUTO DIFF WBC: CPT

## 2021-12-21 RX ORDER — ONDANSETRON 2 MG/ML
4 INJECTION INTRAMUSCULAR; INTRAVENOUS ONCE
Status: COMPLETED | OUTPATIENT
Start: 2021-12-21 | End: 2021-12-21

## 2021-12-21 RX ORDER — SODIUM CHLORIDE 9 MG/ML
INJECTION, SOLUTION INTRAVENOUS CONTINUOUS
Status: DISCONTINUED | OUTPATIENT
Start: 2021-12-21 | End: 2021-12-21

## 2021-12-21 RX ORDER — SODIUM CHLORIDE 0.9 % (FLUSH) 0.9 %
10 SYRINGE (ML) INJECTION 2 TIMES DAILY
Status: DISCONTINUED | OUTPATIENT
Start: 2021-12-21 | End: 2021-12-21

## 2021-12-21 RX ORDER — LORAZEPAM 2 MG/ML
0.5 INJECTION INTRAMUSCULAR ONCE
Status: COMPLETED | OUTPATIENT
Start: 2021-12-21 | End: 2021-12-21

## 2021-12-21 RX ORDER — 0.9 % SODIUM CHLORIDE 0.9 %
1000 INTRAVENOUS SOLUTION INTRAVENOUS ONCE
Status: DISCONTINUED | OUTPATIENT
Start: 2021-12-21 | End: 2021-12-21

## 2021-12-21 RX ORDER — 0.9 % SODIUM CHLORIDE 0.9 %
500 INTRAVENOUS SOLUTION INTRAVENOUS ONCE
Status: DISCONTINUED | OUTPATIENT
Start: 2021-12-21 | End: 2021-12-21

## 2021-12-21 RX ADMIN — LORAZEPAM 0.5 MG: 2 INJECTION INTRAMUSCULAR; INTRAVENOUS at 08:23

## 2021-12-21 RX ADMIN — IOPAMIDOL 100 ML: 612 INJECTION, SOLUTION INTRAVENOUS at 08:58

## 2021-12-21 RX ADMIN — ONDANSETRON 4 MG: 2 INJECTION INTRAMUSCULAR; INTRAVENOUS at 08:08

## 2021-12-21 RX ADMIN — SODIUM CHLORIDE: 9 INJECTION, SOLUTION INTRAVENOUS at 08:08

## 2021-12-21 ASSESSMENT — PAIN SCALES - GENERAL
PAINLEVEL_OUTOF10: 6
PAINLEVEL_OUTOF10: 6

## 2021-12-21 ASSESSMENT — ENCOUNTER SYMPTOMS
SHORTNESS OF BREATH: 0
CONSTIPATION: 0
VOMITING: 0
EYE DISCHARGE: 0
NAUSEA: 1
RHINORRHEA: 0
SORE THROAT: 0
ABDOMINAL DISTENTION: 0
COLOR CHANGE: 0
ABDOMINAL PAIN: 0

## 2021-12-21 ASSESSMENT — PAIN DESCRIPTION - ORIENTATION
ORIENTATION: RIGHT
ORIENTATION: RIGHT

## 2021-12-21 ASSESSMENT — PAIN DESCRIPTION - LOCATION
LOCATION: RIB CAGE
LOCATION: RIB CAGE

## 2021-12-21 ASSESSMENT — PAIN DESCRIPTION - PAIN TYPE
TYPE: ACUTE PAIN
TYPE: ACUTE PAIN

## 2021-12-21 NOTE — ED NOTES
Family is at bedside and pt keeps trying to get out of bed. Pt states she needs to pee (pt is hooked up to the [address]). Pt took off her oxygen off. Pt was placed back on her oxygen.       Crista Li RN  12/21/21 [id number]

## 2021-12-21 NOTE — CARE COORDINATION
FAMILY REQUESTING A BSC FOR PT AT HOME SINCE SHE FELL COMING OUT OF THE BATHROOM. THEY SAID THAT SHE HAS WEAKNESS. I RECOMMENDED HHC WITH NURSE, PT, OT AND SW. THEY AGREED AND THEY CHOSE [facility]. [address] TAKES [employer] AND I NOTIFIED TIP IN INTAKE ABOUT THE HHC ORDER. THE TWO DAUGHTERS WERE GIVEN A PRESCRIPTION FOR BSC.  CALLED [facility] TO TAKE PT BACK TO HER HOME. ETA  2 - 3 HOURS.

## 2021-12-21 NOTE — ED PROVIDER NOTES
[facility] [facility] ED  eMERGENCY dEPARTMENT eNCOUnter      Pt Name: Quinten Freeman  MRN: [id number]  Juaquingfpatrizia 5/19/1928  Date of evaluation: 12/21/2021  Provider: Rafita Estrada PA-C    CHIEF COMPLAINT       Chief Complaint   Patient presents with    Fall     fell no loc did not hit head c/o right rib pain,  family reports flu like symptoms          HISTORY OF PRESENT ILLNESS   (Location/Symptom, Timing/Onset,Context/Setting, Quality, Duration, Modifying Factors, Severity)  Note limiting factors. Quinten Freeman is a 80 y.o. female who presents to the emergency department length of right-sided rib pain secondary to a fall. Patient states that she has not been feeling well since yesterday, body aches, chills, cough, she states she normally has difficulty walking walks with a Rollator. This morning she lost her balance and fell striking the right side of her chest on a Rollator she fell, she denies any loss of conscious, no head neck or back pain. Past medical history significant for anxiety, COPD, hypertension. HPI    NursingNotes were reviewed. REVIEW OF SYSTEMS    (2-9 systems for level 4, 10 or more for level 5)     Review of Systems   Constitutional: Positive for fatigue. Negative for activity change, appetite change and chills. HENT: Negative for congestion, ear discharge, ear pain, nosebleeds, rhinorrhea and sore throat. Eyes: Negative for discharge. Respiratory: Negative for shortness of breath. Cardiovascular: Negative for chest pain, palpitations and leg swelling. Gastrointestinal: Positive for nausea. Negative for abdominal distention, abdominal pain, constipation and vomiting. Genitourinary: Negative for difficulty urinating and dysuria. Musculoskeletal: Positive for myalgias. Negative for arthralgias. Skin: Negative for color change. Neurological: Negative for dizziness, syncope, numbness and headaches.    Psychiatric/Behavioral: Negative for agitation and confusion. Except as noted above the remainder of the review of systems was reviewed and negative. PAST MEDICAL HISTORY     Past Medical History:   Diagnosis Date    Anxiety     Cancer ([address]     Glaucoma     Gout     left knee    Hypertension     Lung disease     Obesity (BMI 30-39. 9) 7/11/2019    Obstructive sleep apnea 7/11/2019         SURGICALHISTORY       Past Surgical History:   Procedure Laterality Date    CATARACT REMOVAL WITH IMPLANT Bilateral 2016    COLON SURGERY  2009    polyps    COLONOSCOPY  02/2009    GASTROSTOMY TUBE PLACEMENT N/A 11/4/2020    EGD PEG TUBE PLACEMENT performed by Glo Moritz, MD at [address]  2002    graft from neck         CURRENT MEDICATIONS       Discharge Medication List as of 12/21/2021 10:29 AM      CONTINUE these medications which have NOT CHANGED    Details   losartan (COZAAR) 25 MG tablet Take 1 tablet by mouth every 12 hours, Disp-180 tablet, R-3Normal      atorvastatin (LIPITOR) 20 MG tablet TAKE 1 TABLET BY MOUTH EVERY DAY, Disp-90 tablet, R-0Normal      Elastic Bandages & Supports (MEDICAL COMPRESSION STOCKINGS) MISC Disp-1 each, R-0, PrintKnee high, 20 to 30 mm hg, remove at night, pharmacy to fit.      mometasone (ELOCON) 0.1 % cream Apply thin layer topically every 12 hours to rash on hand and legs. , Disp-45 g, R-0, Normal      zoster recombinant adjuvanted vaccine (SHINGRIX) 50 MCG/0.5ML SUSR injection Inject 0.5 mLs into the muscle See Admin Instructions 1 dose now and repeat in 2-6 months, Disp-0.5 mL, R-0Normal      fluticasone-salmeterol (ADVAIR DISKUS) 250-50 MCG/DOSE AEPB Inhale 1 puff into the lungs every 12 hours, Disp-180 each, R-3Normal      Handicap Placard MISC Starting Thu 4/8/2021, Disp-1 each, R-0, PrintGood for 5 years      montelukast (SINGULAIR) 10 MG tablet Take 1 tablet by mouth nightly as needed (allergies), Disp-90 tablet, R-3Normal      ipratropium-albuterol (DUONEB) 0.5-2.5 (3) MG/3ML SOLN nebulizer solution Inhale 3 mLs into the lungs every 6 hours as needed for Shortness of Breath ((or wheezing)), Disp-360 mL, R-11Normal      metoprolol tartrate (LOPRESSOR) 25 MG tablet TAKE 1/2 TABLET BY MOUTH TWICE A DAY, Disp-90 tablet, R-3Normal      albuterol sulfate HFA (PROAIR HFA) 108 (90 Base) MCG/ACT inhaler Inhale 1 puff into the lungs every 6 hours as needed for Wheezing or Shortness of Breath, Disp-1 Inhaler, R-11Normal      OXYGEN Inhale 2 L into the lungs Indications: PRN at bedtimeHistorical Med      latanoprost (XALATAN) 0.005 % ophthalmic solution INSTILL 1 DROP INTO BOTH EYES EVERY DAY      dorzolamide-timolol (COSOPT) 22.3-6.8 MG/ML ophthalmic solution Use 1 Drop in both eyes every 12 hours. brimonidine (ALPHAGAN P) 0.15 % ophthalmic solution INSTILL 1 DROP INTO BOTH EYES TWICE DAILY., R-3             ALLERGIES     Norvasc [amlodipine besylate]    FAMILY HISTORY     History reviewed. No pertinent family history. SOCIAL HISTORY       Social History     Socioeconomic History    Marital status:      Spouse name: None    Number of children: None    Years of education: None    Highest education level: None   Occupational History    None   Tobacco Use    Smoking status: Never Smoker    Smokeless tobacco: Never Used   Substance and Sexual Activity    Alcohol use: No    Drug use: No    Sexual activity: None   Other Topics Concern    None   Social History Narrative    None     Social Determinants of Health     Financial Resource Strain:     Difficulty of Paying Living Expenses: Not on file   Food Insecurity:     Worried About Running Out of Food in the Last Year: Not on file    Deborah of Food in the Last Year: Not on file   Transportation Needs:     Lack of Transportation (Medical): Not on file    Lack of Transportation (Non-Medical):  Not on file   Physical Activity:     Days of Exercise per Week: Not on file    Minutes of Exercise per Session: Not on file   Stress:  Feeling of Stress : Not on file   Social Connections:     Frequency of Communication with Friends and Family: Not on file    Frequency of Social Gatherings with Friends and Family: Not on file    Attends Buddhism Services: Not on file    Active Member of Clubs or Organizations: Not on file    Attends Club or Organization Meetings: Not on file    Marital Status: Not on file   Intimate Partner Violence:     Fear of Current or Ex-Partner: Not on file    Emotionally Abused: Not on file    Physically Abused: Not on file    Sexually Abused: Not on file   Housing Stability:     Unable to Pay for Housing in the Last Year: Not on file    Number of [place] in the Last Year: Not on file    Unstable Housing in the Last Year: Not on file       SCREENINGS    [place] Coma Scale  Eye Opening: Spontaneous  Best Verbal Response: Confused  Best Motor Response: Obeys commands  [place] Coma Scale Score: 14Glasgow Coma Scale (Birth - 2 yrs)  Eye Opening: Spontaneous  Best Auditory/Visual Stimuli Response: Smiles, listens, follows  Best Motor Response: Obeys commands  [place] Coma Scale Score: [email or web address]      PHYSICAL EXAM    (up to 7 for level 4, 8 or more for level 5)     ED Triage Vitals   BP Temp Temp src Pulse Resp SpO2 Height Weight   12/21/21 0720 12/21/21 0726 -- 12/21/21 0720 12/21/21 0720 12/21/21 0720 12/21/21 0724 12/21/21 0724   (!) 153/89 100.6 °F (38.1 °C)  125 20 94 % 5' 2\" (1.575 m) 180 lb (81.6 kg)       Physical Exam  Vitals and nursing note reviewed. Constitutional:       General: She is not in acute distress. Appearance: She is well-developed. She is not ill-appearing, toxic-appearing or diaphoretic. HENT:      Head: Normocephalic. Nose: No congestion. Mouth/Throat:      Mouth: Mucous membranes are moist.      Pharynx: No oropharyngeal exudate or posterior oropharyngeal erythema. Eyes:      Extraocular Movements: Extraocular movements intact.       Conjunctiva/sclera: Conjunctivae normal.      Pupils: Pupils are equal, round, and reactive to light. Neck:      Vascular: No JVD. Trachea: No tracheal deviation. Cardiovascular:      Rate and Rhythm: Normal rate. Pulses: Normal pulses. Heart sounds: Normal heart sounds. No murmur heard. No friction rub. No gallop. Pulmonary:      Effort: Pulmonary effort is normal. No tachypnea, accessory muscle usage, respiratory distress or retractions. Breath sounds: No stridor. No wheezing, rhonchi or rales. Comments: Lung sounds are clear in all fields, no wheezes rales or rhonchi, no accessory muscle use, retractions, no crepitus or instability, no flail segments or paradoxical movement. Room air saturations 95%. Chest:      Chest wall: No tenderness. Abdominal:      General: Abdomen is flat. Bowel sounds are normal. There is no distension or abdominal bruit. Palpations: There is no shifting dullness, fluid wave, hepatomegaly, splenomegaly, mass or pulsatile mass. Tenderness: There is no abdominal tenderness. There is no right CVA tenderness, left CVA tenderness, guarding or rebound. Negative signs include Camara's sign, Rovsing's sign and McBurney's sign. Musculoskeletal:         General: No deformity. Cervical back: Normal range of motion and neck supple. No rigidity. Skin:     General: Skin is warm and dry. Capillary Refill: Capillary refill takes less than 2 seconds. Coloration: Skin is not jaundiced. Neurological:      General: No focal deficit present. Mental Status: She is alert and oriented to person, place, and time. Mental status is at baseline. Cranial Nerves: No cranial nerve deficit. Sensory: No sensory deficit. Motor: No weakness.       Coordination: Coordination normal.   Psychiatric:         Mood and Affect: Mood normal.         DIAGNOSTIC RESULTS     EKG: All EKG's are interpreted by the Emergency Department Physician who either signs or routine follow-up                  6-8 mm     CT at 3-6 months, then consider CT at 18-24 months                   >8 mm     CT at 3-6 months, then  consider CT at 18-24 months   Use most suspicious nodule as guide to management. Follow-up intervals may vary according to size and risk (recommendation 2A). High risk**                   <6 mm     Optional CT at 12 months                  6-8 mm     CT at 3-6 months, then at 18-24 months                  >8 mm     CT at 3-6 months, then at 18-24 months   Use most suspicious nodule as guide to management. Follow-up intervals may vary according to size and risk (recommendation 2A). B: Subsolid Nodules*           Single                        Ground glass                   <6 mm     No routine follow-up                 >=6 mm     CT at 6-12 months to confirm persistence, then CT  every 2 years until 5 years   In certain suspicious nodules <6 mm, consider follow-up at 2 and 4 years. If solid component(s) or growth develops, consider resection. (Recommendations 3A and 4A). Part solid                   <6 mm     No routine follow-up                 >=6 mm     CT at 3-6 months to confirm persistence. If unchanged and solid component remains <6 mm, annual CT  should be performed for 5 years. In practice, part-solid nodules cannot be defined as such until >=6 mm, and nodules <6 mm do not usually require follow-up. Persistent part-solid nodules with solid components >=6 mm should be considered highly suspicious (recommendations 4A-4C)           Multiple                   <6 mm     CT at 3-6 months. If stable, consider CT at 2 and 4 years. >=6 mm     CT at 3-6 months. Subsequent management based  on the most suspicious nodule(s). Multiple <6 mm pure ground-glass nodules are usually benign, but consider follow-up in selected patients at high risk at 2 and 4 years (recommendation 5A). Note. --These recommendations do not apply to lung cancer screening, patients with immunosuppression, or patients with known primary cancer. * Dimensions are average of long and short axes, rounded to the nearest millimeter. ** Consider all relevant risk factors (see Risk Factors). ____________________________________________________________         All CT scans at this facility use dose modulation, iterative reconstruction, and/or weight based dosing when appropriate to reduce radiation dose to as low as reasonably achievable. CT Head WO Contrast   Final Result   Impression:      Mild cerebral atrophy. Chronic ischemic white matter disease. Minimal/mild ethmoid sinusitis. All CT scans at this facility use dose modulation, iterative reconstruction, and/or weight based dosing when appropriate to reduce radiation dose to as low as reasonably achievable. CT CERVICAL SPINE WO CONTRAST   Final Result   No acute cervical spine abnormality.                   ED BEDSIDE ULTRASOUND:   Performed by ED Physician - none    LABS:  Labs Reviewed   COMPREHENSIVE METABOLIC PANEL - Abnormal; Notable for the following components:       Result Value    Potassium 5.1 (*)     Glucose 109 (*)     CREATININE 1.09 (*)     GFR Non- 46.8 (*)     GFR  56.6 (*)     Total Bilirubin 0.8 (*)     All other components within normal limits   CBC WITH AUTO DIFFERENTIAL - Abnormal; Notable for the following components:    Platelets 171 (*)     Lymphocytes Absolute 0.3 (*)     Monocytes Absolute 0.0 (*)     All other components within normal limits   URINE RT REFLEX TO CULTURE - Abnormal; Notable for the following components:    Blood, Urine SMALL (*)     Leukocyte Esterase, Urine TRACE (*)     All other components within normal limits   PROTIME-INR - Abnormal; Notable for the following components:    Protime 15.0 (*)     All other components within normal limits   MICROSCOPIC URINALYSIS - Abnormal; Notable for the following components:    WBC, UA 6-9 (*)     RBC, UA 6-10 (*)     All other components within normal limits   POCT VENOUS - Abnormal; Notable for the following components:    POC Creatinine 1.3 (*)     GFR Non- 38 (*)     GFR  46 (*)     All other components within normal limits   COVID-19, RAPID   TROPONIN   CK   APTT       All other labs were within normal range or not returned as of this dictation. EMERGENCY DEPARTMENT COURSE and DIFFERENTIAL DIAGNOSIS/MDM:   Vitals:    Vitals:    12/21/21 0931 12/21/21 1045 12/21/21 1237 12/21/21 1356   BP: (!) 160/73 (!) 132/56 131/60 135/64   Pulse: 121 126 110 106   Resp: 20 20 20 20   Temp:       SpO2: 94% 95% 95% 95%   Weight:       Height:                MDM  Number of Diagnoses or Management Options  Closed head injury, initial encounter  Dehydration  Pulmonary nodule, right  Diagnosis management comments: Patient presented to the emergency department with complaint of right-sided rib pain secondary to fall she states this did occur yesterday. CT scan head and neck showed no acute process, CT scan of the chest shows no acute fractures or subluxations, no pneumothorax. Patient was discharged with a diagnosis of closed head injury, dehydration, pulmonary nodule. She and her family were advised of the pulmonary nodule, need for follow-up with her regular family physician, as well as given referral to pulmonology. She was advised if she has worsening or change symptoms, she should return to the ER for reevaluation. CRITICAL CARE TIME   Total Critical Care time was 0 minutes, excluding separately reportableprocedures. There was a high probability of clinicallysignificant/life threatening deterioration in the patient's condition which required my urgent intervention.      CONSULTS:  IP CONSULT TO HOME CARE NEEDS    PROCEDURES:  Unless otherwise noted below, none     Procedures    FINAL IMPRESSION      1. Closed head injury, initial encounter    2. Dehydration    3.  Pulmonary nodule, right          DISPOSITION/PLAN   DISPOSITION Decision To Discharge 12/21/2021 10:23:16 AM      PATIENT REFERRED TO:  Judge Luis Fernando MD  [address] [place] [phone]    In 2 days      Judge Gould, [facility] [phone]    In 2 days      Argentina Lara MD  [address] [id number] [phone]    In 1 week        DISCHARGE MEDICATIONS:  Discharge Medication List as of 12/21/2021 10:29 AM             (Please note that portions of this note were completed with a voice recognition program.  Efforts were made to edit the dictations but occasionally words are mis-transcribed.)    Elvira Love PA-C (electronically signed)  Attending Emergency Physician         Elvira Love PA-C  12/26/21 808 [address], [place]  12/26/21 4746

## 2021-12-21 NOTE — ED NOTES
Bed: 11  Expected date: 12/21/21  Expected time: 7:07 AM  Means of arrival: [facility]  Comments:  CAT 3Trauma  80year old female fall  Right rib pain  199/84  122  24  95% 4 liter     Alejandro Zamorano RN  12/21/21 5415

## 2021-12-21 NOTE — ED TRIAGE NOTES
Patient with rib pain and flu like symptoms, fell walking with rolator and landed on right rib, nauseous

## 2021-12-22 ENCOUNTER — TELEPHONE (OUTPATIENT)
Dept: FAMILY MEDICINE CLINIC | Age: 86
End: 2021-12-22

## 2021-12-26 ENCOUNTER — HOSPITAL ENCOUNTER (INPATIENT)
Age: 86
LOS: 10 days | Discharge: NURSING, REHAB OR LONG-TERM CARE | DRG: 871 | End: 2022-01-05
Attending: STUDENT IN AN ORGANIZED HEALTH CARE EDUCATION/TRAINING PROGRAM | Admitting: INTERNAL MEDICINE
Payer: MEDICARE

## 2021-12-26 ENCOUNTER — APPOINTMENT (OUTPATIENT)
Dept: GENERAL RADIOLOGY | Age: 86
DRG: 871 | End: 2021-12-26
Payer: MEDICARE

## 2021-12-26 DIAGNOSIS — R62.7 FAILURE TO THRIVE IN ADULT: ICD-10-CM

## 2021-12-26 DIAGNOSIS — N30.00 ACUTE CYSTITIS WITHOUT HEMATURIA: ICD-10-CM

## 2021-12-26 DIAGNOSIS — J18.9 PNEUMONIA OF BOTH LOWER LOBES DUE TO INFECTIOUS ORGANISM: Primary | ICD-10-CM

## 2021-12-26 PROBLEM — N39.0 UTI (URINARY TRACT INFECTION): Status: ACTIVE | Noted: 2021-12-26

## 2021-12-26 LAB
ALBUMIN SERPL-MCNC: 3.1 G/DL (ref 3.5–4.6)
ALP BLD-CCNC: 236 U/L (ref 40–130)
ALT SERPL-CCNC: 27 U/L (ref 0–33)
ANION GAP SERPL CALCULATED.3IONS-SCNC: 14 MEQ/L (ref 9–15)
AST SERPL-CCNC: 38 U/L (ref 0–35)
BACTERIA: ABNORMAL /HPF
BASOPHILS ABSOLUTE: 0.1 K/UL (ref 0–0.2)
BASOPHILS RELATIVE PERCENT: 0.4 %
BILIRUB SERPL-MCNC: 0.8 MG/DL (ref 0.2–0.7)
BILIRUBIN URINE: NEGATIVE
BLOOD, URINE: ABNORMAL
BUN BLDV-MCNC: 20 MG/DL (ref 8–23)
CALCIUM SERPL-MCNC: 8.4 MG/DL (ref 8.5–9.9)
CHLORIDE BLD-SCNC: 102 MEQ/L (ref 95–107)
CLARITY: ABNORMAL
CO2: 21 MEQ/L (ref 20–31)
COLOR: YELLOW
CREAT SERPL-MCNC: 1.06 MG/DL (ref 0.5–0.9)
EOSINOPHILS ABSOLUTE: 0 K/UL (ref 0–0.7)
EOSINOPHILS RELATIVE PERCENT: 0.1 %
EPITHELIAL CELLS, UA: ABNORMAL /HPF (ref 0–5)
GFR AFRICAN AMERICAN: 58.5
GFR NON-AFRICAN AMERICAN: 48.3
GLOBULIN: 3.8 G/DL (ref 2.3–3.5)
GLUCOSE BLD-MCNC: 114 MG/DL (ref 70–99)
GLUCOSE URINE: NEGATIVE MG/DL
HCT VFR BLD CALC: 37.7 % (ref 37–47)
HEMOGLOBIN: 12.4 G/DL (ref 12–16)
HYALINE CASTS: ABNORMAL /HPF (ref 0–5)
INFLUENZA A BY PCR: NEGATIVE
INFLUENZA B BY PCR: NEGATIVE
INR BLD: 1.4
KETONES, URINE: ABNORMAL MG/DL
LACTIC ACID: 0.9 MMOL/L (ref 0.5–2.2)
LEUKOCYTE ESTERASE, URINE: ABNORMAL
LYMPHOCYTES ABSOLUTE: 0.6 K/UL (ref 1–4.8)
LYMPHOCYTES RELATIVE PERCENT: 5.3 %
MAGNESIUM: 2.4 MG/DL (ref 1.7–2.4)
MCH RBC QN AUTO: 28.3 PG (ref 27–31.3)
MCHC RBC AUTO-ENTMCNC: 32.8 % (ref 33–37)
MCV RBC AUTO: 86.3 FL (ref 82–100)
MONO TEST: NEGATIVE
MONOCYTES ABSOLUTE: 1.2 K/UL (ref 0.2–0.8)
MONOCYTES RELATIVE PERCENT: 9.9 %
NEUTROPHILS ABSOLUTE: 10.3 K/UL (ref 1.4–6.5)
NEUTROPHILS RELATIVE PERCENT: 84.3 %
NITRITE, URINE: POSITIVE
PDW BLD-RTO: 14.4 % (ref 11.5–14.5)
PH UA: 5.5 (ref 5–9)
PLATELET # BLD: 145 K/UL (ref 130–400)
POTASSIUM SERPL-SCNC: 4.5 MEQ/L (ref 3.4–4.9)
PROTEIN UA: 100 MG/DL
PROTHROMBIN TIME: 16.7 SEC (ref 12.3–14.9)
RBC # BLD: 4.37 M/UL (ref 4.2–5.4)
RBC UA: ABNORMAL /HPF (ref 0–5)
REASON FOR REJECTION: NORMAL
REJECTED TEST: NORMAL
SARS-COV-2, NAAT: NOT DETECTED
SODIUM BLD-SCNC: 137 MEQ/L (ref 135–144)
SPECIFIC GRAVITY UA: 1.02 (ref 1–1.03)
STREP GRP A PCR: NEGATIVE
TOTAL CK: 132 U/L (ref 0–170)
TOTAL PROTEIN: 6.9 G/DL (ref 6.3–8)
TROPONIN: <0.01 NG/ML (ref 0–0.01)
TSH SERPL DL<=0.05 MIU/L-ACNC: 1.18 UIU/ML (ref 0.44–3.86)
URINE REFLEX TO CULTURE: YES
UROBILINOGEN, URINE: 1 E.U./DL
WBC # BLD: 12.2 K/UL (ref 4.8–10.8)
WBC UA: >100 /HPF (ref 0–5)

## 2021-12-26 PROCEDURE — 99285 EMERGENCY DEPT VISIT HI MDM: CPT

## 2021-12-26 PROCEDURE — 85610 PROTHROMBIN TIME: CPT

## 2021-12-26 PROCEDURE — 87502 INFLUENZA DNA AMP PROBE: CPT

## 2021-12-26 PROCEDURE — 83605 ASSAY OF LACTIC ACID: CPT

## 2021-12-26 PROCEDURE — 2580000003 HC RX 258: Performed by: STUDENT IN AN ORGANIZED HEALTH CARE EDUCATION/TRAINING PROGRAM

## 2021-12-26 PROCEDURE — 87040 BLOOD CULTURE FOR BACTERIA: CPT

## 2021-12-26 PROCEDURE — 84443 ASSAY THYROID STIM HORMONE: CPT

## 2021-12-26 PROCEDURE — 87077 CULTURE AEROBIC IDENTIFY: CPT

## 2021-12-26 PROCEDURE — 81001 URINALYSIS AUTO W/SCOPE: CPT

## 2021-12-26 PROCEDURE — 96360 HYDRATION IV INFUSION INIT: CPT

## 2021-12-26 PROCEDURE — 84145 PROCALCITONIN (PCT): CPT

## 2021-12-26 PROCEDURE — 80053 COMPREHEN METABOLIC PANEL: CPT

## 2021-12-26 PROCEDURE — 87186 SC STD MICRODIL/AGAR DIL: CPT

## 2021-12-26 PROCEDURE — 85025 COMPLETE CBC W/AUTO DIFF WBC: CPT

## 2021-12-26 PROCEDURE — 86141 C-REACTIVE PROTEIN HS: CPT

## 2021-12-26 PROCEDURE — 83735 ASSAY OF MAGNESIUM: CPT

## 2021-12-26 PROCEDURE — 82550 ASSAY OF CK (CPK): CPT

## 2021-12-26 PROCEDURE — 6360000002 HC RX W HCPCS: Performed by: NURSE PRACTITIONER

## 2021-12-26 PROCEDURE — 1210000000 HC MED SURG R&B

## 2021-12-26 PROCEDURE — 6360000002 HC RX W HCPCS: Performed by: STUDENT IN AN ORGANIZED HEALTH CARE EDUCATION/TRAINING PROGRAM

## 2021-12-26 PROCEDURE — 36415 COLL VENOUS BLD VENIPUNCTURE: CPT

## 2021-12-26 PROCEDURE — 87651 STREP A DNA AMP PROBE: CPT

## 2021-12-26 PROCEDURE — 84484 ASSAY OF TROPONIN QUANT: CPT

## 2021-12-26 PROCEDURE — 2580000003 HC RX 258: Performed by: NURSE PRACTITIONER

## 2021-12-26 PROCEDURE — 51701 INSERT BLADDER CATHETER: CPT

## 2021-12-26 PROCEDURE — 86308 HETEROPHILE ANTIBODY SCREEN: CPT

## 2021-12-26 PROCEDURE — 71045 X-RAY EXAM CHEST 1 VIEW: CPT

## 2021-12-26 PROCEDURE — 87086 URINE CULTURE/COLONY COUNT: CPT

## 2021-12-26 PROCEDURE — 87635 SARS-COV-2 COVID-19 AMP PRB: CPT

## 2021-12-26 PROCEDURE — 96365 THER/PROPH/DIAG IV INF INIT: CPT

## 2021-12-26 PROCEDURE — 87150 DNA/RNA AMPLIFIED PROBE: CPT

## 2021-12-26 RX ORDER — ATORVASTATIN CALCIUM 20 MG/1
20 TABLET, FILM COATED ORAL NIGHTLY
Status: DISCONTINUED | OUTPATIENT
Start: 2021-12-27 | End: 2022-01-05 | Stop reason: HOSPADM

## 2021-12-26 RX ORDER — BUDESONIDE AND FORMOTEROL FUMARATE DIHYDRATE 160; 4.5 UG/1; UG/1
2 AEROSOL RESPIRATORY (INHALATION) 2 TIMES DAILY
Refills: 3 | Status: DISCONTINUED | OUTPATIENT
Start: 2021-12-26 | End: 2022-01-05 | Stop reason: HOSPADM

## 2021-12-26 RX ORDER — ONDANSETRON 2 MG/ML
4 INJECTION INTRAMUSCULAR; INTRAVENOUS EVERY 6 HOURS PRN
Status: DISCONTINUED | OUTPATIENT
Start: 2021-12-26 | End: 2022-01-05 | Stop reason: HOSPADM

## 2021-12-26 RX ORDER — POLYETHYLENE GLYCOL 3350 17 G/17G
17 POWDER, FOR SOLUTION ORAL DAILY PRN
Status: DISCONTINUED | OUTPATIENT
Start: 2021-12-26 | End: 2022-01-05 | Stop reason: HOSPADM

## 2021-12-26 RX ORDER — 0.9 % SODIUM CHLORIDE 0.9 %
1000 INTRAVENOUS SOLUTION INTRAVENOUS ONCE
Status: COMPLETED | OUTPATIENT
Start: 2021-12-26 | End: 2021-12-26

## 2021-12-26 RX ORDER — SODIUM CHLORIDE 0.9 % (FLUSH) 0.9 %
5-40 SYRINGE (ML) INJECTION EVERY 12 HOURS SCHEDULED
Status: DISCONTINUED | OUTPATIENT
Start: 2021-12-26 | End: 2022-01-05 | Stop reason: HOSPADM

## 2021-12-26 RX ORDER — ACETAMINOPHEN 650 MG/1
650 SUPPOSITORY RECTAL EVERY 6 HOURS PRN
Status: DISCONTINUED | OUTPATIENT
Start: 2021-12-26 | End: 2022-01-05 | Stop reason: HOSPADM

## 2021-12-26 RX ORDER — SODIUM CHLORIDE 9 MG/ML
25 INJECTION, SOLUTION INTRAVENOUS PRN
Status: DISCONTINUED | OUTPATIENT
Start: 2021-12-26 | End: 2022-01-05 | Stop reason: HOSPADM

## 2021-12-26 RX ORDER — LABETALOL HYDROCHLORIDE 5 MG/ML
10 INJECTION, SOLUTION INTRAVENOUS EVERY 4 HOURS PRN
Status: DISCONTINUED | OUTPATIENT
Start: 2021-12-26 | End: 2022-01-05 | Stop reason: HOSPADM

## 2021-12-26 RX ORDER — MONTELUKAST SODIUM 10 MG/1
10 TABLET ORAL NIGHTLY PRN
Status: DISCONTINUED | OUTPATIENT
Start: 2021-12-26 | End: 2022-01-05 | Stop reason: HOSPADM

## 2021-12-26 RX ORDER — ACETAMINOPHEN 325 MG/1
650 TABLET ORAL EVERY 6 HOURS PRN
Status: DISCONTINUED | OUTPATIENT
Start: 2021-12-26 | End: 2022-01-05 | Stop reason: HOSPADM

## 2021-12-26 RX ORDER — ONDANSETRON 4 MG/1
4 TABLET, ORALLY DISINTEGRATING ORAL EVERY 8 HOURS PRN
Status: DISCONTINUED | OUTPATIENT
Start: 2021-12-26 | End: 2022-01-05 | Stop reason: HOSPADM

## 2021-12-26 RX ORDER — ALBUTEROL SULFATE 90 UG/1
1 AEROSOL, METERED RESPIRATORY (INHALATION) EVERY 6 HOURS PRN
Status: DISCONTINUED | OUTPATIENT
Start: 2021-12-26 | End: 2022-01-05 | Stop reason: HOSPADM

## 2021-12-26 RX ORDER — SODIUM CHLORIDE 0.9 % (FLUSH) 0.9 %
5-40 SYRINGE (ML) INJECTION PRN
Status: DISCONTINUED | OUTPATIENT
Start: 2021-12-26 | End: 2022-01-05 | Stop reason: HOSPADM

## 2021-12-26 RX ORDER — IPRATROPIUM BROMIDE AND ALBUTEROL SULFATE 2.5; .5 MG/3ML; MG/3ML
1 SOLUTION RESPIRATORY (INHALATION) EVERY 6 HOURS PRN
Status: DISCONTINUED | OUTPATIENT
Start: 2021-12-26 | End: 2022-01-05 | Stop reason: HOSPADM

## 2021-12-26 RX ORDER — SODIUM CHLORIDE 9 MG/ML
INJECTION, SOLUTION INTRAVENOUS CONTINUOUS
Status: DISCONTINUED | OUTPATIENT
Start: 2021-12-26 | End: 2022-01-03

## 2021-12-26 RX ADMIN — ENOXAPARIN SODIUM 40 MG: 100 INJECTION SUBCUTANEOUS at 20:24

## 2021-12-26 RX ADMIN — SODIUM CHLORIDE 1000 ML: 9 INJECTION, SOLUTION INTRAVENOUS at 14:13

## 2021-12-26 RX ADMIN — PIPERACILLIN AND TAZOBACTAM 3375 MG: 3; .375 INJECTION, POWDER, LYOPHILIZED, FOR SOLUTION INTRAVENOUS at 14:14

## 2021-12-26 RX ADMIN — PIPERACILLIN AND TAZOBACTAM 3375 MG: 3; .375 INJECTION, POWDER, LYOPHILIZED, FOR SOLUTION INTRAVENOUS at 21:53

## 2021-12-26 RX ADMIN — SODIUM CHLORIDE: 9 INJECTION, SOLUTION INTRAVENOUS at 20:21

## 2021-12-26 ASSESSMENT — ENCOUNTER SYMPTOMS
COUGH: 1
CHEST TIGHTNESS: 0
SHORTNESS OF BREATH: 0
TROUBLE SWALLOWING: 0
SINUS PRESSURE: 0
ABDOMINAL PAIN: 0
DIARRHEA: 0
BACK PAIN: 0
VOMITING: 0

## 2021-12-26 ASSESSMENT — PAIN SCALES - GENERAL: PAINLEVEL_OUTOF10: 5

## 2021-12-26 ASSESSMENT — PAIN DESCRIPTION - LOCATION: LOCATION: NECK

## 2021-12-26 ASSESSMENT — PAIN DESCRIPTION - PAIN TYPE: TYPE: ACUTE PAIN

## 2021-12-26 ASSESSMENT — PAIN DESCRIPTION - DESCRIPTORS: DESCRIPTORS: ACHING

## 2021-12-26 ASSESSMENT — PAIN DESCRIPTION - FREQUENCY: FREQUENCY: CONTINUOUS

## 2021-12-26 NOTE — ED TRIAGE NOTES
Pt presents via EMS c/o general illness and body aches. EMS also reports per family pt has not been eating and drinking much. Pt is A&Ox4 though hard of hearing, but answers all questions appropriately. Pt verbalizes pain, skin is warm to the touch and moist. O2 via NC placed by EMS, pt does wear O2 at home. IV access obtained via EMS.

## 2021-12-26 NOTE — H&P
[place] [address] MEDICINE    HISTORY AND PHYSICAL EXAM    PATIENT NAME:  Arlene Miller    MRN:  [id number]  SERVICE DATE:  12/26/2021   SERVICE TIME:  5:28 PM    Primary Care Physician: Ira Roach MD     SUBJECTIVE  CHIEF COMPLAINT: Poor intake, SOB    HPI:  Arlene Miller is a 80 y.o., , female who has PMH HTN, COPD (home o2 PRN), FRED, venous stasis, RA, dysphagia, and skin cancer presented with worsening shortness of breath, decreased intake, weakness, and decreased mobility. Per daughters, symptoms began on Tuesday when she suffered a fall. Was seen in the ED. Work-up was negative other than lung nodules seen on CT. Unable to obtain PCP appointment. C was to start for PT/OT. Last evening shortness of breath worsened through the night. Had Tmax of 100. Daughters reports decline in short term memory over the last several months. In the ED, labs and imaging were completed. Received zosyn and 2L IVF. Admitted under the hospitalist service for further management. PAST MEDICAL HISTORY:    Past Medical History:   Diagnosis Date    Anxiety     Cancer ([address]     Glaucoma     Gout     left knee    Hypertension     Lung disease     Obesity (BMI 30-39. 9) 7/11/2019    Obstructive sleep apnea 7/11/2019     PAST SURGICAL HISTORY:    Past Surgical History:   Procedure Laterality Date    CATARACT REMOVAL WITH IMPLANT Bilateral 2016    COLON SURGERY  2009    polyps    COLONOSCOPY  02/2009    GASTROSTOMY TUBE PLACEMENT N/A 11/4/2020    EGD PEG TUBE PLACEMENT performed by Amy Chun MD at [address] [place] [address]  2002    graft from neck     FAMILY HISTORY:  History reviewed. No pertinent family history. SOCIAL HISTORY:    Social History     Socioeconomic History    Marital status:       Spouse name: Not on file    Number of children: Not on file    Years of education: Not on file    Highest education level: Not on file   Occupational History    Not on file Tobacco Use    Smoking status: Never Smoker    Smokeless tobacco: Never Used   Substance and Sexual Activity    Alcohol use: No    Drug use: No    Sexual activity: Not on file   Other Topics Concern    Not on file   Social History Narrative    Not on file     Social Determinants of Health     Financial Resource Strain:     Difficulty of Paying Living Expenses: Not on file   Food Insecurity:     Worried About Running Out of Food in the Last Year: Not on file    Deborah of Food in the Last Year: Not on file   Transportation Needs:     Lack of Transportation (Medical): Not on file    Lack of Transportation (Non-Medical): Not on file   Physical Activity:     Days of Exercise per Week: Not on file    Minutes of Exercise per Session: Not on file   Stress:     Feeling of Stress : Not on file   Social Connections:     Frequency of Communication with Friends and Family: Not on file    Frequency of Social Gatherings with Friends and Family: Not on file    Attends Voodoo Services: Not on file    Active Member of [employer] or Organizations: Not on file    Attends Club or Organization Meetings: Not on file    Marital Status: Not on file   Intimate Partner Violence:     Fear of Current or Ex-Partner: Not on file    Emotionally Abused: Not on file    Physically Abused: Not on file    Sexually Abused: Not on file   Housing Stability:     Unable to Pay for Housing in the Last Year: Not on file    Number of [place] in the Last Year: Not on file    Unstable Housing in the Last Year: Not on file     MEDICATIONS:   Prior to Admission medications    Medication Sig Start Date End Date Taking?  Authorizing Provider   losartan (COZAAR) 25 MG tablet Take 1 tablet by mouth every 12 hours 11/17/21   Cooper Iglesias MD   atorvastatin (LIPITOR) 20 MG tablet TAKE 1 TABLET BY MOUTH EVERY DAY 10/29/21   Cooper Iglesias MD   Elastic Bandages & Supports (MEDICAL COMPRESSION STOCKINGS) MISC Knee high, 20 to 30 mm hg, remove at night, pharmacy to fit. 8/4/21   Meeta Must, MD   mometasone (ELOCON) 0.1 % cream Apply thin layer topically every 12 hours to rash on hand and legs. 8/4/21   Meeta Must, MD   zoster recombinant adjuvanted vaccine [facility]) 50 MCG/0.5ML SUSR injection Inject 0.5 mLs into the muscle See Admin Instructions 1 dose now and repeat in 2-6 months 8/4/21 1/31/22  Meeta Must, MD   fluticasone-salmeterol (ADVAIR DISKUS) 250-50 MCG/DOSE AEPB Inhale 1 puff into the lungs every 12 hours 4/27/21   Meeta Must, MD   Handicap [place] MISC by Does not apply route Good for 5 years 4/8/21   Meeta Must, MD   montelukast (SINGULAIR) 10 MG tablet Take 1 tablet by mouth nightly as needed (allergies) 4/7/21 4/7/22  Meeta Must, MD   ipratropium-albuterol (DUONEB) 0.5-2.5 (3) MG/3ML SOLN nebulizer solution Inhale 3 mLs into the lungs every 6 hours as needed for Shortness of Breath ((or wheezing)) 1/22/21   Meeta Chilel MD   metoprolol tartrate (LOPRESSOR) 25 MG tablet TAKE 1/2 TABLET BY MOUTH TWICE A DAY 1/11/21   Meeta Must, MD   albuterol sulfate HFA (PROAIR HFA) 108 (90 Base) MCG/ACT inhaler Inhale 1 puff into the lungs every 6 hours as needed for Wheezing or Shortness of Breath 5/13/20   Meeta Must, MD   OXYGEN Inhale 2 L into the lungs Indications: PRN at bedtime    Historical Provider, MD   latanoprost (XALATAN) 0.005 % ophthalmic solution INSTILL 1 DROP INTO BOTH EYES EVERY DAY  Patient not taking: Reported on 8/4/2021 7/17/15   Historical Provider, MD   dorzolamide-timolol (COSOPT) 22.3-6.8 MG/ML ophthalmic solution Use 1 Drop in both eyes every 12 hours. Patient not taking: Reported on 8/4/2021 4/10/15   Historical Provider, MD   brimonidine (ALPHAGAN P) 0.15 % ophthalmic solution INSTILL 1 DROP INTO BOTH EYES TWICE DAILY.   Patient not taking: Reported on 8/4/2021 2/7/17   Historical Provider, MD       ALLERGIES: Norvasc [amlodipine besylate]    REVIEW OF SYSTEM:   Review of Systems   Unable to perform ROS: Dementia        OBJECTIVE  PHYSICAL EXAM:   Physical Exam  Constitutional:       Appearance: She is overweight. She is ill-appearing. HENT:      Head: Normocephalic and atraumatic. Right Ear: External ear normal.      Left Ear: External ear normal.      Nose: Nose normal.      Mouth/Throat:      Mouth: Mucous membranes are dry. Pharynx: Oropharynx is clear. Comments: Tongue dry  Eyes:      Conjunctiva/sclera: Conjunctivae normal.   Cardiovascular:      Rate and Rhythm: Regular rhythm. Tachycardia present. Pulses:           Dorsalis pedis pulses are 1+ on the right side and 1+ on the left side. Pulmonary:      Effort: Tachypnea present. No respiratory distress. Breath sounds: Decreased breath sounds present. No wheezing or rales. Abdominal:      General: Bowel sounds are normal. There is distension. Musculoskeletal:      Right lower le+ Edema present. Left lower le+ Edema present. Comments: Herberden's and sonya's nodes with deformities to Bilat hands   Skin:     General: Skin is dry. Capillary Refill: Capillary refill takes 2 to 3 seconds. Coloration: Skin is pale. Neurological:      Mental Status: She is easily aroused. She is disoriented. Motor: Weakness present.       Comments: Alert x1; knows birthday and recognizes daughters  Follows simple commands  Extremely [place]   Psychiatric:         Mood and Affect: Mood normal.          BP (!) 150/77   Pulse 95   Temp 100.1 °F (37.8 °C) (Oral)   Resp 18   Ht 5' 3\" (1.6 m)   Wt 180 lb (81.6 kg)   LMP  (LMP Unknown)   SpO2 99%   BMI 31.89 kg/m²     Vitals:    21 1226 21 1400 21 1500 21 1600   BP: (!) 151/67 (!) 153/64 118/71 (!) 150/77   Pulse: 103 99 96 95   Resp: 18 18 18 18   Temp: 100.1 °F (37.8 °C)      TempSrc: Oral      SpO2: 93% 94% 97% 99%   Weight: 180 lb (81.6 kg)      Height: 5' 3\" (1.6 m)          DATA:     Diagnostic tests reviewed for today's visit:    Most recent labs and imaging results reviewed.      LABS:    Recent Results (from the past 24 hour(s))   Mononucleosis Screen    Collection Time: 12/26/21  1:15 PM   Result Value Ref Range    Mono Test Negative    CBC Auto Differential    Collection Time: 12/26/21  1:15 PM   Result Value Ref Range    WBC 12.2 (H) 4.8 - 10.8 K/uL    RBC 4.37 4.20 - 5.40 M/uL    Hemoglobin 12.4 12.0 - 16.0 g/dL    Hematocrit 37.7 37.0 - 47.0 %    MCV 86.3 82.0 - 100.0 fL    MCH 28.3 27.0 - 31.3 pg    MCHC 32.8 (L) 33.0 - 37.0 %    RDW 14.4 11.5 - 14.5 %    Platelets 534 758 - 316 K/uL    Neutrophils % 84.3 %    Lymphocytes % 5.3 %    Monocytes % 9.9 %    Eosinophils % 0.1 %    Basophils % 0.4 %    Neutrophils Absolute 10.3 (H) 1.4 - 6.5 K/uL    Lymphocytes Absolute 0.6 (L) 1.0 - 4.8 K/uL    Monocytes Absolute 1.2 (H) 0.2 - 0.8 K/uL    Eosinophils Absolute 0.0 0.0 - 0.7 K/uL    Basophils Absolute 0.1 0.0 - 0.2 K/uL   CK    Collection Time: 12/26/21  1:15 PM   Result Value Ref Range    Total  0 - 170 U/L   Comprehensive Metabolic Panel    Collection Time: 12/26/21  1:15 PM   Result Value Ref Range    Sodium 137 135 - 144 mEq/L    Potassium 4.5 3.4 - 4.9 mEq/L    Chloride 102 95 - 107 mEq/L    CO2 21 20 - 31 mEq/L    Anion Gap 14 9 - 15 mEq/L    Glucose 114 (H) 70 - 99 mg/dL    BUN 20 8 - 23 mg/dL    CREATININE 1.06 (H) 0.50 - 0.90 mg/dL    GFR Non- 48.3 (L) >60    GFR  58.5 (L) >60    Calcium 8.4 (L) 8.5 - 9.9 mg/dL    Total Protein 6.9 6.3 - 8.0 g/dL    Albumin 3.1 (L) 3.5 - 4.6 g/dL    Total Bilirubin 0.8 (H) 0.2 - 0.7 mg/dL    Alkaline Phosphatase 236 (H) 40 - 130 U/L    ALT 27 0 - 33 U/L    AST 38 (H) 0 - 35 U/L    Globulin 3.8 (H) 2.3 - 3.5 g/dL   Lactic Acid, Plasma    Collection Time: 12/26/21  1:15 PM   Result Value Ref Range    Lactic Acid 0.9 0.5 - 2.2 mmol/L   Magnesium    Collection Time: 12/26/21  1:15 PM   Result Value Ref Range    Magnesium 2.4 1.7 - 2.4 mg/dL   Troponin    Collection Time: 12/26/21  1:15 PM   Result Value Ref Range    Troponin <0.010 0.000 - 0.010 ng/mL   TSH without Reflex    Collection Time: 12/26/21  1:15 PM   Result Value Ref Range    TSH 1.180 0.440 - 3.860 uIU/mL   Urinalysis Reflex to Culture    Collection Time: 12/26/21  1:15 PM    Specimen: Urine, clean catch   Result Value Ref Range    Color, UA Yellow Straw/Yellow    Clarity, UA TURBID (A) Clear    Glucose, Ur Negative Negative mg/dL    Bilirubin Urine Negative Negative    Ketones, Urine TRACE (A) Negative mg/dL    Specific Gravity, UA 1.017 1.005 - 1.030    Blood, Urine MODERATE (A) Negative    pH, UA 5.5 5.0 - 9.0    Protein,  (A) Negative mg/dL    Urobilinogen, Urine 1.0 <2.0 E.U./dL    Nitrite, Urine POSITIVE (A) Negative    Leukocyte Esterase, Urine LARGE (A) Negative    Urine Reflex to Culture Yes    Microscopic Urinalysis    Collection Time: 12/26/21  1:15 PM   Result Value Ref Range    Bacteria, UA MANY (A) Negative /HPF    Hyaline Casts, UA 1-3 0 - 5 /HPF    WBC, UA >100 (H) 0 - 5 /HPF    RBC, UA 3-5 (A) 0 - 5 /HPF    Epithelial Cells, UA 0-2 0 - 5 /HPF   COVID-19, Rapid    Collection Time: 12/26/21  2:08 PM    Specimen: Nasopharyngeal Swab   Result Value Ref Range    SARS-CoV-2, NAAT Not Detected Not Detected   Rapid Strep Screen    Collection Time: 12/26/21  2:21 PM    Specimen: Throat   Result Value Ref Range    Strep Grp A PCR Negative    SPECIMEN REJECTION    Collection Time: 12/26/21  2:22 PM   Result Value Ref Range    Rejected Test PT     Reason for Rejection see below    Rapid Influenza A/B Antigens    Collection Time: 12/26/21  2:23 PM    Specimen: Nasopharyngeal   Result Value Ref Range    Influenza A by PCR Negative     Influenza B by PCR Negative        IMAGING:  XR CHEST PORTABLE    Result Date: 12/26/2021  EXAMINATION: XR CHEST PORTABLE CLINICAL HISTORY: FEVER COMPARISONS: CT CHEST, DECEMBER 21, 2021, CHEST RADIOGRAPH, OCTOBER 31, 2020 FINDINGS: Osseous structures intact. Cardiopericardial silhouette normal. Ill-defined areas of increased opacity are found in the right upper and middle lung, as well as in the left upper, and left mid and lower lung. Blunting left costophrenic angle identified. BILATERAL ATELECTASIS/PNEUMONIA. LEFT PLEURAL EFFUSION.       VTE Prophylaxis: enoxaparin    ASSESSMENT AND PLAN    80 y.o. female with PMH who has PMH HTN, COPD (home o2 PRN), FRED, venous stasis, RA, dysphagia, and skin cancer presented with worsening shortness of breath, decreased intake, weakness, and decreased mobility who is admitted with the following:    SIRS likely secondary to UTI and possible PNA  -Continue IV antibiotics  -Daily labs  -O2 to maintain sats >92%  -Acapella and incentive spirometry    RICHARD secondary to dehydration and decreased oral intake  -Creat 1.06 (baseline 0.7)  -Gentle IVF  -Renal function panel daily  -Avoid nephrotoxic agents    HTN  -Resume home medications when taking PO  -Labetalol PRN    Failure to thrive in adult  -NPO; IVFs  -Bedside swallow study  -PT/OT/Speech consults  -Rehab consult    Pulmonary nodules (new finding since previous scans Oct 2020)  COPD not in exacerbation  FRED  -Multiple pulmonary nodules RUL and RLL  -Consult pulmonary for further recommendations  -Wears home o2 PRN  -O2 to maintain sats >92%  -Resume home medications    Thyroid nodule  -L lobe thyroid nodules  -Outpatient f/u    Advanced care planning  -DNRCCA/DNI    Plan of care discussed with: daughters    SIGNATURE: JULIO Simms - LALI  DATE: December 26, 2021  TIME: 5:28 PM

## 2021-12-26 NOTE — ED PROVIDER NOTES
[facility] ED  eMERGENCY dEPARTMENT eNCOUnter      Pt Name: Arlene Miller  MRN: [id number]  [place] 5/19/1928  Date of evaluation: 12/26/2021  Provider: [address]       Chief Complaint   Patient presents with    Illness     not eating/drinking per family         HISTORY OF PRESENT ILLNESS   (Location/Symptom, Timing/Onset,Context/Setting, Quality, Duration, Modifying Factors, Severity)  Note limiting factors. Arlene Miller is a 80 y.o. female who presents to the emergency department with c/o 4 days of not eating or drinking. Patient is drinking little bit of liquids according to her family. Patient states that she feels exhausted. Low-grade fever at home. No vomiting or diarrhea. When asked questions and if you wait the patient does answer questions appropriately. Patient has had a dry cough. Patient denies abdominal pain. Patient denies any loss of taste or loss of smell. The history is provided by the patient and the EMS personnel. NursingNotes were reviewed. REVIEW OF SYSTEMS    (2-9 systems for level 4, 10 or more for level 5)     Review of Systems   Constitutional: Positive for activity change, appetite change, chills, diaphoresis, fatigue and fever. Negative for unexpected weight change. HENT: Negative for drooling, ear pain, nosebleeds, sinus pressure and trouble swallowing. Respiratory: Positive for cough. Negative for chest tightness and shortness of breath. Cardiovascular: Negative for chest pain and leg swelling. Gastrointestinal: Negative for abdominal pain, diarrhea and vomiting. Endocrine: Negative for polydipsia and polyphagia. Genitourinary: Negative for dysuria, flank pain and frequency. Musculoskeletal: Negative for back pain and myalgias. Skin: Negative for pallor and rash. Neurological: Negative for syncope, weakness and headaches. Hematological: Does not bruise/bleed easily.    All other systems reviewed and are negative. Except as noted above the remainder of the review of systems was reviewed and negative. PAST MEDICAL HISTORY     Past Medical History:   Diagnosis Date    Anxiety     Cancer ([address]     Glaucoma     Gout     left knee    Hypertension     Lung disease     Obesity (BMI 30-39. 9) 7/11/2019    Obstructive sleep apnea 7/11/2019         SURGICALHISTORY       Past Surgical History:   Procedure Laterality Date    CATARACT REMOVAL WITH IMPLANT Bilateral 2016    COLON SURGERY  2009    polyps    COLONOSCOPY  02/2009    GASTROSTOMY TUBE PLACEMENT N/A 11/4/2020    EGD PEG TUBE PLACEMENT performed by Nasreen Foley MD at [address]  2002    graft from neck         CURRENT MEDICATIONS       Previous Medications    ALBUTEROL SULFATE HFA (PROAIR HFA) 108 (90 BASE) MCG/ACT INHALER    Inhale 1 puff into the lungs every 6 hours as needed for Wheezing or Shortness of Breath    ATORVASTATIN (LIPITOR) 20 MG TABLET    TAKE 1 TABLET BY MOUTH EVERY DAY    BRIMONIDINE (ALPHAGAN P) 0.15 % OPHTHALMIC SOLUTION    INSTILL 1 DROP INTO BOTH EYES TWICE DAILY. DORZOLAMIDE-TIMOLOL (COSOPT) 22.3-6.8 MG/ML OPHTHALMIC SOLUTION    Use 1 Drop in both eyes every 12 hours. ELASTIC BANDAGES & SUPPORTS (MEDICAL COMPRESSION STOCKINGS) MISC    Knee high, 20 to 30 mm hg, remove at night, pharmacy to fit.     FLUTICASONE-SALMETEROL (ADVAIR DISKUS) 250-50 MCG/DOSE AEPB    Inhale 1 puff into the lungs every 12 hours    HANDICAP PLACARD MISC    by Does not apply route Good for 5 years    IPRATROPIUM-ALBUTEROL (DUONEB) 0.5-2.5 (3) MG/3ML SOLN NEBULIZER SOLUTION    Inhale 3 mLs into the lungs every 6 hours as needed for Shortness of Breath ((or wheezing))    LATANOPROST (XALATAN) 0.005 % OPHTHALMIC SOLUTION    INSTILL 1 DROP INTO BOTH EYES EVERY DAY    LOSARTAN (COZAAR) 25 MG TABLET    Take 1 tablet by mouth every 12 hours    METOPROLOL TARTRATE (LOPRESSOR) 25 MG TABLET    TAKE 1/2 TABLET BY MOUTH TWICE A DAY    MOMETASONE (ELOCON) 0.1 % CREAM    Apply thin layer topically every 12 hours to rash on hand and legs. MONTELUKAST (SINGULAIR) 10 MG TABLET    Take 1 tablet by mouth nightly as needed (allergies)    OXYGEN    Inhale 2 L into the lungs Indications: PRN at bedtime    ZOSTER RECOMBINANT ADJUVANTED VACCINE (SHINGRIX) 50 MCG/0.5ML SUSR INJECTION    Inject 0.5 mLs into the muscle See Admin Instructions 1 dose now and repeat in 2-6 months       ALLERGIES     Norvasc [amlodipine besylate]    FAMILY HISTORY     History reviewed. No pertinent family history. SOCIAL HISTORY       Social History     Socioeconomic History    Marital status:      Spouse name: None    Number of children: None    Years of education: None    Highest education level: None   Occupational History    None   Tobacco Use    Smoking status: Never Smoker    Smokeless tobacco: Never Used   Substance and Sexual Activity    Alcohol use: No    Drug use: No    Sexual activity: None   Other Topics Concern    None   Social History Narrative    None     Social Determinants of Health     Financial Resource Strain:     Difficulty of Paying Living Expenses: Not on file   Food Insecurity:     Worried About Running Out of Food in the Last Year: Not on file    Deborah of Food in the Last Year: Not on file   Transportation Needs:     Lack of Transportation (Medical): Not on file    Lack of Transportation (Non-Medical):  Not on file   Physical Activity:     Days of Exercise per Week: Not on file    Minutes of Exercise per Session: Not on file   Stress:     Feeling of Stress : Not on file   Social Connections:     Frequency of Communication with Friends and Family: Not on file    Frequency of Social Gatherings with Friends and Family: Not on file    Attends Worship Services: Not on file    Active Member of Clubs or Organizations: Not on file    Attends Club or Organization Meetings: Not on file    Marital Status: Not on file   Intimate Partner Violence:     Fear of Current or Ex-Partner: Not on file    Emotionally Abused: Not on file    Physically Abused: Not on file    Sexually Abused: Not on file   Housing Stability:     Unable to Pay for Housing in the Last Year: Not on file    Number of [place] in the Last Year: Not on file    Unstable Housing in the Last Year: Not on file       SCREENINGS    Chris Coma Scale  Eye Opening: Spontaneous  Best Verbal Response: Oriented  Best Motor Response: Obeys commands  [place] Coma Scale Score: 15 @FLOW([id number])@      PHYSICAL EXAM    (up to 7 for level 4, 8 or more for level 5)     ED Triage Vitals [12/26/21 1226]   BP Temp Temp Source Pulse Resp SpO2 Height Weight   (!) 151/67 100.1 °F (37.8 °C) Oral 103 18 93 % 5' 3\" (1.6 m) 180 lb (81.6 kg)       Physical Exam  Vitals and nursing note reviewed. Constitutional:       General: She is awake. She is in acute distress. Appearance: Normal appearance. She is well-developed and normal weight. She is ill-appearing. She is not toxic-appearing or diaphoretic. Comments: No photophobia. No phonophobia. HENT:      Head: Normocephalic and atraumatic. No Asif's sign. Right Ear: External ear normal.      Left Ear: External ear normal.      Nose: Nose normal. No congestion or rhinorrhea. Mouth/Throat:      Mouth: Mucous membranes are dry. Pharynx: Oropharynx is clear. No oropharyngeal exudate or posterior oropharyngeal erythema. Eyes:      General: No scleral icterus. Right eye: No foreign body or discharge. Left eye: No discharge. Extraocular Movements: Extraocular movements intact. Conjunctiva/sclera: Conjunctivae normal.      Left eye: No exudate. Pupils: Pupils are equal, round, and reactive to light. Neck:      Vascular: No JVD. Trachea: No tracheal deviation. Comments: No meningismus.   Cardiovascular:      Rate and Rhythm: Normal rate and regular rhythm. Heart sounds: Normal heart sounds. Heart sounds not distant. No murmur heard. No friction rub. No gallop. Pulmonary:      Effort: Pulmonary effort is normal. No respiratory distress. Breath sounds: Normal breath sounds. No stridor. No wheezing, rhonchi or rales. Chest:      Chest wall: No tenderness. Abdominal:      General: Abdomen is flat. Bowel sounds are normal. There is no distension. Palpations: Abdomen is soft. There is no mass. Tenderness: There is no abdominal tenderness. There is no right CVA tenderness, left CVA tenderness, guarding or rebound. Hernia: No hernia is present. Musculoskeletal:         General: No swelling, tenderness, deformity or signs of injury. Normal range of motion. Cervical back: Normal range of motion and neck supple. No rigidity. Lymphadenopathy:      Head:      Right side of head: No submental adenopathy. Left side of head: No submental adenopathy. Skin:     General: Skin is warm and dry. Capillary Refill: Capillary refill takes less than 2 seconds. Coloration: Skin is not jaundiced or pale. Findings: No bruising, erythema, lesion or rash. Comments: Skin tenting   Neurological:      General: No focal deficit present. Mental Status: She is alert and oriented to person, place, and time. Mental status is at baseline. Cranial Nerves: No cranial nerve deficit. Sensory: No sensory deficit. Motor: No weakness. Coordination: Coordination normal.      Deep Tendon Reflexes: Reflexes are normal and symmetric. Psychiatric:         Mood and Affect: Mood normal.         Behavior: Behavior normal. Behavior is cooperative. Thought Content:  Thought content normal.         Judgment: Judgment normal.         DIAGNOSTIC RESULTS     EKG: All EKG's are interpreted by the Emergency Department Physician who either signs or Co-signsthis chart in the absence of a cardiologist.        RADIOLOGY: Non-plain filmimages such as CT, Ultrasound and MRI are read by the radiologist. Plain radiographic images are visualized and preliminarily interpreted by the emergency physician with the below findings:        Interpretation per the Radiologist below, if available at the time ofthis note:    XR CHEST PORTABLE   Final Result   BILATERAL ATELECTASIS/PNEUMONIA. LEFT PLEURAL EFFUSION. Chest X-ray: Bilateral infiltrates, no pneumothorax, no free air.       ED BEDSIDE ULTRASOUND:   Performed by ED Physician - none    LABS:  Labs Reviewed   CBC WITH AUTO DIFFERENTIAL - Abnormal; Notable for the following components:       Result Value    WBC 12.2 (*)     MCHC 32.8 (*)     Neutrophils Absolute 10.3 (*)     Lymphocytes Absolute 0.6 (*)     Monocytes Absolute 1.2 (*)     All other components within normal limits   COMPREHENSIVE METABOLIC PANEL - Abnormal; Notable for the following components:    Glucose 114 (*)     CREATININE 1.06 (*)     GFR Non- 48.3 (*)     GFR  58.5 (*)     Calcium 8.4 (*)     Albumin 3.1 (*)     Total Bilirubin 0.8 (*)     Alkaline Phosphatase 236 (*)     AST 38 (*)     Globulin 3.8 (*)     All other components within normal limits   URINE RT REFLEX TO CULTURE - Abnormal; Notable for the following components:    Clarity, UA TURBID (*)     Ketones, Urine TRACE (*)     Blood, Urine MODERATE (*)     Protein,  (*)     Nitrite, Urine POSITIVE (*)     Leukocyte Esterase, Urine LARGE (*)     All other components within normal limits   MICROSCOPIC URINALYSIS - Abnormal; Notable for the following components:    Bacteria, UA MANY (*)     WBC, UA >100 (*)     RBC, UA 3-5 (*)     All other components within normal limits   COVID-19, RAPID   RAPID INFLUENZA A/B ANTIGENS   RAPID STREP SCREEN   CULTURE, BLOOD 1   CULTURE, BLOOD 2   CULTURE, URINE   MONONUCLEOSIS SCREEN   CK   LACTIC ACID, PLASMA   MAGNESIUM   TROPONIN   TSH WITHOUT REFLEX   SPECIMEN REJECTION   LIPASE PROTIME-INR       All other labs were within normal range or not returned as of this dictation. EMERGENCY DEPARTMENT COURSE and DIFFERENTIAL DIAGNOSIS/MDM:   Vitals:    Vitals:    12/26/21 1226 12/26/21 1400 12/26/21 1500 12/26/21 1600   BP: (!) 151/67 (!) 153/64 118/71 (!) 150/77   Pulse: 103 99 96 95   Resp: 18 18 18 18   Temp: 100.1 °F (37.8 °C)      TempSrc: Oral      SpO2: 93% 94% 97% 99%   Weight: 180 lb (81.6 kg)      Height: 5' 3\" (1.6 m)              MDM  IV, O2, cardiac monitors and continuous pulse ox are in place. Patient received 2 L bolus of IV fluid. Patient received IV Zosyn. Chest x-ray shows bilateral infiltrates. Patient has large amount of white blood cells in her urine as well as positive leukocyte esterase and nitrates. Findings discussed with the family. Case discussed with the hospitalist and Dr. Ruth Fabry;  he accepts the patient to his service. Port score is IV or I 113 points. 10% chance of death if not hospitalized. CRITICAL CARE TIME   Total Critical Care time [id number], excluding separately reportableprocedures. There was a high probability of clinicallysignificant/life threatening deterioration in the patient's condition which required my urgent intervention. CONSULTS:  None    PROCEDURES:  Unless otherwise noted below, none     Procedures    FINAL IMPRESSION      1. Pneumonia of both lower lobes due to infectious organism    2. Acute cystitis without hematuria    3. Failure to thrive in adult          DISPOSITION/PLAN   DISPOSITION Decision To Admit 12/26/2021 03:44:43 PM      PATIENT REFERRED TO:  No follow-up provider specified.     DISCHARGE MEDICATIONS:  New Prescriptions    No medications on file          (Please note that portions of this note were completed with a voice recognition program.  Efforts were made to edit the dictations but occasionally words are mis-transcribed.)    Florence Silverman DO (electronically signed)  Attending Emergency Physician          Emily Thomas, DO  12/26/21 1640

## 2021-12-26 NOTE — ED NOTES
Bed:  [facility]  Expected date: 12/26/21  Expected time:   Means of arrival:   Comments:  80 F  Fever, confusion  150/60       Rolando Yang RN  12/26/21 3719

## 2021-12-26 NOTE — ED NOTES
Assumed care of patient. Patient transferred to room 10 and placed on monitor. Jessica Nguyễn RN  12/26/21 4600

## 2021-12-27 PROBLEM — Z78.9 IMPAIRED MOBILITY AND ACTIVITIES OF DAILY LIVING: Status: ACTIVE | Noted: 2021-12-27

## 2021-12-27 PROBLEM — Z74.09 IMPAIRED MOBILITY AND ACTIVITIES OF DAILY LIVING: Status: ACTIVE | Noted: 2021-12-27

## 2021-12-27 LAB
ACINETOBACTER CALCOAC BAUMANNII COMPLEX BY PCR: NOT DETECTED
ALBUMIN SERPL-MCNC: 2.8 G/DL (ref 3.5–4.6)
ALP BLD-CCNC: 295 U/L (ref 40–130)
ALT SERPL-CCNC: 33 U/L (ref 0–33)
ANION GAP SERPL CALCULATED.3IONS-SCNC: 16 MEQ/L (ref 9–15)
AST SERPL-CCNC: 41 U/L (ref 0–35)
BACTEROIDES FRAGILIS BY PCR: NOT DETECTED
BASOPHILS ABSOLUTE: 0 K/UL (ref 0–0.2)
BASOPHILS RELATIVE PERCENT: 0.2 %
BILIRUB SERPL-MCNC: 0.6 MG/DL (ref 0.2–0.7)
BUN BLDV-MCNC: 16 MG/DL (ref 8–23)
C-REACTIVE PROTEIN, HIGH SENSITIVITY: >300 MG/L (ref 0–5)
CALCIUM SERPL-MCNC: 8.2 MG/DL (ref 8.5–9.9)
CANDIDA ALBICANS BY PCR: NOT DETECTED
CANDIDA AURIS BY PCR: NOT DETECTED
CANDIDA GLABRATA BY PCR: NOT DETECTED
CANDIDA KRUSEI BY PCR: NOT DETECTED
CANDIDA PARAPSILOSIS BY PCR: NOT DETECTED
CANDIDA TROPICALIS BY PCR: NOT DETECTED
CARBAPENEM RESISTANCE IMP GENE BY PCR: NOT DETECTED
CARBAPENEM RESISTANCE KPC BY PCR: NOT DETECTED
CARBAPENEM RESISTANCE NDM GENE BY PCR: NOT DETECTED
CARBAPENEM RESISTANCE OXA-48 GENE BY PCR: NOT DETECTED
CARBAPENEM RESISTANCE VIM GENE BY PCR: NOT DETECTED
CEPHALOSPORIN RESISTANCE CTX-M GENE BY PCR: NOT DETECTED
CHLORIDE BLD-SCNC: 105 MEQ/L (ref 95–107)
CO2: 19 MEQ/L (ref 20–31)
COLISTIN RESISTANCE MCR-1 GENE BY PCR: NOT DETECTED
CREAT SERPL-MCNC: 0.88 MG/DL (ref 0.5–0.9)
CRYPTOCOCCUS NEOFORMANS/GATTII BY PCR: NOT DETECTED
ENTEROBACTER CLOACAE COMPLEX BY PCR: NOT DETECTED
ENTEROBACTERALES BY PCR: DETECTED
ENTEROCOCCUS FAECALIS BY PCR: NOT DETECTED
ENTEROCOCCUS FAECIUM BY PCR: NOT DETECTED
EOSINOPHILS ABSOLUTE: 0.1 K/UL (ref 0–0.7)
EOSINOPHILS RELATIVE PERCENT: 0.6 %
ESCHERICHIA COLI BY PCR: DETECTED
GFR AFRICAN AMERICAN: >60
GFR NON-AFRICAN AMERICAN: 59.9
GLOBULIN: 3.6 G/DL (ref 2.3–3.5)
GLUCOSE BLD-MCNC: 102 MG/DL (ref 70–99)
HAEMOPHILUS INFLUENZAE BY PCR: NOT DETECTED
HCT VFR BLD CALC: 36.1 % (ref 37–47)
HEMOGLOBIN: 11.8 G/DL (ref 12–16)
KLEBSIELLA AEROGENES BY PCR: NOT DETECTED
KLEBSIELLA OXYTOCA BY PCR: NOT DETECTED
KLEBSIELLA PNEUMONIAE GROUP BY PCR: NOT DETECTED
LIPASE: 24 U/L (ref 12–95)
LISTERIA MONOCYTOGENES BY PCR: NOT DETECTED
LYMPHOCYTES ABSOLUTE: 0.6 K/UL (ref 1–4.8)
LYMPHOCYTES RELATIVE PERCENT: 6 %
MCH RBC QN AUTO: 29 PG (ref 27–31.3)
MCHC RBC AUTO-ENTMCNC: 32.6 % (ref 33–37)
MCV RBC AUTO: 89.1 FL (ref 82–100)
MONOCYTES ABSOLUTE: 1.1 K/UL (ref 0.2–0.8)
MONOCYTES RELATIVE PERCENT: 10.6 %
NEISSERIA MENINGITIDIS BY PCR: NOT DETECTED
NEUTROPHILS ABSOLUTE: 8.5 K/UL (ref 1.4–6.5)
NEUTROPHILS RELATIVE PERCENT: 82.6 %
PDW BLD-RTO: 14.5 % (ref 11.5–14.5)
PLATELET # BLD: 153 K/UL (ref 130–400)
POTASSIUM SERPL-SCNC: 3.6 MEQ/L (ref 3.4–4.9)
PROCALCITONIN: 1.54 NG/ML (ref 0–0.15)
PROCALCITONIN: 2.1 NG/ML (ref 0–0.15)
PROTEUS SPECIES BY PCR: NOT DETECTED
PSEUDOMONAS AERUGINOSA BY PCR: NOT DETECTED
RBC # BLD: 4.06 M/UL (ref 4.2–5.4)
SALMONELLA SPECIES BY PCR: NOT DETECTED
SERRATIA MARCESCENS BY PCR: NOT DETECTED
SODIUM BLD-SCNC: 140 MEQ/L (ref 135–144)
STAPHYLOCOCCUS AUREUS BY PCR: NOT DETECTED
STAPHYLOCOCCUS EPIDERMIDIS BY PCR: NOT DETECTED
STAPHYLOCOCCUS LUGDUNENSIS BY PCR: NOT DETECTED
STAPHYLOCOCCUS SPECIES BY PCR: NOT DETECTED
STENOTROPHOMONAS MALTOPHILIA BY PCR: NOT DETECTED
STREPTOCOCCUS AGALACTIAE BY PCR: NOT DETECTED
STREPTOCOCCUS PNEUMONIAE BY PCR: NOT DETECTED
STREPTOCOCCUS PYOGENES  BY PCR: NOT DETECTED
STREPTOCOCCUS SPECIES BY PCR: NOT DETECTED
TOTAL PROTEIN: 6.4 G/DL (ref 6.3–8)
WBC # BLD: 10.3 K/UL (ref 4.8–10.8)

## 2021-12-27 PROCEDURE — 85025 COMPLETE CBC W/AUTO DIFF WBC: CPT

## 2021-12-27 PROCEDURE — 36415 COLL VENOUS BLD VENIPUNCTURE: CPT

## 2021-12-27 PROCEDURE — 99223 1ST HOSP IP/OBS HIGH 75: CPT | Performed by: INTERNAL MEDICINE

## 2021-12-27 PROCEDURE — 84145 PROCALCITONIN (PCT): CPT

## 2021-12-27 PROCEDURE — 2700000000 HC OXYGEN THERAPY PER DAY

## 2021-12-27 PROCEDURE — 94761 N-INVAS EAR/PLS OXIMETRY MLT: CPT

## 2021-12-27 PROCEDURE — 94664 DEMO&/EVAL PT USE INHALER: CPT

## 2021-12-27 PROCEDURE — 1210000000 HC MED SURG R&B

## 2021-12-27 PROCEDURE — 80053 COMPREHEN METABOLIC PANEL: CPT

## 2021-12-27 PROCEDURE — 83690 ASSAY OF LIPASE: CPT

## 2021-12-27 PROCEDURE — 92610 EVALUATE SWALLOWING FUNCTION: CPT

## 2021-12-27 PROCEDURE — 6370000000 HC RX 637 (ALT 250 FOR IP): Performed by: NURSE PRACTITIONER

## 2021-12-27 PROCEDURE — 2580000003 HC RX 258: Performed by: NURSE PRACTITIONER

## 2021-12-27 PROCEDURE — 94640 AIRWAY INHALATION TREATMENT: CPT

## 2021-12-27 PROCEDURE — 97163 PT EVAL HIGH COMPLEX 45 MIN: CPT

## 2021-12-27 PROCEDURE — 6360000002 HC RX W HCPCS: Performed by: NURSE PRACTITIONER

## 2021-12-27 RX ADMIN — PIPERACILLIN AND TAZOBACTAM 3375 MG: 3; .375 INJECTION, POWDER, LYOPHILIZED, FOR SOLUTION INTRAVENOUS at 05:29

## 2021-12-27 RX ADMIN — PIPERACILLIN AND TAZOBACTAM 3375 MG: 3; .375 INJECTION, POWDER, LYOPHILIZED, FOR SOLUTION INTRAVENOUS at 21:15

## 2021-12-27 RX ADMIN — Medication 650 MG: at 19:54

## 2021-12-27 RX ADMIN — SODIUM CHLORIDE: 9 INJECTION, SOLUTION INTRAVENOUS at 19:55

## 2021-12-27 RX ADMIN — METOPROLOL TARTRATE 12.5 MG: 25 TABLET, FILM COATED ORAL at 08:57

## 2021-12-27 RX ADMIN — SODIUM CHLORIDE: 9 INJECTION, SOLUTION INTRAVENOUS at 04:37

## 2021-12-27 RX ADMIN — PIPERACILLIN AND TAZOBACTAM 3375 MG: 3; .375 INJECTION, POWDER, LYOPHILIZED, FOR SOLUTION INTRAVENOUS at 14:16

## 2021-12-27 RX ADMIN — ENOXAPARIN SODIUM 40 MG: 100 INJECTION SUBCUTANEOUS at 19:57

## 2021-12-27 RX ADMIN — ATORVASTATIN CALCIUM 20 MG: 20 TABLET, FILM COATED ORAL at 19:57

## 2021-12-27 RX ADMIN — BUDESONIDE AND FORMOTEROL FUMARATE DIHYDRATE 2 PUFF: 160; 4.5 AEROSOL RESPIRATORY (INHALATION) at 08:45

## 2021-12-27 RX ADMIN — BUDESONIDE AND FORMOTEROL FUMARATE DIHYDRATE 2 PUFF: 160; 4.5 AEROSOL RESPIRATORY (INHALATION) at 21:36

## 2021-12-27 RX ADMIN — METOPROLOL TARTRATE 12.5 MG: 25 TABLET, FILM COATED ORAL at 19:55

## 2021-12-27 ASSESSMENT — PAIN DESCRIPTION - LOCATION: LOCATION: NECK

## 2021-12-27 ASSESSMENT — PAIN SCALES - GENERAL: PAINLEVEL_OUTOF10: 0

## 2021-12-27 NOTE — PROGRESS NOTES
Progress Note  Date:2021       JQJL:[id number]  Patient Name:Trudy Polo     Date of Birth:[birth year]     Age:89 y.o. Subjective    Subjective   No acute events overnight. Blood cultures now demonstrating preliminary gram-negative rods. Speech therapy evaluated, made modified diet recommendations for safe p.o. intake. Review of Systems   Unable to complete full ROS in setting of encephalopathy/dementia. In brief, patient indicates dry mouth, nausea without vomiting, and suprapubic discomfort. Objective         Vitals Last 24 Hours:  TEMPERATURE:  Temp  Av.6 °F (37 °C)  Min: 97.7 °F (36.5 °C)  Max: 100.1 °F (37.8 °C)  RESPIRATIONS RANGE: Resp  Av.1  Min: 16  Max: 22  PULSE OXIMETRY RANGE: SpO2  Av.2 %  Min: 93 %  Max: 99 %  PULSE RANGE: Pulse  Av.6  Min: 88  Max: 103  BLOOD PRESSURE RANGE: Systolic (15XHJ), ULU:339 , Min:118 , HJY:106   ; Diastolic (72QZX), OZW:28, Min:56, Max:81    I/O (24Hr): Intake/Output Summary (Last 24 hours) at 2021 1101  Last data filed at 2021 0203  Gross per 24 hour   Intake 50 ml   Output --   Net 50 ml     Objective   Constitutional: Elderly adult female sitting up in bed no acute distress  Head: NCAT  Eyes: PERRLA, EOMI. Arcus senilis appreciated. ENT: Dry oropharynx with some dried posterior secretions visible. No obvious bleeding. Suspected to be hard of hearing. Neck: Trachea midline  Cardiovascular: Regular rhythm with borderline tachycardia. +1 bilateral pretibial edema. Pulmonary: Normal rate and effort of respiration on 2 L by nasal cannula. Abdomen: Soft, nontense abdomen. Hypoactive bowel sounds. Suprapubic tenderness to palpation. Neurologic: Alert, disoriented. Follows some commands, gives some 1-word answers to questions. Generalized weakness appreciated, no focal weakness noted, but patient largely unable to cooperate with neurologic exam.  Psychiatric: Calm, disoriented.   Not agitated or distressed. MSK/integumentary: Baseline arthritic changes of bilateral hands. Left mid clavicular erythematous patch with slightly raised central lesion of unclear etiology, appearing almost as a small isolated bullae. Will monitor. Labs/Imaging/Diagnostics    Labs:  CBC:  Recent Labs     12/26/21  1315   WBC 12.2*   RBC 4.37   HGB 12.4   HCT 37.7   MCV 86.3   RDW 14.4        CHEMISTRIES:  Recent Labs     12/26/21  1315      K 4.5      CO2 21   BUN 20   CREATININE 1.06*   GLUCOSE 114*   MG 2.4     PT/INR:  Recent Labs     12/26/21  2300   PROTIME 16.7*   INR 1.4     APTT:No results for input(s): APTT in the last 72 hours. LIVER PROFILE:  Recent Labs     12/26/21  1315   AST 38*   ALT 27   BILITOT 0.8*   ALKPHOS 236*       Imaging Last 24 Hours:  XR CHEST PORTABLE    Result Date: 12/26/2021  EXAMINATION: XR CHEST PORTABLE CLINICAL HISTORY: FEVER COMPARISONS: CT CHEST, DECEMBER 21, 2021, CHEST RADIOGRAPH, OCTOBER 31, 2020 FINDINGS: Osseous structures intact. Cardiopericardial silhouette normal. Ill-defined areas of increased opacity are found in the right upper and middle lung, as well as in the left upper, and left mid and lower lung. Blunting left costophrenic angle identified. BILATERAL ATELECTASIS/PNEUMONIA. LEFT PLEURAL EFFUSION.     Assessment//Plan           Hospital Problems           Last Modified POA    Obstructive sleep apnea 12/27/2021 Yes    UTI (urinary tract infection) 12/26/2021 Yes    Impaired mobility and activities of daily living 12/27/2021 Yes        Assessment & Plan    80 y.o. female with PMH who has PMH HTN, COPD (home o2 PRN), FRED, venous stasis, RA, dysphagia, and skin cancer presented with worsening shortness of breath, decreased intake, weakness, and decreased mobility who is admitted with the following:     Gram negative bacteremia in setting of UTI, with possible superimposed PNA  -Continue IV antibiotics (Zosyn)  -Daily labs  -O2 to maintain sats >92%  -Acapella and incentive spirometry  -Trend procalcitonin     RICHARD secondary to dehydration and decreased oral intake  -Creat 1.06 (baseline 0.7)  -Gentle IVF  -Renal function panel daily  -Avoid nephrotoxic agents     HTN  -Resume home medications when taking PO  -Labetalol PRN     Failure to thrive in adult  -NPO; IVFs  -Bedside swallow study  -PT/OT/Speech consults  -Rehab consult     Pulmonary nodules (new finding since previous scans Oct 2020)  COPD not in exacerbation  FRED  -Multiple pulmonary nodules RUL and RLL  -Consult pulmonary for further recommendations  -Wears home o2 PRN  -O2 to maintain sats >92%  -Resume home medications     Thyroid nodule  -L lobe thyroid nodules  -Outpatient f/u       Advanced care planning  -DNRCCA/DNI    Diet: Per ST recommendations. 1:1 feeding at present in setting of weakness and confusion.       Electronically signed by Aaron Deleon DO on 12/27/21 at 11:01 AM EST

## 2021-12-27 NOTE — PROGRESS NOTES
Pt's vitals are: 98.1 temp, 94 HR, 22 Resp, /56, 02 is 95% on 3 liters, wears prn at home according to previous notes. I asked her name/where she is- pt looked at me and did not respond. I asked her if she was in any pain- she reports her neck hurts, I placed another pillow under her head and she says this helped. Purewick in place. She was not able to turn herself or move onto the new bed, will continue to turn her every two hours. Call light in reach, bed alarm on.    1453- I fed pt a soft and bite sized lunch, she tolerated this well. She was incont of a large BM. Family at bedside was updated. Pt a&o to self.

## 2021-12-27 NOTE — PROGRESS NOTES
Physical Therapy Med Surg Initial Assessment  Facility/Department[facility]  [facility]       NAME: Mazin Hartman  : 1928 (30 y.o.)  MRN: [id number]  CODE STATUS: Limited    Date of Service: 2021    Patient Diagnosis(es): UTI (urinary tract infection) [N39.0]  Failure to thrive in adult [R62.7]  Acute cystitis without hematuria [N30.00]  Pneumonia of both lower lobes due to infectious organism [J18.9]   Chief Complaint   Patient presents with    Illness     not eating/drinking per family     Patient Active Problem List    Diagnosis Date Noted    Impaired mobility and activities of daily living 2021    UTI (urinary tract infection) 2021    Venous stasis dermatitis of both lower extremities 08/10/2021    Aphasia     Chest pain 2020    Obesity (BMI 30-39.9) 2019    Obstructive sleep apnea 2019    COPD exacerbation ([address]     COPD (chronic obstructive pulmonary disease) with acute bronchitis ([address] 2018    Anxiety 2017    Essential hypertension         Past Medical History:   Diagnosis Date    Anxiety     Cancer ([address]     Glaucoma     Gout     left knee    Hypertension     Lung disease     Obesity (BMI 30-39. 9) 2019    Obstructive sleep apnea 2019     Past Surgical History:   Procedure Laterality Date    CATARACT REMOVAL WITH IMPLANT Bilateral     COLON SURGERY      polyps    COLONOSCOPY  2009    GASTROSTOMY TUBE PLACEMENT N/A 2020    EGD PEG TUBE PLACEMENT performed by Jonny Henderson MD at [address]  2002    graft from neck       Chart Reviewed: Yes  Patient assessed for rehabilitation services?: Yes  Family / Caregiver Present: No  General Comment  Comments: minimal verbalizations    Restrictions:  Restrictions/Precautions: Fall Risk (mod cole score)     SUBJECTIVE:      Pain  Pre Treatment Pain Screening  Intervention List: Patient able to continue with treatment;Nurse/physician notified    Post Treatment Pain Screening:   Pain Screening  Patient Currently in Pain: Yes (pt unable to rate pain due to decreased cognition)  Pain Assessment  Pain Location: Neck    Prior Level of Function:  Social/Functional History  Additional Comments: pt unable to provide any social functional information due to pt with decreased cognition    OBJECTIVE:   Vision:  (pt unable to provide information due to pt with decreased cognition)  Hearing:  (pt unable to provide information due to pt with decreased cognition)    Cognition:  Overall Orientation Status: Impaired  Orientation Level: Oriented to person,Disoriented to place,Disoriented to time,Disoriented to situation  Follows Commands: Impaired (increased processing time; unable to consistently follow 1 step instructions)    Observation/Palpation  Observation: on O2; increased SOB with minimal activity- SPO2 maintained 94-97% with activity; aide assisted PT with mobility  Edema: B LEs    ROM:  RLE AROM: WFL  RLE General AROM: stiffness all joints  LLE AROM : WFL  LLE General AROM: stiffness all joints    Strength:  Strength RLE  Comment: difficult to assess due to pt with poor ability to follow instructions for MMTing- functionally 3/5  Strength LLE  Comment: difficult to assess due to pt with poor ability to follow instructions for MMTing- functionally 3/5    Neuro:  Balance  Posture: Fair (slouched)  Sitting - Static: Poor (mod with intermittent max A to maintain sitting edge of bed)  Sitting - Dynamic:  (unsafe to assess due to high level of assistance for sitting edge of bed)  Standing - Static:  (unsafe to assess due to high level of assistance for supine <> sit transfers)  Standing - Dynamic:  (unsafe to assess due to high level of assistance for supine <> sit transfers)     Motor Control  Gross Motor?: Exceptions (difficulty initiating movement)       Bed mobility  Rolling to Left: Maximum assistance  Rolling to Right: Maximum assistance  Supine to Sit: Dependent/Total;2 Person assistance  Sit to Supine: Dependent/Total;2 Person assistance    Transfers  Comment: unsafe to assess due to high level of assistance for supine <> sit transfers    Ambulation  Ambulation?: No (unsafe to assess due to high level of assistance for supine <> sit transfers)    Stairs/Curb  Stairs?: No (unsafe to assess due to high level of assistance for supine <> sit transfers)         Activity Tolerance  Activity Tolerance: Patient limited by fatigue;Patient limited by cognitive status; Patient limited by endurance  Activity Tolerance: Pt limited by strength and balance deficits          PT Education  PT Education: Goals;PT Role;Plan of Care;General Safety    ASSESSMENT:   Body structures, Functions, Activity limitations: Decreased functional mobility ; Decreased safe awareness;Decreased balance;Decreased coordination;Decreased cognition;Decreased ROM; Decreased endurance; Increased pain;Decreased strength;Decreased posture  Decision Making: High Complexity  History: high  Exam: high  Clinical Presentation: high    Prognosis: Fair;Good    DISCHARGE RECOMMENDATIONS:  Discharge Recommendations: Continue to assess pending progress    Assessment: Pt is 80 y.o. female dx with UTI and failure to thrive. Pt exhibits decreased cognition, decreased verbalizations, decreased ROM, strength, balance, activity tolerance requiring assistance of two people for all mobility at this time. Continued PT is required for safe d/c at highest level of indep.   REQUIRES PT FOLLOW UP: Yes      PLAN OF CARE:  Plan  Times per week: 3-6  Current Treatment Recommendations: Strengthening,Functional Mobility Training,Wheelchair Mobility Training,Neuromuscular Re-education,Home Exercise Program,Equipment Evaluation, Education, & procurement,ROM,Transfer Training,Gait Training,Safety Education & [place] Management,Patient/Caregiver Education & Training,Positioning  Safety Devices  Type of devices: Bed alarm in place,Call light within reach,Left in bed    Goals:  Short term goals  Short term goal 1: Pt will demonstrate sitting edge of bed >/= 3 min with CGA  Long term goals  Long term goal 1: Pt will demonstrate bed mobility min A  Long term goal 2: Pt will demonstrate transfers mod A  Long term goal 3: Pt will demonstrate amb >/= 10 ft max A with safest AD    AMPAC (6 CLICK) BASIC MOBILITY  AM-PAC Inpatient Mobility Raw Score : 7    Therapy Time:   Individual   Time In 1001   Time Out [address] [place], PT, 12/27/21 at 10:49 AM         Definitions for assistance levels  Independent = pt does not require any physical supervision or assistance from another person for activity completion. Device may be needed.   Stand by assistance = pt requires verbal cues or instructions from another person, close to but not touching, to perform the activity  Minimal assistance= pt performs 75% or more of the activity; assistance is required to complete the activity  Moderate assistance= pt performs 50% of the activity; assistance is required to complete the activity  Maximal assistance = pt performs 25% of the activity; assistance is required to complete the activity  Dependent = pt requires total physical assistance to accomplish the task

## 2021-12-27 NOTE — CARE COORDINATION
Attempted to see patient to discuss Acute Inpatient Rehab, however, she was sleeping soundly. Will try again another time.  Electronically signed by Tatum Kramer RN on 12/27/21 at 2:17 PM EST

## 2021-12-27 NOTE — PROGRESS NOTES
[facility]   [facility]  Speech Language Pathology  Clinical Bedside Swallow Evaluation    NAME:Trudy Banks  : 1928 (80 y.o.)   MRN: [id number]  [facility]  ADMISSION DATE: 2021  PATIENT DIAGNOSIS(ES): UTI (urinary tract infection) [N39.0]  Failure to thrive in adult [R62.7]  Acute cystitis without hematuria [N30.00]  Pneumonia of both lower lobes due to infectious organism [J18.9]  Chief Complaint   Patient presents with    Illness     not eating/drinking per family     Patient Active Problem List    Diagnosis Date Noted    Impaired mobility and activities of daily living 2021    UTI (urinary tract infection) 2021    Venous stasis dermatitis of both lower extremities 08/10/2021    Aphasia     Chest pain 2020    Obesity (BMI 30-39.9) 2019    Obstructive sleep apnea 2019    COPD exacerbation ([address]     COPD (chronic obstructive pulmonary disease) with acute bronchitis ([address] 2018    Anxiety 2017    Essential hypertension      Past Medical History:   Diagnosis Date    Anxiety     Cancer ([address]     Glaucoma     Gout     left knee    Hypertension     Lung disease     Obesity (BMI 30-39. 9) 2019    Obstructive sleep apnea 2019     Past Surgical History:   Procedure Laterality Date    CATARACT REMOVAL WITH IMPLANT Bilateral     COLON SURGERY  2009    polyps    COLONOSCOPY  2009    GASTROSTOMY TUBE PLACEMENT N/A 2020    EGD PEG TUBE PLACEMENT performed by Samina Shelton MD at [address] [address]      graft from neck     Allergies   Allergen Reactions    Norvasc [Amlodipine Besylate] Swelling       DATE ONSET: 21    Date of Evaluation: 2021   Evaluating Therapist: WILFRID Church    Recommended Diet and Intervention  Diet Solids Recommendation: Easy to Chew  Liquid Consistency Recommendation:  Thin  Recommended Form of Meds: PO  Recommendations: Modified barium swallow study (MBSS recommended if pt is not tolerating recommended diet and is able to participate in instrumental procedure)  Therapeutic Interventions: Diet tolerance monitoring,Therapeutic PO trials with SLP    Compensatory Swallowing Strategies  Compensatory Swallowing Strategies: Alternate solids and liquids;Eat/Feed slowly;Upright as possible for all oral intake;Small bites/sips; Total feed;Assist feed; Other (comments) (check oral cavity following PO and meds)    Reason for Referral  Sharath Gates was referred for a bedside swallow evaluation to assess the efficiency of her swallow function, identify signs and symptoms of aspiration, identify risk factors, and make recommendations regarding safe dietary consistencies, effective compensatory strategies, and safe eating environment. General  Chart Reviewed: Yes  Comments: Per H&P, pt has not been consuming PO for '4 days\" pt with PEG placement in 2020 but is no longer used for nutrition. Bilateral infiltrates per cxr. Subjective  Subjective: Pt was alert upon  arrival. Pt unable to state name but continued to smile at SLP. Pt said \"real good\" following PO trials. At the end of session, pt was asking SLP questions. Confused with poor insight to current medical situtaion. Behavior/Cognition: Alert; Cooperative; Requires cueing;Confused  Respiratory Status: O2 via nasual cannula  Breath Sounds: Clear  O2 Device: Nasal cannula  Liters of Oxygen: 2 L  Communication Observation: Functional  Follows Directions: Simple  Dentition: Adequate  Patient Positioning: Upright in bed  Baseline Vocal Quality: Normal  Prior Dysphagia History: pt was NPO due to cognitive status in 2020  Consistencies Administered: Reg solid; Thin - teaspoon; Thin - cup; Thin - straw; Ice Chips;Dysphagia Pureed (Dysphagia I); Dysphagia Soft and Bite-Sized (Dysphagia III)    Current Diet level:  Current Diet : NPO  Current Liquid Diet : NPO    Oral Motor Deficits  Oral/Motor  Oral Motor: Exceptions to [facility]  Labial Strength: Reduced  Labial Coordination: Reduced  Lingual Strength: Reduced  Lingual Coordination: Reduced    Oral Phase Dysfunction  Oral Phase  Oral Phase: Exceptions  Oral Phase Dysfunction  Impaired Mastication: Reg Solid  Pocketing Left: Reg solid  Decreased Anterior to Posterior Transit: Reg solid  Lingual/Palatal Residue: Reg solid     Indicators of Pharyngeal Phase Dysfunction   Pharyngeal Phase  Pharyngeal Phase: WFL  Pharyngeal Phase   Pharyngeal: no overt s/s of aspiration; no observed changes in respiratory status    Impression  Dysphagia Diagnosis: Mild oral stage dysphagia  Dysphagia Impression : Pt presents with mild oral dysphagia characterized by reduced labial/lingual strength, and coordination which resulted in reduced A-P transfer, prolonged mastication, left side pocketing, and lingual manipulation on trials of reg solids. Improved bolus preparation and transfer on trails of soft solids, puree, and thin. No overt s/s of aspiration observed on trials of solid PO and thin liquids via spoon, cup, and straw. Pt will need assistance and supervision during PO intake due to cognitive status. Dysphagia Outcome Severity Scale: Level 4: Mild moderate dysphagia- Intermittent supervision/cueing. One - two diet consistencies restricted     Treatment Plan  Requires SLP Intervention: Yes  Duration/Frequency of Treatment: 2-3x/week or until diet tolerance is observed  D/C Recommendations: To be determined       Treatment/Goals  Short-term Goals  Timeframe for Short-term Goals: 2-3x/week during LOS or until diet tolerance is met. Goal 1: Pt will tolerate recommended diet with no overt s/s of difficulty or aspiration. Goal 2: Pt will complete labial and lingual exercises with 80% acc and min cues to improve oral motor strength and swallow function.   Goal 3: Pt will complete tongue base exercises with 80% acc and min cues to improve lingual strength and posterior propulsion during oral phase of swallow. Goal 4: Pt will tolerate trials of  reg solids with good oral clearance and no overt s/s of difficulty or aspiration. Long-term Goals  Timeframe for Long-term Goals: 1-2 weeks  Goal 1: Pt will tolerate recommended diet with no overt s/s of difficulty or aspiration. Prognosis  Prognosis  Prognosis for safe diet advancement: fair  Barriers/Prognosis Comment: Clinical indicators of oral dysphagia at bedside, likely acute related to current pulmonary status, weakness, and cognitive state. Patient's prognosis for diet advancement is fair at this time given patient's current clinical presentation. A PO diet is recommended at this time with close monitoring during all PO for s/s of aspiration. If s/s of aspiration become apparent, consult SLP. Individuals consulted  Consulted and agree with results and recommendations: RN (ISRAEL Bell)    Education  Patient Education: results and recommendations  Patient Education Response: No evidence of learning  Safety Devices in place: Yes  Type of devices: All fall risk precautions in place    Pain Assessment:  Pre-Treatment  Pain assessment: 0-10  Pain level: unable to rate  Intervention:  Patient demonstrated no s/s of pain. Patient reported pain meds were recently given by nursing. Post-Treatment  Pain assessment: 0-10  Pain level: unable to rate  Intervention:  Patient demonstrated no s/s of pain.          Therapy Time  SLP Individual Minutes  Time In: 0940  Time Out: 1000  Minutes: 20              Signature: Electronically signed by WILFRID Osborne on 12/27/2021 at 10:23 AM

## 2021-12-27 NOTE — PROGRESS NOTES
years  []   Pulmonary Disorder  (acute or chronic)  []   Severe or Chronic w/ Exacerbation  []     Surgical Status No [x]   Surgeries     General []   Surgery Lower []   Abdominal Thoracic or []   Upper Abdominal Thoracic with  PulmonaryDisorder  []     Chest X-ray Clear/Not  Ordered     []  Chronic Changes  Results Pending  []  Infiltrates, atelectasis, pleural effusion, or edema  [x]  Infiltrates in more than one lobe []  Infiltrate + Atelectasis, &/or pleural effusion  []    Respiratory Pattern Regular,  RR = 12-20 [x]  Increased,  RR = 21-25 []  RIVERA, irregular,  or RR = 26-30 []  Decreased FEV1  or RR = 31-35 []  Severe SOB, use  of accessory muscles, or RR ? 35  []    Mental Status Alert, oriented,  Cooperative [x]  Confused but Follows commands []  Lethargic or unable to follow commands []  Obtunded  []  Comatose  []    Breath Sounds Clear to  auscultation  [x]  Decreased unilaterally or  in bases only []  Decreased  bilaterally  []  Crackles or intermittent wheezes []  Wheezes []    Cough Strong, Spontan., & nonproductive [x]  Strong,  spontaneous, &  productive []  Weak,  Nonproductive []  Weak, productive or  with wheezes []  No spontaneous  cough or may require suctioning []    Level of Activity Ambulatory []  Ambulatory w/ Assist  [x]  Non-ambulatory []  Paraplegic []  Quadriplegic []    Total    Score:____3___     Triage Score:____5____      Tri       Triage:     1. (>20) Freq: Q3    2. (16-20) Freq: Q4   3. (11-15) Freq: QID & Albuterol Q2 PRN    4. (6-10) Freq: TID & Albuterol Q2 PRN    5. (0-5) Freq Q4prn

## 2021-12-28 LAB
ALBUMIN SERPL-MCNC: 2.5 G/DL (ref 3.5–4.6)
ALP BLD-CCNC: 296 U/L (ref 40–130)
ALT SERPL-CCNC: 30 U/L (ref 0–33)
ANION GAP SERPL CALCULATED.3IONS-SCNC: 16 MEQ/L (ref 9–15)
AST SERPL-CCNC: 38 U/L (ref 0–35)
BILIRUB SERPL-MCNC: 0.7 MG/DL (ref 0.2–0.7)
BUN BLDV-MCNC: 13 MG/DL (ref 8–23)
CALCIUM SERPL-MCNC: 8.3 MG/DL (ref 8.5–9.9)
CHLORIDE BLD-SCNC: 103 MEQ/L (ref 95–107)
CO2: 18 MEQ/L (ref 20–31)
CREAT SERPL-MCNC: 0.83 MG/DL (ref 0.5–0.9)
GFR AFRICAN AMERICAN: >60
GFR NON-AFRICAN AMERICAN: >60
GLOBULIN: 3.7 G/DL (ref 2.3–3.5)
GLUCOSE BLD-MCNC: 104 MG/DL (ref 70–99)
POTASSIUM SERPL-SCNC: 3.8 MEQ/L (ref 3.4–4.9)
PROCALCITONIN: 1.08 NG/ML (ref 0–0.15)
SODIUM BLD-SCNC: 137 MEQ/L (ref 135–144)
TOTAL PROTEIN: 6.2 G/DL (ref 6.3–8)

## 2021-12-28 PROCEDURE — 94761 N-INVAS EAR/PLS OXIMETRY MLT: CPT

## 2021-12-28 PROCEDURE — 97535 SELF CARE MNGMENT TRAINING: CPT

## 2021-12-28 PROCEDURE — 36415 COLL VENOUS BLD VENIPUNCTURE: CPT

## 2021-12-28 PROCEDURE — 6360000002 HC RX W HCPCS: Performed by: NURSE PRACTITIONER

## 2021-12-28 PROCEDURE — 2580000003 HC RX 258: Performed by: NURSE PRACTITIONER

## 2021-12-28 PROCEDURE — 6370000000 HC RX 637 (ALT 250 FOR IP): Performed by: INTERNAL MEDICINE

## 2021-12-28 PROCEDURE — 6370000000 HC RX 637 (ALT 250 FOR IP): Performed by: NURSE PRACTITIONER

## 2021-12-28 PROCEDURE — 97167 OT EVAL HIGH COMPLEX 60 MIN: CPT | Performed by: OCCUPATIONAL THERAPIST

## 2021-12-28 PROCEDURE — 84145 PROCALCITONIN (PCT): CPT

## 2021-12-28 PROCEDURE — 99232 SBSQ HOSP IP/OBS MODERATE 35: CPT | Performed by: INTERNAL MEDICINE

## 2021-12-28 PROCEDURE — 1210000000 HC MED SURG R&B

## 2021-12-28 PROCEDURE — 94640 AIRWAY INHALATION TREATMENT: CPT

## 2021-12-28 PROCEDURE — 2700000000 HC OXYGEN THERAPY PER DAY

## 2021-12-28 PROCEDURE — 80053 COMPREHEN METABOLIC PANEL: CPT

## 2021-12-28 RX ORDER — LOSARTAN POTASSIUM 25 MG/1
25 TABLET ORAL EVERY 12 HOURS
Status: DISCONTINUED | OUTPATIENT
Start: 2021-12-28 | End: 2022-01-05 | Stop reason: HOSPADM

## 2021-12-28 RX ADMIN — PIPERACILLIN AND TAZOBACTAM 3375 MG: 3; .375 INJECTION, POWDER, LYOPHILIZED, FOR SOLUTION INTRAVENOUS at 06:37

## 2021-12-28 RX ADMIN — SODIUM CHLORIDE: 9 INJECTION, SOLUTION INTRAVENOUS at 19:04

## 2021-12-28 RX ADMIN — SODIUM CHLORIDE: 9 INJECTION, SOLUTION INTRAVENOUS at 08:25

## 2021-12-28 RX ADMIN — PIPERACILLIN AND TAZOBACTAM 3375 MG: 3; .375 INJECTION, POWDER, LYOPHILIZED, FOR SOLUTION INTRAVENOUS at 21:32

## 2021-12-28 RX ADMIN — Medication 10 ML: at 08:25

## 2021-12-28 RX ADMIN — Medication 650 MG: at 11:46

## 2021-12-28 RX ADMIN — ENOXAPARIN SODIUM 40 MG: 100 INJECTION SUBCUTANEOUS at 08:25

## 2021-12-28 RX ADMIN — METOPROLOL TARTRATE 12.5 MG: 25 TABLET, FILM COATED ORAL at 19:03

## 2021-12-28 RX ADMIN — BUDESONIDE AND FORMOTEROL FUMARATE DIHYDRATE 2 PUFF: 160; 4.5 AEROSOL RESPIRATORY (INHALATION) at 07:23

## 2021-12-28 RX ADMIN — LOSARTAN POTASSIUM 25 MG: 25 TABLET, FILM COATED ORAL at 11:46

## 2021-12-28 RX ADMIN — BUDESONIDE AND FORMOTEROL FUMARATE DIHYDRATE 2 PUFF: 160; 4.5 AEROSOL RESPIRATORY (INHALATION) at 20:03

## 2021-12-28 RX ADMIN — LOSARTAN POTASSIUM 25 MG: 25 TABLET, FILM COATED ORAL at 22:14

## 2021-12-28 RX ADMIN — METOPROLOL TARTRATE 12.5 MG: 25 TABLET, FILM COATED ORAL at 08:25

## 2021-12-28 RX ADMIN — ATORVASTATIN CALCIUM 20 MG: 20 TABLET, FILM COATED ORAL at 19:03

## 2021-12-28 RX ADMIN — PIPERACILLIN AND TAZOBACTAM 3375 MG: 3; .375 INJECTION, POWDER, LYOPHILIZED, FOR SOLUTION INTRAVENOUS at 13:54

## 2021-12-28 RX ADMIN — Medication 10 ML: at 19:03

## 2021-12-28 ASSESSMENT — PAIN DESCRIPTION - PAIN TYPE: TYPE: ACUTE PAIN

## 2021-12-28 ASSESSMENT — PAIN SCALES - GENERAL
PAINLEVEL_OUTOF10: 0
PAINLEVEL_OUTOF10: 4

## 2021-12-28 ASSESSMENT — PAIN DESCRIPTION - DESCRIPTORS: DESCRIPTORS: ACHING

## 2021-12-28 ASSESSMENT — PAIN SCALES - WONG BAKER: WONGBAKER_NUMERICALRESPONSE: 0

## 2021-12-28 ASSESSMENT — PAIN DESCRIPTION - LOCATION: LOCATION: WRIST

## 2021-12-28 NOTE — PROGRESS NOTES
[facility] OCCUPATIONAL THERAPY EVALUATION - ACUTE     NAME: Leanne Edmonds  : 1928 (35 y.o.)  MRN: [id number]  CODE STATUS: Limited  Room: U591/W973-93    Date of Service: 2021    Patient Diagnosis(es): UTI (urinary tract infection) [N39.0]  Failure to thrive in adult [R62.7]  Acute cystitis without hematuria [N30.00]  Pneumonia of both lower lobes due to infectious organism [J18.9]   Chief Complaint   Patient presents with    Illness     not eating/drinking per family     Patient Active Problem List    Diagnosis Date Noted    Impaired mobility and activities of daily living 2021    UTI (urinary tract infection) 2021    Venous stasis dermatitis of both lower extremities 08/10/2021    Aphasia     Chest pain 2020    Obesity (BMI 30-39.9) 2019    Obstructive sleep apnea 2019    COPD exacerbation ([address]     COPD (chronic obstructive pulmonary disease) with acute bronchitis ([address] 2018    Anxiety 2017    Essential hypertension         Past Medical History:   Diagnosis Date    Anxiety     Cancer ([address]     Glaucoma     Gout     left knee    Hypertension     Lung disease     Obesity (BMI 30-39. 9) 2019    Obstructive sleep apnea 2019     Past Surgical History:   Procedure Laterality Date    CATARACT REMOVAL WITH IMPLANT Bilateral 2016    COLON SURGERY  2009    polyps    COLONOSCOPY  2009    GASTROSTOMY TUBE PLACEMENT N/A 2020    EGD PEG TUBE PLACEMENT performed by Teddy Brown MD at [address]      graft from neck        Restrictions  Restrictions/Precautions: Fall Risk     Safety Devices: Safety Devices  Safety Devices in place: Yes  Type of devices: All fall risk precautions in place        Subjective  Pre Treatment Pain Screening  Pain at present: 0  Scale Used:  Faces  Intervention List: Patient able to continue with treatment    Pain Reassessment:   Pain Assessment  Patient Currently in Pain: No  Pain Assessment: Faces  Brady-Miranda Pain Rating: No hurt       Prior Level of Function:  Social/Functional History  Additional Comments: pt unable to provide any social functional information due to pt with decreased cognition    OBJECTIVE:     Orientation Status:  Orientation  Overall Orientation Status: Impaired  Orientation Level: Unable to assess (due to decreased cognition)    Observation:  Observation/Palpation  Observation: on O2; increased SOB with minimal activity- SPO2 maintained 95% with activity  Edema: B LEs    Cognition Status:  Cognition  Overall Cognitive Status: Exceptions  Arousal/Alertness: Delayed responses to stimuli  Following Commands: Inconsistently follows commands (followed only AROM commands with demonstration on evaluation)  Attention Span: Unable to maintain attention  Safety Judgement: Decreased awareness of need for safety  Initiation: Requires cues for all  Cognition Comment: limited participation on evaluation    Perception Status:  Perception  Overall Perceptual Status:  (unable to accurately assess)    Sensation Status:  Sensation  Overall Sensation Status:  (unable to accurately assess)    Vision and Hearing Status:  Vision  Vision:  (pt unable to provide information due to pt with decreased cognition)  Hearing  Hearing:  (pt unable to provide information due to pt with decreased cognition)     ROM:   LUE AROM (degrees)  LUE AROM : WFL  Left Hand AROM (degrees)  Left Hand AROM: Exceptions  Left Hand General AROM: left hand appears with limited motion and a claw like position  RUE AROM (degrees)  RUE AROM : WFL  Right Hand AROM (degrees)  Right Hand AROM: WFL    Strength:  LUE Strength  LUE Strength Comment: unable to accurately assess but appears functional 3/5  RUE Strength  RUE Strength Comment: unable to accurately assess but appears functional 3/5    Coordination, Tone, Quality of Movement:    Tone RUE  RUE Tone: Normotonic  Tone LUE  LUE Tone: Normotonic  Coordination  Movements Are Fluid And Coordinated: Yes  Quality of Movement Other  Comment: With demonstration and verbal cues, patient participated in AROM to BUE    Hand Dominance:  Hand Dominance  Hand Dominance:  (unable to accurately assess due to low cognition)    ADL Status:  ADL  Feeding: Unable to assess(comment)  Grooming: Dependent/Total  UE Bathing: Dependent/Total  LE Bathing: Dependent/Total  LE Dressing: Dependent/Total  Toileting: Unable to assess(comment)  Toilet Transfers  Toilet Transfer: Unable to assess  Tub Transfers  Tub Transfers: Not tested    Therapy key for assistance levels -   Independent = Pt. is able to perform task with no assistance but may require a device   Stand by assistance = Pt. does not perform task at an independent level but does not need physical assistance, requires verbal cues  Minimal, Moderate, Maximal Assistance = Pt. requires physical assistance (25%, 50%, 75% assist from helper) for task but is able to actively participate in task   Dependent = Pt. requires total assistance with task and is not able to actively participate with task completion     Functional Mobility:     Transfers  Sit to stand: Unable to assess  Stand to sit: Unable to assess  Transfer Comments: transfers deemed unsafe due to limited cognition and increased assist for bed mobility    Bed Mobility  Bed mobility  Rolling to Left: Maximum assistance  Rolling to Right: Maximum assistance  Supine to Sit: Dependent/Total,2 Person assistance  Sit to Supine: Dependent/Total,2 Person assistance    Seated and Standing Balance:  Balance  Sitting Balance: Unable to assess(comment)  Standing Balance: Unable to assess(comment)    Functional Endurance:  Activity Tolerance  Activity Tolerance: Treatment limited secondary to decreased cognition    D/C Recommendations:  OT D/C RECOMMENDATIONS  REQUIRES OT FOLLOW UP: Yes    Equipment Recommendations:  OT Equipment Recommendations  Other: TBD    OT Education:   OT Education  OT Education: OT Role,Plan of Care    OT Follow Up:  OT D/C RECOMMENDATIONS  REQUIRES OT FOLLOW UP: Yes       Assessment/Discharge Disposition:  Assessment: Patient is a 81 YO female from home who presents to [facility] with the above deficits which affects her ability to complete ADLs. Patient would benefit from continued OT to max [place] and safety at home. Performance deficits / Impairments: Decreased functional mobility ,Decreased ADL status,Decreased strength,Decreased cognition,Decreased endurance,Decreased fine motor control,Decreased safe awareness  Prognosis: Poor  Discharge Recommendations: Continue to assess pending progress  Decision Making: High Complexity  History: minimal medical history  Exam: 7 deficits  Assistance / Modification: maximal    Six Click Score   How much help for putting on and taking off regular lower body clothing?: Total  How much help for Bathing?: Total  How much help for Toileting?: Total  How much help for putting on and taking off regular upper body clothing?: Total  How much help for taking care of personal grooming?: Total  How much help for eating meals?: Total  AM-PAC Inpatient Daily Activity Raw Score: 6  AM-PAC Inpatient ADL T-Scale Score : 17.07  ADL Inpatient CMS 0-100% Score: 100    Plan:  Plan  Times per week: 1-3 x week  Plan weeks: length of acute stay  Current Treatment Recommendations: Strengthening,Functional Mobility Training,Endurance Training,Cognitive Reorientation,Patient/Caregiver Education & Training,Self-Care / ADL,Safety Education & Training    Goals:   Patient will:    - Improve functional endurance to tolerate/complete 30 mins of ADL's  - Be mod assist in UB ADLs   - Be mod assist in LB ADLs  - Be min assist in ADL transfers without LOB  - Be mod assist in toileting tasks  - Improve B UE Function (AROM, strength, motor control, tone normalization) to complete ADLs as projected.   - Improve B UE strength and endurance to 3+/5 in order to participate in self-care activities as projected. - Access appropriate D/C site with as few architectural barriers as possible.     Patient Goal: Patient goals : None stated    Discussed and agreed upon: No Patient with decreased cognition and unable to agree/disagree to goal.    Therapy Time:   OT Individual Minutes  Time In: 5472  Time Out: 1559  Minutes: 20    Eval: 20 minutes     Electronically signed by:    Kathe Ahn OT, OTR/L  12/28/2021, 4:07 PM

## 2021-12-28 NOTE — PROGRESS NOTES
Physical Therapy Med Surg Daily Treatment Note  Facility/Department: [facility]  [facility]       NAME: Zack Iglesias  : 1928 (89 y.o.)  MRN: [id number]  CODE STATUS: Limited    Date of Service: 2021    Patient Diagnosis(es): UTI (urinary tract infection) [N39.0]  Failure to thrive in adult [R62.7]  Acute cystitis without hematuria [N30.00]  Pneumonia of both lower lobes due to infectious organism [J18.9]   Chief Complaint   Patient presents with    Illness     not eating/drinking per family     Patient Active Problem List    Diagnosis Date Noted    Impaired mobility and activities of daily living 2021    UTI (urinary tract infection) 2021    Venous stasis dermatitis of both lower extremities 08/10/2021    Aphasia     Chest pain 2020    Obesity (BMI 30-39.9) 2019    Obstructive sleep apnea 2019    COPD exacerbation ([address]     COPD (chronic obstructive pulmonary disease) with acute bronchitis ([address] 2018    Anxiety 2017    Essential hypertension         Past Medical History:   Diagnosis Date    Anxiety     Cancer ([address]     Glaucoma     Gout     left knee    Hypertension     Lung disease     Obesity (BMI 30-39. 9) 2019    Obstructive sleep apnea 2019     Past Surgical History:   Procedure Laterality Date    CATARACT REMOVAL WITH IMPLANT Bilateral     COLON SURGERY      polyps    COLONOSCOPY  2009    GASTROSTOMY TUBE PLACEMENT N/A 2020    EGD PEG TUBE PLACEMENT performed by Temo Chen MD at [address]      graft from neck          Restrictions  Restrictions/Precautions: Fall Risk (mod cole score)    SUBJECTIVE   Subjective  Subjective: minimal verbalizations. Agreeable to tx. Pre-Session Pain Report  Pre Treatment Pain Screening  Pain at present: 0  Intervention List: Patient able to continue with treatment  Comments / Details: unable to rate.  Just says, \"it hurts\" pointing to her IV site. Post-Session Pain Report  Pain Assessment  Pain Level: 0  Pain Type: Acute pain  Pain Location: Wrist  Pain Descriptors: Aching         OBJECTIVE   Orientation  Orientation Level: Disoriented X4    Bed mobility  Rolling to Right: Maximum assistance  Supine to Sit: Maximum assistance;2 Person assistance  Sit to Supine: Maximum assistance;2 Person assistance  Comment: HOB slightly elevated for supine to sit and flat for sit to supine. hand over hand assist for sequencing and task completion. heavy trunk lifting assist required. Transfers  Sit to Stand: Moderate Assistance;2 Person Assistance  Stand to sit: Moderate Assistance;2 Person Assistance;Minimal Assistance  Comment: vc's for hand placement with use of WW. 2 hands on [employer] best at this time. Pt gave good effort to stand but was confused and required increased cues. Activity Tolerance  Activity Tolerance: Patient limited by fatigue;Patient limited by endurance; Patient limited by cognitive status          ASSESSMENT   Assessment: Pt pleasantly confused with minimal verbalizations. She stood with Mod A +2 at [employer] and gave good effort. Discharge Recommendations:  Continue to assess pending progress    Goals  Short term goals  Short term goal 1: Pt will demonstrate sitting edge of bed >/= 3 min with CGA  Long term goals  Long term goal 1: Pt will demonstrate bed mobility min A  Long term goal 2: Pt will demonstrate transfers mod A  Long term goal 3: Pt will demonstrate amb >/= 10 ft max A with safest AD    PLAN    Safety Devices  Type of devices: Bed alarm in place;Call light within reach; Left in bed; All fall risk precautions in place     AMPAC (6 CLICK) BASIC MOBILITY  AM-PAC Inpatient Mobility Raw Score : 9      Therapy Time   Individual   Time In 0913   Time Out 0929   Minutes 16      bm/Trsf  - 16 mins       Ananda Voss PTA, 12/28/21 at 9:35 AM       Definitions for assistance levels  Independent = pt does not require any physical supervision or assistance from another person for activity completion. Device may be needed.   Stand by assistance = pt requires verbal cues or instructions from another person, close to but not touching, to perform the activity  Minimal assistance= pt performs 75% or more of the activity; assistance is required to complete the activity  Moderate assistance= pt performs 50% of the activity; assistance is required to complete the activity  Maximal assistance = pt performs 25% of the activity; assistance is required to complete the activity  Dependent = pt requires total physical assistance to accomplish the task

## 2021-12-28 NOTE — PROGRESS NOTES
Progress Note  Date:2021       JRBQ:Q280/J335-34  Patient Name:Trudy Pierre     Date of Birth:[birth year]     Age:89 y.o. Subjective    Subjective   No acute events overnight. Febrile to 101.8 °F overnight. A.m. blood pressure elevated 171/65  Blood cultures demonstrating preliminary gram-negative rods starting . Speech Therapy evaluated, made modified diet recommendations for safe p.o. intake. Review of Systems   Unable to complete full ROS in setting of encephalopathy/dementia. Patient does indicate dry mouth, nausea without vomiting, and suprapubic discomfort. Objective         Vitals Last 24 Hours:  TEMPERATURE:  Temp  Av.4 °F (38 °C)  Min: 99 °F (37.2 °C)  Max: 101.8 °F (38.8 °C)  RESPIRATIONS RANGE: Resp  Av  Min: 20  Max: 24  PULSE OXIMETRY RANGE: SpO2  Av.3 %  Min: 92 %  Max: 96 %  PULSE RANGE: Pulse  Av.7  Min: 84  Max: 99  BLOOD PRESSURE RANGE: Systolic (98ZMH), VPR:638 , Min:132 , YJ   ; Diastolic (00QNM), WZW:52, Min:56, Max:66    I/O (24Hr): Intake/Output Summary (Last 24 hours) at 2021 1055  Last data filed at 2021 0020  Gross per 24 hour   Intake 240 ml   Output 600 ml   Net -360 ml     Objective:  Vital signs: (most recent): Blood pressure (!) 132/56, pulse 86, temperature 99 °F (37.2 °C), resp. rate 20, height 5' 3\" (1.6 m), weight 180 lb (81.6 kg), SpO2 95 %, not currently breastfeeding. Constitutional: Elderly adult female sitting up in bed no acute distress  Head: NCAT  Eyes: PERRLA, EOMI. Arcus senilis appreciated. ENT: Dry oropharynx with some dried posterior secretions visible. No obvious bleeding. Suspected to be hard of hearing. Neck: Trachea midline  Cardiovascular: Regular rhythm with borderline tachycardia. +1 bilateral pretibial edema. Pulmonary: Normal rate and effort of respiration on 2 L by nasal cannula. Abdomen: Soft, nontense abdomen. Hypoactive bowel sounds.   Suprapubic tenderness to palpation. Neurologic: Alert, disoriented. Follows some commands, gives some 1-word answers to questions. Generalized weakness appreciated, no focal weakness noted, but patient largely unable to cooperate with neurologic exam.  Psychiatric: Calm, disoriented. Not agitated or distressed. MSK/integumentary: Baseline arthritic changes of bilateral hands. Left mid clavicular erythematous patch with slightly raised central lesion of unclear etiology, appearing almost as a small isolated bullae. Will monitor. Labs/Imaging/Diagnostics    Labs:  CBC:  Recent Labs     12/26/21  1315 12/27/21  1204   WBC 12.2* 10.3   RBC 4.37 4.06*   HGB 12.4 11.8*   HCT 37.7 36.1*   MCV 86.3 89.1   RDW 14.4 14.5    153     CHEMISTRIES:  Recent Labs     12/26/21  1315 12/27/21  1204 12/28/21  0502    140 137   K 4.5 3.6 3.8    105 103   CO2 21 19* 18*   BUN 20 16 13   CREATININE 1.06* 0.88 0.83   GLUCOSE 114* 102* 104*   MG 2.4  --   --      PT/INR:  Recent Labs     12/26/21  2300   PROTIME 16.7*   INR 1.4     APTT:No results for input(s): APTT in the last 72 hours. LIVER PROFILE:  Recent Labs     12/26/21  1315 12/27/21  1204 12/28/21  0502   AST 38* 41* 38*   ALT 27 33 30   BILITOT 0.8* 0.6 0.7   ALKPHOS 236* 295* 296*       Imaging Last 24 Hours:  XR CHEST PORTABLE    Result Date: 12/26/2021  EXAMINATION: XR CHEST PORTABLE CLINICAL HISTORY: FEVER COMPARISONS: CT CHEST, DECEMBER 21, 2021, CHEST RADIOGRAPH, OCTOBER 31, 2020 FINDINGS: Osseous structures intact. Cardiopericardial silhouette normal. Ill-defined areas of increased opacity are found in the right upper and middle lung, as well as in the left upper, and left mid and lower lung. Blunting left costophrenic angle identified. BILATERAL ATELECTASIS/PNEUMONIA. LEFT PLEURAL EFFUSION.     Assessment//Plan           Hospital Problems           Last Modified POA    Obstructive sleep apnea 12/27/2021 Yes    UTI (urinary tract infection) 12/26/2021 Yes Impaired mobility and activities of daily living 12/27/2021 Yes        Assessment & Plan    80 y.o. female with PMH who has PMH HTN, COPD (home o2 PRN), FRED, venous stasis, RA, dysphagia, and skin cancer presented with worsening shortness of breath, decreased intake, weakness, and decreased mobility who is admitted with the following:     Gram negative bacteremia in setting of UTI, with possible superimposed PNA  -Continue IV antibiotics (Zosyn)  -Daily labs  -O2 to maintain sats >92%  -Acapella and incentive spirometry  -Trend procalcitonin, improving     RICHARD secondary to dehydration and decreased oral intake  -Creat 1.06 (baseline 0.7)  -Gentle IVF  -Renal function panel daily  -Avoid nephrotoxic agents  -Improving     HTN  -Resume home medications when taking PO  -Labetalol PRN  -12/28: Resumed home losartan 25 mg p.o. daily     Failure to thrive in adult  -NPO; IVFs  -Bedside swallow study  -PT/OT/Speech consults  -Rehab consult     Pulmonary nodules (new finding since previous scans Oct 2020)  COPD not in exacerbation  FRED  -Multiple pulmonary nodules RUL and RLL  -Consult pulmonary for further recommendations  -Wears home o2 PRN  -O2 to maintain sats >92%  -Resumed home medications     Thyroid nodule  -L lobe thyroid nodules  -Outpatient f/u       Advanced care planning  -DNRCCA/DNI    Diet: Per ST recommendations. 1:1 feeding at present in setting of weakness and confusion.       Electronically signed by Nancy Rosen DO on 12/28/2021 at 10:57 AM

## 2021-12-28 NOTE — CONSULTS
Physical Medicine & Rehabilitation  Consult Note      Admitting Physician: Laney Miranda DO    Primary Care Provider: Agnes Taylor MD     Reason for Consult:  Asses rehab needs, promote physical and mental function, and decrease likelihood of re-admit to the hospital after discharge. History of Present Illness:    Miguel Low is a 80 y.o. female admitted to [facility] on 12/26/2021. UTI sepsis        HPI      I reviewed recent nursing notes discussed care with acute care providers, \" see notes   \". Their inpatient work up has included:    Imaging:      Imaging and other studies reviewed and discussed with patient and staff    CT Head WO Contrast    Result Date: 12/21/2021  CT Brain. Contrast medium:  without contrast.. History:  Fall. Right chest pain. Altered mental status. Technical factors: CT imaging of the brain was obtained and formatted as 5 mm contiguous axial images. 2.5 mm contiguous axial images were obtained through the osseous structures. Sagittal and coronal reconstruction obtained during postprocessing. Comparison:  CT brain, October 27, 2020. MRI brain, November 2, 2020. Findings: Extra-axial spaces:  Normal. Intracranial hemorrhage:  None. Ventricular system: Ventricles mildly enlarged. Sulci mildly prominent. Basal Cisterns:  Normal. Cerebral Parenchyma: Bilateral symmetric periventricular areas decreased attenuation. Midline Shift:  None. Cerebellum:  Normal. Paranasal sinuses and mastoid air cells: Minimal bilateral opacification ethmoid air cells. Visualized Orbits: Remote bilateral ocular surgery. Impression: Mild cerebral atrophy. Chronic ischemic white matter disease. Minimal/mild ethmoid sinusitis. All CT scans at this facility use dose modulation, iterative reconstruction, and/or weight based dosing when appropriate to reduce radiation dose to as low as reasonably achievable.     CT CHEST W CONTRAST    Result Date: 12/21/2021  CT of the Chest with intravenous contrast medium. HISTORY: Fall. Right rib pain. TECHNICAL FACTORS: CT imaging of the chest was obtained and formatted as 5 mm contiguous axial images from the thoracic inlet through the adrenal glands. Sagittal and coronal reconstructions obtained during postprocessing. Intravenous contrast medium:  100 ml of Isovue-300 Comparison:  CT chest, October 27, 2020. FINDINGS: Right lung: Motion artifact. Noncalcified nonpleural-based 7.1 x 7.0 mm pulmonary nodule found laterally and posteriorly right upper lobe (series 2, image 18). Partially pleural-based noncalcified right lower lobe nodule, measuring 8 x 12 mm (series 2, image 29), not present on prior study. Finding not present on examination October, 2020. Dependent subsegmental consolidation right posterior lung base. No pleural effusion. Left lung: Fibrotic change, left lung apex. No nodules, masses, pleural effusion. Dependent subsegmental atelectatic change left lung base. Lymph nodes/mediastinum asymmetric enlargement left lobe of thyroid with low-attenuation thyroid nodules are identified, largest measuring 7 x 12 mm. No hilar, mediastinal, or axillary lymph node enlargement. Thoracic aorta: AP and transverse dimension, 3.8 x 3.9 cm, the pulmonary arterial bifurcation. Cardiac Size: Upper limits normal. Pericardial effusion: None. Upper abdomen:Limited imaging upper abdomen small hiatal hernia. Musculoskeletal:No osteoblastic, and no osteolytic lesions. No acute fracture     Interval development of right lung nodules, largest measuring 7 x 12 mm. Recommendations below. Bilateral lower lobe dependent subsegmental atelectasis. Dilatation, ascending thoracic aorta.  Other findings discussed ____________________________________________________________ Fleischner Society 2017 Guidelines for Management of Incidentally Detected Pulmonary Nodules in Adults A: Solid Nodules*      Single                Low risk**                     <6 mm     No routine follow-up                               6-8 mm     CT at 6-12 months, then consider CT at 18-24 months                 >8 mm     Consider CT at 3 months, PET/CT, or tissue sampling Nodules <6 mm do not require routine follow-up, but certain patients at high risk with suspicious nodule morphology, upper lobe location, or both may warrant 12-month follow-up (recommendation 1A). High risk**                <6 mm     Optional CT at 12 months               6-8 mm     CT at 6-12 months, then CT at 18-24 months                 >8 mm     Consider CT at 3 months, PET/CT, or tissue sampling Nodules <6 mm do not require routine follow-up, but certain patients at high risk with suspicious nodule morphology, upper lobe location, or both may warrant 12-month follow-up (recommendation 1A). Multiple                         Low risk**                <6 mm     No routine follow-up               6-8 mm     CT at 3-6 months, then consider CT at 18-24 months                >8 mm     CT at 3-6 months, then  consider CT at 18-24 months Use most suspicious nodule as guide to management. Follow-up intervals may vary according to size and risk (recommendation 2A). High risk**                <6 mm     Optional CT at 12 months               6-8 mm     CT at 3-6 months, then at 18-24 months                >8 mm     CT at 3-6 months, then at 18-24 months Use most suspicious nodule as guide to management. Follow-up intervals may vary according to size and risk (recommendation 2A). B: Subsolid Nodules*      Single                  Ground glass                <6 mm     No routine follow-up              >=6 mm     CT at 6-12 months to confirm persistence, then CT  every 2 years until 5 years In certain suspicious nodules <6 mm, consider follow-up at 2 and 4 years. If solid component(s) or growth develops, consider resection. (Recommendations 3A and 4A).            Part solid                <6 mm     No routine follow-up              >=6 mm     CT at 3-6 months to confirm persistence. If unchanged and solid component remains <6 mm, annual CT  should be performed for 5 years. In practice, part-solid nodules cannot be defined as such until >=6 mm, and nodules <6 mm do not usually require follow-up. Persistent part-solid nodules with solid components >=6 mm should be considered highly suspicious (recommendations 4A-4C)      Multiple                <6 mm     CT at 3-6 months. If stable, consider CT at 2 and 4 years. >=6 mm     CT at 3-6 months. Subsequent management based  on the most suspicious nodule(s). Multiple <6 mm pure ground-glass nodules are usually benign, but consider follow-up in selected patients at high risk at 2 and 4 years (recommendation 5A). Note. --These recommendations do not apply to lung cancer screening, patients with immunosuppression, or patients with known primary cancer. * Dimensions are average of long and short axes, rounded to the nearest millimeter. ** Consider all relevant risk factors (see Risk Factors). ____________________________________________________________ All CT scans at this facility use dose modulation, iterative reconstruction, and/or weight based dosing when appropriate to reduce radiation dose to as low as reasonably achievable. CT CERVICAL SPINE WO CONTRAST    Result Date: 12/21/2021  EXAMINATION: CT CERVICAL SPINE WO CONTRAST   DATE AND TIME:12/21/2021 8:49 AM CLINICAL HISTORY:Acute posterior neck pain  fall  COMPARISON: None TECHNIQUE:Helical scanning was performed from the skull base through the remainder of the cervical spine without intravenous contrast. Sagittal and coronal reformats were obtained. The lack of contrast limits CT sensitivity of the soft tissues. All CT scans at this facility use dose modulation, iterative reconstruction, and/or weight based dosing when appropriate to reduce radiation dose to as low as reasonably achievable.  FINDINGS Fracture:No acute fracture. Alignment:  Normal cervical lordosis. No subluxation. No canal stenosis. Dense atlas:Dens atlas relationship is normal. Soft tissues:Negative. Other findings: There is moderate cervical spondylosis with multiple level disc osteophyte changes. Findings consistent with chronic mastoiditis with tiny fluid level in the left mastoid air cells suggestive of more acute inflammation. No acute cervical spine abnormality. XR CHEST PORTABLE    Result Date: 12/26/2021  EXAMINATION: XR CHEST PORTABLE CLINICAL HISTORY: FEVER COMPARISONS: CT CHEST, DECEMBER 21, 2021, CHEST RADIOGRAPH, OCTOBER 31, 2020 FINDINGS: Osseous structures intact. Cardiopericardial silhouette normal. Ill-defined areas of increased opacity are found in the right upper and middle lung, as well as in the left upper, and left mid and lower lung. Blunting left costophrenic angle identified. BILATERAL ATELECTASIS/PNEUMONIA. LEFT PLEURAL EFFUSION.              Labs:       labs reviewed and discussed with patient and staff    Lab Results   Component Value Date    POCGLU 130 11/06/2020    POCGLU 125 11/06/2020    POCGLU 130 11/06/2020    POCGLU 109 11/05/2020    POCGLU 122 11/05/2020     Lab Results   Component Value Date     12/27/2021    K 3.6 12/27/2021    K 4.0 11/06/2020     12/27/2021    CO2 19 12/27/2021    BUN 16 12/27/2021    CREATININE 0.88 12/27/2021    CALCIUM 8.2 12/27/2021    LABALBU 2.8 12/27/2021    BILITOT 0.6 12/27/2021    ALKPHOS 295 12/27/2021    AST 41 12/27/2021    ALT 33 12/27/2021     Lab Results   Component Value Date    WBC 10.3 12/27/2021    RBC 4.06 12/27/2021    HGB 11.8 12/27/2021    HCT 36.1 12/27/2021    MCV 89.1 12/27/2021    MCH 29.0 12/27/2021    MCHC 32.6 12/27/2021    RDW 14.5 12/27/2021     12/27/2021    MPV 11.6 06/17/2014     Lab Results   Component Value Date    VITD25 7.4 10/29/2019     Lab Results   Component Value Date    COLORU Yellow 12/26/2021    NITRU POSITIVE 12/26/2021    GLUCOSEU Negative 12/26/2021    KETUA TRACE 12/26/2021    UROBILINOGEN 1.0 12/26/2021    BILIRUBINUR Negative 12/26/2021    BILIRUBINUR neg 09/18/2017     Lab Results   Component Value Date    PROTIME 16.7 12/26/2021     Lab Results   Component Value Date    INR 1.4 12/26/2021         I discussed results with patient. Current Rehabilitation Assessments:    Rehabilitation:  Physical therapy: FIMS:  BedMobility:      Transfers:  ,  ,      FIMS: ,  , Assessment: Pt is 80 y.o. female dx with UTI and failure to thrive. Pt exhibits decreased cognition, decreased verbalizations, decreased ROM, strength, balance, activity tolerance requiring assistance of two people for all mobility at this time. Continued PT is required for safe d/c at highest level of indep. Occupational therapy: FIMS:   ,  ,           OCCUPATIONAL THERAPY                     LTG:                 Speech therapy: FIMS:       SPEECH THERAPY               Diet/Swallow:  Diet Solids Recommendation: Easy to Chew  Liquid Consistency Recommendation: Thin  Dysphagia Outcome Severity Scale: Level 4: Mild moderate dysphagia- Intermittent supervision/cueing. One - two diet consistencies restricted    Compensatory Swallowing Strategies: Alternate solids and liquids,Eat/Feed slowly,Upright as possible for all oral intake,Small bites/sips,Total feed,Assist feed,Other (comments) (check oral cavity following PO and meds)  Therapeutic Interventions: Diet tolerance monitoring,Therapeutic PO trials with SLP          COGNITION    OT:    SP:          Past Medical History:          Diagnosis Date    Anxiety     Cancer ([facility] [address]     Glaucoma     Gout     left knee    Hypertension     Lung disease     Obesity (BMI 30-39. 9) 7/11/2019    Obstructive sleep apnea 7/11/2019         PastSurgical History:          Procedure Laterality Date    CATARACT REMOVAL WITH IMPLANT Bilateral 2016    COLON SURGERY  2009    polyps    COLONOSCOPY  02/2009    GASTROSTOMY TUBE PLACEMENT N/A 11/4/2020    EGD PEG TUBE PLACEMENT performed by Alphonso Klinefelter, MD at [address]  2002    graft from neck         Allergies: Allergies   Allergen Reactions    Norvasc [Amlodipine Besylate] Swelling        CurrentMedications:     Current Facility-Administered Medications: sodium chloride flush 0.9 % injection 5-40 mL, 5-40 mL, IntraVENous, 2 times per day  sodium chloride flush 0.9 % injection 5-40 mL, 5-40 mL, IntraVENous, PRN  0.9 % sodium chloride infusion, 25 mL, IntraVENous, PRN  enoxaparin (LOVENOX) injection 40 mg, 40 mg, SubCUTAneous, Daily  ondansetron (ZOFRAN-ODT) disintegrating tablet 4 mg, 4 mg, Oral, Q8H PRN **OR** ondansetron (ZOFRAN) injection 4 mg, 4 mg, IntraVENous, Q6H PRN  acetaminophen (TYLENOL) tablet 650 mg, 650 mg, Oral, Q6H PRN **OR** acetaminophen (TYLENOL) suppository 650 mg, 650 mg, Rectal, Q6H PRN  polyethylene glycol (GLYCOLAX) packet 17 g, 17 g, Oral, Daily PRN  piperacillin-tazobactam (ZOSYN) 3,375 mg in dextrose 5 % 50 mL IVPB (mini-bag), 3,375 mg, IntraVENous, Q8H  0.9 % sodium chloride infusion, , IntraVENous, Continuous  labetalol (NORMODYNE;TRANDATE) injection 10 mg, 10 mg, IntraVENous, Q4H PRN  albuterol sulfate  (90 Base) MCG/ACT inhaler 1 puff, 1 puff, Inhalation, Q6H PRN  atorvastatin (LIPITOR) tablet 20 mg, 20 mg, Oral, Nightly  budesonide-formoterol (SYMBICORT) 160-4.5 MCG/ACT inhaler 2 puff, 2 puff, Inhalation, BID  ipratropium-albuterol (DUONEB) nebulizer solution 3 mL, 1 vial, Inhalation, Q6H PRN  metoprolol tartrate (LOPRESSOR) tablet 12.5 mg, 12.5 mg, Oral, BID  montelukast (SINGULAIR) tablet 10 mg, 10 mg, Oral, Nightly PRN      Social History:    Social History     Socioeconomic History    Marital status:       Spouse name: Not on file    Number of children: Not on file    Years of education: Not on file    Highest education level: Not on file   Occupational History    Not on file Tobacco Use    Smoking status: Never Smoker    Smokeless tobacco: Never Used   Substance and Sexual Activity    Alcohol use: No    Drug use: No    Sexual activity: Not on file   Other Topics Concern    Not on file   Social History Narrative    ,  Lives with her dtr Elroy Montes De Oca in [place] [address] [facility], [facility], [place], [place],     Son Alexsandra Cole is in the area                      Social Determinants of Health     Financial Resource Strain:     Difficulty of Paying Living Expenses: Not on file   Food Insecurity:     Worried About Running Out of Food in the Last Year: Not on file    Deborah of Food in the Last Year: Not on file   Transportation Needs:     Lack of Transportation (Medical): Not on file    Lack of Transportation (Non-Medical): Not on file   Physical Activity:     Days of Exercise per Week: Not on file    Minutes of Exercise per Session: Not on file   Stress:     Feeling of Stress : Not on file   Social Connections:     Frequency of Communication with Friends and Family: Not on file    Frequency of Social Gatherings with Friends and Family: Not on file    Attends Hoahaoism Services: Not on file    Active Member of [address] [place] [address] or Organizations: Not on file    Attends Club or Organization Meetings: Not on file    Marital Status: Not on file   Intimate Partner Violence:     Fear of Current or Ex-Partner: Not on file    Emotionally Abused: Not on file    Physically Abused: Not on file    Sexually Abused: Not on file   Housing Stability:     Unable to Pay for Housing in the Last Year: Not on file    Number of [place] in the Last Year: Not on file    Unstable Housing in the Last Year: Not on file          Family History:     History reviewed. No pertinent family history.     Review of Systems:    Review of Systems          Physical Exam:    BP (!) 149/62   Pulse 90   Temp 97.7 °F (36.5 °C)   Resp 18   Ht 5' 3\" (1.6 m)   Wt 180 lb (81.6 kg)   LMP  (LMP Unknown)   SpO2 95% BMI 31.89 kg/m²      Physical Exam  Ortho Exam  Neurologic Exam       ROS x10: The patient also complains of severely impaired mobility and activities of daily living. Otherwise no new problems with vision, hearing, nose, mouth, throat, dermal, cardiovascular, GI, , pulmonary, musculoskeletal, psychiatric or neurological. See Rehab H&P on Rehab chart dated . Vital signs:  BP (!) 149/62   Pulse 90   Temp 97.7 °F (36.5 °C)   Resp 18   Ht 5' 3\" (1.6 m)   Wt 180 lb (81.6 kg)   LMP  (LMP Unknown)   SpO2 95%   BMI 31.89 kg/m²   I/O:   PO/Intake:  fair PO intake, no problems observed or reported. Bowel/Bladder:  continent, no problems noted. General:  Patient is well developed, adequately nourished, non-obese and     well kempt. HEENT:    PERRLA, hearing intact to loud voice, external inspection of ear     and nose benign. Inspection of lips, tongue and gums benign  Musculoskeletal: No significant change in strength or tone. All joints stable. Inspection and palpation of digits and nails show no clubbing,       cyanosis or inflammatory conditions. Neuro/Psychiatric: Affect: flat but pleasant. Alert and oriented to person, place and     Situation with   cues. No significant change in deep tendon reflexes or     sensation  Lungs:  Diminished, CTA-B. Respiration effort is normal at rest.     Heart:   S1 = S2, RRR. No loud murmurs. Abdomen:  Soft, non-tender, no enlargement of liver or spleen. Extremities:  No significant lower extremity edema or tenderness. Skin:   Intact to general survey, no visualized or palpated problems. Rehabilitation:  Physical therapy:   Bed Mobility:      Transfers:  ,  ,      FIMS:  ,  , Assessment: Pt is 80 y.o. female dx with UTI and failure to thrive. Pt exhibits decreased cognition, decreased verbalizations, decreased ROM, strength, balance, activity tolerance requiring assistance of two people for all mobility at this time.   Continued PT is required for safe d/c at highest level of indep.     Occupational therapy:    ,  ,      Speech therapy:               Diagnostics:    Recent Results (from the past 24 hour(s))   Protime-INR    Collection Time: 12/26/21 11:00 PM   Result Value Ref Range    Protime 16.7 (H) 12.3 - 14.9 sec    INR 1.4    High sensitivity CRP    Collection Time: 12/26/21 11:00 PM   Result Value Ref Range    CRP High Sensitivity >300.0 (H) 0.0 - 5.0 mg/L   PROCALCITONIN    Collection Time: 12/26/21 11:00 PM   Result Value Ref Range    Procalcitonin 2.10 (H) 0.00 - 0.15 ng/mL   CBC Auto Differential    Collection Time: 12/27/21 12:04 PM   Result Value Ref Range    WBC 10.3 4.8 - 10.8 K/uL    RBC 4.06 (L) 4.20 - 5.40 M/uL    Hemoglobin 11.8 (L) 12.0 - 16.0 g/dL    Hematocrit 36.1 (L) 37.0 - 47.0 %    MCV 89.1 82.0 - 100.0 fL    MCH 29.0 27.0 - 31.3 pg    MCHC 32.6 (L) 33.0 - 37.0 %    RDW 14.5 11.5 - 14.5 %    Platelets 824 331 - 352 K/uL    Neutrophils % 82.6 %    Lymphocytes % 6.0 %    Monocytes % 10.6 %    Eosinophils % 0.6 %    Basophils % 0.2 %    Neutrophils Absolute 8.5 (H) 1.4 - 6.5 K/uL    Lymphocytes Absolute 0.6 (L) 1.0 - 4.8 K/uL    Monocytes Absolute 1.1 (H) 0.2 - 0.8 K/uL    Eosinophils Absolute 0.1 0.0 - 0.7 K/uL    Basophils Absolute 0.0 0.0 - 0.2 K/uL   Comprehensive Metabolic Panel    Collection Time: 12/27/21 12:04 PM   Result Value Ref Range    Sodium 140 135 - 144 mEq/L    Potassium 3.6 3.4 - 4.9 mEq/L    Chloride 105 95 - 107 mEq/L    CO2 19 (L) 20 - 31 mEq/L    Anion Gap 16 (H) 9 - 15 mEq/L    Glucose 102 (H) 70 - 99 mg/dL    BUN 16 8 - 23 mg/dL    CREATININE 0.88 0.50 - 0.90 mg/dL    GFR Non-African American 59.9 (L) >60    GFR  >60.0 >60    Calcium 8.2 (L) 8.5 - 9.9 mg/dL    Total Protein 6.4 6.3 - 8.0 g/dL    Albumin 2.8 (L) 3.5 - 4.6 g/dL    Total Bilirubin 0.6 0.2 - 0.7 mg/dL    Alkaline Phosphatase 295 (H) 40 - 130 U/L    ALT 33 0 - 33 U/L    AST 41 (H) 0 - 35 U/L    Globulin 3.6 (H) 2.3 - 3.5 g/dL   PROCALCITONIN    Collection Time: 12/27/21 12:04 PM   Result Value Ref Range    Procalcitonin 1.54 (H) 0.00 - 0.15 ng/mL   Lipase    Collection Time: 12/27/21 12:04 PM   Result Value Ref Range    Lipase 24 12 - 95 U/L              Impression:    1. Impaired mobility and ADLs due to UTI, metabolic encephalopathy   2. Fatigue and Malaise      Complex Active General Medical Issues thatcomplicate care Assess & Plan:     1. Active Problems:    Obstructive sleep apnea    UTI (urinary tract infection)    Impaired mobility and activities of daily living  Resolved Problems:    * No resolved hospital problems. *    Patient Active Problem List   Diagnosis    Essential hypertension    Anxiety    COPD (chronic obstructive pulmonary disease) with acute bronchitis (HCC)    COPD exacerbation (HCC)    Obesity (BMI 30-39. 9)    Obstructive sleep apnea    Chest pain    Aphasia    Venous stasis dermatitis of both lower extremities    UTI (urinary tract infection)    Impaired mobility and activities of daily living               Recommendations:    1. Considering all of the factors aboveincluding the patient's current medical status, social status/home envirnment, their functional needs and their ability to participate in a therapy program, I feel that they would best be served at  [facility]. It is my opinion that they will be able to tolerate and benefit from 3 hours of therapy a day. I reviewed the varous local options re these levels of care with the patient and family. 2. Nursing care to focus on bowel and bladder issues. 3. spread therapy out over a 7-day window-adding scheduled rest breaks when needed. Focus on energy conservation. Monitor your heart rate and medications effects on heart rate and blood pressure before during and after therapy. 4. Vitamin B12 shot times one  5. Monitor for urinary retention.   6. Improve hydration and Nutrition by adding Proteinex and push PO fluids       It was my pleasure to evaluate Arlene Miller today. Please call [phone] with questions.     Cherylene Leer, MD

## 2021-12-28 NOTE — PROGRESS NOTES
Physician Progress Note      PATIENT:               Suhail Gallo  CSN #:                  [id number]  :                       [id number]  ADMIT DATE:       2021 12:23 PM  [address] [place] DATE:  RESPONDING  PROVIDER #:        Sixto Patel TEXT:    Pt admitted  with gram negative bacteremia, UTI and possible pneumonia. Pt noted to have WBC 12.2, , Procalcitonin 2.10, CRP>300,   encephalopathy, and RICHARD on admission. Pt developed fever of 101.8 on . If   possible, please document in the progress notes and discharge summary if you   are evaluating and /or treating any of the following: The medical record reflects the following:  Risk Factors: UTI and possible pneumonia  Clinical Indicators: WBC 12.2/10.3; PCT 2.10/1.54/1.08; CRP>300; SCr/GFR   1.06/48.3; Patient noted to be in acute distress, ill-appearing,   encephalopathic on admission. 2/2 Blood cultures positive for GNR; UA shows   large leukocyte esterase, many bacteria, WBC >100, positive nitrite. Fever of   101.8 ; H&P \"SIRS likely secondary to UTI and possible PNA\". Treatment: IVF,  IV Zosyn, Tylenol, labs, cultures, imaging, supplemental O2    Thank you, Manpreet Cunningham RN BSN [facility]  [phone]  Options provided:  -- Sepsis, present on admission  -- Gram negative bacteremia and UTI without Sepsis  -- Other - I will add my own diagnosis  -- Disagree - Not applicable / Not valid  -- Disagree - Clinically unable to determine / Unknown  -- Refer to Clinical Documentation Reviewer    PROVIDER RESPONSE TEXT:    This patient has sepsis which was present on admission.     Query created by: Parisa Voss on 2021 1:26 PM      Electronically signed by:  Abhay Saleh 2021 5:29 PM

## 2021-12-28 NOTE — CONSULTS
Pulmonary Medicine  Consult Note      Reason for consultation: Pulmonary nodule    Consulting physician: Dr. Kristen Galaviz:      This is 80-year-old female with past medical history significant for hypertension, COPD, also sleep apnea, rheumatoid arthritis, dysphagia, venous stasis, was presented with increased shortness of breath, decreased p.o. intake, generalized weakness and decreased mobility. She had a fall due to generalized weakness she was seen in the ED work-up was negative except lung nodules seen on CT chest.  She developed worsening shortness of breath, develop fever. She antonia also has decreased memory and a several month. In ER she received IV antibiotic with Zosyn and 2 L IV fluid admitted for further management. CT chest shows interval development of right lung nodules largest measuring 7 x 12 mm, bilateral lower lobe dependent subsegmental atelectasis, dilated ascending aorta. Pulmonary consult was done. Patient unable to provide any significant history chart was reviewed for some detail. Past Medical History:        Diagnosis Date    Anxiety     Cancer ([address]     Glaucoma     Gout     left knee    Hypertension     Lung disease     Obesity (BMI 30-39. 9) 7/11/2019    Obstructive sleep apnea 7/11/2019       Past Surgical History:        Procedure Laterality Date    CATARACT REMOVAL WITH IMPLANT Bilateral 2016    COLON SURGERY  2009    polyps    COLONOSCOPY  02/2009    GASTROSTOMY TUBE PLACEMENT N/A 11/4/2020    EGD PEG TUBE PLACEMENT performed by Sam Loredo MD at [facility]  2002    graft from neck       Social History:     reports that she has never smoked. She has never used smokeless tobacco. She reports that she does not drink alcohol and does not use drugs. Family History:   History reviewed. No pertinent family history.     Allergies:  Norvasc [amlodipine besylate]        MEDICATIONS during current hospitalization: Continuous Infusions:   sodium chloride      sodium chloride 75 mL/hr at 12/27/21 1955       Scheduled Meds:   sodium chloride flush  5-40 mL IntraVENous 2 times per day    enoxaparin  40 mg SubCUTAneous Daily    piperacillin-tazobactam  3,375 mg IntraVENous Q8H    atorvastatin  20 mg Oral Nightly    budesonide-formoterol  2 puff Inhalation BID    metoprolol tartrate  12.5 mg Oral BID       PRN Meds:sodium chloride flush, sodium chloride, ondansetron **OR** ondansetron, acetaminophen **OR** acetaminophen, polyethylene glycol, labetalol, albuterol sulfate HFA, ipratropium-albuterol, montelukast    REVIEW OF SYSTEMS:  As in history of present illness  Other 14 point review of system is negative. PHYSICAL EXAM:    Vitals:  BP (!) 149/62   Pulse 90   Temp 97.7 °F (36.5 °C)   Resp 18   Ht 5' 3\" (1.6 m)   Wt 180 lb (81.6 kg)   LMP  (LMP Unknown)   SpO2 95%   BMI 31.89 kg/m²   General: Patient is somewhat sleepy and confused. .comfortable in bed, No distress. Head: Atraumatic , Normocephalic   Eyes: PERRL. No sclera icterus. No conjunctival injection. No discharge   ENT: No nasal  discharge. Pharynx clear. Neck:  Trachea midline. No thyromegaly, no JVD, No cervical adenopathy. Chest : Bilaterally symmetrical ,Normal effort,  No accessory muscle use  Lung : Diminished BS bilateraly. No Rales. No wheezing. No rhonchi. Heart[de-identified] Normal  rate. Regular rhythm. No mumur ,  Rub or gallop  ABD: Non-tender. Non-distended. No masses. No organmegaly. Normal bowel sounds. No hernia. Extremities: 1+ pitting in both lower leg , No Cyanosis ,No clubbing  Neuro: She is awake but confused. Unable to examine, moving extremity  Skin: Warm and dry. No erythema rash on exposed extremities.         Data Review  Recent Labs     12/26/21  1315 12/27/21  1204   WBC 12.2* 10.3   HGB 12.4 11.8*   HCT 37.7 36.1*    153      Recent Labs     12/26/21  1315 12/27/21  1204    140   K 4.5 3.6    105   CO2 21 19*   BUN 20 16   CREATININE 1.06* 0.88   GLUCOSE 114* 102*       MV Settings: ABGs: No results for input(s): PHART, MXZ5OPV, PO2ART, JJQ6QJK, BEART, H6EEKNAQ, GCV7GXQ in the last 72 hours. O2 Device: Nasal cannula  O2 Flow Rate (L/min): 2 L/min  Lab Results   Component Value Date    LACTA 0.9 12/26/2021    LACTA 1.0 12/25/2018       Radiology  CT Head WO Contrast    Result Date: 12/21/2021  CT Brain. Contrast medium:  without contrast.. History:  Fall. Right chest pain. Altered mental status. Technical factors: CT imaging of the brain was obtained and formatted as 5 mm contiguous axial images. 2.5 mm contiguous axial images were obtained through the osseous structures. Sagittal and coronal reconstruction obtained during postprocessing. Comparison:  CT brain, October 27, 2020. MRI brain, November 2, 2020. Findings: Extra-axial spaces:  Normal. Intracranial hemorrhage:  None. Ventricular system: Ventricles mildly enlarged. Sulci mildly prominent. Basal Cisterns:  Normal. Cerebral Parenchyma: Bilateral symmetric periventricular areas decreased attenuation. Midline Shift:  None. Cerebellum:  Normal. Paranasal sinuses and mastoid air cells: Minimal bilateral opacification ethmoid air cells. Visualized Orbits: Remote bilateral ocular surgery. Impression: Mild cerebral atrophy. Chronic ischemic white matter disease. Minimal/mild ethmoid sinusitis. All CT scans at this facility use dose modulation, iterative reconstruction, and/or weight based dosing when appropriate to reduce radiation dose to as low as reasonably achievable. CT CHEST W CONTRAST    Result Date: 12/21/2021  CT of the Chest with intravenous contrast medium. HISTORY: Fall. Right rib pain. TECHNICAL FACTORS: CT imaging of the chest was obtained and formatted as 5 mm contiguous axial images from the thoracic inlet through the adrenal glands. Sagittal and coronal reconstructions obtained during postprocessing.  Intravenous contrast medium:  100 ml of Isovue-300 Comparison:  CT chest, October 27, 2020. FINDINGS: Right lung: Motion artifact. Noncalcified nonpleural-based 7.1 x 7.0 mm pulmonary nodule found laterally and posteriorly right upper lobe (series 2, image 18). Partially pleural-based noncalcified right lower lobe nodule, measuring 8 x 12 mm (series 2, image 29), not present on prior study. Finding not present on examination October, 2020. Dependent subsegmental consolidation right posterior lung base. No pleural effusion. Left lung: Fibrotic change, left lung apex. No nodules, masses, pleural effusion. Dependent subsegmental atelectatic change left lung base. Lymph nodes/mediastinum asymmetric enlargement left lobe of thyroid with low-attenuation thyroid nodules are identified, largest measuring 7 x 12 mm. No hilar, mediastinal, or axillary lymph node enlargement. Thoracic aorta: AP and transverse dimension, 3.8 x 3.9 cm, the pulmonary arterial bifurcation. Cardiac Size: Upper limits normal. Pericardial effusion: None. Upper abdomen:Limited imaging upper abdomen small hiatal hernia. Musculoskeletal:No osteoblastic, and no osteolytic lesions. No acute fracture     Interval development of right lung nodules, largest measuring 7 x 12 mm. Recommendations below. Bilateral lower lobe dependent subsegmental atelectasis. Dilatation, ascending thoracic aorta.  Other findings discussed ____________________________________________________________ Fleischner Society 2017 Guidelines for Management of Incidentally Detected Pulmonary Nodules in Adults A: Solid Nodules*      Single                Low risk**                     <6 mm     No routine follow-up                               6-8 mm     CT at 6-12 months, then consider CT at 18-24 months                 >8 mm     Consider CT at 3 months, PET/CT, or tissue sampling Nodules <6 mm do not require routine follow-up, but certain patients at high risk with suspicious nodule morphology, upper lobe location, or both may warrant 12-month follow-up (recommendation 1A). High risk**                <6 mm     Optional CT at 12 months               6-8 mm     CT at 6-12 months, then CT at 18-24 months                 >8 mm     Consider CT at 3 months, PET/CT, or tissue sampling Nodules <6 mm do not require routine follow-up, but certain patients at high risk with suspicious nodule morphology, upper lobe location, or both may warrant 12-month follow-up (recommendation 1A). Multiple                         Low risk**                <6 mm     No routine follow-up               6-8 mm     CT at 3-6 months, then consider CT at 18-24 months                >8 mm     CT at 3-6 months, then  consider CT at 18-24 months Use most suspicious nodule as guide to management. Follow-up intervals may vary according to size and risk (recommendation 2A). High risk**                <6 mm     Optional CT at 12 months               6-8 mm     CT at 3-6 months, then at 18-24 months                >8 mm     CT at 3-6 months, then at 18-24 months Use most suspicious nodule as guide to management. Follow-up intervals may vary according to size and risk (recommendation 2A). B: Subsolid Nodules*      Single                  Ground glass                <6 mm     No routine follow-up              >=6 mm     CT at 6-12 months to confirm persistence, then CT  every 2 years until 5 years In certain suspicious nodules <6 mm, consider follow-up at 2 and 4 years. If solid component(s) or growth develops, consider resection. (Recommendations 3A and 4A). Part solid                <6 mm     No routine follow-up              >=6 mm     CT at 3-6 months to confirm persistence. If unchanged and solid component remains <6 mm, annual CT  should be performed for 5 years. In practice, part-solid nodules cannot be defined as such until >=6 mm, and nodules <6 mm do not usually require follow-up.  Persistent part-solid nodules with solid components >=6 mm should be considered highly suspicious (recommendations 4A-4C)      Multiple                <6 mm     CT at 3-6 months. If stable, consider CT at 2 and 4 years. >=6 mm     CT at 3-6 months. Subsequent management based  on the most suspicious nodule(s). Multiple <6 mm pure ground-glass nodules are usually benign, but consider follow-up in selected patients at high risk at 2 and 4 years (recommendation 5A). Note. --These recommendations do not apply to lung cancer screening, patients with immunosuppression, or patients with known primary cancer. * Dimensions are average of long and short axes, rounded to the nearest millimeter. ** Consider all relevant risk factors (see Risk Factors). ____________________________________________________________ All CT scans at this facility use dose modulation, iterative reconstruction, and/or weight based dosing when appropriate to reduce radiation dose to as low as reasonably achievable. CT CERVICAL SPINE WO CONTRAST    Result Date: 12/21/2021  EXAMINATION: CT CERVICAL SPINE WO CONTRAST   DATE AND TIME:12/21/2021 8:49 AM CLINICAL HISTORY:Acute posterior neck pain  fall  COMPARISON: None TECHNIQUE:Helical scanning was performed from the skull base through the remainder of the cervical spine without intravenous contrast. Sagittal and coronal reformats were obtained. The lack of contrast limits CT sensitivity of the soft tissues. All CT scans at this facility use dose modulation, iterative reconstruction, and/or weight based dosing when appropriate to reduce radiation dose to as low as reasonably achievable. FINDINGS   Fracture:No acute fracture. Alignment:  Normal cervical lordosis. No subluxation. No canal stenosis. Dense atlas:Dens atlas relationship is normal. Soft tissues:Negative. Other findings: There is moderate cervical spondylosis with multiple level disc osteophyte changes.   Findings consistent with chronic mastoiditis with tiny fluid level in the left mastoid air cells suggestive of more acute inflammation. No acute cervical spine abnormality. XR CHEST PORTABLE    Result Date: 12/26/2021  EXAMINATION: XR CHEST PORTABLE CLINICAL HISTORY: FEVER COMPARISONS: CT CHEST, DECEMBER 21, 2021, CHEST RADIOGRAPH, OCTOBER 31, 2020 FINDINGS: Osseous structures intact. Cardiopericardial silhouette normal. Ill-defined areas of increased opacity are found in the right upper and middle lung, as well as in the left upper, and left mid and lower lung. Blunting left costophrenic angle identified. BILATERAL ATELECTASIS/PNEUMONIA. LEFT PLEURAL EFFUSION. Assessment and  plan:     1. Gram-negative bacteremia in setting of UTI  2. Possible pneumonia/bibasilar atelectasis and left pleural effusion  3. COPD without exacerbation  4. Pulmonary nodule, right upper lobe 8 x 12 mm  5. FRED  6. Acute kidney injury secondary to dehydration poor p.o. intake  7. Hypertension  8. Thyroid nodule    She started on IV antibiotic with Zosyn. She has gram-negative bacteremia in setting with UTI and possible basilar pneumonia. She had CT chest chest showing interval development of right lung nodules largest measuring 8 x 12 mm. At this time patient is confused and not sure how family would like to proceed regarding this lung nodules. I recommend PET scan done for further evaluation if family wants to pursue aggressive work-up. Will discuss with family regarding this. At this time Acapella and incentive spirometry, and elevated procalcitonin 2.10 coming down to 1.54. She also has a left thyroid nodule and that work-up can be done as outpatient. COPD without exacerbation. Bronchodilator therapy with Symbicort 2 puff twice daily. O2 to keep saturation 90% above. Will follow    Thank you for consult  NOTE: This report was transcribed using voice recognition software.  Every effort was made to ensure accuracy; however, inadvertent computerized transcription errors may be present.     Electronically signed by Patric Saleh MD, FCCP on 12/27/2021 at 9:14 PM

## 2021-12-28 NOTE — CARE COORDINATION
Spoke with [facility], at this time, they are planning SNF, will follow up if that changes.  Electronically signed by Maria Antonia Stewart RN on 12/28/2021 at 1:25 PM

## 2021-12-28 NOTE — PLAN OF CARE
See OT evaluation for all goals and OT POC.  Electronically signed by Yasmeen Basilio OT on 12/28/2021 at 4:07 PM

## 2021-12-28 NOTE — PROGRESS NOTES
Shift assessment complete. Patient is not responding to questions. I spoke loud so she could hear me, and she would make eye contact, but would not answer. Fed her breakfast, she ate less than half. Lungs clear/diminished, she is on 2 L NC, she does not wear O2 at home. Medication given per [facility]. Call light in reach. Fall precautions in place.

## 2021-12-28 NOTE — CARE COORDINATION
SPOKE WITH DTR FREEDOM OF CHOICE OFFERED DAUGHTER MARS STATES SHE WILL SPEAK WITH PATIENT'S OTHER DAUGHTER TO COME UP SNF CHOICE. AND WILL RETURN CALL TOMORROW.

## 2021-12-29 LAB
ALBUMIN SERPL-MCNC: 2.5 G/DL (ref 3.5–4.6)
ALP BLD-CCNC: 302 U/L (ref 40–130)
ALT SERPL-CCNC: 29 U/L (ref 0–33)
ANION GAP SERPL CALCULATED.3IONS-SCNC: 12 MEQ/L (ref 9–15)
AST SERPL-CCNC: 26 U/L (ref 0–35)
BASOPHILS ABSOLUTE: 0 K/UL (ref 0–0.2)
BASOPHILS RELATIVE PERCENT: 0.2 %
BILIRUB SERPL-MCNC: 0.5 MG/DL (ref 0.2–0.7)
BLOOD CULTURE, ROUTINE: ABNORMAL
BLOOD CULTURE, ROUTINE: ABNORMAL
BUN BLDV-MCNC: 11 MG/DL (ref 8–23)
CALCIUM SERPL-MCNC: 8.2 MG/DL (ref 8.5–9.9)
CHLORIDE BLD-SCNC: 104 MEQ/L (ref 95–107)
CO2: 23 MEQ/L (ref 20–31)
CREAT SERPL-MCNC: 0.81 MG/DL (ref 0.5–0.9)
CULTURE, BLOOD 2: ABNORMAL
CULTURE, BLOOD 2: ABNORMAL
EOSINOPHILS ABSOLUTE: 0.2 K/UL (ref 0–0.7)
EOSINOPHILS RELATIVE PERCENT: 1.6 %
GFR AFRICAN AMERICAN: >60
GFR NON-AFRICAN AMERICAN: >60
GLOBULIN: 3.6 G/DL (ref 2.3–3.5)
GLUCOSE BLD-MCNC: 136 MG/DL (ref 70–99)
HCT VFR BLD CALC: 33.2 % (ref 37–47)
HEMOGLOBIN: 11.1 G/DL (ref 12–16)
LYMPHOCYTES ABSOLUTE: 0.8 K/UL (ref 1–4.8)
LYMPHOCYTES RELATIVE PERCENT: 7.6 %
MCH RBC QN AUTO: 29 PG (ref 27–31.3)
MCHC RBC AUTO-ENTMCNC: 33.4 % (ref 33–37)
MCV RBC AUTO: 86.9 FL (ref 82–100)
MONOCYTES ABSOLUTE: 0.8 K/UL (ref 0.2–0.8)
MONOCYTES RELATIVE PERCENT: 7.9 %
NEUTROPHILS ABSOLUTE: 8.2 K/UL (ref 1.4–6.5)
NEUTROPHILS RELATIVE PERCENT: 82.7 %
ORGANISM: ABNORMAL
PDW BLD-RTO: 14.2 % (ref 11.5–14.5)
PLATELET # BLD: 187 K/UL (ref 130–400)
POTASSIUM SERPL-SCNC: 3.2 MEQ/L (ref 3.4–4.9)
PROCALCITONIN: 0.74 NG/ML (ref 0–0.15)
RBC # BLD: 3.82 M/UL (ref 4.2–5.4)
SODIUM BLD-SCNC: 139 MEQ/L (ref 135–144)
TOTAL PROTEIN: 6.1 G/DL (ref 6.3–8)
URINE CULTURE, ROUTINE: ABNORMAL
WBC # BLD: 10 K/UL (ref 4.8–10.8)

## 2021-12-29 PROCEDURE — 99233 SBSQ HOSP IP/OBS HIGH 50: CPT | Performed by: INTERNAL MEDICINE

## 2021-12-29 PROCEDURE — 1210000000 HC MED SURG R&B

## 2021-12-29 PROCEDURE — 6360000002 HC RX W HCPCS: Performed by: NURSE PRACTITIONER

## 2021-12-29 PROCEDURE — 85025 COMPLETE CBC W/AUTO DIFF WBC: CPT

## 2021-12-29 PROCEDURE — 2580000003 HC RX 258: Performed by: NURSE PRACTITIONER

## 2021-12-29 PROCEDURE — 84145 PROCALCITONIN (PCT): CPT

## 2021-12-29 PROCEDURE — 36415 COLL VENOUS BLD VENIPUNCTURE: CPT

## 2021-12-29 PROCEDURE — 6370000000 HC RX 637 (ALT 250 FOR IP): Performed by: INTERNAL MEDICINE

## 2021-12-29 PROCEDURE — 6370000000 HC RX 637 (ALT 250 FOR IP): Performed by: NURSE PRACTITIONER

## 2021-12-29 PROCEDURE — 94640 AIRWAY INHALATION TREATMENT: CPT

## 2021-12-29 PROCEDURE — 94761 N-INVAS EAR/PLS OXIMETRY MLT: CPT

## 2021-12-29 PROCEDURE — 2700000000 HC OXYGEN THERAPY PER DAY

## 2021-12-29 PROCEDURE — 80053 COMPREHEN METABOLIC PANEL: CPT

## 2021-12-29 PROCEDURE — 97535 SELF CARE MNGMENT TRAINING: CPT

## 2021-12-29 RX ADMIN — BUDESONIDE AND FORMOTEROL FUMARATE DIHYDRATE 2 PUFF: 160; 4.5 AEROSOL RESPIRATORY (INHALATION) at 07:41

## 2021-12-29 RX ADMIN — ENOXAPARIN SODIUM 40 MG: 100 INJECTION SUBCUTANEOUS at 10:11

## 2021-12-29 RX ADMIN — SODIUM CHLORIDE: 9 INJECTION, SOLUTION INTRAVENOUS at 05:00

## 2021-12-29 RX ADMIN — PIPERACILLIN AND TAZOBACTAM 3375 MG: 3; .375 INJECTION, POWDER, LYOPHILIZED, FOR SOLUTION INTRAVENOUS at 05:00

## 2021-12-29 RX ADMIN — Medication 10 ML: at 10:12

## 2021-12-29 RX ADMIN — PIPERACILLIN AND TAZOBACTAM 3375 MG: 3; .375 INJECTION, POWDER, LYOPHILIZED, FOR SOLUTION INTRAVENOUS at 14:25

## 2021-12-29 RX ADMIN — BUDESONIDE AND FORMOTEROL FUMARATE DIHYDRATE 2 PUFF: 160; 4.5 AEROSOL RESPIRATORY (INHALATION) at 20:11

## 2021-12-29 RX ADMIN — Medication 10 ML: at 22:56

## 2021-12-29 RX ADMIN — METOPROLOL TARTRATE 12.5 MG: 25 TABLET, FILM COATED ORAL at 22:53

## 2021-12-29 RX ADMIN — ATORVASTATIN CALCIUM 20 MG: 20 TABLET, FILM COATED ORAL at 22:53

## 2021-12-29 RX ADMIN — LOSARTAN POTASSIUM 25 MG: 25 TABLET, FILM COATED ORAL at 12:00

## 2021-12-29 RX ADMIN — METOPROLOL TARTRATE 12.5 MG: 25 TABLET, FILM COATED ORAL at 10:11

## 2021-12-29 ASSESSMENT — PAIN SCALES - GENERAL: PAINLEVEL_OUTOF10: 4

## 2021-12-29 ASSESSMENT — PAIN DESCRIPTION - PAIN TYPE: TYPE: ACUTE PAIN

## 2021-12-29 ASSESSMENT — PAIN DESCRIPTION - LOCATION: LOCATION: ABDOMEN

## 2021-12-29 NOTE — PROGRESS NOTES
Progress Note  Date:2021       LBDW:[id number]  Patient Name:Trudy Polo     Date of Birth:[birth year]     Age:89 y.o. Subjective    Subjective   No acute events overnight. Afebrile. Improving BP control. Blood cultures demonstrating preliminary gram-negative rods starting . Review of Systems   Unable to complete full ROS in setting of encephalopathy/dementia. Objective         Vitals Last 24 Hours:  TEMPERATURE:  Temp  Av.3 °F (36.8 °C)  Min: 98.1 °F (36.7 °C)  Max: 98.4 °F (36.9 °C)  RESPIRATIONS RANGE: No data recorded  PULSE OXIMETRY RANGE: SpO2  Av.3 %  Min: 94 %  Max: 97 %  PULSE RANGE: Pulse  Av.5  Min: 87  Max: 88  BLOOD PRESSURE RANGE: Systolic (90KUA), RLD:717 , Min:150 , FRB:334   ; Diastolic (93OVZ), CJK:67, Min:65, Max:73    I/O (24Hr): Intake/Output Summary (Last 24 hours) at 2021 1411  Last data filed at 2021 2246  Gross per 24 hour   Intake 360 ml   Output --   Net 360 ml     Objective:  Vital signs: (most recent): Blood pressure (!) 159/73, pulse 87, temperature 98.4 °F (36.9 °C), resp. rate 20, height 5' 3\" (1.6 m), weight 180 lb (81.6 kg), SpO2 94 %, not currently breastfeeding. Constitutional: Elderly adult female sitting up in bed no acute distress  Head: NCAT  Eyes: PERRLA, EOMI. Arcus senilis appreciated. ENT: Dry oropharynx with some dried posterior secretions visible. No obvious bleeding. Suspected to be hard of hearing. Neck: Trachea midline  Cardiovascular: Regular rhythm with borderline tachycardia. +1 bilateral pretibial edema. Pulmonary: Normal rate and effort of respiration on 2 L by nasal cannula. Abdomen: Soft, nontense abdomen. Hypoactive bowel sounds. Suprapubic tenderness to palpation. Neurologic: Alert, disoriented. Follows some commands, gives some 1-word answers to questions.   Generalized weakness appreciated, no focal weakness noted, but patient largely unable to cooperate with neurologic exam.  Psychiatric: Calm, disoriented. Not agitated or distressed. MSK/integumentary: Baseline arthritic changes of bilateral hands. Left mid clavicular erythematous patch with slightly raised central lesion of unclear etiology, appearing almost as a small isolated bullae. Will monitor. Labs/Imaging/Diagnostics    Labs:  CBC:  Recent Labs     12/27/21  1204 12/29/21  0653   WBC 10.3 10.0   RBC 4.06* 3.82*   HGB 11.8* 11.1*   HCT 36.1* 33.2*   MCV 89.1 86.9   RDW 14.5 14.2    187     CHEMISTRIES:  Recent Labs     12/27/21  1204 12/28/21  0502 12/29/21  0653    137 139   K 3.6 3.8 3.2*    103 104   CO2 19* 18* 23   BUN 16 13 11   CREATININE 0.88 0.83 0.81   GLUCOSE 102* 104* 136*     PT/INR:  Recent Labs     12/26/21  2300   PROTIME 16.7*   INR 1.4     APTT:No results for input(s): APTT in the last 72 hours. LIVER PROFILE:  Recent Labs     12/27/21  1204 12/28/21  0502 12/29/21  0653   AST 41* 38* 26   ALT 33 30 29   BILITOT 0.6 0.7 0.5   ALKPHOS 295* 296* 302*       Imaging Last 24 Hours:  XR CHEST PORTABLE    Result Date: 12/26/2021  EXAMINATION: XR CHEST PORTABLE CLINICAL HISTORY: FEVER COMPARISONS: CT CHEST, DECEMBER 21, 2021, CHEST RADIOGRAPH, OCTOBER 31, 2020 FINDINGS: Osseous structures intact. Cardiopericardial silhouette normal. Ill-defined areas of increased opacity are found in the right upper and middle lung, as well as in the left upper, and left mid and lower lung. Blunting left costophrenic angle identified. BILATERAL ATELECTASIS/PNEUMONIA. LEFT PLEURAL EFFUSION.     Assessment//Plan           Hospital Problems           Last Modified POA    Obstructive sleep apnea 12/27/2021 Yes    UTI (urinary tract infection) 12/26/2021 Yes    Impaired mobility and activities of daily living 12/27/2021 Yes        Assessment & Plan    80 y.o. female with PMH who has PMH HTN, COPD (home o2 PRN), FRED, venous stasis, RA, dysphagia, and skin cancer presented with worsening shortness of breath, decreased intake, weakness, and decreased mobility who is admitted with the following:     Gram negative bacteremia in setting of UTI, with possible superimposed PNA  -Continue IV antibiotics (Zosyn)  -Daily labs  -O2 to maintain sats >92%  -Acapella and incentive spirometry  -Trend procalcitonin, improving  12/29: Blood cultures demonstrating both Enterobacter and E.coli bacteremia. ID consulted 11/29 in setting of multi-agent bacteremia, appreciate management recs.       RICHARD secondary to dehydration and decreased oral intake  -Creat 1.06 (baseline 0.7)  -Gentle IVF  -Renal function panel daily  -Avoid nephrotoxic agents  -Improving     HTN  -Resume home medications when taking PO  -Labetalol PRN  -12/28: Resumed home losartan 25 mg p.o. daily  -12/29: Improved today, but not yet at goal.       Failure to thrive in adult  -NPO; IVFs  -Bedside swallow study  -PT/OT/Speech consults  -Rehab consult     Pulmonary nodules (new finding since previous scans Oct 2020)  COPD not in exacerbation  FRED  -Multiple pulmonary nodules RUL and RLL  -Consult pulmonary for further recommendations  -Wears home o2 PRN  -O2 to maintain sats >92%  -Resumed home medications     Thyroid nodule  -L lobe thyroid nodules  -Outpatient f/u     Advanced care planning  -DNRCCA/DNI      Diet: Per ST recommendations. 1:1 feeding at present in setting of weakness and confusion.       Electronically signed by Rere Chambers DO on 12/29/2021 at 2:11 PM

## 2021-12-29 NOTE — PROGRESS NOTES
[facility]   Facility/Department: [address] [facility]  Speech Language Pathology    Obinna Moreno  5/19/1928  [id number]    Date: 12/29/2021      Speech Therapy attempted to see Obinna Moreno on this date for a/an:    Treatment    Pt was unable to be seen due to: Other: SLP entered room and pt was upright in bed. Pt's lunch tray was eaten and remained in front of her. Pt able to initiate greeting to SLP, however, unable to answer remaining questions/follow commands. SLP attempted to give pt trials of easy to chew textures and thin liquids, however, pt shook her head and stated no when presented. SLP to re-attempt as able. SLP unable to reach RN - Ritchie Resendez to give update.         Electronically signed by WILFRID Agarwal on 12/29/21 at 1:28 PM EST

## 2021-12-29 NOTE — PROGRESS NOTES
Shift assessment complete. Patient not answering questions. Medication given per [facility]. Call light in reach. Fall precautions in place.

## 2021-12-29 NOTE — CARE COORDINATION
SPOKE WITH PATIENT'S DAUGHTERS, THEY ARE REQUESTING SNF  [facility], [place] [address] [facility]. REFERRALS SENT [facility] AND [place] ARE OUT OF NETWORK/ WILL AWAIT CALL FROM [id number] [facility].  PER TREVOR [facility]STON, [facility] OR [facility] ARE IN NETWORK

## 2021-12-29 NOTE — PROGRESS NOTES
INPATIENT PROGRESS NOTES    PATIENT NAME: Humberto Monahan  MRN: [id number]  SERVICE DATE:  December 28, 2021   SERVICE TIME:  10:20 PM      PRIMARY SERVICE: Pulmonary Disease    CHIEF COMPLAIN: Shortness of breath      INTERVAL HPI: Patient seen and examined at bedside, Interval Notes, orders reviewed. Nursing notes noted  Patient had fever overnight. Afebrile at this time. Appears comfortable in bed without having short of breath. She is on 3 L O2 via nasal cannula O2 saturation. Patient is still encephalopathic unable to obtain any review of system per patient    OBJECTIVE    Body mass index is 31.89 kg/m². PHYSICAL EXAM:  Vitals:  BP (!) 150/65   Pulse 88   Temp 98.1 °F (36.7 °C)   Resp 20   Ht 5' 3\" (1.6 m)   Wt 180 lb (81.6 kg)   LMP  (LMP Unknown)   SpO2 95%   BMI 31.89 kg/m²   General: Alert, awake . comfortable in bed, No distress. Head: Atraumatic , Normocephalic   Eyes: PERRL. No sclera icterus. No conjunctival injection. No discharge   ENT: No nasal  discharge. Pharynx clear. Neck:  Trachea midline. No thyromegaly, no JVD, No cervical adenopathy. Chest : Bilaterally symmetrical ,Normal effort,  No accessory muscle use  Lung : . Fair BS bilateral, decreased BS at bases. No Rales. No wheezing. No rhonchi. Heart[de-identified] Normal  rate. Regular rhythm. No mumur ,  Rub or gallop  ABD: Non-tender. Non-distended. No masses. No organmegaly. Normal bowel sounds. No hernia.   Ext : No Pitting both leg , No Cyanosis No clubbing  Neuro: no focal weakness          DATA:   Recent Labs     12/26/21  1315 12/27/21  1204   WBC 12.2* 10.3   HGB 12.4 11.8*   HCT 37.7 36.1*   MCV 86.3 89.1    153     Recent Labs     12/27/21  1204 12/27/21  1204 12/28/21  0502     --  137   K 3.6  --  3.8     --  103   CO2 19*  --  18*   BUN 16  --  13   CREATININE 0.88  --  0.83   GLUCOSE 102*  --  104*   CALCIUM 8.2*  --  8.3*   PROT 6.4  --  6.2*   LABALBU 2.8*  --  2.5*   BILITOT 0.6  --  0.7   ALKPHOS 295* --  296*   AST 41*  --  38*   ALT 33   < > 30   LABGLOM 59.9*   < > >60.0   GFRAA >60.0   < > >60.0   GLOB 3.6*   < > 3.7*    < > = values in this interval not displayed. MV Settings:          No results for input(s): PHART, TTA5JQQ, PO2ART, ZUI7RVC, BEART, H9NMMMSE in the last 72 hours. O2 Device: Nasal cannula  O2 Flow Rate (L/min): 3 L/min    ADULT DIET; Dysphagia - Soft and Bite Sized     MEDICATIONS during current hospitalization:    Continuous Infusions:   sodium chloride      sodium chloride 75 mL/hr at 12/28/21 1904       Scheduled Meds:   losartan  25 mg Oral Q12H    sodium chloride flush  5-40 mL IntraVENous 2 times per day    enoxaparin  40 mg SubCUTAneous Daily    piperacillin-tazobactam  3,375 mg IntraVENous Q8H    atorvastatin  20 mg Oral Nightly    budesonide-formoterol  2 puff Inhalation BID    metoprolol tartrate  12.5 mg Oral BID       PRN Meds:sodium chloride flush, sodium chloride, ondansetron **OR** ondansetron, acetaminophen **OR** acetaminophen, polyethylene glycol, labetalol, albuterol sulfate HFA, ipratropium-albuterol, montelukast    Radiology  CT Head WO Contrast    Result Date: 12/21/2021  CT Brain. Contrast medium:  without contrast.. History:  Fall. Right chest pain. Altered mental status. Technical factors: CT imaging of the brain was obtained and formatted as 5 mm contiguous axial images. 2.5 mm contiguous axial images were obtained through the osseous structures. Sagittal and coronal reconstruction obtained during postprocessing. Comparison:  CT brain, October 27, 2020. MRI brain, November 2, 2020. Findings: Extra-axial spaces:  Normal. Intracranial hemorrhage:  None. Ventricular system: Ventricles mildly enlarged. Sulci mildly prominent. Basal Cisterns:  Normal. Cerebral Parenchyma: Bilateral symmetric periventricular areas decreased attenuation. Midline Shift:  None.  Cerebellum:  Normal. Paranasal sinuses and mastoid air cells: Minimal bilateral opacification ethmoid air cells. Visualized Orbits: Remote bilateral ocular surgery. Impression: Mild cerebral atrophy. Chronic ischemic white matter disease. Minimal/mild ethmoid sinusitis. All CT scans at this facility use dose modulation, iterative reconstruction, and/or weight based dosing when appropriate to reduce radiation dose to as low as reasonably achievable. CT CHEST W CONTRAST    Result Date: 12/21/2021  CT of the Chest with intravenous contrast medium. HISTORY: Fall. Right rib pain. TECHNICAL FACTORS: CT imaging of the chest was obtained and formatted as 5 mm contiguous axial images from the thoracic inlet through the adrenal glands. Sagittal and coronal reconstructions obtained during postprocessing. Intravenous contrast medium:  100 ml of Isovue-300 Comparison:  CT chest, October 27, 2020. FINDINGS: Right lung: Motion artifact. Noncalcified nonpleural-based 7.1 x 7.0 mm pulmonary nodule found laterally and posteriorly right upper lobe (series 2, image 18). Partially pleural-based noncalcified right lower lobe nodule, measuring 8 x 12 mm (series 2, image 29), not present on prior study. Finding not present on examination October, 2020. Dependent subsegmental consolidation right posterior lung base. No pleural effusion. Left lung: Fibrotic change, left lung apex. No nodules, masses, pleural effusion. Dependent subsegmental atelectatic change left lung base. Lymph nodes/mediastinum asymmetric enlargement left lobe of thyroid with low-attenuation thyroid nodules are identified, largest measuring 7 x 12 mm. No hilar, mediastinal, or axillary lymph node enlargement. Thoracic aorta: AP and transverse dimension, 3.8 x 3.9 cm, the pulmonary arterial bifurcation. Cardiac Size: Upper limits normal. Pericardial effusion: None. Upper abdomen:Limited imaging upper abdomen small hiatal hernia. Musculoskeletal:No osteoblastic, and no osteolytic lesions.  No acute fracture     Interval development of right lung nodules, largest measuring 7 x 12 mm. Recommendations below. Bilateral lower lobe dependent subsegmental atelectasis. Dilatation, ascending thoracic aorta. Other findings discussed ____________________________________________________________ Fleischner Society 2017 Guidelines for Management of Incidentally Detected Pulmonary Nodules in Adults A: Solid Nodules*      Single                Low risk**                     <6 mm     No routine follow-up                               6-8 mm     CT at 6-12 months, then consider CT at 18-24 months                 >8 mm     Consider CT at 3 months, PET/CT, or tissue sampling Nodules <6 mm do not require routine follow-up, but certain patients at high risk with suspicious nodule morphology, upper lobe location, or both may warrant 12-month follow-up (recommendation 1A). High risk**                <6 mm     Optional CT at 12 months               6-8 mm     CT at 6-12 months, then CT at 18-24 months                 >8 mm     Consider CT at 3 months, PET/CT, or tissue sampling Nodules <6 mm do not require routine follow-up, but certain patients at high risk with suspicious nodule morphology, upper lobe location, or both may warrant 12-month follow-up (recommendation 1A). Multiple                         Low risk**                <6 mm     No routine follow-up               6-8 mm     CT at 3-6 months, then consider CT at 18-24 months                >8 mm     CT at 3-6 months, then  consider CT at 18-24 months Use most suspicious nodule as guide to management. Follow-up intervals may vary according to size and risk (recommendation 2A). High risk**                <6 mm     Optional CT at 12 months               6-8 mm     CT at 3-6 months, then at 18-24 months                >8 mm     CT at 3-6 months, then at 18-24 months Use most suspicious nodule as guide to management. Follow-up intervals may vary according to size and risk (recommendation 2A).  B: Subsolid Nodules*      Single                  Ground glass                <6 mm     No routine follow-up              >=6 mm     CT at 6-12 months to confirm persistence, then CT  every 2 years until 5 years In certain suspicious nodules <6 mm, consider follow-up at 2 and 4 years. If solid component(s) or growth develops, consider resection. (Recommendations 3A and 4A). Part solid                <6 mm     No routine follow-up              >=6 mm     CT at 3-6 months to confirm persistence. If unchanged and solid component remains <6 mm, annual CT  should be performed for 5 years. In practice, part-solid nodules cannot be defined as such until >=6 mm, and nodules <6 mm do not usually require follow-up. Persistent part-solid nodules with solid components >=6 mm should be considered highly suspicious (recommendations 4A-4C)      Multiple                <6 mm     CT at 3-6 months. If stable, consider CT at 2 and 4 years. >=6 mm     CT at 3-6 months. Subsequent management based  on the most suspicious nodule(s). Multiple <6 mm pure ground-glass nodules are usually benign, but consider follow-up in selected patients at high risk at 2 and 4 years (recommendation 5A). Note. --These recommendations do not apply to lung cancer screening, patients with immunosuppression, or patients with known primary cancer. * Dimensions are average of long and short axes, rounded to the nearest millimeter. ** Consider all relevant risk factors (see Risk Factors). ____________________________________________________________ All CT scans at this facility use dose modulation, iterative reconstruction, and/or weight based dosing when appropriate to reduce radiation dose to as low as reasonably achievable.      CT CERVICAL SPINE WO CONTRAST    Result Date: 12/21/2021  EXAMINATION: CT CERVICAL SPINE WO CONTRAST   DATE AND TIME:12/21/2021 8:49 AM CLINICAL HISTORY:Acute posterior neck pain  fall  COMPARISON: None TECHNIQUE:Helical scanning was performed from the skull base through the remainder of the cervical spine without intravenous contrast. Sagittal and coronal reformats were obtained. The lack of contrast limits CT sensitivity of the soft tissues. All CT scans at this facility use dose modulation, iterative reconstruction, and/or weight based dosing when appropriate to reduce radiation dose to as low as reasonably achievable. FINDINGS   Fracture:No acute fracture. Alignment:  Normal cervical lordosis. No subluxation. No canal stenosis. Dense atlas:Dens atlas relationship is normal. Soft tissues:Negative. Other findings: There is moderate cervical spondylosis with multiple level disc osteophyte changes. Findings consistent with chronic mastoiditis with tiny fluid level in the left mastoid air cells suggestive of more acute inflammation. No acute cervical spine abnormality. XR CHEST PORTABLE    Result Date: 12/26/2021  EXAMINATION: XR CHEST PORTABLE CLINICAL HISTORY: FEVER COMPARISONS: CT CHEST, DECEMBER 21, 2021, CHEST RADIOGRAPH, OCTOBER 31, 2020 FINDINGS: Osseous structures intact. Cardiopericardial silhouette normal. Ill-defined areas of increased opacity are found in the right upper and middle lung, as well as in the left upper, and left mid and lower lung. Blunting left costophrenic angle identified. BILATERAL ATELECTASIS/PNEUMONIA. LEFT PLEURAL EFFUSION. IMPRESSION AND SUGGESTION:    1. Gram-negative bacteremia in setting of UTI  2. Possible pneumonia/bibasilar atelectasis and left pleural effusion  3. COPD without exacerbation  4. Pulmonary nodule, right upper lobe 8 x 12 mm  5. FRED  6. Encephalopathy  7. Acute kidney injury secondary to dehydration poor p.o. intake  8. Hypertension  9. Thyroid nodule    She started on IV antibiotic with Zosyn. She has gram-negative bacteremia in setting with UTI and possible basilar pneumonia.   She had CT chest chest showing interval development of right lung nodules largest measuring 8 x 12 mm. I recommend PET scan done for further evaluation if family wants to pursue aggressive work-up. We will follow. Continue present      NOTE: This report was transcribed using voice recognition software. Every effort was made to ensure accuracy; however, inadvertent computerized transcription errors may be present.       Electronically signed by Brielle Deleon MD, [facility] on 12/28/2021 at 10:20 PM

## 2021-12-29 NOTE — PROGRESS NOTES
Physical Therapy Med Surg Daily Treatment Note  Facility/Department: [facility]  [facility]       NAME: Obinna Moreno  : 1928 (59 y.o.)  MRN: [id number]  CODE STATUS: Limited    Date of Service: 2021    Patient Diagnosis(es): UTI (urinary tract infection) [N39.0]  Failure to thrive in adult [R62.7]  Acute cystitis without hematuria [N30.00]  Pneumonia of both lower lobes due to infectious organism [J18.9]   Chief Complaint   Patient presents with    Illness     not eating/drinking per family     Patient Active Problem List    Diagnosis Date Noted    Impaired mobility and activities of daily living 2021    UTI (urinary tract infection) 2021    Venous stasis dermatitis of both lower extremities 08/10/2021    Aphasia     Chest pain 2020    Obesity (BMI 30-39.9) 2019    Obstructive sleep apnea 2019    COPD exacerbation ([address]     COPD (chronic obstructive pulmonary disease) with acute bronchitis ([address] 2018    Anxiety 2017    Essential hypertension         Past Medical History:   Diagnosis Date    Anxiety     Cancer ([address]     Glaucoma     Gout     left knee    Hypertension     Lung disease     Obesity (BMI 30-39. 9) 2019    Obstructive sleep apnea 2019     Past Surgical History:   Procedure Laterality Date    CATARACT REMOVAL WITH IMPLANT Bilateral 2016    COLON SURGERY      polyps    COLONOSCOPY  2009    GASTROSTOMY TUBE PLACEMENT N/A 2020    EGD PEG TUBE PLACEMENT performed by Lakesha Alcazar MD at [address] [address]      graft from neck       Restrictions  Restrictions/Precautions: Fall Risk    SUBJECTIVE   General  Chart Reviewed: Yes  Family / Caregiver Present: No  Subjective  Subjective: minimal verbalizations. Agreeable to tx.     Pre-Session Pain Report  Pre Treatment Pain Screening  Pain at present: 4  Scale Used: Numeric Score  Intervention List: Patient able to continue with treatment  Pain Screening  Patient Currently in Pain: Yes       Post-Session Pain Report  Pain Assessment  Pain Assessment: 0-10  Pain Level: 4  Pain Type: Acute pain  Pain Location: Abdomen         OBJECTIVE        Bed mobility  Supine to Sit: Maximum assistance;2 Person assistance  Sit to Supine: Maximum assistance;2 Person assistance  Comment: vc's for technique and sequencing. poor initiation. Transfers  Comment: pt declines despite encouragement. Exercises  Knee Long Arc Quad: x5 BLE, noted lack of dorsiflexion RLE. Activity Tolerance  Activity Tolerance: Patient limited by fatigue;Patient limited by endurance; Patient limited by cognitive status          ASSESSMENT   Assessment: pt with poor tolerance to tx, unwilling to attempt STS this session. Discharge Recommendations:  Continue to assess pending progress    Goals  Short term goals  Short term goal 1: Pt will demonstrate sitting edge of bed >/= 3 min with CGA  Long term goals  Long term goal 1: Pt will demonstrate bed mobility min A  Long term goal 2: Pt will demonstrate transfers mod A  Long term goal 3: Pt will demonstrate amb >/= 10 ft max A with safest AD    PLAN    Times per week: 3-6  Safety Devices  Type of devices: Bed alarm in place,Call light within reach,Left in bed,All fall risk precautions in place     [facility] (6 CLICK) BASIC MOBILITY  AM-PAC Inpatient Mobility Raw Score : 9      Therapy Time   Individual   Time In 1054   Time Out 1106   Minutes 12      BM: Brendan 69, PTA, 12/29/21 at 11:51 AM         Definitions for assistance levels  Independent = pt does not require any physical supervision or assistance from another person for activity completion. Device may be needed.   Stand by assistance = pt requires verbal cues or instructions from another person, close to but not touching, to perform the activity  Minimal assistance= pt performs 75% or more of the activity; assistance is required to complete the activity  Moderate assistance= pt performs 50% of the activity; assistance is required to complete the activity  Maximal assistance = pt performs 25% of the activity; assistance is required to complete the activity  Dependent = pt requires total physical assistance to accomplish the task

## 2021-12-30 ENCOUNTER — APPOINTMENT (OUTPATIENT)
Dept: ULTRASOUND IMAGING | Age: 86
DRG: 871 | End: 2021-12-30
Payer: MEDICARE

## 2021-12-30 LAB
ALBUMIN SERPL-MCNC: 2.7 G/DL (ref 3.5–4.6)
ALP BLD-CCNC: 284 U/L (ref 40–130)
ALT SERPL-CCNC: 25 U/L (ref 0–33)
ANION GAP SERPL CALCULATED.3IONS-SCNC: 12 MEQ/L (ref 9–15)
AST SERPL-CCNC: 22 U/L (ref 0–35)
BASOPHILS ABSOLUTE: 0 K/UL (ref 0–0.2)
BASOPHILS RELATIVE PERCENT: 0.4 %
BILIRUB SERPL-MCNC: 0.5 MG/DL (ref 0.2–0.7)
BUN BLDV-MCNC: 11 MG/DL (ref 8–23)
CALCIUM SERPL-MCNC: 8.3 MG/DL (ref 8.5–9.9)
CHLORIDE BLD-SCNC: 99 MEQ/L (ref 95–107)
CO2: 23 MEQ/L (ref 20–31)
CREAT SERPL-MCNC: 0.81 MG/DL (ref 0.5–0.9)
EOSINOPHILS ABSOLUTE: 0.2 K/UL (ref 0–0.7)
EOSINOPHILS RELATIVE PERCENT: 1.6 %
GFR AFRICAN AMERICAN: >60
GFR NON-AFRICAN AMERICAN: >60
GLOBULIN: 3.3 G/DL (ref 2.3–3.5)
GLUCOSE BLD-MCNC: 121 MG/DL (ref 70–99)
HCT VFR BLD CALC: 33.8 % (ref 37–47)
HEMOGLOBIN: 11.2 G/DL (ref 12–16)
LYMPHOCYTES ABSOLUTE: 0.8 K/UL (ref 1–4.8)
LYMPHOCYTES RELATIVE PERCENT: 7.5 %
MCH RBC QN AUTO: 28.7 PG (ref 27–31.3)
MCHC RBC AUTO-ENTMCNC: 33.1 % (ref 33–37)
MCV RBC AUTO: 86.8 FL (ref 82–100)
MONOCYTES ABSOLUTE: 0.9 K/UL (ref 0.2–0.8)
MONOCYTES RELATIVE PERCENT: 8.2 %
NEUTROPHILS ABSOLUTE: 9.1 K/UL (ref 1.4–6.5)
NEUTROPHILS RELATIVE PERCENT: 82.3 %
PDW BLD-RTO: 14.3 % (ref 11.5–14.5)
PLATELET # BLD: 232 K/UL (ref 130–400)
POTASSIUM SERPL-SCNC: 2.9 MEQ/L (ref 3.4–4.9)
PROCALCITONIN: 0.5 NG/ML (ref 0–0.15)
RBC # BLD: 3.9 M/UL (ref 4.2–5.4)
SODIUM BLD-SCNC: 134 MEQ/L (ref 135–144)
TOTAL PROTEIN: 6 G/DL (ref 6.3–8)
WBC # BLD: 11.1 K/UL (ref 4.8–10.8)

## 2021-12-30 PROCEDURE — 94761 N-INVAS EAR/PLS OXIMETRY MLT: CPT

## 2021-12-30 PROCEDURE — 99232 SBSQ HOSP IP/OBS MODERATE 35: CPT | Performed by: INTERNAL MEDICINE

## 2021-12-30 PROCEDURE — 76770 US EXAM ABDO BACK WALL COMP: CPT

## 2021-12-30 PROCEDURE — 94640 AIRWAY INHALATION TREATMENT: CPT

## 2021-12-30 PROCEDURE — 1210000000 HC MED SURG R&B

## 2021-12-30 PROCEDURE — 6360000002 HC RX W HCPCS: Performed by: NURSE PRACTITIONER

## 2021-12-30 PROCEDURE — 84145 PROCALCITONIN (PCT): CPT

## 2021-12-30 PROCEDURE — 2580000003 HC RX 258: Performed by: NURSE PRACTITIONER

## 2021-12-30 PROCEDURE — 6370000000 HC RX 637 (ALT 250 FOR IP): Performed by: INTERNAL MEDICINE

## 2021-12-30 PROCEDURE — 80053 COMPREHEN METABOLIC PANEL: CPT

## 2021-12-30 PROCEDURE — 2700000000 HC OXYGEN THERAPY PER DAY

## 2021-12-30 PROCEDURE — 87040 BLOOD CULTURE FOR BACTERIA: CPT

## 2021-12-30 PROCEDURE — 85025 COMPLETE CBC W/AUTO DIFF WBC: CPT

## 2021-12-30 PROCEDURE — 99222 1ST HOSP IP/OBS MODERATE 55: CPT | Performed by: INTERNAL MEDICINE

## 2021-12-30 PROCEDURE — 92526 ORAL FUNCTION THERAPY: CPT

## 2021-12-30 PROCEDURE — 6370000000 HC RX 637 (ALT 250 FOR IP): Performed by: NURSE PRACTITIONER

## 2021-12-30 PROCEDURE — 36415 COLL VENOUS BLD VENIPUNCTURE: CPT

## 2021-12-30 RX ORDER — POTASSIUM CHLORIDE 7.45 MG/ML
10 INJECTION INTRAVENOUS PRN
Status: DISCONTINUED | OUTPATIENT
Start: 2021-12-30 | End: 2022-01-05 | Stop reason: HOSPADM

## 2021-12-30 RX ORDER — POTASSIUM CHLORIDE 20 MEQ/1
40 TABLET, EXTENDED RELEASE ORAL PRN
Status: DISCONTINUED | OUTPATIENT
Start: 2021-12-30 | End: 2022-01-05 | Stop reason: HOSPADM

## 2021-12-30 RX ADMIN — POTASSIUM BICARBONATE 40 MEQ: 782 TABLET, EFFERVESCENT ORAL at 18:17

## 2021-12-30 RX ADMIN — BUDESONIDE AND FORMOTEROL FUMARATE DIHYDRATE 2 PUFF: 160; 4.5 AEROSOL RESPIRATORY (INHALATION) at 09:34

## 2021-12-30 RX ADMIN — ENOXAPARIN SODIUM 40 MG: 100 INJECTION SUBCUTANEOUS at 09:27

## 2021-12-30 RX ADMIN — PIPERACILLIN AND TAZOBACTAM 3375 MG: 3; .375 INJECTION, POWDER, LYOPHILIZED, FOR SOLUTION INTRAVENOUS at 00:13

## 2021-12-30 RX ADMIN — PIPERACILLIN AND TAZOBACTAM 3375 MG: 3; .375 INJECTION, POWDER, LYOPHILIZED, FOR SOLUTION INTRAVENOUS at 23:28

## 2021-12-30 RX ADMIN — LOSARTAN POTASSIUM 25 MG: 25 TABLET, FILM COATED ORAL at 09:31

## 2021-12-30 RX ADMIN — LOSARTAN POTASSIUM 25 MG: 25 TABLET, FILM COATED ORAL at 00:25

## 2021-12-30 RX ADMIN — METOPROLOL TARTRATE 12.5 MG: 25 TABLET, FILM COATED ORAL at 20:37

## 2021-12-30 RX ADMIN — Medication 5 ML: at 10:41

## 2021-12-30 RX ADMIN — LOSARTAN POTASSIUM 25 MG: 25 TABLET, FILM COATED ORAL at 21:58

## 2021-12-30 RX ADMIN — METOPROLOL TARTRATE 12.5 MG: 25 TABLET, FILM COATED ORAL at 09:26

## 2021-12-30 RX ADMIN — SODIUM CHLORIDE: 9 INJECTION, SOLUTION INTRAVENOUS at 10:06

## 2021-12-30 RX ADMIN — ATORVASTATIN CALCIUM 20 MG: 20 TABLET, FILM COATED ORAL at 20:37

## 2021-12-30 RX ADMIN — PIPERACILLIN AND TAZOBACTAM 3375 MG: 3; .375 INJECTION, POWDER, LYOPHILIZED, FOR SOLUTION INTRAVENOUS at 07:03

## 2021-12-30 RX ADMIN — PIPERACILLIN AND TAZOBACTAM 3375 MG: 3; .375 INJECTION, POWDER, LYOPHILIZED, FOR SOLUTION INTRAVENOUS at 14:42

## 2021-12-30 RX ADMIN — Medication 10 ML: at 20:38

## 2021-12-30 NOTE — PROGRESS NOTES
INPATIENT PROGRESS NOTES    PATIENT NAME: Francisca He  MRN: [id number]  SERVICE DATE:  December 29, 2021   SERVICE TIME:  7:22 PM      PRIMARY SERVICE: Pulmonary Disease    CHIEF COMPLAIN: Shortness of breath      INTERVAL HPI: Patient seen and examined at bedside, Interval Notes, orders reviewed. Nursing notes noted  Patient is comfortable in bed without having short of breath. She is on 3 L O2 via nasal cannula O2 saturation 95%. unable to obtain any review of system. She is afebrile. No vomiting or diarrhea    OBJECTIVE    Body mass index is 31.89 kg/m². PHYSICAL EXAM:  Vitals:  BP (!) 159/73   Pulse 87   Temp 98.4 °F (36.9 °C)   Resp 20   Ht 5' 3\" (1.6 m)   Wt 180 lb (81.6 kg)   LMP  (LMP Unknown)   SpO2 95%   BMI 31.89 kg/m²   General: Alert, awake . comfortable in bed, No distress. Head: Atraumatic , Normocephalic   Eyes: PERRL. No sclera icterus. No conjunctival injection. No discharge   ENT: No nasal  discharge. Pharynx clear. Neck:  Trachea midline. No thyromegaly, no JVD, No cervical adenopathy. Chest : Bilaterally symmetrical ,Normal effort,  No accessory muscle use  Lung : . Fair BS bilateral, decreased BS at bases. No Rales. No wheezing. No rhonchi. Heart[de-identified] Normal  rate. Regular rhythm. No mumur ,  Rub or gallop  ABD: Non-tender. Non-distended. No masses. No organmegaly. Normal bowel sounds. No hernia.   Ext : No Pitting both leg , No Cyanosis No clubbing  Neuro: no focal weakness          DATA:   Recent Labs     12/27/21  1204 12/29/21  0653   WBC 10.3 10.0   HGB 11.8* 11.1*   HCT 36.1* 33.2*   MCV 89.1 86.9    187     Recent Labs     12/28/21  0502 12/28/21  0502 12/29/21  0653     --  139   K 3.8  --  3.2*     --  104   CO2 18*  --  23   BUN 13  --  11   CREATININE 0.83  --  0.81   GLUCOSE 104*  --  136*   CALCIUM 8.3*  --  8.2*   PROT 6.2*  --  6.1*   LABALBU 2.5*  --  2.5*   BILITOT 0.7  --  0.5   ALKPHOS 296*  --  302*   AST 38*  --  26   ALT 30   < > 29 LABGLOM >60.0   < > >60.0   GFRAA >60.0   < > >60.0   GLOB 3.7*   < > 3.6*    < > = values in this interval not displayed. MV Settings:          No results for input(s): PHART, CVR6CQS, PO2ART, NHW9OMN, BEART, X9SGVKPC in the last 72 hours. O2 Device: Nasal cannula  O2 Flow Rate (L/min): 3 L/min    ADULT DIET; Dysphagia - Soft and Bite Sized     MEDICATIONS during current hospitalization:    Continuous Infusions:   sodium chloride      sodium chloride 75 mL/hr at 12/29/21 0500       Scheduled Meds:   losartan  25 mg Oral Q12H    sodium chloride flush  5-40 mL IntraVENous 2 times per day    enoxaparin  40 mg SubCUTAneous Daily    piperacillin-tazobactam  3,375 mg IntraVENous Q8H    atorvastatin  20 mg Oral Nightly    budesonide-formoterol  2 puff Inhalation BID    metoprolol tartrate  12.5 mg Oral BID       PRN Meds:sodium chloride flush, sodium chloride, ondansetron **OR** ondansetron, acetaminophen **OR** acetaminophen, polyethylene glycol, labetalol, albuterol sulfate HFA, ipratropium-albuterol, montelukast    Radiology  CT Head WO Contrast    Result Date: 12/21/2021  CT Brain. Contrast medium:  without contrast.. History:  Fall. Right chest pain. Altered mental status. Technical factors: CT imaging of the brain was obtained and formatted as 5 mm contiguous axial images. 2.5 mm contiguous axial images were obtained through the osseous structures. Sagittal and coronal reconstruction obtained during postprocessing. Comparison:  CT brain, October 27, 2020. MRI brain, November 2, 2020. Findings: Extra-axial spaces:  Normal. Intracranial hemorrhage:  None. Ventricular system: Ventricles mildly enlarged. Sulci mildly prominent. Basal Cisterns:  Normal. Cerebral Parenchyma: Bilateral symmetric periventricular areas decreased attenuation. Midline Shift:  None. Cerebellum:  Normal. Paranasal sinuses and mastoid air cells: Minimal bilateral opacification ethmoid air cells.  Visualized Orbits: Remote bilateral ocular surgery. Impression: Mild cerebral atrophy. Chronic ischemic white matter disease. Minimal/mild ethmoid sinusitis. All CT scans at this facility use dose modulation, iterative reconstruction, and/or weight based dosing when appropriate to reduce radiation dose to as low as reasonably achievable. CT CHEST W CONTRAST    Result Date: 12/21/2021  CT of the Chest with intravenous contrast medium. HISTORY: Fall. Right rib pain. TECHNICAL FACTORS: CT imaging of the chest was obtained and formatted as 5 mm contiguous axial images from the thoracic inlet through the adrenal glands. Sagittal and coronal reconstructions obtained during postprocessing. Intravenous contrast medium:  100 ml of Isovue-300 Comparison:  CT chest, October 27, 2020. FINDINGS: Right lung: Motion artifact. Noncalcified nonpleural-based 7.1 x 7.0 mm pulmonary nodule found laterally and posteriorly right upper lobe (series 2, image 18). Partially pleural-based noncalcified right lower lobe nodule, measuring 8 x 12 mm (series 2, image 29), not present on prior study. Finding not present on examination October, 2020. Dependent subsegmental consolidation right posterior lung base. No pleural effusion. Left lung: Fibrotic change, left lung apex. No nodules, masses, pleural effusion. Dependent subsegmental atelectatic change left lung base. Lymph nodes/mediastinum asymmetric enlargement left lobe of thyroid with low-attenuation thyroid nodules are identified, largest measuring 7 x 12 mm. No hilar, mediastinal, or axillary lymph node enlargement. Thoracic aorta: AP and transverse dimension, 3.8 x 3.9 cm, the pulmonary arterial bifurcation. Cardiac Size: Upper limits normal. Pericardial effusion: None. Upper abdomen:Limited imaging upper abdomen small hiatal hernia. Musculoskeletal:No osteoblastic, and no osteolytic lesions. No acute fracture     Interval development of right lung nodules, largest measuring 7 x 12 mm.  Recommendations below. Bilateral lower lobe dependent subsegmental atelectasis. Dilatation, ascending thoracic aorta. Other findings discussed ____________________________________________________________ Fleischner Society 2017 Guidelines for Management of Incidentally Detected Pulmonary Nodules in Adults A: Solid Nodules*      Single                Low risk**                     <6 mm     No routine follow-up                               6-8 mm     CT at 6-12 months, then consider CT at 18-24 months                 >8 mm     Consider CT at 3 months, PET/CT, or tissue sampling Nodules <6 mm do not require routine follow-up, but certain patients at high risk with suspicious nodule morphology, upper lobe location, or both may warrant 12-month follow-up (recommendation 1A). High risk**                <6 mm     Optional CT at 12 months               6-8 mm     CT at 6-12 months, then CT at 18-24 months                 >8 mm     Consider CT at 3 months, PET/CT, or tissue sampling Nodules <6 mm do not require routine follow-up, but certain patients at high risk with suspicious nodule morphology, upper lobe location, or both may warrant 12-month follow-up (recommendation 1A). Multiple                         Low risk**                <6 mm     No routine follow-up               6-8 mm     CT at 3-6 months, then consider CT at 18-24 months                >8 mm     CT at 3-6 months, then  consider CT at 18-24 months Use most suspicious nodule as guide to management. Follow-up intervals may vary according to size and risk (recommendation 2A). High risk**                <6 mm     Optional CT at 12 months               6-8 mm     CT at 3-6 months, then at 18-24 months                >8 mm     CT at 3-6 months, then at 18-24 months Use most suspicious nodule as guide to management. Follow-up intervals may vary according to size and risk (recommendation 2A).  B: Subsolid Nodules*      Single Ground glass                <6 mm     No routine follow-up              >=6 mm     CT at 6-12 months to confirm persistence, then CT  every 2 years until 5 years In certain suspicious nodules <6 mm, consider follow-up at 2 and 4 years. If solid component(s) or growth develops, consider resection. (Recommendations 3A and 4A). Part solid                <6 mm     No routine follow-up              >=6 mm     CT at 3-6 months to confirm persistence. If unchanged and solid component remains <6 mm, annual CT  should be performed for 5 years. In practice, part-solid nodules cannot be defined as such until >=6 mm, and nodules <6 mm do not usually require follow-up. Persistent part-solid nodules with solid components >=6 mm should be considered highly suspicious (recommendations 4A-4C)      Multiple                <6 mm     CT at 3-6 months. If stable, consider CT at 2 and 4 years. >=6 mm     CT at 3-6 months. Subsequent management based  on the most suspicious nodule(s). Multiple <6 mm pure ground-glass nodules are usually benign, but consider follow-up in selected patients at high risk at 2 and 4 years (recommendation 5A). Note. --These recommendations do not apply to lung cancer screening, patients with immunosuppression, or patients with known primary cancer. * Dimensions are average of long and short axes, rounded to the nearest millimeter. ** Consider all relevant risk factors (see Risk Factors). ____________________________________________________________ All CT scans at this facility use dose modulation, iterative reconstruction, and/or weight based dosing when appropriate to reduce radiation dose to as low as reasonably achievable.      CT CERVICAL SPINE WO CONTRAST    Result Date: 12/21/2021  EXAMINATION: CT CERVICAL SPINE WO CONTRAST   DATE AND TIME:12/21/2021 8:49 AM CLINICAL HISTORY:Acute posterior neck pain  fall  COMPARISON: None TECHNIQUE:Helical scanning was performed from the skull base through the remainder of the cervical spine without intravenous contrast. Sagittal and coronal reformats were obtained. The lack of contrast limits CT sensitivity of the soft tissues. All CT scans at this facility use dose modulation, iterative reconstruction, and/or weight based dosing when appropriate to reduce radiation dose to as low as reasonably achievable. FINDINGS   Fracture:No acute fracture. Alignment:  Normal cervical lordosis. No subluxation. No canal stenosis. Dense atlas:Dens atlas relationship is normal. Soft tissues:Negative. Other findings: There is moderate cervical spondylosis with multiple level disc osteophyte changes. Findings consistent with chronic mastoiditis with tiny fluid level in the left mastoid air cells suggestive of more acute inflammation. No acute cervical spine abnormality. XR CHEST PORTABLE    Result Date: 12/26/2021  EXAMINATION: XR CHEST PORTABLE CLINICAL HISTORY: FEVER COMPARISONS: CT CHEST, DECEMBER 21, 2021, CHEST RADIOGRAPH, OCTOBER 31, 2020 FINDINGS: Osseous structures intact. Cardiopericardial silhouette normal. Ill-defined areas of increased opacity are found in the right upper and middle lung, as well as in the left upper, and left mid and lower lung. Blunting left costophrenic angle identified. BILATERAL ATELECTASIS/PNEUMONIA. LEFT PLEURAL EFFUSION. IMPRESSION AND SUGGESTION:    1. Gram-negative bacteremia in setting of UTI  2. Possible pneumonia/bibasilar atelectasis and left pleural effusion  3. COPD without exacerbation  4. Pulmonary nodule, right upper lobe 8 x 12 mm  5. FRED  6. Encephalopathy  7. Acute kidney injury secondary to dehydration poor p.o. intake  8. Hypertension  9. Thyroid nodule    She started on IV antibiotic with Zosyn. She has gram-negative bacteremia in setting with UTI and possible basilar pneumonia. She had CT chest chest showing interval development of right lung nodules largest measuring 8 x 12 mm.   Talk to patient daughter and she is in agreement that with with her age and her overall condition should not pursue any aggressive work-up if overall condition improve then may need to follow-up CT chest in 3 months. At this time continue treatment for UTI with bacteremia and plan for skilled nursing facility from here. NOTE: This report was transcribed using voice recognition software. Every effort was made to ensure accuracy; however, inadvertent computerized transcription errors may be present.       Electronically signed by Jaqueline Silver MD, FCCP on 12/29/2021 at 7:22 PM

## 2021-12-30 NOTE — PROGRESS NOTES
[facility]  Facility/Department: [facility] MED SURG UNIT  Speech Language Pathology   Treatment Note    Silvia Duval  5/19/1928  [id number]    Medical Dx: UTI (urinary tract infection) [N39.0]  Failure to thrive in adult [R62.7]  Acute cystitis without hematuria [N30.00]  Pneumonia of both lower lobes due to infectious organism [J18.9]  Speech Dx: Dysphagia     12/30/2021    Current diet level: ADULT DIET; Dysphagia - Soft and Bite Sized  Current respiratory status: 3 L oxygen via nasal cannula      Subjective:  Alert, Cooperative and Confused      Pt was alert and cooperative for treatment. Pt was answering more questions this date and initiating more with SLP. Pt intermittently followed commands, SLP suspects hearing and cognition play role in pt's inability to consistently follow commands. Pt able to state breakfast did not go well and that she was \"coughing\". Per Dr. Early Apley, pt had a \"bad urine infection\". Pt exhibited SOB and mild baseline cough prior to trials. Pt had nasal cannula on 3L. Interventions used this date:  Dysphagia Treatment    Objective/Assessment:  Patient progressing towards goals:  Short-term Goals  Goal 1: Pt will tolerate recommended diet with no overt s/s of difficulty or aspiration. Pt trialed soft and bite size consistencies this date, however, demonstrated significantly increased mastication time on single piece of fruit. SLP provided max cues in order to get pt to elicit swallow, however, pt unable. SLP unable to extract fruit piece given tactile cues, as pt would not allow SLP in oral cavity. Pt was able to clear bolus given increased time (1+ min). Pt tolerated 4/4 trials of puree consistencies this date with no overt s/s of aspiration in all given opportunities. Pt demonstrated adequate lingual manipulation and timely swallow onset in all trials. Pt tolerated 3/3 sips of thin liquids via cup with no overt s/s of aspiration.  Pt exhibited min throat clear following trial of thin liquids x1 via consecutive straw sip. Pt tolerated 3/3 trials of mildly (nectar) thick liquids via cup with no overt s/s of aspiration. SLP REC: Downgrade pt diet to puree consistencies and thin liquids via cup sip only. Pt needs to be supervised with all PO intake. If pt is not tolerating PO diet, downgrade to nectar thick liquids and page ST. SLP informed RN Harrie Nyhan of recommendations. Goal 2: Pt will complete labial and lingual exercises with 80% acc and min cues to improve oral motor strength and swallow function. Not addressed at pt not able to consistently follow directives. Goal 3: Pt will complete tongue base exercises with 80% acc and min cues to improve lingual strength and posterior propulsion during oral phase of swallow. Not addressed at pt not able to consistently follow directives. SLP to add the following goal to ST POC:  Goal 4: Pt will tolerate trials of minced and moist solids with good oral clearance and no overt s/s of difficulty or aspiration. Treatment/Activity Tolerance:  Patient tolerated treatment well    Plan: Alter current POC (see above)    Pain Assessment:  Pre-Treatment  Pain assessment: 0-10  Pain level: 0  Intervention:  Patient demonstrated no s/s of pain. Post-Treatment  Pain assessment: 0-10  Pain level: 0  Intervention:  Patient demonstrated no s/s of pain. Patient/Caregiver Education:  No education provided at this time. Safety Devices:   All fall risk precautions in place, Bed alarm in place and Call light within reach      Therapy Time   Time in: 1402   Time out: 1418   Total Minutes: 16 minutes      Signature: Electronically signed by WILFRID Menchaca on 12/30/2021 at 2:29 PM

## 2021-12-30 NOTE — PROGRESS NOTES
abdomen. Hypoactive bowel sounds. Suprapubic tenderness to palpation. Neurologic: Alert, disoriented. Follows some commands, gives some short phrase answers to questions. Generalized weakness appreciated. Overall more alert/oriented today compared to prior, but still grossly disoriented and very [place]. Psychiatric: Calm, disoriented. Not agitated or distressed. MSK/integumentary: Baseline arthritic changes of bilateral hands. Labs/Imaging/Diagnostics    Labs:  CBC:  Recent Labs     12/27/21  1204 12/29/21  0653 12/30/21  0722   WBC 10.3 10.0 11.1*   RBC 4.06* 3.82* 3.90*   HGB 11.8* 11.1* 11.2*   HCT 36.1* 33.2* 33.8*   MCV 89.1 86.9 86.8   RDW 14.5 14.2 14.3    187 232     CHEMISTRIES:  Recent Labs     12/27/21  1204 12/28/21  0502 12/29/21  0653    137 139   K 3.6 3.8 3.2*    103 104   CO2 19* 18* 23   BUN 16 13 11   CREATININE 0.88 0.83 0.81   GLUCOSE 102* 104* 136*     PT/INR:  No results for input(s): PROTIME, INR in the last 72 hours. APTT:No results for input(s): APTT in the last 72 hours. LIVER PROFILE:  Recent Labs     12/27/21  1204 12/28/21  0502 12/29/21  0653   AST 41* 38* 26   ALT 33 30 29   BILITOT 0.6 0.7 0.5   ALKPHOS 295* 296* 302*       Imaging Last 24 Hours:  XR CHEST PORTABLE    Result Date: 12/26/2021  EXAMINATION: XR CHEST PORTABLE CLINICAL HISTORY: FEVER COMPARISONS: CT CHEST, DECEMBER 21, 2021, CHEST RADIOGRAPH, OCTOBER 31, 2020 FINDINGS: Osseous structures intact. Cardiopericardial silhouette normal. Ill-defined areas of increased opacity are found in the right upper and middle lung, as well as in the left upper, and left mid and lower lung. Blunting left costophrenic angle identified. BILATERAL ATELECTASIS/PNEUMONIA. LEFT PLEURAL EFFUSION.     Assessment//Plan           Hospital Problems           Last Modified POA    Obstructive sleep apnea 12/27/2021 Yes    UTI (urinary tract infection) 12/26/2021 Yes    Impaired mobility and activities of daily living 12/27/2021 Yes        Assessment & Plan    80 y.o. female with PMH who has PMH HTN, COPD (home o2 PRN), FRED, venous stasis, RA, dysphagia, and skin cancer presented with worsening shortness of breath, decreased intake, weakness, and decreased mobility who is admitted with the following:     Gram negative bacteremia in setting of UTI, with possible superimposed PNA  -Continue IV antibiotics (Zosyn)  -Daily labs  -O2 to maintain sats >92%  -Acapella and incentive spirometry  -Trend procalcitonin, improving  12/29: Blood cultures demonstrating both Enterobacter and E.coli bacteremia. ID consulted 11/29 in setting of multi-agent bacteremia, appreciate management recs. 12/30: Continues on Zosyn, pending ID recs.       RICHARD secondary to dehydration and decreased oral intake  -Creat 1.06 (baseline 0.7)  -Gentle IVF  -Renal function panel daily  -Avoid nephrotoxic agents  -Improving     HTN  -Resume home medications when taking PO  -Labetalol PRN  -12/28: Resumed home losartan 25 mg p.o. daily  -12/29: Improved today, but not yet at goal.    -12/30: SBP now in 140-150s. Will continue to work towards normotension.     Failure to thrive in adult  -NPO; IVFs  -Bedside swallow study  -PT/OT/Speech consults  -Rehab consult     Pulmonary nodules (new finding since previous scans Oct 2020)  COPD not in exacerbation  FRED  -Multiple pulmonary nodules RUL and RLL  -Consult pulmonary for further recommendations  -Wears home o2 PRN  -O2 to maintain sats >92%  -Resumed home medications  12/30: Pulmonary discussed nodules with family. They agree on patient being poor candidate for any aggressive intervention or diagnostics. Respiratory status stable at present. Pulmonology will sign off. Can follow-up outpatient if family desires additional diagnostics.     Thyroid nodule  -L lobe thyroid nodules  -Outpatient f/u     Advanced care planning  -DNRCCA/DNI      Diet: Per ST recommendations.   1:1 feeding at present in setting of weakness and confusion.       Electronically signed by [facility], DO on 12/30/2021 at 9:14 AM

## 2021-12-30 NOTE — CONSULTS
Infectious Disease     Patient Name: Vasyl Santamaria  Date: 12/30/2021  YOB: 1928  Medical Record Number: [id number]        Chief Complaint   Patient presents with    Illness     not eating/drinking per family          History of Present Illness:  Patient 80year-old female presented with worsening shortness of breath weakness for several days. Apparently she had a fall she was brought in several days ago she had an elevated temperature at presentation. Blood cultures were done admission. She is found to have gram-negative bacteremia there is also concern for pneumonia. Urinalysis was abnormal.  She is get E. coli in her blood and urine. Interestingly looks like she had an urinalysisseveral days before admission that did not have any significant pyuria        Review of Systems: Patient a poor historian at this time see above      Social History     Tobacco Use    Smoking status: Never Smoker    Smokeless tobacco: Never Used   Substance Use Topics    Alcohol use: No    Drug use: No         Past Medical History:   Diagnosis Date    Anxiety     Cancer ([address]     Glaucoma     Gout     left knee    Hypertension     Lung disease     Obesity (BMI 30-39. 9) 7/11/2019    Obstructive sleep apnea 7/11/2019           Past Surgical History:   Procedure Laterality Date    CATARACT REMOVAL WITH IMPLANT Bilateral 2016    COLON SURGERY  2009    polyps    COLONOSCOPY  02/2009    GASTROSTOMY TUBE PLACEMENT N/A 11/4/2020    EGD PEG TUBE PLACEMENT performed by Guanaco Nelson MD at [address] [id number]  2002    graft from neck         No current facility-administered medications on file prior to encounter.      Current Outpatient Medications on File Prior to Encounter   Medication Sig Dispense Refill    losartan (COZAAR) 25 MG tablet Take 1 tablet by mouth every 12 hours 180 tablet 3    atorvastatin (LIPITOR) 20 MG tablet TAKE 1 TABLET BY MOUTH EVERY DAY 90 tablet 0    Elastic Bandages & Supports (MEDICAL COMPRESSION STOCKINGS) MISC Knee high, 20 to 30 mm hg, remove at night, pharmacy to fit. 1 each 0    mometasone (ELOCON) 0.1 % cream Apply thin layer topically every 12 hours to rash on hand and legs. 45 g 0    zoster recombinant adjuvanted vaccine (SHINGRIX) 50 MCG/0.5ML SUSR injection Inject 0.5 mLs into the muscle See Admin Instructions 1 dose now and repeat in 2-6 months 0.5 mL 0    fluticasone-salmeterol (ADVAIR DISKUS) 250-50 MCG/DOSE AEPB Inhale 1 puff into the lungs every 12 hours 180 each 3    Handicap Placard MISC by Does not apply route Good for 5 years 1 each 0    montelukast (SINGULAIR) 10 MG tablet Take 1 tablet by mouth nightly as needed (allergies) 90 tablet 3    ipratropium-albuterol (DUONEB) 0.5-2.5 (3) MG/3ML SOLN nebulizer solution Inhale 3 mLs into the lungs every 6 hours as needed for Shortness of Breath ((or wheezing)) 360 mL 11    metoprolol tartrate (LOPRESSOR) 25 MG tablet TAKE 1/2 TABLET BY MOUTH TWICE A DAY 90 tablet 3    albuterol sulfate HFA (PROAIR HFA) 108 (90 Base) MCG/ACT inhaler Inhale 1 puff into the lungs every 6 hours as needed for Wheezing or Shortness of Breath 1 Inhaler 11    OXYGEN Inhale 2 L into the lungs Indications: PRN at bedtime      latanoprost (XALATAN) 0.005 % ophthalmic solution INSTILL 1 DROP INTO BOTH EYES EVERY DAY (Patient not taking: Reported on 8/4/2021)      dorzolamide-timolol (COSOPT) 22.3-6.8 MG/ML ophthalmic solution Use 1 Drop in both eyes every 12 hours. (Patient not taking: Reported on 8/4/2021)      brimonidine (ALPHAGAN P) 0.15 % ophthalmic solution INSTILL 1 DROP INTO BOTH EYES TWICE DAILY. (Patient not taking: Reported on 8/4/2021)  3       Allergies   Allergen Reactions    Norvasc [Amlodipine Besylate] Swelling         History reviewed. No pertinent family history. Physical Exam:     Blood pressure (!) 145/71, pulse 80, temperature 98.6 °F (37 °C), resp.  rate 18, height 5' 3\" (1.6 m), weight 180 lb (81.6 kg), SpO2 92 %, not currently breastfeeding. General: Patient appears ok at the present time. NAD  Skin: no new rashes  HEENT:  Neck is supple, No subconjunctival hemorrhages, no oral exudates  Heart: S1 S2  Lungs: clear bilaterally   Abdomen: soft, ND, NTTP,   Back :no CVA tenderness  Extrem: No edema, non tender  Neuro exam: CN II-XII intact  Psych: cooperative    Labs: I have reviewed all lab results by electronic record, including most recent CBC, metabolic panel, and pertinent abnormalities were addressed from an infectious disease perspective. Trends are being monitored over time. Lab Results   Component Value Date    WBC 11.1 (H) 12/30/2021    HGB 11.2 (L) 12/30/2021    HCT 33.8 (L) 12/30/2021    MCV 86.8 12/30/2021     12/30/2021     Lab Results   Component Value Date     12/30/2021    K 2.9 12/30/2021    K 4.0 11/06/2020    CL 99 12/30/2021    CO2 23 12/30/2021    BUN 11 12/30/2021    CREATININE 0.81 12/30/2021    GLUCOSE 121 12/30/2021    CALCIUM 8.3 12/30/2021        Radiology:  I have reviewed imaging results per electronic record and most pertinent abnormalities are being addressed from an infectious disease standpoint. ASSESSMENT:  E. coli sepsis  E. coli urinary tract infection  Weakness    Current antibiotics are active  PLAN:  If continues to do okay and is appropriate for oral therapy can try ciprofloxacin for 7 days, 500 mg p.o. twice daily.   Renal function is improved QTC is okay, and I do not see any interacting meds on her hospitalized last    If unable to take oral meds, or questionable GI absorption then we can continue her on IV antibiotics     repeat negative bloods, but if does well this does not need to be held up for discharge  Would check a  ultrasound, and bladder scan make sure no obstruction

## 2021-12-30 NOTE — PROGRESS NOTES
Melisa Cid   Facility/Department: [facility] MED SURG [facility]  Speech Language Pathology    NAME:Trudy Martines  : 1928   ROOM: [id number]      Date: 2021      Chart reviewed, patient has not had a change in medical status at this time. Patient is currently on program with SLP. New evaluation not indicated at this time.         Electronically signed by WILFRID Scott on 2021 at 12:46 PM

## 2021-12-30 NOTE — PROGRESS NOTES
[facility]   Facility/Department: [facility]  Speech Language Pathology    Sergei Mask  5/19/1928  [id number]    Date: 12/30/2021      Speech Therapy attempted to see Sergei Mask on this date for a/an:    Treatment    Pt was unable to be seen due to: Other: Pt seen this date due to concerns from RN following observation with breakfast tray. Per RN, pt was coughing on thin liquids. SLP attempted to trials PO at bedside. Pt coughing and increased respirations following x1 puree trial and x1 thin liquid trial. It should  noted pt was upset and softly crying upon ST arrival. Pt unable to state why she was upset. SLP and RN provided comfort. Unable to assess diet tolerance or need for further assessment at this time. Check back later this PM. Pt resting comfortable upon ST departure.        Electronically signed by WILFRID Galeano on 12/30/21 at 12:46 PM EST

## 2021-12-30 NOTE — PROGRESS NOTES
Daily Update     From: Home with daughter. PMH: Anxiety, hypertension, FRED. Anticipated Discharge Date: TBD    Anticipated Discharge Disposition: SNF, referral sent to [facility] 12/29. Patient Mobility or PT/OT ordered: Yes - recommend SNF. Covid Test Date and Result: 12/26 - Negative. Barriers to Discharge:    12/30~ Per attending physician, patient with double bacteremia. Receiving IV Zosyn. CXR with bilateral pleural effusions. WBC increasing, new ID consult. Requiring O2, does not use at baseline. Potassium down to 2.9 today. Per RN, patient may need SLP eval for swallowing due to difficulty w/ PO intake. CM spoke with physician regarding DC plan, he states patient is not ready for DC at this time. CMI done.

## 2021-12-31 LAB
ALBUMIN SERPL-MCNC: 2.9 G/DL (ref 3.5–4.6)
ALP BLD-CCNC: 260 U/L (ref 40–130)
ALT SERPL-CCNC: 28 U/L (ref 0–33)
ANION GAP SERPL CALCULATED.3IONS-SCNC: 14 MEQ/L (ref 9–15)
AST SERPL-CCNC: 26 U/L (ref 0–35)
BASOPHILS ABSOLUTE: 0 K/UL (ref 0–0.2)
BASOPHILS RELATIVE PERCENT: 0.3 %
BILIRUB SERPL-MCNC: 0.6 MG/DL (ref 0.2–0.7)
BUN BLDV-MCNC: 9 MG/DL (ref 8–23)
CALCIUM SERPL-MCNC: 8.5 MG/DL (ref 8.5–9.9)
CHLORIDE BLD-SCNC: 101 MEQ/L (ref 95–107)
CO2: 25 MEQ/L (ref 20–31)
CREAT SERPL-MCNC: 0.7 MG/DL (ref 0.5–0.9)
EOSINOPHILS ABSOLUTE: 0.1 K/UL (ref 0–0.7)
EOSINOPHILS RELATIVE PERCENT: 1 %
GFR AFRICAN AMERICAN: >60
GFR NON-AFRICAN AMERICAN: >60
GLOBULIN: 3.2 G/DL (ref 2.3–3.5)
GLUCOSE BLD-MCNC: 113 MG/DL (ref 70–99)
HCT VFR BLD CALC: 35.4 % (ref 37–47)
HEMOGLOBIN: 11.7 G/DL (ref 12–16)
LYMPHOCYTES ABSOLUTE: 0.9 K/UL (ref 1–4.8)
LYMPHOCYTES RELATIVE PERCENT: 6.8 %
MCH RBC QN AUTO: 28.7 PG (ref 27–31.3)
MCHC RBC AUTO-ENTMCNC: 33.1 % (ref 33–37)
MCV RBC AUTO: 86.6 FL (ref 82–100)
MONOCYTES ABSOLUTE: 0.9 K/UL (ref 0.2–0.8)
MONOCYTES RELATIVE PERCENT: 7.1 %
NEUTROPHILS ABSOLUTE: 11.3 K/UL (ref 1.4–6.5)
NEUTROPHILS RELATIVE PERCENT: 84.8 %
PDW BLD-RTO: 14 % (ref 11.5–14.5)
PLATELET # BLD: 279 K/UL (ref 130–400)
POTASSIUM SERPL-SCNC: 3.1 MEQ/L (ref 3.4–4.9)
PROCALCITONIN: 0.36 NG/ML (ref 0–0.15)
RBC # BLD: 4.08 M/UL (ref 4.2–5.4)
SODIUM BLD-SCNC: 140 MEQ/L (ref 135–144)
TOTAL PROTEIN: 6.1 G/DL (ref 6.3–8)
WBC # BLD: 13.3 K/UL (ref 4.8–10.8)

## 2021-12-31 PROCEDURE — 6360000002 HC RX W HCPCS: Performed by: NURSE PRACTITIONER

## 2021-12-31 PROCEDURE — 85025 COMPLETE CBC W/AUTO DIFF WBC: CPT

## 2021-12-31 PROCEDURE — 99232 SBSQ HOSP IP/OBS MODERATE 35: CPT | Performed by: INTERNAL MEDICINE

## 2021-12-31 PROCEDURE — 94761 N-INVAS EAR/PLS OXIMETRY MLT: CPT

## 2021-12-31 PROCEDURE — 6370000000 HC RX 637 (ALT 250 FOR IP): Performed by: NURSE PRACTITIONER

## 2021-12-31 PROCEDURE — 84145 PROCALCITONIN (PCT): CPT

## 2021-12-31 PROCEDURE — 1210000000 HC MED SURG R&B

## 2021-12-31 PROCEDURE — 36415 COLL VENOUS BLD VENIPUNCTURE: CPT

## 2021-12-31 PROCEDURE — 94640 AIRWAY INHALATION TREATMENT: CPT

## 2021-12-31 PROCEDURE — 80053 COMPREHEN METABOLIC PANEL: CPT

## 2021-12-31 PROCEDURE — 6370000000 HC RX 637 (ALT 250 FOR IP): Performed by: INTERNAL MEDICINE

## 2021-12-31 PROCEDURE — 2580000003 HC RX 258: Performed by: NURSE PRACTITIONER

## 2021-12-31 RX ORDER — CIPROFLOXACIN 500 MG/1
500 TABLET, FILM COATED ORAL EVERY 12 HOURS SCHEDULED
Status: DISCONTINUED | OUTPATIENT
Start: 2021-12-31 | End: 2022-01-05 | Stop reason: HOSPADM

## 2021-12-31 RX ADMIN — ENOXAPARIN SODIUM 40 MG: 100 INJECTION SUBCUTANEOUS at 09:11

## 2021-12-31 RX ADMIN — BUDESONIDE AND FORMOTEROL FUMARATE DIHYDRATE 2 PUFF: 160; 4.5 AEROSOL RESPIRATORY (INHALATION) at 20:05

## 2021-12-31 RX ADMIN — METOPROLOL 25 MG: 25 TABLET ORAL at 23:21

## 2021-12-31 RX ADMIN — POTASSIUM BICARBONATE 40 MEQ: 782 TABLET, EFFERVESCENT ORAL at 11:50

## 2021-12-31 RX ADMIN — Medication 650 MG: at 02:15

## 2021-12-31 RX ADMIN — CIPROFLOXACIN HYDROCHLORIDE 500 MG: 500 TABLET, FILM COATED ORAL at 23:21

## 2021-12-31 RX ADMIN — METOPROLOL TARTRATE 12.5 MG: 25 TABLET, FILM COATED ORAL at 09:11

## 2021-12-31 RX ADMIN — ATORVASTATIN CALCIUM 20 MG: 20 TABLET, FILM COATED ORAL at 23:21

## 2021-12-31 RX ADMIN — LOSARTAN POTASSIUM 25 MG: 25 TABLET, FILM COATED ORAL at 09:11

## 2021-12-31 RX ADMIN — LOSARTAN POTASSIUM 25 MG: 25 TABLET, FILM COATED ORAL at 23:21

## 2021-12-31 RX ADMIN — PIPERACILLIN AND TAZOBACTAM 3375 MG: 3; .375 INJECTION, POWDER, LYOPHILIZED, FOR SOLUTION INTRAVENOUS at 10:48

## 2021-12-31 RX ADMIN — SODIUM CHLORIDE: 9 INJECTION, SOLUTION INTRAVENOUS at 11:51

## 2021-12-31 ASSESSMENT — PAIN SCALES - GENERAL: PAINLEVEL_OUTOF10: 7

## 2021-12-31 NOTE — PROGRESS NOTES
Physical Therapy Med Surg Daily Treatment Note  Facility/Department: [facility]  [id number]       NAME: Hood Sanders  : 1928 (76 y.o.)  MRN: [id number]  CODE STATUS: Limited    Date of Service: 2021    Patient Diagnosis(es): UTI (urinary tract infection) [N39.0]  Failure to thrive in adult [R62.7]  Acute cystitis without hematuria [N30.00]  Pneumonia of both lower lobes due to infectious organism [J18.9]   Chief Complaint   Patient presents with    Illness     not eating/drinking per family     Patient Active Problem List    Diagnosis Date Noted    Impaired mobility and activities of daily living 2021    UTI (urinary tract infection) 2021    Venous stasis dermatitis of both lower extremities 08/10/2021    Aphasia     Chest pain 2020    Obesity (BMI 30-39.9) 2019    Obstructive sleep apnea 2019    COPD exacerbation ([address]     COPD (chronic obstructive pulmonary disease) with acute bronchitis ([address] 2018    Anxiety 2017    Essential hypertension         Past Medical History:   Diagnosis Date    Anxiety     Cancer ([address]     Glaucoma     Gout     left knee    Hypertension     Lung disease     Obesity (BMI 30-39. 9) 2019    Obstructive sleep apnea 2019     Past Surgical History:   Procedure Laterality Date    CATARACT REMOVAL WITH IMPLANT Bilateral     COLON SURGERY      polyps    COLONOSCOPY  2009    GASTROSTOMY TUBE PLACEMENT N/A 2020    EGD PEG TUBE PLACEMENT performed by Alphonso Klinefelter, MD at [address]      graft from neck       Restrictions       SUBJECTIVE   General  Chart Reviewed: Yes  Response To Previous Treatment: Patient with no complaints from previous session. Family / Caregiver Present: No  Subjective  Subjective: Patient resting in bed. Significantly [place]. agreeable tx.  Attempted written communication however patient also has vision deficits. Pre-Session Pain Report  Denies   Post-Session Pain Report  Denies     OBJECTIVE   Bed mobility  Supine to Sit: Moderate assistance  Sit to Supine: Minimal assistance; Moderate assistance  Scooting: Moderate assistance  Requires increased time, effort and use of bed rail to complete     Transfers  Sit to Stand: Moderate Assistance  Stand to sit: Moderate Assistance  Comment: patient completes sit to stand x1     ASSESSMENT   Assessment: Patient continues to have difficulty following instruction due to confusion/hearing deficits. Completes sit to stand transfer at mod A +2- unable to tolerate > 30 seconds of static standing. Request to return BTB. Discharge Recommendations:  Continue to assess pending progress    Goals  Short term goals  Short term goal 1: Pt will demonstrate sitting edge of bed >/= 3 min with CGA  Long term goals  Long term goal 1: Pt will demonstrate bed mobility min A  Long term goal 2: Pt will demonstrate transfers mod A  Long term goal 3: Pt will demonstrate amb >/= 10 ft max A with safest AD    PLAN    Times per week: 3-6  Safety Devices  Type of devices: Bed alarm in place,Call light within reach,Left in bed,All fall risk precautions in place     AMPAC (6 CLICK) BASIC MOBILITY  AM-PAC Inpatient Mobility Raw Score : 9     Therapy Time   Individual   Time In 1050   Time Out 1110   Minutes 20     Timed Code Treatment Minutes: 20 Minutes     Tr [facility], [facility], 12/31/21 at 12:52 PM         Definitions for assistance levels  Independent = pt does not require any physical supervision or assistance from another person for activity completion. Device may be needed.   Stand by assistance = pt requires verbal cues or instructions from another person, close to but not touching, to perform the activity  Minimal assistance= pt performs 75% or more of the activity; assistance is required to complete the activity  Moderate assistance= pt performs 50% of the activity; assistance is required to complete the activity  Maximal assistance = pt performs 25% of the activity; assistance is required to complete the activity  Dependent = pt requires total physical assistance to accomplish the task

## 2021-12-31 NOTE — PROGRESS NOTES
Infectious Disease     Patient Name: Leanne Edmonds  Date: 12/31/2021  YOB: 1928  Medical Record Number: [id number]        Chief Complaint   Patient presents with    Illness     not eating/drinking per family          History of Present Illness:  Patient no new complaints. Has been somewhat confused she is hard of hearing        Review of Systems: Patient a poor historian at this time see above          Physical Exam:     Blood pressure (!) 163/69, pulse 89, temperature 97.5 °F (36.4 °C), resp. rate 18, height 5' 3\" (1.6 m), weight 180 lb (81.6 kg), SpO2 90 %, not currently breastfeeding. General: Patient appears ok at the present time. NAD  Skin: no new rashes  HEENT:  Neck is supple, No subconjunctival hemorrhages, no oral exudates  Heart: S1 S2  Lungs: clear bilaterally   Abdomen: soft, ND, NTTP,   Back :no CVA tenderness  Extrem: No edema, non tender  Neuro exam: CN II-XII intact  Psych: cooperative    Labs: I have reviewed all lab results by electronic record, including most recent CBC, metabolic panel, and pertinent abnormalities were addressed from an infectious disease perspective. Trends are being monitored over time. Lab Results   Component Value Date    WBC 13.3 (H) 12/31/2021    HGB 11.7 (L) 12/31/2021    HCT 35.4 (L) 12/31/2021    MCV 86.6 12/31/2021     12/31/2021     Lab Results   Component Value Date     12/31/2021    K 3.1 12/31/2021    K 4.0 11/06/2020     12/31/2021    CO2 25 12/31/2021    BUN 9 12/31/2021    CREATININE 0.70 12/31/2021    GLUCOSE 113 12/31/2021    CALCIUM 8.5 12/31/2021        Radiology:  I have reviewed imaging results per electronic record and most pertinent abnormalities are being addressed from an infectious disease standpoint. ASSESSMENT:  E. coli sepsis  E. coli urinary tract infection  Weakness     ultrasound no obstruction  PLAN:  If c is appropriate for oral therapy can try ciprofloxacin for 7 days, 500 mg p.o. twice daily.   Renal function is improved QTC is okay, and I do not see any interacting meds on her hospitalized last    If unable to take oral meds, or questionable GI absorption then we can continue her on IV antibiotics     repeat negative bloods    Bladder scan if not already

## 2021-12-31 NOTE — PROGRESS NOTES
INPATIENT PROGRESS NOTES    PATIENT NAME: Francisca He  MRN: [id number]  SERVICE DATE:  December 30, 2021   SERVICE TIME:  9:29 PM      PRIMARY SERVICE: Pulmonary Disease    CHIEF COMPLAIN: Shortness of breath      INTERVAL HPI: Patient seen and examined at bedside, Interval Notes, orders reviewed. Nursing notes noted  Patient is much more awake today and trying to interact. Comfortable in bed without having short of breath. She is on 3 L O2 via nasal cannula O2 saturation 94%. No complaint of shortness of breath. she is afebrile. No vomiting or diarrhea    OBJECTIVE    Body mass index is 31.89 kg/m². PHYSICAL EXAM:  Vitals:  BP (!) 153/72   Pulse 89   Temp 99 °F (37.2 °C)   Resp 18   Ht 5' 3\" (1.6 m)   Wt 180 lb (81.6 kg)   LMP  (LMP Unknown)   SpO2 94%   BMI 31.89 kg/m²   General: Alert, awake . comfortable in bed, No distress. Head: Atraumatic , Normocephalic   Eyes: PERRL. No sclera icterus. No conjunctival injection. No discharge   ENT: No nasal  discharge. Pharynx clear. Neck:  Trachea midline. No thyromegaly, no JVD, No cervical adenopathy. Chest : Bilaterally symmetrical ,Normal effort,  No accessory muscle use  Lung : . Fair BS bilateral, decreased BS at bases. No Rales. No wheezing. No rhonchi. Heart[de-identified] Normal  rate. Regular rhythm. No mumur ,  Rub or gallop  ABD: Non-tender. Non-distended. No masses. No organmegaly. Normal bowel sounds. No hernia.   Ext : No Pitting both leg , No Cyanosis No clubbing  Neuro: no focal weakness          DATA:   Recent Labs     12/29/21  0653 12/30/21  0722   WBC 10.0 11.1*   HGB 11.1* 11.2*   HCT 33.2* 33.8*   MCV 86.9 86.8    232     Recent Labs     12/29/21  0653 12/29/21  0653 12/30/21  0722     --  134*   K 3.2*  --  2.9*     --  99   CO2 23  --  23   BUN 11  --  11   CREATININE 0.81  --  0.81   GLUCOSE 136*  --  121*   CALCIUM 8.2*  --  8.3*   PROT 6.1*  --  6.0*   LABALBU 2.5*  --  2.7*   BILITOT 0.5  --  0.5   ALKPHOS 302*  -- 284*   AST 26  --  22   ALT 29   < > 25   LABGLOM >60.0   < > >60.0   GFRAA >60.0   < > >60.0   GLOB 3.6*   < > 3.3    < > = values in this interval not displayed. MV Settings:          No results for input(s): PHART, BTC7EDO, PO2ART, FEV4MAP, BEART, C4GCZEPU in the last 72 hours. O2 Device: Nasal cannula  O2 Flow Rate (L/min): 3 L/min    ADULT DIET; Dysphagia - Soft and Bite Sized     MEDICATIONS during current hospitalization:    Continuous Infusions:   sodium chloride      sodium chloride 75 mL/hr at 12/30/21 1006       Scheduled Meds:   losartan  25 mg Oral Q12H    sodium chloride flush  5-40 mL IntraVENous 2 times per day    enoxaparin  40 mg SubCUTAneous Daily    piperacillin-tazobactam  3,375 mg IntraVENous Q8H    atorvastatin  20 mg Oral Nightly    budesonide-formoterol  2 puff Inhalation BID    metoprolol tartrate  12.5 mg Oral BID       PRN Meds:potassium chloride **OR** potassium alternative oral replacement **OR** potassium chloride, sodium chloride flush, sodium chloride, ondansetron **OR** ondansetron, acetaminophen **OR** acetaminophen, polyethylene glycol, labetalol, albuterol sulfate HFA, ipratropium-albuterol, montelukast    Radiology  CT Head WO Contrast    Result Date: 12/21/2021  CT Brain. Contrast medium:  without contrast.. History:  Fall. Right chest pain. Altered mental status. Technical factors: CT imaging of the brain was obtained and formatted as 5 mm contiguous axial images. 2.5 mm contiguous axial images were obtained through the osseous structures. Sagittal and coronal reconstruction obtained during postprocessing. Comparison:  CT brain, October 27, 2020. MRI brain, November 2, 2020. Findings: Extra-axial spaces:  Normal. Intracranial hemorrhage:  None. Ventricular system: Ventricles mildly enlarged. Sulci mildly prominent. Basal Cisterns:  Normal. Cerebral Parenchyma: Bilateral symmetric periventricular areas decreased attenuation. Midline Shift:  None.  Cerebellum: Normal. Paranasal sinuses and mastoid air cells: Minimal bilateral opacification ethmoid air cells. Visualized Orbits: Remote bilateral ocular surgery. Impression: Mild cerebral atrophy. Chronic ischemic white matter disease. Minimal/mild ethmoid sinusitis. All CT scans at this facility use dose modulation, iterative reconstruction, and/or weight based dosing when appropriate to reduce radiation dose to as low as reasonably achievable. CT CHEST W CONTRAST    Result Date: 12/21/2021  CT of the Chest with intravenous contrast medium. HISTORY: Fall. Right rib pain. TECHNICAL FACTORS: CT imaging of the chest was obtained and formatted as 5 mm contiguous axial images from the thoracic inlet through the adrenal glands. Sagittal and coronal reconstructions obtained during postprocessing. Intravenous contrast medium:  100 ml of Isovue-300 Comparison:  CT chest, October 27, 2020. FINDINGS: Right lung: Motion artifact. Noncalcified nonpleural-based 7.1 x 7.0 mm pulmonary nodule found laterally and posteriorly right upper lobe (series 2, image 18). Partially pleural-based noncalcified right lower lobe nodule, measuring 8 x 12 mm (series 2, image 29), not present on prior study. Finding not present on examination October, 2020. Dependent subsegmental consolidation right posterior lung base. No pleural effusion. Left lung: Fibrotic change, left lung apex. No nodules, masses, pleural effusion. Dependent subsegmental atelectatic change left lung base. Lymph nodes/mediastinum asymmetric enlargement left lobe of thyroid with low-attenuation thyroid nodules are identified, largest measuring 7 x 12 mm. No hilar, mediastinal, or axillary lymph node enlargement. Thoracic aorta: AP and transverse dimension, 3.8 x 3.9 cm, the pulmonary arterial bifurcation. Cardiac Size: Upper limits normal. Pericardial effusion: None. Upper abdomen:Limited imaging upper abdomen small hiatal hernia.  Musculoskeletal:No osteoblastic, and no TIME:12/21/2021 8:49 AM CLINICAL HISTORY:Acute posterior neck pain  fall  COMPARISON: None TECHNIQUE:Helical scanning was performed from the skull base through the remainder of the cervical spine without intravenous contrast. Sagittal and coronal reformats were obtained. The lack of contrast limits CT sensitivity of the soft tissues. All CT scans at this facility use dose modulation, iterative reconstruction, and/or weight based dosing when appropriate to reduce radiation dose to as low as reasonably achievable. FINDINGS   Fracture:No acute fracture. Alignment:  Normal cervical lordosis. No subluxation. No canal stenosis. Dense atlas:Dens atlas relationship is normal. Soft tissues:Negative. Other findings: There is moderate cervical spondylosis with multiple level disc osteophyte changes. Findings consistent with chronic mastoiditis with tiny fluid level in the left mastoid air cells suggestive of more acute inflammation. No acute cervical spine abnormality. XR CHEST PORTABLE    Result Date: 12/26/2021  EXAMINATION: XR CHEST PORTABLE CLINICAL HISTORY: FEVER COMPARISONS: CT CHEST, DECEMBER 21, 2021, CHEST RADIOGRAPH, OCTOBER 31, 2020 FINDINGS: Osseous structures intact. Cardiopericardial silhouette normal. Ill-defined areas of increased opacity are found in the right upper and middle lung, as well as in the left upper, and left mid and lower lung. Blunting left costophrenic angle identified. BILATERAL ATELECTASIS/PNEUMONIA. LEFT PLEURAL EFFUSION. IMPRESSION AND SUGGESTION:    1. Gram-negative bacteremia in setting of UTI  2. Possible pneumonia/bibasilar atelectasis and left pleural effusion  3. COPD without exacerbation  4. Pulmonary nodule, right upper lobe 8 x 12 mm  5. FRED  6. Encephalopathy  7. Acute kidney injury secondary to dehydration poor p.o. intake  8. Hypertension  9. Thyroid nodule    She started on IV antibiotic with Zosyn.   She has gram-negative bacteremia in setting with UTI and possible basilar pneumonia. She had CT chest chest showing interval development of right lung nodules largest measuring 8 x 12 mm. Talk to patient daughter and she is in agreement that with with her age and her overall condition should not pursue any aggressive work-up. At this time continue treatment for UTI with bacteremia and plan for skilled nursing facility from here. Will sign off. Please call if needed    NOTE: This report was transcribed using voice recognition software. Every effort was made to ensure accuracy; however, inadvertent computerized transcription errors may be present.       Electronically signed by [id number] 179Hemal Swanson Se, MD, FCCP on 12/30/2021 at 9:29 PM

## 2021-12-31 NOTE — PROGRESS NOTES
Progress Note  Date:2021       KCUD:[id number]  Patient Name:Trudy Lawrence     Date of Birth:[birth year]     Age:89 y.o. Subjective    Subjective   No acute events overnight. Afebrile. Improving BP control. Blood cultures demonstrating E.coli bacteremia. More alert/interactive today, though still profoundly hard of hearing. SLP evaluated after choking episode, made dietary recommendations for safety. Review of Systems   Denies any acute pain at present. Unable to complete full ROS in setting of encephalopathy/dementia. Objective         Vitals Last 24 Hours:  TEMPERATURE:  Temp  Av °F (37.2 °C)  Min: 99 °F (37.2 °C)  Max: 99 °F (37.2 °C)  RESPIRATIONS RANGE: No data recorded  PULSE OXIMETRY RANGE: SpO2  Av %  Min: 94 %  Max: 94 %  PULSE RANGE: Pulse  Av  Min: 89  Max: 89  BLOOD PRESSURE RANGE: Systolic (94XXG), GOY:556 , Min:153 , YSB:475   ; Diastolic (97PHJ), JJL:96, Min:69, Max:72    I/O (24Hr): Intake/Output Summary (Last 24 hours) at 2021 1040  Last data filed at 2021 9451  Gross per 24 hour   Intake 1280 ml   Output 2100 ml   Net -820 ml     Objective:  Vital signs: (most recent): Blood pressure (!) 163/69, pulse 89, temperature 99 °F (37.2 °C), resp. rate 18, height 5' 3\" (1.6 m), weight 180 lb (81.6 kg), SpO2 94 %, not currently breastfeeding. Constitutional: Elderly adult female sitting up in bed no acute distress  Head: NCAT  Eyes: PERRLA, EOMI. Arcus senilis appreciated. ENT: Dry oropharynx with some dried posterior secretions visible. No obvious bleeding. Suspected to be hard of hearing. Neck: Trachea midline  Cardiovascular: Regular rhythm with borderline tachycardia. +1 bilateral pretibial edema. Pulmonary: Normal rate and effort of respiration on 2 L by nasal cannula. Mild intermittent nonproductive cough during exam.    Abdomen: Soft, nontense abdomen. Hypoactive bowel sounds.   Suprapubic tenderness to palpation. Neurologic: Alert, disoriented, but somewhat improved compared to prior day. Follows some commands, gives some short phrase answers to questions. Generalized weakness appreciated. Overall more alert/oriented today compared to prior, but still disoriented and very [place]. Psychiatric: Calm, disoriented. Not agitated or distressed. MSK/integumentary: Baseline arthritic changes of bilateral hands. Labs/Imaging/Diagnostics    Labs:  CBC:  Recent Labs     12/29/21  0653 12/30/21  0722 12/31/21  0659   WBC 10.0 11.1* 13.3*   RBC 3.82* 3.90* 4.08*   HGB 11.1* 11.2* 11.7*   HCT 33.2* 33.8* 35.4*   MCV 86.9 86.8 86.6   RDW 14.2 14.3 14.0    232 279     CHEMISTRIES:  Recent Labs     12/29/21  0653 12/30/21  0722 12/31/21  0659    134* 140   K 3.2* 2.9* 3.1*    99 101   CO2 23 23 25   BUN 11 11 9   CREATININE 0.81 0.81 0.70   GLUCOSE 136* 121* 113*     PT/INR:  No results for input(s): PROTIME, INR in the last 72 hours. APTT:No results for input(s): APTT in the last 72 hours. LIVER PROFILE:  Recent Labs     12/29/21  0653 12/30/21  0722 12/31/21  0659   AST 26 22 26   ALT 29 25 28   BILITOT 0.5 0.5 0.6   ALKPHOS 302* 284* 260*       Imaging Last 24 Hours:  XR CHEST PORTABLE    Result Date: 12/26/2021  EXAMINATION: XR CHEST PORTABLE CLINICAL HISTORY: FEVER COMPARISONS: CT CHEST, DECEMBER 21, 2021, CHEST RADIOGRAPH, OCTOBER 31, 2020 FINDINGS: Osseous structures intact. Cardiopericardial silhouette normal. Ill-defined areas of increased opacity are found in the right upper and middle lung, as well as in the left upper, and left mid and lower lung. Blunting left costophrenic angle identified. BILATERAL ATELECTASIS/PNEUMONIA. LEFT PLEURAL EFFUSION.     Assessment//Plan           Hospital Problems           Last Modified POA    Obstructive sleep apnea 12/27/2021 Yes    UTI (urinary tract infection) 12/26/2021 Yes    Impaired mobility and activities of daily living 12/27/2021 Yes Assessment & Plan    80 y.o. female with PMH who has PMH HTN, COPD (home o2 PRN), FRED, venous stasis, RA, dysphagia, and skin cancer presented with worsening shortness of breath, decreased intake, weakness, and decreased mobility who is admitted with the following:     Gram negative bacteremia in setting of UTI, with possible superimposed PNA  -Continue IV antibiotics (Zosyn)  -Daily labs  -O2 to maintain sats >92%  -Acapella and incentive spirometry  -Trend procalcitonin, improving  12/29: Blood cultures demonstrating both Enterobacter and E.coli bacteremia. ID consulted 11/29 in setting of multi-agent bacteremia, appreciate management recs. 12/30: Continues on Zosyn, pending ID recs. 12/31: WBC increased to 13.3 today, though procalcitonin improved to 0.36 from 0.50. Repeat blood cultures obtained 12/30 by ID, results pending. ID recommending transition to Ciprofloxacin based on C&S.       RICHARD secondary to dehydration and decreased oral intake  -Creat 1.06 (baseline 0.7)  -Gentle IVF  -Renal function panel daily  -Avoid nephrotoxic agents  -Improving  -12/31: Abdominal ultrasound ordered 1230 by ID to ensure no bladder outlet obstruction. No evidence of bladder obstruction, but 1.0 cm nonobstructing renal calculus was noted in right renal pelvis, with no associated hydronephrosis.     HTN  -Resume home medications when taking PO  -Labetalol PRN  -12/28: Resumed home losartan 25 mg p.o. daily  -12/29: Improved today, but not yet at goal.    -12/30: SBP now in 140-150s. Will continue to work towards normotension. -12/31: SBP 140s-160s overnight. Will increase metoprolol tartrate from 12.5 twice daily to 25 twice daily.   We will continue with losartan 25 mg p.o. twice daily.     Failure to thrive in adult  -NPO; IVFs  -Bedside swallow study  -PT/OT/Speech consults  -Rehab consult     Pulmonary nodules (new finding since previous scans Oct 2020)  COPD not in exacerbation  FRED  -Multiple pulmonary nodules RUL and RLL  -Consult pulmonary for further recommendations  -Wears home o2 PRN  -O2 to maintain sats >92%  -Resumed home medications  12/30: Pulmonary discussed nodules with family. They agree on patient being poor candidate for any aggressive intervention or diagnostics. Respiratory status stable at present. Pulmonology will sign off. Can follow-up outpatient if family desires additional diagnostics.     Thyroid nodule  -L lobe thyroid nodules  -Outpatient f/u     Advanced care planning  -DNRCCA/DNI      Diet: Per ST recommendations. 1:1 feeding at present in setting of weakness and confusion.       Electronically signed by Chung Farah DO on 12/31/2021 at 10:40 AM

## 2022-01-01 LAB
ALBUMIN SERPL-MCNC: 2.7 G/DL (ref 3.5–4.6)
ALP BLD-CCNC: 223 U/L (ref 40–130)
ALT SERPL-CCNC: 29 U/L (ref 0–33)
ANION GAP SERPL CALCULATED.3IONS-SCNC: 13 MEQ/L (ref 9–15)
AST SERPL-CCNC: 27 U/L (ref 0–35)
BASOPHILS ABSOLUTE: 0.1 K/UL (ref 0–0.2)
BASOPHILS RELATIVE PERCENT: 0.5 %
BILIRUB SERPL-MCNC: 0.6 MG/DL (ref 0.2–0.7)
BUN BLDV-MCNC: 9 MG/DL (ref 8–23)
CALCIUM SERPL-MCNC: 8.5 MG/DL (ref 8.5–9.9)
CHLORIDE BLD-SCNC: 101 MEQ/L (ref 95–107)
CO2: 25 MEQ/L (ref 20–31)
CREAT SERPL-MCNC: 0.64 MG/DL (ref 0.5–0.9)
EOSINOPHILS ABSOLUTE: 0.1 K/UL (ref 0–0.7)
EOSINOPHILS RELATIVE PERCENT: 1.4 %
GFR AFRICAN AMERICAN: >60
GFR NON-AFRICAN AMERICAN: >60
GLOBULIN: 3 G/DL (ref 2.3–3.5)
GLUCOSE BLD-MCNC: 100 MG/DL (ref 70–99)
HCT VFR BLD CALC: 33.8 % (ref 37–47)
HEMOGLOBIN: 11.3 G/DL (ref 12–16)
LYMPHOCYTES ABSOLUTE: 0.7 K/UL (ref 1–4.8)
LYMPHOCYTES RELATIVE PERCENT: 7.6 %
MCH RBC QN AUTO: 29 PG (ref 27–31.3)
MCHC RBC AUTO-ENTMCNC: 33.3 % (ref 33–37)
MCV RBC AUTO: 87.2 FL (ref 82–100)
MONOCYTES ABSOLUTE: 0.7 K/UL (ref 0.2–0.8)
MONOCYTES RELATIVE PERCENT: 7.6 %
NEUTROPHILS ABSOLUTE: 8 K/UL (ref 1.4–6.5)
NEUTROPHILS RELATIVE PERCENT: 82.9 %
PDW BLD-RTO: 14 % (ref 11.5–14.5)
PLATELET # BLD: 272 K/UL (ref 130–400)
POTASSIUM SERPL-SCNC: 3.2 MEQ/L (ref 3.4–4.9)
PROCALCITONIN: 0.25 NG/ML (ref 0–0.15)
RBC # BLD: 3.88 M/UL (ref 4.2–5.4)
SODIUM BLD-SCNC: 139 MEQ/L (ref 135–144)
TOTAL PROTEIN: 5.7 G/DL (ref 6.3–8)
WBC # BLD: 9.6 K/UL (ref 4.8–10.8)

## 2022-01-01 PROCEDURE — 6370000000 HC RX 637 (ALT 250 FOR IP): Performed by: NURSE PRACTITIONER

## 2022-01-01 PROCEDURE — 1210000000 HC MED SURG R&B

## 2022-01-01 PROCEDURE — 2580000003 HC RX 258: Performed by: NURSE PRACTITIONER

## 2022-01-01 PROCEDURE — 2700000000 HC OXYGEN THERAPY PER DAY

## 2022-01-01 PROCEDURE — 85025 COMPLETE CBC W/AUTO DIFF WBC: CPT

## 2022-01-01 PROCEDURE — 94760 N-INVAS EAR/PLS OXIMETRY 1: CPT

## 2022-01-01 PROCEDURE — 36415 COLL VENOUS BLD VENIPUNCTURE: CPT

## 2022-01-01 PROCEDURE — 6370000000 HC RX 637 (ALT 250 FOR IP): Performed by: INTERNAL MEDICINE

## 2022-01-01 PROCEDURE — 80053 COMPREHEN METABOLIC PANEL: CPT

## 2022-01-01 PROCEDURE — 84145 PROCALCITONIN (PCT): CPT

## 2022-01-01 PROCEDURE — 6360000002 HC RX W HCPCS: Performed by: NURSE PRACTITIONER

## 2022-01-01 PROCEDURE — 99232 SBSQ HOSP IP/OBS MODERATE 35: CPT | Performed by: INTERNAL MEDICINE

## 2022-01-01 PROCEDURE — 94640 AIRWAY INHALATION TREATMENT: CPT

## 2022-01-01 RX ORDER — CIPROFLOXACIN 500 MG/1
500 TABLET, FILM COATED ORAL 2 TIMES DAILY
Qty: 10 TABLET | Refills: 0
Start: 2022-01-01 | End: 2022-01-06

## 2022-01-01 RX ADMIN — ATORVASTATIN CALCIUM 20 MG: 20 TABLET, FILM COATED ORAL at 21:12

## 2022-01-01 RX ADMIN — Medication 10 ML: at 21:12

## 2022-01-01 RX ADMIN — CIPROFLOXACIN HYDROCHLORIDE 500 MG: 500 TABLET, FILM COATED ORAL at 10:38

## 2022-01-01 RX ADMIN — Medication 10 ML: at 14:29

## 2022-01-01 RX ADMIN — METOPROLOL 25 MG: 25 TABLET ORAL at 10:38

## 2022-01-01 RX ADMIN — METOPROLOL 25 MG: 25 TABLET ORAL at 21:11

## 2022-01-01 RX ADMIN — BUDESONIDE AND FORMOTEROL FUMARATE DIHYDRATE 2 PUFF: 160; 4.5 AEROSOL RESPIRATORY (INHALATION) at 19:52

## 2022-01-01 RX ADMIN — LOSARTAN POTASSIUM 25 MG: 25 TABLET, FILM COATED ORAL at 21:12

## 2022-01-01 RX ADMIN — LOSARTAN POTASSIUM 25 MG: 25 TABLET, FILM COATED ORAL at 14:28

## 2022-01-01 RX ADMIN — SODIUM CHLORIDE: 9 INJECTION, SOLUTION INTRAVENOUS at 06:18

## 2022-01-01 RX ADMIN — BUDESONIDE AND FORMOTEROL FUMARATE DIHYDRATE 2 PUFF: 160; 4.5 AEROSOL RESPIRATORY (INHALATION) at 07:26

## 2022-01-01 RX ADMIN — ENOXAPARIN SODIUM 40 MG: 100 INJECTION SUBCUTANEOUS at 10:38

## 2022-01-01 RX ADMIN — CIPROFLOXACIN HYDROCHLORIDE 500 MG: 500 TABLET, FILM COATED ORAL at 21:12

## 2022-01-01 NOTE — PROGRESS NOTES
Infectious Disease     Patient Name: Leah Rodriguez  Date: 1/1/2022  YOB: 1928  Medical Record Number: [id number]      History of Present Illness: Follow up E. coli UTI and sepsis with bacteremia. Currently on p.o. ciprofloxacin 500 twice daily, well tolerated. Patient is not able to provide any review of system or history. Hard of hearing and does not answer any significant questions. Review of Systems: Patient a poor historian at this time   Incontinent of urine, clear urine in pure wick    Physical Exam:   Vitals:    12/31/21 1858 12/31/21 2005 01/01/22 0726 01/01/22 0729   BP: (!) 172/66   (!) 142/49   Pulse: 85   81   Resp:   18    Temp: 99 °F (37.2 °C)   97.5 °F (36.4 °C)   TempSrc:       SpO2: 93% 94% 96% 96%   Weight:       Height:         General Appearance: Awake, does not say anything, tracks with eyes, well-developed and well-nourished, in no acute distress  Does not follow any commands   skin: warm and dry, no rash. Head: normocephalic and atraumatic  Eyes: anicteric sclerae  ENT:  normal mucous membranes. Lungs: normal respiratory effort, clear lungs  Heart no murmur  Abdomen: soft, no tenderness  No leg edema  No erythema, no tenderness    Labs: I have reviewed all lab results by electronic record, including most recent CBC, metabolic panel, and pertinent abnormalities were addressed from an infectious disease perspective. Trends are being monitored over time.    Lab Results   Component Value Date    WBC 9.6 01/01/2022    HGB 11.3 (L) 01/01/2022    HCT 33.8 (L) 01/01/2022    MCV 87.2 01/01/2022     01/01/2022     Lab Results   Component Value Date     01/01/2022    K 3.2 01/01/2022    K 4.0 11/06/2020     01/01/2022    CO2 25 01/01/2022    BUN 9 01/01/2022    CREATININE 0.64 01/01/2022    GLUCOSE 100 01/01/2022    CALCIUM 8.5 01/01/2022        Radiology:  I have reviewed imaging results per electronic record and most pertinent abnormalities are being addressed from an infectious disease standpoint.        ASSESSMENT:  E. coli sepsis due to UTI  Hypokalemia in the setting of poor p.o. intake    PLAN:  Continue ciprofloxacin for 1 more week  SNF referral, awaiting approval  Agree with nutritional supplementation    SARITHA DONAHUE MD

## 2022-01-01 NOTE — PROGRESS NOTES
Pt was able to tell me her name. She is very hard of hearing. Took her evening pills whole, no issues with swallowing. She has a purewick catheter- 400 ml urine output so far this shift. Call light in reach, bed alarm on, continuing to turn pt every 2 hours.

## 2022-01-01 NOTE — PROGRESS NOTES
Department of Internal Medicine  General Internal Medicine  Attending Progress Note      SUBJECTIVE:  Pt seen and examined. Hard of hearing. No complaints.  [facility] on discharge - pre-cert just started    OBJECTIVE      Medications    Current Facility-Administered Medications: metoprolol tartrate (LOPRESSOR) tablet 25 mg, 25 mg, Oral, BID  ciprofloxacin (CIPRO) tablet 500 mg, 500 mg, Oral, 2 times per day  potassium chloride (KLOR-CON M) extended release tablet 40 mEq, 40 mEq, Oral, PRN **OR** potassium bicarb-citric acid (EFFER-K) effervescent tablet 40 mEq, 40 mEq, Oral, PRN **OR** potassium chloride 10 mEq/100 mL IVPB (Peripheral Line), 10 mEq, IntraVENous, PRN  losartan (COZAAR) tablet 25 mg, 25 mg, Oral, Q12H  sodium chloride flush 0.9 % injection 5-40 mL, 5-40 mL, IntraVENous, 2 times per day  sodium chloride flush 0.9 % injection 5-40 mL, 5-40 mL, IntraVENous, PRN  0.9 % sodium chloride infusion, 25 mL, IntraVENous, PRN  enoxaparin (LOVENOX) injection 40 mg, 40 mg, SubCUTAneous, Daily  ondansetron (ZOFRAN-ODT) disintegrating tablet 4 mg, 4 mg, Oral, Q8H PRN **OR** ondansetron (ZOFRAN) injection 4 mg, 4 mg, IntraVENous, Q6H PRN  acetaminophen (TYLENOL) tablet 650 mg, 650 mg, Oral, Q6H PRN **OR** acetaminophen (TYLENOL) suppository 650 mg, 650 mg, Rectal, Q6H PRN  polyethylene glycol (GLYCOLAX) packet 17 g, 17 g, Oral, Daily PRN  0.9 % sodium chloride infusion, , IntraVENous, Continuous  labetalol (NORMODYNE;TRANDATE) injection 10 mg, 10 mg, IntraVENous, Q4H PRN  albuterol sulfate  (90 Base) MCG/ACT inhaler 1 puff, 1 puff, Inhalation, Q6H PRN  atorvastatin (LIPITOR) tablet 20 mg, 20 mg, Oral, Nightly  budesonide-formoterol (SYMBICORT) 160-4.5 MCG/ACT inhaler 2 puff, 2 puff, Inhalation, BID  ipratropium-albuterol (DUONEB) nebulizer solution 3 mL, 1 vial, Inhalation, Q6H PRN  montelukast (SINGULAIR) tablet 10 mg, 10 mg, Oral, Nightly PRN  Physical    VITALS:  BP (!) 142/49   Pulse 81   Temp 97.5 °F (36.4 °C) Resp 18   Ht 5' 3\" (1.6 m)   Wt 180 lb (81.6 kg)   LMP  (LMP Unknown)   SpO2 96%   BMI 31.89 kg/m²   Constitutional: Awake and alert in no acute distress. Lying in bed comfortably, very hard of hearing  Head: Normocephalic, atraumatic  Eyes: EOMI, PERRLA  ENT: moist mucous membranes  Neck: neck supple, trachea midline  Lungs: Good inspiratory effort, no wheeze, no rhonchi, no rales  Heart: RRR, normal S1 and S2  GI: Soft, non-distended, non tender, no guarding, no rebound, +BS  MSK: no edema noted  Skin: warm, dry  Psych: appropriate affect     Data    CBC:   Lab Results   Component Value Date    WBC 9.6 01/01/2022    RBC 3.88 01/01/2022    HGB 11.3 01/01/2022    HCT 33.8 01/01/2022    MCV 87.2 01/01/2022    MCH 29.0 01/01/2022    MCHC 33.3 01/01/2022    RDW 14.0 01/01/2022     01/01/2022    MPV 11.6 06/17/2014     CMP:    Lab Results   Component Value Date     01/01/2022    K 3.2 01/01/2022    K 4.0 11/06/2020     01/01/2022    CO2 25 01/01/2022    BUN 9 01/01/2022    CREATININE 0.64 01/01/2022    GFRAA >60.0 01/01/2022    LABGLOM >60.0 01/01/2022    GLUCOSE 100 01/01/2022    PROT 5.7 01/01/2022    LABALBU 2.7 01/01/2022    CALCIUM 8.5 01/01/2022    BILITOT 0.6 01/01/2022    ALKPHOS 223 01/01/2022    AST 27 01/01/2022    ALT 29 01/01/2022       ASSESSMENT AND PLAN      80 y.o. female with PMH who has PMH HTN, COPD (home o2 PRN), FRED, venous stasis, RA, dysphagia, and skin cancer presented with worsening shortness of breath, decreased intake, weakness, and decreased mobility who is admitted with the following:     Gram negative bacteremia in setting of UTI, with possible superimposed PNA  -Continue IV antibiotics (Zosyn)  -Daily labs  -O2 to maintain sats >92%  -Acapella and incentive spirometry  -Trend procalcitonin, improving  12/29: Blood cultures demonstrating both Enterobacter and E.coli bacteremia. ID consulted 11/29 in setting of multi-agent bacteremia, appreciate management recs. 12/30: Continues on Zosyn, pending ID recs. 12/31: WBC increased to 13.3 today, though procalcitonin improved to 0.36 from 0.50. Repeat blood cultures obtained 12/30 by ID, results pending. ID recommending transition to Ciprofloxacin based on C&S.       RICHARD secondary to dehydration and decreased oral intake  -Creat 1.06 (baseline 0.7)  -Gentle IVF  -Renal function panel daily  -Avoid nephrotoxic agents  -Improving  -12/31: Abdominal ultrasound ordered 1230 by ID to ensure no bladder outlet obstruction. No evidence of bladder obstruction, but 1.0 cm nonobstructing renal calculus was noted in right renal pelvis, with no associated hydronephrosis.     HTN  -Resume home medications when taking PO  -Labetalol PRN  -12/28: Resumed home losartan 25 mg p.o. daily  -12/29: Improved today, but not yet at goal.    -12/30: SBP now in 140-150s. Will continue to work towards normotension. -12/31: SBP 140s-160s overnight. Will increase metoprolol tartrate from 12.5 twice daily to 25 twice daily. We will continue with losartan 25 mg p.o. twice daily.     Failure to thrive in adult  -NPO; IVFs  -Bedside swallow study  -PT/OT/Speech consults  -Rehab consult     Pulmonary nodules (new finding since previous scans Oct 2020)  COPD not in exacerbation  FRED  -Multiple pulmonary nodules RUL and RLL  -Consult pulmonary for further recommendations  -Wears home o2 PRN  -O2 to maintain sats >92%  -Resumed home medications  12/30: Pulmonary discussed nodules with family. They agree on patient being poor candidate for any aggressive intervention or diagnostics. Respiratory status stable at present. Pulmonology will sign off. Can follow-up outpatient if family desires additional diagnostics.     Thyroid nodule  -L lobe thyroid nodules  -Outpatient f/u     Advanced care planning  -DNRCCA/DNI        Diet: Per ST recommendations. 1:1 feeding at present in setting of weakness and confusion.     Disposition: [facility] at discharge. Pre-cert started, likely Monday or Tuesday. Continuing abx. PO abx at discharge per ID.       Luz Flatness, DO  Internal Medicine   Hospitalist    >35 minutes in total care time

## 2022-01-01 NOTE — PROGRESS NOTES
Daily Update    From: Home with daughter. PMH: Anxiety, hypertension, FRED. Anticipated Discharge Date: TBD    Anticipated Discharge Disposition: SNF - Need choice. Patient Mobility or PT/OT ordered: Yes - recommend SNF. Covid Test Date and Result: 12/26 - Negative. Barriers to Discharge:    IV ATB transitioned to PO Cipro for bacteremia mgmt. Blood cultures 12/30 - no growth. Per SLP recs, patient needs supervision for all PO intake. Need SNF choice that is in-network and accepting admissions. Updates:  0930~ CM followed up with [facility] regarding referral sent 12/29, they did not receive referral and are currently on an admissions hold. [address], [facility] are in network. CM contacted Caryn with [facility] today at [address] [place] [address] to see which of these facilities is able to accept admissions at this time, message left. Await call back. 0950~ Received call back from Taylor Dorado regarding this referral, facilities noted above have beds available (though [address] has very few that cannot be held) and are able to review the patient for admission. 1535~ Caryn from [facility] can accept, precert required. CM called and spoke with patient's daughter/primary contact Kourtney Jauregui regarding SNF choice and to update her on acceptance. Daughter states she would like to speak with her sisters before making a final choice and will call this CM back in the morning.

## 2022-01-02 LAB
ALBUMIN SERPL-MCNC: 2.5 G/DL (ref 3.5–4.6)
ALP BLD-CCNC: 199 U/L (ref 40–130)
ALT SERPL-CCNC: 27 U/L (ref 0–33)
ANION GAP SERPL CALCULATED.3IONS-SCNC: 13 MEQ/L (ref 9–15)
AST SERPL-CCNC: 21 U/L (ref 0–35)
BASOPHILS ABSOLUTE: 0 K/UL (ref 0–0.2)
BASOPHILS RELATIVE PERCENT: 0.4 %
BILIRUB SERPL-MCNC: 0.6 MG/DL (ref 0.2–0.7)
BUN BLDV-MCNC: 12 MG/DL (ref 8–23)
CALCIUM SERPL-MCNC: 8.4 MG/DL (ref 8.5–9.9)
CHLORIDE BLD-SCNC: 103 MEQ/L (ref 95–107)
CO2: 24 MEQ/L (ref 20–31)
CREAT SERPL-MCNC: 0.68 MG/DL (ref 0.5–0.9)
EOSINOPHILS ABSOLUTE: 0.1 K/UL (ref 0–0.7)
EOSINOPHILS RELATIVE PERCENT: 1.4 %
GFR AFRICAN AMERICAN: >60
GFR NON-AFRICAN AMERICAN: >60
GLOBULIN: 3.2 G/DL (ref 2.3–3.5)
GLUCOSE BLD-MCNC: 135 MG/DL (ref 70–99)
HCT VFR BLD CALC: 33.5 % (ref 37–47)
HEMOGLOBIN: 11.1 G/DL (ref 12–16)
LYMPHOCYTES ABSOLUTE: 0.8 K/UL (ref 1–4.8)
LYMPHOCYTES RELATIVE PERCENT: 8.5 %
MCH RBC QN AUTO: 28.9 PG (ref 27–31.3)
MCHC RBC AUTO-ENTMCNC: 33.3 % (ref 33–37)
MCV RBC AUTO: 86.8 FL (ref 82–100)
MONOCYTES ABSOLUTE: 0.9 K/UL (ref 0.2–0.8)
MONOCYTES RELATIVE PERCENT: 10.5 %
NEUTROPHILS ABSOLUTE: 7.1 K/UL (ref 1.4–6.5)
NEUTROPHILS RELATIVE PERCENT: 79.2 %
PDW BLD-RTO: 14 % (ref 11.5–14.5)
PLATELET # BLD: 308 K/UL (ref 130–400)
POTASSIUM SERPL-SCNC: 3.4 MEQ/L (ref 3.4–4.9)
PROCALCITONIN: 0.19 NG/ML (ref 0–0.15)
RBC # BLD: 3.86 M/UL (ref 4.2–5.4)
SODIUM BLD-SCNC: 140 MEQ/L (ref 135–144)
TOTAL PROTEIN: 5.7 G/DL (ref 6.3–8)
WBC # BLD: 9 K/UL (ref 4.8–10.8)

## 2022-01-02 PROCEDURE — 2700000000 HC OXYGEN THERAPY PER DAY

## 2022-01-02 PROCEDURE — 1210000000 HC MED SURG R&B

## 2022-01-02 PROCEDURE — 6370000000 HC RX 637 (ALT 250 FOR IP): Performed by: NURSE PRACTITIONER

## 2022-01-02 PROCEDURE — 94761 N-INVAS EAR/PLS OXIMETRY MLT: CPT

## 2022-01-02 PROCEDURE — 97110 THERAPEUTIC EXERCISES: CPT

## 2022-01-02 PROCEDURE — 2580000003 HC RX 258: Performed by: NURSE PRACTITIONER

## 2022-01-02 PROCEDURE — 6360000002 HC RX W HCPCS: Performed by: NURSE PRACTITIONER

## 2022-01-02 PROCEDURE — 84145 PROCALCITONIN (PCT): CPT

## 2022-01-02 PROCEDURE — 85025 COMPLETE CBC W/AUTO DIFF WBC: CPT

## 2022-01-02 PROCEDURE — 94640 AIRWAY INHALATION TREATMENT: CPT

## 2022-01-02 PROCEDURE — 6370000000 HC RX 637 (ALT 250 FOR IP): Performed by: INTERNAL MEDICINE

## 2022-01-02 PROCEDURE — 97535 SELF CARE MNGMENT TRAINING: CPT

## 2022-01-02 PROCEDURE — 80053 COMPREHEN METABOLIC PANEL: CPT

## 2022-01-02 PROCEDURE — 36415 COLL VENOUS BLD VENIPUNCTURE: CPT

## 2022-01-02 RX ADMIN — ATORVASTATIN CALCIUM 20 MG: 20 TABLET, FILM COATED ORAL at 21:02

## 2022-01-02 RX ADMIN — Medication 10 ML: at 21:03

## 2022-01-02 RX ADMIN — BUDESONIDE AND FORMOTEROL FUMARATE DIHYDRATE 2 PUFF: 160; 4.5 AEROSOL RESPIRATORY (INHALATION) at 08:00

## 2022-01-02 RX ADMIN — SODIUM CHLORIDE: 9 INJECTION, SOLUTION INTRAVENOUS at 00:24

## 2022-01-02 RX ADMIN — CIPROFLOXACIN HYDROCHLORIDE 500 MG: 500 TABLET, FILM COATED ORAL at 21:02

## 2022-01-02 RX ADMIN — METOPROLOL 25 MG: 25 TABLET ORAL at 09:33

## 2022-01-02 RX ADMIN — METOPROLOL 25 MG: 25 TABLET ORAL at 21:02

## 2022-01-02 RX ADMIN — LOSARTAN POTASSIUM 25 MG: 25 TABLET, FILM COATED ORAL at 09:33

## 2022-01-02 RX ADMIN — CIPROFLOXACIN HYDROCHLORIDE 500 MG: 500 TABLET, FILM COATED ORAL at 09:33

## 2022-01-02 RX ADMIN — BUDESONIDE AND FORMOTEROL FUMARATE DIHYDRATE 2 PUFF: 160; 4.5 AEROSOL RESPIRATORY (INHALATION) at 20:49

## 2022-01-02 RX ADMIN — ENOXAPARIN SODIUM 40 MG: 100 INJECTION SUBCUTANEOUS at 09:32

## 2022-01-02 ASSESSMENT — PAIN SCALES - GENERAL: PAINLEVEL_OUTOF10: 0

## 2022-01-02 NOTE — PROGRESS NOTES
Physical Therapy Med Surg Daily Treatment Note  Facility/Department: [facility]  [facility]       NAME: [facility]  : 1928 (1 y.o.)  MRN: [id number]  CODE STATUS: Limited    Date of Service: 2022    Patient Diagnosis(es): UTI (urinary tract infection) [N39.0]  Failure to thrive in adult [R62.7]  Acute cystitis without hematuria [N30.00]  Pneumonia of both lower lobes due to infectious organism [J18.9]   Chief Complaint   Patient presents with    Illness     not eating/drinking per family     Patient Active Problem List    Diagnosis Date Noted    Impaired mobility and activities of daily living 2021    UTI (urinary tract infection) 2021    Venous stasis dermatitis of both lower extremities 08/10/2021    Aphasia     Chest pain 2020    Obesity (BMI 30-39.9) 2019    Obstructive sleep apnea 2019    COPD exacerbation ([address]     COPD (chronic obstructive pulmonary disease) with acute bronchitis ([address] 2018    Anxiety 2017    Essential hypertension         Past Medical History:   Diagnosis Date    Anxiety     Cancer ([address]     Glaucoma     Gout     left knee    Hypertension     Lung disease     Obesity (BMI 30-39. 9) 2019    Obstructive sleep apnea 2019     Past Surgical History:   Procedure Laterality Date    CATARACT REMOVAL WITH IMPLANT Bilateral 2016    COLON SURGERY      polyps    COLONOSCOPY  2009    GASTROSTOMY TUBE PLACEMENT N/A 2020    EGD PEG TUBE PLACEMENT performed by Silas Adorno MD at [address]      graft from neck       Restrictions  Restrictions/Precautions: Fall Risk    SUBJECTIVE   General  Chart Reviewed: Yes  Response To Previous Treatment: Patient with no complaints from previous session. Family / Caregiver Present: No  Subjective  Subjective: Patient resting in bed. Significantly [place]. agreeable tx.  Attempted written communication however patient also has vision deficits. General Comment  Comments: minimal verbalizations    Pre-Session Pain Report  Pre Treatment Pain Screening  Pain at present: 0  Scale Used: Numeric Score  Intervention List: Patient able to continue with treatment  Pain Screening  Patient Currently in Pain: Denies       Post-Session Pain Report  Pain Assessment  Pain Assessment: 0-10  Pain Level: 0         OBJECTIVE   Bed mobility  Rolling to Right: Maximum assistance  Supine to Sit: Maximum assistance  Sit to Supine: Maximum assistance  Scooting: Dependent/Total;2 Person assistance  Comment: poor initiation, though able to help minimally when therapist initiates movement    Transfers  Comment: did not assess, pt declined after sitting edge of bed for 3-4 minutes     Exercises  Heelslides: x10 AAROM supine  Hip Flexion: x5  Hip Abduction: x10 AAROM supine  Knee Long Arc Quad: x10  Ankle Pumps: x10 AAROM      Activity Tolerance  Activity Tolerance: Patient limited by fatigue;Patient limited by endurance; Patient limited by cognitive status     ASSESSMENT   Assessment: Pt significantly limited by hearing and visual impaiments, though able to follow some initiation of bed mobility and able to communicate that she was ready to lay back down after sitting edge of bed for 3-4 minutes w/ CGA. 2 person assist needed to help pt scoot towards top of bed/reposition.   Prognosis: Fair;Good  REQUIRES PT FOLLOW UP: Yes     Discharge Recommendations:  Continue to assess pending progress    Goals  Short term goals  Short term goal 1: Pt will demonstrate sitting edge of bed >/= 3 min with CGA  Long term goals  Long term goal 1: Pt will demonstrate bed mobility min A  Long term goal 2: Pt will demonstrate transfers mod A  Long term goal 3: Pt will demonstrate amb >/= 10 ft max A with safest AD    PLAN    Times per week: 3-6  Safety Devices  Type of devices: Bed alarm in place,Call light within reach,Left in bed,All fall risk precautions in place     [facility] (6 CLICK) BASIC MOBILITY  AM-PAC Inpatient Mobility Raw Score : 9     Therapy Time   Individual   Time In 1424   Time Out 1449   Minutes 25     Bed mob 15  therex 10    [place], [place], 01/02/22 at 2:52 PM         Definitions for assistance levels  Independent = pt does not require any physical supervision or assistance from another person for activity completion. Device may be needed.   Stand by assistance = pt requires verbal cues or instructions from another person, close to but not touching, to perform the activity  Minimal assistance= pt performs 75% or more of the activity; assistance is required to complete the activity  Moderate assistance= pt performs 50% of the activity; assistance is required to complete the activity  Maximal assistance = pt performs 25% of the activity; assistance is required to complete the activity  Dependent = pt requires total physical assistance to accomplish the task

## 2022-01-02 NOTE — CARE COORDINATION
Daily Update     From: Home with daughter.     PMH: Anxiety, hypertension, FRED.     Anticipated Discharge Date: TBD     Anticipated Discharge Disposition: SNF - Need choice.     Patient Mobility or PT/OT ordered: Yes - recommend SNF.     Covid Test Date and Result: 12/26 - Negative.     Barriers to Discharge:    IV ATB transitioned to PO Cipro for bacteremia mgmt. Blood cultures 12/30 - no growth. Per SLP recs, patient needs supervision for all PO intake. Need SNF choice that is in-network and accepting admissions. Updates:  CM spoke with daughter on 1/1 to discuss SNF options. Received call back from daughter Eloy Jauregui today 1/2 at 36, she and the patient's other children would like patient to go to [facility]. CM spoke with Estefani Quinones to update her on family's choice; Estefani Quinones states that precert will be started for [facility] in the morning. CM called daughter with update on DC plan. Physician was notified that precert will be needed for tx to SNF. Call received from patient's daughter Shikha Alex (with whom she lives) at 46. CM spoke at length with Shikha Aelx regarding DC plan. Call ended at [phone]. All questions / concerns addressed and RN was asked to call daughter with an update as well.

## 2022-01-02 NOTE — PROGRESS NOTES
Pt resting in bed. Turned and repositioned. Pt is [facility] Alert to self. Fall precautions in place. Has pure wick catheter in place draining yellow urine. meds given per orders. Hourly rounds continue. Call light in reach.

## 2022-01-02 NOTE — PROGRESS NOTES
Department of Internal Medicine  General Internal Medicine  Attending Progress Note      SUBJECTIVE:  Pt seen and examined. Hard of hearing. No complaints.  [place]guila Sacks on discharge - pre-cert just started    OBJECTIVE      Medications    Current Facility-Administered Medications: metoprolol tartrate (LOPRESSOR) tablet 25 mg, 25 mg, Oral, BID  ciprofloxacin (CIPRO) tablet 500 mg, 500 mg, Oral, 2 times per day  potassium chloride (KLOR-CON M) extended release tablet 40 mEq, 40 mEq, Oral, PRN **OR** potassium bicarb-citric acid (EFFER-K) effervescent tablet 40 mEq, 40 mEq, Oral, PRN **OR** potassium chloride 10 mEq/100 mL IVPB (Peripheral Line), 10 mEq, IntraVENous, PRN  losartan (COZAAR) tablet 25 mg, 25 mg, Oral, Q12H  sodium chloride flush 0.9 % injection 5-40 mL, 5-40 mL, IntraVENous, 2 times per day  sodium chloride flush 0.9 % injection 5-40 mL, 5-40 mL, IntraVENous, PRN  0.9 % sodium chloride infusion, 25 mL, IntraVENous, PRN  enoxaparin (LOVENOX) injection 40 mg, 40 mg, SubCUTAneous, Daily  ondansetron (ZOFRAN-ODT) disintegrating tablet 4 mg, 4 mg, Oral, Q8H PRN **OR** ondansetron (ZOFRAN) injection 4 mg, 4 mg, IntraVENous, Q6H PRN  acetaminophen (TYLENOL) tablet 650 mg, 650 mg, Oral, Q6H PRN **OR** acetaminophen (TYLENOL) suppository 650 mg, 650 mg, Rectal, Q6H PRN  polyethylene glycol (GLYCOLAX) packet 17 g, 17 g, Oral, Daily PRN  0.9 % sodium chloride infusion, , IntraVENous, Continuous  labetalol (NORMODYNE;TRANDATE) injection 10 mg, 10 mg, IntraVENous, Q4H PRN  albuterol sulfate  (90 Base) MCG/ACT inhaler 1 puff, 1 puff, Inhalation, Q6H PRN  atorvastatin (LIPITOR) tablet 20 mg, 20 mg, Oral, Nightly  budesonide-formoterol (SYMBICORT) 160-4.5 MCG/ACT inhaler 2 puff, 2 puff, Inhalation, BID  ipratropium-albuterol (DUONEB) nebulizer solution 3 mL, 1 vial, Inhalation, Q6H PRN  montelukast (SINGULAIR) tablet 10 mg, 10 mg, Oral, Nightly PRN  Physical    VITALS:  BP (!) 143/57   Pulse 88   Temp 98.2 °F (36.8 °C) (Oral)   Resp 18   Ht 5' 3\" (1.6 m)   Wt 180 lb (81.6 kg)   LMP  (LMP Unknown)   SpO2 94%   BMI 31.89 kg/m²   Constitutional: Awake and alert in no acute distress. Lying in bed comfortably, very hard of hearing  Head: Normocephalic, atraumatic  Eyes: EOMI, PERRLA  ENT: moist mucous membranes  Neck: neck supple, trachea midline  Lungs: Good inspiratory effort, no wheeze, no rhonchi, no rales  Heart: RRR, normal S1 and S2  GI: Soft, non-distended, non tender, no guarding, no rebound, +BS  MSK: no edema noted  Skin: warm, dry  Psych: appropriate affect     Data    CBC:   Lab Results   Component Value Date    WBC 9.0 01/02/2022    RBC 3.86 01/02/2022    HGB 11.1 01/02/2022    HCT 33.5 01/02/2022    MCV 86.8 01/02/2022    MCH 28.9 01/02/2022    MCHC 33.3 01/02/2022    RDW 14.0 01/02/2022     01/02/2022    MPV 11.6 06/17/2014     CMP:    Lab Results   Component Value Date     01/02/2022    K 3.4 01/02/2022    K 4.0 11/06/2020     01/02/2022    CO2 24 01/02/2022    BUN 12 01/02/2022    CREATININE 0.68 01/02/2022    GFRAA >60.0 01/02/2022    LABGLOM >60.0 01/02/2022    GLUCOSE 135 01/02/2022    PROT 5.7 01/02/2022    LABALBU 2.5 01/02/2022    CALCIUM 8.4 01/02/2022    BILITOT 0.6 01/02/2022    ALKPHOS 199 01/02/2022    AST 21 01/02/2022    ALT 27 01/02/2022       ASSESSMENT AND PLAN      80 y.o. female with PMH who has PMH HTN, COPD (home o2 PRN), FRED, venous stasis, RA, dysphagia, and skin cancer presented with worsening shortness of breath, decreased intake, weakness, and decreased mobility who is admitted with the following:     Gram negative bacteremia in setting of UTI, with possible superimposed PNA  -Continue IV antibiotics (Zosyn)  -Daily labs  -O2 to maintain sats >92%  -Acapella and incentive spirometry  -Trend procalcitonin, improving  12/29: Blood cultures demonstrating both Enterobacter and E.coli bacteremia.   ID consulted 11/29 in setting of multi-agent bacteremia, appreciate management recs. 12/30: Continues on Zosyn, pending ID recs. 12/31: WBC increased to 13.3 today, though procalcitonin improved to 0.36 from 0.50. Repeat blood cultures obtained 12/30 by ID, results pending. ID recommending transition to Ciprofloxacin based on C&S.       RICHARD secondary to dehydration and decreased oral intake  -Creat 1.06 (baseline 0.7)  -Gentle IVF  -Renal function panel daily  -Avoid nephrotoxic agents  -Improving  -12/31: Abdominal ultrasound ordered 1230 by ID to ensure no bladder outlet obstruction. No evidence of bladder obstruction, but 1.0 cm nonobstructing renal calculus was noted in right renal pelvis, with no associated hydronephrosis.     HTN  -Resume home medications when taking PO  -Labetalol PRN  -12/28: Resumed home losartan 25 mg p.o. daily  -12/29: Improved today, but not yet at goal.    -12/30: SBP now in 140-150s. Will continue to work towards normotension. -12/31: SBP 140s-160s overnight. Will increase metoprolol tartrate from 12.5 twice daily to 25 twice daily. We will continue with losartan 25 mg p.o. twice daily.     Failure to thrive in adult  -NPO; IVFs  -Bedside swallow study  -PT/OT/Speech consults  -Rehab consult     Pulmonary nodules (new finding since previous scans Oct 2020)  COPD not in exacerbation  FRED  -Multiple pulmonary nodules RUL and RLL  -Consult pulmonary for further recommendations  -Wears home o2 PRN  -O2 to maintain sats >92%  -Resumed home medications  12/30: Pulmonary discussed nodules with family. They agree on patient being poor candidate for any aggressive intervention or diagnostics. Respiratory status stable at present. Pulmonology will sign off. Can follow-up outpatient if family desires additional diagnostics.     Thyroid nodule  -L lobe thyroid nodules  -Outpatient f/u     Advanced care planning  -DNRCCA/DNI        Diet: Per ST recommendations. 1:1 feeding at present in setting of weakness and confusion.     Disposition: [facility] AT [facility] at discharge. Pre-cert started, likely Monday or Tuesday. Continuing abx. PO abx at discharge per ID.       Jomar Atkins DO  Internal Medicine   Hospitalist    >35 minutes in total care time

## 2022-01-03 LAB
ALBUMIN SERPL-MCNC: 2.4 G/DL (ref 3.5–4.6)
ALP BLD-CCNC: 170 U/L (ref 40–130)
ALT SERPL-CCNC: 22 U/L (ref 0–33)
ANION GAP SERPL CALCULATED.3IONS-SCNC: 14 MEQ/L (ref 9–15)
AST SERPL-CCNC: 17 U/L (ref 0–35)
BASOPHILS ABSOLUTE: 0 K/UL (ref 0–0.2)
BASOPHILS RELATIVE PERCENT: 0.6 %
BILIRUB SERPL-MCNC: 0.6 MG/DL (ref 0.2–0.7)
BUN BLDV-MCNC: 12 MG/DL (ref 8–23)
CALCIUM SERPL-MCNC: 8.5 MG/DL (ref 8.5–9.9)
CHLORIDE BLD-SCNC: 104 MEQ/L (ref 95–107)
CO2: 22 MEQ/L (ref 20–31)
CREAT SERPL-MCNC: 0.63 MG/DL (ref 0.5–0.9)
EOSINOPHILS ABSOLUTE: 0.1 K/UL (ref 0–0.7)
EOSINOPHILS RELATIVE PERCENT: 1.4 %
GFR AFRICAN AMERICAN: >60
GFR NON-AFRICAN AMERICAN: >60
GLOBULIN: 3.2 G/DL (ref 2.3–3.5)
GLUCOSE BLD-MCNC: 124 MG/DL (ref 70–99)
HCT VFR BLD CALC: 33.8 % (ref 37–47)
HEMOGLOBIN: 11.1 G/DL (ref 12–16)
LYMPHOCYTES ABSOLUTE: 0.8 K/UL (ref 1–4.8)
LYMPHOCYTES RELATIVE PERCENT: 10.3 %
MCH RBC QN AUTO: 28.6 PG (ref 27–31.3)
MCHC RBC AUTO-ENTMCNC: 32.9 % (ref 33–37)
MCV RBC AUTO: 86.8 FL (ref 82–100)
MONOCYTES ABSOLUTE: 1 K/UL (ref 0.2–0.8)
MONOCYTES RELATIVE PERCENT: 13.3 %
NEUTROPHILS ABSOLUTE: 5.5 K/UL (ref 1.4–6.5)
NEUTROPHILS RELATIVE PERCENT: 74.4 %
PDW BLD-RTO: 14.1 % (ref 11.5–14.5)
PLATELET # BLD: 325 K/UL (ref 130–400)
POTASSIUM SERPL-SCNC: 3.7 MEQ/L (ref 3.4–4.9)
PROCALCITONIN: 0.16 NG/ML (ref 0–0.15)
RBC # BLD: 3.89 M/UL (ref 4.2–5.4)
SODIUM BLD-SCNC: 140 MEQ/L (ref 135–144)
TOTAL PROTEIN: 5.6 G/DL (ref 6.3–8)
WBC # BLD: 7.4 K/UL (ref 4.8–10.8)

## 2022-01-03 PROCEDURE — 36415 COLL VENOUS BLD VENIPUNCTURE: CPT

## 2022-01-03 PROCEDURE — 85025 COMPLETE CBC W/AUTO DIFF WBC: CPT

## 2022-01-03 PROCEDURE — 2580000003 HC RX 258: Performed by: NURSE PRACTITIONER

## 2022-01-03 PROCEDURE — 94761 N-INVAS EAR/PLS OXIMETRY MLT: CPT

## 2022-01-03 PROCEDURE — 92526 ORAL FUNCTION THERAPY: CPT

## 2022-01-03 PROCEDURE — 1210000000 HC MED SURG R&B

## 2022-01-03 PROCEDURE — 6370000000 HC RX 637 (ALT 250 FOR IP): Performed by: INTERNAL MEDICINE

## 2022-01-03 PROCEDURE — 2700000000 HC OXYGEN THERAPY PER DAY

## 2022-01-03 PROCEDURE — 94640 AIRWAY INHALATION TREATMENT: CPT

## 2022-01-03 PROCEDURE — 6360000002 HC RX W HCPCS: Performed by: NURSE PRACTITIONER

## 2022-01-03 PROCEDURE — 84145 PROCALCITONIN (PCT): CPT

## 2022-01-03 PROCEDURE — 97535 SELF CARE MNGMENT TRAINING: CPT

## 2022-01-03 PROCEDURE — 6370000000 HC RX 637 (ALT 250 FOR IP): Performed by: NURSE PRACTITIONER

## 2022-01-03 PROCEDURE — 80053 COMPREHEN METABOLIC PANEL: CPT

## 2022-01-03 RX ADMIN — BUDESONIDE AND FORMOTEROL FUMARATE DIHYDRATE 2 PUFF: 160; 4.5 AEROSOL RESPIRATORY (INHALATION) at 07:30

## 2022-01-03 RX ADMIN — ENOXAPARIN SODIUM 40 MG: 100 INJECTION SUBCUTANEOUS at 10:57

## 2022-01-03 RX ADMIN — Medication 10 ML: at 20:04

## 2022-01-03 RX ADMIN — ATORVASTATIN CALCIUM 20 MG: 20 TABLET, FILM COATED ORAL at 20:03

## 2022-01-03 RX ADMIN — Medication 10 ML: at 11:44

## 2022-01-03 RX ADMIN — METOPROLOL 25 MG: 25 TABLET ORAL at 20:03

## 2022-01-03 RX ADMIN — LOSARTAN POTASSIUM 25 MG: 25 TABLET, FILM COATED ORAL at 01:00

## 2022-01-03 RX ADMIN — METOPROLOL 25 MG: 25 TABLET ORAL at 10:57

## 2022-01-03 RX ADMIN — CIPROFLOXACIN HYDROCHLORIDE 500 MG: 500 TABLET, FILM COATED ORAL at 20:03

## 2022-01-03 RX ADMIN — BUDESONIDE AND FORMOTEROL FUMARATE DIHYDRATE 2 PUFF: 160; 4.5 AEROSOL RESPIRATORY (INHALATION) at 19:25

## 2022-01-03 RX ADMIN — CIPROFLOXACIN HYDROCHLORIDE 500 MG: 500 TABLET, FILM COATED ORAL at 10:57

## 2022-01-03 RX ADMIN — LOSARTAN POTASSIUM 25 MG: 25 TABLET, FILM COATED ORAL at 22:55

## 2022-01-03 RX ADMIN — LOSARTAN POTASSIUM 25 MG: 25 TABLET, FILM COATED ORAL at 10:57

## 2022-01-03 ASSESSMENT — PAIN SCALES - WONG BAKER
WONGBAKER_NUMERICALRESPONSE: 0
WONGBAKER_NUMERICALRESPONSE: 0

## 2022-01-03 NOTE — PROGRESS NOTES
Patient pleasant and cooperative this am, denies pain or discomfort at this time. Patient continues on antibiotic therapy without adverse reactions at this time and remains afebrile. Patient to possibly discharge today, awaiting pre cert, will continue to monitor. Daughter in today and given updates.

## 2022-01-03 NOTE — PROGRESS NOTES
[facility]  Facility/Department: [facility] Console MED SURG UNIT  Speech Language Pathology   Treatment Note    Sergei Mask  5/19/1928  [id number]    Medical Dx: UTI (urinary tract infection) [N39.0]  Failure to thrive in adult [R62.7]  Acute cystitis without hematuria [N30.00]  Pneumonia of both lower lobes due to infectious organism [J18.9]  Speech Dx: Dysphagia     1/3/2022    Current diet level: ADULT ORAL NUTRITION SUPPLEMENT; Breakfast; Clear Liquid Oral Supplement  ADULT ORAL NUTRITION SUPPLEMENT; Dinner; Standard High Calorie/High Protein Oral Supplement  ADULT ORAL NUTRITION SUPPLEMENT; Lunch; Frozen Oral Supplement  ADULT DIET; Dysphagia - Pureed  Current respiratory status: 3 L oxygen via nasal cannula      Subjective:  Alert, Cooperative and Confused      Pt's diet was downgraded on 12/30/21 to puree/thin liquids. Pt had nasal cannula on 3L. Interventions used this date:  Dysphagia Treatment    Objective/Assessment:  Patient progressing towards goals:  Short-term Goals  Goal 1: Pt will tolerate recommended diet with no overt s/s of difficulty or aspiration. Pt trialed puree 6x with no overt s/s of aspiration observed. Pt trialed thin liquids 5x with no overt s/s of aspiration observed. Goal 2: Pt will complete labial and lingual exercises with 80% acc and min cues to improve oral motor strength and swallow function. Pt able to protrude tongue in 6/10 trials. Unable to follow directions for other exercises. Goal 3: Pt will complete tongue base exercises with 80% acc and min cues to improve lingual strength and posterior propulsion during oral phase of swallow. Unable to complete task. Unable to follow directions. Goal 4: Pt will tolerate trials of minced and moist solids with good oral clearance and no overt s/s of difficulty or aspiration. --Pt refused trials of minced and moist solids on this date. Pt was able to state \"no\" to SLP and attempted to push her hand away 2x.   Despite

## 2022-01-03 NOTE — PROGRESS NOTES
Hospitalist Progress Note      PCP: David Goodwin MD    Date of Admission: 12/26/2021    Chief Complaint:    Chief Complaint   Patient presents with    Illness     not eating/drinking per family     Subjective:  Patient is doing well today; hard of hearing; denies complaints. Medications:  Reviewed    Infusion Medications    sodium chloride       Scheduled Medications    metoprolol tartrate  25 mg Oral BID    ciprofloxacin  500 mg Oral 2 times per day    losartan  25 mg Oral Q12H    sodium chloride flush  5-40 mL IntraVENous 2 times per day    enoxaparin  40 mg SubCUTAneous Daily    atorvastatin  20 mg Oral Nightly    budesonide-formoterol  2 puff Inhalation BID     PRN Meds: potassium chloride **OR** potassium alternative oral replacement **OR** potassium chloride, sodium chloride flush, sodium chloride, ondansetron **OR** ondansetron, acetaminophen **OR** acetaminophen, polyethylene glycol, labetalol, albuterol sulfate HFA, ipratropium-albuterol, montelukast      Intake/Output Summary (Last 24 hours) at 1/3/2022 1449  Last data filed at 1/3/2022 0630  Gross per 24 hour   Intake 1368 ml   Output 800 ml   Net 568 ml     Exam:    BP (!) 143/60   Pulse 87   Temp 99.5 °F (37.5 °C) (Oral)   Resp 18   Ht 5' 3\" (1.6 m)   Wt 180 lb (81.6 kg)   LMP  (LMP Unknown)   SpO2 94%   BMI 31.89 kg/m²     General appearance: No apparent distress, appears stated age and cooperative. HEENT:  Conjunctivae/corneas clear. Neck:  Trachea midline. Respiratory:  Normal respiratory effort. Clear to auscultation  Cardiovascular: Regular rate and rhythm   Abdomen: Soft, non-tender, non-distended with normal bowel sounds. Musculoskeletal: No clubbing, cyanosis or edema bilaterally.    Neuro: Non Focal.   Capillary Refill: Brisk,< 3 seconds   Peripheral Pulses: +2 palpable, equal bilaterally     Labs:   Recent Labs     01/01/22  0826 01/02/22  0543 01/03/22  0537   WBC 9.6 9.0 7.4   HGB 11.3* 11.1* 11.1*   HCT 33.8* 33.5* 33.8*    308 325     Recent Labs     01/01/22  0826 01/02/22  0543 01/03/22  0537    140 140   K 3.2* 3.4 3.7    103 104   CO2 25 24 22   BUN 9 12 12   CREATININE 0.64 0.68 0.63   CALCIUM 8.5 8.4* 8.5     Recent Labs     01/01/22  0826 01/02/22  0543 01/03/22  0537   AST 27 21 17   ALT 29 27 22   BILITOT 0.6 0.6 0.6   ALKPHOS 223* 199* 170*     No results for input(s): INR in the last 72 hours. No results for input(s): Ifeoma Councilman in the last 72 hours. Urinalysis:      Lab Results   Component Value Date    NITRU POSITIVE 12/26/2021    WBCUA >100 12/26/2021    BACTERIA MANY 12/26/2021    RBCUA 3-5 12/26/2021    BLOODU MODERATE 12/26/2021    SPECGRAV 1.017 12/26/2021    GLUCOSEU Negative 12/26/2021     Radiology:  US RETROPERITONEAL COMPLETE   Final Result      1.0 cm right interpolar region nonobstructing calculus. No hydronephrosis. XR CHEST PORTABLE   Final Result   BILATERAL ATELECTASIS/PNEUMONIA. LEFT PLEURAL EFFUSION.         Assessment/Plan:    80 y.o. female with PMH who has PMH HTN, COPD (home o2 PRN), FRED, venous stasis, RA, dysphagia, and skin cancer presented with worsening shortness of breath, decreased intake, weakness, and decreased mobility who is admitted with the following:       Gram negative bacteremia in setting of UTI, with possible superimposed PNA  - Cipro per ID recommendations; ok for d/c; awaiting precert     RICHARD secondary to dehydration and decreased oral intake  -Resolved     HTN  -Improved; back on home meds     Failure to thrive in adult  - SNF placement planned     Pulmonary nodules (new finding since previous scans Oct 2020)  COPD not in exacerbation  FRED  -Multiple pulmonary nodules RUL and RLL  -Consult pulmonary for further recommendations  -Wears home o2 PRN  -O2 to maintain sats >92%  -Resumed home medications  Per previous notes:  12/30: Pulmonary discussed nodules with family. Ursula Mares agree on patient being poor candidate for any aggressive intervention or diagnostics.  Respiratory status stable at present.  Pulmonology will sign off.  Can follow-up outpatient if family desires additional diagnostics.     Thyroid nodule  -L lobe thyroid nodules  -Outpatient f/u     Advanced care planning  -DNRCCA/DNI      Diet: Per ST recommendations.  1:1 feeding at present in setting of weakness and confusion.     Disposition: [facility] at discharge. Awaiting precert       Active Hospital Problems    Diagnosis Date Noted    Impaired mobility and activities of daily living [Z74.09, Z78.9] 12/27/2021    UTI (urinary tract infection) [N39.0] 12/26/2021    Obstructive sleep apnea [G47.33] 07/11/2019     Additional work up or/and treatment plan may be added today or then after based on clinical progression. I am managing a portion of pt care. Some medical issues are handled by other specialists. Additional work up and treatment should be done in out pt setting by pt PCP and other out pt providers. In addition to examining and evaluating pt, I spent additional time explaining care, normal and abnormal findings, and treatment plan. All of pt questions were answered. Counseling, diet and education were  provided. Case will be discussed with nursing staff when appropriate. Family will be updated if and when appropriate. Diet: ADULT ORAL NUTRITION SUPPLEMENT; Breakfast; Clear Liquid Oral Supplement  ADULT ORAL NUTRITION SUPPLEMENT; Dinner; Standard High Calorie/High Protein Oral Supplement  ADULT ORAL NUTRITION SUPPLEMENT; Lunch; Frozen Oral Supplement  ADULT DIET;  Dysphagia - Pureed    Code Status: Limited    PT/OT Eval     Electronically signed by Barrie Escobar MD on 1/3/2022 at 2:49 PM

## 2022-01-03 NOTE — PROGRESS NOTES
[facility] OCCUPATIONAL THERAPY MED SURG TREATMENT NOTE     Date: 1/3/2022  Patient Name: Arlene Miller        MRN: [id number]  Account: [de-identified]   : 1928  (80 y.o.)  [facility]    Chart Review:  Diagnosis:  The primary encounter diagnosis was Pneumonia of both lower lobes due to infectious organism. Diagnoses of Acute cystitis without hematuria and Failure to thrive in adult were also pertinent to this visit. Restrictions:    Restrictions/Precautions: Fall Risk       Safety Devices: Safety Devices in place: Yes  Type of devices: All fall risk precautions in place      Subjective:  Patient states:  Pt only states \"you have pretty hair\" when therapist asks if she speaks [place]. Pain:  Start of tx:  Pre Treatment Pain Screening  Pain at present: 0  Scale Used: Faces  Intervention List: Patient able to continue with treatment    End of tx:  Pain Assessment  Patient Currently in Pain: Denies  Pain Assessment: Faces  Brady-Miranda Pain Rating: No hurt    Objective:    ADL:  Feeding: Dependent/Total  Grooming: Dependent/Total (Washing face, brushing hair. Does not attempt to participate even with [facility] assist)  UE Bathing: Unable to assess(comment) (Anticipate dependent, pt. shakes her head no for bathing when therapist attempts)  LE Bathing: Unable to assess(comment) (Anticipate dependent)  UE Dressing: Unable to assess(comment) (Anticipate dependent)  LE Dressing: Unable to assess(comment) (Anticipate dependent)  Toileting: Unable to assess(comment) (Anticipate dependent)  Additional Comments: Pt in bed. Stares at therapist but finally nods yes when therapist offers to help wash face. Minimally participates with reaching, but does not participate with rolling or weight shifting. Shakes head no for bathing tasks, however anticipate dependent for all aspects of ADL d/t decreased sequencing, initiation, strength and consistency.       Unsafe to attempt transfers d/t weakness and decreased participation. Therapy key for assistance levels -   Independent = Pt. is able to perform task with no assistance but may require a device   Stand by assistance = Pt. does not perform task at an independent level but does not need physical assistance, requires verbal cues  Minimal, Moderate, Maximal Assistance = Pt. requires physical assistance (25%, 50%, 75% assist from helper) for task but is able to actively participate in task   Dependent = Pt. requires total assistance with task and is not able to actively participate with task completion    Functional Balance:  Transfers:    Cognition:  Cognition  Overall Cognitive Status: Exceptions  Arousal/Alertness: Delayed responses to stimuli  Following Commands: Inconsistently follows commands  Attention Span: Unable to maintain attention  Memory:  (Unable to assess)  Safety Judgement: Decreased awareness of need for safety  Insights: Not aware of deficits  Sequencing: Requires cues for all  Cognition Comment: Limitated participation in active tasks    Bed Mobility   DNT; pt. did not participate even with cues. Anticipate dependent    UE Exercises:  Type of ROM/Therapeutic Exercise: AAROM  Shoulder Flexion: 1 x 5 reps each UE  Shoulder Extension: 1 x 5 reps each UE  Shoulder ABduction: 1 x 5 reps each UE  Shoulder ADduction: 1 x 5 reps each UE  Elbow Flexion: 1 x 5 reps each UE  Elbow Extension: 1 x 5 reps each UE  Wrist Flexion: 1 x 5 reps each UE  Wrist Extension: 1 x 5 reps each UE  Finger Flexion: 1 x 5 reps each UE  Finger Extension: 1 x 5 reps each UE  Other: Increased time and tactile cues for initiation of each movement. Activity Tolerance:  Activity Tolerance: Treatment limited secondary to decreased cognition    Treatment Consisted Of:  ADL Training    Assessment/Discharge Disposition:  Assessment: Pt. demonstrates decreased attention, sequencing and initiation.  Requires total assist and initiation cues throughout for ADL tasks during minimal participation. Pt. would continue to benefit from OT to maximize independence and safety with ADL tasks. Discharge Recommendations: Continue to assess pending progress       6-Click   How much help for putting on and taking off regular lower body clothing?: Total  How much help for Bathing?: Total  How much help for Toileting?: Total  How much help for putting on and taking off regular upper body clothing?: Total  How much help for taking care of personal grooming?: Total  How much help for eating meals?: Total  AM-PAC Inpatient Daily Activity Raw Score: 6  AM-PAC Inpatient ADL T-Scale Score : 17.07  ADL Inpatient CMS 0-100% Score: 100    Plan:  Continue OT per POC    Goals/Plan Addressed This session:  Improve ADL [place]    Minutes:    Time In: 6658  Time Out: 1454  Minutes: 17    ADL/IADL trainin minutes    Electronically signed by:     PORFIRIO Hills  1/3/2022, 3:05 PM

## 2022-01-03 NOTE — PROGRESS NOTES
Physical Therapy Missed Treatment   Facility/Department: [facility] MED SURG [id number]    NAME: Mazin Hartman    : 1928 (1 y.o.)  MRN: [id number]    Account: [de-identified]  Gender: female    Chart reviewed, attempted PT at 200. Patient unavailable 2° to:        [x] Pt declined. Pt shaking her head no once she knew I was from physical therapy. Education and encouragement given to pt, but pt declined at this time stating \"I just want to rest okay. Not today. \"       [x] Other notified        Will attempt PT treatment again at earliest convenience.       Electronically signed by Sylvie Starks PTA on 1/3/22 at 10:50 AM EST  Electronically signed by Lori Miner PT on 1/3/2022 at 10:54 AM

## 2022-01-04 LAB
ALBUMIN SERPL-MCNC: 2.6 G/DL (ref 3.5–4.6)
ALP BLD-CCNC: 154 U/L (ref 40–130)
ALT SERPL-CCNC: 20 U/L (ref 0–33)
ANION GAP SERPL CALCULATED.3IONS-SCNC: 13 MEQ/L (ref 9–15)
AST SERPL-CCNC: 16 U/L (ref 0–35)
BASOPHILS ABSOLUTE: 0.1 K/UL (ref 0–0.2)
BASOPHILS RELATIVE PERCENT: 1.2 %
BILIRUB SERPL-MCNC: 0.6 MG/DL (ref 0.2–0.7)
BLOOD CULTURE, ROUTINE: NORMAL
BUN BLDV-MCNC: 13 MG/DL (ref 8–23)
CALCIUM SERPL-MCNC: 8.7 MG/DL (ref 8.5–9.9)
CHLORIDE BLD-SCNC: 104 MEQ/L (ref 95–107)
CO2: 23 MEQ/L (ref 20–31)
CREAT SERPL-MCNC: 0.63 MG/DL (ref 0.5–0.9)
CULTURE, BLOOD 2: NORMAL
EOSINOPHILS ABSOLUTE: 0.1 K/UL (ref 0–0.7)
EOSINOPHILS RELATIVE PERCENT: 1.3 %
GFR AFRICAN AMERICAN: >60
GFR NON-AFRICAN AMERICAN: >60
GLOBULIN: 3.2 G/DL (ref 2.3–3.5)
GLUCOSE BLD-MCNC: 127 MG/DL (ref 70–99)
HCT VFR BLD CALC: 34.5 % (ref 37–47)
HEMOGLOBIN: 11.4 G/DL (ref 12–16)
LYMPHOCYTES ABSOLUTE: 0.9 K/UL (ref 1–4.8)
LYMPHOCYTES RELATIVE PERCENT: 13.7 %
MCH RBC QN AUTO: 29.2 PG (ref 27–31.3)
MCHC RBC AUTO-ENTMCNC: 33 % (ref 33–37)
MCV RBC AUTO: 88.5 FL (ref 82–100)
MONOCYTES ABSOLUTE: 0.9 K/UL (ref 0.2–0.8)
MONOCYTES RELATIVE PERCENT: 13.9 %
NEUTROPHILS ABSOLUTE: 4.7 K/UL (ref 1.4–6.5)
NEUTROPHILS RELATIVE PERCENT: 69.9 %
PDW BLD-RTO: 14.2 % (ref 11.5–14.5)
PLATELET # BLD: 324 K/UL (ref 130–400)
POTASSIUM SERPL-SCNC: 3.7 MEQ/L (ref 3.4–4.9)
PROCALCITONIN: 0.14 NG/ML (ref 0–0.15)
RBC # BLD: 3.89 M/UL (ref 4.2–5.4)
SODIUM BLD-SCNC: 140 MEQ/L (ref 135–144)
TOTAL PROTEIN: 5.8 G/DL (ref 6.3–8)
WBC # BLD: 6.8 K/UL (ref 4.8–10.8)

## 2022-01-04 PROCEDURE — 6370000000 HC RX 637 (ALT 250 FOR IP): Performed by: NURSE PRACTITIONER

## 2022-01-04 PROCEDURE — 6370000000 HC RX 637 (ALT 250 FOR IP): Performed by: INTERNAL MEDICINE

## 2022-01-04 PROCEDURE — 97112 NEUROMUSCULAR REEDUCATION: CPT

## 2022-01-04 PROCEDURE — 94640 AIRWAY INHALATION TREATMENT: CPT

## 2022-01-04 PROCEDURE — 84145 PROCALCITONIN (PCT): CPT

## 2022-01-04 PROCEDURE — 1210000000 HC MED SURG R&B

## 2022-01-04 PROCEDURE — 6360000002 HC RX W HCPCS: Performed by: NURSE PRACTITIONER

## 2022-01-04 PROCEDURE — 85025 COMPLETE CBC W/AUTO DIFF WBC: CPT

## 2022-01-04 PROCEDURE — 80053 COMPREHEN METABOLIC PANEL: CPT

## 2022-01-04 PROCEDURE — 2580000003 HC RX 258: Performed by: NURSE PRACTITIONER

## 2022-01-04 PROCEDURE — 36415 COLL VENOUS BLD VENIPUNCTURE: CPT

## 2022-01-04 PROCEDURE — 2700000000 HC OXYGEN THERAPY PER DAY

## 2022-01-04 PROCEDURE — 94761 N-INVAS EAR/PLS OXIMETRY MLT: CPT

## 2022-01-04 RX ADMIN — BUDESONIDE AND FORMOTEROL FUMARATE DIHYDRATE 2 PUFF: 160; 4.5 AEROSOL RESPIRATORY (INHALATION) at 20:11

## 2022-01-04 RX ADMIN — LOSARTAN POTASSIUM 25 MG: 25 TABLET, FILM COATED ORAL at 11:45

## 2022-01-04 RX ADMIN — METOPROLOL 25 MG: 25 TABLET ORAL at 20:34

## 2022-01-04 RX ADMIN — ENOXAPARIN SODIUM 40 MG: 100 INJECTION SUBCUTANEOUS at 11:46

## 2022-01-04 RX ADMIN — CIPROFLOXACIN HYDROCHLORIDE 500 MG: 500 TABLET, FILM COATED ORAL at 20:34

## 2022-01-04 RX ADMIN — LOSARTAN POTASSIUM 25 MG: 25 TABLET, FILM COATED ORAL at 20:35

## 2022-01-04 RX ADMIN — METOPROLOL 25 MG: 25 TABLET ORAL at 11:45

## 2022-01-04 RX ADMIN — BUDESONIDE AND FORMOTEROL FUMARATE DIHYDRATE 2 PUFF: 160; 4.5 AEROSOL RESPIRATORY (INHALATION) at 07:46

## 2022-01-04 RX ADMIN — Medication 10 ML: at 11:46

## 2022-01-04 RX ADMIN — CIPROFLOXACIN HYDROCHLORIDE 500 MG: 500 TABLET, FILM COATED ORAL at 11:45

## 2022-01-04 RX ADMIN — Medication 10 ML: at 20:34

## 2022-01-04 RX ADMIN — ATORVASTATIN CALCIUM 20 MG: 20 TABLET, FILM COATED ORAL at 20:34

## 2022-01-04 ASSESSMENT — PAIN SCALES - WONG BAKER: WONGBAKER_NUMERICALRESPONSE: 0

## 2022-01-04 ASSESSMENT — PAIN SCALES - GENERAL: PAINLEVEL_OUTOF10: 0

## 2022-01-04 NOTE — PROGRESS NOTES
Physical Therapy Med Surg Daily Treatment Note  Facility/Department: [facility]  Room: [place] [address]       NAME: Macy George  : 1928 (9 y.o.)  MRN: [id number]  CODE STATUS: Limited    Date of Service: 2022    Patient Diagnosis(es): UTI (urinary tract infection) [N39.0]  Failure to thrive in adult [R62.7]  Acute cystitis without hematuria [N30.00]  Pneumonia of both lower lobes due to infectious organism [J18.9]   Chief Complaint   Patient presents with    Illness     not eating/drinking per family     Patient Active Problem List    Diagnosis Date Noted    Impaired mobility and activities of daily living 2021    UTI (urinary tract infection) 2021    Venous stasis dermatitis of both lower extremities 08/10/2021    Aphasia     Chest pain 2020    Obesity (BMI 30-39.9) 2019    Obstructive sleep apnea 2019    [facility] exacerbation [facility] [address]     COPD (chronic obstructive pulmonary disease) with acute bronchitis ([address] 2018    Anxiety 2017    Essential hypertension         Past Medical History:   Diagnosis Date    Anxiety     Cancer ([address]     Glaucoma     Gout     left knee    Hypertension     Lung disease     Obesity (BMI 30-39. 9) 2019    Obstructive sleep apnea 2019     Past Surgical History:   Procedure Laterality Date    CATARACT REMOVAL WITH IMPLANT Bilateral     COLON SURGERY      polyps    COLONOSCOPY  2009    GASTROSTOMY TUBE PLACEMENT N/A 2020    EGD PEG TUBE PLACEMENT performed by Adriana Mello MD at [facility]      graft from neck       Restrictions  Restrictions/Precautions: Fall Risk    SUBJECTIVE   General  Chart Reviewed: Yes  Family / Caregiver Present: No  Subjective  Subjective: \"You're strong. \"  General Comment  Comments: Pt resting in bed - agreeable to PT tx    Pre-Session Pain Report        Pre Treatment Pain Screening  Comments / Details: anisa    Post-Session Pain Report  Pain Assessment  Pain Assessment:  (denies)         OBJECTIVE        Bed mobility  Rolling to Right: Maximum assistance  Supine to Sit: Maximum assistance  Sit to Supine: Maximum assistance  Scooting: Dependent/Total;2 Person assistance  Comment: Hand over hand assist for sequencing cues throughout each transition. Pt requires frequent redirection to return to bed d/t rushing. Pt requires additional time and repetition to complete each transition. Increased SOB noted. Rest breaks provided. Use of bedrails provided. Transfers  Comment: Attempted, however pt very fearful and declining - pushing back heavily. \"I can't. I can't. \"                      Balance  Comments: Robb to complete lateral and ant/post weight shifts in unsupported sitting. X 10 each direction. B HHA provided for security and guidance. Excursion 6\". Rest provided d/t fatigue. Pt encouraged to attempt to sit EOB with hands in lap for engagement of postural mm. Frequent redirection and repetition required for participation. Activity Tolerance  Activity Tolerance: Pt tolerates 5min activities sitting EOB. PT Education  PT Education: PT Role;Plan of Care  Patient Education: bed mobility sequencing. ASSESSMENT   Assessment: Pt limited by fatigue and fear of physical challenges this AM. Frequent rest breaks required for all activities. SOB noted, however improves quickly once returned to supine position. Patient Education: bed mobility sequencing.      Discharge Recommendations:  Continue to assess pending progress    Goals  Short term goals  Short term goal 1: Pt will demonstrate sitting edge of bed >/= 3 min with CGA  Long term goals  Long term goal 1: Pt will demonstrate bed mobility min A  Long term goal 2: Pt will demonstrate transfers mod A  Long term goal 3: Pt will demonstrate amb >/= 10 ft max A with safest AD    PLAN    Times per week: 3-6  Plan Comment: increasing weight shifts OBOS anteriorly  Safety Devices  Type of devices: All fall risk precautions in place     AMPAC (6 CLICK) [address] Inpatient Mobility Raw Score : 8     Therapy Time   Individual   Time In 1010   Time Out 1022   Minutes 12     Timed Code Treatment Minutes: 12 Minutes (7min NMR; 5min transfers)       Fernando Horn PT, 01/04/22 at 10:31 AM         Definitions for assistance levels  Independent = pt does not require any physical supervision or assistance from another person for activity completion. Device may be needed.   Stand by assistance = pt requires verbal cues or instructions from another person, close to but not touching, to perform the activity  Minimal assistance= pt performs 75% or more of the activity; assistance is required to complete the activity  Moderate assistance= pt performs 50% of the activity; assistance is required to complete the activity  Maximal assistance = pt performs 25% of the activity; assistance is required to complete the activity  Dependent = pt requires total physical assistance to accomplish the task

## 2022-01-04 NOTE — PROGRESS NOTES
Hospitalist Progress Note      PCP: Lois Gasca MD    Date of Admission: 12/26/2021    Chief Complaint:    Chief Complaint   Patient presents with    Illness     not eating/drinking per family     Subjective:  Patient is doing well today; hard of hearing; denies complaints. Medications:  Reviewed    Infusion Medications    sodium chloride       Scheduled Medications    metoprolol tartrate  25 mg Oral BID    ciprofloxacin  500 mg Oral 2 times per day    losartan  25 mg Oral Q12H    sodium chloride flush  5-40 mL IntraVENous 2 times per day    enoxaparin  40 mg SubCUTAneous Daily    atorvastatin  20 mg Oral Nightly    budesonide-formoterol  2 puff Inhalation BID     PRN Meds: potassium chloride **OR** potassium alternative oral replacement **OR** potassium chloride, sodium chloride flush, sodium chloride, ondansetron **OR** ondansetron, acetaminophen **OR** acetaminophen, polyethylene glycol, labetalol, albuterol sulfate HFA, ipratropium-albuterol, montelukast      Intake/Output Summary (Last 24 hours) at 1/4/2022 1335  Last data filed at 1/4/2022 1221  Gross per 24 hour   Intake 180 ml   Output --   Net 180 ml     Exam:    BP (!) 145/62   Pulse 91   Temp 99.3 °F (37.4 °C) (Oral)   Resp 18   Ht 5' 3\" (1.6 m)   Wt 180 lb (81.6 kg)   LMP  (LMP Unknown)   SpO2 96%   BMI 31.89 kg/m²     General appearance: No apparent distress, appears stated age and cooperative. HEENT:  Conjunctivae/corneas clear. Neck:  Trachea midline. Respiratory:  Normal respiratory effort. Clear to auscultation  Cardiovascular: Regular rate and rhythm   Abdomen: Soft, non-tender, non-distended with normal bowel sounds. Musculoskeletal: No clubbing, cyanosis or edema bilaterally.    Neuro: Non Focal.   Capillary Refill: Brisk,< 3 seconds   Peripheral Pulses: +2 palpable, equal bilaterally     Labs:   Recent Labs     01/02/22  0543 01/03/22  0537 01/04/22  0510   WBC 9.0 7.4 6.8   HGB 11.1* 11.1* 11.4*   HCT 33.5* 33.8* 34.5*    325 324     Recent Labs     01/02/22  0543 01/03/22  0537 01/04/22  0510    140 140   K 3.4 3.7 3.7    104 104   CO2 24 22 23   BUN 12 12 13   CREATININE 0.68 0.63 0.63   CALCIUM 8.4* 8.5 8.7     Recent Labs     01/02/22  0543 01/03/22  0537 01/04/22  0510   AST 21 17 16   ALT 27 22 20   BILITOT 0.6 0.6 0.6   ALKPHOS 199* 170* 154*     No results for input(s): INR in the last 72 hours. No results for input(s): Sangeeta Shay in the last 72 hours. Urinalysis:      Lab Results   Component Value Date    NITRU POSITIVE 12/26/2021    WBCUA >100 12/26/2021    BACTERIA MANY 12/26/2021    RBCUA 3-5 12/26/2021    BLOODU MODERATE 12/26/2021    SPECGRAV 1.017 12/26/2021    GLUCOSEU Negative 12/26/2021     Radiology:  US RETROPERITONEAL COMPLETE   Final Result      1.0 cm right interpolar region nonobstructing calculus. No hydronephrosis. XR CHEST PORTABLE   Final Result   BILATERAL ATELECTASIS/PNEUMONIA. LEFT PLEURAL EFFUSION.         Assessment/Plan:    80 y.o. female with PMH who has PMH HTN, COPD (home o2 PRN), FRED, venous stasis, RA, dysphagia, and skin cancer presented with worsening shortness of breath, decreased intake, weakness, and decreased mobility who is admitted with the following:       Gram negative bacteremia in setting of UTI, with possible superimposed PNA  - Cipro per ID recommendations; ok for d/c; awaiting precert     RICHARD secondary to dehydration and decreased oral intake  -Resolved     HTN  -Improved; back on home meds     Failure to thrive in adult  - SNF placement planned     Pulmonary nodules (new finding since previous scans Oct 2020)  COPD not in exacerbation  FRED  -Multiple pulmonary nodules RUL and RLL  -Consult pulmonary for further recommendations  -Wears home o2 PRN  -O2 to maintain sats >92%  -Resumed home medications  Per previous notes:  12/30: Pulmonary discussed nodules with family. [place] agree on patient being poor candidate for any aggressive intervention or diagnostics.  Respiratory status stable at present.  Pulmonology will sign off.  Can follow-up outpatient if family desires additional diagnostics.     Thyroid nodule  -L lobe thyroid nodules  -Outpatient f/u     Advanced care planning  -DNRCCA/DNI      Diet: Per ST recommendations.     Disposition: [facility] at discharge. Awaiting precert       Active Hospital Problems    Diagnosis Date Noted    Impaired mobility and activities of daily living [Z74.09, Z78.9] 12/27/2021    UTI (urinary tract infection) [N39.0] 12/26/2021    Obstructive sleep apnea [G47.33] 07/11/2019     Additional work up or/and treatment plan may be added today or then after based on clinical progression. I am managing a portion of pt care. Some medical issues are handled by other specialists. Additional work up and treatment should be done in out pt setting by pt PCP and other out pt providers. In addition to examining and evaluating pt, I spent additional time explaining care, normal and abnormal findings, and treatment plan. All of pt questions were answered. Counseling, diet and education were  provided. Case will be discussed with nursing staff when appropriate. Family will be updated if and when appropriate. Diet: ADULT ORAL NUTRITION SUPPLEMENT; Breakfast; Clear Liquid Oral Supplement  ADULT ORAL NUTRITION SUPPLEMENT; Dinner; Standard High Calorie/High Protein Oral Supplement  ADULT ORAL NUTRITION SUPPLEMENT; Lunch; Frozen Oral Supplement  ADULT DIET;  Dysphagia - Pureed    Code Status: Limited    Awaiting placement; precert    Electronically signed by David Orantes MD on 1/4/2022 at 1:35 PM

## 2022-01-04 NOTE — PROGRESS NOTES
Assessment as documented in flowsheet. Awake and alert to person and situation. Denies pain. Very [place], and does not always respond verbally to questions asked.

## 2022-01-04 NOTE — PROGRESS NOTES
Vitals stable, waiting on precert for [employer]. Continuing to turn her every 2 hours. Pt very hard of hearing. She ate most of her breakfast per daughter, I fed her lunch and she ate about 25% of it. Call light in reach, bed alarm on, no needs.

## 2022-01-05 VITALS
DIASTOLIC BLOOD PRESSURE: 60 MMHG | WEIGHT: 182.98 LBS | HEART RATE: 83 BPM | OXYGEN SATURATION: 94 % | TEMPERATURE: 98.8 F | HEIGHT: 63 IN | RESPIRATION RATE: 18 BRPM | BODY MASS INDEX: 32.42 KG/M2 | SYSTOLIC BLOOD PRESSURE: 137 MMHG

## 2022-01-05 LAB
ALBUMIN SERPL-MCNC: 2.7 G/DL (ref 3.5–4.6)
ALP BLD-CCNC: 127 U/L (ref 40–130)
ALT SERPL-CCNC: 16 U/L (ref 0–33)
ANION GAP SERPL CALCULATED.3IONS-SCNC: 11 MEQ/L (ref 9–15)
AST SERPL-CCNC: 11 U/L (ref 0–35)
BASOPHILS ABSOLUTE: 0.1 K/UL (ref 0–0.2)
BASOPHILS RELATIVE PERCENT: 0.8 %
BILIRUB SERPL-MCNC: 0.7 MG/DL (ref 0.2–0.7)
BUN BLDV-MCNC: 15 MG/DL (ref 8–23)
CALCIUM SERPL-MCNC: 8.6 MG/DL (ref 8.5–9.9)
CHLORIDE BLD-SCNC: 101 MEQ/L (ref 95–107)
CO2: 26 MEQ/L (ref 20–31)
CREAT SERPL-MCNC: 0.77 MG/DL (ref 0.5–0.9)
EOSINOPHILS ABSOLUTE: 0.1 K/UL (ref 0–0.7)
EOSINOPHILS RELATIVE PERCENT: 1.6 %
GFR AFRICAN AMERICAN: >60
GFR NON-AFRICAN AMERICAN: >60
GLOBULIN: 2.9 G/DL (ref 2.3–3.5)
GLUCOSE BLD-MCNC: 116 MG/DL (ref 70–99)
HCT VFR BLD CALC: 33.1 % (ref 37–47)
HEMOGLOBIN: 11 G/DL (ref 12–16)
LYMPHOCYTES ABSOLUTE: 0.9 K/UL (ref 1–4.8)
LYMPHOCYTES RELATIVE PERCENT: 14.3 %
MCH RBC QN AUTO: 29 PG (ref 27–31.3)
MCHC RBC AUTO-ENTMCNC: 33.3 % (ref 33–37)
MCV RBC AUTO: 87.1 FL (ref 82–100)
MONOCYTES ABSOLUTE: 1 K/UL (ref 0.2–0.8)
MONOCYTES RELATIVE PERCENT: 15.3 %
NEUTROPHILS ABSOLUTE: 4.5 K/UL (ref 1.4–6.5)
NEUTROPHILS RELATIVE PERCENT: 68 %
PDW BLD-RTO: 14.1 % (ref 11.5–14.5)
PLATELET # BLD: 313 K/UL (ref 130–400)
POTASSIUM SERPL-SCNC: 3.6 MEQ/L (ref 3.4–4.9)
PROCALCITONIN: 0.15 NG/ML (ref 0–0.15)
RBC # BLD: 3.8 M/UL (ref 4.2–5.4)
SARS-COV-2, NAAT: NOT DETECTED
SODIUM BLD-SCNC: 138 MEQ/L (ref 135–144)
TOTAL PROTEIN: 5.6 G/DL (ref 6.3–8)
WBC # BLD: 6.6 K/UL (ref 4.8–10.8)

## 2022-01-05 PROCEDURE — 97535 SELF CARE MNGMENT TRAINING: CPT

## 2022-01-05 PROCEDURE — 6360000002 HC RX W HCPCS: Performed by: NURSE PRACTITIONER

## 2022-01-05 PROCEDURE — 94640 AIRWAY INHALATION TREATMENT: CPT

## 2022-01-05 PROCEDURE — 87635 SARS-COV-2 COVID-19 AMP PRB: CPT

## 2022-01-05 PROCEDURE — 36415 COLL VENOUS BLD VENIPUNCTURE: CPT

## 2022-01-05 PROCEDURE — 6370000000 HC RX 637 (ALT 250 FOR IP): Performed by: INTERNAL MEDICINE

## 2022-01-05 PROCEDURE — 84145 PROCALCITONIN (PCT): CPT

## 2022-01-05 PROCEDURE — 2700000000 HC OXYGEN THERAPY PER DAY

## 2022-01-05 PROCEDURE — 94761 N-INVAS EAR/PLS OXIMETRY MLT: CPT

## 2022-01-05 PROCEDURE — 80053 COMPREHEN METABOLIC PANEL: CPT

## 2022-01-05 PROCEDURE — 85025 COMPLETE CBC W/AUTO DIFF WBC: CPT

## 2022-01-05 PROCEDURE — 6370000000 HC RX 637 (ALT 250 FOR IP): Performed by: NURSE PRACTITIONER

## 2022-01-05 RX ADMIN — CIPROFLOXACIN HYDROCHLORIDE 500 MG: 500 TABLET, FILM COATED ORAL at 07:55

## 2022-01-05 RX ADMIN — METOPROLOL 25 MG: 25 TABLET ORAL at 07:54

## 2022-01-05 RX ADMIN — BUDESONIDE AND FORMOTEROL FUMARATE DIHYDRATE 2 PUFF: 160; 4.5 AEROSOL RESPIRATORY (INHALATION) at 07:28

## 2022-01-05 RX ADMIN — LOSARTAN POTASSIUM 25 MG: 25 TABLET, FILM COATED ORAL at 07:55

## 2022-01-05 RX ADMIN — ENOXAPARIN SODIUM 40 MG: 100 INJECTION SUBCUTANEOUS at 07:53

## 2022-01-05 NOTE — PROGRESS NOTES
[facility] OCC[facility]ATIONAL [facility]ER[facility] MED SURG TREATMENT NOTE     Date: 2022  Patient Name: Piter Mae        MRN: [id number]  Account: [de-identified]   : 1928  (80 y.o.)  Room: [facility]    Chart Review:  Diagnosis:  The primary encounter diagnosis was Pneumonia of both lower lobes due to infectious organism. Diagnoses of Acute cystitis without hematuria and Failure to thrive in adult were also pertinent to this visit. Restrictions:    Restrictions/Precautions: Fall Risk       Safety Devices: Safety Devices in place: Yes  Type of devices: All fall risk precautions in place        Subjective:  Patient states:  Pt gave occasional laugh and shook head inconsistently responding to questions. Pain:  Start of tx:  Pre Treatment Pain Screening  Pain at present: 0  Scale Used: Faces  Intervention List: Patient able to continue with treatment  Comments / Details: shook head no; motioned to lay down when in sitting    End of tx:  Pain Assessment  Patient Currently in Pain: Denies  Pain Assessment: Faces  Brady-Miranda Pain Rating: No hurt    Objective:    ADL:  Pt sat EOB at the below level of assist and brushed hair at the below level. Pt occasionally smiling to questions/conversation. Pt motioned to return supine after aprox 1 min-1min30 sec of sitting EOB. 3L O2 via NC SPO2 94% sitting EOB.    Grooming: Dependent/Total (brushing hair seated EOB)       ERMELINDA hose donned: No  If no, why: N/A    Therapy key for assistance levels -   Independent = Pt. is able to perform task with no assistance but may require a device   Stand by assistance = Pt. does not perform task at an independent level but does not need physical assistance, requires verbal cues  Minimal, Moderate, Maximal Assistance = Pt. requires physical assistance (25%, 50%, 75% assist from helper) for task but is able to actively participate in task   Dependent = Pt. requires total assistance with task and is not able to actively participate with task completion    Functional Balance:  Sitting Balance: Minimal assistance  Standing Balance: Unable to assess(comment)        Transfers:  Transfer Comments: differed as pt motioned to lay down after sitting EOB aprox 1-1 min 30 sec    Cognition:       Bed Mobility  Bed mobility  Supine to Sit: Dependent/Total  Sit to Supine: Dependent/Total  Comment: 2 person assist for safety    UE Exercises:      Pt able to follow visual demonstration and complete elbow flexion/extension, wrist flexion with extension to neutral, limited shoulder flexion. Pt did demonstrate ability to utilize UE's and initiate movement of UE's when t/f from supine to sitting EOB.      Activity Tolerance:  Activity Tolerance: Treatment limited secondary to decreased cognition    Treatment Consisted Of:  ADL Training    Assessment/Discharge Disposition:     Discharge Recommendations: Continue to assess pending progress         6-Click   How much help for putting on and taking off regular lower body clothing?: Total  How much help for Bathing?: Total  How much help for Toileting?: Total  How much help for putting on and taking off regular upper body clothing?: Total  How much help for taking care of personal grooming?: Total  How much help for eating meals?: Total  AM-PAC Inpatient Daily Activity Raw Score: 6  AM-PAC Inpatient ADL T-Scale Score : 17.07  ADL Inpatient CMS 0-100% Score: 100    Plan:  Continue OT per POC    Goals/Plan Addressed This session:  Improve Balance  Improve Functional Transfers  Improve ADL [place]    Minutes:    Time In: 1438  Time Out: 1450  Minutes: 12    ADL/IADL trainin minutes    Electronically signed by:    Pa Fabian OT  2022, 3:09 PM

## 2022-01-05 NOTE — PROGRESS NOTES
Assessment completed. Lungs clear. Pt resting comfortably with even, unlabored breathing. No c/o pain at this time. At start of shift pt was not verbally responding to questions but did engage in conversation later in shift. Incont of bowel and bladder. Denies any needs at this time. Will continue to monitor.

## 2022-01-05 NOTE — DISCHARGE SUMMARY
2020 FINDINGS: Osseous structures intact. Cardiopericardial silhouette normal. Ill-defined areas of increased opacity are found in the right upper and middle lung, as well as in the left upper, and left mid and lower lung. Blunting left costophrenic angle identified. BILATERAL ATELECTASIS/PNEUMONIA. LEFT PLEURAL EFFUSION. Discharge Medications:         Medication List      START taking these medications    ciprofloxacin 500 MG tablet  Commonly known as: CIPRO  Take 1 tablet by mouth 2 times daily for 5 days        ASK your doctor about these medications    albuterol sulfate  (90 Base) MCG/ACT inhaler  Commonly known as: ProAir HFA  Inhale 1 puff into the lungs every 6 hours as needed for Wheezing or Shortness of Breath     atorvastatin 20 MG tablet  Commonly known as: LIPITOR  TAKE 1 TABLET BY MOUTH EVERY DAY     brimonidine 0.15 % ophthalmic solution  Commonly known as: ALPHAGAN P     Cosopt 22.3-6.8 MG/ML ophthalmic solution  Generic drug: dorzolamide-timolol     fluticasone-salmeterol 250-50 MCG/DOSE Aepb  Commonly known as: Advair Diskus  Inhale 1 puff into the lungs every 12 hours     Handicap Placard Misc  by Does not apply route Good for 5 years     ipratropium-albuterol 0.5-2.5 (3) MG/3ML Soln nebulizer solution  Commonly known as: DUONEB  Inhale 3 mLs into the lungs every 6 hours as needed for Shortness of Breath ((or wheezing))     latanoprost 0.005 % ophthalmic solution  Commonly known as: XALATAN     losartan 25 MG tablet  Commonly known as: COZAAR  Take 1 tablet by mouth every 12 hours     Medical Compression Stockings Misc  Knee high, 20 to 30 mm hg, remove at night, pharmacy to fit.     metoprolol tartrate 25 MG tablet  Commonly known as: LOPRESSOR  TAKE 1/2 TABLET BY MOUTH TWICE A DAY     mometasone 0.1 % cream  Commonly known as: Elocon  Apply thin layer topically every 12 hours to rash on hand and legs.      montelukast 10 MG tablet  Commonly known as: SINGULAIR  Take 1 tablet by mouth nightly as needed (allergies)     OXYGEN     Shingrix 50 MCG/0.5ML Susr injection  Generic drug: zoster recombinant adjuvanted vaccine  Inject 0.5 mLs into the muscle See Admin Instructions 1 dose now and repeat in 2-6 months           Where to Get Your Medications      Information about where to get these medications is not yet available    Ask your nurse or doctor about these medications  · ciprofloxacin 500 MG tablet         Disposition:   If discharged to Home, Any [facility] needs that were indicated and/or required as been addressed and set up by Social Work. Condition at discharge: good     Activity: activity as tolerated    Total time taken for discharging this patient: 40 minutes. Greater than 70% of time was spent focused exclusively on this patient. Time was taken to review chart, discuss plans with consultants, reconciling medications, discussing plan answering questions with patient.      Signed:  Jj Hicks MD  1/5/2022, 4:02 PM  ----------------------------------------------------------------------------------------------------------------------    Mazin Hartman

## 2022-01-05 NOTE — PLAN OF CARE
Nutrition Problem #1: Inadequate oral intake  Intervention: Food and/or Nutrient Delivery: Continue Oral Nutrition Supplement,Continue Current Diet  Nutritional Goals: po > 50% meals and supplements, maintain wt ~ 180#

## 2022-01-05 NOTE — PROGRESS NOTES
Comprehensive Nutrition Assessment    Type and Reason for Visit:  Reassess    Nutrition Recommendations/Plan:   Continue current plan of care    Nutrition Assessment:  Unable to determine nutritional status PTA, remains at risk for malnutrition related to chronic disease ( dysphagia and advanced age), pt requires 1:1 feeding assitance at meals, intake apppears inadequate per records. Continue oral nutrition supplements to aid in meeting energy/protien needs until po > 50% established. Unable to interview pt, sleeping soundly at time of visit and did not wake to name ( very [address] per notes)    Malnutrition Assessment:  Malnutrition Status: At risk for malnutrition (Comment)    Context:  Chronic Illness     Findings of the 6 clinical characteristics of malnutrition:  Energy Intake:  7 - 75% or less estimated energy requirements for 1 month or longer  Weight Loss:  Unable to assess     Body Fat Loss:  Unable to assess     Muscle Mass Loss:  Unable to assess    Fluid Accumulation:  1 - Mild Extremities   Strength:  Not Performed    Estimated Daily Nutrient Needs:  Energy (kcal):  2115-0586 kcals @ 15-18 kcal/kg; Weight Used for Energy Requirements:  Admission (82 kg)     Protein (g):  ~ 68 g protein @ 1.3 g/kg IBW; Weight Used for Protein Requirements:  Ideal (52 kg)        Fluid (ml/day):  ~1300; Method Used for Fluid Requirements:  1 ml/kcal      Nutrition Related Findings:  per notes ate ' most of breakfast and 25% of lunch' 1/4      Wounds:  None       Current Nutrition Therapies:    ADULT ORAL NUTRITION SUPPLEMENT; Breakfast; Clear Liquid Oral Supplement  ADULT ORAL NUTRITION SUPPLEMENT; Dinner; Standard High Calorie/High Protein Oral Supplement  ADULT ORAL NUTRITION SUPPLEMENT; Lunch; Frozen Oral Supplement  ADULT DIET;  Dysphagia - Pureed    Anthropometric Measures:  · Height: 5' 3\" (160 cm)  · Current Body Weight: 182 lb (82.6 kg) (1/5)   · Admission Body Weight: 180 lb (81.6 kg) (est)    · Usual Body Weight: 187 lb (84.8 kg) (1/2021; 180# ( 12/21/21))     · Ideal Body Weight: 115 lbs; % Ideal Body Weight   > 100%  · BMI: 32.2  · BMI Categories: Obese Class 1 (BMI 30.0-34.9) (est)       Nutrition Diagnosis:   · Inadequate oral intake related to cognitive or neurological impairment,swallowing difficulty as evidenced by intake 26-50%    · Swallowing difficulty related to cognitive or neurological impairment,other (comment) (dysphagia) as evidenced by swallow study results      Nutrition Interventions:   Food and/or Nutrient Delivery:  Continue Oral Nutrition Supplement,Continue Current Diet  Nutrition Education/Counseling:  Education not indicated   Coordination of Nutrition Care:  Continue to monitor while inpatient    Goals:  po > 50% meals and supplements, maintain wt ~ 180#       Nutrition Monitoring and Evaluation:   Behavioral-Environmental Outcomes:  None Identified   Food/Nutrient Intake Outcomes:  Food and Nutrient Intake,Supplement Intake,Diet Advancement/Tolerance  Physical Signs/Symptoms Outcomes:  Meal Time Behavior,Weight     Discharge Planning:    Continue Oral Nutrition Supplement     Electronically signed by Kylie Yadav RD, LD on 1/5/22 at 1:24 PM EST

## 2022-01-05 NOTE — PROGRESS NOTES
Physical Therapy Med Surg Daily Treatment Note  Facility/Department: [facility]  [id number]       NAME: Rocio Knowles  : 1928 (30 y.o.)  MRN: [id number]  CODE STATUS: Limited    Date of Service: 2022    Patient Diagnosis(es): UTI (urinary tract infection) [N39.0]  Failure to thrive in adult [R62.7]  Acute cystitis without hematuria [N30.00]  Pneumonia of both lower lobes due to infectious organism [J18.9]   Chief Complaint   Patient presents with    Illness     not eating/drinking per family     Patient Active Problem List    Diagnosis Date Noted    Impaired mobility and activities of daily living 2021    UTI (urinary tract infection) 2021    Venous stasis dermatitis of both lower extremities 08/10/2021    Aphasia     Chest pain 2020    Obesity (BMI 30-39.9) 2019    Obstructive sleep apnea 2019    COPD exacerbation ([address]     COPD (chronic obstructive pulmonary disease) with acute bronchitis ([address] 2018    Anxiety 2017    Essential hypertension         Past Medical History:   Diagnosis Date    Anxiety     Cancer ([address]     Glaucoma     Gout     left knee    Hypertension     Lung disease     Obesity (BMI 30-39. 9) 2019    Obstructive sleep apnea 2019     Past Surgical History:   Procedure Laterality Date    CATARACT REMOVAL WITH IMPLANT Bilateral 2016    COLON SURGERY      polyps    COLONOSCOPY  2009    GASTROSTOMY TUBE PLACEMENT N/A 2020    EGD PEG TUBE PLACEMENT performed by Lisa Resendiz MD at [address] [id number]      graft from neck       Restrictions  Restrictions/Precautions: Fall Risk    SUBJECTIVE   General  Chart Reviewed: Yes  Subjective  Subjective: Pt with minimal verbal responses at this time, answering questions with head nods, pt expressed her back hurts while in sitting position but unable to get a number respone to pain levels.     Pre-Session Pain Report Pain Screening  Patient Currently in Pain: Denies (Pt expressed pain while in sitting position, no pain level given)       Post-Session Pain Report            OBJECTIVE        Bed mobility  Supine to Sit: Moderate assistance  Sit to Supine: Moderate assistance  Scooting: Dependent/Total;2 Person assistance  Comment: Extra time needed to complete with encouragement to attempt. Transfers  Sit to Stand: Moderate Assistance  Stand to sit: Moderate Assistance  Comment: Pt with x2 standing attempts with static standing of less than 20 sec each attempt. Pt with attempted side step at EOB with HHA from therapist, unable to complete at this time. Pt able to side shift towards Head of Bed from seated position    Ambulation  Ambulation?: No (Attempted side stepping at EOB pt unable to clear foot from floor in order to advance to left side.)              Neuromuscular Education  Neuromuscular Comments: Static sitting at EOB x 7 min with Gilberto UE resting in lap or reaching behind back to rub pain in response to pt's c/o \" back hurts\"                          Activity Tolerance  Activity Tolerance: Patient limited by fatigue;Patient limited by endurance  Activity Tolerance: Pt increased sitting time at EOB to 7 min today with SpO2 of 93% while on O2          ASSESSMENT   Body structures, Functions, Activity limitations: Decreased functional mobility ; Decreased safe awareness;Decreased balance;Decreased coordination;Decreased cognition;Decreased ROM; Decreased endurance; Increased pain;Decreased strength;Decreased posture  Assessment: Pt with extreme [place], very limited verbal response to questions, often replied with head nods.  Pt able to complete x2 Sit-Stand today with therapist sitting in Chair in front of pt providing Gilberto UE support to allow pt to assist in pulling herself up into a standing position, static standing tolerance limited to 20 sec or less with each attempt (x2)     Discharge Recommendations:  Continue to assess pending progress    Goals  Short term goals  Short term goal 1: Pt will demonstrate sitting edge of bed >/= 3 min with CGA  Long term goals  Long term goal 1: Pt will demonstrate bed mobility min A  Long term goal 2: Pt will demonstrate transfers mod A  Long term goal 3: Pt will demonstrate amb >/= 10 ft max A with safest AD    PLAN    Times per week: 3-6  Safety Devices  Type of devices: Bed alarm in place,Call light within reach,Left in bed     AMPAC (6 CLICK) BASIC MOBILITY  AM-PAC Inpatient Mobility Raw Score : 9     Therapy Time   Individual   Time In 0858   Time Out 0912   Minutes 14     Bed Mobilty/Transfers: 7 min   Neuro Facilitation: 7 min   [employer]vance [employer] , PTA, 01/05/22 at 9:23 AM         Definitions for assistance levels  Independent = pt does not require any physical supervision or assistance from another person for activity completion. Device may be needed.   Stand by assistance = pt requires verbal cues or instructions from another person, close to but not touching, to perform the activity  Minimal assistance= pt performs 75% or more of the activity; assistance is required to complete the activity  Moderate assistance= pt performs 50% of the activity; assistance is required to complete the activity  Maximal assistance = pt performs 25% of the activity; assistance is required to complete the activity  Dependent = pt requires total physical assistance to accomplish the task

## 2022-01-07 NOTE — PROGRESS NOTES
Physician Progress Note      PATIENT:               Nuvia Stewart  CSN #:                  [id number]  :                       1928  ADMIT DATE:       2021 12:23 PM  [address] DATE:        2022 7:19 PM  RESPONDING  PROVIDER #:        La Nena Garcia MD          QUERY TEXT:    Patient admitted with gram negative bacteremia secondary to UTI. Possible   pneumonia has been continuously documented by attending and pulmonology. If   possible, please document if pneumonia was: The medical record reflects the following:  Risk Factors: COPD, dementia, FRED, gram-negative bacteremia  Clinical Indicators: admission CXR shows b/l atelectasis/pneumonia. Left   pleural effusion, Procalcitonin 2.10/1.54  WBC 12.2/10.3  CRP>300; T   100.1-101.8; tachycardia 90's-100's on admission, tachypnea, diminished LS. Patient presented with worsening sob, encephalopathy, decreased intake,   weakness, and decreased mobility  Treatment: IVF,  IV Zosyn x 6 days, Tylenol, Acapella, IS, nebs, labs,   cultures, imaging, supplemental O2, pulmonology consult    Thank you, Sharon Chu RN BSN [facility]  [phone]  Options provided:  -- This patient had treated for possible gram-negative pneumonia  -- This patient has possible pneumonia  -- Pneumonia ruled out after study  -- Other - I will add my own diagnosis  -- Disagree - Not applicable / Not valid  -- Disagree - Clinically unable to determine / Unknown  -- Refer to Clinical Documentation Reviewer    PROVIDER RESPONSE TEXT:    This patient had possible pneumonia.     Query created by: Slava Trujillo on 2022 8:44 AM      Electronically signed by:  La Nena Garcia MD 2022 3:41 PM

## 2022-01-08 RX ORDER — CIPROFLOXACIN 500 MG/1
500 TABLET, FILM COATED ORAL 2 TIMES DAILY
COMMUNITY
Start: 2022-01-06 | End: 2022-01-08

## 2022-01-10 ENCOUNTER — OFFICE VISIT (OUTPATIENT)
Dept: GERIATRIC MEDICINE | Age: 87
End: 2022-01-10
Payer: MEDICARE

## 2022-01-10 DIAGNOSIS — H91.93 BILATERAL HEARING LOSS, UNSPECIFIED HEARING LOSS TYPE: ICD-10-CM

## 2022-01-10 DIAGNOSIS — Z87.440 HISTORY OF UTI: ICD-10-CM

## 2022-01-10 DIAGNOSIS — J44.9 CHRONIC OBSTRUCTIVE PULMONARY DISEASE, UNSPECIFIED COPD TYPE (HCC): ICD-10-CM

## 2022-01-10 DIAGNOSIS — I10 PRIMARY HYPERTENSION: Primary | ICD-10-CM

## 2022-01-10 DIAGNOSIS — R91.8 LUNG NODULES: ICD-10-CM

## 2022-01-10 DIAGNOSIS — Z87.898 HISTORY OF BACTEREMIA: ICD-10-CM

## 2022-01-10 DIAGNOSIS — N17.9 AKI (ACUTE KIDNEY INJURY) (HCC): ICD-10-CM

## 2022-01-10 PROCEDURE — 99309 SBSQ NF CARE MODERATE MDM 30: CPT | Performed by: NURSE PRACTITIONER

## 2022-01-14 ENCOUNTER — VIRTUAL VISIT (OUTPATIENT)
Dept: INFECTIOUS DISEASES | Age: 87
End: 2022-01-14
Payer: MEDICARE

## 2022-01-14 DIAGNOSIS — N39.0 URINARY TRACT INFECTION WITHOUT HEMATURIA, SITE UNSPECIFIED: Primary | ICD-10-CM

## 2022-01-14 PROCEDURE — 99213 OFFICE O/P EST LOW 20 MIN: CPT | Performed by: INTERNAL MEDICINE

## 2022-01-14 ASSESSMENT — ENCOUNTER SYMPTOMS
GASTROINTESTINAL NEGATIVE: 1
RESPIRATORY NEGATIVE: 1

## 2022-01-14 NOTE — PROGRESS NOTES
Infectious Disease     Patient Name: Belen Siegel  Date: 1/14/2022  YOB: 1928  Medical Record Number: [id number]        Belen Siegel, was evaluated through a synchronous (real-time) audio-video encounter. The patient (or guardian if applicable) is aware that this is a billable service. Verbal consent to proceed has been obtained within the past 12 months. The visit was conducted pursuant to the emergency declaration under the [address] [place] [address] and the [employer] and [employer] Act. Patient identification was verified, and a caregiver was present when appropriate. The patient was located in a state where the provider was credentialed to provide care. Total time spent for this encounter: 15 min    --West Wiggins MD on 1/14/2022 at 10:12 AM    An electronic signature was used to authenticate this note. E. coli sepsis        Patient was hospitalized beginning of January 2022 E. coli sepsis bacteremia  Source was urine patient was discharged on 1 week of Cipro after hospitalization        Review of Systems   Constitutional: Negative. Respiratory: Negative. Cardiovascular: Negative. Gastrointestinal: Negative. Genitourinary: Negative. Review of Systems: All 14 review of systems negative other than as stated above    Social History     Tobacco Use    Smoking status: Never Smoker    Smokeless tobacco: Never Used   Substance Use Topics    Alcohol use: No    Drug use: No         Past Medical History:   Diagnosis Date    Anxiety     Cancer ([address]     Glaucoma     Gout     left knee    Hypertension     Lung disease     Obesity (BMI 30-39. 9) 7/11/2019    Obstructive sleep apnea 7/11/2019           Past Surgical History:   Procedure Laterality Date    CATARACT REMOVAL WITH IMPLANT Bilateral 2016    COLON SURGERY  2009    polyps    COLONOSCOPY  02/2009    GASTROSTOMY TUBE PLACEMENT N/A 11/4/2020    EGD PEG TUBE PLACEMENT performed [facility] Byron Jiang MD [facility] [address]  [facility]    graft from neck         Current Outpatient Medications on File Prior to Visit   Medication Sig Dispense Refill    losartan (COZAAR) 25 MG tablet Take 1 tablet by mouth every 12 hours (Patient taking differently: Take 25 mg by mouth every 12 hours Indications: High Blood Pressure Disorder ) 180 tablet 3    atorvastatin (LIPITOR) 20 MG tablet TAKE 1 TABLET BY MOUTH EVERY DAY (Patient taking differently: Take 20 mg by mouth nightly Indications: High Amount of Fats in the Blood ) 90 tablet 0    Elastic Bandages & Supports (MEDICAL COMPRESSION STOCKINGS) MISC Knee high, 20 to 30 mm hg, remove at night, pharmacy to fit. 1 each 0    mometasone (ELOCON) 0.1 % cream Apply thin layer topically every 12 hours to rash on hand and legs. (Patient taking differently: Apply topically every 12 hours Indications: Acute Rash Apply thin layer topically every 12 hours to rash on hand and legs. ) 45 g 0    zoster recombinant adjuvanted vaccine (SHINGRIX) 50 MCG/0.5ML SUSR injection Inject 0.5 mLs into the muscle See Admin Instructions 1 dose now and repeat in 2-6 months 0.5 mL 0    fluticasone-salmeterol (ADVAIR DISKUS) 250-50 MCG/DOSE AEPB Inhale 1 puff into the lungs every 12 hours (Patient taking differently: Inhale 1 puff into the lungs every 12 hours Indications: Chronic Obstructive Lung Disease ) 180 each 3    Handicap Placard MISC by Does not apply route Good for 5 years 1 each 0    montelukast (SINGULAIR) 10 MG tablet Take 1 tablet by mouth nightly as needed (allergies) 90 tablet 3    ipratropium-albuterol (DUONEB) 0.5-2.5 (3) MG/3ML SOLN nebulizer solution Inhale 3 mLs into the lungs every 6 hours as needed for Shortness of Breath ((or wheezing)) (Patient taking differently: Inhale 1 vial into the lungs every 6 hours as needed for Shortness of Breath ((or wheezing)) Indications: Chronic Obstructive Lung Disease ) 360 mL 11    metoprolol tartrate (LOPRESSOR) 25 MG tablet TAKE 1/2 TABLET BY MOUTH TWICE A DAY (Patient taking differently: Take 12.5 mg by mouth 2 times daily Indications: High Blood Pressure Disorder ) 90 tablet 3    albuterol sulfate HFA (PROAIR HFA) 108 (90 Base) MCG/ACT inhaler Inhale 1 puff into the lungs every 6 hours as needed for Wheezing or Shortness of Breath 1 Inhaler 11    OXYGEN Inhale 2 L into the lungs Indications: PRN at bedtime      latanoprost (XALATAN) 0.005 % ophthalmic solution Place 1 drop into both eyes Daily with lunch Indications: Glaucoma       dorzolamide-timolol (COSOPT) 22.3-6.8 MG/ML ophthalmic solution Place 1 drop into both eyes 2 times daily Indications: Glaucoma       brimonidine (ALPHAGAN P) 0.15 % ophthalmic solution Place 1 drop into both eyes 2 times daily Indications: Glaucoma   3     No current facility-administered medications on file prior to visit. Allergies   Allergen Reactions    Norvasc [Amlodipine Besylate] Swelling         No family history on file. .   Lab Results   Component Value Date    WBC 6.6 01/05/2022    HGB 11.0 (L) 01/05/2022    HCT 33.1 (L) 01/05/2022    MCV 87.1 01/05/2022     01/05/2022     Lab Results   Component Value Date     01/05/2022    K 3.6 01/05/2022    K 4.0 11/06/2020     01/05/2022    CO2 26 01/05/2022    BUN 15 01/05/2022    CREATININE 0.77 01/05/2022    GLUCOSE 116 01/05/2022    CALCIUM 8.6 01/05/2022                ASSESSMENT:  Patient Active Problem List   Diagnosis    Essential hypertension    Anxiety    COPD (chronic obstructive pulmonary disease) with acute bronchitis (HCC)    COPD exacerbation (HCC)    Obesity (BMI 30-39. 9)    Obstructive sleep apnea    Chest pain    Aphasia    Venous stasis dermatitis of both lower extremities    UTI (urinary tract infection)    Impaired mobility and activities of daily living         PLAN:  E. coli sepsis    Completed antibiotic

## 2022-01-15 NOTE — PROGRESS NOTES
Subjective:    Sumaya mallika [facility]  Patient ID: Leah Rodriguez is a pleasant 80 y.o. female who presents today for:  Chief Complaint   Patient presents with   Blank Other     New admit from [facility] admission from [facility].  Admitted on 12/26/2021, discharge date 1/5/2022. Gram-negative bacteremia secondary to UTI/pna. Also presented with RICHARD and failure to thrive secondary to bacteremia. ID following along with pulmonology. Noted to have multiple pulmonary nodules. Family is aware but no aggressive treatment at this time. Patient is sitting up in her chair, eating breakfast.  Consuming 80 to 90% of her meals. She has been participating with therapy. She denies nausea/vomiting or diarrhea. She denies chest pain, shortness of breath, or palpitations. No constipation or abdominal pain. Has a virtual appointment with infectious disease on January 14.  116/56, 98.0, 82, 16, 93% room air  Called the patient's daughter to discuss POC, and answer any questions. Patient Active Problem List   Diagnosis    Essential hypertension    Anxiety    COPD (chronic obstructive pulmonary disease) with acute bronchitis (HCC)    COPD exacerbation (HCC)    Obesity (BMI 30-39. 9)    Obstructive sleep apnea    Chest pain    Aphasia    Venous stasis dermatitis of both lower extremities    UTI (urinary tract infection)    Impaired mobility and activities of daily living     Past Medical History:   Diagnosis Date    Anxiety     Cancer ([address]     Glaucoma     Gout     left knee    Hypertension     Lung disease     Obesity (BMI 30-39. 9) 7/11/2019    Obstructive sleep apnea 7/11/2019     Past Surgical History:   Procedure Laterality Date    CATARACT REMOVAL WITH IMPLANT Bilateral 2016    COLON SURGERY  2009    polyps    COLONOSCOPY  02/2009    GASTROSTOMY TUBE PLACEMENT N/A 11/4/2020    EGD PEG TUBE PLACEMENT performed by Liyah Mack MD at [address]  2002    graft from neck Social History     Socioeconomic History    Marital status:      Spouse name: Not on file    Number of children: Not on file    Years of education: Not on file    Highest education level: Not on file   Occupational History    Not on file   Tobacco Use    Smoking status: Never Smoker    Smokeless tobacco: Never Used   Substance and Sexual Activity    Alcohol use: No    Drug use: No    Sexual activity: Not on file   Other Topics Concern    Not on file   Social History Narrative    ,  Lives with her dtr Macrina Solorio in [place] [address]     [facility] [place] [place], [place],     Son Flo Hernandez is in the area                      Social Determinants of Health     Financial Resource Strain:     Difficulty of Paying Living Expenses: Not on file   Food Insecurity:     Worried About [address] in the Last Year: Not on file    [address] in the Last Year: Not on file   Transportation Needs:     Lack of Transportation (Medical): Not on file    Lack of Transportation (Non-Medical):  Not on file   Physical Activity:     Days of Exercise per Week: Not on file    Minutes of Exercise per Session: Not on file   Stress:     Feeling of Stress : Not on file   Social Connections:     Frequency of Communication with Friends and Family: Not on file    Frequency of Social Gatherings with Friends and Family: Not on file    Attends Buddhism Services: Not on file    Active Member of [employer] or Organizations: Not on file    Attends Club or Organization Meetings: Not on file    Marital Status: Not on file   Intimate Partner Violence:     Fear of Current or Ex-Partner: Not on file    Emotionally Abused: Not on file    Physically Abused: Not on file    Sexually Abused: Not on file   Housing Stability:     Unable to Pay for Housing in the Last Year: Not on file    Number of [place] in the Last Year: Not on file    Unstable Housing in the Last Year: Not on file     No family history on file.  Allergies   Allergen Reactions    Norvasc [Amlodipine Besylate] Swelling         Review of Systems   All other systems reviewed and are negative. Objective:       Physical Exam  Vitals reviewed. Constitutional:       General: She is awake. She is not in acute distress. Appearance: She is well-developed and overweight. She is not ill-appearing, toxic-appearing or diaphoretic. HENT:      Head: Normocephalic and atraumatic. Eyes:      General: No scleral icterus. Conjunctiva/sclera: Conjunctivae normal.      Pupils: Pupils are equal, round, and reactive to light. Cardiovascular:      Rate and Rhythm: Normal rate and regular rhythm. Pulses: Normal pulses. Heart sounds: Murmur heard. Pulmonary:      Effort: Pulmonary effort is normal. No tachypnea, bradypnea, accessory muscle usage, prolonged expiration or respiratory distress. Breath sounds: Normal breath sounds. No stridor. No wheezing, rhonchi or rales. Chest:      Chest wall: No tenderness. Abdominal:      General: Bowel sounds are normal. There is no distension. Palpations: Abdomen is soft. There is no mass. Tenderness: There is no abdominal tenderness. There is no guarding or rebound. Musculoskeletal:         General: Normal range of motion. Cervical back: Normal range of motion and neck supple. Right lower leg: No edema. Left lower leg: No edema. Lymphadenopathy:      Cervical: No cervical adenopathy. Skin:     General: Skin is warm and dry. Coloration: Skin is not jaundiced or pale. Findings: No bruising, erythema or rash. Neurological:      Mental Status: She is alert. Mental status is at baseline. Motor: Weakness present.       Comments: oriented x 2-3   [place]   Psychiatric:         Attention and Perception: Attention and perception normal.         Mood and Affect: Mood normal.         Speech: Speech normal.         Behavior: Behavior normal. Behavior is cooperative. Thought Content: Thought content normal.         Cognition and Memory: Cognition normal.         Judgment: Judgment normal.      Comments: [facility]       Assessment:       Diagnosis Orders   1. Primary hypertension     2. Chronic obstructive pulmonary disease, unspecified COPD type ([facility] [address]     3. Bilateral hearing loss, unspecified hearing loss type     4. History of UTI     5. History of bacteremia     6. RICHARD (acute kidney injury) ([facility] [address]     7. Lung nodules           Plan:   1. Stable. No episodes of hypo or hypertension while in SNF.  -Continue current POC  -Routine labs  -JNC 8 guidelines  2. Stable. Continue current POC. Monitor for any acute changes. 3.  Recommend her family bring in hearing aids  4. Resolved. Monitor for any signs/symptoms of UTI. -Encourage p.o. fluids  -Avoid constipation  -Mirtha care  5. Keep follow-up with infectious disease  6. Improved per hospitalist discharge note. Routine labs pending. 7.  Family does not want to pursue aggressive treatment at this time. If they are interested/change their mind they can follow-up with pulmonology      Side effects, adverse effects of the medication prescribed today, as well as treatment plan and result expectations have beendiscussed with the patient who expresses understanding and desires to proceed.     JULIO Iraheta - CNP

## 2022-01-19 ENCOUNTER — OFFICE VISIT (OUTPATIENT)
Dept: GERIATRIC MEDICINE | Age: 87
End: 2022-01-19
Payer: MEDICARE
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
## 2022-01-19 DIAGNOSIS — N30.00 ACUTE CYSTITIS WITHOUT HEMATURIA: ICD-10-CM

## 2022-01-19 DIAGNOSIS — M54.50 ACUTE RIGHT-SIDED LOW BACK PAIN WITHOUT SCIATICA: Primary | ICD-10-CM

## 2022-01-19 PROCEDURE — 99309 SBSQ NF CARE MODERATE MDM 30: CPT | Performed by: NURSE PRACTITIONER

## 2022-01-19 NOTE — PROGRESS NOTES
Subjective:    Sumaya mallika [facility]  Patient ID: Humberto Monahan is a pleasant 80 y.o. female who presents today for:  Chief Complaint   Patient presents with   Rosalene Carine Fall    Urinary Tract Infection    Back Pain     right side    Patient suffered a fall over the weekend. Imaging has all returned negative for anything acute. She has been complaining of right lower back/hip pain, worse with movement. Tramadol was started with good response. She is positive for a UTI, which could be contributing to her back pain. However, will await sensitivity. No reports of dysuria or hematuria per the patient or the staff. Patient working with therapy at this time and has no c/o pain. Will implement a heating pad. No N/V/D. No change in appetite. No F/C. No CP, SOB, or palpitations. Patient Active Problem List   Diagnosis    Essential hypertension    Anxiety    COPD (chronic obstructive pulmonary disease) with acute bronchitis (HCC)    COPD exacerbation (HCC)    Obesity (BMI 30-39. 9)    Obstructive sleep apnea    Chest pain    Aphasia    Venous stasis dermatitis of both lower extremities    UTI (urinary tract infection)    Impaired mobility and activities of daily living     Past Medical History:   Diagnosis Date    Anxiety     Cancer ([address]     Glaucoma     Gout     left knee    Hypertension     Lung disease     Obesity (BMI 30-39. 9) 7/11/2019    Obstructive sleep apnea 7/11/2019     Past Surgical History:   Procedure Laterality Date    CATARACT REMOVAL WITH IMPLANT Bilateral 2016    COLON SURGERY  2009    polyps    COLONOSCOPY  02/2009    GASTROSTOMY TUBE PLACEMENT N/A 11/4/2020    EGD PEG TUBE PLACEMENT performed by Santos Butler MD at [address] [address]  2002    graft from neck     Social History     Socioeconomic History    Marital status:       Spouse name: Not on file    Number of children: Not on file    Years of education: Not on file    Highest education level: Not on file   Occupational History    Not on file   Tobacco Use    Smoking status: Never Smoker    Smokeless tobacco: Never Used   Substance and Sexual Activity    Alcohol use: No    Drug use: No    Sexual activity: Not on file   Other Topics Concern    Not on file   Social History Narrative    ,  Lives with her dtr Wendy Pacheco in [place] [address]     [facility], [place], [place],     Son Little Maldonado is in the area                      Social Determinants of Health     Financial Resource Strain:     Difficulty of Paying Living Expenses: Not on file   Food Insecurity:     Worried About [address] in the Last Year: Not on file    Deborah of Food in the Last Year: Not on file   Transportation Needs:     Lack of Transportation (Medical): Not on file    Lack of Transportation (Non-Medical): Not on file   Physical Activity:     Days of Exercise per Week: Not on file    Minutes of Exercise per Session: Not on file   Stress:     Feeling of Stress : Not on file   Social Connections:     Frequency of Communication with Friends and Family: Not on file    Frequency of Social Gatherings with Friends and Family: Not on file    Attends Mandaen Services: Not on file    Active Member of [address] [place] [address] or Organizations: Not on file    Attends Club or Organization Meetings: Not on file    Marital Status: Not on file   Intimate Partner Violence:     Fear of Current or Ex-Partner: Not on file    Emotionally Abused: Not on file    Physically Abused: Not on file    Sexually Abused: Not on file   Housing Stability:     Unable to Pay for Housing in the Last Year: Not on file    Number of [place] in the Last Year: Not on file    Unstable Housing in the Last Year: Not on file     No family history on file. Allergies   Allergen Reactions    Norvasc [Amlodipine Besylate] Swelling     Review of Systems   All other systems reviewed and are negative.     Objective:   LMP  (LMP Unknown)   130/68, 97.8, 84, 18, 98% room air  Physical Exam  Vitals reviewed. Constitutional:       General: She is awake. She is not in acute distress. Appearance: Normal appearance. She is well-developed and normal weight. She is not ill-appearing, toxic-appearing or diaphoretic. HENT:      Head: Normocephalic and atraumatic. Eyes:      General: No scleral icterus. Conjunctiva/sclera: Conjunctivae normal.      Pupils: Pupils are equal, round, and reactive to light. Cardiovascular:      Rate and Rhythm: Normal rate and regular rhythm. Heart sounds: Normal heart sounds. No murmur heard. Pulmonary:      Effort: Pulmonary effort is normal. No respiratory distress. Breath sounds: Normal breath sounds. No stridor. No wheezing, rhonchi or rales. Chest:      Chest wall: No tenderness. Abdominal:      General: Bowel sounds are normal. There is no distension. Palpations: Abdomen is soft. There is no mass. Tenderness: There is no abdominal tenderness. There is no guarding or rebound. Musculoskeletal:         General: Normal range of motion. Cervical back: Normal range of motion and neck supple. Right lower leg: No edema. Left lower leg: No edema. Lymphadenopathy:      Cervical: No cervical adenopathy. Skin:     General: Skin is warm and dry. Coloration: Skin is not cyanotic, jaundiced, mottled or pale. Findings: No bruising, erythema or rash. Neurological:      Mental Status: She is alert. Mental status is at baseline. Motor: Weakness present. Comments: Oriented x 2-3   [place]   Psychiatric:         Attention and Perception: Attention normal.         Mood and Affect: Mood normal.         Speech: Speech normal.         Behavior: Behavior normal. Behavior is cooperative. Thought Content: Thought content normal.         Cognition and Memory: Memory is impaired (forgetful ). Judgment: Judgment normal.         Assessment:       Diagnosis Orders   1. Acute right-sided low back pain without sciatica     2. Acute cystitis without hematuria     3. Fall, initial encounter           Plan:    1. Tramadol 50 mg Q 8 hours for moderate pain.  -Heating pad. -All imaging has returned negative, could be 2/2 UTI. If pain persists after UTI treatment will consider CT scan. 2. Sensitivity pending. 3. Continue with PT/OT to help improve balance, strength, and endurance.  -Fall risk precautions in place. Side effects, adverse effects of the medication prescribed today, as well as treatment plan and result expectations have beendiscussed with the patient who expresses understanding and desires to proceed.     Pattie Castro, JULIO - CNP

## 2022-01-24 ENCOUNTER — OFFICE VISIT (OUTPATIENT)
Dept: GERIATRIC MEDICINE | Age: 87
End: 2022-01-24
Payer: MEDICARE

## 2022-01-24 DIAGNOSIS — M54.6 THORACIC SPINE PAIN: Primary | ICD-10-CM

## 2022-01-24 DIAGNOSIS — R29.898 WEAKNESS OF BOTH LOWER EXTREMITIES: ICD-10-CM

## 2022-01-24 DIAGNOSIS — N30.00 ACUTE CYSTITIS WITHOUT HEMATURIA: ICD-10-CM

## 2022-01-24 PROCEDURE — 99309 SBSQ NF CARE MODERATE MDM 30: CPT | Performed by: NURSE PRACTITIONER

## 2022-01-25 PROBLEM — N39.0 UTI (URINARY TRACT INFECTION): Status: RESOLVED | Noted: 2021-12-26 | Resolved: 2022-01-25

## 2022-01-25 NOTE — PROGRESS NOTES
Subjective:    Sumaya mallika [facility]  Patient ID: Marlys Christensen is a pleasant 80 y.o. female who presents today for:  Chief Complaint   Patient presents with    Back Pain   Patient was evaluated in the PT room. Was able to observe the resident go up and down 4 stairs with 1 assist.  Uses walker and 1 assist. Rianna Sosa last week. X-rays returned negative. Continues  to experience right thoracic spine pain. However, improved. Treated for a UTI last week. No hematuria or dysuria. No F/C. No CP or SOB. Tolerating POP without N/V/D. No abd pain. Patient Active Problem List   Diagnosis    Essential hypertension    Anxiety    COPD (chronic obstructive pulmonary disease) with acute bronchitis (HCC)    COPD exacerbation (HCC)    Obesity (BMI 30-39. 9)    Obstructive sleep apnea    Chest pain    Aphasia    Venous stasis dermatitis of both lower extremities    UTI (urinary tract infection)    Impaired mobility and activities of daily living     Past Medical History:   Diagnosis Date    Anxiety     Cancer ([address]     Glaucoma     Gout     left knee    Hypertension     Lung disease     Obesity (BMI 30-39. 9) 7/11/2019    Obstructive sleep apnea 7/11/2019     Past Surgical History:   Procedure Laterality Date    CATARACT REMOVAL WITH IMPLANT Bilateral 2016    COLON SURGERY  2009    polyps    COLONOSCOPY  02/2009    GASTROSTOMY TUBE PLACEMENT N/A 11/4/2020    EGD PEG TUBE PLACEMENT performed by Nasreen Foley MD at [address] [address]  2002    graft from neck     Social History     Socioeconomic History    Marital status:       Spouse name: Not on file    Number of children: Not on file    Years of education: Not on file    Highest education level: Not on file   Occupational History    Not on file   Tobacco Use    Smoking status: Never Smoker    Smokeless tobacco: Never Used   Substance and Sexual Activity    Alcohol use: No    Drug use: No    Sexual activity: Not on file   Other Topics Concern    Not on file   Social History Narrative    ,  Lives with her dtr Author Justus in [facility]     [facility], [employer] [place], [place],     Son Mikie Gill is in the area                      Social Determinants of Health     Financial Resource Strain:     Difficulty of Paying Living Expenses: Not on file   Food Insecurity:     Worried About [address] in the Last Year: Not on file    Deborah of Food in the Last Year: Not on file   Transportation Needs:     Lack of Transportation (Medical): Not on file    Lack of Transportation (Non-Medical): Not on file   Physical Activity:     Days of Exercise per Week: Not on file    Minutes of Exercise per Session: Not on file   Stress:     Feeling of Stress : Not on file   Social Connections:     Frequency of Communication with Friends and Family: Not on file    Frequency of Social Gatherings with Friends and Family: Not on file    Attends Mandaen Services: Not on file    Active Member of [address] [place] [address] or Organizations: Not on file    Attends Club or Organization Meetings: Not on file    Marital Status: Not on file   Intimate Partner Violence:     Fear of Current or Ex-Partner: Not on file    Emotionally Abused: Not on file    Physically Abused: Not on file    Sexually Abused: Not on file   Housing Stability:     Unable to Pay for Housing in the Last Year: Not on file    Number of [place] in the Last Year: Not on file    Unstable Housing in the Last Year: Not on file     No family history on file. Allergies   Allergen Reactions    Norvasc [Amlodipine Besylate] Swelling         Review of Systems   All other systems reviewed and are negative. Objective:   See [facility] for VS    Physical Exam  Vitals reviewed. Constitutional:       General: She is awake. She is not in acute distress. Appearance: She is well-developed and overweight. She is not ill-appearing, toxic-appearing or diaphoretic.    HENT:      Head: Normocephalic and atraumatic. Eyes:      General: No scleral icterus. Conjunctiva/sclera: Conjunctivae normal.      Pupils: Pupils are equal, round, and reactive to light. Cardiovascular:      Rate and Rhythm: Normal rate and regular rhythm. Pulses: Normal pulses. Heart sounds: Normal heart sounds. No murmur heard. Pulmonary:      Effort: Pulmonary effort is normal. No respiratory distress. Breath sounds: Normal breath sounds. No stridor. No wheezing, rhonchi or rales. Chest:      Chest wall: No tenderness. Abdominal:      General: Bowel sounds are normal. There is no distension. Palpations: Abdomen is soft. There is no mass. Tenderness: There is no abdominal tenderness. There is no guarding or rebound. Musculoskeletal:         General: Normal range of motion. Cervical back: Normal range of motion and neck supple. Right lower leg: No edema. Left lower leg: No edema. Lymphadenopathy:      Cervical: No cervical adenopathy. Skin:     General: Skin is warm and dry. Coloration: Skin is not cyanotic, jaundiced, mottled, pale or sallow. Findings: No erythema or rash. Neurological:      Mental Status: She is alert and oriented to person, place, and time. Mental status is at baseline. Motor: Weakness (LE weakness-bilat) present. Psychiatric:         Mood and Affect: Mood normal.         Behavior: Behavior normal. Behavior is cooperative. Thought Content: Thought content normal.         Judgment: Judgment normal.       Assessment:       Diagnosis Orders   1. Thoracic spine pain     2. Weakness of both lower extremities     3. Acute cystitis without hematuria           Plan:   1. Continue with PT/OT. -Trial heat before therapy sessions, ice after sessions. - D/C Tramadol.  -Tylenol 1 gram bid  -Add Zanaflex 2 mg Q 6 hours PRN  2. Continue with PT/OT  3. Resolved.       Side effects, adverse effects of the medication prescribed today, as well as treatment plan and result expectations have beendiscussed with the patient who expresses understanding and desires to proceed.     Sharmaine Granados, APRN - CNP

## 2022-01-26 ENCOUNTER — OFFICE VISIT (OUTPATIENT)
Dept: GERIATRIC MEDICINE | Age: 87
End: 2022-01-26
Payer: MEDICARE

## 2022-01-26 DIAGNOSIS — F41.9 ANXIETY: ICD-10-CM

## 2022-01-26 DIAGNOSIS — G47.33 OBSTRUCTIVE SLEEP APNEA: ICD-10-CM

## 2022-01-26 DIAGNOSIS — Z78.9 IMPAIRED MOBILITY AND ACTIVITIES OF DAILY LIVING: ICD-10-CM

## 2022-01-26 DIAGNOSIS — Z74.09 IMPAIRED MOBILITY AND ACTIVITIES OF DAILY LIVING: ICD-10-CM

## 2022-01-26 DIAGNOSIS — I87.2 VENOUS STASIS DERMATITIS OF BOTH LOWER EXTREMITIES: ICD-10-CM

## 2022-01-26 DIAGNOSIS — J20.9 COPD (CHRONIC OBSTRUCTIVE PULMONARY DISEASE) WITH ACUTE BRONCHITIS (HCC): ICD-10-CM

## 2022-01-26 DIAGNOSIS — I10 ESSENTIAL HYPERTENSION: Primary | ICD-10-CM

## 2022-01-26 DIAGNOSIS — J44.0 COPD (CHRONIC OBSTRUCTIVE PULMONARY DISEASE) WITH ACUTE BRONCHITIS (HCC): ICD-10-CM

## 2022-01-26 DIAGNOSIS — M54.89 OTHER BACK PAIN, UNSPECIFIED CHRONICITY: ICD-10-CM

## 2022-01-26 PROCEDURE — 99309 SBSQ NF CARE MODERATE MDM 30: CPT | Performed by: NURSE PRACTITIONER

## 2022-01-27 ENCOUNTER — TELEPHONE (OUTPATIENT)
Dept: FAMILY MEDICINE CLINIC | Age: 87
End: 2022-01-27
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
## 2022-02-08 DIAGNOSIS — R07.9 CHEST PAIN, UNSPECIFIED: ICD-10-CM

## 2022-02-08 RX ORDER — ATORVASTATIN CALCIUM 20 MG/1
TABLET, FILM COATED ORAL
Qty: 90 TABLET | Refills: 0 | Status: SHIPPED | OUTPATIENT
Start: 2022-02-08 | End: 2022-05-10

## 2022-02-10 ENCOUNTER — VIRTUAL VISIT (OUTPATIENT)
Dept: FAMILY MEDICINE CLINIC | Age: 87
End: 2022-02-10
Payer: MEDICARE

## 2022-02-10 DIAGNOSIS — Z87.01 HISTORY OF PNEUMONIA: ICD-10-CM

## 2022-02-10 DIAGNOSIS — E04.1 THYROID NODULE: ICD-10-CM

## 2022-02-10 DIAGNOSIS — G47.33 OSA (OBSTRUCTIVE SLEEP APNEA): ICD-10-CM

## 2022-02-10 DIAGNOSIS — R79.1 ABNORMAL INR: ICD-10-CM

## 2022-02-10 DIAGNOSIS — Z23 ENCOUNTER FOR IMMUNIZATION: ICD-10-CM

## 2022-02-10 DIAGNOSIS — Z00.00 ENCOUNTER FOR ANNUAL WELLNESS EXAM IN MEDICARE PATIENT: Primary | ICD-10-CM

## 2022-02-10 DIAGNOSIS — E87.6 HYPOKALEMIA: ICD-10-CM

## 2022-02-10 DIAGNOSIS — Z00.00 ROUTINE GENERAL MEDICAL EXAMINATION AT A HEALTH CARE FACILITY: ICD-10-CM

## 2022-02-10 DIAGNOSIS — N20.0 NEPHROLITHIASIS: ICD-10-CM

## 2022-02-10 DIAGNOSIS — F41.9 ANXIETY: ICD-10-CM

## 2022-02-10 DIAGNOSIS — R91.8 MULTIPLE LUNG NODULES: ICD-10-CM

## 2022-02-10 DIAGNOSIS — I10 ESSENTIAL HYPERTENSION: ICD-10-CM

## 2022-02-10 DIAGNOSIS — I51.89 DIASTOLIC DYSFUNCTION: ICD-10-CM

## 2022-02-10 DIAGNOSIS — Z87.440 HISTORY OF UTI: Primary | ICD-10-CM

## 2022-02-10 PROCEDURE — 99215 OFFICE O/P EST HI 40 MIN: CPT | Performed by: INTERNAL MEDICINE

## 2022-02-10 PROCEDURE — 1111F DSCHRG MED/CURRENT MED MERGE: CPT | Performed by: INTERNAL MEDICINE

## 2022-02-10 PROCEDURE — G0439 PPPS, SUBSEQ VISIT: HCPCS | Performed by: INTERNAL MEDICINE

## 2022-02-10 RX ORDER — DORZOLAMIDE HYDROCHLORIDE AND TIMOLOL MALEATE 20; 5 MG/ML; MG/ML
1 SOLUTION/ DROPS OPHTHALMIC 2 TIMES DAILY
Qty: 1 EACH | Refills: 1 | Status: SHIPPED | OUTPATIENT
Start: 2022-02-10 | End: 2022-03-11

## 2022-02-10 RX ORDER — IPRATROPIUM BROMIDE AND ALBUTEROL SULFATE 2.5; .5 MG/3ML; MG/3ML
1 SOLUTION RESPIRATORY (INHALATION) EVERY 6 HOURS PRN
Qty: 360 ML | Refills: 11 | Status: SHIPPED | OUTPATIENT
Start: 2022-02-10 | End: 2022-02-22 | Stop reason: SDUPTHER

## 2022-02-10 RX ORDER — BRIMONIDINE TARTRATE 0.15 %
1 DROPS OPHTHALMIC (EYE) 2 TIMES DAILY
Qty: 1 EACH | Refills: 1 | Status: SHIPPED | OUTPATIENT
Start: 2022-02-10 | End: 2022-09-29 | Stop reason: SDUPTHER

## 2022-02-10 RX ORDER — ALBUTEROL SULFATE 90 UG/1
1 AEROSOL, METERED RESPIRATORY (INHALATION) EVERY 6 HOURS PRN
Qty: 1 EACH | Refills: 5 | Status: SHIPPED | OUTPATIENT
Start: 2022-02-10 | End: 2022-02-22 | Stop reason: SDUPTHER

## 2022-02-10 RX ORDER — ZOSTER VACCINE RECOMBINANT, ADJUVANTED 50 MCG/0.5
0.5 KIT INTRAMUSCULAR SEE ADMIN INSTRUCTIONS
Qty: 0.5 ML | Refills: 0 | Status: SHIPPED | OUTPATIENT
Start: 2022-02-10 | End: 2022-07-18

## 2022-02-10 RX ORDER — LATANOPROST 50 UG/ML
1 SOLUTION/ DROPS OPHTHALMIC
Qty: 1 EACH | Refills: 1 | Status: SHIPPED | OUTPATIENT
Start: 2022-02-10 | End: 2022-03-11

## 2022-02-10 ASSESSMENT — ENCOUNTER SYMPTOMS
BACK PAIN: 0
SHORTNESS OF BREATH: 0
ABDOMINAL PAIN: 0
EYE PAIN: 0

## 2022-02-10 NOTE — PATIENT INSTRUCTIONS
Personalized Preventive Plan for Vasyl Mac - 2/10/2022  Medicare offers a range of preventive health benefits. Some of the tests and screenings are paid in full while other may be subject to a deductible, co-insurance, and/or copay. Some of these benefits include a comprehensive review of your medical history including lifestyle, illnesses that may run in your family, and various assessments and screenings as appropriate. After reviewing your medical record and screening and assessments performed today your provider may have ordered immunizations, labs, imaging, and/or referrals for you. A list of these orders (if applicable) as well as your Preventive Care list are included within your After Visit Summary for your review. Other Preventive Recommendations:    · A preventive eye exam performed by an eye specialist is recommended every 1-2 years to screen for glaucoma; cataracts, macular degeneration, and other eye disorders. · A preventive dental visit is recommended every 6 months. · Try to get at least 150 minutes of exercise per week or 10,000 steps per day on a pedometer . · Order or download the FREE \"Exercise & Physical Activity: Your Everyday Guide\" from [employer]. Call 7-630.109.4202 or search [employer] online. · You need 0944-5118 mg of calcium and 5776-7506 IU of vitamin D per day. It is possible to meet your calcium requirement with diet alone, but a vitamin D supplement is usually necessary to meet this goal.  · When exposed to the sun, use a sunscreen that protects against both UVA and UVB radiation with an SPF of 30 or greater. Reapply every 2 to 3 hours or after sweating, drying off with a towel, or swimming. · Always wear a seat belt when traveling in a car. Always wear a helmet when riding a bicycle or motorcycle.

## 2022-02-10 NOTE — PROGRESS NOTES
Medicare Annual Wellness Visit  Name: Ronny Engle Date: 2/10/2022   MRN: [id number] Sex: Female   Age: 80 y.o. Ethnicity: Non- / Non    : 1928 Race: White (non-)      Tucker Brar is here for No chief complaint on file. Screenings for behavioral, psychosocial and functional/safety risks, and cognitive dysfunction are all negative except as indicated below. These results, as well as other patient data from the [address] form, are documented in Flowsheets linked to this Encounter. Allergies   Allergen Reactions    Norvasc [Amlodipine Besylate] Swelling       Prior to Visit Medications    Medication Sig Taking?  Authorizing Provider   ipratropium-albuterol (DUONEB) 0.5-2.5 (3) MG/3ML SOLN nebulizer solution Inhale 3 mLs into the lungs every 6 hours as needed for Shortness of Breath ((or wheezing))  Jaime Torres MD   albuterol sulfate HFA (PROAIR HFA) 108 (90 Base) MCG/ACT inhaler Inhale 1 puff into the lungs every 6 hours as needed for Wheezing or Shortness of Breath  Jaime Torres MD   brimonidine (ALPHAGAN P) 0.15 % ophthalmic solution Place 1 drop into both eyes 2 times daily Indications: Glaucoma  Jaime Torres MD   dorzolamide-timolol (COSOPT) 22.3-6.8 MG/ML ophthalmic solution Place 1 drop into both eyes 2 times daily Indications: Glaucoma  Jaime Torres MD   latanoprost (XALATAN) 0.005 % ophthalmic solution Place 1 drop into both eyes Daily with lunch Indications: Glaucoma  Jaime Torres MD   atorvastatin (LIPITOR) 20 MG tablet Indications: High Amount of Fats in the Blood Take one tablet Po qhs  Jaime Torres MD   losartan (COZAAR) 25 MG tablet Take 1 tablet by mouth every 12 hours  Patient taking differently: Take 25 mg by mouth every 12 hours Indications: High Blood Pressure Disorder   Jaime Torres MD   Elastic Bandages & Supports (MEDICAL COMPRESSION STOCKINGS) MISC Knee high, 20 to 30 mm hg, remove at night, pharmacy to fit. Patient not taking: Reported on 2/10/2022  Joyce Castillo MD   fluticasone-salmeterol (ADVAIR DISKUS) 250-50 MCG/DOSE AEPB Inhale 1 puff into the lungs every 12 hours  Patient taking differently: Inhale 1 puff into the lungs every 12 hours Indications: Chronic Obstructive Lung Disease   Joyce Castillo MD   montelukast (SINGULAIR) 10 MG tablet Take 1 tablet by mouth nightly as needed (allergies)  Joyce Castillo MD   metoprolol tartrate (LOPRESSOR) 25 MG tablet TAKE 1/2 TABLET BY MOUTH TWICE A DAY  Patient taking differently: Take 12.5 mg by mouth 2 times daily Indications: High Blood Pressure Disorder   Joyce Castillo MD   OXYGEN Inhale 2 L into the lungs Indications: PRN at bedtime  Historical Provider, MD       Past Medical History:   Diagnosis Date    Anxiety     Cancer ([address]     Glaucoma     Gout     left knee    Hypertension     Lung disease     Obesity (BMI 30-39. 9) 7/11/2019    Obstructive sleep apnea 7/11/2019       Past Surgical History:   Procedure Laterality Date    CATARACT REMOVAL WITH IMPLANT Bilateral 2016    COLON SURGERY  2009    polyps    COLONOSCOPY  02/2009    GASTROSTOMY TUBE PLACEMENT N/A 11/4/2020    EGD PEG [facility]BE [facility]    graft from neck       No family history on file. CareTeam (Including outside providers/suppliers regularly involved in providing care):   Patient Care Team:  Joyce Castillo MD as PCP - General (Internal Medicine)  Joyce Castillo MD as PCP - [facility]    Wt Readings from Last 3 Encounters:   01/05/22 182 lb 15.7 oz (83 kg)   12/21/21 180 lb (81.6 kg)   01/19/21 187 lb (84.8 kg)     There were no vitals filed for this visit. There is no height or weight on file to calculate BMI. Based upon direct observation of the patient, evaluation of cognition reveals remote memory intact, recent memory impaired. Very little, family not very concerned. Offered neuro referral    Patient's complete Health Risk Assessment and screening values have been reviewed and are found in Flowsheets. The following problems were reviewed today and where indicated follow up appointments were made and/or referrals ordered.       Positive Risk Factor Screenings with Interventions:            General Health and ACP:     Advance Directives     Power of  Living Will ACP-Advance Directive ACP-Power of     Not on File Not on File Not on File Not on File      General Health Risk Interventions:  · No Living Will: Patient referred to [place] Habits/Nutrition:        Health Habits/Nutrition Interventions:  · Dental exam overdue:  patient encouraged to make appointment with his/her dentist         Personalized Preventive Plan   Current Health Maintenance Status  Immunization History   Administered Date(s) Administered    COVID-19, Pfizer Purple top, DILUTE for use, 12+ yrs, 30mcg/0.3mL dose 02/03/2021, 02/23/2021    Influenza, High Dose (Fluzone 65 yrs and older) 09/18/2017, 10/09/2018, 01/10/2021    Influenza, Quadv, IM, PF (6 mo and older Fluzone, Flulaval, Fluarix, and 3 yrs and older Afluria) 11/06/2020    Influenza, Triv, inactivated, subunit, adjuvanted, IM (Fluad 65 yrs and older) 10/14/2019    Pneumococcal Conjugate 13-valent ([id number]) 09/18/2017    Pneumococcal Polysaccharide ([id number]) 10/14/2019        Health Maintenance   Topic Date Due    DTaP/Tdap/Td vaccine (1 - Tdap) Never done    Shingles Vaccine (1 of 2) Never done   [employer] Visit (AWV)  06/02/2021    Lipid screen  07/09/2021    COVID-19 Vaccine (3 - Booster for Pfizer series) 07/23/2021    Flu vaccine (1) 09/01/2021    Depression Screen  08/04/2022    Potassium monitoring  01/05/2023    Creatinine monitoring  01/05/2023    Pneumococcal 65+ years Vaccine  Completed    Hepatitis A vaccine  Aged Out    Hepatitis B vaccine  Aged Out    Hib vaccine Aged Out    Meningococcal (ACWY) vaccine  Aged Out     Recommendations for [employer] Due: see orders and patient instructions/AVS.        Fall in last year  Home safety tips provided   Already doing PT , OT, SN (Right now)   Using a regular walker  Last opto several years (refusing to see opto)   Last dental exam \"couldn't even tell you\"  They will get her in to see that provider. Chronic hearing loss, won't wear hearing aids, Ace Srivastava never wore her hearing aids\"  No throw rugs in the home. Has grab bars in bathroom, walk in shower, has shower chair  Has railings   Has working smoke detectors and CO detectors. Wears seat belt. ACP referral  Wants to be full code, will have ACP referral.   Refusing all preventative care   She does as much exercise as she can, but not able to complete 150 minutes per week. Denies back pain   They will let me know if she wants shingrix   Recommended screening schedule for the next 5-10 years is provided to the patient in written form: see Patient Instructions/AVS.    There are no diagnoses linked to this encounter.

## 2022-02-10 NOTE — PROGRESS NOTES
Subjective:      Patient ID: Miguel Low is a 80 y.o. female who presents today with:  No chief complaint on file. HPI     Here for hospital follow up   12/26/2021 through 1/6/2022   No burning or frequency with urination (that is new or worsening)  She has chronic urinary frequency   bp 145/69, oxygen 95, pulse 73  126/72, oxygen 94  135/71, oxygen 95, pulse 72  Past Medical History:   Diagnosis Date    Anxiety     Cancer ([address]     Glaucoma     Gout     left knee    Hypertension     Lung disease     Obesity (BMI 30-39. 9) 7/11/2019    Obstructive sleep apnea 7/11/2019     Past Surgical History:   Procedure Laterality Date    CATARACT REMOVAL WITH IMPLANT Bilateral 2016    COLON SURGERY  2009    polyps    COLONOSCOPY  02/2009    GASTROSTOMY TUBE PLACEMENT N/A 11/4/2020    EGD PEG TUBE PLACEMENT performed by Shayna Enciso MD at [address] [address]  2002    graft from neck     Social History     Socioeconomic History    Marital status:      Spouse name: Not on file    Number of children: Not on file    Years of education: Not on file    Highest education level: Not on file   Occupational History    Not on file   Tobacco Use    Smoking status: Never Smoker    Smokeless tobacco: Never Used   Substance and Sexual Activity    Alcohol use: No    Drug use: No    Sexual activity: Not on file   Other Topics Concern    Not on file   Social History Narrative    ,  Lives with her dtr Macrina Solorio in [place] [address] [address],     Son Flo Hernandez is in the area                      Social Determinants of Health     Financial Resource Strain:     Difficulty of Paying Living Expenses: Not on file   Food Insecurity:     Worried About [address] in the Last Year: Not on file    [address] in the Last Year: Not on file   Transportation Needs:     Lack of Transportation (Medical):  Not on file    Lack of Transportation (Non-Medical): Not on file   Physical Activity:     Days of Exercise per Week: Not on file    Minutes of Exercise per Session: Not on file   Stress:     Feeling of Stress : Not on file   Social Connections:     Frequency of Communication with Friends and Family: Not on file    Frequency of Social Gatherings with Friends and Family: Not on file    Attends Temple Services: Not on file    Active Member of [address] [place] [address] or Organizations: Not on file    Attends Club or Organization Meetings: Not on file    Marital Status: Not on file   Intimate Partner Violence:     Fear of Current or Ex-Partner: Not on file    Emotionally Abused: Not on file    Physically Abused: Not on file    Sexually Abused: Not on file   Housing Stability:     Unable to Pay for Housing in the Last Year: Not on file    Number of [place] in the Last Year: Not on file    Unstable Housing in the Last Year: Not on file     Allergies   Allergen Reactions    Norvasc [Amlodipine Besylate] Swelling     Current Outpatient Medications on File Prior to Visit   Medication Sig Dispense Refill    atorvastatin (LIPITOR) 20 MG tablet Indications: High Amount of Fats in the Blood Take one tablet Po qhs 90 tablet 0    losartan (COZAAR) 25 MG tablet Take 1 tablet by mouth every 12 hours (Patient taking differently: Take 25 mg by mouth every 12 hours Indications: High Blood Pressure Disorder ) 180 tablet 3    fluticasone-salmeterol (ADVAIR DISKUS) 250-50 MCG/DOSE AEPB Inhale 1 puff into the lungs every 12 hours (Patient taking differently: Inhale 1 puff into the lungs every 12 hours Indications: Chronic Obstructive Lung Disease ) 180 each 3    montelukast (SINGULAIR) 10 MG tablet Take 1 tablet by mouth nightly as needed (allergies) 90 tablet 3    metoprolol tartrate (LOPRESSOR) 25 MG tablet TAKE 1/2 TABLET BY MOUTH TWICE A DAY (Patient taking differently: Take 12.5 mg by mouth 2 times daily Indications: High Blood Pressure Disorder ) 90 tablet 3    OXYGEN Inhale 2 L into the lungs Indications: PRN at bedtime      Elastic Bandages & Supports (MEDICAL COMPRESSION STOCKINGS) MISC Knee high, 20 to 30 mm hg, remove at night, pharmacy to fit. (Patient not taking: Reported on 2/10/2022) 1 each 0     No current facility-administered medications on file prior to visit. I have personally reviewed the ROS, PMH, PFH, and social history     Review of Systems   Constitutional: Negative for chills and fever. HENT: Negative for congestion. Eyes: Negative for pain. Respiratory: Negative for shortness of breath. Cardiovascular: Negative for chest pain. Gastrointestinal: Negative for abdominal pain. Genitourinary: Negative for hematuria. Musculoskeletal: Negative for back pain. Allergic/Immunologic: Negative for immunocompromised state. Neurological: Negative for headaches. Psychiatric/Behavioral: Negative for hallucinations. Objective:   LMP  (LMP Unknown)     Physical Exam  Constitutional:       General: She is not in acute distress. Appearance: She is not ill-appearing, toxic-appearing or diaphoretic. Pulmonary:      Effort: Pulmonary effort is normal. No respiratory distress. Assessment:       Diagnosis Orders   1. History of UTI  Urine Reflex to Culture    High Sensitivity Crp    Protime-Inr   2. History of pneumonia  XR CHEST (2 VW)    High Sensitivity [facility] [place]   3. Multiple lung nodules  High Sensitivity Crp    Protime-Inr   4. Diastolic dysfunction  albuterol sulfate HFA (PROAIR HFA) 108 (90 Base) MCG/ACT inhaler    High Sensitivity Crp    Protime-Inr   5. Essential hypertension  albuterol sulfate HFA (PROAIR HFA) 108 (90 Base) MCG/ACT inhaler    High Sensitivity Crp    Protime-Inr   6. Anxiety  albuterol sulfate HFA (PROAIR HFA) 108 (90 Base) MCG/ACT inhaler    High Sensitivity Crp    Protime-Inr   7.  Hypokalemia  albuterol sulfate HFA (PROAIR HFA) 108 (90 Base) MCG/ACT inhaler    High Sensitivity Crp    Protime-Inr   8. FRED (obstructive sleep apnea)  albuterol sulfate HFA (PROAIR HFA) 108 (90 Base) MCG/ACT inhaler    High Sensitivity Crp    Protime-Inr   9. Encounter for immunization  albuterol sulfate HFA (PROAIR HFA) 108 (90 Base) MCG/ACT inhaler    High Sensitivity Crp    Protime-Inr   10. Nephrolithiasis  Blanquita Jade MD, Urology, [facility] Sensitivity Crp    Protime-Inr   11. Abnormal INR  High Sensitivity Crp    Protime-Inr   12. Thyroid nodule  US HEAD NECK SOFT TISSUE THYROID         Plan:    video visit done because of coronavirus pandemic. Doxy. me used   explained billeable visit, patient understands and agrees to continue  I was at home and patient was at home during this visit. Clinically improved  Getting covid booster tomorrow   Dilatation, ascending thoracic aorta. -Discuss at fu   Interval development of right lung nodules, largest measuring 7 x 12 mm. See below. Basic labs at next appt m  Thyroid us                   Orders Placed This Encounter   Procedures    XR CHEST (2 VW)     Standing Status:   Future     Standing Expiration Date:   2/10/2023     Order Specific Question:   Reason for exam:     Answer:   fu cxr    US HEAD NECK SOFT TISSUE THYROID     This procedure can be scheduled via [employer]. Access your [employer] account by visiting [email or web address]. Standing Status:   Future     Standing Expiration Date:   2/12/2023     Order Specific Question:   Reason for exam:     Answer:   nodules    Urine Reflex to Culture     Standing Status:   Future     Standing Expiration Date:   2/10/2023     Order Specific Question:   SPECIFY(EX-CATH,MIDSTREAM,CYSTO,ETC)? Answer:   mid stream    High Sensitivity Crp     Standing Status:   Future     Standing Expiration Date:   2/10/2023    Protime-Inr     Standing Status:   Future     Standing Expiration Date:   2/10/2023     Order Specific Question:   Daily Coumadin Dose?      Answer:   [address] - Mohan Regalado MD, Urology, [place]     Referral Priority:   Routine     Referral Type:   Eval and Treat     Referral Reason:   Specialty Services Required     Referred to Provider:   Pedro Mcdaniels MD     Requested Specialty:   Urology     Number of Visits Requested:   [address]     Referral Priority:   Routine     Referral Type:   Eval and Treat     Referral Reason:   Specialty Services Required     Requested Specialty:   Speech Pathology     Number of Visits Requested:   1     Orders Placed This Encounter   Medications    ipratropium-albuterol (DUONEB) 0.5-2.5 (3) MG/3ML SOLN nebulizer solution     Sig: Inhale 3 mLs into the lungs every 6 hours as needed for Shortness of Breath ((or wheezing))     Dispense:  360 mL     Refill:  11    albuterol sulfate HFA (PROAIR HFA) 108 (90 Base) MCG/ACT inhaler     Sig: Inhale 1 puff into the lungs every 6 hours as needed for Wheezing or Shortness of Breath     Dispense:  1 each     Refill:  5    brimonidine (ALPHAGAN P) 0.15 % ophthalmic solution     Sig: Place 1 drop into both eyes 2 times daily Indications: Glaucoma     Dispense:  1 each     Refill:  1    dorzolamide-timolol (COSOPT) 22.3-6.8 MG/ML ophthalmic solution     Sig: Place 1 drop into both eyes 2 times daily Indications: Glaucoma     Dispense:  1 each     Refill:  1    latanoprost (XALATAN) 0.005 % ophthalmic solution     Sig: Place 1 drop into both eyes Daily with lunch Indications: Glaucoma     Dispense:  1 each     Refill:  1   refusing to see her eye doctor   Had flu shot 2021   Risk of lung cancer explained. 36 minutes spent with additional 7 minutes reviewing notes from hospital, etc.    Family will decide if they want to pursue lung nodule (risk of cancer)   If anything should change or worsen call ASAP, don't wait for next scheduled appointment.     Return in about 3 weeks (around 3/3/2022) for Chronic condition management/appointment, worsening symptoms, call ASAP for appointment.       Melissa Dodson MD

## 2022-02-15 ENCOUNTER — CLINICAL DOCUMENTATION (OUTPATIENT)
Dept: SPIRITUAL SERVICES | Age: 87
End: 2022-02-15

## 2022-02-15 NOTE — ACP (ADVANCE CARE PLANNING)
Advance Care Planning   Ambulatory ACP Specialist Patient Outreach    Date:  2/15/2022  ACP Specialist:  Ramiro Rudolph    Outreach call to patient in follow-up to ACP Specialist referral from: Suri Cope MD    [x] PCP  [] Provider   [] Ambulatory Care Management [] Other for Reason:    [x] Advance Directive Assistance  [] Code Status Discussion  [] Complete Portable DNR Order  [] Discuss Goals of Care  [] Complete POST/MOST  [] Early ACP Decision-Making  [] Other    Date Referral Received:2/10/22    Today's Outreach:  [x] First   [] Second  [] Third                               First outreach made by [x]  phone  [x] email [x]   [employer]     Intervention:  [] Spoke with Patient  [x] Left VM requesting return call      Outcome:Left detailed VM for patient to call back, Sent Email with ACP Packet attached, [employer]. Will reach out again in two weeks. Next Step:   [] ACP scheduled conversation  [x] Outreach again in two week               [x] Email / Mail ACP Info Sheets  [x] Email / Mail Advance Directive            [] Close Referral. Routing closure to referring provider/staff and to ACP Specialist .      Thank you for this referral.

## 2022-02-22 DIAGNOSIS — I10 ESSENTIAL HYPERTENSION: ICD-10-CM

## 2022-02-22 DIAGNOSIS — E87.6 HYPOKALEMIA: ICD-10-CM

## 2022-02-22 DIAGNOSIS — F41.9 ANXIETY: ICD-10-CM

## 2022-02-22 DIAGNOSIS — I51.89 DIASTOLIC DYSFUNCTION: ICD-10-CM

## 2022-02-22 DIAGNOSIS — Z23 ENCOUNTER FOR IMMUNIZATION: ICD-10-CM

## 2022-02-22 DIAGNOSIS — G47.33 OSA (OBSTRUCTIVE SLEEP APNEA): ICD-10-CM

## 2022-02-22 RX ORDER — IPRATROPIUM BROMIDE AND ALBUTEROL SULFATE 2.5; .5 MG/3ML; MG/3ML
1 SOLUTION RESPIRATORY (INHALATION) EVERY 6 HOURS PRN
Qty: 360 ML | Refills: 3 | Status: SHIPPED | OUTPATIENT
Start: 2022-02-22 | End: 2022-04-27

## 2022-02-22 RX ORDER — ALBUTEROL SULFATE 90 UG/1
1 AEROSOL, METERED RESPIRATORY (INHALATION) EVERY 6 HOURS PRN
Qty: 3 EACH | Refills: 3 | Status: SHIPPED | OUTPATIENT
Start: 2022-02-22

## 2022-02-22 NOTE — PROGRESS NOTES
Subjective:    Sumaya tena [facility]  Patient ID: Rocio Knowles is a pleasant 80 y.o. female who presents today for:  Chief Complaint   Patient presents with    Other     discharge home    Patient will be going home in the next day or two. She will be with family and will require C. She is using a wheeled walker and doing well/progressing with therapy. She has no complaints at this time. She denies CP, SOB, or palpitations. She is tolerating PO. No N/V/D. No melena or hematochezia. UTI has resolved with oral ATB. No dysuria or hematuria. Back pain well manage with pain medications. Medications:  Albuterol 1 puff every 6 hours as needed for wheezing/shortness of breath  Atorvastatin 20 mg daily  Metoprolol 12.5 mg twice daily  Alphagan ophthalmic drops 0.15% 1 drop both eyes twice daily  Cosopt 22.3-6.8 mg ophthalmic drops 1 drop both eyes twice daily Advair Diskus 250-50 mcg 1 puff inhalation twice daily  DuoNeb 1 vial/inhalation every 6 hours as needed for shortness of breath/wheezing  Xalantan  ophthalmic solution 1 drop both eyes every afternoon  Cozaar 25 mg twice daily  Singulair 1 mg tablet p.o. at at bedtime as needed  Oxygen 2 L  Tylenol 1 gram Q 6-8 hours PRN pain  Patient Active Problem List   Diagnosis    Essential hypertension    Anxiety    COPD (chronic obstructive pulmonary disease) with acute bronchitis (HCC)    COPD exacerbation (HCC)    Obesity (BMI 30-39. 9)    Obstructive sleep apnea    Chest pain    Aphasia    Venous stasis dermatitis of both lower extremities    Impaired mobility and activities of daily living     Past Medical History:   Diagnosis Date    Anxiety     [address] [address]     Glaucoma     Gout     left knee    Hypertension     Lung disease     Obesity (BMI 30-39. 9) 7/11/2019    Obstructive sleep apnea 7/11/2019     Past Surgical History:   Procedure Laterality Date    CATARACT REMOVAL WITH IMPLANT Bilateral 2016    COLON SURGERY  2009    polyps    Unable to Pay for Housing in the Last Year: Not on file    Number of Places Lived in the Last Year: Not on file    Unstable Housing in the Last Year: Not on file     No family history on file. Allergies   Allergen Reactions    Norvasc [Amlodipine Besylate] Swelling         Review of Systems   All other systems reviewed and are negative. Objective:   LMP  (LMP Unknown)     Physical Exam  Vitals reviewed. Constitutional:       General: She is awake. She is not in acute distress. Appearance: She is well-developed and overweight. She is not ill-appearing, toxic-appearing or diaphoretic. Interventions: Nasal cannula in place. HENT:      Head: Normocephalic and atraumatic. Mouth/Throat:      Mouth: Mucous membranes are moist.      Pharynx: Oropharynx is clear. Eyes:      General: No scleral icterus. Extraocular Movements: Extraocular movements intact. Conjunctiva/sclera: Conjunctivae normal.      Pupils: Pupils are equal, round, and reactive to light. Cardiovascular:      Rate and Rhythm: Normal rate and regular rhythm. Pulses: Normal pulses. Heart sounds: Normal heart sounds. No murmur heard. Pulmonary:      Effort: Pulmonary effort is normal. No respiratory distress. Breath sounds: Normal breath sounds. No stridor. No wheezing, rhonchi or rales. Chest:      Chest wall: No tenderness. Abdominal:      General: Abdomen is protuberant. Bowel sounds are normal. There is no distension. Palpations: Abdomen is soft. There is no mass. Tenderness: There is no abdominal tenderness. There is no guarding or rebound. Musculoskeletal:         General: Normal range of motion. Cervical back: Normal range of motion and neck supple. Right lower leg: No edema. Left lower leg: No edema. Lymphadenopathy:      Cervical: No cervical adenopathy. Skin:     General: Skin is warm and dry. Coloration: Skin is not cyanotic, jaundiced, mottled or pale. Findings: No bruising, erythema or rash. Neurological:      Mental Status: She is alert and oriented to person, place, and time. Mental status is at baseline. Motor: Weakness present. Psychiatric:         Attention and Perception: Attention and perception normal.         Mood and Affect: Mood and affect normal.         Speech: Speech normal.         Behavior: Behavior normal. Behavior is cooperative. Thought Content: Thought content normal.         Cognition and Memory: Cognition and memory normal.         Judgment: Judgment normal.       Assessment:       Diagnosis Orders   1. Essential hypertension     2. COPD (chronic obstructive pulmonary disease) with acute bronchitis ([facility] [address] [facility]     3. Obstructive sleep apnea     4. Venous stasis dermatitis of both lower extremities     5. Anxiety     6. Impaired mobility and activities of daily living     7. Other back pain, unspecified chronicity           Plan:    1. Stable. No episodes of hypo or hypertension. Recommend she continue current POC with beta-blocker. -JNC 8 guidelines recommended  2. Stable. No episodes of dyspnea or chest pain. Current POC. Follow-up with pulmonology. 3.  Supplemental oxygen and CPAP at at bedtime. Follow-up with pulmonology. 4.  Active problem while in skilled facility. Follow-up with PCP. 5.  Relaxation  techniques discussed at length. Follow-up with PCP if she continues to experience this. 6.  Continue with  PT/OT to help improve balance, strength and endurance. 7. Ok to continue Tylenol PRN. Do not exceed 4 grams in 24 hours. Pt/POA agrees to POC  Total time taken for discharging this patient: 35 minutes. Time was taken to review chart, discuss plans with discharge planner, nursing, reconciling medications, discussing plan answering questions with patient. Pertinent POC, labs, have been reviewed  Discharge patient from facility when arrangements are made.  Home Health Agency with PT/OT and Nursing if needed. Equipment needed wheeled walker . Same medications and Send medications with patien. F/U with PCP in 3-5 days. Herman Boron is needed to allow pt to participate safely in mobility related activities of daily living. The pt has a mobility limitation that significantly impairs his/her ability to participate in one or more mobility related activities of daily living in the home which prevents pt from accomplishing the MRADL or places pt in a reasonably determined heightened risk of morbidity secondary to the attempts to perform MRADL or prevents pt from completing the MRADL within a reasonable time frame  Pt is able to safely use the walker and the functional mobility deficit can be sufficiently resolved by the use to the walker. Side effects, adverse effects of the medication prescribed today, as well as treatment plan and result expectations have beendiscussed with the patient who expresses understanding and desires to proceed.     Maddie Rosenbaum, APRN - CNP

## 2022-02-24 DIAGNOSIS — R07.9 INTERMITTENT CHEST PAIN: ICD-10-CM

## 2022-02-24 NOTE — TELEPHONE ENCOUNTER
Future Appointments    This patient does not currently have any appointments scheduled.     Past Visits    Date Provider Specialty Visit Type Primary Dx   02/10/2022 Samir Vitale MD Family Medicine Virtual Visit Encounter for annual wellness exam in Medicare patient   02/10/2022 Samir Vitale MD Family Medicine Virtual Visit History of UTI

## 2022-02-27 ENCOUNTER — TELEPHONE (OUTPATIENT)
Dept: FAMILY MEDICINE CLINIC | Age: 87
End: 2022-02-27

## 2022-02-27 NOTE — TELEPHONE ENCOUNTER
On review of old records  She probably did have a prior Heart attack  Does the patient/daugther want to go through with stress testing and or see cardiology?

## 2022-03-01 ENCOUNTER — TELEPHONE (OUTPATIENT)
Dept: FAMILY MEDICINE CLINIC | Age: 87
End: 2022-03-01

## 2022-03-01 NOTE — TELEPHONE ENCOUNTER
Vida Scanlon is calling from [facility]. She is requesting a prescription for Diflucan for the patient who is experiencing a \"reddened\" Vaginal area, as well as itching. Thank you.     Pharm: [facility]

## 2022-03-02 DIAGNOSIS — B37.31 YEAST INFECTION OF THE VAGINA: ICD-10-CM

## 2022-03-02 RX ORDER — MICONAZOLE NITRATE 200 MG/1
200 SUPPOSITORY VAGINAL NIGHTLY
Qty: 7 SUPPOSITORY | Refills: 0 | Status: CANCELLED | OUTPATIENT
Start: 2022-03-02 | End: 2022-03-09

## 2022-03-02 NOTE — TELEPHONE ENCOUNTER
The miconazole suppositories are safer (otc as well or rx we can send let me know)   There is a very low risk of liver damage with diflucan  Without seeing or going to the walk in clinic I would prefer safer option

## 2022-03-11 RX ORDER — LATANOPROST 50 UG/ML
1 SOLUTION/ DROPS OPHTHALMIC DAILY
Qty: 2.5 ML | Refills: 5 | Status: SHIPPED | OUTPATIENT
Start: 2022-03-11 | End: 2022-09-29 | Stop reason: SDUPTHER

## 2022-03-11 RX ORDER — DORZOLAMIDE HYDROCHLORIDE AND TIMOLOL MALEATE 20; 5 MG/ML; MG/ML
1 SOLUTION/ DROPS OPHTHALMIC 2 TIMES DAILY
Qty: 10 ML | Refills: 5 | Status: SHIPPED | OUTPATIENT
Start: 2022-03-11 | End: 2022-09-29 | Stop reason: SDUPTHER

## 2022-03-15 ENCOUNTER — CLINICAL DOCUMENTATION (OUTPATIENT)
Dept: SPIRITUAL SERVICES | Age: 87
End: 2022-03-15

## 2022-03-15 NOTE — ACP (ADVANCE CARE PLANNING)
Advance Care Planning   Ambulatory ACP Specialist Patient Outreach    Date:  3/15/2022  ACP Specialist:  Daphney Long    Outreach call to patient in follow-up to ACP Specialist referral from: Meeta Chilel MD    [x] PCP  [] Provider   [] Ambulatory Care Management [] Other for Reason:    [x] Advance Directive Assistance  [] Code Status Discussion  [] Complete Portable DNR Order  [] Discuss Goals of Care  [] Complete POST/MOST  [] Early ACP Decision-Making  [] Other    Date Referral Received:2/10/22    Today's Outreach:  [] First   [x] Second  [] Third                               Second outreach made by [x]  phone  [x] email [x]   [employer]     Intervention:  [] Spoke with Patient  [x] Left VM requesting return call      Outcome:Left detailed VM for patient to call back, Sent Email with ACP Packet attached, [employer] Will reach out again in two weeks. Next Step:   [] ACP scheduled conversation  [x] Outreach again in two week               [x] Email / Mail ACP Info Sheets  [x] Email / Mail Advance Directive            [] Close Referral. Routing closure to referring provider/staff and to ACP Specialist .      Thank you for this referral.

## 2022-03-30 ENCOUNTER — CLINICAL DOCUMENTATION (OUTPATIENT)
Dept: SPIRITUAL SERVICES | Age: 87
End: 2022-03-30

## 2022-03-30 NOTE — ACP (ADVANCE CARE PLANNING)
Advance Care Planning   Ambulatory ACP Specialist Patient Outreach    Date:  3/30/2022  ACP Specialist:  Megan Veras    Outreach call to patient in follow-up to ACP Specialist referral from: Mohan Johansen MD    [x] PCP  [] Provider   [] Ambulatory Care Management [] Other for Reason:    [x] Advance Directive Assistance  [] Code Status Discussion  [] Complete Portable DNR Order  [] Discuss Goals of Care  [] Complete POST/MOST  [] Early ACP Decision-Making  [] Other    Date Referral Received:2/10/22    Today's Outreach:  [] First   [] Second  [x] Third                               Third outreach made by [x]  phone  [x] email [x]   [employer]     Intervention:  [] Spoke with Patient  [x] Left VM requesting return call      Outcome: Left detailed VM for Patient to return call, Sent Closure Email w/ACP Packet Attached, Sent Closure [place]. Third Outreach. Next Step:   [] ACP scheduled conversation  [] Outreach again in one week               [x] Email / Mail ACP Info Sheets  [x] Email / Mail Advance Directive            [x] Close Referral. Routing closure to referring provider/staff and to ACP Specialist .      Thank you for this referral.

## 2022-03-31 ENCOUNTER — TELEPHONE (OUTPATIENT)
Dept: FAMILY MEDICINE CLINIC | Age: 87
End: 2022-03-31

## 2022-03-31 NOTE — TELEPHONE ENCOUNTER
[facility] UAC and S urine culture [phone] Anabell Ott is with her now hoping t get this asap) please advise.

## 2022-03-31 NOTE — TELEPHONE ENCOUNTER
Order urine with reflex to culture if she can't come in for walk in clinic or do to telephone vv with first available  Thanks,    Call immediately should something change.      Helen Wolfe

## 2022-04-01 ENCOUNTER — PATIENT MESSAGE (OUTPATIENT)
Dept: FAMILY MEDICINE CLINIC | Age: 87
End: 2022-04-01

## 2022-04-01 RX ORDER — SULFAMETHOXAZOLE AND TRIMETHOPRIM 800; 160 MG/1; MG/1
1 TABLET ORAL 2 TIMES DAILY
Qty: 10 TABLET | Refills: 0 | Status: SHIPPED | OUTPATIENT
Start: 2022-04-01 | End: 2022-04-06

## 2022-04-02 LAB
ORGANISM: ABNORMAL
URINE CULTURE, ROUTINE: ABNORMAL
URINE CULTURE, ROUTINE: ABNORMAL

## 2022-04-04 ENCOUNTER — TELEPHONE (OUTPATIENT)
Dept: FAMILY MEDICINE CLINIC | Age: 87
End: 2022-04-04

## 2022-04-04 NOTE — TELEPHONE ENCOUNTER
Dondra Schirmer from Home health care, called and stated that La Johnson called her and would like to have a conversation with her. She requesting that the blood work be faxed so that she can draw the blood work for Dr. Nabor House.

## 2022-04-04 NOTE — TELEPHONE ENCOUNTER
Can you please see what the issue is Does patient have enough medication for 72 hours:     Next Visit Date:  Future Appointments   Date Time Provider Nathalia Cornell   8/26/2019  3:40 PM Richard López  [address] [facility] Maintenance   Topic Date Due    Hepatitis C screen  1961    HIV screen  07/15/1976    Cervical cancer screen  07/15/1982    Diabetes screen  07/15/2001    Shingles Vaccine (1 of 2) 07/15/2011    Colon Cancer Screen FIT/FOBT  05/11/2019    Potassium monitoring  05/01/2020    Creatinine monitoring  05/01/2020    Breast cancer screen  06/23/2020    Lipid screen  09/15/2021    DTaP/Tdap/Td vaccine (2 - Td) 11/24/2025    Flu vaccine  Completed    Pneumococcal 0-64 years Vaccine  Completed       No results found for: LABA1C          ( goal A1C is < 7)   No results found for: LABMICR  LDL Calculated (mg/dL)   Date Value   09/15/2016 114   09/04/2015 100       (goal LDL is <100)   AST (IU/L)   Date Value   07/03/2017 22     ALT (IU/L)   Date Value   07/03/2017 17     BUN (mg/dL)   Date Value   05/01/2019 11     BP Readings from Last 3 Encounters:   05/07/19 136/86   02/26/19 (!) 140/78   05/02/18 138/86          (goal 120/80)    All Future Testing planned in [facility]  Lab Frequency Next Occurrence   CBC Auto Differential Once 02/26/2019   Comprehensive Metabolic Panel Once 35/81/3501   Lipid Panel Once 02/26/2019   TSH with Reflex Once 02/26/2019               Patient Active Problem List:     Right sided sciatica     Asthma     Hypertension

## 2022-04-04 NOTE — TELEPHONE ENCOUNTER
Spoke with [facility], they wanted labs faxed to draw. Sent 3 open orders, was there any others you wanted ran?

## 2022-04-14 ENCOUNTER — APPOINTMENT (OUTPATIENT)
Dept: CT IMAGING | Age: 87
DRG: 445 | End: 2022-04-14
Payer: MEDICARE

## 2022-04-14 ENCOUNTER — HOSPITAL ENCOUNTER (INPATIENT)
Age: 87
LOS: 12 days | Discharge: NURSING, REHAB OR LONG-TERM CARE | DRG: 445 | End: 2022-04-26
Attending: SURGERY | Admitting: SURGERY
Payer: MEDICARE

## 2022-04-14 ENCOUNTER — APPOINTMENT (OUTPATIENT)
Dept: GENERAL RADIOLOGY | Age: 87
DRG: 445 | End: 2022-04-14
Payer: MEDICARE

## 2022-04-14 DIAGNOSIS — K80.00 CALCULUS OF GALLBLADDER WITH ACUTE CHOLECYSTITIS WITHOUT OBSTRUCTION: Primary | ICD-10-CM

## 2022-04-14 DIAGNOSIS — R10.11 RIGHT UPPER QUADRANT ABDOMINAL PAIN: ICD-10-CM

## 2022-04-14 DIAGNOSIS — R11.0 NAUSEA: ICD-10-CM

## 2022-04-14 PROBLEM — K81.0 ACUTE CHOLECYSTITIS: Status: ACTIVE | Noted: 2022-04-14

## 2022-04-14 LAB
ALBUMIN SERPL-MCNC: 3.3 G/DL (ref 3.5–4.6)
ALP BLD-CCNC: 224 U/L (ref 40–130)
ALT SERPL-CCNC: 19 U/L (ref 0–33)
ANION GAP SERPL CALCULATED.3IONS-SCNC: 15 MEQ/L (ref 9–15)
AST SERPL-CCNC: 12 U/L (ref 0–35)
BASOPHILS ABSOLUTE: 0.1 K/UL (ref 0–0.2)
BASOPHILS RELATIVE PERCENT: 0.7 %
BILIRUB SERPL-MCNC: 0.6 MG/DL (ref 0.2–0.7)
BUN BLDV-MCNC: 26 MG/DL (ref 8–23)
C-REACTIVE PROTEIN: 212.9 MG/L (ref 0–5)
CALCIUM SERPL-MCNC: 8.5 MG/DL (ref 8.5–9.9)
CHLORIDE BLD-SCNC: 99 MEQ/L (ref 95–107)
CO2: 22 MEQ/L (ref 20–31)
CREAT SERPL-MCNC: 1.35 MG/DL (ref 0.5–0.9)
EOSINOPHILS ABSOLUTE: 0.1 K/UL (ref 0–0.7)
EOSINOPHILS RELATIVE PERCENT: 1 %
GFR AFRICAN AMERICAN: 39
GFR AFRICAN AMERICAN: 44.2
GFR NON-AFRICAN AMERICAN: 32
GFR NON-AFRICAN AMERICAN: 36.5
GLOBULIN: 3.3 G/DL (ref 2.3–3.5)
GLUCOSE BLD-MCNC: 106 MG/DL (ref 70–99)
HCT VFR BLD CALC: 36.9 % (ref 37–47)
HEMOGLOBIN: 12.2 G/DL (ref 12–16)
LACTIC ACID: 1.2 MMOL/L (ref 0.5–2.2)
LYMPHOCYTES ABSOLUTE: 1.3 K/UL (ref 1–4.8)
LYMPHOCYTES RELATIVE PERCENT: 8.7 %
MCH RBC QN AUTO: 28.5 PG (ref 27–31.3)
MCHC RBC AUTO-ENTMCNC: 33.2 % (ref 33–37)
MCV RBC AUTO: 85.8 FL (ref 82–100)
MONOCYTES ABSOLUTE: 1.4 K/UL (ref 0.2–0.8)
MONOCYTES RELATIVE PERCENT: 9.1 %
NEUTROPHILS ABSOLUTE: 12.1 K/UL (ref 1.4–6.5)
NEUTROPHILS RELATIVE PERCENT: 80.5 %
PDW BLD-RTO: 14 % (ref 11.5–14.5)
PERFORMED ON: ABNORMAL
PLATELET # BLD: 307 K/UL (ref 130–400)
POC CREATININE WHOLE BLOOD: 1.5
POC CREATININE: 1.5 MG/DL (ref 0.6–1.2)
POC SAMPLE TYPE: ABNORMAL
POTASSIUM SERPL-SCNC: 4.3 MEQ/L (ref 3.4–4.9)
PROCALCITONIN: 0.27 NG/ML (ref 0–0.15)
RBC # BLD: 4.3 M/UL (ref 4.2–5.4)
REASON FOR REJECTION: NORMAL
REJECTED TEST: NORMAL
SODIUM BLD-SCNC: 136 MEQ/L (ref 135–144)
TOTAL PROTEIN: 6.6 G/DL (ref 6.3–8)
WBC # BLD: 15 K/UL (ref 4.8–10.8)

## 2022-04-14 PROCEDURE — 80053 COMPREHEN METABOLIC PANEL: CPT

## 2022-04-14 PROCEDURE — 84145 PROCALCITONIN (PCT): CPT

## 2022-04-14 PROCEDURE — 6360000004 HC RX CONTRAST MEDICATION: Performed by: PHYSICIAN ASSISTANT

## 2022-04-14 PROCEDURE — 2580000003 HC RX 258: Performed by: PHYSICIAN ASSISTANT

## 2022-04-14 PROCEDURE — 36415 COLL VENOUS BLD VENIPUNCTURE: CPT

## 2022-04-14 PROCEDURE — 85025 COMPLETE CBC W/AUTO DIFF WBC: CPT

## 2022-04-14 PROCEDURE — 1210000000 HC MED SURG R&B

## 2022-04-14 PROCEDURE — 74177 CT ABD & PELVIS W/CONTRAST: CPT

## 2022-04-14 PROCEDURE — 83605 ASSAY OF LACTIC ACID: CPT

## 2022-04-14 PROCEDURE — 99283 EMERGENCY DEPT VISIT LOW MDM: CPT

## 2022-04-14 PROCEDURE — 71046 X-RAY EXAM CHEST 2 VIEWS: CPT

## 2022-04-14 RX ORDER — 0.9 % SODIUM CHLORIDE 0.9 %
500 INTRAVENOUS SOLUTION INTRAVENOUS ONCE
Status: COMPLETED | OUTPATIENT
Start: 2022-04-14 | End: 2022-04-15

## 2022-04-14 RX ORDER — SODIUM CHLORIDE 9 MG/ML
INJECTION, SOLUTION INTRAVENOUS CONTINUOUS
Status: DISCONTINUED | OUTPATIENT
Start: 2022-04-14 | End: 2022-04-16

## 2022-04-14 RX ADMIN — IOPAMIDOL 100 ML: 612 INJECTION, SOLUTION INTRAVENOUS at 23:07

## 2022-04-14 RX ADMIN — SODIUM CHLORIDE 500 ML: 9 INJECTION, SOLUTION INTRAVENOUS at 22:16

## 2022-04-14 NOTE — Clinical Note
Discharge Plan[de-identified] Other/[facility])   Telemetry/Cardiac Monitoring Required?: No

## 2022-04-15 ENCOUNTER — HOSPITAL ENCOUNTER (INPATIENT)
Dept: INTERVENTIONAL RADIOLOGY/VASCULAR | Age: 87
Discharge: HOME | DRG: 445 | End: 2022-04-17
Payer: MEDICARE

## 2022-04-15 VITALS
RESPIRATION RATE: 21 BRPM | HEART RATE: 88 BPM | OXYGEN SATURATION: 96 % | DIASTOLIC BLOOD PRESSURE: 71 MMHG | SYSTOLIC BLOOD PRESSURE: 167 MMHG

## 2022-04-15 PROBLEM — R10.9 ACUTE ABDOMINAL PAIN: Status: ACTIVE | Noted: 2022-04-15

## 2022-04-15 PROBLEM — N17.9 AKI (ACUTE KIDNEY INJURY) (HCC): Status: ACTIVE | Noted: 2022-04-15

## 2022-04-15 LAB
ABO/RH: NORMAL
ANTIBODY SCREEN: NORMAL
APTT: 29.7 SEC (ref 24.4–36.8)
BACTERIA: ABNORMAL /HPF
BILIRUBIN URINE: ABNORMAL
BLOOD, URINE: NEGATIVE
CLARITY: ABNORMAL
COLOR: ABNORMAL
EPITHELIAL CELLS, UA: ABNORMAL /HPF (ref 0–5)
GLUCOSE URINE: NEGATIVE MG/DL
HYALINE CASTS: ABNORMAL /LPF (ref 0–5)
INR BLD: 1.3
KETONES, URINE: ABNORMAL MG/DL
LEUKOCYTE ESTERASE, URINE: ABNORMAL
NITRITE, URINE: NEGATIVE
PH UA: 5 (ref 5–9)
PROTEIN UA: 30 MG/DL
PROTHROMBIN TIME: 16.4 SEC (ref 12.3–14.9)
RBC UA: ABNORMAL /HPF (ref 0–5)
REASON FOR REJECTION: NORMAL
REJECTED TEST: NORMAL
SPECIFIC GRAVITY UA: 1.03 (ref 1–1.03)
URINE CULTURE, ROUTINE: NORMAL
UROBILINOGEN, URINE: 1 E.U./DL
WBC UA: ABNORMAL /HPF (ref 0–5)

## 2022-04-15 PROCEDURE — 2709999900 IR CHOLECYSTOSTOMY PERCUTANEOUS COMPLETE

## 2022-04-15 PROCEDURE — 6360000002 HC RX W HCPCS: Performed by: PHYSICIAN ASSISTANT

## 2022-04-15 PROCEDURE — 6360000002 HC RX W HCPCS: Performed by: SURGERY

## 2022-04-15 PROCEDURE — 94761 N-INVAS EAR/PLS OXIMETRY MLT: CPT

## 2022-04-15 PROCEDURE — 2580000003 HC RX 258: Performed by: STUDENT IN AN ORGANIZED HEALTH CARE EDUCATION/TRAINING PROGRAM

## 2022-04-15 PROCEDURE — 86850 RBC ANTIBODY SCREEN: CPT

## 2022-04-15 PROCEDURE — 87070 CULTURE OTHR SPECIMN AEROBIC: CPT

## 2022-04-15 PROCEDURE — 94664 DEMO&/EVAL PT USE INHALER: CPT

## 2022-04-15 PROCEDURE — 2580000003 HC RX 258: Performed by: PHYSICIAN ASSISTANT

## 2022-04-15 PROCEDURE — 6370000000 HC RX 637 (ALT 250 FOR IP): Performed by: STUDENT IN AN ORGANIZED HEALTH CARE EDUCATION/TRAINING PROGRAM

## 2022-04-15 PROCEDURE — 6360000002 HC RX W HCPCS: Performed by: RADIOLOGY

## 2022-04-15 PROCEDURE — 86901 BLOOD TYPING SEROLOGIC RH(D): CPT

## 2022-04-15 PROCEDURE — 87077 CULTURE AEROBIC IDENTIFY: CPT

## 2022-04-15 PROCEDURE — 99222 1ST HOSP IP/OBS MODERATE 55: CPT | Performed by: RADIOLOGY

## 2022-04-15 PROCEDURE — 2500000003 HC RX 250 WO HCPCS: Performed by: RADIOLOGY

## 2022-04-15 PROCEDURE — 6360000004 HC RX CONTRAST MEDICATION: Performed by: RADIOLOGY

## 2022-04-15 PROCEDURE — 85610 PROTHROMBIN TIME: CPT

## 2022-04-15 PROCEDURE — 47490 INCISION OF GALLBLADDER: CPT | Performed by: RADIOLOGY

## 2022-04-15 PROCEDURE — 88112 CYTOPATH CELL ENHANCE TECH: CPT

## 2022-04-15 PROCEDURE — A4217 STERILE WATER/SALINE, 500 ML: HCPCS | Performed by: RADIOLOGY

## 2022-04-15 PROCEDURE — 87075 CULTR BACTERIA EXCEPT BLOOD: CPT

## 2022-04-15 PROCEDURE — 2580000003 HC RX 258: Performed by: SURGERY

## 2022-04-15 PROCEDURE — 94640 AIRWAY INHALATION TREATMENT: CPT

## 2022-04-15 PROCEDURE — 87205 SMEAR GRAM STAIN: CPT

## 2022-04-15 PROCEDURE — 1210000000 HC MED SURG R&B

## 2022-04-15 PROCEDURE — 47490 INCISION OF GALLBLADDER: CPT

## 2022-04-15 PROCEDURE — 88305 TISSUE EXAM BY PATHOLOGIST: CPT

## 2022-04-15 PROCEDURE — 2580000003 HC RX 258: Performed by: RADIOLOGY

## 2022-04-15 PROCEDURE — 85730 THROMBOPLASTIN TIME PARTIAL: CPT

## 2022-04-15 PROCEDURE — 36415 COLL VENOUS BLD VENIPUNCTURE: CPT

## 2022-04-15 PROCEDURE — 86900 BLOOD TYPING SEROLOGIC ABO: CPT

## 2022-04-15 PROCEDURE — 0F9430Z DRAINAGE OF GALLBLADDER WITH DRAINAGE DEVICE, PERCUTANEOUS APPROACH: ICD-10-PCS | Performed by: RADIOLOGY

## 2022-04-15 PROCEDURE — 99223 1ST HOSP IP/OBS HIGH 75: CPT | Performed by: SURGERY

## 2022-04-15 PROCEDURE — 87186 SC STD MICRODIL/AGAR DIL: CPT

## 2022-04-15 RX ORDER — MIDAZOLAM HYDROCHLORIDE 2 MG/2ML
INJECTION, SOLUTION INTRAMUSCULAR; INTRAVENOUS
Status: COMPLETED | OUTPATIENT
Start: 2022-04-15 | End: 2022-04-15

## 2022-04-15 RX ORDER — SCOLOPAMINE TRANSDERMAL SYSTEM 1 MG/1
1 PATCH, EXTENDED RELEASE TRANSDERMAL
Status: DISCONTINUED | OUTPATIENT
Start: 2022-04-15 | End: 2022-04-26 | Stop reason: HOSPADM

## 2022-04-15 RX ORDER — ALBUTEROL SULFATE 90 UG/1
1 AEROSOL, METERED RESPIRATORY (INHALATION) EVERY 4 HOURS PRN
Status: DISCONTINUED | OUTPATIENT
Start: 2022-04-15 | End: 2022-04-26 | Stop reason: HOSPADM

## 2022-04-15 RX ORDER — LOSARTAN POTASSIUM 25 MG/1
25 TABLET ORAL EVERY 12 HOURS SCHEDULED
Status: DISCONTINUED | OUTPATIENT
Start: 2022-04-15 | End: 2022-04-26 | Stop reason: HOSPADM

## 2022-04-15 RX ORDER — LATANOPROST 50 UG/ML
1 SOLUTION/ DROPS OPHTHALMIC NIGHTLY
Status: DISCONTINUED | OUTPATIENT
Start: 2022-04-15 | End: 2022-04-26 | Stop reason: HOSPADM

## 2022-04-15 RX ORDER — SODIUM CHLORIDE 0.9 % (FLUSH) 0.9 %
5-40 SYRINGE (ML) INJECTION PRN
Status: DISCONTINUED | OUTPATIENT
Start: 2022-04-14 | End: 2022-04-26 | Stop reason: HOSPADM

## 2022-04-15 RX ORDER — POLYETHYLENE GLYCOL 3350 17 G/17G
17 POWDER, FOR SOLUTION ORAL DAILY
Status: DISCONTINUED | OUTPATIENT
Start: 2022-04-15 | End: 2022-04-26 | Stop reason: HOSPADM

## 2022-04-15 RX ORDER — IPRATROPIUM BROMIDE AND ALBUTEROL SULFATE 2.5; .5 MG/3ML; MG/3ML
1 SOLUTION RESPIRATORY (INHALATION) EVERY 6 HOURS PRN
Status: DISCONTINUED | OUTPATIENT
Start: 2022-04-15 | End: 2022-04-26 | Stop reason: HOSPADM

## 2022-04-15 RX ORDER — BUDESONIDE AND FORMOTEROL FUMARATE DIHYDRATE 80; 4.5 UG/1; UG/1
2 AEROSOL RESPIRATORY (INHALATION) 2 TIMES DAILY
Refills: 3 | Status: DISCONTINUED | OUTPATIENT
Start: 2022-04-15 | End: 2022-04-26 | Stop reason: HOSPADM

## 2022-04-15 RX ORDER — LIDOCAINE HYDROCHLORIDE 20 MG/ML
INJECTION, SOLUTION INFILTRATION; PERINEURAL
Status: COMPLETED | OUTPATIENT
Start: 2022-04-15 | End: 2022-04-15

## 2022-04-15 RX ORDER — SODIUM CHLORIDE, SODIUM LACTATE, POTASSIUM CHLORIDE, CALCIUM CHLORIDE 600; 310; 30; 20 MG/100ML; MG/100ML; MG/100ML; MG/100ML
INJECTION, SOLUTION INTRAVENOUS CONTINUOUS
Status: DISCONTINUED | OUTPATIENT
Start: 2022-04-15 | End: 2022-04-16

## 2022-04-15 RX ORDER — DORZOLAMIDE HCL 20 MG/ML
1 SOLUTION/ DROPS OPHTHALMIC 2 TIMES DAILY
Status: DISCONTINUED | OUTPATIENT
Start: 2022-04-15 | End: 2022-04-26 | Stop reason: HOSPADM

## 2022-04-15 RX ORDER — HYDRALAZINE HYDROCHLORIDE 20 MG/ML
10 INJECTION INTRAMUSCULAR; INTRAVENOUS EVERY 6 HOURS PRN
Status: DISCONTINUED | OUTPATIENT
Start: 2022-04-15 | End: 2022-04-24

## 2022-04-15 RX ORDER — TIMOLOL MALEATE 5 MG/ML
1 SOLUTION/ DROPS OPHTHALMIC 2 TIMES DAILY
Status: DISCONTINUED | OUTPATIENT
Start: 2022-04-15 | End: 2022-04-26 | Stop reason: HOSPADM

## 2022-04-15 RX ORDER — ONDANSETRON 4 MG/1
4 TABLET, ORALLY DISINTEGRATING ORAL EVERY 8 HOURS PRN
Status: DISCONTINUED | OUTPATIENT
Start: 2022-04-14 | End: 2022-04-26 | Stop reason: HOSPADM

## 2022-04-15 RX ORDER — DORZOLAMIDE HYDROCHLORIDE AND TIMOLOL MALEATE 20; 5 MG/ML; MG/ML
1 SOLUTION/ DROPS OPHTHALMIC 2 TIMES DAILY
Status: DISCONTINUED | OUTPATIENT
Start: 2022-04-15 | End: 2022-04-15 | Stop reason: CLARIF

## 2022-04-15 RX ORDER — FENTANYL CITRATE 50 UG/ML
INJECTION, SOLUTION INTRAMUSCULAR; INTRAVENOUS
Status: COMPLETED | OUTPATIENT
Start: 2022-04-15 | End: 2022-04-15

## 2022-04-15 RX ORDER — ONDANSETRON 2 MG/ML
4 INJECTION INTRAMUSCULAR; INTRAVENOUS EVERY 6 HOURS PRN
Status: DISCONTINUED | OUTPATIENT
Start: 2022-04-14 | End: 2022-04-26 | Stop reason: HOSPADM

## 2022-04-15 RX ORDER — SODIUM CHLORIDE 0.9 % (FLUSH) 0.9 %
5-40 SYRINGE (ML) INJECTION EVERY 12 HOURS SCHEDULED
Status: DISCONTINUED | OUTPATIENT
Start: 2022-04-15 | End: 2022-04-26 | Stop reason: HOSPADM

## 2022-04-15 RX ORDER — ATORVASTATIN CALCIUM 20 MG/1
20 TABLET, FILM COATED ORAL DAILY
Status: DISCONTINUED | OUTPATIENT
Start: 2022-04-15 | End: 2022-04-26 | Stop reason: HOSPADM

## 2022-04-15 RX ORDER — SODIUM CHLORIDE 9 MG/ML
INJECTION, SOLUTION INTRAVENOUS PRN
Status: DISCONTINUED | OUTPATIENT
Start: 2022-04-14 | End: 2022-04-26 | Stop reason: HOSPADM

## 2022-04-15 RX ORDER — MAGNESIUM HYDROXIDE 1200 MG/15ML
LIQUID ORAL CONTINUOUS PRN
Status: COMPLETED | OUTPATIENT
Start: 2022-04-15 | End: 2022-04-15

## 2022-04-15 RX ORDER — ACETAMINOPHEN 325 MG/1
650 TABLET ORAL EVERY 4 HOURS PRN
Status: DISCONTINUED | OUTPATIENT
Start: 2022-04-14 | End: 2022-04-15

## 2022-04-15 RX ORDER — BRIMONIDINE TARTRATE 2 MG/ML
1 SOLUTION/ DROPS OPHTHALMIC 2 TIMES DAILY
Status: DISCONTINUED | OUTPATIENT
Start: 2022-04-15 | End: 2022-04-26 | Stop reason: HOSPADM

## 2022-04-15 RX ORDER — BISACODYL 10 MG
10 SUPPOSITORY, RECTAL RECTAL DAILY PRN
Status: DISCONTINUED | OUTPATIENT
Start: 2022-04-15 | End: 2022-04-26 | Stop reason: HOSPADM

## 2022-04-15 RX ORDER — SENNA PLUS 8.6 MG/1
1 TABLET ORAL NIGHTLY
Status: DISCONTINUED | OUTPATIENT
Start: 2022-04-15 | End: 2022-04-26 | Stop reason: HOSPADM

## 2022-04-15 RX ORDER — ACETAMINOPHEN 325 MG/1
650 TABLET ORAL EVERY 6 HOURS
Status: DISCONTINUED | OUTPATIENT
Start: 2022-04-15 | End: 2022-04-26 | Stop reason: HOSPADM

## 2022-04-15 RX ADMIN — PIPERACILLIN AND TAZOBACTAM 3375 MG: 3; .375 INJECTION, POWDER, LYOPHILIZED, FOR SOLUTION INTRAVENOUS at 00:09

## 2022-04-15 RX ADMIN — FENTANYL CITRATE 50 MCG: 50 INJECTION, SOLUTION INTRAMUSCULAR; INTRAVENOUS at 14:41

## 2022-04-15 RX ADMIN — FENTANYL CITRATE 50 MCG: 50 INJECTION, SOLUTION INTRAMUSCULAR; INTRAVENOUS at 14:23

## 2022-04-15 RX ADMIN — PIPERACILLIN AND TAZOBACTAM 3375 MG: 3; .375 INJECTION, POWDER, LYOPHILIZED, FOR SOLUTION INTRAVENOUS at 17:11

## 2022-04-15 RX ADMIN — SODIUM CHLORIDE: 9 INJECTION, SOLUTION INTRAVENOUS at 00:08

## 2022-04-15 RX ADMIN — LIDOCAINE HYDROCHLORIDE 3 ML: 20 INJECTION, SOLUTION INFILTRATION; PERINEURAL at 14:27

## 2022-04-15 RX ADMIN — MIDAZOLAM HYDROCHLORIDE 0.5 MG: 1 INJECTION, SOLUTION INTRAMUSCULAR; INTRAVENOUS at 14:23

## 2022-04-15 RX ADMIN — TIMOLOL MALEATE 1 DROP: 5 SOLUTION/ DROPS OPHTHALMIC at 21:03

## 2022-04-15 RX ADMIN — METOPROLOL TARTRATE 12.5 MG: 25 TABLET, FILM COATED ORAL at 20:52

## 2022-04-15 RX ADMIN — PIPERACILLIN AND TAZOBACTAM 3375 MG: 3; .375 INJECTION, POWDER, LYOPHILIZED, FOR SOLUTION INTRAVENOUS at 12:00

## 2022-04-15 RX ADMIN — LATANOPROST 1 DROP: 50 SOLUTION OPHTHALMIC at 20:52

## 2022-04-15 RX ADMIN — BRIMONIDINE TARTRATE 1 DROP: 2 SOLUTION OPHTHALMIC at 20:52

## 2022-04-15 RX ADMIN — POLYETHYLENE GLYCOL 3350 17 G: 17 POWDER, FOR SOLUTION ORAL at 11:21

## 2022-04-15 RX ADMIN — DORZOLAMIDE HYDROCHLORIDE 1 DROP: 20 SOLUTION/ DROPS OPHTHALMIC at 20:51

## 2022-04-15 RX ADMIN — BUDESONIDE AND FORMOTEROL FUMARATE DIHYDRATE 2 PUFF: 80; 4.5 AEROSOL RESPIRATORY (INHALATION) at 19:55

## 2022-04-15 RX ADMIN — METOPROLOL TARTRATE 12.5 MG: 25 TABLET, FILM COATED ORAL at 11:21

## 2022-04-15 RX ADMIN — ONDANSETRON 4 MG: 2 INJECTION INTRAMUSCULAR; INTRAVENOUS at 15:17

## 2022-04-15 RX ADMIN — ATORVASTATIN CALCIUM 20 MG: 20 TABLET, FILM COATED ORAL at 12:10

## 2022-04-15 RX ADMIN — Medication 10 ML: at 20:52

## 2022-04-15 RX ADMIN — LOSARTAN POTASSIUM 25 MG: 25 TABLET, FILM COATED ORAL at 11:21

## 2022-04-15 RX ADMIN — ACETAMINOPHEN 650 MG: 325 TABLET ORAL at 17:10

## 2022-04-15 RX ADMIN — IOPAMIDOL 20 ML: 755 INJECTION, SOLUTION INTRAVENOUS at 14:50

## 2022-04-15 RX ADMIN — Medication 5 ML: at 12:23

## 2022-04-15 RX ADMIN — ACETAMINOPHEN 650 MG: 325 TABLET ORAL at 11:22

## 2022-04-15 RX ADMIN — BUDESONIDE AND FORMOTEROL FUMARATE DIHYDRATE 2 PUFF: 80; 4.5 AEROSOL RESPIRATORY (INHALATION) at 10:30

## 2022-04-15 RX ADMIN — MIDAZOLAM HYDROCHLORIDE 0.5 MG: 1 INJECTION, SOLUTION INTRAMUSCULAR; INTRAVENOUS at 14:41

## 2022-04-15 RX ADMIN — SENNOSIDES 8.6 MG: 8.6 TABLET, FILM COATED ORAL at 20:52

## 2022-04-15 RX ADMIN — LOSARTAN POTASSIUM 25 MG: 25 TABLET, FILM COATED ORAL at 20:52

## 2022-04-15 RX ADMIN — SODIUM CHLORIDE 500 ML: 900 IRRIGANT IRRIGATION at 14:20

## 2022-04-15 RX ADMIN — SODIUM CHLORIDE, POTASSIUM CHLORIDE, SODIUM LACTATE AND CALCIUM CHLORIDE: 600; 310; 30; 20 INJECTION, SOLUTION INTRAVENOUS at 12:00

## 2022-04-15 RX ADMIN — ACETAMINOPHEN 650 MG: 325 TABLET ORAL at 22:41

## 2022-04-15 ASSESSMENT — ENCOUNTER SYMPTOMS
DIARRHEA: 0
PHOTOPHOBIA: 0
CONSTIPATION: 0
RESPIRATORY NEGATIVE: 1
NAUSEA: 0
COUGH: 0
ABDOMINAL PAIN: 1
VOMITING: 0
BACK PAIN: 0
WHEEZING: 0
SHORTNESS OF BREATH: 0
EYES NEGATIVE: 1

## 2022-04-15 ASSESSMENT — PAIN SCALES - GENERAL
PAINLEVEL_OUTOF10: 8
PAINLEVEL_OUTOF10: 4
PAINLEVEL_OUTOF10: 5

## 2022-04-15 NOTE — FLOWSHEET NOTE
Report given to [facility]. Transport here to  pt. Pt became nauseated, medicated with zofran IV.      1520 pt states she is feeling better. VSS  Transport cancelled and pt taken to room via cart with RN and tech. Bedside report given to [facility]. Dr Elvira Hogan office notified of [place] drain placement.

## 2022-04-15 NOTE — PROGRESS NOTES
04/15/22    From:HOME WITH DTR    Admit: ACUTE CAYDEN     PMH:    Anticipated Discharge Disposition: HOME AND RESUME [facility]    Patient Mobility or PT/OT ordered:  Consults:  GEN SURG, YAMILEX Trinh result &/or vacc status:     Barriers to Discharge: PO DAY 0, START CLEAR LIQUIDS    Assessments: CMI DONE

## 2022-04-15 NOTE — PROGRESS NOTES
Preliminary procedure note, full note to follow on PACS. Under ultrasound and fluoroscopy guidance, a 10 Wolof pigtail transhepatic cholecystostomy drainage catheter was placed into the lumen of the gallbladder. The gallbladder lumen is almost entirely filled with stones. Purulent yellowish bile was aspirated and sent for microbiology and cytology analysis. If acute cholecystitis resolves and patient no longer needs the drain, the drain can be removed in a minimum of 8 weeks. No complications  No blood loss.

## 2022-04-15 NOTE — PROGRESS NOTES
Skin assessment done with Mary WOOD, pt has a healing scabbed pressure injury to the buttocks and redness to buttocks. See flow sheet.  Electronically signed by Ivy Chavez RN on 4/15/2022 at 3:00 AM

## 2022-04-15 NOTE — PROGRESS NOTES
[facility] Respiratory Therapy Evaluation   Current Order:  duoneb  q6 prn + alb inh Q4 prn    Home Regimen: prn      Ordering Physician: Terrie Toscano  Re-evaluation Date: 4/18     Diagnosis: acute cholecystitis      Patient Status: Stable / Unstable + Physician notified    The following MDI Criteria must be met in order to convert aerosol to MDI with spacer.  If unable to meet, MDI will be converted to aerosol:  []  Patient able to demonstrate the ability to use MDI effectively  []  Patient alert and cooperative  []  Patient able to take deep breath with 5-10 second hold  []  Medication(s) available in this delivery method   []  Peak flow greater than or equal to 200 ml/min            Current Order Substituted To  (same drug, same frequency)   Aerosol to MDI [] Albuterol Sulfate 0.083% unit dose by aerosol Albuterol Sulfate MDI 2 puffs by inhalation with spacer    [] Levalbuterol 1.25 mg unit dose by aerosol Levalbuterol MDI 2 puffs by inhalation with spacer    [] Levalbuterol 0.63 mg unit dose by aerosol Levalbuterol MDI 2 puffs by inhalation with spacer    [] Ipratropium Bromide 0.02% unit dose by aerosol Ipratropium Bromide MDI 2 puffs by inhalation with spacer    [] Duoneb (Ipratropium + Albuterol) unit dose by aerosol Ipratropium MDI + Albuterol MDI 2 puffs by inhalation w/spacer   MDI to Aerosol [] Albuterol Sulfate MDI Albuterol Sulfate 0.083% unit dose by aerosol    [] Levalbuterol MDI 2 puffs by inhalation Levalbuterol 1.25 mg unit dose by aerosol    [] Ipratropium Bromide MDI by inhalation Ipratropium Bromide 0.02% unit dose by aerosol    [] Combivent (Ipratropium + Albuterol) MDI by inhalation Duoneb (Ipratropium + Albuterol) unit dose by aerosol       Treatment Assessment [Frequency/Schedule]:  Change frequency to: ________________no change__________________________________per Protocol, P&T, MEC      Points 0 1 2 3 4   Pulmonary Status  Non-Smoker  []   Smoking history   < 20 pack years  []   Smoking history  ?  20 pack years  []   Pulmonary Disorder  (acute or chronic)  [x]   Severe or Chronic w/ Exacerbation  []     Surgical Status No [x]   Surgeries     General []   Surgery Lower []   Abdominal Thoracic or []   Upper Abdominal Thoracic with  PulmonaryDisorder  []     Chest X-ray Clear/Not  Ordered     [x]  Chronic Changes  Results Pending  []  Infiltrates, atelectasis, pleural effusion, or edema  []  Infiltrates in more than one lobe []  Infiltrate + Atelectasis, &/or pleural effusion  []    Respiratory Pattern Regular,  RR = 12-20 [x]  Increased,  RR = 21-25 []  RIVERA, irregular,  or RR = 26-30 []  Decreased FEV1  or RR = 31-35 []  Severe SOB, use  of accessory muscles, or RR ? 35  []    Mental Status Alert, oriented,  Cooperative []  Confused but Follows commands [x]  Lethargic or unable to follow commands []  Obtunded  []  Comatose  []    Breath Sounds Clear to  auscultation  [x]  Decreased unilaterally or  in bases only []  Decreased  bilaterally  []  Crackles or intermittent wheezes []  Wheezes []    Cough Strong, Spontan., & nonproductive [x]  Strong,  spontaneous, &  productive []  Weak,  Nonproductive []  Weak, productive or  with wheezes []  No spontaneous  cough or may require suctioning []    Level of Activity Ambulatory []  Ambulatory w/ Assist  [x]  Non-ambulatory []  Paraplegic []  Quadriplegic []    Total    Score:___5____     Triage Score:____5____      Tri       Triage:     1. (>20) Freq: Q3    2. (16-20) Freq: Q4   3. (11-15) Freq: QID & Albuterol Q2 PRN    4. (6-10) Freq: TID & Albuterol Q2 PRN    5. (0-5) Freq Q4prn

## 2022-04-15 NOTE — PLAN OF CARE
Problem: Falls - Risk of:  Goal: Will remain free from falls  Description: Will remain free from falls  Outcome: Ongoing  Goal: Absence of physical injury  Description: Absence of physical injury  Outcome: Ongoing     Problem: Skin Integrity:  Goal: Will show no infection signs and symptoms  Description: Will show no infection signs and symptoms  Outcome: Ongoing  Goal: Absence of new skin breakdown  Description: Absence of new skin breakdown  Outcome: Ongoing     Problem: Activity:  Goal: Risk for activity intolerance will decrease  Description: Risk for activity intolerance will decrease  Outcome: Ongoing     Problem:  Bowel/Gastric:  Goal: Bowel function will improve  Description: Bowel function will improve  Outcome: Ongoing  Goal: Diagnostic test results will improve  Description: Diagnostic test results will improve  Outcome: Ongoing  Goal: Occurrences of nausea will decrease  Description: Occurrences of nausea will decrease  Outcome: Ongoing  Goal: Occurrences of vomiting will decrease  Description: Occurrences of vomiting will decrease  Outcome: Ongoing     Problem: Fluid Volume:  Goal: Maintenance of adequate hydration will improve  Description: Maintenance of adequate hydration will improve  Outcome: Ongoing     Problem: Health Behavior:  Goal: Ability to state signs and symptoms to report to health care provider will improve  Description: Ability to state signs and symptoms to report to health care provider will improve  Outcome: Ongoing     Problem: Physical Regulation:  Goal: Complications related to the disease process, condition or treatment will be avoided or minimized  Description: Complications related to the disease process, condition or treatment will be avoided or minimized  Outcome: Ongoing  Goal: Ability to maintain clinical measurements within normal limits will improve  Description: Ability to maintain clinical measurements within normal limits will improve  Outcome: Ongoing     Problem: Sensory:  Goal: Ability to identify factors that increase the pain will improve  Description: Ability to identify factors that increase the pain will improve  Outcome: Ongoing  Goal: Ability to notify healthcare provider of pain before it becomes unmanageable or unbearable will improve  Description: Ability to notify healthcare provider of pain before it becomes unmanageable or unbearable will improve  Outcome: Ongoing  Goal: Pain level will decrease  Description: Pain level will decrease  Outcome: Ongoing     Problem: Discharge Planning:  Goal: Participates in care planning  Description: Participates in care planning  Outcome: Ongoing  Goal: Discharged to appropriate level of care  Description: Discharged to appropriate level of care  Outcome: Ongoing     Problem:  Activity Intolerance:  Goal: Ability to tolerate increased activity will improve  Description: Ability to tolerate increased activity will improve  Outcome: Ongoing

## 2022-04-15 NOTE — PROGRESS NOTES
ACUTE CARE SURGERY:     81 y/o female w/ hx of COPD, HTN ? Hx of colon cancer with segmental resection per daughter. She was admitted overnight by Dr. Jennifer Moore for acute cholecystitis on Zosyn. O/E  abd soft ND mild tenderness diffusely but no peritonitis, vague exam    Plan:  Continue zosyn IV  Keep NPO  Repeat labs   IV fluids  Echo ordered. I had lengthy discussion with both daughters regarding operative intervention. I explained that she has indication for operative intervention with laparoscopic cholecystectomy. I discussed details of the procedure including risk of bile leak, bleeding, infection, perioperative complications including respiratory failure especially since patient has hx of COPD, exacerbation of COPD, and cardiac complications 2nd to age and procedure. We discussed that due to her previous ex-lap for colon resection and 1 week long hx of pain she is at higher risk of conversion to open cholecystectomy. I also discussed benefit of surgical intervention. Other options discussed were abx only trial vs percutaneous cholecystostomy. Both I explained were not necessarily the best options since there was risk of recurrence with abx only and perc drain would also have risk of recurrence once removed. She understood and wanted to discuss with her sister regarding decision. See H and P for full history and physical as well as plan    Sophia Mackey MD, FACS  Acute Care Surgery      ADDENDUM ([id number]):    Patient's daughters came in. We further discussed all the options. After discussion daughters wanted to proceed with percutaneous cholecystostomy tube. IR consulted for procedure discussed with Dr. Viral Page.      Sophia Mackey MD, FACS

## 2022-04-15 NOTE — H&P
EMERGENCY GENERAL SURGERY CONSULTATION / H&P    Reason for Consultation:  Acute cholecystitis   Consulting Provider: Audrey Jain MD    HISTORY OF PRESENT INJURY:   Clemente Walton is a 80 y.o. female with past medical history of COPD, HTN, FRED presents to [facility] [place] with 1 week history of intermittent RUQ pain without nausea or vomiting. Patient is a poor historian and history was obtained from her two daughters. Patient is denying any pain at this time. Patient is tolerating PO but is NPO at this time. Patient is voiding spontaneously. She states that she has not had a bowel movement in a \"few days. \" She states she is passing gas. PMH:    Past Medical History:   Diagnosis Date    Anxiety     Cancer ([address]     Glaucoma     Gout     left knee    Hypertension     Lung disease     Obesity (BMI 30-39. 9) 7/11/2019    Obstructive sleep apnea 7/11/2019       PSH:    Past Surgical History:   Procedure Laterality Date    CATARACT REMOVAL WITH IMPLANT Bilateral 2016    COLON SURGERY  2009    polyps    COLONOSCOPY  02/2009    GASTROSTOMY TUBE PLACEMENT N/A 11/4/2020    EGD PEG TUBE PLACEMENT performed by Betty Serrano MD at [place] [id number]  2002    graft from neck       FH:    Denies h/o bleeding or clotting disorders    SH:    Social History     Tobacco Use    Smoking status: Never Smoker    Smokeless tobacco: Never Used   Vaping Use    Vaping Use: Never used   Substance Use Topics    Alcohol use: No    Drug use: No       Home Medications:  No current facility-administered medications on file prior to encounter.      Current Outpatient Medications on File Prior to Encounter   Medication Sig Dispense Refill    dorzolamide-timolol (COSOPT) 22.3-6.8 MG/ML ophthalmic solution PLACE 1 DROP INTO BOTH EYES 2 TIMES DAILY INDICATIONS: GLAUCOMA 10 mL 5    latanoprost (XALATAN) 0.005 % ophthalmic solution Place 1 drop into both eyes daily Indications: Glaucoma 2.5 mL 5    metoprolol tartrate (LOPRESSOR) 25 MG tablet Take 0.5 tablets by mouth 2 times daily Indications: High Blood Pressure Disorder 90 tablet 3    ipratropium-albuterol (DUONEB) 0.5-2.5 (3) MG/3ML SOLN nebulizer solution Inhale 3 mLs into the lungs every 6 hours as needed for Shortness of Breath ((or wheezing)) 360 mL 3    albuterol sulfate HFA (PROAIR HFA) 108 (90 Base) MCG/ACT inhaler Inhale 1 puff into the lungs every 6 hours as needed for Wheezing or Shortness of Breath 3 each 3    brimonidine (ALPHAGAN P) 0.15 % ophthalmic solution Place 1 drop into both eyes 2 times daily Indications: Glaucoma 1 each 1    zoster recombinant adjuvanted vaccine (SHINGRIX) 50 MCG/0.5ML SUSR injection Inject 0.5 mLs into the muscle See Admin Instructions 1 dose now and repeat in 2-6 months 0.5 mL 0    atorvastatin (LIPITOR) 20 MG tablet Indications: High Amount of Fats in the Blood Take one tablet Po qhs 90 tablet 0    losartan (COZAAR) 25 MG tablet Take 1 tablet by mouth every 12 hours (Patient taking differently: Take 25 mg by mouth every 12 hours Indications: High Blood Pressure Disorder ) 180 tablet 3    Elastic Bandages & Supports (MEDICAL COMPRESSION STOCKINGS) MISC Knee high, 20 to 30 mm hg, remove at night, pharmacy to fit. 1 each 0    fluticasone-salmeterol (ADVAIR DISKUS) 250-50 MCG/DOSE AEPB Inhale 1 puff into the lungs every 12 hours (Patient taking differently: Inhale 1 puff into the lungs every 12 hours Indications: Chronic Obstructive Lung Disease ) 180 each 3    montelukast (SINGULAIR) 10 MG tablet Take 1 tablet by mouth nightly as needed (allergies) 90 tablet 3    OXYGEN Inhale 2 L into the lungs Indications: PRN at bedtime         Review Of Systems: Unable to obtain a full ROS 2/2 mental status       Except as noted above the remainder of the review of systems was reviewed and negative.      PHYSICAL EXAM:  Vitals: BP (!) 127/57   Pulse 92   Temp 98.8 °F (37.1 °C) (Oral)   Resp 16   Ht 5' 2\" (1.575 m)   Wt 180 lb (81.6 kg)   LMP  (LMP Unknown) SpO2 97%   BMI 32.92 kg/m²   Constituational: Lying in bed, no acute distress  Cardiovascular: Regular rate and rhythm. Bilateral radial and DP pulses intact   Pulmonary:Beathing comfortably on RA  Abdominal: Soft. Mildly distended. Diffusely tender to palpation   Musculoskeletal: BOWEN spontaneously. No edema   Neurological: A&O x 1. Motor and sensory grossly intact. GCS of 15. BASIC LABS:  CBC with Differential:    Lab Results   Component Value Date    WBC 15.0 04/14/2022    RBC 4.30 04/14/2022    HGB 12.2 04/14/2022    HCT 36.9 04/14/2022     04/14/2022    MCV 85.8 04/14/2022    MCH 28.5 04/14/2022    MCHC 33.2 04/14/2022    RDW 14.0 04/14/2022    BANDSPCT 12 10/28/2020    METASPCT 1 11/05/2020    LYMPHOPCT 8.7 04/14/2022    MONOPCT 9.1 04/14/2022    BASOPCT 0.7 04/14/2022    MONOSABS 1.4 04/14/2022    LYMPHSABS 1.3 04/14/2022    EOSABS 0.1 04/14/2022    BASOSABS 0.1 04/14/2022     CMP:    Lab Results   Component Value Date     04/14/2022    K 4.3 04/14/2022    K 4.0 11/06/2020    CL 99 04/14/2022    CO2 22 04/14/2022    BUN 26 04/14/2022    CREATININE 1.5 04/14/2022    CREATININE 1.35 04/14/2022    GFRAA 39 04/14/2022    LABGLOM 32 04/14/2022    GLUCOSE 106 04/14/2022    PROT 6.6 04/14/2022    LABALBU 3.3 04/14/2022    CALCIUM 8.5 04/14/2022    BILITOT 0.6 04/14/2022    ALKPHOS 224 04/14/2022    AST 12 04/14/2022    ALT 19 04/14/2022     Magnesium:  Lab Results   Component Value Date    MG 2.4 12/26/2021     Troponin:    Lab Results   Component Value Date    TROPONINI <0.010 12/26/2021       IMAGING:  CXR:   No radiographic evidence of acute intrathoracic process. Prominent cardiac silhouette and increased pulmonary markings may be secondary to chronic CHF. CT Abd/Pelvis w/ IV contrast:  Gallbladder markedly distended containing multiple calculi thickening and gallbladder wall.  Surrounding inflammation changes      ASSESSMENT/RECOMMENDATIONS:  Laith Michel is a 80 y.o. female with past medical history of COPD, HTN, FRED presents to [facility] EM[facility] [place] with 1 week history of intermittent RUQ pain without nausea or vomiting. CT imaging concerning for acute cholecystitis. Patient was admitted to the [facility] for continued management of acute cholecystitis. Neurological: Acute abdominal pain, Dx dementia   - Tylenol q6 scheduled  - Will hold off on stronger pain medication for now as patient denies pain      Cardiovascular: Hx HTN, HLD  - Continue vital signs per unit protocol  - Continue home lipitor 20mg daily  - Continue home Losartan 25mg q12hrs  - Continue home metoprolol 12.5mg BID     Respiratory: Hx COPD, pt on home O2 but does not use   - Continue home albuterol q4 PRN for wheezing  - Continue home Symbicort BID  - Continue home Duoneb q6 PRN  - Maintain O2 sat >88%, encourage IS     GI/Diet: Acute cholecystitis   - NPO for now until treatment plan is decided on by treatment team and patient's daughters  - Bowel regimen of senna, Miralax, PRN dulcolax  - Discussed risk/benefits of operative vs non-operative management with patient's daughters given the patient's age and co morbidities. Daughters to discuss options and call surgical team for a definitive plan to be determined.       Renal/Electrolytes: No electrolyte abnormalities, RICHARD on admission with BUN/Cr 26/1.35  - LR @ 75mL/hr  - AM BMP to trend Cr     ID: Leukocytosis 15 on admission  -Zosyn q8  - AM CBC     Heme: No acute issues  - No indication for transfusion  - AM CBC     Endocrine: No acute issues     MSK: No acute issues  - Activity: out of bed as tolerated     Prophylaxis:   - SCD, lovenox 40mg daily for DVT ppx      Dispo: Remain on RNF for continued management of acute cholecystitis     Follow up with General Surgery TBD      Marcella Andrews PA-C  Emergency General Surgery  [phone] ([phone])  [phone]    This patient's plan of care was discussed with Trauma attending physician, Dr. Josette Carson MD.        Teaching Physician Note:  I saw and evaluated the patient. I personally obtained the key and critical portions of the history and physical exam.  I reviewed the NICK's documentation and discussed the patient with the NICK. I agree with the NICK's medical decision making as documented in the NICK note.       Audrey Jain MD

## 2022-04-15 NOTE — PROGRESS NOTES
DVT / VTE PROPHYLAXIS EVALUATION    Estimated Creatinine Clearance: 23 mL/min (A) (based on SCr of 1.5 mg/dL (H)). Recent Labs     04/14/22  2100 04/14/22  2114   BUN 26*  --    CREATININE 1.35* 1.5*     --    HGB 12.2  --    HCT 36.9*  --      ADMITTING DX OR CHIEF COMPLAINT? cholecystectomy  WARFARIN? DOAC'S? none  ANY APPARENT BLEEDING? no  SCHEDULED SURGERY?  yes     Current order:  Enoxaparin 40 mg SUBQ once daily      Plan:  Pharmacologic VTE prophylaxis modified based on patient renal function per [facility]/P&T approved protocol     Patient Weight (kg)      50.9 and below .9 101-150.9 151-174.9 175 or greater   Estimated   CrCl  (ml/min) 30 or greater []   30 mg   SUBQ daily   []   40 mg   SUBQ daily []  30 mg SUBQ   BID  []  40 mg   SUBQ   BID []  60mg SUBQ BID    15-29.9 []  UFH 5000   units SUBQ BID [x]  30 mg   SUBQ daily [] 30 mg SUBQ   daily []  40 mg SUBQ   daily [] 60 mg SUBQ   daily    Less than 15 or dialysis []  UFH 5000   units SUBQ BID [] UFH 5000 units SUBQ TID []  UFH 7500   units   SUBQ TID

## 2022-04-15 NOTE — CONSULTS
CC:   Chief Complaint   Patient presents with    Abnormal Lab     Sent by Dr Yaron Barahona for CRP>200        HPI: Patient is a pleasant 69-year-old woman with a past medical history of COPD, hypertension, obstructive sleep apnea. Presented to the emergency department with a 1 week history of right upper quadrant pain. Patient denies nausea or vomiting. Patient was tolerating p.o., though has not eaten today. A CT scan of the abdomen was obtained and CT images reviewed. CT images demonstrate a distended gallbladder with gallbladder wall thickening and pericholecystic fluid which are concerning for acute cholecystitis. History reviewed. No pertinent family history. Past Surgical History:   Procedure Laterality Date    CATARACT REMOVAL WITH IMPLANT Bilateral 2016    COLON SURGERY  2009    polyps    COLONOSCOPY  02/2009    GASTROSTOMY TUBE PLACEMENT N/A 11/4/2020    EGD PEG TUBE PLACEMENT performed by Analy Lozano MD at [address] [address] [address]  2002    graft from neck        Past Medical History:   Diagnosis Date    RICHARD [facility]acute kidney injury) ([facility] [address] 4/15/2022    Anxiety     Cancer ([facility] [address]     Glaucoma     Gout     left knee    Hypertension     Lung disease     Obesity (BMI 30-39. 9) 7/11/2019    Obstructive sleep apnea 7/11/2019       Social History     Socioeconomic History    Marital status:       Spouse name: None    Number of children: None    Years of education: None    Highest education level: None   Occupational History    None   Tobacco Use    Smoking status: Never Smoker    Smokeless tobacco: Never Used   Vaping Use    Vaping Use: Never used   Substance and Sexual Activity    Alcohol use: No    Drug use: No    Sexual activity: Not Currently   Other Topics Concern    None   Social History Narrative    ,  Lives with her dtr Karon Segal in [address],     [facility] Wendy Beltrán is in the area                      Social Determinants of Health     Financial Resource Strain:     Difficulty of Paying Living Expenses: Not on file   Food Insecurity:     Worried About Running Out of Food in the Last Year: Not on file    Deborah of Food in the Last Year: Not on file   Transportation Needs:     Lack of Transportation (Medical): Not on file    Lack of Transportation (Non-Medical): Not on file   Physical Activity:     Days of Exercise per Week: Not on file    Minutes of Exercise per Session: Not on file   Stress:     Feeling of Stress : Not on file   Social Connections:     Frequency of Communication with Friends and Family: Not on file    Frequency of Social Gatherings with Friends and Family: Not on file    Attends Advent Services: Not on file    Active Member of [address] [place] [address] [employer] or Organizations: Not on file    Attends Club or Organization Meetings: Not on file    Marital Status: Not on file   Intimate Partner Violence:     Fear of Current or Ex-Partner: Not on file    Emotionally Abused: Not on file    Physically Abused: Not on file    Sexually Abused: Not on file   Housing Stability:     Unable to Pay for Housing in the Last Year: Not on file    Number of [place] in the Last Year: Not on file    Unstable Housing in the Last Year: Not on file       Allergies   Allergen Reactions    Norvasc [Amlodipine Besylate] Swelling       No current facility-administered medications on file prior to encounter.      Current Outpatient Medications on File Prior to Encounter   Medication Sig Dispense Refill    dorzolamide-timolol (COSOPT) 22.3-6.8 MG/ML ophthalmic solution PLACE 1 DROP INTO BOTH EYES 2 TIMES DAILY INDICATIONS: GLAUCOMA 10 mL 5    latanoprost (XALATAN) 0.005 % ophthalmic solution Place 1 drop into both eyes daily Indications: Glaucoma 2.5 mL 5    metoprolol tartrate (LOPRESSOR) 25 MG tablet Take 0.5 tablets by mouth 2 times daily Indications: High Blood Pressure Disorder 90 tablet 3    ipratropium-albuterol (DUONEB) 0.5-2.5 (3) MG/3ML SOLN nebulizer solution Inhale 3 mLs into the lungs every 6 hours as needed for Shortness of Breath ((or wheezing)) 360 mL 3    albuterol sulfate HFA (PROAIR HFA) 108 (90 Base) MCG/ACT inhaler Inhale 1 puff into the lungs every 6 hours as needed for Wheezing or Shortness of Breath 3 each 3    brimonidine (ALPHAGAN P) 0.15 % ophthalmic solution Place 1 drop into both eyes 2 times daily Indications: Glaucoma 1 each 1    zoster recombinant adjuvanted vaccine (SHINGRIX) 50 MCG/0.5ML SUSR injection Inject 0.5 mLs into the muscle See Admin Instructions 1 dose now and repeat in 2-6 months 0.5 mL 0    atorvastatin (LIPITOR) 20 MG tablet Indications: High Amount of Fats in the Blood Take one tablet Po qhs 90 tablet 0    losartan (COZAAR) 25 MG tablet Take 1 tablet by mouth every 12 hours (Patient taking differently: Take 25 mg by mouth every 12 hours Indications: High Blood Pressure Disorder ) 180 tablet 3    Elastic Bandages & Supports (MEDICAL COMPRESSION STOCKINGS) MISC Knee high, 20 to 30 mm hg, remove at night, pharmacy to fit. 1 each 0    fluticasone-salmeterol (ADVAIR DISKUS) 250-50 MCG/DOSE AEPB Inhale 1 puff into the lungs every 12 hours (Patient taking differently: Inhale 1 puff into the lungs every 12 hours Indications: Chronic Obstructive Lung Disease ) 180 each 3    montelukast (SINGULAIR) 10 MG tablet Take 1 tablet by mouth nightly as needed (allergies) 90 tablet 3    OXYGEN Inhale 2 L into the lungs Indications: PRN at bedtime         Review of Systems   Constitutional: Positive for appetite change and fatigue. Negative for chills, diaphoresis and fever. HENT: Negative. Negative for congestion, ear pain, hearing loss and tinnitus. Eyes: Negative. Negative for photophobia. Respiratory: Negative. Negative for cough, shortness of breath and wheezing. Cardiovascular: Negative. Negative for chest pain, palpitations and leg swelling. Gastrointestinal: Positive for abdominal pain. Negative for constipation, diarrhea, nausea and vomiting. Genitourinary: Negative. Negative for dysuria, flank pain, frequency, hematuria and urgency. Musculoskeletal: Negative. Negative for back pain and neck pain. Skin: Negative. Negative for rash. Allergic/Immunologic: Negative for environmental allergies. Neurological: Negative. Negative for dizziness, tremors, weakness and headaches. Hematological: Does not bruise/bleed easily. Psychiatric/Behavioral: Negative. Negative for hallucinations and suicidal ideas. The patient is not nervous/anxious. OBJECTIVE:  BP (!) 127/57   Pulse 92   Temp 98.8 °F (37.1 °C) (Oral)   Resp 16   Ht 5' 2\" (1.575 m)   Wt 180 lb (81.6 kg)   LMP  (LMP Unknown)   SpO2 94%   BMI 32.92 kg/m²     Physical Exam  Constitutional:       General: She is not in acute distress. Appearance: She is well-developed. She is not diaphoretic. HENT:      Head: Normocephalic and atraumatic. Nose: Nose normal.      Mouth/Throat:      Pharynx: No oropharyngeal exudate. Eyes:      General: No scleral icterus. Right eye: No discharge. Left eye: No discharge. Conjunctiva/sclera: Conjunctivae normal.   Neck:      Thyroid: No thyromegaly. Vascular: No JVD. Trachea: No tracheal deviation. Cardiovascular:      Rate and Rhythm: Normal rate and regular rhythm. Heart sounds: Normal heart sounds. No murmur heard. No friction rub. No gallop. Pulmonary:      Effort: No respiratory distress. Breath sounds: No stridor. No wheezing or rales. Chest:      Chest wall: No tenderness. Abdominal:      General: Bowel sounds are normal. There is no distension. Palpations: Abdomen is soft. There is no mass. Tenderness: There is no abdominal tenderness. There is no guarding or rebound. Hernia: No hernia is present. Musculoskeletal:         General: No tenderness or deformity. Cervical back: Neck supple.    Skin: General: Skin is dry. Coloration: Skin is not pale. Findings: No erythema or rash. Neurological:      Mental Status: She is alert and oriented to person, place, and time. Cranial Nerves: No cranial nerve deficit. Psychiatric:         Behavior: Behavior normal.         Thought Content: Thought content normal.         Judgment: Judgment normal.             XR CHEST (2 VW)    Result Date: 4/15/2022  Exam: XR CHEST (2 VW)  CLINICAL HISTORY:  abnormal labs  COMPARISON: None CHEST X-ray, 2 VIEWS FINDINGS: The cardiac silhouette is at the upper limits of normal in size. There are no infiltrates, consolidations or effusions. There are mild increased markings throughout both lungs. Bones of the thorax appear intact. No radiographic evidence of acute intrathoracic process. Prominent cardiac silhouette and increased pulmonary markings may be secondary to chronic CHF. CT ABDOMEN PELVIS W IV CONTRAST Additional Contrast? None    Result Date: 4/15/2022  EXAM:  CT ABDOMEN PELVIS W IV CONTRAST History: Right upper quadrant pain. Lower quadrant pain. Technique: Multiple contiguous axial images were obtained of the abdomen and pelvis from the level of the lung bases through the ischial tuberosities with contrast. Multiplanar reformats were obtained. Comparison: CT abdomen pelvis October 28, 2020 Findings: Bibasilar infiltrate and/or atelectasis. The gallbladder is distended and contains multiple small gallstones. There is gallbladder wall thickening and pericholecystic inflammation. The liver, spleen, stomach, pancreas, and adrenal glands are within normal limits. Small hiatal hernia. The kidneys enhance uniformly. Millimeter calculus within the right renal pelvis. No right-sided hydronephrosis. No left-sided urinary tract calculi or hydronephrosis. Urinary bladder is well distended. The uterus is present. Abdominal aorta is nonaneurysmal  and demonstrates atherosclerotic calcification .  No retroperitoneal or abdominal/pelvic lymphadenopathy. No small bowel obstruction. No overt colonic mass or pericolonic inflammation. The appendix is not definitively visualized. No free fluid, loculated fluid collection, or pneumoperitoneum. Likely chronic mild compression deformity of T11. Degenerative changes of the spine. Chronic right rib deformities. Cholelithiasis with gallbladder distention, gallbladder wall thickening, pericholecystic fluid most concerning for acute cholecystitis. Bibasilar atelectasis/scarring and/or infiltrate. All CT scans at this facility use dose modulation, iterative reconstruction, and/or weight based dosing when appropriate to reduce radiation dose to as low as reasonably achievable. ASSESSMENT ANDPLAN:    Assessment: Acute cholecystitis in patient with multiple medical problems who is not a good surgical candidate. Plan: Ultrasound and fluoroscopy guided cholecystostomy tube placement. Tube can not be removed before 8 weeks if problem resolves.      Celine Spencer MD, Cristiana Lion

## 2022-04-15 NOTE — PROGRESS NOTES
Pt arrived back to her room very nauseous and dry heaving, she is currently hypertensive, MD notified. Serosanguineous drainage noted in tubing of drain, she is reporting pain to her right side. Two daughters at bedside, will continue to monitor closely.

## 2022-04-15 NOTE — CARE COORDINATION
[facility] Case Management Initial Discharge Assessment    Met with Family to discuss discharge plan. PCP: Mihaela Domingo MD       Date of Last Visit: VIRTUAL IN JAN 2022 [address] [facility] Patient: No        [facility] Notified: no    If no PCP, list provided? N/A    Discharge Planning    Living Arrangements: at home dependent on family care    Who do you live with? Gracy Blanca    Who helps you with your care:  family    If lives at home:     Do you have any barriers navigating in your home? yes - [place]aggien TO GET AROUND    Patient can perform ADL? Yes    Current Services (outpatient and in home) :  None    Dialysis: No    Is transportation available to get to your appointments? Yes    DME Equipment:  yes - WHEELED WALKER    Respiratory equipment: PRN/HS Oxygen AS NEEDED Liters    Respiratory provider:      Pharmacy:  yes - [facility] with Medication Assistance Program?  No      Patient agreeable to [facility]? Yes, [address]    Patient agreeable to SNF/Rehab? N/A    Other discharge needs identified? Other TBD    Does Patient Have a High-Risk for Readmission Diagnosis (CHF, PN, MI, COPD)? No      Initial Discharge Plan? (Note: please see concurrent daily documentation for any updates after initial note). HOME WITH [facility]    Readmission Risk              Risk of Unplanned Readmission:  16         Electronically signed by Bishop Kanu Arenas RN on 4/15/2022 at 4:06 PM

## 2022-04-15 NOTE — OR NURSING
SEDATION     1414 - Pt to Specials via cart/bed. Pt transferred to the table with 4 person assistance and placed in supine position. Monitors and oxygen applied. Procedure explained and consent obtained. VSS. 3L NC with ETCO2 monitoring applied. Area generously prepped with chlorhexidine per AR, rad tech. Pt draped with full body in sterile fashion. Time out completed for percutaneous fluorscopy guided cholecystostomy tube placement with conscious sedation. 1423 - Conscious sedation medication given per Dr. Russell Tafoya verbal order, see eMar.      1427 - Lidocaine 2 % given by Dr. Russell Tafoya at the marked site with ultrasound guidance, see eMar. Pt's daughter arrived to \[facility]\"" and procedure reviewed with her by LOLI, RN and verbal consent verified. 1430 -5 Nigerien micropuncture set used to gain access. 1435 - Specimen obtained by aspirating fluid content brown purulent color noted. Dr. Russell Tafoya hand injected contrast under fluoro guidance to visualize the area, see eMar.     1439 - Bentson 0.035 in by 150 cm inserted through the MP set. Dilators used to prepare site for drain. 1441 - Additional sedation medication administered per Dr. Celine Page order. See eMAR. [facility] catheter 10 Fr by 25 cm (Lot # [id number], expires 12/07/2024) inserted over the Bentson wire under fluoro. Bentson wire removed. Dr. Russell Tafoya hand injected contrast under fluoro guidance to confirm placement. 12 - Dr. Russell Tafoya suturing around the drain to secure the drain to the skin with 2-O prolene. 1448 - Stay fix dressing applied, split guaze, and tegaderm. No bleeding noted. VSS.    1449 - Gravity drain bag applied. 0 - Pt assisted to cart and will remain in specials room 6 for for recovery by MR, RN. Pt tolerated well.         TOTAL CONTRAST: 20 ml    TOTAL SEDATION:  100 mcg FENTANYL                                          1 mg VERSED

## 2022-04-15 NOTE — FLOWSHEET NOTE
Pt resting on cart post procedure. VSS. Pt states she is doing fine. Denies nausea. States her abdominal pain os a \"5\" at this time.

## 2022-04-16 LAB
ANION GAP SERPL CALCULATED.3IONS-SCNC: 13 MEQ/L (ref 9–15)
BUN BLDV-MCNC: 12 MG/DL (ref 8–23)
CALCIUM SERPL-MCNC: 8.4 MG/DL (ref 8.5–9.9)
CHLORIDE BLD-SCNC: 102 MEQ/L (ref 95–107)
CO2: 21 MEQ/L (ref 20–31)
CREAT SERPL-MCNC: 0.9 MG/DL (ref 0.5–0.9)
GFR AFRICAN AMERICAN: >60
GFR NON-AFRICAN AMERICAN: 58.3
GLUCOSE BLD-MCNC: 121 MG/DL (ref 70–99)
HCT VFR BLD CALC: 35.6 % (ref 37–47)
HEMOGLOBIN: 11.6 G/DL (ref 12–16)
MCH RBC QN AUTO: 28.2 PG (ref 27–31.3)
MCHC RBC AUTO-ENTMCNC: 32.7 % (ref 33–37)
MCV RBC AUTO: 86.2 FL (ref 82–100)
PDW BLD-RTO: 13.9 % (ref 11.5–14.5)
PLATELET # BLD: 300 K/UL (ref 130–400)
POTASSIUM SERPL-SCNC: 4.1 MEQ/L (ref 3.4–4.9)
RBC # BLD: 4.13 M/UL (ref 4.2–5.4)
SODIUM BLD-SCNC: 136 MEQ/L (ref 135–144)
WBC # BLD: 12.8 K/UL (ref 4.8–10.8)

## 2022-04-16 PROCEDURE — 6370000000 HC RX 637 (ALT 250 FOR IP): Performed by: STUDENT IN AN ORGANIZED HEALTH CARE EDUCATION/TRAINING PROGRAM

## 2022-04-16 PROCEDURE — 1210000000 HC MED SURG R&B

## 2022-04-16 PROCEDURE — 94640 AIRWAY INHALATION TREATMENT: CPT

## 2022-04-16 PROCEDURE — 6360000002 HC RX W HCPCS: Performed by: SURGERY

## 2022-04-16 PROCEDURE — 36415 COLL VENOUS BLD VENIPUNCTURE: CPT

## 2022-04-16 PROCEDURE — 85027 COMPLETE CBC AUTOMATED: CPT

## 2022-04-16 PROCEDURE — 94761 N-INVAS EAR/PLS OXIMETRY MLT: CPT

## 2022-04-16 PROCEDURE — 2580000003 HC RX 258: Performed by: SURGERY

## 2022-04-16 PROCEDURE — 99223 1ST HOSP IP/OBS HIGH 75: CPT | Performed by: SURGERY

## 2022-04-16 PROCEDURE — 6360000002 HC RX W HCPCS: Performed by: STUDENT IN AN ORGANIZED HEALTH CARE EDUCATION/TRAINING PROGRAM

## 2022-04-16 PROCEDURE — 80048 BASIC METABOLIC PNL TOTAL CA: CPT

## 2022-04-16 PROCEDURE — 2580000003 HC RX 258: Performed by: PHYSICIAN ASSISTANT

## 2022-04-16 RX ADMIN — BUDESONIDE AND FORMOTEROL FUMARATE DIHYDRATE 2 PUFF: 80; 4.5 AEROSOL RESPIRATORY (INHALATION) at 07:55

## 2022-04-16 RX ADMIN — PIPERACILLIN AND TAZOBACTAM 3375 MG: 3; .375 INJECTION, POWDER, LYOPHILIZED, FOR SOLUTION INTRAVENOUS at 10:31

## 2022-04-16 RX ADMIN — SENNOSIDES 8.6 MG: 8.6 TABLET, FILM COATED ORAL at 20:42

## 2022-04-16 RX ADMIN — ATORVASTATIN CALCIUM 20 MG: 20 TABLET, FILM COATED ORAL at 08:05

## 2022-04-16 RX ADMIN — METOPROLOL TARTRATE 12.5 MG: 25 TABLET, FILM COATED ORAL at 20:43

## 2022-04-16 RX ADMIN — ACETAMINOPHEN 650 MG: 325 TABLET ORAL at 10:15

## 2022-04-16 RX ADMIN — DORZOLAMIDE HYDROCHLORIDE 1 DROP: 20 SOLUTION/ DROPS OPHTHALMIC at 20:42

## 2022-04-16 RX ADMIN — PIPERACILLIN AND TAZOBACTAM 3375 MG: 3; .375 INJECTION, POWDER, LYOPHILIZED, FOR SOLUTION INTRAVENOUS at 18:18

## 2022-04-16 RX ADMIN — LOSARTAN POTASSIUM 25 MG: 25 TABLET, FILM COATED ORAL at 10:13

## 2022-04-16 RX ADMIN — BRIMONIDINE TARTRATE 1 DROP: 2 SOLUTION OPHTHALMIC at 20:42

## 2022-04-16 RX ADMIN — ACETAMINOPHEN 650 MG: 325 TABLET ORAL at 22:38

## 2022-04-16 RX ADMIN — DORZOLAMIDE HYDROCHLORIDE 1 DROP: 20 SOLUTION/ DROPS OPHTHALMIC at 08:07

## 2022-04-16 RX ADMIN — Medication 10 ML: at 10:16

## 2022-04-16 RX ADMIN — TIMOLOL MALEATE 1 DROP: 5 SOLUTION/ DROPS OPHTHALMIC at 20:42

## 2022-04-16 RX ADMIN — METOPROLOL TARTRATE 12.5 MG: 25 TABLET, FILM COATED ORAL at 08:02

## 2022-04-16 RX ADMIN — Medication 10 ML: at 20:43

## 2022-04-16 RX ADMIN — PIPERACILLIN AND TAZOBACTAM 3375 MG: 3; .375 INJECTION, POWDER, LYOPHILIZED, FOR SOLUTION INTRAVENOUS at 01:45

## 2022-04-16 RX ADMIN — LOSARTAN POTASSIUM 25 MG: 25 TABLET, FILM COATED ORAL at 20:42

## 2022-04-16 RX ADMIN — TIMOLOL MALEATE 1 DROP: 5 SOLUTION/ DROPS OPHTHALMIC at 10:17

## 2022-04-16 RX ADMIN — ACETAMINOPHEN 650 MG: 325 TABLET ORAL at 18:18

## 2022-04-16 RX ADMIN — ENOXAPARIN SODIUM 30 MG: 100 INJECTION SUBCUTANEOUS at 08:06

## 2022-04-16 RX ADMIN — BRIMONIDINE TARTRATE 1 DROP: 2 SOLUTION OPHTHALMIC at 08:07

## 2022-04-16 RX ADMIN — LATANOPROST 1 DROP: 50 SOLUTION OPHTHALMIC at 20:42

## 2022-04-16 RX ADMIN — POLYETHYLENE GLYCOL 3350 17 G: 17 POWDER, FOR SOLUTION ORAL at 08:07

## 2022-04-16 RX ADMIN — ACETAMINOPHEN 650 MG: 325 TABLET ORAL at 05:33

## 2022-04-16 RX ADMIN — BUDESONIDE AND FORMOTEROL FUMARATE DIHYDRATE 2 PUFF: 80; 4.5 AEROSOL RESPIRATORY (INHALATION) at 19:36

## 2022-04-16 ASSESSMENT — PAIN SCALES - GENERAL
PAINLEVEL_OUTOF10: 4
PAINLEVEL_OUTOF10: 0
PAINLEVEL_OUTOF10: 3

## 2022-04-16 NOTE — PROGRESS NOTES
EMERGENCY GENERAL SURGERY DAILY PROGRESS NOTE      Patient Name: Emmett Frankel  Admission Date 2022    Hospital Day: 2  Patient seen and examined on 2022    INTERVAL HISTORY/EVENTS     Background:  Emmett Frankel is a 80 y.o. female with past medical history of COPD, HTN, FRED presents to [facility] with 1 week history of intermittent RUQ pain without nausea or vomiting. Patient is a poor historian and history was obtained from her two daughters. Patient is denying any pain at this time. Patient is tolerating PO but is NPO at this time. Patient is voiding spontaneously. She states that she has not had a bowel movement in a \"few days. \" She states she is passing gas. Hospital Course:  2022: admitted to floor, s/p percutaneous cholecystostomy tube placement      24 Hour Events:  Patient had perc adenike drain placed by IR. This am patient states pain improve but still slightly present. Denies nausea/vomiting. Feels hungry. Tolerated clear liquid diet. Labs reviewed. WBC 12.8 (improved)    Output:   Drains: 25 cc      PHYSICAL EXAM     Vitals Average, Min and Max for last 24 hours:  Temp: Temp: 98.8 °F (37.1 °C); Temp  Av.8 °F (37.1 °C)  Min: 98.8 °F (37.1 °C)  Max: 98.8 °F (37.1 °C)  Respirations: Resp  Av.4  Min: 18  Max: 33  Pulse: Pulse  Av.7  Min: 81  Max: 89  Blood Pressure: Systolic (47KGS), DQN:012 , Min:118 , MVI:636   ; Diastolic (79GHY), PAV:61, Min:52, Max:89    SpO2: SpO2  Av.1 %  Min: 90 %  Max: 96 %    24hr Intake/Output:   Intake/Output Summary (Last 24 hours) at 2022 0852  Last data filed at 4/15/2022 1458  Gross per 24 hour   Intake    Output 25 ml   Net -25 ml       Vitals: BP (!) 118/52   Pulse 82   Temp 98.8 °F (37.1 °C) (Oral)   Resp 18   Ht 5' 2\" (1.575 m)   Wt 180 lb (81.6 kg)   LMP  (LMP Unknown)   SpO2 90%   BMI 32.92 kg/m²     Physical Exam:  Constitutional: no acute distress  HEENT: Atraumatic normocephalic.    Cardiovascular: Regular rate and rhythm. Pulmonary: Clear to ausculation bilaterally. No wheezing, rhonchi or rales. Abdominal: Soft. Non-distended. Tender mild left side; no peritonitis; RUQ IR drain with bilious output no erythema  Musculoskeletal: Good ROM in all extremities. No edema. Neurological: Alert, awake, and orientated x 1 (baseline). Motor and sensory grossly intact. No focal deficits. GCS of 14  Skin: warm    LABORATORY RESULTS (LAST 24 HOURS)     CBC with Differential:    Lab Results   Component Value Date    WBC 12.8 04/16/2022    RBC 4.13 04/16/2022    HGB 11.6 04/16/2022    HCT 35.6 04/16/2022     04/16/2022    MCV 86.2 04/16/2022    MCH 28.2 04/16/2022    MCHC 32.7 04/16/2022    RDW 13.9 04/16/2022    BANDSPCT 12 10/28/2020    METASPCT 1 11/05/2020    LYMPHOPCT 8.7 04/14/2022    MONOPCT 9.1 04/14/2022    BASOPCT 0.7 04/14/2022    MONOSABS 1.4 04/14/2022    LYMPHSABS 1.3 04/14/2022    EOSABS 0.1 04/14/2022    BASOSABS 0.1 04/14/2022     CMP:    Lab Results   Component Value Date     04/16/2022    K 4.1 04/16/2022    K 4.0 11/06/2020     04/16/2022    CO2 21 04/16/2022    BUN 12 04/16/2022    CREATININE 0.90 04/16/2022    GFRAA >60.0 04/16/2022    LABGLOM 58.3 04/16/2022    GLUCOSE 121 04/16/2022    PROT 6.6 04/14/2022    LABALBU 3.3 04/14/2022    CALCIUM 8.4 04/16/2022    BILITOT 0.6 04/14/2022    ALKPHOS 224 04/14/2022    AST 12 04/14/2022    ALT 19 04/14/2022     Magnesium:    Lab Results   Component Value Date    MG 2.4 12/26/2021     PT/INR:    Lab Results   Component Value Date    PROTIME 16.4 04/15/2022    INR 1.3 04/15/2022     PTT:    Lab Results   Component Value Date    APTT 29.7 04/15/2022       IMAGING RESULTS (PERSONALLY REVIEWED)     CT scan reviewed from admission    ASSESSMENT & PLAN     Diagnoses:  1. Acute cholecystitis  2. COPD  3.   HTN    PMHx: HTN, COPD    Incidental Findings: none      ASSESSMENT/PLAN:  Neurological: Acute abdominal pain, Dx dementia   - Tylenol q6 scheduled  - Will hold off on stronger pain medication for now as patient is not requesting pain meds.     Cardiovascular: Hx HTN, HLD  - Continue vital signs per unit protocol  - Continue home lipitor 20mg daily  - Continue home Losartan 25mg q12hrs  - Continue home metoprolol 12.5mg BID  - BP improved once home meds started.     Respiratory: Hx COPD, pt on home O2 but does not use   - Continue home albuterol q4 PRN for wheezing  - Continue home Symbicort BID  - Continue home Duoneb q6 PRN  - Maintain O2 sat >88%, encourage IS  - per daughter home O2 sats around 90%. No acute issues at this time.     GI/Diet: Acute cholecystitis   - advance to regular diet today  - Bowel regimen of senna, Miralax, PRN dulcolax  - Discussed risk/benefits of operative vs non-operative management with patient's daughters given the patient's age and co morbidities. Daughters to discuss options and she underwent IR drainage of gallbladder. The daughters did not want to pursue operative intervention.     Renal/Electrolytes: No electrolyte abnormalities, RICHARD on admission with BUN/Cr 26/1.35  - heplock IV fluids  - AM BMP to trend Cr. Currently stable     ID: Leukocytosis 15 on admission, improved today to 12.8  -Zosyn q8 for acute cholecystitis  - AM CBC     Heme: No acute issues  - No indication for transfusion  - AM CBC     Endocrine: No acute issues     MSK: No acute issues  - Activity: out of bed as tolerated      Prophylaxis:   - SCD, lovenox 40mg daily for DVT ppx        Dispo: Remain on RNF for continued management of acute cholecystitis. Please call for any questions or concerns.     [address] [place], [place] [id number]) [phone]

## 2022-04-16 NOTE — PROGRESS NOTES
Comprehensive Nutrition Assessment    Type and Reason for Visit:  Initial,Positive Nutrition Screen,Wound (healing sore to buttocks)    Nutrition Recommendations/Plan:     Continue regular diet. If intakes are adequate, consider low fat diet  Trial clear liquid supplement BID  Please weigh pt when able  Please monitor intakes    Nutrition Assessment:  Pt currently at nutritional risk r/t acute cholecystitis. Pt currently on regular diet with no intakes documented in flowsheets. If cholecystitis does not improve and intakes are adequate, recommend considering low fat diet. Will trial clear liquid supplement BID to help with wound healing and in case intakes are poor. Malnutrition Assessment:  Malnutrition Status:  Insufficient data    Context:  Acute Illness     Findings of the 6 clinical characteristics of malnutrition:  Energy Intake:  Unable to assess  Weight Loss:  Unable to assess (current wt stated)     Body Fat Loss:  Unable to assess     Muscle Mass Loss:  Unable to assess    Fluid Accumulation:  No significant fluid accumulation     Strength:  Not Performed    Estimated Daily Nutrient Needs:  Energy (kcal):  4893-0709 kcal (15-17 kcal/kg); Weight Used for Energy Requirements:  Current (81.6 kg)     Protein (g):  60-70g (1.2-1.4 g/kg); Weight Used for Protein Requirements:  Ideal (50 kg)        Fluid (ml/day):  ~1500 ml (30 ml x 50 kg IBW); Method Used for Fluid Requirements:  ml/Kg      Nutrition Related Findings:  PMHx includes COPD, HTN, FRED, anxiety, glaucoma, h/o colon cancer with segmental resection. Noted pt is a poor historian. No family in room at time of visit so information gathered from EMR. +1 generalized edmea. BM 4/15. S/p percutaneous cholecystostomy tube placement 4/14. Noted pt had nausea yesterday. Glucose elevated at 121    Wounds:  Pressure Injury (healing on buttocks (scabbed))       Current Nutrition Therapies:    ADULT DIET;  Regular    Anthropometric Measures:  · Height: 5' 2\" (157.5 cm)  · Current Body Weight: 180 lb (81.6 kg)   · Admission Body Weight: 180 lb (81.6 kg) (stated)    · Usual Body Weight: 182 lb (82.6 kg) ([facility] 12/26/21)     · Ideal Body Weight: 110 lbs  · BMI: 32.9  · Adjusted Body Weight:  ; No Adjustment   · BMI Categories: Obese Class 1 (BMI 30.0-34. 9)       Nutrition Diagnosis:   · Increased nutrient needs related to acute injury/trauma as evidenced by wounds    Nutrition Interventions:   Food and/or Nutrient Delivery:  Continue Current Diet,Start Oral Nutrition Supplement  Nutrition Education/Counseling:  No recommendation at this time   Coordination of Nutrition Care:  Continue to monitor while inpatient    Goals:  Consume >50% of meals and supplements       Nutrition Monitoring and Evaluation:   Behavioral-Environmental Outcomes:  None Identified   Food/Nutrient Intake Outcomes:  Food and Nutrient Intake,Supplement Intake  Physical Signs/Symptoms Outcomes:  Biochemical Data,GI Status,Weight,Meal Time Behavior,Nausea or Vomiting     Discharge Planning:     Too soon to determine     Electronically signed by Jaiden Nelson RD, LD on 4/16/22 at 12:17 PM EDT

## 2022-04-17 LAB
ANION GAP SERPL CALCULATED.3IONS-SCNC: 12 MEQ/L (ref 9–15)
BUN BLDV-MCNC: 15 MG/DL (ref 8–23)
CALCIUM SERPL-MCNC: 8.2 MG/DL (ref 8.5–9.9)
CHLORIDE BLD-SCNC: 107 MEQ/L (ref 95–107)
CO2: 21 MEQ/L (ref 20–31)
CREAT SERPL-MCNC: 0.93 MG/DL (ref 0.5–0.9)
GFR AFRICAN AMERICAN: >60
GFR NON-AFRICAN AMERICAN: 56.2
GLUCOSE BLD-MCNC: 100 MG/DL (ref 70–99)
HCT VFR BLD CALC: 35.6 % (ref 37–47)
HEMOGLOBIN: 11.7 G/DL (ref 12–16)
MCH RBC QN AUTO: 28.4 PG (ref 27–31.3)
MCHC RBC AUTO-ENTMCNC: 33 % (ref 33–37)
MCV RBC AUTO: 86.1 FL (ref 82–100)
PDW BLD-RTO: 14.1 % (ref 11.5–14.5)
PLATELET # BLD: 258 K/UL (ref 130–400)
POTASSIUM SERPL-SCNC: 4 MEQ/L (ref 3.4–4.9)
RBC # BLD: 4.14 M/UL (ref 4.2–5.4)
SODIUM BLD-SCNC: 140 MEQ/L (ref 135–144)
WBC # BLD: 9 K/UL (ref 4.8–10.8)

## 2022-04-17 PROCEDURE — 94760 N-INVAS EAR/PLS OXIMETRY 1: CPT

## 2022-04-17 PROCEDURE — 6370000000 HC RX 637 (ALT 250 FOR IP): Performed by: STUDENT IN AN ORGANIZED HEALTH CARE EDUCATION/TRAINING PROGRAM

## 2022-04-17 PROCEDURE — 6360000002 HC RX W HCPCS: Performed by: SURGERY

## 2022-04-17 PROCEDURE — 94640 AIRWAY INHALATION TREATMENT: CPT

## 2022-04-17 PROCEDURE — 85027 COMPLETE CBC AUTOMATED: CPT

## 2022-04-17 PROCEDURE — 2580000003 HC RX 258: Performed by: SURGERY

## 2022-04-17 PROCEDURE — 94761 N-INVAS EAR/PLS OXIMETRY MLT: CPT

## 2022-04-17 PROCEDURE — 36415 COLL VENOUS BLD VENIPUNCTURE: CPT

## 2022-04-17 PROCEDURE — 6360000002 HC RX W HCPCS: Performed by: STUDENT IN AN ORGANIZED HEALTH CARE EDUCATION/TRAINING PROGRAM

## 2022-04-17 PROCEDURE — 99232 SBSQ HOSP IP/OBS MODERATE 35: CPT | Performed by: PHYSICIAN ASSISTANT

## 2022-04-17 PROCEDURE — 1210000000 HC MED SURG R&B

## 2022-04-17 PROCEDURE — 97162 PT EVAL MOD COMPLEX 30 MIN: CPT

## 2022-04-17 PROCEDURE — 80048 BASIC METABOLIC PNL TOTAL CA: CPT

## 2022-04-17 PROCEDURE — 2580000003 HC RX 258: Performed by: PHYSICIAN ASSISTANT

## 2022-04-17 RX ADMIN — ENOXAPARIN SODIUM 30 MG: 100 INJECTION SUBCUTANEOUS at 08:31

## 2022-04-17 RX ADMIN — ACETAMINOPHEN 650 MG: 325 TABLET ORAL at 05:53

## 2022-04-17 RX ADMIN — Medication 10 ML: at 08:43

## 2022-04-17 RX ADMIN — LOSARTAN POTASSIUM 25 MG: 25 TABLET, FILM COATED ORAL at 08:30

## 2022-04-17 RX ADMIN — ATORVASTATIN CALCIUM 20 MG: 20 TABLET, FILM COATED ORAL at 08:31

## 2022-04-17 RX ADMIN — PIPERACILLIN AND TAZOBACTAM 3375 MG: 3; .375 INJECTION, POWDER, LYOPHILIZED, FOR SOLUTION INTRAVENOUS at 16:37

## 2022-04-17 RX ADMIN — Medication 10 ML: at 22:07

## 2022-04-17 RX ADMIN — TIMOLOL MALEATE 1 DROP: 5 SOLUTION/ DROPS OPHTHALMIC at 08:41

## 2022-04-17 RX ADMIN — ACETAMINOPHEN 650 MG: 325 TABLET ORAL at 22:08

## 2022-04-17 RX ADMIN — ACETAMINOPHEN 650 MG: 325 TABLET ORAL at 10:10

## 2022-04-17 RX ADMIN — BUDESONIDE AND FORMOTEROL FUMARATE DIHYDRATE 2 PUFF: 80; 4.5 AEROSOL RESPIRATORY (INHALATION) at 19:28

## 2022-04-17 RX ADMIN — LATANOPROST 1 DROP: 50 SOLUTION OPHTHALMIC at 22:07

## 2022-04-17 RX ADMIN — BUDESONIDE AND FORMOTEROL FUMARATE DIHYDRATE 2 PUFF: 80; 4.5 AEROSOL RESPIRATORY (INHALATION) at 07:35

## 2022-04-17 RX ADMIN — DORZOLAMIDE HYDROCHLORIDE 1 DROP: 20 SOLUTION/ DROPS OPHTHALMIC at 08:42

## 2022-04-17 RX ADMIN — POLYETHYLENE GLYCOL 3350 17 G: 17 POWDER, FOR SOLUTION ORAL at 08:31

## 2022-04-17 RX ADMIN — METOPROLOL TARTRATE 12.5 MG: 25 TABLET, FILM COATED ORAL at 22:08

## 2022-04-17 RX ADMIN — PIPERACILLIN AND TAZOBACTAM 3375 MG: 3; .375 INJECTION, POWDER, LYOPHILIZED, FOR SOLUTION INTRAVENOUS at 01:46

## 2022-04-17 RX ADMIN — LOSARTAN POTASSIUM 25 MG: 25 TABLET, FILM COATED ORAL at 22:08

## 2022-04-17 RX ADMIN — SENNOSIDES 8.6 MG: 8.6 TABLET, FILM COATED ORAL at 22:08

## 2022-04-17 RX ADMIN — BRIMONIDINE TARTRATE 1 DROP: 2 SOLUTION OPHTHALMIC at 08:41

## 2022-04-17 RX ADMIN — BRIMONIDINE TARTRATE 1 DROP: 2 SOLUTION OPHTHALMIC at 22:07

## 2022-04-17 RX ADMIN — PIPERACILLIN AND TAZOBACTAM 3375 MG: 3; .375 INJECTION, POWDER, LYOPHILIZED, FOR SOLUTION INTRAVENOUS at 08:45

## 2022-04-17 RX ADMIN — TIMOLOL MALEATE 1 DROP: 5 SOLUTION/ DROPS OPHTHALMIC at 22:07

## 2022-04-17 RX ADMIN — DORZOLAMIDE HYDROCHLORIDE 1 DROP: 20 SOLUTION/ DROPS OPHTHALMIC at 22:07

## 2022-04-17 RX ADMIN — METOPROLOL TARTRATE 12.5 MG: 25 TABLET, FILM COATED ORAL at 08:30

## 2022-04-17 RX ADMIN — ACETAMINOPHEN 650 MG: 325 TABLET ORAL at 16:33

## 2022-04-17 ASSESSMENT — PAIN SCALES - GENERAL
PAINLEVEL_OUTOF10: 0
PAINLEVEL_OUTOF10: 5
PAINLEVEL_OUTOF10: 0

## 2022-04-17 NOTE — PROGRESS NOTES
PT INCONTINENT OF LOOSE STOOL THIS AM, FULL BATH GIVEN LINEN CHANGED. Pt was seen by PT this am and evaluated, pt up in chair for approx 3 hrs this am and into the afternoon. HAD VISITORS THROUGHOUT MOST OF THE AFTERNOON.  Pt resting in bed without complaints

## 2022-04-17 NOTE — PROGRESS NOTES
EMERGENCY GENERAL SURGERY DAILY PROGRESS NOTE      Patient Name: Adelaida Talley  Admission Date 2022    Hospital Day: 3  Patient seen and examined on 2022    INTERVAL HISTORY/EVENTS     Background:  Adelaida Talley is a 80 y.o. female with past medical history of COPD, HTN, FRED presents to [facility] [place] with 1 week history of intermittent RUQ pain without nausea or vomiting. Patient is a poor historian and history was obtained from her two daughters. Patient is denying any pain at this time. Patient is tolerating PO but is NPO at this time. Patient is voiding spontaneously. She states that she has not had a bowel movement in a \"few days. \" She states she is passing gas. Hospital Course:  2022: admitted to floor, s/p percutaneous cholecystostomy tube placement  2022: Improving abdominal pain. Tolerating CLD. 24 Hour Events:  No acute events overnight. Tolerating regular diet. Reports improved abdominal pain when compared to yesterday. Drain remains in place with 110cc of serous drainage. Labs reviewed and significant for resolved leukocytosis, stable H&H, no acute renal or electrolyte abnormalities. Cultures from 4/15 remain with NGTD. Output:   Drains: 110 cc      PHYSICAL EXAM     Vitals Average, Min and Max for last 24 hours:  Temp: Temp: 97.6 °F (36.4 °C);  Temp  Av.4 °F (36.9 °C)  Min: 97.6 °F (36.4 °C)  Max: 99.1 °F (37.3 °C)  Respirations: Resp  Av  Min: 18  Max: 18  Pulse: Pulse  Av  Min: 72  Max: 90  Blood Pressure: Systolic (23WVP), KIC:502 , Min:114 , EDH:648   ; Diastolic (52CWH), RAN:24, Min:50, Max:57  SpO2: SpO2  Av %  Min: 94 %  Max: 94 %    24hr Intake/Output:     Intake/Output Summary (Last 24 hours) at 2022 0843  Last data filed at 2022 0612  Gross per 24 hour   Intake --   Output 110 ml   Net -110 ml       Vitals: BP (!) 133/57   Pulse 72   Temp 97.6 °F (36.4 °C) (Oral)   Resp 18   Ht 5' 2\" (1.575 m)   Wt 180 lb (81.6 kg) LMP  (LMP Unknown)   SpO2 94%   BMI 32.92 kg/m²     Physical Exam:  Constitutional: Lying supine in bed. Alert and conversant. HEENT: Atraumatic normocephalic. Cardiovascular: Regular rate and rhythm. Pulmonary: Clear to ausculation bilaterally. No wheezing, rhonchi or rales. Abdominal: Soft. Non-distended. Tender mild left side; no peritonitis; RUQ IR drain with bilious output no erythema  Musculoskeletal: Good ROM in all extremities. No edema. Neurological: Alert, awake, and orientated x 1 (baseline). Motor and sensory grossly intact. No focal deficits.  GCS of 14  Skin: warm    LABORATORY RESULTS (LAST 24 HOURS)     CBC with Differential:    Lab Results   Component Value Date    WBC 9.0 04/17/2022    RBC 4.14 04/17/2022    HGB 11.7 04/17/2022    HCT 35.6 04/17/2022     04/17/2022    MCV 86.1 04/17/2022    MCH 28.4 04/17/2022    MCHC 33.0 04/17/2022    RDW 14.1 04/17/2022    BANDSPCT 12 10/28/2020    METASPCT 1 11/05/2020    LYMPHOPCT 8.7 04/14/2022    MONOPCT 9.1 04/14/2022    BASOPCT 0.7 04/14/2022    MONOSABS 1.4 04/14/2022    LYMPHSABS 1.3 04/14/2022    EOSABS 0.1 04/14/2022    BASOSABS 0.1 04/14/2022     CMP:    Lab Results   Component Value Date     04/17/2022    K 4.0 04/17/2022    K 4.0 11/06/2020     04/17/2022    CO2 21 04/17/2022    BUN 15 04/17/2022    CREATININE 0.93 04/17/2022    GFRAA >60.0 04/17/2022    LABGLOM 56.2 04/17/2022    GLUCOSE 100 04/17/2022    PROT 6.6 04/14/2022    LABALBU 3.3 04/14/2022    CALCIUM 8.2 04/17/2022    BILITOT 0.6 04/14/2022    ALKPHOS 224 04/14/2022    AST 12 04/14/2022    ALT 19 04/14/2022     Magnesium:    Lab Results   Component Value Date    MG 2.4 12/26/2021     PT/INR:    Lab Results   Component Value Date    PROTIME 16.4 04/15/2022    INR 1.3 04/15/2022     PTT:    Lab Results   Component Value Date    APTT 29.7 04/15/2022       IMAGING RESULTS (PERSONALLY REVIEWED)     CT scan reviewed from admission    ASSESSMENT & PLAN Diagnoses:  Acute cholecystitis  COPD  3. HTN    PMHx: HTN, COPD    Incidental Findings: none      ASSESSMENT/PLAN:  Neurological: Acute abdominal pain, Dx dementia   - Tylenol q6 scheduled  - Will hold off on stronger pain medication for now as patient is not requesting pain meds. Cardiovascular: Hx HTN, HLD  - Continue vital signs per unit protocol  - Continue home lipitor 20mg daily  - Continue home Losartan 25mg q12hrs  - Continue home metoprolol 12.5mg BID     Respiratory: Hx COPD, pt on home O2 but does not use   - Continue home albuterol q4 PRN for wheezing  - Continue home Symbicort BID  - Continue home Duoneb q6 PRN  - Maintain O2 sat >88%, encourage IS  - per daughter home O2 sats around 90%. No acute issues at this time. GI/Diet: Acute cholecystitis   - Continue regular diet  - Bowel regimen of senna, Miralax, PRN dulcolax     Renal/Electrolytes: No electrolyte abnormalities, RICHARD on admission now resolved  - heplock IV fluids  - AM BMP to trend Cr. Currently stable     ID: Leukocytosis resolved  - Zosyn q8 for acute cholecystitis. - Cultures from 4/15 remain with NGTD. - Will transition to PO Augmentin for 7 days total  - AM CBC     Heme: No acute issues  - No indication for transfusion  - AM CBC     Endocrine: No acute issues     MSK: No acute issues  - Activity: out of bed as tolerated  - PT today     Prophylaxis:   - SCD, lovenox 40mg daily for DVT ppx        Dispo: medically cleared for discharge from surgical standpoint. PT today for dispo rec's. Bryan Cross, [address] [facility].  [phone] (1C-0T)  [phone]    Teaching Physician Note:  I saw and evaluated the patient. I personally obtained the key and critical portions of the history and physical exam.  I reviewed the NICK's documentation and discussed the patient with the NICK. I agree with the NICK's medical decision making as documented in the NICK note.       Patient seen and evaluated on 4/17/2022    Samina Braswell MD

## 2022-04-17 NOTE — PLAN OF CARE
Problem: Falls - Risk of:  Goal: Will remain free from falls  Description: Will remain free from falls  Outcome: Ongoing  Goal: Absence of physical injury  Description: Absence of physical injury  Outcome: Ongoing     Problem: Skin Integrity:  Goal: Will show no infection signs and symptoms  Description: Will show no infection signs and symptoms  Outcome: Ongoing  Goal: Absence of new skin breakdown  Description: Absence of new skin breakdown  Outcome: Ongoing     Problem: Activity:  Goal: Risk for activity intolerance will decrease  Description: Risk for activity intolerance will decrease  Outcome: Ongoing     Problem: Bowel/Gastric:  Goal: Bowel function will improve  Description: Bowel function will improve  Outcome: Ongoing  Goal: Diagnostic test results will improve  Description: Diagnostic test results will improve  Outcome: Ongoing  Goal: Occurrences of nausea will decrease  Description: Occurrences of nausea will decrease  Outcome: Ongoing  Goal: Occurrences of vomiting will decrease  Description: Occurrences of vomiting will decrease  Outcome: Ongoing     Problem:  Activity Intolerance:  Goal: Ability to tolerate increased activity will improve  Description: Ability to tolerate increased activity will improve  Outcome: Ongoing     Problem: Pain:  Goal: Pain level will decrease  Description: Pain level will decrease  Outcome: Ongoing  Goal: Control of acute pain  Description: Control of acute pain  Outcome: Ongoing  Goal: Control of chronic pain  Description: Control of chronic pain  Outcome: Ongoing

## 2022-04-17 NOTE — PROGRESS NOTES
Physical Therapy Med Surg Initial Assessment  [facility]  [facility]       NAME: Jimenez Manriquez  : 1928 (37 y.o.)  MRN: [id number]  CODE STATUS: Full Code    Date of Service: 2022    Patient Diagnosis(es): Acute cholecystitis [K81.0]  Nausea [R11.0]  Calculus of gallbladder with acute cholecystitis without obstruction [K80.00]  Right upper quadrant abdominal pain [R10.11]   Chief Complaint   Patient presents with    Abnormal Lab     Sent by Dr Kim Craig for CRP>200     Patient Active Problem List    Diagnosis Date Noted    RICHARD (acute kidney injury) ([address] 04/15/2022    Acute abdominal pain 04/15/2022    Acute cholecystitis 2022    Impaired mobility and activities of daily living 2021    Venous stasis dermatitis of both lower extremities 08/10/2021    Aphasia     Chest pain 2020    Obesity (BMI 30-39.9) 2019    Obstructive sleep apnea 2019    COPD exacerbation ([address]     COPD (chronic obstructive pulmonary disease) with acute bronchitis ([address] 2018    Anxiety 2017    Essential hypertension         Past Medical History:   Diagnosis Date    RICHARD (acute kidney injury) ([address] 4/15/2022    Anxiety     Cancer ([address]     Glaucoma     Gout     left knee    Hypertension     Lung disease     Obesity (BMI 30-39. 9) 2019    Obstructive sleep apnea 2019     Past Surgical History:   Procedure Laterality Date    CATARACT REMOVAL WITH IMPLANT Bilateral 2016    COLON SURGERY  2009    polyps    COLONOSCOPY  2009    GASTROSTOMY TUBE PLACEMENT N/A 2020    EGD PEG TUBE PLACEMENT performed by Baron Layne MD at [address] [place] [address] Right 04/15/2022    Flexima APDL Locking Pigtail Draiange Catheter inserted by Dr. Silvino Garcia (Lot# [id number] / Exp: 2024)   [facility] SKIN CANCER EXCISION      graft from neck       Chart Reviewed: Yes  Patient assessed for rehabilitation services?: Yes  Family / Caregiver Present: No    Restrictions:  Restrictions/Precautions: Fall Risk (high cole score)     SUBJECTIVE:      Pain  Pre Treatment Pain Screening  Pain at present: 0  Intervention List: Patient able to continue with treatment    Post Treatment Pain Screening:   Pain Screening  Patient Currently in Pain: Denies  Pain Assessment  Pain Level: 0    Prior Level of Function:  Social/Functional History  Lives With: Daughter (dtr does not work and is able to assist pt as needed)  Type of Home: House  Home Layout: Two level,Able to Live on Main level with bedroom/bathroom  Home Access: Stairs to enter with rails  Entrance Stairs - Number of Steps: 3  Bathroom Shower/Tub: Tub/Shower unit  Bathroom Equipment: Shower chair,Grab bars in [employer]  Home Equipment: Rolling walker  ADL Assistance: Needs assistance  Homemaking Assistance: Needs assistance  Homemaking Responsibilities: No  Ambulation Assistance: Independent ([facility])  Transfer Assistance: Independent  Active : No  Additional Comments: pt is questionable historian- question decreased recall versus unable to accurately hear the question; pt reports no recent falls at home.     OBJECTIVE:   Vision: Impaired  Vision Exceptions: Wears glasses for reading  Hearing: Exceptions to [facility]  Hearing Exceptions: Bilateral hearing aid;Hard of hearing/hearing concerns    Cognition:  Overall Orientation Status: Impaired  Orientation Level: Oriented to person,Oriented to time,Disoriented to situation,Disoriented to place  Follows Commands: Within Functional Limits    Observation/Palpation  Observation: incontinence of bowel- PT and RN assisting pt with clean up; no dizziness in standing; drain R torso    ROM:  RLE AROM: WFL  LLE AROM : WFL    Strength:  Strength RLE  Comment: grossly 4-/5  Strength LLE  Comment: grossly 4-/5    Neuro:  Balance  Sitting - Static: Good;-  Sitting - Dynamic: Good;- (SBA)  Standing - Static: Poor;+ (CGA to avoid posterior lean and B UE support on [employer])  Standing - Dynamic: Poor (min A with B UE support)     Tone RLE  RLE Tone: Normotonic  Tone LLE  LLE Tone: Normotonic  Motor Control  Gross Motor?: WFL  Sensation  Overall Sensation Status: WFL    Bed mobility  Supine to Sit: Minimal assistance (assistance for trunk from supine to sit)  Sit to Supine: Moderate assistance (assistance to lift LEs into bed)  Comment: HOB elevated    Transfers  Sit to Stand: Moderate Assistance  Stand to sit: Minimal Assistance  Comment: max verbal cues for safe hand placement; several posterior LOB with min A to recover; [employer] used    Ambulation  Ambulation?: Yes  Ambulation 1  Surface: level tile  Device: Rolling Walker  Assistance: Minimal assistance  Quality of Gait: FF, head down  Gait Deviations: Slow Maria Esther; Increased SHARON; Decreased step length;Decreased step height  Distance: 5 ft X 2 bed to bedside commode  Comments: min A to correct posterior lean; poor walker safety and maneuverability    Stairs/Curb  Stairs?: No         Activity Tolerance  Activity Tolerance: Patient limited by cognitive status; Patient limited by endurance; Patient limited by fatigue  Activity Tolerance: Pt limited by strength and balance deficits          PT Education  PT Education: Goals;PT Role;Plan of Care;General Safety    ASSESSMENT:   Body structures, Functions, Activity limitations: Decreased functional mobility ; Decreased safe awareness;Decreased balance;Decreased cognition;Decreased endurance; Increased pain;Decreased strength;Decreased posture  Decision Making: Medium Complexity  History: med  Exam: med  Clinical Presentation: med    Prognosis: Good    DISCHARGE RECOMMENDATIONS:  Discharge Recommendations: Continue to assess pending progress    Assessment: Pt is 80 y.o. female to hospital due to acute cholecystitis. Pt exhibits decreased cognition, strength, activity tolerance and balance requiring a higher level of assistance compared to supervision for all mobility.   Continued PT is required for safe d/c home with dtr at highest level of indep with decreased caregiver burden. REQUIRES PT FOLLOW UP: Yes      PLAN OF CARE:  Plan  Times per week: 3-6  Current Treatment Recommendations: Strengthening,Functional Mobility Training,Neuromuscular Re-education,Equipment Evaluation, Education, & procurement,ROM,Transfer Training,Gait Training,Safety Education & Training,Balance Mejia Rocha training,Pain Management,Patient/Caregiver Education & Training,Positioning  Safety Devices  Type of devices: Bed alarm in place,Call light within reach,Left in bed    Goals:  Long term goals  Long term goal 1: Pt will demonstrate bed mobility supervision  Long term goal 2: Pt will demonstrate transfers with safest AD SBA  Long term goal 3: Pt will demonstrate amb >/= 100ft with safest AD SBA  Long term goal 4: Pt will demonstrate stair negotiation 3 steps with 1 rail CGA to allow pt to safely enter and exit her home. Long term goal 5: Pt will demonstrate HEP with SBA    AMPAC (6 CLICK) BASIC MOBILITY  AM-PAC Inpatient Mobility Raw Score : 14    Therapy Time:   Individual   Time In [address] [facility]   Time Out 0955   Minutes [address] [facility], [address] [facility], 04/17/22 at 10:17 AM         Definitions for assistance levels  Independent = pt does not require any physical supervision or assistance from another person for activity completion. Device may be needed.   Stand by assistance = pt requires verbal cues or instructions from another person, close to but not touching, to perform the activity  Minimal assistance= pt performs 75% or more of the activity; assistance is required to complete the activity  Moderate assistance= pt performs 50% of the activity; assistance is required to complete the activity  Maximal assistance = pt performs 25% of the activity; assistance is required to complete the activity  Dependent = pt requires total physical assistance to accomplish the task

## 2022-04-18 PROBLEM — R10.9 ACUTE ABDOMINAL PAIN: Status: RESOLVED | Noted: 2022-04-15 | Resolved: 2022-04-18

## 2022-04-18 PROBLEM — N17.9 AKI (ACUTE KIDNEY INJURY) (HCC): Status: RESOLVED | Noted: 2022-04-15 | Resolved: 2022-04-18

## 2022-04-18 LAB
ANION GAP SERPL CALCULATED.3IONS-SCNC: 13 MEQ/L (ref 9–15)
BUN BLDV-MCNC: 15 MG/DL (ref 8–23)
CALCIUM SERPL-MCNC: 8.4 MG/DL (ref 8.5–9.9)
CHLORIDE BLD-SCNC: 107 MEQ/L (ref 95–107)
CO2: 21 MEQ/L (ref 20–31)
CREAT SERPL-MCNC: 0.87 MG/DL (ref 0.5–0.9)
GFR AFRICAN AMERICAN: >60
GFR NON-AFRICAN AMERICAN: >60
GLUCOSE BLD-MCNC: 107 MG/DL (ref 70–99)
HCT VFR BLD CALC: 36.1 % (ref 37–47)
HEMOGLOBIN: 11.9 G/DL (ref 12–16)
MCH RBC QN AUTO: 28.7 PG (ref 27–31.3)
MCHC RBC AUTO-ENTMCNC: 32.9 % (ref 33–37)
MCV RBC AUTO: 87.3 FL (ref 82–100)
PDW BLD-RTO: 14 % (ref 11.5–14.5)
PLATELET # BLD: 273 K/UL (ref 130–400)
POTASSIUM SERPL-SCNC: 3.9 MEQ/L (ref 3.4–4.9)
RBC # BLD: 4.14 M/UL (ref 4.2–5.4)
SODIUM BLD-SCNC: 141 MEQ/L (ref 135–144)
WBC # BLD: 7.7 K/UL (ref 4.8–10.8)

## 2022-04-18 PROCEDURE — 6370000000 HC RX 637 (ALT 250 FOR IP): Performed by: STUDENT IN AN ORGANIZED HEALTH CARE EDUCATION/TRAINING PROGRAM

## 2022-04-18 PROCEDURE — 99232 SBSQ HOSP IP/OBS MODERATE 35: CPT | Performed by: PHYSICIAN ASSISTANT

## 2022-04-18 PROCEDURE — 6360000002 HC RX W HCPCS: Performed by: SURGERY

## 2022-04-18 PROCEDURE — 1210000000 HC MED SURG R&B

## 2022-04-18 PROCEDURE — 94761 N-INVAS EAR/PLS OXIMETRY MLT: CPT

## 2022-04-18 PROCEDURE — 36415 COLL VENOUS BLD VENIPUNCTURE: CPT

## 2022-04-18 PROCEDURE — 85027 COMPLETE CBC AUTOMATED: CPT

## 2022-04-18 PROCEDURE — 97535 SELF CARE MNGMENT TRAINING: CPT

## 2022-04-18 PROCEDURE — 6360000002 HC RX W HCPCS: Performed by: STUDENT IN AN ORGANIZED HEALTH CARE EDUCATION/TRAINING PROGRAM

## 2022-04-18 PROCEDURE — 80048 BASIC METABOLIC PNL TOTAL CA: CPT

## 2022-04-18 PROCEDURE — 2580000003 HC RX 258: Performed by: SURGERY

## 2022-04-18 PROCEDURE — 94640 AIRWAY INHALATION TREATMENT: CPT

## 2022-04-18 PROCEDURE — 2580000003 HC RX 258: Performed by: PHYSICIAN ASSISTANT

## 2022-04-18 RX ORDER — AMOXICILLIN AND CLAVULANATE POTASSIUM 500; 125 MG/1; MG/1
1 TABLET, FILM COATED ORAL 3 TIMES DAILY
Qty: 21 TABLET | Refills: 0 | Status: SHIPPED | OUTPATIENT
Start: 2022-04-18 | End: 2022-04-26 | Stop reason: HOSPADM

## 2022-04-18 RX ORDER — ACETAMINOPHEN 325 MG/1
650 TABLET ORAL EVERY 6 HOURS PRN
Qty: 120 TABLET | Refills: 0 | Status: SHIPPED | OUTPATIENT
Start: 2022-04-18

## 2022-04-18 RX ADMIN — ACETAMINOPHEN 650 MG: 325 TABLET ORAL at 11:40

## 2022-04-18 RX ADMIN — LOSARTAN POTASSIUM 25 MG: 25 TABLET, FILM COATED ORAL at 21:04

## 2022-04-18 RX ADMIN — BUDESONIDE AND FORMOTEROL FUMARATE DIHYDRATE 2 PUFF: 80; 4.5 AEROSOL RESPIRATORY (INHALATION) at 07:16

## 2022-04-18 RX ADMIN — SENNOSIDES 8.6 MG: 8.6 TABLET, FILM COATED ORAL at 21:04

## 2022-04-18 RX ADMIN — PIPERACILLIN AND TAZOBACTAM 3375 MG: 3; .375 INJECTION, POWDER, LYOPHILIZED, FOR SOLUTION INTRAVENOUS at 01:14

## 2022-04-18 RX ADMIN — ATORVASTATIN CALCIUM 20 MG: 20 TABLET, FILM COATED ORAL at 08:43

## 2022-04-18 RX ADMIN — ENOXAPARIN SODIUM 30 MG: 100 INJECTION SUBCUTANEOUS at 08:44

## 2022-04-18 RX ADMIN — DORZOLAMIDE HYDROCHLORIDE 1 DROP: 20 SOLUTION/ DROPS OPHTHALMIC at 08:44

## 2022-04-18 RX ADMIN — ACETAMINOPHEN 650 MG: 325 TABLET ORAL at 05:27

## 2022-04-18 RX ADMIN — BUDESONIDE AND FORMOTEROL FUMARATE DIHYDRATE 2 PUFF: 80; 4.5 AEROSOL RESPIRATORY (INHALATION) at 21:02

## 2022-04-18 RX ADMIN — DORZOLAMIDE HYDROCHLORIDE 1 DROP: 20 SOLUTION/ DROPS OPHTHALMIC at 21:05

## 2022-04-18 RX ADMIN — LATANOPROST 1 DROP: 50 SOLUTION OPHTHALMIC at 21:05

## 2022-04-18 RX ADMIN — POLYETHYLENE GLYCOL 3350 17 G: 17 POWDER, FOR SOLUTION ORAL at 08:44

## 2022-04-18 RX ADMIN — PIPERACILLIN AND TAZOBACTAM 3375 MG: 3; .375 INJECTION, POWDER, LYOPHILIZED, FOR SOLUTION INTRAVENOUS at 17:58

## 2022-04-18 RX ADMIN — TIMOLOL MALEATE 1 DROP: 5 SOLUTION/ DROPS OPHTHALMIC at 21:05

## 2022-04-18 RX ADMIN — Medication 10 ML: at 08:56

## 2022-04-18 RX ADMIN — PIPERACILLIN AND TAZOBACTAM 3375 MG: 3; .375 INJECTION, POWDER, LYOPHILIZED, FOR SOLUTION INTRAVENOUS at 08:58

## 2022-04-18 RX ADMIN — BRIMONIDINE TARTRATE 1 DROP: 2 SOLUTION OPHTHALMIC at 21:05

## 2022-04-18 RX ADMIN — METOPROLOL TARTRATE 12.5 MG: 25 TABLET, FILM COATED ORAL at 21:04

## 2022-04-18 RX ADMIN — TIMOLOL MALEATE 1 DROP: 5 SOLUTION/ DROPS OPHTHALMIC at 08:44

## 2022-04-18 RX ADMIN — LOSARTAN POTASSIUM 25 MG: 25 TABLET, FILM COATED ORAL at 08:43

## 2022-04-18 RX ADMIN — METOPROLOL TARTRATE 12.5 MG: 25 TABLET, FILM COATED ORAL at 08:43

## 2022-04-18 RX ADMIN — BRIMONIDINE TARTRATE 1 DROP: 2 SOLUTION OPHTHALMIC at 08:44

## 2022-04-18 RX ADMIN — ACETAMINOPHEN 650 MG: 325 TABLET ORAL at 17:56

## 2022-04-18 RX ADMIN — Medication 10 ML: at 21:10

## 2022-04-18 ASSESSMENT — PAIN DESCRIPTION - ORIENTATION
ORIENTATION: RIGHT

## 2022-04-18 ASSESSMENT — PAIN DESCRIPTION - PAIN TYPE: TYPE: ACUTE PAIN

## 2022-04-18 ASSESSMENT — PAIN DESCRIPTION - DESCRIPTORS
DESCRIPTORS: ACHING

## 2022-04-18 ASSESSMENT — PAIN DESCRIPTION - LOCATION
LOCATION: FLANK

## 2022-04-18 ASSESSMENT — PAIN SCALES - GENERAL
PAINLEVEL_OUTOF10: 2
PAINLEVEL_OUTOF10: 2
PAINLEVEL_OUTOF10: 7
PAINLEVEL_OUTOF10: 7
PAINLEVEL_OUTOF10: 8
PAINLEVEL_OUTOF10: 2

## 2022-04-18 NOTE — PROGRESS NOTES
score)    SUBJECTIVE   General  Chart Reviewed: Yes  Family / Caregiver Present: Yes (2 daughters)  Subjective  Subjective: \"I'm okay. \"    Pre-Session Pain Report  Pre Treatment Pain Screening  Pain at present: 8  Intervention List: Patient able to continue with treatment;Patient declined any intervention;Nurse/physician notified  Pain Screening  Patient Currently in Pain: Yes       Post-Session Pain Report  Pain Assessment  Pain Assessment: 0-10  Pain Level: 8  Pain Type: Acute pain  Pain Location: Flank  Pain Orientation: Right  Pain Descriptors: Aching         OBJECTIVE        Bed mobility  Rolling to Left: Contact guard assistance  Supine to Sit: Minimal assistance  Sit to Supine: Unable to assess (pt up in chair post tx)  Scooting: Contact guard assistance  Comment: HOB elevated, use of HR. Transfers  Sit to Stand: Contact guard assistance;Minimal Assistance  Stand to sit: Contact guard assistance  Comment: Cues for safe hand placement with fair follow through, poor carryover. Ambulation  Ambulation?: Yes  More Ambulation?: No  Ambulation 1  Surface: level tile  Device: Rolling Walker  Assistance: Contact guard assistance  Quality of Gait: ff posture, short step length, decreased majo heel strike, downward gaze  Gait Deviations: Slow Maria Esther;Decreased step length;Decreased step height  Distance: 6' to chair  Comments: vc's for approach to chair with ww     Exercises  Quad Sets: x10  Gluteal Sets: x10  Hip Flexion: seated marching x10  Knee Long Arc Quad: x10  Ankle Pumps: x10  Neurodevelopmental Techniques: standing lateral weight shifts at ww         ASSESSMENT   Assessment: Pt demonstrates an improvement in strength secondary to decreased assistance with bed mobility, trsfs and gait training. Pt able to perform gait training at [facility] as well as trsfs with no LOB or instability. Reviewed ther ex to continue to improve strength and endurance and reduce risk of falls.      Discharge Recommendations:  Continue to assess pending progress    Goals  Long term goals  Long term goal 1: Pt will demonstrate bed mobility supervision  Long term goal 2: Pt will demonstrate transfers with safest AD SBA  Long term goal 3: Pt will demonstrate amb >/= 100ft with safest AD SBA  Long term goal 4: Pt will demonstrate stair negotiation 3 steps with 1 rail CGA to allow pt to safely enter and exit her home. Long term goal 5: Pt will demonstrate HEP with SBA    PLAN    Times per week: 3-6  Plan Comment: Cont. POC  Safety Devices  Type of devices: All fall risk precautions in place,Call light within reach,Chair alarm in place,Left in chair,Nurse notified     [facility] (6 CLICK) [address] Inpatient Mobility Raw Score : 17     Therapy Time   Individual   Time In 1410   Time Out 1428   Minutes 18      BM/Trsf: 8  Gait: 5  There ex: Steve Obrien 65 22, PTA, 04/18/22 at 2:35 PM         Definitions for assistance levels  Independent = pt does not require any physical supervision or assistance from another person for activity completion. Device may be needed.   Stand by assistance = pt requires verbal cues or instructions from another person, close to but not touching, to perform the activity  Minimal assistance= pt performs 75% or more of the activity; assistance is required to complete the activity  Moderate assistance= pt performs 50% of the activity; assistance is required to complete the activity  Maximal assistance = pt performs 25% of the activity; assistance is required to complete the activity  Dependent = pt requires total physical assistance to accomplish the task

## 2022-04-18 NOTE — PROGRESS NOTES
CLINICAL PHARMACY NOTE: MEDS TO BEDS    Total # of Prescriptions Filled: 2   The following medications were delivered to the patient:  · Amox-Pot Cla 500-125 mg Tab  · Acetaminophen 325 mg Tab    Additional Documentation:

## 2022-04-18 NOTE — PROGRESS NOTES
Assessment completed. A&O x3. Medicated with routine Tylenol for 7/10 pain to right flank. Serosanguinous drainage noted in drain bag from right flank. DSD to drain is clean, dry and intact. In bed with alarm activated and call light in reach. Possible dc today.  Electronically signed by Brisa Franco LPN on 1/50/5386 at 19:86 PM

## 2022-04-18 NOTE — DISCHARGE SUMMARY
[address]  [address] [facility], [address]  MRN: [id number]  YOB: 1928  80 y. o.female      Attending  Sohan Encinas MD ?   Date of Admission  4/14/2022 Date of Discharge  4/26/2022      ? DIAGNOSES:  Principal Problem:    Acute cholecystitis  Active Problems:    Muscle wasting and atrophy, not elsewhere classified, left hand    Aphasia    Impaired mobility and activities of daily living-due to cervical myelopathy    Post-op pain    Osteoarthritis of cervical spine with myelopathy  Resolved Problems:    RICHARD (acute kidney injury) ([facility] [address]    Acute abdominal pain         PROCEDURES:  4/15/22: Interventional Radiology by Dr. Lynne Gallardo   1) Ultrasound and fluoroscopy guided cholecystostomy tube placement  ?     DISCHARGE MEDICATIONS:  Current Discharge Medication List           Details   acetaminophen (TYLENOL) 325 MG tablet Take 2 tablets by mouth every 6 hours as needed for Pain  Qty: 120 tablet, Refills: 0              Details   dorzolamide-timolol (COSOPT) 22.3-6.8 MG/ML ophthalmic solution PLACE 1 DROP INTO BOTH EYES 2 TIMES DAILY INDICATIONS: GLAUCOMA  Qty: 10 mL, Refills: 5      latanoprost (XALATAN) 0.005 % ophthalmic solution Place 1 drop into both eyes daily Indications: Glaucoma  Qty: 2.5 mL, Refills: 5      metoprolol tartrate (LOPRESSOR) 25 MG tablet Take 0.5 tablets by mouth 2 times daily Indications: High Blood Pressure Disorder  Qty: 90 tablet, Refills: 3    Associated Diagnoses: Intermittent chest pain      ipratropium-albuterol (DUONEB) 0.5-2.5 (3) MG/3ML SOLN nebulizer solution Inhale 3 mLs into the lungs every 6 hours as needed for Shortness of Breath ((or wheezing))  Qty: 360 mL, Refills: 3      albuterol sulfate HFA (PROAIR HFA) 108 (90 Base) MCG/ACT inhaler Inhale 1 puff into the lungs every 6 hours as needed for Wheezing or Shortness of Breath  Qty: 3 each, Refills: 3    Associated Diagnoses: Diastolic dysfunction; Essential hypertension; Anxiety; Hypokalemia; FRED (obstructive sleep apnea); Encounter for immunization      brimonidine (ALPHAGAN P) 0.15 % ophthalmic solution Place 1 drop into both eyes 2 times daily Indications: Glaucoma  Qty: 1 each, Refills: 1      zoster recombinant adjuvanted vaccine (SHINGRIX) 50 MCG/0.5ML SUSR injection Inject 0.5 mLs into the muscle See Admin Instructions 1 dose now and repeat in 2-6 months  Qty: 0.5 mL, Refills: 0    Associated Diagnoses: Encounter for immunization      atorvastatin (LIPITOR) 20 MG tablet Indications: High Amount of Fats in the Blood Take one tablet Po qhs  Qty: 90 tablet, Refills: 0    Associated Diagnoses: Chest pain, unspecified      losartan (COZAAR) 25 MG tablet Take 1 tablet by mouth every 12 hours  Qty: 180 tablet, Refills: 3      Elastic Bandages & Supports (MEDICAL COMPRESSION STOCKINGS) MISC Knee high, 20 to 30 mm hg, remove at night, pharmacy to fit. Qty: 1 each, Refills: 0      fluticasone-salmeterol (ADVAIR DISKUS) 250-50 MCG/DOSE AEPB Inhale 1 puff into the lungs every 12 hours  Qty: 180 each, Refills: 3      montelukast (SINGULAIR) 10 MG tablet Take 1 tablet by mouth nightly as needed (allergies)  Qty: 90 tablet, Refills: 3      OXYGEN Inhale 2 L into the lungs Indications: PRN at bedtime              REASON FOR HOSPITALIZATION:  Adelaida Talley is a 80 y.o. female with past medical history of COPD, HTN, FRED presents to [facility] [place] on 4/14/2022 with 1 week history of intermittent RUQ pain without nausea or vomiting. Patient is a poor historian and history was obtained from her two daughters. Patient is denying any pain at this time. Patient is tolerating PO but is NPO at this time. Patient is voiding spontaneously. She states that she has not had a bowel movement in a \"few days. \" She states she is passing gas. SIGNIFICANT FINDINGS:  Catalog of Injuries:   Diagnoses:  1.  Acute cholecystitis s/p percutaneous cholecystostomy tube placement with Dr. Enriqueta Andrews on 4/15  2. Post-operative pain     PMHx: HTN, COPD, FRED     Incidental Findings: none (reviewed by MG, 4/19/2022)       HOSPITAL COURSE:  4/14/2022: Presented to ED with 1 week history of abdominal pain. CT concerning for acute cholecystitis. 4/15/2022: Daughters opted for perc drain over surgery. Pigtail placed by Dr. Hetal Mckinney. Admitted to [facility] for acute cholecystitis s/p percutaneous cholecystostomy tube placement  4/16/2022: Improving abdominal pain. Tolerated clear liquid diet. Advanced to regular diet. 4/17/2022: Tolerating regular diet. Medically cleared for discharge from surgical standpoint. PT today for dispo rec's. 4/18/2022: Remains medically cleared for discharge to home with home health care. 4/19/2022: Remains medically cleared for discharge. Pending pre-cert to acute rehab  4/20/2022: Acute rehab denied. Pre-cert started for SNF. Remains med clear  4/21/2022: Remains med clear. Pending pre-cert  9/56/6970: Pending SNF precert. 4/23/2022: remains medically cleared pending precert  9/17/8427:  remains medically cleared pending precert  0/51/5338: HDS, remains medically cleared for discharged, pending placement  4/26/2022: No AEON. Patient tolerating PO well. Pain well controlled and improved. Voiding spontaneously adequate UOP. RUQ IR drain emptied for 30ml serous-pink tinged drainage. No evidence of infection. BM yesterday. GCS 14 (baseline). Patient medically cleared for discharge to SNF today    At time of discharge, Aisha Love was tolerating a regular diet, having bowel movements, and had adequate analgesia on oral pain medications. Pt's activity level was as tolerated with assistance. The patient had no  signs or symptoms of complications.  Patient was determined stable for discharge to: [address]    The patient was seen and examined on the day of discharge with the following findings:  Vitals:    04/26/22 0730   BP: (!) 147/62   Pulse: 77   Resp: 18   Temp: 98.2 °F (36.8 °C)   SpO2: 95% PHYSICAL EXAM:   Constitutional: OOB to chair, Alert to self only (baseline). Pleasant. NAD  HEENT: Atraumatic normocephalic. Cardiovascular: Regular rate and rhythm. B/L 2+ radial and DP pulses palpable  Pulmonary: Clear to ausculation bilaterally. No wheezing, rhonchi or rales. Saturations 95% and breathing comfortably  on room air. Abdominal: Soft. Non-distended. Non-tender. RUQ drain with clear, serous-pink tinged drainage, without erythema, warmth or purulence noted. Nonperitonitic. Musculoskeletal: Good ROM in all extremities. No edema. Neurological: Motor and sensory grossly intact. Follows simple commands. No focal deficits. GCS of 14  Skin: warm and dry        ANTICIPATED FOLLOW UP:  Future Appointments   Date Time Provider Lovely Perdomo   6/15/2022  2:00  [facility]     No discharge procedures on file. Other indicated follow up and instructions for scheduling:   - Follow up with Interventional Radiology (IR), Dr Naren Turcios, every 2 weeks for drain/wound check. Plan for cholangiogram in 8 weeks with IR. Will follow up with general surgery/IR for drain removal following results of cholangiogram.                 - Follow up with Primary Care Physician regarding recent hopsitalization      VTE RISK AT DISCHARGE:  Per trauma program protocol, the patient does not require post-discharge VTE prophylaxis due to: NWB single LE or BLE fractures limiting mobility. --  JULIO Sylvester - CNP  Trauma/Critical Care  Emergency General Surgery    39 minutes were spent on the discharge of this patient including final examination of the patient, discussion of the hospital stay, instructions for continuing care to all relevant caregivers, preparation of discharge records, prescriptions and referral forms, and clear identification of reasons to return to clinic or to emergency room.

## 2022-04-18 NOTE — CARE COORDINATION
PLAN DISCUSSED WITH TRAUMA SERVICE. ANTICIPATE DC TODAY. PLAN REMAINS HOME WITH RESUMPTION OF HHC. PT CURRENT WITH [facility]- [facility] OFFERED, WILL CONT. WITH [facility]. CALL PLACED TO [place], [address] FAXED. DENIES FURTHER NEEDS. Met with pt daughter at bedside to discuss DC plan. She does not feel pt is strong enough to return home today. Plan discussed with trauma team. [place] of choice is offered, SNF list provided. [facility]. Order obtained. Explained precert process to pt/family. Will cont to follow.

## 2022-04-18 NOTE — PROGRESS NOTES
EMERGENCY GENERAL SURGERY DAILY PROGRESS NOTE      Patient Name: Jodi Ortiz  Admission Date 2022    Hospital Day: 4  Patient seen and examined on 2022    INTERVAL HISTORY/EVENTS     Background:  Jodi Ortiz is a 80 y.o. female with past medical history of COPD, HTN, FRED presents to [facility] [place] with 1 week history of intermittent RUQ pain without nausea or vomiting. Patient is a poor historian and history was obtained from her two daughters. Patient is denying any pain at this time. Patient is tolerating PO but is NPO at this time. Patient is voiding spontaneously. She states that she has not had a bowel movement in a \"few days. \" She states she is passing gas. Hospital Course:  2022: Presented to ED with 1 week history of abdominal pain. CT concerning for acute cholecystitis. 4/15/2022: Daughters opted for perc drain over surgery. Pigtail placed by Dr. Micah Nagel. Admitted to [facility] for acute cholecystitis s/p percutaneous cholecystostomy tube placement  2022: Improving abdominal pain. Tolerated clear liquid diet. Advanced to regular diet. 2022: Tolerating regular diet. Medically cleared for discharge from surgical standpoint. PT today for dispo rec's. 2022: Remains medically cleared for discharge. 24 Hour Events:  No acute events overnight. Continues to tolerate a regular diet. Drain remains in place with 100cc of serous drainage. Labs reviewed and without leukocytosis, stable H&H, no acute renal or electrolyte abnormalities. Cultures from 4/15 remain with NGTD (preliminary results). Output:   Drains: 100 cc      PHYSICAL EXAM     Vitals Average, Min and Max for last 24 hours:  Temp: Temp: 98.8 °F (37.1 °C);  Temp  Av.8 °F (37.1 °C)  Min: 98.8 °F (37.1 °C)  Max: 98.8 °F (37.1 °C)  Respirations: Resp  Av  Min: 18  Max: 18  Pulse: Pulse  Av  Min: 81  Max: 81  Blood Pressure: Systolic (29SGB), GZP:988 , Min:109 , YINKA:055   ; Diastolic (88WXM), GWH:00, Min:42, Max:42  SpO2: SpO2  Av.7 %  Min: 93 %  Max: 94 %    24hr Intake/Output:     Intake/Output Summary (Last 24 hours) at 2022 0909  Last data filed at 2022 0856  Gross per 24 hour   Intake 250 ml   Output 100 ml   Net 150 ml       Vitals: BP (!) 109/42   Pulse 81   Temp 98.8 °F (37.1 °C) (Oral)   Resp 18   Ht 5' 2\" (1.575 m)   Wt 180 lb (81.6 kg)   LMP  (LMP Unknown)   SpO2 94%   BMI 32.92 kg/m²     Physical Exam:  Constitutional: Lying supine in bed. Alert and conversant. HEENT: Atraumatic normocephalic. Cardiovascular: Regular rate and rhythm. Pulmonary: Clear to ausculation bilaterally. No wheezing, rhonchi or rales. Abdominal: Soft. Non-distended. Tender mild left side; no peritonitis; RUQ IR drain with bilious output no erythema  Musculoskeletal: Good ROM in all extremities. No edema. Neurological: Alert, awake, and orientated x 1 (baseline). Motor and sensory grossly intact. No focal deficits.  GCS of 14  Skin: warm    LABORATORY RESULTS (LAST 24 HOURS)     CBC with Differential:    Lab Results   Component Value Date    WBC 7.7 2022    RBC 4.14 2022    HGB 11.9 2022    HCT 36.1 2022     2022    MCV 87.3 2022    MCH 28.7 2022    MCHC 32.9 2022    RDW 14.0 2022    BANDSPCT 12 10/28/2020    METASPCT 1 2020    LYMPHOPCT 8.7 2022    MONOPCT 9.1 2022    BASOPCT 0.7 2022    MONOSABS 1.4 2022    LYMPHSABS 1.3 2022    EOSABS 0.1 2022    BASOSABS 0.1 2022     CMP:    Lab Results   Component Value Date     2022    K 3.9 2022    K 4.0 2020     2022    CO2 21 2022    BUN 15 2022    CREATININE 0.87 2022    GFRAA >60.0 2022    LABGLOM >60.0 2022    GLUCOSE 107 2022    PROT 6.6 2022    LABALBU 3.3 2022    CALCIUM 8.4 2022    BILITOT 0.6 2022    ALKPHOS 224 2022    AST 12 2022 standpoint.  [facility] vs SNF.    --  Cuong Paz PA-C  Trauma/Critical Care/General Surgery  [phone] (3Y-6M)  [phone]     This patient's plan of care was discussed and made in collaboration with Trauma Attending physician, Jose L Flores MD.

## 2022-04-19 PROBLEM — G89.18 POST-OP PAIN: Status: ACTIVE | Noted: 2022-04-19

## 2022-04-19 PROCEDURE — 97166 OT EVAL MOD COMPLEX 45 MIN: CPT

## 2022-04-19 PROCEDURE — 6370000000 HC RX 637 (ALT 250 FOR IP): Performed by: STUDENT IN AN ORGANIZED HEALTH CARE EDUCATION/TRAINING PROGRAM

## 2022-04-19 PROCEDURE — 6360000002 HC RX W HCPCS: Performed by: STUDENT IN AN ORGANIZED HEALTH CARE EDUCATION/TRAINING PROGRAM

## 2022-04-19 PROCEDURE — 94640 AIRWAY INHALATION TREATMENT: CPT

## 2022-04-19 PROCEDURE — 99221 1ST HOSP IP/OBS SF/LOW 40: CPT | Performed by: PHYSICAL MEDICINE & REHABILITATION

## 2022-04-19 PROCEDURE — 99232 SBSQ HOSP IP/OBS MODERATE 35: CPT | Performed by: STUDENT IN AN ORGANIZED HEALTH CARE EDUCATION/TRAINING PROGRAM

## 2022-04-19 PROCEDURE — 2580000003 HC RX 258: Performed by: PHYSICIAN ASSISTANT

## 2022-04-19 PROCEDURE — 6360000002 HC RX W HCPCS: Performed by: SURGERY

## 2022-04-19 PROCEDURE — 2580000003 HC RX 258: Performed by: SURGERY

## 2022-04-19 PROCEDURE — 1210000000 HC MED SURG R&B

## 2022-04-19 PROCEDURE — 94761 N-INVAS EAR/PLS OXIMETRY MLT: CPT

## 2022-04-19 RX ADMIN — Medication 10 ML: at 21:13

## 2022-04-19 RX ADMIN — ACETAMINOPHEN 650 MG: 325 TABLET ORAL at 18:42

## 2022-04-19 RX ADMIN — ACETAMINOPHEN 650 MG: 325 TABLET ORAL at 00:16

## 2022-04-19 RX ADMIN — PIPERACILLIN AND TAZOBACTAM 3375 MG: 3; .375 INJECTION, POWDER, LYOPHILIZED, FOR SOLUTION INTRAVENOUS at 09:46

## 2022-04-19 RX ADMIN — POLYETHYLENE GLYCOL 3350 17 G: 17 POWDER, FOR SOLUTION ORAL at 09:16

## 2022-04-19 RX ADMIN — Medication 10 ML: at 09:46

## 2022-04-19 RX ADMIN — LATANOPROST 1 DROP: 50 SOLUTION OPHTHALMIC at 21:17

## 2022-04-19 RX ADMIN — DORZOLAMIDE HYDROCHLORIDE 1 DROP: 20 SOLUTION/ DROPS OPHTHALMIC at 21:17

## 2022-04-19 RX ADMIN — METOPROLOL TARTRATE 12.5 MG: 25 TABLET, FILM COATED ORAL at 09:16

## 2022-04-19 RX ADMIN — ATORVASTATIN CALCIUM 20 MG: 20 TABLET, FILM COATED ORAL at 09:14

## 2022-04-19 RX ADMIN — METOPROLOL TARTRATE 12.5 MG: 25 TABLET, FILM COATED ORAL at 21:12

## 2022-04-19 RX ADMIN — LOSARTAN POTASSIUM 25 MG: 25 TABLET, FILM COATED ORAL at 21:12

## 2022-04-19 RX ADMIN — PIPERACILLIN AND TAZOBACTAM 3375 MG: 3; .375 INJECTION, POWDER, LYOPHILIZED, FOR SOLUTION INTRAVENOUS at 02:15

## 2022-04-19 RX ADMIN — TIMOLOL MALEATE 1 DROP: 5 SOLUTION/ DROPS OPHTHALMIC at 09:18

## 2022-04-19 RX ADMIN — BRIMONIDINE TARTRATE 1 DROP: 2 SOLUTION OPHTHALMIC at 21:17

## 2022-04-19 RX ADMIN — SENNOSIDES 8.6 MG: 8.6 TABLET, FILM COATED ORAL at 21:15

## 2022-04-19 RX ADMIN — ACETAMINOPHEN 650 MG: 325 TABLET ORAL at 05:24

## 2022-04-19 RX ADMIN — BUDESONIDE AND FORMOTEROL FUMARATE DIHYDRATE 2 PUFF: 80; 4.5 AEROSOL RESPIRATORY (INHALATION) at 08:06

## 2022-04-19 RX ADMIN — ACETAMINOPHEN 650 MG: 325 TABLET ORAL at 12:22

## 2022-04-19 RX ADMIN — ENOXAPARIN SODIUM 30 MG: 100 INJECTION SUBCUTANEOUS at 09:16

## 2022-04-19 RX ADMIN — LOSARTAN POTASSIUM 25 MG: 25 TABLET, FILM COATED ORAL at 09:14

## 2022-04-19 RX ADMIN — DORZOLAMIDE HYDROCHLORIDE 1 DROP: 20 SOLUTION/ DROPS OPHTHALMIC at 09:18

## 2022-04-19 RX ADMIN — BUDESONIDE AND FORMOTEROL FUMARATE DIHYDRATE 2 PUFF: 80; 4.5 AEROSOL RESPIRATORY (INHALATION) at 19:28

## 2022-04-19 RX ADMIN — TIMOLOL MALEATE 1 DROP: 5 SOLUTION/ DROPS OPHTHALMIC at 21:17

## 2022-04-19 RX ADMIN — BRIMONIDINE TARTRATE 1 DROP: 2 SOLUTION OPHTHALMIC at 09:11

## 2022-04-19 RX ADMIN — PIPERACILLIN AND TAZOBACTAM 3375 MG: 3; .375 INJECTION, POWDER, LYOPHILIZED, FOR SOLUTION INTRAVENOUS at 18:46

## 2022-04-19 ASSESSMENT — PAIN SCALES - GENERAL
PAINLEVEL_OUTOF10: 0
PAINLEVEL_OUTOF10: 7
PAINLEVEL_OUTOF10: 0
PAINLEVEL_OUTOF10: 7
PAINLEVEL_OUTOF10: 0

## 2022-04-19 ASSESSMENT — ENCOUNTER SYMPTOMS
EYE DISCHARGE: 0
COUGH: 0
VOICE CHANGE: 0
EYES NEGATIVE: 1
ABDOMINAL DISTENTION: 0
DIARRHEA: 0
NAUSEA: 1
CONSTIPATION: 0
VOMITING: 0
RESPIRATORY NEGATIVE: 1
WHEEZING: 0
APNEA: 0
SHORTNESS OF BREATH: 0
NAUSEA: 0
PHOTOPHOBIA: 0
BACK PAIN: 0
ANAL BLEEDING: 0
ABDOMINAL PAIN: 1

## 2022-04-19 NOTE — CARE COORDINATION
Precert sent out [facility])  for acute Inpatient Rehab admission awaiting decision.   Electronically signed by Chantale Reyna RN on 4/19/22 at 5:00 PM EDT

## 2022-04-19 NOTE — CONSULTS
Physical Medicine & Rehabilitation  Consult Note      Admitting Physician: Nelly Pérez MD    Primary Care Provider: Celeste Reyna MD     Reason for Consult:  Asses rehab needs, promote physical and mental function, analyze level of care to determine rehab needs and decrease likelihood of re-admit to the hospital after discharge. History of Present Illness:    Rizwan Muller is a 80 y.o. female admitted to [facility] on 4/14/2022. Patient was admitted through the emergency room after family noted abdominal discomfort and suspected a UTI. She denied fever chills vomiting or specific urinary complaints. She was evaluated and found to have abdominal pain and nausea. She is found to have gallstones and acute cholecystitis without obstruction. She has responded to pain management and antibiotic care. She has been on IV Zosyn status post growing E. coli in her blood. She was deemed not to be a good surgical candidate and she underwent fluoroscopy guided cholecystectomy with tube placement by Dr. Ciara Robertson and fluoroscopy guided cholecystostomy tube placement. Tube can not be removed before 8 weeks if problem resolves. She is now tolerating a regular diet her abdominal pain is improved. She has been cleared for discharge by the trauma service. Abdominal Pain  Associated symptoms include myalgias. Pertinent negatives include no constipation, diarrhea, dysuria, fever, frequency, headaches, hematuria, nausea or vomiting. I reviewed recent nursing notes discussed care with acute care providers, \"[address] ANTICIPATE DC TODAY. PLAN REMAINS HOME WITH [facility]UMPTION OF [facility]. [facility] CUR[facility], WILL CONT. WITH [facility]. CALL PLACED TO [facility], [address] FAXED. DENIES FURTHER NEEDS.      Met with pt daughter at bedside to discuss DC plan. She does not feel pt is strong enough to return home today.  Plan discussed with trauma team. [place] of choice is offered, SNF list provided. [facility] obtained. Explained precert process to pt/family. Will cont to follow \". Events from the previous 24 hours reviewed   he is stabilizing medically but continues to have significant mobility and ADL deficits requiring acute level therapy as well as medical supervision. .      Their inpatient work up has included:    Imaging:      Imaging and other studies reviewed and discussed with patient and staff    XR CHEST 4/15/2022 The cardiac silhouette is at the upper limits of normal in size. There are no infiltrates, consolidations or effusions. There are mild increased markings throughout both lungs. Bones of the thorax appear intact. No radiographic evidence of acute intrathoracic process. Prominent cardiac silhouette and increased pulmonary markings may be secondary to chronic CHF. CT ABDOMEN PELVIS  : 4/15/2022: Bibasilar infiltrate and/or atelectasis. The gallbladder is distended and contains multiple small gallstones. There is gallbladder wall thickening and pericholecystic inflammation. The liver, spleen, stomach, pancreas, and adrenal glands are within normal limits. Small hiatal hernia. The kidneys enhance uniformly. Millimeter calculus within the right renal pelvis. No right-sided hydronephrosis. No left-sided urinary tract calculi or hydronephrosis. Urinary bladder is well distended. The uterus is present. Abdominal aorta is nonaneurysmal  and demonstrates atherosclerotic calcification . No retroperitoneal or abdominal/pelvic lymphadenopathy. No small bowel obstruction. No overt colonic mass or pericolonic inflammation. The appendix is not definitively visualized. No free fluid, loculated fluid collection, or pneumoperitoneum. Likely chronic mild compression deformity of T11. Degenerative changes of the spine. Chronic right rib deformities.      Cholelithiasis with gallbladder distention, gallbladder wall thickening, pericholecystic fluid most concerning for acute cholecystitis. Bibasilar atelectasis/scarring and/or infiltrate. Labs:       labs reviewed and discussed with patient and staff    Lab Results   Component Value Date    POCGLU 130 11/06/2020    POCGLU 125 11/06/2020    POCGLU 130 11/06/2020    POCGLU 109 11/05/2020    POCGLU 122 11/05/2020     Lab Results   Component Value Date     04/18/2022    K 3.9 04/18/2022    K 4.0 11/06/2020     04/18/2022    CO2 21 04/18/2022    BUN 15 04/18/2022    CREATININE 0.87 04/18/2022    CALCIUM 8.4 04/18/2022    LABALBU 3.3 04/14/2022    BILITOT 0.6 04/14/2022    ALKPHOS 224 04/14/2022    AST 12 04/14/2022    ALT 19 04/14/2022     Lab Results   Component Value Date    WBC 7.7 04/18/2022    RBC 4.14 04/18/2022    HGB 11.9 04/18/2022    HCT 36.1 04/18/2022    MCV 87.3 04/18/2022    MCH 28.7 04/18/2022    MCHC 32.9 04/18/2022    RDW 14.0 04/18/2022     04/18/2022    MPV 11.6 06/17/2014     Lab Results   Component Value Date    VITD25 7.4 10/29/2019     Lab Results   Component Value Date    COLORU DARK YELLOW 04/15/2022    NITRU Negative 04/15/2022    GLUCOSEU Negative 04/15/2022    KETUA TRACE 04/15/2022    UROBILINOGEN 1.0 04/15/2022    BILIRUBINUR SMALL 04/15/2022    BILIRUBINUR neg 09/18/2017     Lab Results   Component Value Date    PROTIME 16.4 04/15/2022     Lab Results   Component Value Date    INR 1.3 04/15/2022         I discussed results with patient.     Current Rehabilitation Assessments:    Rehabilitation:  Physical therapy: FIMS:  BedMobility: Scooting: Contact guard assistance    Transfers: Sit to Stand: Contact guard assistance,Minimal Assistance  Stand to sit: Contact guard assistance, Ambulation 1  Surface: level tile  Device: Rolling Walker  Assistance: Contact guard assistance  Quality of Gait: ff posture, short step length, decreased majo heel strike, downward gaze  Gait Deviations: Slow Maria Esther,Decreased step length,Decreased step height  Distance: 6' to chair  Comments: vc's for approach to chair with ww,      FIMS: ,  , Assessment: Pt demonstrates an improvement in strength secondary to decreased assistance with bed mobility, trsfs and gait training. Pt able to perform gait training at [facility] as well as trsfs with no LOB or instability. Reviewed ther ex to continue to improve strength and endurance and reduce risk of falls. Occupational therapy: FIMS:   ,  ,           OCCUPATIONAL THERAPY                     LTG:                 Speech therapy: FIMS:       SPEECH THERAPY               Diet/Swallow:                           COGNITION    OT:    SP: Re-evals pending      Past Medical History:          Diagnosis Date    RICHARD (acute kidney [facility]) ([facility] [address] 4/15/2022    Anxiety     Cancer ([facility] [address] [facility]     Glaucoma     Gout     left knee    Hypertension     Lung disease     Obesity (BMI 30-39. 9) 7/11/2019    Obstructive sleep apnea 7/11/2019         PastSurgical History:          Procedure Laterality Date    CATARACT REMOVAL WITH IMPLANT Bilateral 2016    COLON SURGERY  2009    polyps    COLONOSCOPY  02/2009    GASTROSTOMY TUBE PLACEMENT N/A 11/04/2020    EGD PEG TUBE PLACEMENT performed by Saranya Yap MD at [address] [place] [address] Right 04/15/2022    Flexima APDL Locking Pigtail Draiange Catheter inserted by Dr. Russell Tafoya (Lot# [id number] / Exp: 12/07/2024)    IR CHOLECYSTOSTOMY PERCUTANEOUS COMPLETE  4/15/2022    IR CHOLECYSTOSTOMY PERCUTANEOUS COMPLETE 4/15/2022 MLOZ SPECIAL PROCEDURE    SKIN CANCER EXCISION  2002    graft from neck         Allergies:      Allergies   Allergen Reactions    Norvasc [Amlodipine Besylate] Swelling        CurrentMedications:     Current Facility-Administered Medications: sodium chloride flush 0.9 % injection 5-40 mL, 5-40 mL, IntraVENous, 2 times per day  sodium chloride flush 0.9 % injection 5-40 mL, 5-40 mL, IntraVENous, PRN  0.9 % sodium chloride infusion, , IntraVENous, PRN  ondansetron (ZOFRAN-ODT) disintegrating tablet 4 mg, 4 mg, Oral, Q8H PRN **OR** ondansetron (ZOFRAN) injection 4 mg, 4 mg, IntraVENous, Q6H PRN  piperacillin-tazobactam (ZOSYN) 3,375 mg in dextrose 5 % 50 mL IVPB extended infusion (mini-bag), 3,375 mg, IntraVENous, Q8H  albuterol sulfate  (90 Base) MCG/ACT inhaler 1 puff, 1 puff, Inhalation, Q4H PRN  atorvastatin (LIPITOR) tablet 20 mg, 20 mg, Oral, Daily  brimonidine (ALPHAGAN) 0.2 % ophthalmic solution 1 drop, 1 drop, Both Eyes, BID  budesonide-formoterol (SYMBICORT) 80-4.5 MCG/ACT inhaler 2 puff, 2 puff, Inhalation, BID  ipratropium-albuterol (DUONEB) nebulizer solution 3 mL, 1 vial, Inhalation, Q6H PRN  latanoprost (XALATAN) 0.005 % ophthalmic solution 1 drop, 1 drop, Both Eyes, Nightly  losartan (COZAAR) tablet 25 mg, 25 mg, Oral, 2 times per day  metoprolol tartrate (LOPRESSOR) tablet 12.5 mg, 12.5 mg, Oral, BID  dorzolamide (TRUSOPT) 2 % ophthalmic solution 1 drop, 1 drop, Both Eyes, BID  timolol (TIMOPTIC) 0.5 % ophthalmic solution 1 drop, 1 drop, Both Eyes, BID  acetaminophen (TYLENOL) tablet 650 mg, 650 mg, Oral, Q6H  senna (SENOKOT) tablet 8.6 mg, 1 tablet, Oral, Nightly  polyethylene glycol (GLYCOLAX) packet 17 g, 17 g, Oral, Daily  bisacodyl (DULCOLAX) suppository 10 mg, 10 mg, Rectal, Daily PRN  hydrALAZINE (APRESOLINE) injection 10 mg, 10 mg, IntraVENous, Q6H PRN  scopolamine (TRANSDERM-SCOP) transdermal patch 1 patch, 1 patch, TransDERmal, Q72H  enoxaparin (LOVENOX) injection 30 mg, 30 mg, SubCUTAneous, Daily      Social History:    Social History     Socioeconomic History    Marital status:       Spouse name: Not on file    Number of children: Not on file    Years of education: Not on file    Highest education level: Not on file   Occupational History    Not on file   Tobacco Use    Smoking status: Never Smoker    Smokeless tobacco: Never Used   Vaping Use    Vaping Use: Never used   Substance and Sexual Activity    Alcohol use: No    Drug use: No    Sexual activity: Not Currently   Other Topics Concern    Not on file   Social History Narrative    ,  Lives with her dtr Kerry Leon- Daughter (dtr does not work and is able to assist pt as needed), Son Venkat Lund is in the area    Home[place] [facility]: Two level,Able to Live on Main level with bedroom/bathroom    Home Access: Stairs to enter with rails- Number of Steps: 3    Bathroom Shower/Tub: Tub/Shower unit,  Shower chair,Grab bars in shower,Hand-held shower    Home Equipment: Rolling walker    ADL Assistance: Needs assistance    Homemaking Assistance: Needs assistance    Homemaking Responsibilities: No    Ambulation Assistance: Independent (used [employer]),  BSC, LIFT CHAIR, WALKER, CANE,     Transfer Assistance: Independent    Active : No    Additional Comments: pt is questionable historian- question decreased recall versus unable to accurately hear the question; pt reports no recent falls at home. Social Determinants of Health     Financial Resource Strain:     Difficulty of Paying Living Expenses: Not on file   Food Insecurity:     Worried About Running Out of Food in the Last Year: Not on file    Deborah of Food in the Last Year: Not on file   Transportation Needs:     Lack of Transportation (Medical): Not on file    Lack of Transportation (Non-Medical):  Not on file   Physical Activity:     Days of Exercise per Week: Not on file    Minutes of Exercise per Session: Not on file   Stress:     Feeling of Stress : Not on file   Social Connections:     Frequency of Communication with Friends and Family: Not on file    Frequency of Social Gatherings with Friends and Family: Not on file    Attends [employer] Services: Not on file    Active Member of Clubs or Organizations: Not on file    Attends Club or Organization Meetings: Not on file    Marital Status: Not on file   Intimate Partner Violence:     Fear of Current or Ex-Partner: Not on file    Emotionally Abused: Not on file    Physically Abused: Not on file    Sexually Abused: Not on file   Housing Stability:     Unable to Pay for Housing in the Last Year: Not on file    Number of Places Lived in the Last Year: Not on file    Unstable Housing in the Last Year: Not on file          Family History:     History reviewed. No pertinent family history. Review of Systems:    Review of Systems   Constitutional: Positive for appetite change and fatigue. Negative for chills, diaphoresis and fever. HENT: Negative. Negative for congestion, ear pain, hearing loss and tinnitus. Eyes: Negative. Negative for photophobia. Respiratory: Negative. Negative for cough, shortness of breath and wheezing. Cardiovascular: Negative. Negative for chest pain, palpitations and leg swelling. Gastrointestinal: Positive for abdominal pain. Negative for constipation, diarrhea, nausea and vomiting. Genitourinary: Negative. Negative for dysuria, flank pain, frequency, hematuria and urgency. Musculoskeletal: Positive for myalgias. Negative for back pain and neck pain. Skin: Negative. Negative for rash. Allergic/Immunologic: Negative for environmental allergies. Neurological: Negative. Negative for dizziness, tremors, weakness and headaches. Hematological: Does not bruise/bleed easily. Psychiatric/Behavioral: Positive for dysphoric mood. Negative for hallucinations and suicidal ideas. The patient is not nervous/anxious. Physical Exam:    BP (!) 143/58   Pulse 78   Temp 97.9 °F (36.6 °C) (Oral)   Resp 18   Ht 5' 2\" (1.575 m)   Wt 180 lb (81.6 kg)   LMP  (LMP Unknown)   SpO2 95%   BMI 32.92 kg/m²      Physical Exam  Constitutional:       General: She is not in acute distress. Appearance: She is well-developed. She is not diaphoretic. HENT:      Head: Normocephalic and atraumatic.       Nose: Nose normal. Mouth/Throat:      Pharynx: No oropharyngeal exudate. Eyes:      General: No scleral icterus. Right eye: No discharge. Left eye: No discharge. Conjunctiva/sclera: Conjunctivae normal.   Neck:      Thyroid: No thyromegaly. Vascular: No JVD. Trachea: No tracheal deviation. Cardiovascular:      Rate and Rhythm: Normal rate and regular rhythm. Heart sounds: Normal heart sounds. No murmur heard. No friction rub. No gallop. Pulmonary:      Effort: No respiratory distress. Breath sounds: No stridor. No wheezing or rales. Chest:      Chest wall: No tenderness. Abdominal:      General: Bowel sounds are normal. There is no distension. Palpations: Abdomen is soft. There is no mass. Tenderness: There is no abdominal tenderness. There is no guarding or rebound. Hernia: No hernia is present. Musculoskeletal:         General: No tenderness or deformity. Cervical back: Neck supple. Skin:     General: Skin is dry. Coloration: Skin is not pale. Findings: Wound present. No erythema or rash. Neurological:      Mental Status: She is alert and oriented to person, place, and time. Cranial Nerves: No cranial nerve deficit. Psychiatric:         Speech: Speech is delayed. Behavior: Behavior normal.         Thought Content: Thought content normal.         Cognition and Memory: Cognition is impaired. Memory is impaired. Judgment: Judgment normal.       Ortho Exam  Neurologic Exam     Mental Status   Oriented to person, place, and time. Follows 1 step commands. Attention: normal. Concentration: normal.   Level of consciousness: alert  Able to name object. Unable to read. Able to repeat. Direct Exam:  MODERATE NEUTROPHILS   Direct Exam:  NO BACTERIA SEEN   Cult,Aerobe/Anaerobe:  No anaerobic organisms isolated at 3 days.    Performed at [facility] [id number] [address]   ([phone]   P Organism Escherichia coli Abnormal  P            Diagnostics:    No results found for this or any previous visit (from the past 24 hour(s)). Impression:    1. Impaired mobility and ADLs due to E Coli sepsis sp GB disease      Complex Active General Medical Issues thatcomplicate care Assess & Plan:     1. Principal Problem:    Acute cholecystitis  Active Problems:    Aphasia    Impaired mobility and activities of daily living  Resolved Problems:    RICHARD (acute kidney injury) ([address]    Acute abdominal pain            Recommendations:    1. Considering all of the factors above including the patient's current medical status, social status/home envirnment, their functional needs and their ability to participate in a therapy program, I feel that they would best be served at   acute     rehabilitation level of care. It is my opinion that they will   be able to tolerate and benefit from 3 hours of therapy a day. I reviewed the varous options re levels of care with the patient and family. Please see pre-admission screen note for further details. 2. Specialized nursing care to focus on:  1. Bowel and bladder issues-Monitor for urinary retention-check PVRs, bladder scan--cath if no void. 2. Wound management re  Serosanguinous drainage noted in drain bag from right flank. DSD to drain is clean, dry and intact -pressure relief protocols-side to side turns  3. IV medication administration IV antibiotics-Zosyn for E. coli sepsis  3. Monitor endurance and if necessary spread therapy out over a 7-day window-adding scheduled rest breaks when needed. Focus on energy conservation. Monitor heart rate and   cardiac medications effects on heart rate and blood pressure before, during and after therapy. Progress toward endurance training with pulse ox monitoring for saturation and heart rate.   4. monitoring for dysphagia-- Improve hydration and nutrition by adding Vitamin B12 shot times one, adding Protein supplements and push PO fluids  5. Monitor and treat higher level cognitive deficits, focus on difficulty with sequencing and problem-solving. 6.   focus on higher-level balance and falls risk issues focusing on balance training and monitoring for orthostasis. Above recommendations are indicated to address medical complexity and need for appropriate rehab services. Will tailor individual care and rehab plan per individuals needs re bleeding risk, recent infection, decline in cognition and renal issues. .    I discussed acute rehab with the patient verify that the patient is able and willing to participate in 3 hours of therapy a day. Rehab and acute care Case management has also reinforced this expectation. [facility] Please call [phone] with questions.     Bepastora Diss, DO

## 2022-04-19 NOTE — PLAN OF CARE
See OT evaluation for all goals and OT POC.  Electronically signed by Jeani Phalen, OTR/L on 4/19/2022 at 4:26 PM

## 2022-04-19 NOTE — PROGRESS NOTES
Physical Therapy Missed Treatment   Facility/Department: [facility] MED SURG U516/Q456-22    NAME: Osman Chris    : 1928 (54 y.o.)  MRN: [id number]    Account: [de-identified]  Gender: female    Chart reviewed, attempted PT at 1400. Patient unavailable 2° to:    [] Hold per nsg request    [x] Pt declined pt sleeping in recliner, family in room. Pt unwilling to work with therapy and family agrees to let pt rest at this time. [] Pt. . off floor for test/procedure. [] Pt. Unavailable       Will attempt PT treatment again at earliest convenience.       Electronically signed by Omar Duque PTA on 22 at 2:50 PM EDT

## 2022-04-19 NOTE — PROGRESS NOTES
[facility] OCCUPATIONAL THERAPY EVALUATION - ACUTE     NAME: Chantale León  : 1928 (39 y.o.)  MRN: [id number]  CODE STATUS: Full Code  [facility]    Date of Service: 2022    Patient Diagnosis(es): Acute cholecystitis [K81.0]  Nausea [R11.0]  Calculus of gallbladder with acute cholecystitis without obstruction [K80.00]  Right upper quadrant abdominal pain [R10.11]   Chief Complaint   Patient presents with    Abnormal Lab     Sent by Dr Christ Ma for CRP>200     Patient Active Problem List    Diagnosis Date Noted    Acute cholecystitis 2022    Impaired mobility and activities of daily living 2021    Venous stasis dermatitis of both lower extremities 08/10/2021    Aphasia     Chest pain 2020    Obesity (BMI 30-39.9) 2019    Obstructive sleep apnea 2019    COPD exacerbation ([address]     COPD (chronic obstructive pulmonary disease) with acute bronchitis ([address] 2018    Anxiety 2017    Essential hypertension         Past Medical History:   Diagnosis Date    RICHARD (acute kidney injury) ([address] 4/15/2022    Anxiety     Cancer ([address]     Glaucoma     Gout     left knee    Hypertension     Lung disease     Obesity (BMI 30-39. 9) 2019    Obstructive sleep apnea 2019     Past Surgical History:   Procedure Laterality Date    CATARACT REMOVAL WITH IMPLANT Bilateral 2016    COLON SURGERY  2009    polyps    COLONOSCOPY  2009    GASTROSTOMY TUBE PLACEMENT N/A 2020    EGD PEG TUBE PLACEMENT performed by Danie Marquez MD at [address] [place] [address] Right 04/15/2022    Flexima APDL Locking Pigtail Draiange Catheter inserted by Dr. Nanda Luque (Lot# [id number] / Exp: 2024)    IR CHOLECYSTOSTOMY PERCUTANEOUS COMPLETE  4/15/2022    IR CHOLECYSTOSTOMY PERCUTANEOUS COMPLETE 4/15/2022 [facility] SPECIAL PROCEDURE    SKIN CANCER EXCISION      graft from neck        Restrictions  Restrictions/Precautions: Fall Risk (high cole score)     Safety Devices: Safety Devices  Safety Devices in place: Yes  Type of devices: All fall risk precautions in place      Subjective  Pre Treatment Pain Screening  Pain at present: 0  Scale Used: Numeric Score  Intervention List: Patient able to continue with treatment,Patient declined any intervention    Pain Reassessment:   Pain Assessment  Patient Currently in Pain: Denies  Pain Assessment: 0-10  Pain Level: 0     Prior Level of Function:  Social/Functional History  Lives With: Daughter (dtr does not work and is able to assist pt as needed)  Type of Home: House  Home Layout: Two level,Able to Live on Main level with bedroom/bathroom  Home Access: Stairs to enter with rails  Entrance Stairs - Number of Steps: 3  Bathroom Shower/Tub: Tub/Shower unit,Walk-in shower  Bathroom Equipment: Shower chair,Grab bars in shower,Hand-held shower  Home Equipment: Rolling walker  ADL Assistance: Needs assistance  Homemaking Assistance: Needs assistance  Homemaking Responsibilities: No  Ambulation Assistance: Independent (used [employer])  Transfer Assistance: Independent  Active : No  Additional Comments: pt is questionable historian- question decreased recall versus unable to accurately hear the question; pt reports no recent falls at home. OBJECTIVE:     Orientation Status:  Orientation  Overall Orientation Status: Within Functional Limits    Observation:  Observation/Palpation  Posture: Fair  Observation: incontinence of bowel- PT and RN assisting pt with clean up; no dizziness in standing; drain R torso    Cognition Status:  Cognition  Overall Cognitive Status: Exceptions  Arousal/Alertness: Appropriate responses to stimuli  Following Commands:  Follows one step commands with increased time,Follows multistep commands consistently  Attention Span: Difficulty attending to directions,Difficulty dividing attention  Memory: Decreased recall of recent events,Decreased short term memory,Decreased long term memory  Safety Judgement: Decreased awareness of need for assistance,Decreased awareness of need for safety  Problem Solving: Assistance required to implement solutions,Assistance required to correct errors made,Assistance required to generate solutions,Assistance required to identify errors made  Insights: Decreased awareness of deficits  Initiation: Requires cues for some  Sequencing: Requires cues for some  Cognition Comment: Increased processing time    Perception Status:  Perception  Overall Perceptual Status: [facility]    Sensation Status:  Sensation  Overall Sensation Status: [facility]    Vision and Hearing Status:  Vision  Vision: Impaired  Vision Exceptions: Wears glasses for reading  Hearing  Hearing: Exceptions to [facility]  Hearing Exceptions: Bilateral hearing aid,Hard of hearing/hearing concerns     ROM:   LUE AROM (degrees)  LUE General AROM: Arthritic limitations  Left Hand AROM (degrees)  Left Hand AROM: WFL  RUE AROM (degrees)  RUE General AROM: Arthritic limitations  Right Hand AROM (degrees)  Right Hand General AROM: Significant arthritic deformities    Strength:  LUE Strength  Gross LUE Strength: Exceptions to [facility]  L Hand General: 3/5  LUE Strength Comment: 3/5 all planes  RUE Strength  Gross RUE Strength: Exceptions to [facility]  R Hand General: 3-/5  RUE Strength Comment: 3-/5 all planes    Coordination, Tone, Quality of Movement:    Tone RUE  RUE Tone: Normotonic  Tone LUE  LUE Tone: Normotonic  Coordination  Movements Are Fluid And Coordinated: No  Coordination and Movement description: Fine motor impairments,Decreased speed,Decreased accuracy,Right UE,Left UE  Quality of Movement Other  Comment: Significant R hand arthritic deformities    Hand Dominance:  Hand Dominance  Hand Dominance: Right (Pt has been using L hand PRN d/t R hand significant arthritis)    ADL Status:  ADL  Feeding: Setup  Grooming: Minimal assistance  UE Bathing: Minimal assistance  LE Bathing: Maximum assistance  UE Dressing: Minimal assistance  LE Dressing: Maximum assistance  Toileting: Maximum assistance  Additional Comments: Simulated ADLs as above; pt had just finished toileting, required assist for hygiene. Increased time and effort for mobility, reports she wears slip on shoes at home. States she typically does her own bathing and dressing but daughter can assist PRN  Toilet Transfers  Toilet - Technique: Ambulating  Equipment Used: Grab bars  Toilet Transfer: Minimal assistance  Toilet Transfers Comments: Increased effort     Therapy key for assistance levels    Independent = Pt. is able to perform task with no assistance but may require a device   Stand by assistance = Pt. does not perform task at an independent level but does not need physical assistance, requires verbal cues  Minimal, Moderate, Maximal Assistance = Pt. requires physical assistance (25%, 50%, 75% assist from helper) for task but is able to actively participate in task   Dependent = Pt. requires total assistance with task and is not able to actively participate with task completion     Functional Mobility:  Functional Mobility  Functional - Mobility Device: Rolling Walker  Activity: To/from bathroom  Assist Level: Contact guard assistance  Functional Mobility Comments: Increased time and effort for maneuvering [employer]  Transfers  Sit to stand: Minimal assistance  Stand to sit: Minimal assistance    Bed Mobility  Bed mobility  Supine to Sit: Unable to assess (Up in bathroom on therapist arrival)  Sit to Supine:  Moderate assistance (Increased effort)    Seated and Standing Balance:  Balance  Sitting Balance: Supervision  Standing Balance: Contact guard assistance    Functional Endurance:  Activity Tolerance  Activity Tolerance: Patient Tolerated treatment well    D/C Recommendations:  OT D/C RECOMMENDATIONS  REQUIRES OT FOLLOW UP: Yes    Equipment Recommendations:  OT Equipment Recommendations  Other: Continue to assess    OT Education:   OT Education  OT Education: [facility]  Patient Education: Educated pt. on role of acute care OT  Barriers to Learning: [facility]    OT Follow Up:  OT D/C RECOMMENDATIONS  REQUIRES OT FOLLOW UP: Yes     Assessment/Discharge Disposition:  Assessment: Pt is a 80year old woman from home with family support who presents to [facility] with the above deficits which impact her ability to perform ADLs and IADLs. Pt. limited d/t weakness, fatigue. Pt. would benefit from continued OT to maximize independence and safety with ADL tasks.   Performance deficits / Impairments: Decreased functional mobility ,Decreased ADL status,Decreased strength,Decreased endurance,Decreased balance,Decreased high-level IADLs,Decreased fine motor control,Decreased safe awareness,Decreased cognition  Prognosis: Good  Discharge Recommendations: Continue to assess pending progress  Decision Making: Medium Complexity  History: Pt's medical history is moderately complex  Exam: Pt. has 9 performance deficits  Assistance / Modification: Pt requires max A    Six Click Score   How much help for putting on and taking off regular lower body clothing?: A Lot  How much help for Bathing?: A Lot  How much help for Toileting?: A Lot  How much help for putting on and taking off regular upper body clothing?: A Lot  How much help for taking care of personal grooming?: A Little  How much help for eating meals?: A Little  AM-[facility] Inpatient Daily Activity Raw Score: 14  AM-[facility] Inpatient ADL T-Scale Score : 33.39  ADL Inpatient CMS 0-100% Score: 59.67    Plan:  Plan  Times per week: 1-4x  Plan weeks: Length of acute stay  Current Treatment Recommendations: Balance Training,Functional Mobility Training,Strengthening,Endurance Training,Pain Management,Safety Education & Training,Self-Care / ADL,Other (comment),Patient/Caregiver Education & Training,Equipment Evaluation, Education, & procurement,Home Management Training,Cognitive/Perceptual Training    Goals:   Patient will:    - Improve functional endurance to tolerate/complete 30 mins of ADL's  - Be Setup in UB ADLs   - Be Min A in LB ADLs  - Be SBA in ADL transfers without LOB  - Be SBA in toileting tasks  - Improve L hand fine motor coordination to [facility] in order to manage clothing fasteners/self-care containers in a timely manner  - Improve R, L UE strength and endurance to 3+/5, 4/5 in order to participate in self-care activities as projected. - Access appropriate D/C site with as few architectural barriers as possible. - Sequence self-care tasks with no verbal cues for safety    Patient Goal: Patient goals : \"I want to get home\"     Discussed and agreed upon: Yes Comments:     Therapy Time:   OT Individual Minutes  Time In: 1600  Time Out: 1610  Minutes: 10    Eval: 10 minutes     Electronically signed by:     CHRIS Jean/L, CHRIS/L  4/19/2022, 4:25 PM

## 2022-04-19 NOTE — PLAN OF CARE
Problem: Falls - Risk of:  Goal: Will remain free from falls  Description: Will remain free from falls  Outcome: Ongoing  Goal: Absence of physical injury  Description: Absence of physical injury  Outcome: Ongoing     Problem: Skin Integrity:  Goal: Will show no infection signs and symptoms  Description: Will show no infection signs and symptoms  Outcome: Ongoing  Goal: Absence of new skin breakdown  Description: Absence of new skin breakdown  Outcome: Ongoing     Problem: Activity:  Goal: Risk for activity intolerance will decrease  Description: Risk for activity intolerance will decrease  Outcome: Ongoing     Problem:  Bowel/Gastric:  Goal: Bowel function will improve  Description: Bowel function will improve  Outcome: Ongoing  Goal: Diagnostic test results will improve  Description: Diagnostic test results will improve  Outcome: Ongoing  Goal: Occurrences of nausea will decrease  Description: Occurrences of nausea will decrease  Outcome: Ongoing  Goal: Occurrences of vomiting will decrease  Description: Occurrences of vomiting will decrease  Outcome: Ongoing     Problem: Fluid Volume:  Goal: Maintenance of adequate hydration will improve  Description: Maintenance of adequate hydration will improve  Outcome: Ongoing     Problem: Health Behavior:  Goal: Ability to state signs and symptoms to report to health care provider will improve  Description: Ability to state signs and symptoms to report to health care provider will improve  Outcome: Ongoing     Problem: Physical Regulation:  Goal: Complications related to the disease process, condition or treatment will be avoided or minimized  Description: Complications related to the disease process, condition or treatment will be avoided or minimized  Outcome: Ongoing  Goal: Ability to maintain clinical measurements within normal limits will improve  Description: Ability to maintain clinical measurements within normal limits will improve  Outcome: Ongoing     Problem: Sensory:  Goal: Ability to identify factors that increase the pain will improve  Description: Ability to identify factors that increase the pain will improve  Outcome: Ongoing  Goal: Ability to notify healthcare provider of pain before it becomes unmanageable or unbearable will improve  Description: Ability to notify healthcare provider of pain before it becomes unmanageable or unbearable will improve  Outcome: Ongoing  Goal: Pain level will decrease  Description: Pain level will decrease  Outcome: Ongoing     Problem: Discharge Planning:  Goal: Participates in care planning  Description: Participates in care planning  Outcome: Ongoing  Goal: Discharged to appropriate level of care  Description: Discharged to appropriate level of care  Outcome: Ongoing     Problem: Activity Intolerance:  Goal: Ability to tolerate increased activity will improve  Description: Ability to tolerate increased activity will improve  Outcome: Ongoing     Problem: Pain:  Goal: Pain level will decrease  Description: Pain level will decrease  Outcome: Ongoing  Goal: Control of acute pain  Description: Control of acute pain  Outcome: Ongoing  Goal: Control of chronic pain  Description: Control of chronic pain  Outcome: Ongoing     Problem: Nutrition  Goal: Optimal nutrition therapy  Outcome: Ongoing     Problem: IP BALANCE  Goal: LTG - Patient will maintain balance to allow for safe/functional mobility  Outcome: Ongoing     Problem: Falls - Risk of:  Goal: Will remain free from falls  Description: Will remain free from falls  Outcome: Ongoing  Goal: Absence of physical injury  Description: Absence of physical injury  Outcome: Ongoing     Problem: Skin Integrity:  Goal: Will show no infection signs and symptoms  Description: Will show no infection signs and symptoms  Outcome: Ongoing  Goal: Absence of new skin breakdown  Description: Absence of new skin breakdown  Outcome: Ongoing     Problem:  Activity:  Goal: Risk for activity intolerance will decrease  Description: Risk for activity intolerance will decrease  Outcome: Ongoing     Problem:  Bowel/Gastric:  Goal: Bowel function will improve  Description: Bowel function will improve  Outcome: Ongoing  Goal: Diagnostic test results will improve  Description: Diagnostic test results will improve  Outcome: Ongoing  Goal: Occurrences of nausea will decrease  Description: Occurrences of nausea will decrease  Outcome: Ongoing  Goal: Occurrences of vomiting will decrease  Description: Occurrences of vomiting will decrease  Outcome: Ongoing     Problem: Fluid Volume:  Goal: Maintenance of adequate hydration will improve  Description: Maintenance of adequate hydration will improve  Outcome: Ongoing     Problem: Health Behavior:  Goal: Ability to state signs and symptoms to report to health care provider will improve  Description: Ability to state signs and symptoms to report to health care provider will improve  Outcome: Ongoing     Problem: Physical Regulation:  Goal: Complications related to the disease process, condition or treatment will be avoided or minimized  Description: Complications related to the disease process, condition or treatment will be avoided or minimized  Outcome: Ongoing  Goal: Ability to maintain clinical measurements within normal limits will improve  Description: Ability to maintain clinical measurements within normal limits will improve  Outcome: Ongoing     Problem: Sensory:  Goal: Ability to identify factors that increase the pain will improve  Description: Ability to identify factors that increase the pain will improve  Outcome: Ongoing  Goal: Ability to notify healthcare provider of pain before it becomes unmanageable or unbearable will improve  Description: Ability to notify healthcare provider of pain before it becomes unmanageable or unbearable will improve  Outcome: Ongoing  Goal: Pain level will decrease  Description: Pain level will decrease  Outcome: Ongoing     Problem: Discharge Planning:  Goal: Participates in care planning  Description: Participates in care planning  Outcome: Ongoing  Goal: Discharged to appropriate level of care  Description: Discharged to appropriate level of care  Outcome: Ongoing     Problem:  Activity Intolerance:  Goal: Ability to tolerate increased activity will improve  Description: Ability to tolerate increased activity will improve  Outcome: Ongoing     Problem: Pain:  Goal: Pain level will decrease  Description: Pain level will decrease  Outcome: Ongoing  Goal: Control of acute pain  Description: Control of acute pain  Outcome: Ongoing  Goal: Control of chronic pain  Description: Control of chronic pain  Outcome: Ongoing     Problem: Nutrition  Goal: Optimal nutrition therapy  Outcome: Ongoing     Problem: IP BALANCE  Goal: LTG - Patient will maintain balance to allow for safe/functional mobility  Outcome: Ongoing

## 2022-04-19 NOTE — CARE COORDINATION
Inpatient Rehab referral received. Met with patient and her daughter to explain [facility] Acute Inpatient Rehab program and requirements, including 3 hours of intense therapy daily, anticipated length of stay and goal of discharge to home. All questions answered and patient verbalized understanding. Freedom of choice provided and patient and daughter requests admit to [facility] if approved by insurance. PM&R consult completed. Patient plans to get stronger and return home and will have the assistance of her daughter at home. Would like to continue home PT/OT when patient is able.   Electronically signed by Florin Roberts RN on 4/19/22 at 2:49 PM EDT

## 2022-04-19 NOTE — PROGRESS NOTES
EMERGENCY GENERAL SURGERY DAILY PROGRESS NOTE      Patient Name: Mason Lira  Admission Date 4/14/2022    Hospital Day: 5  Patient seen and examined on 4/19/2022    INTERVAL HISTORY/EVENTS     Background:  Mason Lira is a 80 y.o. female with past medical history of COPD, HTN, FRED presents to [facility] with 1 week history of intermittent RUQ pain without nausea or vomiting. Patient is a poor historian and history was obtained from her two daughters. Patient is denying any pain at this time. Patient is tolerating PO but is NPO at this time. Patient is voiding spontaneously. She states that she has not had a bowel movement in a \"few days. \" She states she is passing gas. Hospital Course:  4/14/2022: Presented to ED with 1 week history of abdominal pain. CT concerning for acute cholecystitis. 4/15/2022: Daughters opted for perc drain over surgery. Pigtail placed by Dr. Candi Arora. Admitted to [facility] for acute cholecystitis s/p percutaneous cholecystostomy tube placement  4/16/2022: Improving abdominal pain. Tolerated clear liquid diet. Advanced to regular diet. 4/17/2022: Tolerating regular diet. Medically cleared for discharge from surgical standpoint. PT today for dispo rec's. 4/18/2022: Remains medically cleared for discharge. 24 Hour Events:  No acute events overnight. Patient is endorsing pain to the RUQ in the area of the drain. Tolerating a regular diet. Drain remains in place with 200cc of serous drainage. Daughter reporting some burning on urination last night. Patient not endorsing burning this AM. Last UTI per chart was 3/31/22 and was E. Coli. Able to move to toilet with walker and assistance. IR drain cultures indicating moderate E. Coli growth. Voiding spontaneously. Having regular bowel movements. Vital signs reviewed. Afebrile, not tachycardic, normotensive, sating 94% on RA    No new labs today. Output:  UOP: 250 charted.    Drains: 200 cc      PHYSICAL EXAM     Vitals Average, Min and Max for last 24 hours:  Temp: Temp: 97.9 °F (36.6 °C); Temp  Av.3 °F (36.8 °C)  Min: 97.9 °F (36.6 °C)  Max: 98.7 °F (37.1 °C)  Respirations: Resp  Av  Min: 18  Max: 18  Pulse: Pulse  Av  Min: 78  Max: 80  Blood Pressure: Systolic (75JNR), TTQ:086 , Min:143 , ZEC:672   ; Diastolic (75HOU), EPA:30, Min:55, Max:58  SpO2: SpO2  Av.8 %  Min: 93 %  Max: 95 %        24hr Intake/Output:     Intake/Output Summary (Last 24 hours) at 2022 1738  Last data filed at 2022 0449  Gross per 24 hour   Intake    Output 350 ml   Net -350 ml       Vitals: BP (!) 143/58   Pulse 78   Temp 97.9 °F (36.6 °C) (Oral)   Resp 18   Ht 5' 2\" (1.575 m)   Wt 180 lb (81.6 kg)   LMP  (LMP Unknown)   SpO2 95%   BMI 32.92 kg/m²     Physical Exam:  Constitutional: Lying supine in bed. Alert and conversant. No acute distress  HEENT: Atraumatic normocephalic. Cardiovascular: Regular rate and rhythm. Bilateral radial and DP pulses intact   Pulmonary: Clear to ausculation bilaterally. No wheezing, rhonchi or rales. Breathing comfortably on RA  Abdominal: Soft. Non-distended. Tender mild RLQ; no peritonitis; RUQ IR drain with bilious output, no erythema and no discharge around the drain. Musculoskeletal: BOWEN spontaneously. No edema. Ambulates without difficulty  Neurological: Alert, awake, and orientated x 1 (baseline). Motor and sensory grossly intact.  GCS of 14  Skin: warm, dry    LABORATORY RESULTS (LAST 24 HOURS)     CBC with Differential:    Lab Results   Component Value Date    WBC 7.7 2022    RBC 4.14 2022    HGB 11.9 2022    HCT 36.1 2022     2022    MCV 87.3 2022    MCH 28.7 2022    MCHC 32.9 2022    RDW 14.0 2022    BANDSPCT 12 10/28/2020    METASPCT 1 2020    LYMPHOPCT 8.7 2022    MONOPCT 9.1 2022    BASOPCT 0.7 2022    MONOSABS 1.4 2022    LYMPHSABS 1.3 2022    EOSABS 0.1 2022 BASOSABS 0.1 04/14/2022     CMP:    Lab Results   Component Value Date     04/18/2022    K 3.9 04/18/2022    K 4.0 11/06/2020     04/18/2022    CO2 21 04/18/2022    BUN 15 04/18/2022    CREATININE 0.87 04/18/2022    GFRAA >60.0 04/18/2022    LABGLOM >60.0 04/18/2022    GLUCOSE 107 04/18/2022    PROT 6.6 04/14/2022    LABALBU 3.3 04/14/2022    CALCIUM 8.4 04/18/2022    BILITOT 0.6 04/14/2022    ALKPHOS 224 04/14/2022    AST 12 04/14/2022    ALT 19 04/14/2022     Magnesium:    Lab Results   Component Value Date    MG 2.4 12/26/2021     PT/INR:    Lab Results   Component Value Date    PROTIME 16.4 04/15/2022    INR 1.3 04/15/2022     PTT:    Lab Results   Component Value Date    APTT 29.7 04/15/2022       IMAGING RESULTS (PERSONALLY REVIEWED)     Admission imaging reviewed. No new imaging. ASSESSMENT & PLAN     Diagnoses:  1. Acute cholecystitis s/p percutaneous cholecystostomy tube placement with Dr. Pablo Kidd on 4/15  2. Post-operative pain    PMHx: HTN, COPD, FRED    Incidental Findings: none (reviewed by MG, 4/19/2022)      ASSESSMENT/PLAN:  Neurological: Acute abdominal pain, Dx dementia   - Continue Tylenol q6 scheduled  - Will hold off on opioid pain medication as patient is not requesting pain meds. Cardiovascular: Hx HTN, HLD  - Continue vital signs per unit protocol  - Continue home lipitor 20mg daily,  Losartan 25mg q12hrs, metoprolol 12.5mg BID    Respiratory: Hx COPD, pt on home O2 but does not use, per daughter home O2 sats around 90%. No acute issues at this time   - Continue home albuterol q4 PRN for wheezing  - Continue home Symbicort BID  - Continue home Duoneb q6 PRN  - Maintain O2 sat >88%, encourage IS     GI/Diet: Acute cholecystitis, having bowel movements  - Continue regular diet  - Bowel regimen of senna, Miralax, PRN dulcolax     Renal/Electrolytes: No electrolyte abnormalities on last BMP, RICHARD on admission now resolved  - HLIV  - BMP as needed     ID: Leukocytosis resolved. Admitting urinalysis and culture without signs of recurrent UTI-  will follow clinically but would expect for Zosyn to cover E. Coli as seen on past urine cultures. - Zosyn q8 for acute cholecystitis; started 4/15/22   - Will transition to PO Augmentin at discharge for 7 days total of ABx coverage; anticipated abx stop date: 4/21/22  - Cultures from 4/15 showing E. Coli, with sensitivity to zosyn.   - CBC as needed      Heme: No acute issues  - No indication for transfusion  - CBC as needed     Endocrine: No acute issues     MSK: No acute issues  - Activity: out of bed as tolerated  - PM&R consult today. Recommendations including acute rehab with specialized nursing. Pre-cert pending     Prophylaxis:   - SCD, lovenox 30mg daily for DVT ppx         Dispo: Remains medically cleared for discharge from surgical standpoint. Remain on RNF for dispo planning. Follow up with IR every 2 weeks for drain/wound check. Plan for cholangiogram in 8 weeks with IR. Will follow with general surgery/IR for drain removal following results of cholangiogram.  Follow up with PCP for recent hospitalization.    --  Lottie Angel PA-C  Trauma/Critical Care/General Surgery  [phone] (8G-1O)  [phone]     This patient's plan of care was discussed and made in collaboration with Trauma Attending physician, Roman Huynh MD.

## 2022-04-19 NOTE — ED PROVIDER NOTES
[address] [facility] [address]  eMERGENCY dEPARTMENT eNCOUnter      Pt Name: Angélica Moreno  MRN: [id number]  Juaquingfpatrizia 5/19/1928  Date of evaluation: 4/14/2022  Provider: Martir Castillo PA-C    CHIEF COMPLAINT       Chief Complaint   Patient presents with    Abnormal Lab     Sent by Dr Vianey Santiago for CRP>200         HISTORY OF PRESENT ILLNESS   (Location/Symptom, Timing/Onset,Context/Setting, Quality, Duration, Modifying Factors, Severity)  Note limiting factors. Angélica Moreno is a 80 y.o. female who presents to the emergency department concern for elevated CRP family notes the patient had some nausea and abdominal discomfort they are concerned about a UTI patient denies fever chills vomiting symptoms mild to moderate severity nothing improves or worsen symptoms    HPI    NursingNotes were reviewed. REVIEW OF SYSTEMS    (2-9 systems for level 4, 10 or more for level 5)     Review of Systems   Constitutional: Negative for activity change, appetite change and unexpected weight change. HENT: Negative for ear discharge, nosebleeds and voice change. Eyes: Negative for discharge. Respiratory: Negative for apnea. Cardiovascular: Negative for chest pain. Gastrointestinal: Positive for abdominal pain and nausea. Negative for abdominal distention, anal bleeding and vomiting. Endocrine: Negative for polyuria. Genitourinary: Negative for dysuria and hematuria. Musculoskeletal: Negative for joint swelling and neck pain. Skin: Negative for pallor. Neurological: Negative for weakness. Hematological: Does not bruise/bleed easily. All other systems reviewed and are negative. Except as noted above the remainder of the review of systems was reviewed and negative.        PAST MEDICAL HISTORY     Past Medical History:   Diagnosis Date    RICHARD (acute kidney injury) ([facility] [address] 4/15/2022    Anxiety     Cancer ([facility] [address]     Glaucoma     Gout     left knee    Hypertension     Lung disease     Obesity (BMI 30-39. 9) 7/11/2019    Obstructive sleep apnea 7/11/2019         SURGICALHISTORY       Past Surgical History:   Procedure Laterality Date    CATARACT REMOVAL WITH IMPLANT Bilateral 2016    COLON SURGERY  2009    polyps    COLONOSCOPY  02/2009    GASTROSTOMY TUBE PLACEMENT N/A 11/04/2020    EGD PEG TUBE PLACEMENT performed by Kinga Amaro MD at [address] Right 04/15/2022    Flexima APDL Locking Pigtail Draiange Catheter inserted by Dr. Marie Mcfarlane (Lot# [id number] / Exp: 12/07/2024)   [facility] SKIN CANCER EXCISION  2002    graft from neck         CURRENT MEDICATIONS       Current Discharge Medication List      CONTINUE these medications which have NOT CHANGED    Details   dorzolamide-timolol (COSOPT) 22.3-6.8 MG/ML ophthalmic solution PLACE 1 DROP INTO BOTH EYES 2 TIMES DAILY INDICATIONS: GLAUCOMA  Qty: 10 mL, Refills: 5      latanoprost (XALATAN) 0.005 % ophthalmic solution Place 1 drop into both eyes daily Indications: Glaucoma  Qty: 2.5 mL, Refills: 5      metoprolol tartrate (LOPRESSOR) 25 MG tablet Take 0.5 tablets by mouth 2 times daily Indications: High Blood Pressure Disorder  Qty: 90 tablet, Refills: 3    Associated Diagnoses: Intermittent chest pain      ipratropium-albuterol (DUONEB) 0.5-2.5 (3) MG/3ML SOLN nebulizer solution Inhale 3 mLs into the lungs every 6 hours as needed for Shortness of Breath ((or wheezing))  Qty: 360 mL, Refills: 3      albuterol sulfate HFA (PROAIR HFA) 108 (90 Base) MCG/ACT inhaler Inhale 1 puff into the lungs every 6 hours as needed for Wheezing or Shortness of Breath  Qty: 3 each, Refills: 3    Associated Diagnoses: Diastolic dysfunction; Essential hypertension; Anxiety; Hypokalemia; FRED (obstructive sleep apnea);  Encounter for immunization      brimonidine (ALPHAGAN P) 0.15 % ophthalmic solution Place 1 drop into both eyes 2 times daily Indications: Glaucoma  Qty: 1 each, Refills: 1      zoster recombinant adjuvanted vaccine (SHINGRIX) 50 MCG/0.5ML SUSR injection Inject 0.5 mLs into the muscle See Admin Instructions 1 dose now and repeat in 2-6 months  Qty: 0.5 mL, Refills: 0    Associated Diagnoses: Encounter for immunization      atorvastatin (LIPITOR) 20 MG tablet Indications: High Amount of Fats in the Blood Take one tablet Po qhs  Qty: 90 tablet, Refills: 0    Associated Diagnoses: Chest pain, unspecified      losartan (COZAAR) 25 MG tablet Take 1 tablet by mouth every 12 hours  Qty: 180 tablet, Refills: 3      Elastic Bandages & Supports (MEDICAL COMPRESSION STOCKINGS) MISC Knee high, 20 to 30 mm hg, remove at night, pharmacy to fit. Qty: 1 each, Refills: 0      fluticasone-salmeterol (ADVAIR DISKUS) 250-50 MCG/DOSE AEPB Inhale 1 puff into the lungs every 12 hours  Qty: 180 each, Refills: 3      montelukast (SINGULAIR) 10 MG tablet Take 1 tablet by mouth nightly as needed (allergies)  Qty: 90 tablet, Refills: 3      OXYGEN Inhale 2 L into the lungs Indications: PRN at bedtime                  Norvasc [amlodipine besylate]    FAMILY HISTORY     History reviewed. No pertinent family history. SOCIAL HISTORY       Social History     Socioeconomic History    Marital status:       Spouse name: None    Number of children: None    Years of education: None    Highest education level: None   Occupational History    None   Tobacco Use    Smoking status: Never Smoker    Smokeless tobacco: Never Used   Vaping Use    Vaping Use: Never used   Substance and Sexual Activity    Alcohol use: No    Drug use: No    Sexual activity: Not Currently   Other Topics Concern    None   Social History Narrative    ,  Lives with her dtr [place] Jurist in [address],     Son Bo Morel is in the area                      Social Determinants of Health     Financial Resource Strain:     Difficulty of Paying Living Expenses: Not on file   Food Insecurity:     Worried About Running Out of Food in the Last Year: Not on file  Ran Out of Food in the Last Year: Not on file   Transportation Needs:     Lack of Transportation (Medical): Not on file    Lack of Transportation (Non-Medical): Not on file   Physical Activity:     Days of Exercise per Week: Not on file    Minutes of Exercise per Session: Not on file   Stress:     Feeling of Stress : Not on file   Social Connections:     Frequency of Communication with Friends and Family: Not on file    Frequency of Social Gatherings with Friends and Family: Not on file    Attends Buddhism Services: Not on file    Active Member of [address] [place] [address] [employer] or Organizations: Not on file    Attends Club or Organization Meetings: Not on file    Marital Status: Not on file   Intimate Partner Violence:     Fear of Current or Ex-Partner: Not on file    Emotionally Abused: Not on file    Physically Abused: Not on file    Sexually Abused: Not on file   Housing Stability:     Unable to Pay for Housing in the Last Year: Not on file    Number of [place] in the Last Year: Not on file    Unstable Housing in the Last Year: Not on file       SCREENINGS   [place] Coma Scale  Eye Opening: Spontaneous  Best Verbal Response: Oriented  Best Motor Response: Obeys commands  Chris Coma Scale Score: 15  Ebola Virus Disease (EVD) Screening   Temp: 98.7 °F (37.1 °C)  CIWA Assessment  BP: (!) 149/55  Pulse: 80    PHYSICAL EXAM    (up to 7 for level 4, 8 or more for level 5)     ED Triage Vitals [04/14/22 2025]   BP Temp Temp Source Pulse Resp SpO2 Height Weight   (!) 96/56 98.3 °F (36.8 °C) Temporal 88 16 95 % 5' 2\" (1.575 m) 180 lb (81.6 kg)       Physical Exam  Vitals and nursing note reviewed. Constitutional:       General: She is not in acute distress. Appearance: She is well-developed. HENT:      Head: Normocephalic and atraumatic.       Right Ear: External ear normal.      Left Ear: External ear normal.      Nose: Nose normal.      Mouth/Throat:      Mouth: Mucous membranes are moist.   Eyes: General:         Right eye: No discharge. Left eye: No discharge. Pupils: Pupils are equal, round, and reactive to light. Cardiovascular:      Rate and Rhythm: Normal rate and regular rhythm. Heart sounds: Normal heart sounds. Pulmonary:      Effort: Pulmonary effort is normal. No respiratory distress. Breath sounds: Normal breath sounds. No stridor. Abdominal:      General: Bowel sounds are normal. There is no distension. Palpations: Abdomen is soft. Tenderness: There is abdominal tenderness. Comments: Right upper quadrant tenderness to palpation   Musculoskeletal:         General: Normal range of motion. Cervical back: Normal range of motion and neck supple. Skin:     General: Skin is warm. Findings: No erythema. Neurological:      Mental Status: She is alert and oriented to person, place, and time. Psychiatric:         Mood and Affect: Mood normal.         RESULTS     EKG: All EKG's are interpreted by the Emergency Department Physician who either signs or Co-signsthis chart in the absence of a cardiologist.         RADIOLOGY:   Ольга Carlos such as CT, Ultrasound and MRI are read by the radiologist. Rola Epstein radiographic images are visualized and preliminarily interpreted by the emergency physician with the below findings:         Interpretation per the Radiologist below, if available at the time ofthis note:    XR CHEST (2 VW)   Final Result   No radiographic evidence of acute intrathoracic process. Prominent cardiac silhouette and increased pulmonary markings may be secondary to chronic CHF. CT ABDOMEN PELVIS W IV CONTRAST Additional Contrast? None   Final Result      Cholelithiasis with gallbladder distention, gallbladder wall thickening, pericholecystic fluid most concerning for acute cholecystitis. Bibasilar atelectasis/scarring and/or infiltrate.          All CT scans at this facility use dose modulation, iterative reconstruction, and/or weight based dosing when appropriate to reduce radiation dose to as low as reasonably achievable.       IR CHOLECYSTOSTOMY PERCUTANEOUS COMPLETE    (Results Pending)         ED BEDSIDE ULTRASOUND:   Performed by ED Physician - none    LABS:  Labs Reviewed   COMPREHENSIVE METABOLIC PANEL - Abnormal; Notable for the following components:       Result Value    Glucose 106 (*)     BUN 26 (*)     CREATININE 1.35 (*)     GFR Non- 36.5 (*)     GFR  44.2 (*)     Albumin 3.3 (*)     Alkaline Phosphatase 224 (*)     All other components within normal limits   CBC WITH AUTO DIFFERENTIAL - Abnormal; Notable for the following components:    WBC 15.0 (*)     Hematocrit 36.9 (*)     Neutrophils Absolute 12.1 (*)     Monocytes Absolute 1.4 (*)     All other components within normal limits   PROCALCITONIN - Abnormal; Notable for the following components:    Procalcitonin 0.27 (*)     All other components within normal limits   PROTIME-INR - Abnormal; Notable for the following components:    Protime 16.4 (*)     All other components within normal limits   CBC - Abnormal; Notable for the following components:    WBC 12.8 (*)     RBC 4.13 (*)     Hemoglobin 11.6 (*)     Hematocrit 35.6 (*)     MCHC 32.7 (*)     All other components within normal limits    Narrative:     Collection has been rescheduled by [facility] at 04/16/2022 06:15 Reason:   Drawn   BASIC METABOLIC PANEL - Abnormal; Notable for the following components:    Glucose 121 (*)     GFR Non- 58.3 (*)     Calcium 8.4 (*)     All other components within normal limits    Narrative:     Collection has been rescheduled by [facility] at 04/16/2022 06:15 Reason:   Drawn   CBC - Abnormal; Notable for the following components:    RBC 4.14 (*)     Hemoglobin 11.7 (*)     Hematocrit 35.6 (*)     All other components within normal limits   BASIC METABOLIC PANEL - Abnormal; Notable for the following components:    Glucose 100 (*) CREATININE 0.93 (*)     GFR Non- 56.2 (*)     Calcium 8.2 (*)     All other components within normal limits   CBC - Abnormal; Notable for the following components:    RBC 4.14 (*)     Hemoglobin 11.9 (*)     Hematocrit 36.1 (*)     MCHC 32.9 (*)     All other components within normal limits   BASIC METABOLIC PANEL - Abnormal; Notable for the following components:    Glucose 107 (*)     Calcium 8.4 (*)     All other components within normal limits   POCT VENOUS - Abnormal; Notable for the following components:    POC Creatinine 1.5 (*)     GFR Non- 32 (*)     GFR  39 (*)     All other components within normal limits   POCT CREATININE - URINE - Normal   LACTIC ACID   SPECIMEN REJECTION    Narrative:     ORDER WAS CANCELLED 04/15/2022 13:10, Rejected: Quantity not sufficient. APTT   TYPE AND SCREEN       All other labs were within normal range or not returned as of this dictation. EMERGENCY DEPARTMENT COURSE and DIFFERENTIAL DIAGNOSIS/MDM:   Vitals:    Vitals:    04/17/22 1928 04/18/22 0716 04/18/22 2102 04/18/22 2103   BP:    (!) 149/55   Pulse:    80   Resp:    18   Temp:    98.7 °F (37.1 °C)   TempSrc:    Oral   SpO2: 94% 94% 93% 94%   Weight:       Height:                MDM  Number of Diagnoses or Management Options  Calculus of gallbladder with acute cholecystitis without obstruction  Nausea  Right upper quadrant abdominal pain  Diagnosis management comments: Discussed with Dr. Radha Morales surgeon regarding patient's cholecystitis.   Will admit       Amount and/or Complexity of Data Reviewed  Clinical lab tests: reviewed and ordered  Tests in the radiology section of CPT®: reviewed and ordered  Discuss the patient with other providers: yes        CRITICAL CARE TIME       CONSULTS:  IP CONSULT TO INTERVENTIONAL RADIOLOGY  IP CONSULT TO CASE MANAGEMENT  IP CONSULT TO HOME CARE NEEDS  IP CONSULT TO REHAB/TCU ADMISSION COORDINATOR    PROCEDURES:  Unless otherwise noted below, none     Procedures    FINAL IMPRESSION      1. Calculus of gallbladder with acute cholecystitis without obstruction    2. Right upper quadrant abdominal pain    3. Nausea          DISPOSITION/PLAN   DISPOSITION Admitted 04/15/2022 12:01:42 AM      PATIENT REFERRED TO:  Albarran JULIO Cortes - CNP  [address] [facility] [address] [facility]  [phone]    On 4/25/2022  AT [place] [address]  [phone]    our office will call you within the next 24 hours to call and schedule your follow up for removal of your drain.       DISCHARGE MEDICATIONS:  Current Discharge Medication List      START taking these medications    Details   amoxicillin-clavulanate (AUGMENTIN) 500-125 MG per tablet Take 1 tablet by mouth 3 times daily for 7 days  Qty: 21 tablet, Refills: 0      acetaminophen (TYLENOL) 325 MG tablet Take 2 tablets by mouth every 6 hours as needed for Pain  Qty: 120 tablet, Refills: 0                (Please note that portions of this note were completed with a voice recognition program.  Efforts were made to edit the dictations but occasionally words are mis-transcribed.)    Fernando Encinas PA-C (electronically signed)  Attending Emergency Physician       Fernando Encinas PA-C  04/19/22 0045

## 2022-04-20 PROCEDURE — 6360000002 HC RX W HCPCS: Performed by: SURGERY

## 2022-04-20 PROCEDURE — 6360000002 HC RX W HCPCS: Performed by: STUDENT IN AN ORGANIZED HEALTH CARE EDUCATION/TRAINING PROGRAM

## 2022-04-20 PROCEDURE — 97116 GAIT TRAINING THERAPY: CPT

## 2022-04-20 PROCEDURE — 6370000000 HC RX 637 (ALT 250 FOR IP): Performed by: STUDENT IN AN ORGANIZED HEALTH CARE EDUCATION/TRAINING PROGRAM

## 2022-04-20 PROCEDURE — 94761 N-INVAS EAR/PLS OXIMETRY MLT: CPT

## 2022-04-20 PROCEDURE — 99231 SBSQ HOSP IP/OBS SF/LOW 25: CPT | Performed by: PHYSICAL MEDICINE & REHABILITATION

## 2022-04-20 PROCEDURE — 1210000000 HC MED SURG R&B

## 2022-04-20 PROCEDURE — 94640 AIRWAY INHALATION TREATMENT: CPT

## 2022-04-20 PROCEDURE — 99231 SBSQ HOSP IP/OBS SF/LOW 25: CPT | Performed by: STUDENT IN AN ORGANIZED HEALTH CARE EDUCATION/TRAINING PROGRAM

## 2022-04-20 PROCEDURE — 2580000003 HC RX 258: Performed by: SURGERY

## 2022-04-20 PROCEDURE — 2580000003 HC RX 258: Performed by: PHYSICIAN ASSISTANT

## 2022-04-20 RX ADMIN — SENNOSIDES 8.6 MG: 8.6 TABLET, FILM COATED ORAL at 21:46

## 2022-04-20 RX ADMIN — ACETAMINOPHEN 650 MG: 325 TABLET ORAL at 01:04

## 2022-04-20 RX ADMIN — ENOXAPARIN SODIUM 30 MG: 100 INJECTION SUBCUTANEOUS at 09:09

## 2022-04-20 RX ADMIN — DORZOLAMIDE HYDROCHLORIDE 1 DROP: 20 SOLUTION/ DROPS OPHTHALMIC at 21:47

## 2022-04-20 RX ADMIN — BUDESONIDE AND FORMOTEROL FUMARATE DIHYDRATE 2 PUFF: 80; 4.5 AEROSOL RESPIRATORY (INHALATION) at 20:09

## 2022-04-20 RX ADMIN — PIPERACILLIN AND TAZOBACTAM 3375 MG: 3; .375 INJECTION, POWDER, LYOPHILIZED, FOR SOLUTION INTRAVENOUS at 02:33

## 2022-04-20 RX ADMIN — PIPERACILLIN AND TAZOBACTAM 3375 MG: 3; .375 INJECTION, POWDER, LYOPHILIZED, FOR SOLUTION INTRAVENOUS at 11:05

## 2022-04-20 RX ADMIN — LOSARTAN POTASSIUM 25 MG: 25 TABLET, FILM COATED ORAL at 09:08

## 2022-04-20 RX ADMIN — BRIMONIDINE TARTRATE 1 DROP: 2 SOLUTION OPHTHALMIC at 09:19

## 2022-04-20 RX ADMIN — LATANOPROST 1 DROP: 50 SOLUTION OPHTHALMIC at 21:47

## 2022-04-20 RX ADMIN — Medication 10 ML: at 09:16

## 2022-04-20 RX ADMIN — BUDESONIDE AND FORMOTEROL FUMARATE DIHYDRATE 2 PUFF: 80; 4.5 AEROSOL RESPIRATORY (INHALATION) at 07:37

## 2022-04-20 RX ADMIN — ACETAMINOPHEN 650 MG: 325 TABLET ORAL at 12:35

## 2022-04-20 RX ADMIN — METOPROLOL TARTRATE 12.5 MG: 25 TABLET, FILM COATED ORAL at 09:12

## 2022-04-20 RX ADMIN — Medication 10 ML: at 21:46

## 2022-04-20 RX ADMIN — BRIMONIDINE TARTRATE 1 DROP: 2 SOLUTION OPHTHALMIC at 21:47

## 2022-04-20 RX ADMIN — SODIUM CHLORIDE, PRESERVATIVE FREE 5 ML: 5 INJECTION INTRAVENOUS at 10:52

## 2022-04-20 RX ADMIN — DORZOLAMIDE HYDROCHLORIDE 1 DROP: 20 SOLUTION/ DROPS OPHTHALMIC at 09:07

## 2022-04-20 RX ADMIN — ACETAMINOPHEN 650 MG: 325 TABLET ORAL at 18:21

## 2022-04-20 RX ADMIN — METOPROLOL TARTRATE 12.5 MG: 25 TABLET, FILM COATED ORAL at 21:46

## 2022-04-20 RX ADMIN — ATORVASTATIN CALCIUM 20 MG: 20 TABLET, FILM COATED ORAL at 09:09

## 2022-04-20 RX ADMIN — LOSARTAN POTASSIUM 25 MG: 25 TABLET, FILM COATED ORAL at 21:46

## 2022-04-20 RX ADMIN — PIPERACILLIN AND TAZOBACTAM 3375 MG: 3; .375 INJECTION, POWDER, LYOPHILIZED, FOR SOLUTION INTRAVENOUS at 21:59

## 2022-04-20 RX ADMIN — TIMOLOL MALEATE 1 DROP: 5 SOLUTION/ DROPS OPHTHALMIC at 21:47

## 2022-04-20 RX ADMIN — TIMOLOL MALEATE 1 DROP: 5 SOLUTION/ DROPS OPHTHALMIC at 09:13

## 2022-04-20 ASSESSMENT — PAIN SCALES - GENERAL
PAINLEVEL_OUTOF10: 0
PAINLEVEL_OUTOF10: 5
PAINLEVEL_OUTOF10: 7
PAINLEVEL_OUTOF10: 8
PAINLEVEL_OUTOF10: 8
PAINLEVEL_OUTOF10: 5

## 2022-04-20 ASSESSMENT — PAIN DESCRIPTION - PAIN TYPE: TYPE: ACUTE PAIN

## 2022-04-20 ASSESSMENT — PAIN - FUNCTIONAL ASSESSMENT
PAIN_FUNCTIONAL_ASSESSMENT: PREVENTS OR INTERFERES SOME ACTIVE ACTIVITIES AND ADLS

## 2022-04-20 ASSESSMENT — PAIN DESCRIPTION - DESCRIPTORS
DESCRIPTORS: ACHING;BURNING;STABBING;THROBBING
DESCRIPTORS: SORE

## 2022-04-20 ASSESSMENT — PAIN DESCRIPTION - LOCATION: LOCATION: ABDOMEN

## 2022-04-20 ASSESSMENT — PAIN DESCRIPTION - ORIENTATION: ORIENTATION: RIGHT;LOWER

## 2022-04-20 NOTE — PROGRESS NOTES
EMERGENCY GENERAL SURGERY DAILY PROGRESS NOTE      Patient Name: Valery Veliz  Admission Date 4/14/2022    Hospital Day: 6  Patient seen and examined on 4/20/2022    INTERVAL HISTORY/EVENTS     Background:  Valery Veliz is a 80 y.o. female with past medical history of COPD, HTN, FRED presents to [facility] [place] with 1 week history of intermittent RUQ pain without nausea or vomiting. Patient is a poor historian and history was obtained from her two daughters. Patient is denying any pain at this time. Patient is tolerating PO but is NPO at this time. Patient is voiding spontaneously. She states that she has not had a bowel movement in a \"few days. \" She states she is passing gas. Hospital Course:  4/14/2022: Presented to ED with 1 week history of abdominal pain. CT concerning for acute cholecystitis. 4/15/2022: Daughters opted for perc drain over surgery. Pigtail placed by Dr. Agnieszka Ryan. Admitted to [facility] for acute cholecystitis s/p percutaneous cholecystostomy tube placement  4/16/2022: Improving abdominal pain. Tolerated clear liquid diet. Advanced to regular diet. 4/17/2022: Tolerating regular diet. Medically cleared for discharge from surgical standpoint. PT today for dispo rec's. 4/18/2022: Remains medically cleared for discharge. 4/19/2022: Remains medically cleared for discharge. Pending pre-cert to acute rehab    24 Hour Events:  No acute events overnight per patient or nursing. Patient is endorsing pain to the RUQ in the area of the drain that is unchanged from yesterday. Tolerating a regular diet. Drain remains in place with 100cc of serous drainage. Voiding spontaneously. Having regular bowel movements. Is ambulating with 2WW to toilet with minimal assistance. Vital signs reviewed. Afebrile, not tachycardic, normotensive, sating 94% on RA    No new labs today. Output:  UOP: 5 occurances charted.    Drains: 100 cc      PHYSICAL EXAM     Vitals Average, Min and Max for last 24 hours:  Temp: Temp: 97.8 °F (36.6 °C); Temp  Av.6 °F (36.4 °C)  Min: 97.4 °F (36.3 °C)  Max: 97.8 °F (36.6 °C)  Respirations: Resp  Av.8  Min: 18  Max: 20  Pulse: Pulse  Av.8  Min: 77  Max: 80  Blood Pressure: Systolic (05PSU), LVH:238 , Min:147 , GRR:662   ; Diastolic (46ERE), SKS:29, Min:56, Max:74  SpO2: SpO2  Av.8 %  Min: 92 %  Max: 95 %        24hr Intake/Output:     Intake/Output Summary (Last 24 hours) at 2022 1611  Last data filed at 2022 0435  Gross per 24 hour   Intake 675.83 ml   Output 100 ml   Net 575.83 ml       Vitals: BP (!) 152/64   Pulse 77   Temp 97.8 °F (36.6 °C) (Oral)   Resp 20   Ht 5' 2\" (1.575 m)   Wt 180 lb (81.6 kg)   LMP  (LMP Unknown)   SpO2 94%   BMI 32.92 kg/m²     Physical Exam:  Constitutional: Lying supine in bed. Alert and conversant. No acute distress  HEENT: Atraumatic normocephalic. Cardiovascular: Regular rate and rhythm. Bilateral radial and DP pulses intact   Pulmonary: Breathing comfortably on RA. Abdominal: Soft. Non-distended. Tender mild RLQ; no peritonitis; RUQ IR drain with bilious, sero-sang output, no erythema and no discharge around the drain. Musculoskeletal: BOWEN spontaneously. No edema. Ambulates without difficulty with 2WW. Neurological: Alert, awake, and orientated x 2 (AOx1 baseline). Motor and sensory grossly intact.  GCS of 15  Skin: warm, dry    LABORATORY RESULTS (LAST 24 HOURS)     CBC with Differential:    Lab Results   Component Value Date    WBC 7.7 2022    RBC 4.14 2022    HGB 11.9 2022    HCT 36.1 2022     2022    MCV 87.3 2022    MCH 28.7 2022    MCHC 32.9 2022    RDW 14.0 2022    BANDSPCT 12 10/28/2020    METASPCT 1 2020    LYMPHOPCT 8.7 2022    MONOPCT 9.1 2022    BASOPCT 0.7 2022    MONOSABS 1.4 2022    LYMPHSABS 1.3 2022    EOSABS 0.1 2022    BASOSABS 0.1 2022     CMP:    Lab Results   Component Value Date    NA 141 04/18/2022    K 3.9 04/18/2022    K 4.0 11/06/2020     04/18/2022    CO2 21 04/18/2022    BUN 15 04/18/2022    CREATININE 0.87 04/18/2022    GFRAA >60.0 04/18/2022    LABGLOM >60.0 04/18/2022    GLUCOSE 107 04/18/2022    PROT 6.6 04/14/2022    LABALBU 3.3 04/14/2022    CALCIUM 8.4 04/18/2022    BILITOT 0.6 04/14/2022    ALKPHOS 224 04/14/2022    AST 12 04/14/2022    ALT 19 04/14/2022     Magnesium:    Lab Results   Component Value Date    MG 2.4 12/26/2021     PT/INR:    Lab Results   Component Value Date    PROTIME 16.4 04/15/2022    INR 1.3 04/15/2022     PTT:    Lab Results   Component Value Date    APTT 29.7 04/15/2022       IMAGING RESULTS (PERSONALLY REVIEWED)     Admission imaging reviewed. No new imaging. ASSESSMENT & PLAN     Diagnoses:  1. Acute cholecystitis s/p percutaneous cholecystostomy tube placement with Dr. Micah Nagel on 4/15  2. Post-operative pain    PMHx: HTN, COPD, FRED    Incidental Findings: none (reviewed by MG, 4/19/2022)      ASSESSMENT/PLAN:  Neurological: Acute abdominal pain, Dx dementia   - Continue Tylenol q6 scheduled  - Will hold off on opioid pain medication as patient is not requesting pain meds. Cardiovascular: Hx HTN, HLD  - Continue vital signs per unit protocol  - Continue home lipitor 20mg daily,  Losartan 25mg q12hrs, metoprolol 12.5mg BID    Respiratory: Hx COPD, pt on home O2 but does not use, per daughter home O2 sats around 90%. No acute issues at this time   - Continue home albuterol q4 PRN for wheezing  - Continue home Symbicort BID  - Continue home Duoneb q6 PRN  - Maintain O2 sat >88%, encourage IS     GI/Diet: Acute cholecystitis, having bowel movements  - Continue regular diet  - Bowel regimen of senna, Miralax, PRN dulcolax     Renal/Electrolytes: No electrolyte abnormalities on last BMP, RICHARD on admission now resolved  - HLIV  - BMP as needed     ID: Leukocytosis resolved.  Admitting urinalysis and culture without signs of recurrent UTI-  will follow clinically but would expect for Zosyn to cover E. Coli as seen on past urine cultures. - Zosyn q8 for acute cholecystitis; started 4/15/22   - Will transition to PO Augmentin at discharge for 7 days total of ABx coverage; anticipated abx stop date: 4/21/22  - Cultures from 4/15 showing E. Coli, with sensitivity to zosyn.   - CBC as needed      Heme: No acute issues  - No indication for transfusion  - CBC as needed     Endocrine: No acute issues     MSK: No acute issues  - Activity: out of bed as tolerated     Prophylaxis:   - SCD, lovenox 30mg daily for DVT ppx         Dispo: Remains medically cleared for discharge from surgical standpoint. Remain on RNF for dispo planning. Insurance denied acute rehab. Will attempt discharge to SNF. Follow up with IR every 2 weeks for drain/wound check. Plan for cholangiogram in 8 weeks with IR. Will follow with general surgery/IR for drain removal following results of cholangiogram.  Follow up with PCP for recent hospitalization.    --  Josee Page PA-C  Trauma/Critical Care/General Surgery  [phone] ([phone])  [phone]     This patient's plan of care was discussed and made in collaboration with Trauma Attending physician, Lulu Caicedo MD.

## 2022-04-20 NOTE — CARE COORDINATION
Received secure call/message from [facility] and she has been denied an acute inpatient rehab admission. Their medical director recommends SNF level as the patient does not have enough medical needs or therapy needs for acute Inpatient rehab.   Electronically signed by Siddharth Blackman RN on 4/20/22 at 3:41 PM EDT

## 2022-04-20 NOTE — DISCHARGE INSTR - COC
subunit, adjuvanted, IM (Fluad 65 yrs and older) 10/14/2019    Pneumococcal Conjugate 13-valent ([id number]) 09/18/2017    Pneumococcal Polysaccharide ([id number]) 10/14/2019       Active Problems:  Patient Active Problem List   Diagnosis Code    Essential hypertension I10    Anxiety F41.9    COPD (chronic obstructive pulmonary disease) with acute bronchitis (HCC) J44.0, J20.9    COPD exacerbation (HCC) J44.1    Obesity (BMI 30-39. 9) E66.9    Obstructive sleep apnea G47.33    Chest pain R07.9    Aphasia R47.01    Venous stasis dermatitis of both lower extremities I87.2    Impaired mobility and activities of daily living Z74.09, Z78.9    Acute cholecystitis K81.0    Post-op pain G89.18       Isolation/Infection:   Isolation            No Isolation          Patient Infection Status       Infection Onset Added Last Indicated Last Indicated By Review Planned Expiration Resolved Resolved By    None active    Resolved    COVID-19 (Rule Out) 12/26/21 12/26/21 12/26/21 COVID-19, Rapid (Ordered)   12/26/21 Rule-Out Test Resulted    COVID-19 (Rule Out) 12/21/21 12/21/21 12/21/21 COVID-19, Rapid (Ordered)   12/21/21 Rule-Out Test Resulted    COVID-19 (Rule Out) 11/05/20 11/06/20 11/06/20 COVID-19 (Ordered)   11/06/20 Rule-Out Test Resulted    COVID-19 (Rule Out) 10/28/20 10/28/20 10/28/20 COVID-19 (Ordered)   10/28/20 Cyril Devi RN    2 negative COVID's. Electronically signed by Cyril Devi RN on 10/28/20 at 3:48 PM EDT       COVID-19 (Rule Out) 10/27/20 10/27/20 10/27/20 COVID-19 (Ordered)   10/27/20 Rule-Out Test Resulted            Nurse Assessment:  Last Vital Signs: BP (!) 152/64   Pulse 77   Temp 97.8 °F (36.6 °C) (Oral)   Resp 20   Ht 5' 2\" (1.575 m)   Wt 180 lb (81.6 kg)   LMP  (LMP Unknown)   SpO2 94%   BMI 32.92 kg/m²     Last documented pain score (0-10 scale): Pain Level: 0  Last Weight:   Wt Readings from Last 1 Encounters:   04/14/22 180 lb (81.6 kg)     Mental Status:  oriented and alert    IV Access:  - None    Nursing Mobility/ADLs:  Walking   Assisted  Transfer  Assisted  Bathing  Assisted  Dressing  Assisted    Toileting  Independent  Feeding  Independent  Med Admin  Assisted  Med Delivery   whole    Wound Care Documentation and Therapy:  Wound 04/15/22 Sacrum scabbed (Active)   Dressing Status Other (Comment) 04/15/22 0100   Dressing/Treatment Zinc paste 04/20/22 0921   Wound Assessment Pink/red 04/20/22 0921   Drainage Amount None 04/20/22 0921   Odor None 04/18/22 0850   Number of days: 5       Incision 11/04/20 Abdomen Medial;Upper (Active)   Number of days: 532        Elimination:  Continence: Bowel: No  Bladder: No  Urinary Catheter: None   Colostomy/Ileostomy/Ileal Conduit: No       Date of Last BM: 4/25/22    Intake/Output Summary (Last 24 hours) at 4/20/2022 1708  Last data filed at 4/20/2022 0435  Gross per 24 hour   Intake 675.83 ml   Output 100 ml   Net 575.83 ml     I/O last 3 completed shifts: In: 675.8 [IV Piggyback:675.8]  Out: 450 [Urine:250; Drains:200]    Safety Concerns:     None and At Risk for Falls    Impairments/Disabilities:      None    Nutrition Therapy:  Current Nutrition Therapy:   - Oral Diet:  General    Routes of Feeding: Oral  Liquids: Thin Liquids  Daily Fluid Restriction: no  Last Modified Barium Swallow with Video (Video Swallowing Test): not done    Treatments at the Time of Hospital Discharge:   Respiratory Treatments: none  Oxygen Therapy:  is not on home oxygen therapy.   Ventilator:    - No ventilator support    Rehab Therapies: Physical Therapy and Occupational Therapy  Weight Bearing Status/Restrictions: No weight bearing restrictions  Other Medical Equipment (for information only, NOT a DME order):  walker  Other Treatments: Daniela Drain     Patient's personal belongings (please select all that are sent with patient):  Demar    RN SIGNATURE:  Electronically signed by Tete Hendricks RN on 4/26/22 at 1:01 PM EDT    CASE MANAGEMENT/SOCIAL WORK SECTION    Inpatient Status Date: 4/14/22    Readmission Risk Assessment Score:  Readmission Risk              Risk of Unplanned Readmission:  21           Discharging to Facility/ Agency   Name: [facility]  Address:  Phone:  Fax:    Dialysis Facility (if applicable)   Name:  Address:  Dialysis Schedule:  Phone:  Fax:    / signature: Electronically signed by АННА Killian on 4/20/22 at 5:09 PM EDT    PHYSICIAN SECTION    Prognosis: Good    Condition at Discharge: Stable    Rehab Potential (if transferring to Rehab): Good    Recommended Labs or Other Treatments After Discharge:   - Maintain RUQ drain- record and empty drainage daily  - Cover with plastic when showering- no submerging  - Follow up with Interventional Radiology in 2 weeks regarding drain    Physician Certification: I certify the above information and transfer of Iqra Andrade  is necessary for the continuing treatment of the diagnosis listed and that she requires [place] for less 30 days.      Update Admission H&P: No change in H&P see latest progress note for updates    PHYSICIAN SIGNATURE:  Electronically signed by Jeffrey Garza MD on 4/26/22 at 1:38 PM EDT

## 2022-04-20 NOTE — CARE COORDINATION
Received call insurance denied rehab and will auth a SNF for patient. Rehab agrees not enough criteria for acute rehab. We will have to meet with pt/fam for snf plan.

## 2022-04-20 NOTE — PROGRESS NOTES
Physical Therapy Med Surg Daily Treatment Note  Facility/Department: [address] [place] [address] [address]  Room: [place] [address]       NAME: Mason Lira  : 1928 (89 y.o.)  MRN: [id number]  CODE STATUS: Full Code    Date of Service: 2022    Patient Diagnosis(es): Acute cholecystitis [K81.0]  Nausea [R11.0]  Calculus of gallbladder with acute cholecystitis without obstruction [K80.00]  Right upper quadrant abdominal pain [R10.11]   Chief Complaint   Patient presents with    Abnormal Lab     Sent by Dr Tim Guajardo for CRP>200     Patient Active Problem List    Diagnosis Date Noted    Post-op pain 2022    Acute cholecystitis 2022    Impaired mobility and activities of daily living 2021    Venous stasis dermatitis of both lower extremities 08/10/2021    Aphasia     Chest pain 2020    Obesity (BMI 30-39.9) 2019    Obstructive sleep apnea 2019    COPD exacerbation ([address]     COPD (chronic obstructive pulmonary disease) with acute bronchitis ([address] 2018    Anxiety 2017    Essential hypertension         Past Medical History:   Diagnosis Date    RICHARD (acute kidney injury) ([address] 4/15/2022    Anxiety     Cancer ([address]     Glaucoma     Gout     left knee    Hypertension     Lung disease     Obesity (BMI 30-39. 9) 2019    Obstructive sleep apnea 2019     Past Surgical History:   Procedure Laterality Date    CATARACT REMOVAL WITH IMPLANT Bilateral 2016    COLON SURGERY  2009    polyps    COLONOSCOPY  2009    GASTROSTOMY TUBE PLACEMENT N/A 2020    EGD PEG TUBE PLACEMENT performed by Laura Cristina MD at [address] [place] [address] Right 04/15/2022    Flexima APDL Locking Pigtail Draiange Catheter inserted by Dr. Nyla Johnston (Lot# [id number] / Exp: 2024)    IR CHOLECYSTOSTOMY PERCUTANEOUS COMPLETE  4/15/2022    IR CHOLECYSTOSTOMY PERCUTANEOUS COMPLETE 4/15/2022 [facility] SPECIAL PROCEDURE    SKIN CANCER EXCISION      graft from neck Restrictions:  Restrictions/Precautions: Fall Risk    SUBJECTIVE:   Subjective: Pt reports she is ok. States she is tired. Pain  Pain: Pt reports pain in her stomach 7/10 declined intervention  OBJECTIVE:      Transfers  Sit to Stand: Contact guard assistance  Stand to sit: Contact guard assistance  Comment: Cues for safe hand placement with decreased carryover. Ambulation  Surface: level tile  Device: Rolling Walker  Assistance: Contact guard assistance  Quality of Gait: flexed posture, slow gely, B foot drop R>L, decreased step length  Distance: 30ft  Comments: Vc's for approach to chair with ww. Fatigued after 30ft. PT Exercises  Exercise Treatment: Seated LE therex AP x20 R PROM L AAROM, LAQs x10, march x10, hip add with pillow x20, hip add with manual resistance x20. Cues for control and to increase ROM     ASSESSMENT   Assessment: Pt tolerated increased gait distance. Fatigued after 30ft requiring rest.  VCs for safety with transfers and approach to chair. Discharge Recommendations:  Continue to assess pending progress     Goals  Long Term Goals  Long term goal 1: Pt will demonstrate bed mobility supervision  Long term goal 2: Pt will demonstrate transfers with safest AD SBA  Long term goal 3: Pt will demonstrate amb >/= 100ft with safest AD SBA  Long term goal 4: Pt will demonstrate stair negotiation 3 steps with 1 rail CGA to allow pt to safely enter and exit her home. Long term goal 5: Pt will demonstrate HEP with SBA    PLAN    Times per week: 3-6      [facility] (6 CLICK) [facility] [place] [facility] Inpatient Mobility Raw Score : 17   Therapy Time   Individual   Time In 1015   Time Out 1034   Minutes 19   Transfers 4  Gait 8  therex 171 Meagan Joseph PTA, 04/20/22 at 10:48 AM     Definitions for assistance levels  Independent = pt does not require any physical supervision or assistance from another person for activity completion. Device may be needed.   Stand by assistance = pt requires verbal cues or instructions from another person, close to but not touching, to perform the activity  Minimal assistance= pt performs 75% or more of the activity; assistance is required to complete the activity  Moderate assistance= pt performs 50% of the activity; assistance is required to complete the activity  Maximal assistance = pt performs 25% of the activity; assistance is required to complete the activity  Dependent = pt requires total physical assistance to accomplish the task

## 2022-04-20 NOTE — PROGRESS NOTES
Subjective: The patient complains of severe acute on chronic progressive fatigue and stop abdominal pain status post cholecystitis partially relieved by rest, PT, OT and meds , placement of drainage tube and exacerbated by exertion and recent illness cholecystitis with IR surgery. I am concerned about patients medical complexities including:  Principal Problem:    Acute cholecystitis  Active Problems:    Aphasia    Impaired mobility and activities of daily living    Post-op pain  Resolved Problems:    RICHARD (acute kidney injury) ([address]    Acute abdominal pain      . Reviewed recent nursing note and discussed current status and planned care with acute care providers, \" Inpatient Rehab referral received. Met with patient and her daughter to explain [id number] [facility] Acute Inpatient Rehab program and requirements, including 3 hours of intense therapy daily, anticipated length of stay and goal of discharge to home. All questions answered and patient verbalized understanding. Freedom of choice provided and patient and daughter requests admit to [facility] if approved by insurance. PM&R consult completed. Patient plans to get stronger and return home and will have the assistance of her daughter at home. Would like to continue home PT/OT when patient is able. \".    ROS x10: The patient also complains of severely impaired mobility and activities of daily living. Otherwise no new problems with vision, hearing, nose, mouth, throat, dermal, cardiovascular, GI, , pulmonary, musculoskeletal, psychiatric or neurological. See Rehab fconsult on Rehab chart . Vital signs:  BP (!) 152/64   Pulse 77   Temp 97.8 °F (36.6 °C) (Oral)   Resp 20   Ht 5' 2\" (1.575 m)   Wt 180 lb (81.6 kg)   LMP  (LMP Unknown)   SpO2 94%   BMI 32.92 kg/m²   I/O:   PO/Intake:    fair PO intake, no change in PO intake    Bowel/Bladder:   continent,    General:  Patient is well developed, adequately nourished, and    well kempt.      HEENT: PERRLA, hearing intact to loud voice, external inspection of ear     and nose benign. Inspection of lips, tongue and gums benign  Musculoskeletal: No significant change in strength or tone. All joints stable. Inspection and palpation of digits and nails show no clubbing,       cyanosis or inflammatory conditions. Neuro/Psychiatric: Affect: flat-  Alert and oriented to self and situation with  No needed cues. No significant change in deep tendon reflexes or     sensation  Lungs:  Diminished, CTA-B  . Respiration effort is normal at rest.   Heart:   S1 = S2,   RRR. Abdomen:  Soft,  RUQ-tender    Extremities:  Trace  lower extremity edema but no unusual tenderness. Skin:   BUE bruises dt blood draws,     Rehabilitation:  Physical therapy: FIMS:  Bed Mobility: Scooting: Contact guard assistance    Transfers: Sit to Stand: Contact guard assistance  Stand to sit: Contact guard assistance, Ambulation  Surface: level tile  Device: Rolling Walker  Assistance: Contact guard assistance  Quality of Gait: flexed posture, slow gely, B foot drop R>L, decreased step length  Gait Deviations: Slow Gely,Decreased step length,Decreased step height  Distance: 30ft  Comments: Vc's for approach to chair with ww. Fatigued after 30ft. More Ambulation?: No,      FIMS:  ,  , Assessment: Pt tolerated increased gait distance. Fatigued after 30ft requiring rest.  VCs for safety with transfers and approach to chair. Occupational therapy: FIMS:   ,  , Assessment: Pt is a 80year old woman from home with family support who presents to [facility] with the above deficits which impact her ability to perform ADLs and IADLs. Pt. limited d/t weakness, fatigue. Pt. would benefit from continued OT to maximize independence and safety with ADL tasks.     Speech therapy: FIMS:      SPEECH THERAPY               Diet/Swallow:                           COGNITION  OT: Cognition Comment: Increased processing time  SP:           Lab/X-ray studies reviewed, analyzed and discussed with patient and staff:   No results found for this or any previous visit (from the past 24 hour(s)). Previous extensive, complex labs, notes and diagnostics reviewed and analyzed. ALLERGIES:    Allergies as of 04/14/2022 - Fully Reviewed 04/14/2022   Allergen Reaction Noted    Norvasc [amlodipine besylate] Swelling 12/18/2018      (please also verify by checking [facility])     Complex Physical Medicine & Rehab Issues Assess & Plan:   1. Severe abnormality of gait and mobility and impaired self-care and ADL's secondary to E. coli sepsis status post cholecystitis. Updated functional and medical status reassessed regarding patients ability to participate in therapies and patient found to be able to participate in skilled vs acute intensive comprehensive inpatient rehabilitation program including PT/OT to improve balance, ambulation, ADLs, and to improve the P/AROM. It is my opinion that they will be able to tolerate 3 hours of therapy a day and benefit from it at an acute level. As always we will attempt to get patient to the most efficient but most effective level of care will be in their best interest.  Continue to focus on energy conservation heart rate and blood pressure monitoring before during and after therapy endurance and consistency of function. Will continue to follow to attempt to dc to the lowest most effective and efficient level of rehab care. 2. Bowel constipation   and Bladder dysfunction   overactive, neurogenic bladder:  frequent toileting, ambulate to bathroom with assistance, check post void residuals. Check for C.difficile x1 if >2 loose stools in 24 hours, continue bowel & bladder program.  Monitor for UTI symptoms including lethargy and confusion  3.  Moderate postop cholecystitis right upper quadrant pain and generalized OA pain: reassess pain every shift and prior to and after each therapy session, give prn Tylenol   and consider scheduled Tylenol, modalities prn in therapy, consider Lidoderm, K-pad prn.   4. Skin breakdown healing interventional radiology surgery right upper quadrant risk:  continue pressure relief program.  Daily skin exams and reports from nursing. 5. Severe fatigue due to immobility and nutritional deficits: improvements in PO intake   Add vitamin B12 vitamin D and CoQ10 titrate dosing and add protein supplementation with low carb content. 6. Complex discharge planning:   Discussed with care team-last 24 hour events noted. I will continue to follow along and reassess functional and medical status as we strive to improve patients functional and medical outcomes progressing to the most efficient and lowest level of care. Complex Active General Medical Issues that complicate care Assess & Plan:     1. Principal Problem:    Acute cholecystitis  Active Problems:    Aphasia    Impaired mobility and activities of daily living    Post-op pain  Resolved Problems:    RICHARD (acute kidney injury) ([facility] [address]    Acute abdominal pain          Events and functional changes in the past 24 hours reviewed improvements in functional status are encouraging We are awaiting precertification through her insurance. I again discussed acute rehab with the patient verify that the patient is able and willing to participate in 3 hours of therapy a day. Rehab and acute care Case management has also reinforced this expectation.     Juli Tavera D.O., PM&R     Attending    [address]

## 2022-04-20 NOTE — CARE COORDINATION
[facility] Pre-Admission Screening Document      Patient Name: Carlyn Campos       MRN: [id number]    : 1928    Age: 80 y.o. Gender: female   Payor: Payor: Maggie Khanna / Plan: Maggie Jey / Product Type: *No Product type* /   MSSP: No    Admitted from: [facility] Floor: 2W  Attending Care Provider: Mario Alberto Serrano MD  Inpatient Rehab Referring Care Provider: Elvira Wright  Primary Care Provider: Yani Pena MD  Inpatient Treatment Team including Consults: Treatment Team: Attending Provider: Mario Alberto Serrano MD; Consulting Physician: Mata Asher MD; Registered Nurse: Gregory Santillan RN; Utilization Reviewer: Jessica Gramajo RN; : Cherryl Gosselin, RN    Reason for Hospitalization:   1. Calculus of gallbladder with acute cholecystitis without obstruction    2. Right upper quadrant abdominal pain    3. Nausea      Chief Complaint   Patient presents with    Abnormal Lab     Sent by Dr Yaron Barahona for CRP>200     Isolation:No active isolations    Hospital Course:  Admit Date: 2022  8:31 PM  Inpatient Rehab Referral Date: 22  Narrative of hospital course/history of present illness: 80 yr old female presented to ED with 1 week history of intermittent RUQ pain without nausea or vomiting. IR-Acute cholecystitis. 4/15/22-Ultrasound and fluoroscopy guided cholecystostomy tube placement. Tube can not be removed before 8 weeks if problem resolves. Medical & Surgical History/Current Comorbidities:  Past Medical History:   Diagnosis Date    RICHADR (acute kidney injury) ([facility] [address] 4/15/2022    Anxiety     Cancer ([facility] [address] [facility]     Glaucoma     Gout     left knee    Hypertension     Lung disease     Obesity (BMI 30-39. 9) 2019    Obstructive sleep apnea 2019    Osteoarthritis of cervical spine with myelopathy 2022     Past Surgical History:   Procedure Laterality Date  CATARACT REMOVAL WITH IMPLANT Bilateral 2016    COLON SURGERY  2009    polyps    COLONOSCOPY  02/2009    GASTROSTOMY TUBE PLACEMENT N/A 11/04/2020    EGD PEG TUBE PLACEMENT performed by Manjula Winston MD at [address] Right 04/15/2022    Flexima APDL Locking Pigtail Draiange Catheter inserted by Dr. Norberto Sommer (Lot# [id number] / Exp: 12/07/2024)    IR CHOLECYSTOSTOMY PERCUTANEOUS COMPLETE  4/15/2022    IR CHOLECYSTOSTOMY PERCUTANEOUS COMPLETE 4/15/2022 [facility] SPECIAL PROCEDURE    SKIN CANCER EXCISION  2002    graft from neck       Advanced Directives:  Advance Directives     Power of  Living Will ACP-Advance Directive ACP-Power of     Not on File Not on File Not on File Not on File          Labs/Infection Control:  Recent Labs     04/21/22  0948   WBC 9.5   HGB 12.6   HCT 38.3      BUN 15   CREATININE 0.87   GLUCOSE 166*      K 3.8   CALCIUM 8.8   PROT 6.4      Blood cultures:  No results for input(s): BC in the last 72 hours. Urinalysis/C&S:  No results for input(s): NITRITE, LABCAST, WBCUA, RBCUA, MUCUS, TRICHOMONAS, YEAST, BACTERIA, LEUKOCYTESUR, BLOODU, GLUCOSEU, KETUA, AMORPHOUS, LABURIN in the last 72 hours. Radiology:  XR CHEST (2 VW)  Result Date: 4/15/2022  No radiographic evidence of acute intrathoracic process. Prominent cardiac silhouette and increased pulmonary markings may be secondary to chronic CHF. CT ABDOMEN PELVIS W IV CONTRAST Additional Contrast? None  Result Date: 4/15/2022  Cholelithiasis with gallbladder distention, gallbladder wall thickening, pericholecystic fluid most concerning for acute cholecystitis. Bibasilar atelectasis/scarring and/or infiltrate. All CT scans at this facility use dose modulation, iterative reconstruction, and/or weight based dosing when appropriate to reduce radiation dose to as low as reasonably achievable. IR CHOLECYSTOSTOMY PERCUTANEOUS COMPLETE  Impression: 1.  Successful placement of a 10 [facility] cholecystostomy drain. 2. Cholangiogram demonstrated a gallbladder which is entirely filled with stones. The common bile duct did not opacify suggesting possible occlusion of the cystic duct. Purulent pus like yellow-green bile was aspirated and sent for microbiology analysis. Additional clinical data: Acute cholecystitis Procedure: 1. Ultrasound evaluation of gallbladder and liver. Ultrasound-guided needle placement into the gallbladder. Ultrasound images saved to PACS. 2. Fluoroscopy time for cholangiogram up to 1 hour. 3. Antegrade cholangiogram through catheter. 4. Placement of 10 [place] cholecystostomy tube. 5. IV conscious sedation 45 minutes in duration. Body of Report: Pre-procedure evaluation confirmed that the patient was an appropriate candidate for conscious sedation. Adequate sedation was maintained during the entire procedure. Vital signs, pulse oximetry, and response to verbal commands were monitored and recorded by the nurse throughout the procedure and the recovery period. The flow sheet was placed in the medical record including the medications and dosages used. The patient returned to baseline neurologic and physiologic status prior to leaving the department. No immediate sedation related complications were noted. Medication for conscious sedation was administered via IV route. 45 minutes of conscious sedation was provided. Informed consent was obtained from the patient following discussion of risks, benefits and alternatives to this procedure. The patient was placed supine on the angiographic table. The patient's right abdomen was then prepped and draped in normal sterile fashion. Ultrasound was used to evaluate the gallbladder and liver which demonstrated an entirely stone filled gallbladder with wall thickening. Ultrasound was used for guidance of a 21-gauge micropuncture access needle which was placed under direct ultrasound imaging into the gallbladder.  A microaccess wire was advanced under fluoroscopic imaging. A transition sheath was then advanced. Through the transition sheath, contrast was injected and a cystosco-cholangiogram was performed which demonstrated  the gallbladder lumen is entirely filled with stones. The cystic duct did not opacify neither did the common bile duct. An Amplatz wire was advanced through the transition sheath. The tract was serially dilated with 7, 8, and 9 dilators respectively. Following that a 8 Sami cholecystostomy tube was advanced over the Amplatz wire into the gallbladder and the loop was formed. The wire and internal stiffener were removed. The cholecystostomy tube was used to irrigate the gallbladder and aspirate thick bile. Bile was sent for microbiology analysis. The cholecystostomy tube was secured in place with suture and Percufix device and sterile dressing applied. Patient tolerated the procedure well without complications. Patient was transferred to recovery room in normal and stable condition and back to hospital floor. Medications/IV's:  The patient is currently on lovenox for DVT prophylaxis. Scheduled:    sodium chloride flush, 5-40 mL, IntraVENous, 2 times per day    atorvastatin, 20 mg, Oral, Daily    brimonidine, 1 drop, Both Eyes, BID    budesonide-formoterol, 2 puff, Inhalation, BID    latanoprost, 1 drop, Both Eyes, Nightly    losartan, 25 mg, Oral, 2 times per day    metoprolol tartrate, 12.5 mg, Oral, BID    dorzolamide, 1 drop, Both Eyes, BID    timolol, 1 drop, Both Eyes, BID    acetaminophen, 650 mg, Oral, Q6H    senna, 1 tablet, Oral, Nightly    polyethylene glycol, 17 g, Oral, Daily    scopolamine, 1 patch, TransDERmal, Q72H    enoxaparin, 30 mg, SubCUTAneous, Daily    PRN:  sodium chloride flush, sodium chloride, ondansetron **OR** ondansetron, albuterol sulfate HFA, ipratropium-albuterol, bisacodyl, hydrALAZINE    Allergies:   Allergies   Allergen Reactions    Norvasc [Amlodipine Besylate] Swelling Most Recent Vitals, Height and Weight  BP (!) 155/59   Pulse 72   Temp 98.3 °F (36.8 °C) (Oral)   Resp 18   Ht 5' 2\" (1.575 m)   Wt 174 lb (78.9 kg)   LMP  (LMP Unknown)   SpO2 95%   BMI 31.83 kg/m²     Weight Bearing Restrictions: WBAT       Current Diet Order: ADULT DIET; Regular    Skin: Redness to buttocks  Wound Care Documentation:  Wound 04/15/22 Sacrum scabbed (Active)   Dressing Status Other (Comment) 04/15/22 0100   Dressing/Treatment Zinc paste 04/19/22 0915   Wound Assessment Pink/red 04/19/22 0915   Drainage Amount None 04/19/22 0915   Odor None 04/18/22 0850   Number of days: 5          Lungs: wdl         Cognition and Behavior:  Language Preference (if other than [place]):   Preferred language: English  Alertness/Behavior  Neuro (WDL): Within Defined Limits  Level of Consciousness: Alert (0)  History of Falling: Yes Cognition Comment: Increased processing time    Short Term Memory Deficits     History of Falling: Yes    Safety          Prior Level of Function and Living Arrangements:  Social/Functional History  Lives With: Daughter (dtr does not work and is able to assist pt as needed)  Type of Home: House  Home Layout: Two level,Able to Live on Main level with bedroom/bathroom  Home Access: Stairs to enter with rails  Entrance Stairs - Number of Steps: 3  Bathroom Shower/Tub: Tub/Shower unit,Walk-in shower  Bathroom Equipment: Shower chair,Grab bars in shower,Hand-held shower  Home Equipment: Rolling walker  ADL Assistance: Needs assistance  Homemaking Assistance: Needs assistance  Homemaking Responsibilities: No  Ambulation Assistance: Independent (used [employer])  Transfer Assistance: Independent  Active : No  Additional Comments: pt is questionable historian- question decreased recall versus unable to accurately hear the question; pt reports no recent falls at home.   Current Residence: Private Residence  Living Arrangements: [address]: Children  Current Services Prior To Admission: None  Type of Home Care Services: None  Preferred language: English  Dental Appliances: None  Vision - Corrective Lenses: None  Hearing Aid: None  Personal Equipment:   Dental Appliances: None  Vision - Corrective Lenses: None  Hearing Aid: None    CURRENT FUNCTIONAL LEVEL:  Physical Therapy  Bed mobility:  Supine to Sit: Minimal assistance (04/18/22 1430)  Sit to Supine: Unable to assess (pt up in chair post tx) (04/18/22 1430)  Transfers:  Sit to Stand: Contact guard assistance (04/21/22 1128)  Gait:   Device: Rolling Walker (04/21/22 1128)  Assistance: Contact guard assistance (04/21/22 1128)  Distance: 30ft (04/21/22 1128)  Quality of Gait: flexed posture, slow gely,low steppage gait due to bilateral foot drop, fatigued post ambulation (04/21/22 1128)  Comments: Vc's for approach to chair with ww. Fatigued after 30ft. (04/20/22 1026)  Stairs:     W/C mobility:       Occupational Therapy  Hand Dominance: Right (Pt has been using L hand PRN d/t R hand significant arthritis)  ADL  Feeding: Setup (04/19/22 1613)  Grooming: Minimal assistance (04/19/22 1613)  UE Bathing: Minimal assistance (04/19/22 1613)  LE Bathing: Maximum assistance (04/19/22 1613)  UE Dressing: Minimal assistance (04/19/22 1613)  LE Dressing: Maximum assistance (04/19/22 1613)  Toileting: Maximum assistance (04/19/22 1613)  Additional Comments: Simulated ADLs as above; pt had just finished toileting, required assist for hygiene. Increased time and effort for mobility, reports she wears slip on shoes at home.  States she typically does her own bathing and dressing but daughter can assist PRN (04/19/22 1613)  Toilet Transfers  Toilet - Technique: Ambulating (04/19/22 1617)  Equipment Used: Grab bars (04/19/22 1617)  Toilet Transfer: Minimal assistance (04/19/22 1617)  Toilet Transfers Comments: Increased effort (04/19/22 1617)          Speech Language Pathology n/a      Current Conditions Requiring Inpatient Rehabilitation  Bowel/Bladder Dysfunction: Yes  Intervention Required = Frequent toileting  Risk for Medical/Clinical Complications = moderate  Skin Healing/Breakdown Risk: Yes  Intervention Required = Side to side turns, Surgical incision and Monitor for S/S of infection  Risk for Medical/Clinical Complications = high  Nutrition/Hydration Deficiency: Yes  Intervention Required = Monitor I&Os and Check Labs  Risk for Medical/Clinical Complications = high  Medical Comorbidities: Yes  Intervention Required = DVT risk and COPD  Risk for Medical/Clinical Complications = high    Rehab/Skilled Needs:   3 hours of Intensive Acute Rehab therapy daily, 5 days/week for a total of 900 minutes  PT Treatment Time:  1.5 hrs/day  OT Treatment Time: 1.5 hrs/day  Rehabilitation Nursing   Case management/Social work    Cultural needs:   Values / Beliefs  Do You Have Any Ethnic, Cultural, Sacramental, or Spiritual Hinduism Needs You Would Like Us To Be Aware of While You Are in the Hospital : No   Funding needs:   Potential Assistance Purchasing Medications: No     Expected Level of Improvement with Rehab  Assist for ADL Supervision / [address] for Transfers Supervision / [address] for Gait Supervision / Standby Assist    Patient's willingness to participate: Yes  Patient's ability to tolerate proposed care: Yes  Patient/Family Goals of Rehab (in patient's/family's own words): return home    Anticipated Discharge Plan:  Home with   Home Health, RN PT OT Aide Social Work to be determined      Barriers to Discharge:  Home entry accessibility  Multi-level home  Caregiver availability  [address] transportation  Resource availability      Rehab evaluation plan: [address] denied 4/20/22  Reviewer's Signature: Electronically signed by Judd Garcia RN on 4/22/22 at 4:46 PM EDT

## 2022-04-20 NOTE — CARE COORDINATION
LSW spoke with the pt and her family to let them know that she was denied for acute rehab and to ask them for a SNF choice. Family was considering home vs [facility] as options. Will discuss as a family and get back with this LSW. [facility] HAS ACCEPTED THE PT AND PRECERT STARTED.

## 2022-04-21 PROBLEM — M62.542 MUSCLE WASTING AND ATROPHY, NOT ELSEWHERE CLASSIFIED, LEFT HAND: Status: ACTIVE | Noted: 2022-04-21

## 2022-04-21 LAB
ALBUMIN SERPL-MCNC: 3.1 G/DL (ref 3.5–4.6)
ALP BLD-CCNC: 250 U/L (ref 40–130)
ALT SERPL-CCNC: 17 U/L (ref 0–33)
ANION GAP SERPL CALCULATED.3IONS-SCNC: 15 MEQ/L (ref 9–15)
AST SERPL-CCNC: 18 U/L (ref 0–35)
BILIRUB SERPL-MCNC: 0.4 MG/DL (ref 0.2–0.7)
BUN BLDV-MCNC: 15 MG/DL (ref 8–23)
CALCIUM SERPL-MCNC: 8.8 MG/DL (ref 8.5–9.9)
CHLORIDE BLD-SCNC: 103 MEQ/L (ref 95–107)
CO2: 20 MEQ/L (ref 20–31)
CREAT SERPL-MCNC: 0.87 MG/DL (ref 0.5–0.9)
CULTURE WOUND: ABNORMAL
CULTURE WOUND: ABNORMAL
GFR AFRICAN AMERICAN: >60
GFR NON-AFRICAN AMERICAN: >60
GLOBULIN: 3.3 G/DL (ref 2.3–3.5)
GLUCOSE BLD-MCNC: 166 MG/DL (ref 70–99)
HCT VFR BLD CALC: 38.3 % (ref 37–47)
HEMOGLOBIN: 12.6 G/DL (ref 12–16)
MCH RBC QN AUTO: 28.3 PG (ref 27–31.3)
MCHC RBC AUTO-ENTMCNC: 32.8 % (ref 33–37)
MCV RBC AUTO: 86.3 FL (ref 82–100)
ORGANISM: ABNORMAL
PDW BLD-RTO: 14.2 % (ref 11.5–14.5)
PLATELET # BLD: 324 K/UL (ref 130–400)
POTASSIUM SERPL-SCNC: 3.8 MEQ/L (ref 3.4–4.9)
RBC # BLD: 4.44 M/UL (ref 4.2–5.4)
SODIUM BLD-SCNC: 138 MEQ/L (ref 135–144)
TOTAL PROTEIN: 6.4 G/DL (ref 6.3–8)
WBC # BLD: 9.5 K/UL (ref 4.8–10.8)

## 2022-04-21 PROCEDURE — 80053 COMPREHEN METABOLIC PANEL: CPT

## 2022-04-21 PROCEDURE — 6360000002 HC RX W HCPCS: Performed by: STUDENT IN AN ORGANIZED HEALTH CARE EDUCATION/TRAINING PROGRAM

## 2022-04-21 PROCEDURE — 2580000003 HC RX 258: Performed by: SURGERY

## 2022-04-21 PROCEDURE — 6370000000 HC RX 637 (ALT 250 FOR IP): Performed by: STUDENT IN AN ORGANIZED HEALTH CARE EDUCATION/TRAINING PROGRAM

## 2022-04-21 PROCEDURE — 99231 SBSQ HOSP IP/OBS SF/LOW 25: CPT | Performed by: STUDENT IN AN ORGANIZED HEALTH CARE EDUCATION/TRAINING PROGRAM

## 2022-04-21 PROCEDURE — 2580000003 HC RX 258: Performed by: PHYSICIAN ASSISTANT

## 2022-04-21 PROCEDURE — 94761 N-INVAS EAR/PLS OXIMETRY MLT: CPT

## 2022-04-21 PROCEDURE — 1210000000 HC MED SURG R&B

## 2022-04-21 PROCEDURE — 85027 COMPLETE CBC AUTOMATED: CPT

## 2022-04-21 PROCEDURE — 6360000002 HC RX W HCPCS: Performed by: SURGERY

## 2022-04-21 PROCEDURE — 94640 AIRWAY INHALATION TREATMENT: CPT

## 2022-04-21 PROCEDURE — 36415 COLL VENOUS BLD VENIPUNCTURE: CPT

## 2022-04-21 PROCEDURE — 97116 GAIT TRAINING THERAPY: CPT

## 2022-04-21 RX ADMIN — BRIMONIDINE TARTRATE 1 DROP: 2 SOLUTION OPHTHALMIC at 22:37

## 2022-04-21 RX ADMIN — METOPROLOL TARTRATE 12.5 MG: 25 TABLET, FILM COATED ORAL at 08:10

## 2022-04-21 RX ADMIN — ATORVASTATIN CALCIUM 20 MG: 20 TABLET, FILM COATED ORAL at 08:10

## 2022-04-21 RX ADMIN — Medication 10 ML: at 08:22

## 2022-04-21 RX ADMIN — BRIMONIDINE TARTRATE 1 DROP: 2 SOLUTION OPHTHALMIC at 08:21

## 2022-04-21 RX ADMIN — METOPROLOL TARTRATE 12.5 MG: 25 TABLET, FILM COATED ORAL at 22:18

## 2022-04-21 RX ADMIN — ACETAMINOPHEN 650 MG: 325 TABLET ORAL at 12:07

## 2022-04-21 RX ADMIN — BUDESONIDE AND FORMOTEROL FUMARATE DIHYDRATE 2 PUFF: 80; 4.5 AEROSOL RESPIRATORY (INHALATION) at 08:39

## 2022-04-21 RX ADMIN — LOSARTAN POTASSIUM 25 MG: 25 TABLET, FILM COATED ORAL at 22:18

## 2022-04-21 RX ADMIN — DORZOLAMIDE HYDROCHLORIDE 1 DROP: 20 SOLUTION/ DROPS OPHTHALMIC at 08:21

## 2022-04-21 RX ADMIN — LATANOPROST 1 DROP: 50 SOLUTION OPHTHALMIC at 22:37

## 2022-04-21 RX ADMIN — ENOXAPARIN SODIUM 30 MG: 100 INJECTION SUBCUTANEOUS at 08:09

## 2022-04-21 RX ADMIN — POLYETHYLENE GLYCOL 3350 17 G: 17 POWDER, FOR SOLUTION ORAL at 08:10

## 2022-04-21 RX ADMIN — TIMOLOL MALEATE 1 DROP: 5 SOLUTION/ DROPS OPHTHALMIC at 08:21

## 2022-04-21 RX ADMIN — TIMOLOL MALEATE 1 DROP: 5 SOLUTION/ DROPS OPHTHALMIC at 22:37

## 2022-04-21 RX ADMIN — BUDESONIDE AND FORMOTEROL FUMARATE DIHYDRATE 2 PUFF: 80; 4.5 AEROSOL RESPIRATORY (INHALATION) at 19:44

## 2022-04-21 RX ADMIN — ACETAMINOPHEN 650 MG: 325 TABLET ORAL at 06:49

## 2022-04-21 RX ADMIN — LOSARTAN POTASSIUM 25 MG: 25 TABLET, FILM COATED ORAL at 08:10

## 2022-04-21 RX ADMIN — ACETAMINOPHEN 650 MG: 325 TABLET ORAL at 18:01

## 2022-04-21 RX ADMIN — Medication 10 ML: at 22:20

## 2022-04-21 RX ADMIN — DORZOLAMIDE HYDROCHLORIDE 1 DROP: 20 SOLUTION/ DROPS OPHTHALMIC at 22:37

## 2022-04-21 RX ADMIN — PIPERACILLIN AND TAZOBACTAM 3375 MG: 3; .375 INJECTION, POWDER, LYOPHILIZED, FOR SOLUTION INTRAVENOUS at 06:55

## 2022-04-21 RX ADMIN — SENNOSIDES 8.6 MG: 8.6 TABLET, FILM COATED ORAL at 22:18

## 2022-04-21 ASSESSMENT — PAIN SCALES - GENERAL
PAINLEVEL_OUTOF10: 0
PAINLEVEL_OUTOF10: 2

## 2022-04-21 NOTE — PROGRESS NOTES
EMERGENCY GENERAL SURGERY DAILY PROGRESS NOTE      Patient Name: Kartik Mclaughlin  Admission Date 4/14/2022    Hospital Day: 7  Patient seen and examined on 4/21/2022    INTERVAL HISTORY/EVENTS     Background:  Kartik Mclaughlin is a 80 y.o. female with past medical history of COPD, HTN, FRED presents to [facility] [place] with 1 week history of intermittent RUQ pain without nausea or vomiting. Patient is a poor historian and history was obtained from her two daughters. Patient is denying any pain at this time. Patient is tolerating PO but is NPO at this time. Patient is voiding spontaneously. She states that she has not had a bowel movement in a \"few days. \" She states she is passing gas. Hospital Course:  4/14/2022: Presented to ED with 1 week history of abdominal pain. CT concerning for acute cholecystitis. 4/15/2022: Daughters opted for perc drain over surgery. Pigtail placed by Dr. Gay Nissen. Admitted to [facility] for acute cholecystitis s/p percutaneous cholecystostomy tube placement  4/16/2022: Improving abdominal pain. Tolerated clear liquid diet. Advanced to regular diet. 4/17/2022: Tolerating regular diet. Medically cleared for discharge from surgical standpoint. PT today for dispo rec's. 4/18/2022: Remains medically cleared for discharge. 4/19/2022: Remains medically cleared for discharge. Pending pre-cert to acute rehab  4/20/2022: Acute rehab denied. Pre-cert started for SNF. Remains med clear    24 Hour Events:  No acute events overnight per patient or nursing. Patient is endorsing pain to the RUQ in the area of the drain that is unchanged from yesterday. Tolerating a regular diet without nausea or vomiting. Drain remains in place with 50cc of serosang/bilious drainage. Voiding spontaneously. Having regular bowel movements. Is ambulating with 2WW to toilet with minimal assistance. Vital signs reviewed. Afebrile, not tachycardic, normotensive, sating 94% on RA    Lab work reviewed.  No leukocytosis, no anemia, platelets normal. No electrolyte abnormalities. BUN/Cr normal. Hepatic function panel unremarkable except for stable, mildly elevated Alk Phos     Output:  UOP: Not recorded  Drains: 50 cc      PHYSICAL EXAM     Vitals Average, Min and Max for last 24 hours:  Temp: Temp: 97.4 °F (36.3 °C); Temp  Av.1 °F (36.7 °C)  Min: 97.4 °F (36.3 °C)  Max: 98.8 °F (37.1 °C)  Respirations: Resp  Av  Min: 18  Max: 18  Pulse: Pulse  Av  Min: 68  Max: 76  Blood Pressure: Systolic (50JSM), FNR:718 , Min:138 , YSD:367   ; Diastolic (85NDF), REE:05, Min:52, Max:85  SpO2: SpO2  Av %  Min: 92 %  Max: 94 %        24hr Intake/Output:     Intake/Output Summary (Last 24 hours) at 2022 1508  Last data filed at 2022 1846  Gross per 24 hour   Intake 74.22 ml   Output 50 ml   Net 24.22 ml       Vitals: BP (!) 138/52   Pulse 68   Temp 97.4 °F (36.3 °C) (Oral)   Resp 18   Ht 5' 2\" (1.575 m)   Wt 180 lb (81.6 kg)   LMP  (LMP Unknown)   SpO2 94%   BMI 32.92 kg/m²     Physical Exam:  Constitutional: Sitting up at bedside. Alert and conversant. No acute distress  HEENT: Atraumatic normocephalic. Cardiovascular: Regular rate and rhythm. Bilateral radial and DP pulses intact   Pulmonary: Breathing comfortably on RA. Abdominal: Soft. Non-distended. Non tender. RUQ IR drain with bilious, sero-sang output, no erythema and no discharge around the drain. Musculoskeletal: BOWEN spontaneously. No edema. Neurological: Alert, awake, and orientated x 2 (AOx1 baseline). Motor and sensory grossly intact.  GCS of 15  Skin: warm, dry    LABORATORY RESULTS (LAST 24 HOURS)     CBC with Differential:    Lab Results   Component Value Date    WBC 9.5 2022    RBC 4.44 2022    HGB 12.6 2022    HCT 38.3 2022     2022    MCV 86.3 2022    MCH 28.3 2022    MCHC 32.8 2022    RDW 14.2 2022    BANDSPCT 12 10/28/2020    METASPCT 1 2020    LYMPHOPCT 8.7 2022 MONOPCT 9.1 04/14/2022    BASOPCT 0.7 04/14/2022    MONOSABS 1.4 04/14/2022    LYMPHSABS 1.3 04/14/2022    EOSABS 0.1 04/14/2022    BASOSABS 0.1 04/14/2022     CMP:    Lab Results   Component Value Date     04/21/2022    K 3.8 04/21/2022    K 4.0 11/06/2020     04/21/2022    CO2 20 04/21/2022    BUN 15 04/21/2022    CREATININE 0.87 04/21/2022    GFRAA >60.0 04/21/2022    LABGLOM >60.0 04/21/2022    GLUCOSE 166 04/21/2022    PROT 6.4 04/21/2022    LABALBU 3.1 04/21/2022    CALCIUM 8.8 04/21/2022    BILITOT 0.4 04/21/2022    ALKPHOS 250 04/21/2022    AST 18 04/21/2022    ALT 17 04/21/2022     Magnesium:    Lab Results   Component Value Date    MG 2.4 12/26/2021     PT/INR:    Lab Results   Component Value Date    PROTIME 16.4 04/15/2022    INR 1.3 04/15/2022     PTT:    Lab Results   Component Value Date    APTT 29.7 04/15/2022       IMAGING RESULTS (PERSONALLY REVIEWED)     Admission imaging reviewed. No new imaging. ASSESSMENT & PLAN     Diagnoses:  1. Acute cholecystitis s/p percutaneous cholecystostomy tube placement with Dr. Micah Nagel on 4/15  2. Post-operative pain    PMHx: HTN, COPD, FRED    Incidental Findings: none (reviewed by MG, 4/19/2022)      ASSESSMENT/PLAN:  Neurological: Acute abdominal pain, Dx dementia   - Continue Tylenol q6 scheduled  - Will hold off on opioid pain medication as patient is not requesting pain meds. Cardiovascular: Hx HTN, HLD  - Continue vital signs per unit protocol  - Continue home lipitor 20mg daily,  Losartan 25mg q12hrs, metoprolol 12.5mg BID    Respiratory: Hx COPD, pt on home O2 but does not use, per daughter home O2 sats around 90%.  No acute issues at this time   - Continue home albuterol q4 PRN for wheezing  - Continue home Symbicort BID  - Continue home Duoneb q6 PRN  - Maintain O2 sat >88%, encourage IS     GI/Diet: Acute cholecystitis, having bowel movements  - Continue regular diet  - Bowel regimen of senna, Miralax, PRN dulcolax     Renal/Electrolytes: No electrolyte abnormalities, RICHARD on admission now resolved  - HLIV  - BMP as needed     ID: Leukocytosis resolved. Admitting urinalysis and culture without signs of recurrent UTI-  will follow clinically but would expect for Zosyn to cover E. Coli as seen on past urine cultures. - Zosyn q8 for acute cholecystitis; (end 4/21)  - Cultures from 4/15 showing E. Coli, with sensitivity to zosyn.   - CBC as needed      Heme: No acute issues  - No indication for transfusion  - CBC as needed     Endocrine: No acute issues     MSK: No acute issues  - Activity: out of bed as tolerated     Prophylaxis:   - SCD, lovenox 30mg daily for DVT ppx         Dispo: Remains medically cleared for discharge from surgical standpoint. Remain on [facility] for dispo planning. Pre-cert pending to SNF. Follow up with IR every 2 weeks for drain/wound check. Plan for cholangiogram in 8 weeks with IR. Will follow with general surgery/IR for drain removal following results of cholangiogram.  Follow up with PCP for recent hospitalization.    --  Dong Vasquez PA-C  Trauma/Critical Care/General Surgery  [phone] ([phone])  [phone]     This patient's plan of care was discussed and made in collaboration with Trauma Attending physician, Isak Sterling MD.

## 2022-04-21 NOTE — PLAN OF CARE
Problem: Falls - Risk of:  Goal: Will remain free from falls  Description: Will remain free from falls  Outcome: Progressing  Goal: Absence of physical injury  Description: Absence of physical injury  Outcome: Progressing     Problem: Skin Integrity:  Goal: Will show no infection signs and symptoms  Description: Will show no infection signs and symptoms  Outcome: Progressing  Goal: Absence of new skin breakdown  Description: Absence of new skin breakdown  Outcome: Progressing     Problem: Activity:  Goal: Risk for activity intolerance will decrease  Description: Risk for activity intolerance will decrease  Outcome: Progressing     Problem:  Bowel/Gastric:  Goal: Bowel function will improve  Description: Bowel function will improve  Outcome: Progressing  Goal: Diagnostic test results will improve  Description: Diagnostic test results will improve  Outcome: Progressing  Goal: Occurrences of nausea will decrease  Description: Occurrences of nausea will decrease  Outcome: Progressing  Goal: Occurrences of vomiting will decrease  Description: Occurrences of vomiting will decrease  Outcome: Progressing

## 2022-04-21 NOTE — PROGRESS NOTES
Physician Progress Note      PATIENT:               Willi Poe  CSN #:                  [id number]  :                       1928  ADMIT DATE:       2022 8:31 PM  [address] [place] DATE:  RESPONDING  PROVIDER #:        Dougie Jaramillo MD          QUERY TEXT:    Pt admitted with acute cholecystitis and RICHARD on . E-coli sepsis noted by   Dr. Frantz Prescott on . On admission WBC 15, , Procalcitonin 0.27. If   possible, please document in the progress notes and discharge summary if you   are evaluating and /or treating any of the following: The medical record reflects the following:  Risk Factors: acute cholecystitis,  Clinical Indicators: On admission WBC 15, , Procalcitonin 0.27, Lactic   acid 1.2, RICHARD with Scr 1.35; 4/15 IR drain cultures indicating moderate E. Coli growth. WBC 12.8 on  then 9/7.7  Treatment: IVF, IV Zosyn since , GB drain, cultures, labs    Thank you, Junie García Rn BSN [facility]  [phone]  Options provided:  -- E coli Sepsis, present on admission  -- Acute cholecystitis without Sepsis  -- Sepsis was ruled out  -- Other - I will add my own diagnosis  -- Disagree - Not applicable / Not valid  -- Disagree - Clinically unable to determine / Unknown  -- Refer to Clinical Documentation Reviewer    PROVIDER RESPONSE TEXT:    This patient has acute cholecystitis without Sepsis.     Query created by: Dawn Reyes on 2022 9:18 AM      Electronically signed by:  Dougie Jaramillo MD 2022 10:19 PM

## 2022-04-21 NOTE — PROGRESS NOTES
Subjective: The patient complains of severe acute on chronic progressive fatigue and stop abdominal pain status post cholecystitis partially relieved by rest, PT, OT and meds , placement of drainage tube and exacerbated by exertion and recent illness cholecystitis with IR surgery. I am concerned about her rehab comorbidities including intrinsic hand muscle wasting looks to me that she has had cervical myelopathy in the past.  I have documented in the chart. I am disappointed the patient did not qualify for acute rehab given his comorbidity. I am concerned about patients medical complexities including:  Principal Problem:    Acute cholecystitis  Active Problems:    Muscle wasting and atrophy, not elsewhere classified, left hand    Aphasia    Impaired mobility and activities of daily living    Post-op pain  Resolved Problems:    RICHARD (acute kidney injury) ([address]    Acute abdominal pain      . Reviewed recent nursing note and discussed current status and planned care with acute care providers, \" Inpatient Rehab referral received. Met with patient and her daughter to explain [facility] Acute Inpatient Rehab program and requirements, including 3 hours of intense therapy daily, anticipated length of stay and goal of discharge to home. All questions answered and patient verbalized understanding. Freedom of choice provided and patient and daughter requests admit to [facility] if approved by insurance. PM&R consult completed. Patient plans to get stronger and return home and will have the assistance of her daughter at home. Would like to continue home PT/OT when patient is able. \".    ROS x10: The patient also complains of severely impaired mobility and activities of daily living. Otherwise no new problems with vision, hearing, nose, mouth, throat, dermal, cardiovascular, GI, , pulmonary, musculoskeletal, psychiatric or neurological. See Rehab fconsult on Rehab chart .        Vital signs:  BP (!) 138/52   Pulse 68 Temp 97.4 °F (36.3 °C)   Resp 18   Ht 5' 2\" (1.575 m)   Wt 180 lb (81.6 kg)   LMP  (LMP Unknown)   SpO2 93%   BMI 32.92 kg/m²   I/O:   PO/Intake:    fair PO intake, no change in PO intake    Bowel/Bladder:   continent,    General:  Patient is well developed, adequately nourished, and    well kempt. HEENT:    PERRLA, hearing intact to loud voice, external inspection of ear     and nose benign. Inspection of lips, tongue and gums benign  Musculoskeletal: No significant change in strength or tone. All joints stable. Inspection and palpation of digits and nails show no clubbing,       cyanosis or inflammatory conditions. Neuro/Psychiatric: Affect: flat-  Alert and oriented to self and situation with  No needed cues. No significant change in deep tendon reflexes or     sensation  Lungs:  Diminished, CTA-B  . Respiration effort is normal at rest.   Heart:   S1 = S2,   RRR. Abdomen:  Soft,  RUQ-tender    Extremities:  Trace  lower extremity edema but no unusual tenderness. Intrinsic hand muscle wasting   Skin:   BUE bruises dt blood draws,     Rehabilitation:  Physical therapy: FIMS:  Bed Mobility: Scooting: Contact guard assistance    Transfers: Sit to Stand: Contact guard assistance  Stand to sit: Contact guard assistance, Ambulation  Surface: level tile  Device: Rolling Walker  Assistance: Contact guard assistance  Quality of Gait: flexed posture, slow gely, B foot drop R>L, decreased step length  Gait Deviations: Slow Gely,Decreased step length,Decreased step height  Distance: 30ft  Comments: Vc's for approach to chair with ww. Fatigued after 30ft. More Ambulation?: No,      FIMS:  ,  , Assessment: Pt tolerated increased gait distance. Fatigued after 30ft requiring rest.  VCs for safety with transfers and approach to chair.     Occupational therapy: FIMS:   ,  , Assessment: Pt is a 80year old woman from home with family support who presents to [facility] with the above deficits which impact her ability to perform ADLs and IADLs. Pt. limited d/t weakness, fatigue. Pt. would benefit from continued OT to maximize independence and safety with ADL tasks. Speech therapy: FIMS:      SPEECH THERAPY               Diet/Swallow:                           COGNITION  OT: Cognition Comment: Increased processing time  SP:           Lab/X-ray studies reviewed, analyzed and discussed with patient and staff:   No results found for this or any previous visit (from the past 24 hour(s)). Previous extensive, complex labs, notes and diagnostics reviewed and analyzed. ALLERGIES:    Allergies as of 04/14/2022 - Fully Reviewed 04/14/2022   Allergen Reaction Noted    Norvasc [amlodipine besylate] Swelling 12/18/2018      (please also verify by checking [facility])     Complex Physical Medicine & Rehab Issues Assess & Plan:   1. Severe abnormality of gait and mobility and impaired self-care and ADL's secondary to E. coli sepsis status post cholecystitis. Updated functional and medical status reassessed regarding patients ability to participate in therapies and patient found to be able to participate in skilled vs acute intensive comprehensive inpatient rehabilitation program including PT/OT to improve balance, ambulation, ADLs, and to improve the P/AROM. It is my opinion that they will be able to tolerate 3 hours of therapy a day and benefit from it at an acute level. As always we will attempt to get patient to the most efficient but most effective level of care will be in their best interest.  Continue to focus on energy conservation heart rate and blood pressure monitoring before during and after therapy endurance and consistency of function. Will continue to follow to attempt to dc to the lowest most effective and efficient level of rehab care.   2. Bowel constipation   and Bladder dysfunction   overactive, neurogenic bladder:  frequent toileting, ambulate to bathroom with assistance, check post void residuals. Check for C.difficile x1 if >2 loose stools in 24 hours, continue bowel & bladder program.  Monitor for UTI symptoms including lethargy and confusion  3. Moderate postop cholecystitis right upper quadrant pain and generalized OA pain: reassess pain every shift and prior to and after each therapy session, give prn Tylenol   and consider scheduled Tylenol, modalities prn in therapy, consider Lidoderm, K-pad prn.   4. Skin breakdown healing interventional radiology surgery right upper quadrant risk:  continue pressure relief program.  Daily skin exams and reports from nursing. 5. Severe fatigue due to immobility and nutritional deficits: improvements in PO intake   Add vitamin B12 vitamin D and CoQ10 titrate dosing and add protein supplementation with low carb content. 6. Complex discharge planning:   Discussed with care team-last 24 hour events noted. I will continue to follow along and reassess functional and medical status as we strive to improve patients functional and medical outcomes progressing to the most efficient and lowest level of care. Complex Active General Medical Issues that complicate care Assess & Plan:     1. Principal Problem:    Acute cholecystitis  Active Problems:    Muscle wasting and atrophy, not elsewhere classified, left hand    Aphasia    Impaired mobility and activities of daily living    Post-op pain  Resolved Problems:    RICHARD (acute kidney injury) ([facility] [address]    Acute abdominal pain          Events and functional changes in the past 24 hours reviewed improvements in functional status are encouraging We are awaiting precertification through her insurance. I again discussed acute rehab with the patient verify that the patient is able and willing to participate in 3 hours of therapy a day. Rehab and acute care Case management has also reinforced this expectation.     Emmie Ba D.O., PM&R     Attending    [address]

## 2022-04-21 NOTE — PROGRESS NOTES
Physical Therapy Med Surg Daily Treatment Note  Facility/Department: [facility]  [facility]       NAME: Chilango Person  : 1928 (19 y.o.)  MRN: [id number]  CODE STATUS: Full Code    Date of Service: 2022    Patient Diagnosis(es): Acute cholecystitis [K81.0]  Nausea [R11.0]  Calculus of gallbladder with acute cholecystitis without obstruction [K80.00]  Right upper quadrant abdominal pain [R10.11]   Chief Complaint   Patient presents with    Abnormal Lab     Sent by Dr Rosa Hendricks for CRP>200     Patient Active Problem List    Diagnosis Date Noted    Muscle wasting and atrophy, not elsewhere classified, left hand 2022    Post-op pain 2022    Acute cholecystitis 2022    Impaired mobility and activities of daily living 2021    Venous stasis dermatitis of both lower extremities 08/10/2021    Aphasia     Chest pain 2020    Obesity (BMI 30-39.9) 2019    Obstructive sleep apnea 2019    COPD exacerbation ([address]     COPD (chronic obstructive pulmonary disease) with acute bronchitis ([address] 2018    Anxiety 2017    Essential hypertension         Past Medical History:   Diagnosis Date    RICHARD (acute kidney injury) ([address] 4/15/2022    Anxiety     Cancer [facility]     Glaucoma     Gout     left knee    Hypertension     Lung disease     Obesity (BMI 30-39. 9) 2019    Obstructive sleep apnea 2019     Past Surgical History:   Procedure Laterality Date    CATARACT REMOVAL WITH IMPLANT Bilateral 2016    COLON SURGERY  2009    polyps    COLONOSCOPY  2009    GASTROSTOMY TUBE PLACEMENT N/A 2020    EGD PEG TUBE PLACEMENT performed by Connor Del Toro MD at [facility] EXTERNAL Right 04/15/2022    Flexima APDL Locking Pigtail Draiange Catheter inserted by Dr. Diamond Cohen (Lot# [id number] / Exp: 2024)    IR CHOLECYSTOSTOMY PERCUTANEOUS COMPLETE  4/15/2022    IR CHOLECYSTOSTOMY PERCUTANEOUS COMPLETE 4/15/2022 [facility]    SKIN CANCER EXCISION  2002    graft from neck       SUBJECTIVE   General  Chart Reviewed: Yes  Response To Previous Treatment: Patient with no complaints from previous session. Family / Caregiver Present: No  Subjective  Subjective: P  General Comment  Comments: Patient is [place]    Pre-Session Pain Report  Patient denies pain   Post-Session Pain Report   Patient denies pain     OBJECTIVE     Transfers  Sit to Stand: Contact guard assistance  Stand to sit: Contact guard assistance  Comment: Cues for safe hand placement with decreased carryover. Ambulation  Surface: level tile  Device: Rolling Walker  Assistance: Contact guard assistance  Quality of Gait: flexed posture, slow gely,low steppage gait due to bilateral foot drop, fatigued post ambulation  Distance: 30ft       ASSESSMENT   Body Structures, Functions, Activity Limitations Requiring Skilled Therapeutic Intervention: Decreased functional mobility ; Decreased safe awareness;Decreased balance;Decreased cognition;Decreased endurance; Increased pain;Decreased strength;Decreased posture  Assessment: Patient continues to fatigue quickly with transfers and gait training. Requires verbal cues for hand placement and to negotiate obstacles when ambulating. Discharge Recommendations:  Continue to assess pending progress    Goals  Long Term Goals  Long term goal 1: Pt will demonstrate bed mobility supervision  Long term goal 2: Pt will demonstrate transfers with safest AD SBA  Long term goal 3: Pt will demonstrate amb >/= 100ft with safest AD SBA  Long term goal 4: Pt will demonstrate stair negotiation 3 steps with 1 rail CGA to allow pt to safely enter and exit her home. Long term goal 5: Pt will demonstrate HEP with SBA    PLAN    Times per week: 3-6  Safety Devices  Type of Devices:  All fall risk precautions in place,Call light within reach,Chair alarm in place,Left in chair     [facility] (6 CLICK) [id number] Mobility Raw Score : 18    Therapy Time   Individual   Time In 1020   Time Out 1030   Minutes 10     Timed Code Treatment Minutes: 10 Minutes   gt 10     [facility], PTA, 04/21/22 at 12:33 PM         Definitions for assistance levels  Independent = pt does not require any physical supervision or assistance from another person for activity completion. Device may be needed.   Stand by assistance = pt requires verbal cues or instructions from another person, close to but not touching, to perform the activity  Minimal assistance= pt performs 75% or more of the activity; assistance is required to complete the activity  Moderate assistance= pt performs 50% of the activity; assistance is required to complete the activity  Maximal assistance = pt performs 25% of the activity; assistance is required to complete the activity  Dependent = pt requires total physical assistance to accomplish the task

## 2022-04-21 NOTE — CARE COORDINATION
4/21 DC Planning Updates:    Precert was started for [facility] yesterday 4/20 per LSW notes. [employer] contacted Caryn Godfrey; she will check for updates but at this time precert is still pending. CM met with patient and family to provide an update and precert process and pending status was explained. Patient/family voice no other questions or concerns.

## 2022-04-21 NOTE — PROGRESS NOTES
[facility] Occupational Therapy      Date: 2022  Patient Name: Valery Veliz        MRN: [id number]  Account: [de-identified]   : 1928  (80 y.o.)  Room: [place] [address]    Chart reviewed, attempted OT at [phone]. Patient not seen 2° to:    Pt declined therapy at this time reporting fatigue. Pt requests therapist attempt again this afternoon. Will attempt again when able.     Electronically signed by LUIS Hinojosa on 2022 at 11:36 AM

## 2022-04-22 PROBLEM — M47.12 OSTEOARTHRITIS OF CERVICAL SPINE WITH MYELOPATHY: Status: ACTIVE | Noted: 2022-04-22

## 2022-04-22 PROCEDURE — 2580000003 HC RX 258: Performed by: PHYSICIAN ASSISTANT

## 2022-04-22 PROCEDURE — 99232 SBSQ HOSP IP/OBS MODERATE 35: CPT | Performed by: STUDENT IN AN ORGANIZED HEALTH CARE EDUCATION/TRAINING PROGRAM

## 2022-04-22 PROCEDURE — 94761 N-INVAS EAR/PLS OXIMETRY MLT: CPT

## 2022-04-22 PROCEDURE — 6370000000 HC RX 637 (ALT 250 FOR IP): Performed by: STUDENT IN AN ORGANIZED HEALTH CARE EDUCATION/TRAINING PROGRAM

## 2022-04-22 PROCEDURE — 94640 AIRWAY INHALATION TREATMENT: CPT

## 2022-04-22 PROCEDURE — 1210000000 HC MED SURG R&B

## 2022-04-22 PROCEDURE — 97110 THERAPEUTIC EXERCISES: CPT

## 2022-04-22 PROCEDURE — 6360000002 HC RX W HCPCS: Performed by: STUDENT IN AN ORGANIZED HEALTH CARE EDUCATION/TRAINING PROGRAM

## 2022-04-22 PROCEDURE — 97535 SELF CARE MNGMENT TRAINING: CPT

## 2022-04-22 RX ADMIN — ACETAMINOPHEN 650 MG: 325 TABLET ORAL at 17:49

## 2022-04-22 RX ADMIN — TIMOLOL MALEATE 1 DROP: 5 SOLUTION/ DROPS OPHTHALMIC at 21:17

## 2022-04-22 RX ADMIN — LOSARTAN POTASSIUM 25 MG: 25 TABLET, FILM COATED ORAL at 08:20

## 2022-04-22 RX ADMIN — SENNOSIDES 8.6 MG: 8.6 TABLET, FILM COATED ORAL at 21:17

## 2022-04-22 RX ADMIN — DORZOLAMIDE HYDROCHLORIDE 1 DROP: 20 SOLUTION/ DROPS OPHTHALMIC at 21:17

## 2022-04-22 RX ADMIN — ACETAMINOPHEN 650 MG: 325 TABLET ORAL at 11:48

## 2022-04-22 RX ADMIN — BUDESONIDE AND FORMOTEROL FUMARATE DIHYDRATE 2 PUFF: 80; 4.5 AEROSOL RESPIRATORY (INHALATION) at 20:01

## 2022-04-22 RX ADMIN — Medication 10 ML: at 21:17

## 2022-04-22 RX ADMIN — POLYETHYLENE GLYCOL 3350 17 G: 17 POWDER, FOR SOLUTION ORAL at 08:19

## 2022-04-22 RX ADMIN — METOPROLOL TARTRATE 12.5 MG: 25 TABLET, FILM COATED ORAL at 21:17

## 2022-04-22 RX ADMIN — LATANOPROST 1 DROP: 50 SOLUTION OPHTHALMIC at 21:17

## 2022-04-22 RX ADMIN — BRIMONIDINE TARTRATE 1 DROP: 2 SOLUTION OPHTHALMIC at 08:14

## 2022-04-22 RX ADMIN — BRIMONIDINE TARTRATE 1 DROP: 2 SOLUTION OPHTHALMIC at 21:17

## 2022-04-22 RX ADMIN — DORZOLAMIDE HYDROCHLORIDE 1 DROP: 20 SOLUTION/ DROPS OPHTHALMIC at 08:15

## 2022-04-22 RX ADMIN — Medication 10 ML: at 08:13

## 2022-04-22 RX ADMIN — ENOXAPARIN SODIUM 30 MG: 100 INJECTION SUBCUTANEOUS at 08:19

## 2022-04-22 RX ADMIN — METOPROLOL TARTRATE 12.5 MG: 25 TABLET, FILM COATED ORAL at 08:20

## 2022-04-22 RX ADMIN — BUDESONIDE AND FORMOTEROL FUMARATE DIHYDRATE 2 PUFF: 80; 4.5 AEROSOL RESPIRATORY (INHALATION) at 07:32

## 2022-04-22 RX ADMIN — LOSARTAN POTASSIUM 25 MG: 25 TABLET, FILM COATED ORAL at 21:17

## 2022-04-22 RX ADMIN — TIMOLOL MALEATE 1 DROP: 5 SOLUTION/ DROPS OPHTHALMIC at 08:15

## 2022-04-22 RX ADMIN — ATORVASTATIN CALCIUM 20 MG: 20 TABLET, FILM COATED ORAL at 08:20

## 2022-04-22 ASSESSMENT — PAIN SCALES - GENERAL
PAINLEVEL_OUTOF10: 0

## 2022-04-22 NOTE — PROGRESS NOTES
Physical Therapy Med Surg Daily Treatment Note  [facility]/Department: [facility]  [facility]       NAME: Babita Caballero  : 1928 (42 y.o.)  MRN: [id number]  CODE STATUS: Full Code    Date of Service: 2022    Patient Diagnosis(es): Acute cholecystitis [K81.0]  Nausea [R11.0]  Calculus of gallbladder with acute cholecystitis without obstruction [K80.00]  Right upper quadrant abdominal pain [R10.11]   Chief Complaint   Patient presents with    Abnormal Lab     Sent by Dr Zeferino Castillo for CRP>200     Patient Active Problem List    Diagnosis Date Noted    Muscle wasting and atrophy, not elsewhere classified, left hand 2022    Osteoarthritis of cervical spine with myelopathy 2022    Post-op pain 2022    Acute cholecystitis 2022    Impaired mobility and activities of daily living-due to cervical myelopathy 2021    Venous stasis dermatitis of both lower extremities 08/10/2021    Aphasia     Chest pain 2020    Obesity (BMI 30-39.9) 2019    Obstructive sleep apnea 2019    COPD exacerbation ([address]     COPD (chronic obstructive pulmonary disease) with acute bronchitis ([address] 2018    Anxiety 2017    Essential hypertension         Past Medical History:   Diagnosis Date    RICHARD (acute kidney injury) ([address] 4/15/2022    Anxiety     [facility]     Glaucoma     Gout     left knee    Hypertension     Lung disease     Obesity (BMI 30-39. 9) 2019    Obstructive sleep apnea 2019    Osteoarthritis of cervical spine with myelopathy 2022     Past Surgical History:   Procedure Laterality Date    CATARACT REMOVAL WITH IMPLANT Bilateral 2016    COLON SURGERY  2009    polyps    COLONOSCOPY  2009    GASTROSTOMY TUBE PLACEMENT N/A 2020    EGD PEG TUBE PLACEMENT performed by Pradip Amaya MD at [address] [place] [address] Right 04/15/2022    Flexima APDL Locking Pigtail Draiange Catheter inserted by ambulatory distance this session secondary to pt's fatigue. Pt only able to take lateral steps 2' to [facility] before becoming fatigued. Throughout ambulation, pt requires step by step cues to sequence BLE and [employer]. Cues provided during trsfs for proper hand placement to improve pt's overall safety and quality of STS. Discharge Recommendations:  Continue to assess pending progress         Goals  Long Term Goals  Long term goal 1: Pt will demonstrate bed mobility supervision  Long term goal 2: Pt will demonstrate transfers with safest AD SBA  Long term goal 3: Pt will demonstrate amb >/= 100ft with safest AD SBA  Long term goal 4: Pt will demonstrate stair negotiation 3 steps with 1 rail CGA to allow pt to safely enter and exit her home. Long term goal 5: Pt will demonstrate HEP with SBA    PLAN    Times per week: 3-6  Plan Comment: Cont. POC  Safety Devices  Type of Devices: All fall risk precautions in place,Bed alarm in place,Call light within reach,Left in bed     AMPA (6 CLICK) [facility] Inpatient Mobility Raw Score : 18     Therapy Time   Individual   Time In 1444   Time Out 1507   Minutes 23      BM/Trsf: 10  Gait: 5   There ex: 8    Rolf Escalante PTA, 04/22/22 at 3:16 PM         Definitions for assistance levels  Independent = pt does not require any physical supervision or assistance from another person for activity completion. Device may be needed.   Stand by assistance = pt requires verbal cues or instructions from another person, close to but not touching, to perform the activity  Minimal assistance= pt performs 75% or more of the activity; assistance is required to complete the activity  Moderate assistance= pt performs 50% of the activity; assistance is required to complete the activity  Maximal assistance = pt performs 25% of the activity; assistance is required to complete the activity  Dependent = pt requires total physical assistance to accomplish the task

## 2022-04-22 NOTE — PLAN OF CARE
Nutrition Problem #1: Increased nutrient needs  Intervention: Food and/or Nutrient Delivery: Continue Current Diet,Discontinue Oral Nutrition Supplement  Nutritional Goals: Consume >50% of meals and supplements

## 2022-04-22 NOTE — PROGRESS NOTES
EMERGENCY GENERAL SURGERY DAILY PROGRESS NOTE      Patient Name: Babita Caballero  Admission Date 4/14/2022    Hospital Day: 8  Patient seen and examined on 4/22/2022    INTERVAL HISTORY/EVENTS     Background:  Babita Caballero is a 80 y.o. female with past medical history of COPD, HTN, FRED presents to [facility] [place] with 1 week history of intermittent RUQ pain without nausea or vomiting. Patient is a poor historian and history was obtained from her two daughters. Patient is denying any pain at this time. Patient is tolerating PO but is NPO at this time. Patient is voiding spontaneously. She states that she has not had a bowel movement in a \"few days. \" She states she is passing gas. Hospital Course:  4/14/2022: Presented to ED with 1 week history of abdominal pain. CT concerning for acute cholecystitis. 4/15/2022: Daughters opted for perc drain over surgery. Pigtail placed by Dr. Sav Young. Admitted to [facility] for acute cholecystitis s/p percutaneous cholecystostomy tube placement  4/16/2022: Improving abdominal pain. Tolerated clear liquid diet. Advanced to regular diet. 4/17/2022: Tolerating regular diet. Medically cleared for discharge from surgical standpoint. PT today for dispo rec's. 4/18/2022: Remains medically cleared for discharge. 4/19/2022: Remains medically cleared for discharge. Pending pre-cert to acute rehab  4/20/2022: Acute rehab denied. Pre-cert started for SNF. Remains med clear  4/21/2022: Remains med clear. Pending pre-cert    24 Hour Events:  No acute events overnight per patient or nursing. Patient denies any pain at this time. Tolerating a regular diet without nausea or vomiting. Drain remains in place with 50cc of serosang/bilious drainage. Voiding spontaneously. Having regular bowel movements. Is ambulating with 2WW to toilet with minimal assistance. Vital signs reviewed. Afebrile, not tachycardic, normotensive, sating 94-96% on RA    No new labs today.      Output:  UOP: 750mL  Drains: 50 cc  BM x 2      PHYSICAL EXAM     Vitals Average, Min and Max for last 24 hours:  Temp: Temp: 98.3 °F (36.8 °C); Temp  Av °F (36.7 °C)  Min: 97.6 °F (36.4 °C)  Max: 98.3 °F (36.8 °C)  Respirations: Resp  Av  Min: 16  Max: 18  Pulse: Pulse  Av  Min: 68  Max: 72  Blood Pressure: Systolic (58FWL), MGF:341 , Min:155 , FMH:193   ; Diastolic (26NQD), EUX:17, Min:59, Max:59  SpO2: SpO2  Av.5 %  Min: 95 %  Max: 96 %        24hr Intake/Output:     Intake/Output Summary (Last 24 hours) at 2022 1623  Last data filed at 2022 1511  Gross per 24 hour   Intake    Output 800 ml   Net -800 ml       Vitals: BP (!) 155/59   Pulse 72   Temp 98.3 °F (36.8 °C) (Oral)   Resp 18   Ht 5' 2\" (1.575 m)   Wt 174 lb (78.9 kg)   LMP  (LMP Unknown)   SpO2 95%   BMI 31.83 kg/m²     Physical Exam:  Constitutional: Sitting up in bedside chair. Alert and conversant. No acute distress  HEENT: Atraumatic normocephalic. Cardiovascular: Regular rate and rhythm. Bilateral radial and DP pulses intact   Pulmonary: Breathing comfortably on RA. Abdominal: Soft. Non-distended. Non tender. RUQ IR drain with bilious, sero-sang output, no erythema and no discharge around the drain. Musculoskeletal: BOWEN spontaneously. No edema. Neurological: Alert, awake, and orientated x 2 (AOx1 baseline). Motor and sensory grossly intact.  GCS of 15  Skin: warm, dry    LABORATORY RESULTS (LAST 24 HOURS)     CBC with Differential:    Lab Results   Component Value Date    WBC 9.5 2022    RBC 4.44 2022    HGB 12.6 2022    HCT 38.3 2022     2022    MCV 86.3 2022    MCH 28.3 2022    MCHC 32.8 2022    RDW 14.2 2022    BANDSPCT 12 10/28/2020    METASPCT 1 2020    LYMPHOPCT 8.7 2022    MONOPCT 9.1 2022    BASOPCT 0.7 2022    MONOSABS 1.4 2022    LYMPHSABS 1.3 2022    EOSABS 0.1 2022    BASOSABS 0.1 2022     CMP:    Lab Results   Component Value Date     04/21/2022    K 3.8 04/21/2022    K 4.0 11/06/2020     04/21/2022    CO2 20 04/21/2022    BUN 15 04/21/2022    CREATININE 0.87 04/21/2022    GFRAA >60.0 04/21/2022    LABGLOM >60.0 04/21/2022    GLUCOSE 166 04/21/2022    PROT 6.4 04/21/2022    LABALBU 3.1 04/21/2022    CALCIUM 8.8 04/21/2022    BILITOT 0.4 04/21/2022    ALKPHOS 250 04/21/2022    AST 18 04/21/2022    ALT 17 04/21/2022     Magnesium:    Lab Results   Component Value Date    MG 2.4 12/26/2021     PT/INR:    Lab Results   Component Value Date    PROTIME 16.4 04/15/2022    INR 1.3 04/15/2022     PTT:    Lab Results   Component Value Date    APTT 29.7 04/15/2022       IMAGING RESULTS (PERSONALLY REVIEWED)     Admission imaging reviewed. No new imaging. ASSESSMENT & PLAN     Diagnoses:  1. Acute cholecystitis s/p percutaneous cholecystostomy tube placement with Dr. Terrence Zhang on 4/15  2. Post-operative pain    PMHx: HTN, COPD, FRED    Incidental Findings: none (reviewed by MG, 4/19/2022)      ASSESSMENT/PLAN:  Neurological: Acute abdominal pain, Dx dementia   - Continue Tylenol q6 scheduled  - Will hold off on opioid pain medication as patient is not requesting pain meds. Cardiovascular: Hx HTN, HLD  - Continue vital signs per unit protocol  - Continue home lipitor 20mg daily,  Losartan 25mg q12hrs, metoprolol 12.5mg BID    Respiratory: Hx COPD, pt on home O2 but does not use, per daughter home O2 sats around 90%. No acute issues at this time   - Continue home albuterol q4 PRN for wheezing  - Continue home Symbicort BID  - Continue home Duoneb q6 PRN  - Maintain O2 sat >88%, encourage IS     GI/Diet: Acute cholecystitis, having bowel movements  - Continue regular diet  - Bowel regimen of senna, Miralax, PRN dulcolax     Renal/Electrolytes: No electrolyte abnormalities, RICHARD on admission now resolved  - HLIV  - BMP as needed     ID: Leukocytosis resolved.  Admitting urinalysis and culture without signs of recurrent UTI-  will follow clinically but would expect for Zosyn to cover E. Coli as seen on past urine cultures. - Zosyn course completed. No additional abx necessary  - Cultures from 4/15 showing E. Coli, with sensitivity to zosyn.   - CBC as needed      Heme: No acute issues  - No indication for transfusion  - CBC as needed     Endocrine: No acute issues     MSK: No acute issues  - Activity: out of bed as tolerated     Prophylaxis:   - SCD, lovenox 30mg daily for DVT ppx         Dispo: Remains medically cleared for discharge from surgical standpoint. Remain on [facility] for dispo planning. Pre-cert pending to SNF. Follow up with IR every 2 weeks for drain/wound check. Plan for cholangiogram in 8 weeks with IR. Will follow with general surgery/IR for drain removal following results of cholangiogram.  Follow up with PCP for recent hospitalization.    --  Ever Lynn PA-C  Trauma/Critical Care/General Surgery  [phone] ([phone])  [phone]     This patient's plan of care was discussed and made in collaboration with Trauma Attending physician, Hakeem Lara MD.

## 2022-04-22 NOTE — PROGRESS NOTES
Comprehensive Nutrition Assessment    Type and Reason for Visit:  Reassess    Nutrition Recommendations/Plan:   1. Continue with General diet  2. Full set up assist with trays  3. D/c oral supplements as intake goals are met     Malnutrition Assessment:  Malnutrition Status:  Insufficient data (04/16/22 1216)    Context:  Acute Illness     Findings of the 6 clinical characteristics of malnutrition:  Energy Intake:  Unable to assess  Weight Loss:  Unable to assess (current wt stated)     Body Fat Loss:  Unable to assess     Muscle Mass Loss:  Unable to assess    Fluid Accumulation:  No significant fluid accumulation     Strength:  Not Performed    Nutrition Assessment:    Pt progressing towards nutrition related goals, information obtained from family, who report very good oral intake    Nutrition Related Findings:    PMHx includes COPD, HTN, FRED, anxiety, glaucoma, h/o colon cancer with segmental resection. Noted pt is a poor historian. No family in room at time of visit so information gathered from EMR. +1 generalized edmea. BM 4/15. S/p percutaneous cholecystostomy tube placement 4/14. Noted pt had nausea yesterday. Wound Type: Pressure Injury (healing on buttocks (scabbed))       Current Nutrition Intake & Therapies:    Average Meal Intake: %  Average Supplements Intake: 1-25%  ADULT DIET; Regular    Anthropometric Measures:  Height: 5' 2\" (157.5 cm)  Ideal Body Weight (IBW): 110 lbs (50 kg)    Admission Body Weight: 180 lb (81.6 kg) (stated)  Current Body Weight: 174 lb (78.9 kg) (4/22),Weight Source: Stated  Current BMI (kg/m2): 31.8  Usual Body Weight: 182 lb (82.6 kg) ([facility] 12/26/21)  % Weight Change (Calculated): -1.1  Weight Adjustment For: No Adjustment   BMI Categories: Obese Class 1 (BMI 30.0-34. 9)    Estimated Daily Nutrient Needs:  Energy Requirements Based On: Kcal/kg  Weight Used for Energy Requirements: Current (81.6 kg)  Energy (kcal/day): 8020-7020 kcal (15-17 kcal/kg)  Weight Used for Protein Requirements: Ideal (50 kg)  Protein (g/day): 60-70g (1.2-1.4 g/kg)  Method Used for Fluid Requirements: ml/Kg  Fluid (ml/day): ~1500 ml (30 ml x 50 kg IBW)    Nutrition Diagnosis:   · Increased nutrient needs related to acute injury/trauma as evidenced by wounds    Nutrition Interventions:   Food and/or Nutrient Delivery: Continue Current Diet,Discontinue Oral Nutrition Supplement  Nutrition Education/Counseling: No recommendation at this time  Coordination of Nutrition Care: Continue to monitor while inpatient       Goals:  Previous Goal Met: Goal(s) Achieved  Goals: Meet at least 75% of estimated needs       Nutrition Monitoring and Evaluation:   Behavioral-Environmental Outcomes: None Identified  Food/Nutrient Intake Outcomes: Food and Nutrient Intake,Supplement Intake  Physical Signs/Symptoms Outcomes: Biochemical Data,GI Status,Weight,Meal Time Behavior,Nausea or Vomiting    Discharge Planning:    No discharge needs at this time     Abhay Weaver RD, LD

## 2022-04-23 PROCEDURE — 99233 SBSQ HOSP IP/OBS HIGH 50: CPT | Performed by: NURSE PRACTITIONER

## 2022-04-23 PROCEDURE — 6370000000 HC RX 637 (ALT 250 FOR IP): Performed by: STUDENT IN AN ORGANIZED HEALTH CARE EDUCATION/TRAINING PROGRAM

## 2022-04-23 PROCEDURE — 94760 N-INVAS EAR/PLS OXIMETRY 1: CPT

## 2022-04-23 PROCEDURE — 2580000003 HC RX 258: Performed by: PHYSICIAN ASSISTANT

## 2022-04-23 PROCEDURE — 1210000000 HC MED SURG R&B

## 2022-04-23 PROCEDURE — 6360000002 HC RX W HCPCS: Performed by: STUDENT IN AN ORGANIZED HEALTH CARE EDUCATION/TRAINING PROGRAM

## 2022-04-23 PROCEDURE — 94640 AIRWAY INHALATION TREATMENT: CPT

## 2022-04-23 PROCEDURE — 94761 N-INVAS EAR/PLS OXIMETRY MLT: CPT

## 2022-04-23 RX ADMIN — BUDESONIDE AND FORMOTEROL FUMARATE DIHYDRATE 2 PUFF: 80; 4.5 AEROSOL RESPIRATORY (INHALATION) at 07:28

## 2022-04-23 RX ADMIN — DORZOLAMIDE HYDROCHLORIDE 1 DROP: 20 SOLUTION/ DROPS OPHTHALMIC at 09:07

## 2022-04-23 RX ADMIN — Medication 10 ML: at 20:36

## 2022-04-23 RX ADMIN — BRIMONIDINE TARTRATE 1 DROP: 2 SOLUTION OPHTHALMIC at 20:39

## 2022-04-23 RX ADMIN — SENNOSIDES 8.6 MG: 8.6 TABLET, FILM COATED ORAL at 20:33

## 2022-04-23 RX ADMIN — Medication 10 ML: at 09:08

## 2022-04-23 RX ADMIN — TIMOLOL MALEATE 1 DROP: 5 SOLUTION/ DROPS OPHTHALMIC at 09:07

## 2022-04-23 RX ADMIN — METOPROLOL TARTRATE 12.5 MG: 25 TABLET, FILM COATED ORAL at 20:33

## 2022-04-23 RX ADMIN — ATORVASTATIN CALCIUM 20 MG: 20 TABLET, FILM COATED ORAL at 09:05

## 2022-04-23 RX ADMIN — BRIMONIDINE TARTRATE 1 DROP: 2 SOLUTION OPHTHALMIC at 09:07

## 2022-04-23 RX ADMIN — ACETAMINOPHEN 650 MG: 325 TABLET ORAL at 17:47

## 2022-04-23 RX ADMIN — TIMOLOL MALEATE 1 DROP: 5 SOLUTION/ DROPS OPHTHALMIC at 20:41

## 2022-04-23 RX ADMIN — LOSARTAN POTASSIUM 25 MG: 25 TABLET, FILM COATED ORAL at 20:33

## 2022-04-23 RX ADMIN — DORZOLAMIDE HYDROCHLORIDE 1 DROP: 20 SOLUTION/ DROPS OPHTHALMIC at 20:42

## 2022-04-23 RX ADMIN — LATANOPROST 1 DROP: 50 SOLUTION OPHTHALMIC at 20:36

## 2022-04-23 RX ADMIN — LOSARTAN POTASSIUM 25 MG: 25 TABLET, FILM COATED ORAL at 09:05

## 2022-04-23 RX ADMIN — BUDESONIDE AND FORMOTEROL FUMARATE DIHYDRATE 2 PUFF: 80; 4.5 AEROSOL RESPIRATORY (INHALATION) at 19:38

## 2022-04-23 RX ADMIN — ENOXAPARIN SODIUM 30 MG: 100 INJECTION SUBCUTANEOUS at 09:05

## 2022-04-23 RX ADMIN — ACETAMINOPHEN 650 MG: 325 TABLET ORAL at 05:57

## 2022-04-23 RX ADMIN — METOPROLOL TARTRATE 12.5 MG: 25 TABLET, FILM COATED ORAL at 09:05

## 2022-04-23 ASSESSMENT — PAIN SCALES - GENERAL
PAINLEVEL_OUTOF10: 0
PAINLEVEL_OUTOF10: 2
PAINLEVEL_OUTOF10: 0

## 2022-04-23 NOTE — PLAN OF CARE
Problem: Falls - Risk of:  Goal: Will remain free from falls  Description: Will remain free from falls  Outcome: Progressing  Goal: Absence of physical injury  Description: Absence of physical injury  Outcome: Progressing     Problem: Skin Integrity:  Goal: Will show no infection signs and symptoms  Description: Will show no infection signs and symptoms  Outcome: Progressing  Goal: Absence of new skin breakdown  Description: Absence of new skin breakdown  Outcome: Progressing     Problem:  Bowel/Gastric:  Goal: Bowel function will improve  Description: Bowel function will improve  Outcome: Progressing  Goal: Diagnostic test results will improve  Description: Diagnostic test results will improve  Outcome: Progressing  Goal: Occurrences of nausea will decrease  Description: Occurrences of nausea will decrease  Outcome: Progressing  Goal: Occurrences of vomiting will decrease  Description: Occurrences of vomiting will decrease  Outcome: Progressing     Problem: Fluid Volume:  Goal: Maintenance of adequate hydration will improve  Description: Maintenance of adequate hydration will improve  Outcome: Progressing

## 2022-04-23 NOTE — PROGRESS NOTES
EMERGENCY GENERAL SURGERY DAILY PROGRESS NOTE      Patient Name: Angélica Moreno  Admission Date 2022    Hospital Day: 9  Patient seen and examined on 2022    INTERVAL HISTORY/EVENTS     Background:  Angélica Moreno is a 80 y.o. female with past medical history of COPD, HTN, FRED presents to [facility] with 1 week history of intermittent RUQ pain without nausea or vomiting. Patient is a poor historian and history was obtained from her two daughters. Patient is denying any pain at this time. Patient is tolerating PO but is NPO at this time. Patient is voiding spontaneously. She states that she has not had a bowel movement in a \"few days. \" She states she is passing gas. Hospital Course:  2022: Presented to ED with 1 week history of abdominal pain. CT concerning for acute cholecystitis. 4/15/2022: Daughters opted for perc drain over surgery. Pigtail placed by Dr. Belen Langley. Admitted to [facility] for acute cholecystitis s/p percutaneous cholecystostomy tube placement  2022: Improving abdominal pain. Tolerated clear liquid diet. Advanced to regular diet. 2022: Tolerating regular diet. Medically cleared for discharge from surgical standpoint. PT today for dispo rec's. 2022: Remains medically cleared for discharge. 2022: Remains medically cleared for discharge. Pending pre-cert to acute rehab  2022: Acute rehab denied. Pre-cert started for SNF. Remains med clear  2022: Remains med clear. Pending pre-cert  : Pending SNF precert. 24 Hour Events:  No acute events overnight per patient or nursing. HDS on RA with acceptable saturations. Patient feeling well, denies abdominal pain. Tolerating diet, voiding and having BMs. Pain well controlled with current regimen. No new labs today. Output:  UOP: 750mL  Drains: 100cc  BM x 2      PHYSICAL EXAM     Vitals Average, Min and Max for last 24 hours:  Temp: Temp: 98.7 °F (37.1 °C);  Temp  Av.7 °F (37.1 °C) CREATININE 0.87 04/21/2022    GFRAA >60.0 04/21/2022    LABGLOM >60.0 04/21/2022    GLUCOSE 166 04/21/2022    PROT 6.4 04/21/2022    LABALBU 3.1 04/21/2022    CALCIUM 8.8 04/21/2022    BILITOT 0.4 04/21/2022    ALKPHOS 250 04/21/2022    AST 18 04/21/2022    ALT 17 04/21/2022     Magnesium:    Lab Results   Component Value Date    MG 2.4 12/26/2021     PT/INR:    Lab Results   Component Value Date    PROTIME 16.4 04/15/2022    INR 1.3 04/15/2022     PTT:    Lab Results   Component Value Date    APTT 29.7 04/15/2022       IMAGING RESULTS (PERSONALLY REVIEWED)     Admission imaging reviewed. No new imaging. ASSESSMENT & PLAN     Diagnoses:  1. Acute cholecystitis s/p percutaneous cholecystostomy tube placement with Dr. Mildred Mills on 4/15  2. Post-operative pain    PMHx: HTN, COPD, FRED    Incidental Findings: none (reviewed by MG, 4/19/2022)      ASSESSMENT/PLAN:  Neurological: Acute abdominal pain, Dx dementia   - Continue Tylenol q6 scheduled  - Will hold off on opioid pain medication as patient is not requesting pain meds. Cardiovascular: Hx HTN, HLD  - Continue vital signs per unit protocol  - Continue home lipitor 20mg daily,  Losartan 25mg q12hrs, metoprolol 12.5mg BID    Respiratory: Hx COPD, pt on home O2 but does not use, per daughter home O2 sats around 90%. No acute issues at this time   - Continue home albuterol q4 PRN for wheezing  - Continue home Symbicort BID  - Continue home Duoneb q6 PRN  - Maintain O2 sat >88%, encourage IS     GI/Diet: Acute cholecystitis, having bowel movements  - Continue regular diet  - Bowel regimen of senna, Miralax, PRN dulcolax     Renal/Electrolytes: No electrolyte abnormalities, RICHARD on admission now resolved  - HLIV  - BMP as needed     ID: Leukocytosis resolved. Admitting urinalysis and culture without signs of recurrent UTI-  will follow clinically but would expect for Zosyn to cover E. Coli as seen on past urine cultures. - Zosyn course completed.  No additional abx necessary  - Cultures from 4/15 showing E. Coli, with sensitivity to zosyn.   - CBC as needed      Heme: No acute issues  - No indication for transfusion  - CBC as needed     Endocrine: No acute issues     MSK: No acute issues  - Activity: out of bed as tolerated     Prophylaxis:   - SCD, lovenox 30mg daily for DVT ppx         Dispo: Remains medically cleared for discharge from surgical standpoint. Remain on [facility] for dispo planning. Pre-cert pending to SNF. Follow up with IR every 2 weeks for drain/wound check. Plan for cholangiogram in 8 weeks with IR. Will follow with general surgery/IR for drain removal following results of cholangiogram.  Follow up with PCP for recent hospitalization.      JULIO Harry - NP  Trauma/Critical Care/General Surgery  [phone] ([id number])  [phone]     This patient's plan of care was discussed and made in collaboration with Trauma Attending physician, Margo Chahal MD.

## 2022-04-23 NOTE — PROGRESS NOTES
Physical Therapy Missed Treatment   Facility/Department: [facility] MED SURG [id number]/[id number]    NAME: Sarai Shaffer    : 1928 (48 y.o.)  MRN: [id number]    Account: [de-identified]  Gender: female    Chart reviewed, attempted PT at [phone]. Patient unavailable 2° to:    [] Hold per nsg request    [x] Pt declined pt sleeping in recliner, woken up and encouraged to participate in treatment, pt repeatedly shaking head and saying \"no\". [] Pt. . off floor for test/procedure. [] Pt. Unavailable       Will attempt PT treatment again at earliest convenience.       Electronically signed by Flori Kamara PTA on 22 at 2:33 PM EDT

## 2022-04-24 PROCEDURE — 6360000002 HC RX W HCPCS: Performed by: STUDENT IN AN ORGANIZED HEALTH CARE EDUCATION/TRAINING PROGRAM

## 2022-04-24 PROCEDURE — 6370000000 HC RX 637 (ALT 250 FOR IP): Performed by: STUDENT IN AN ORGANIZED HEALTH CARE EDUCATION/TRAINING PROGRAM

## 2022-04-24 PROCEDURE — 94640 AIRWAY INHALATION TREATMENT: CPT

## 2022-04-24 PROCEDURE — 1210000000 HC MED SURG R&B

## 2022-04-24 PROCEDURE — 99233 SBSQ HOSP IP/OBS HIGH 50: CPT | Performed by: NURSE PRACTITIONER

## 2022-04-24 PROCEDURE — 2580000003 HC RX 258: Performed by: PHYSICIAN ASSISTANT

## 2022-04-24 PROCEDURE — 94761 N-INVAS EAR/PLS OXIMETRY MLT: CPT

## 2022-04-24 RX ADMIN — ACETAMINOPHEN 650 MG: 325 TABLET ORAL at 12:19

## 2022-04-24 RX ADMIN — ACETAMINOPHEN 650 MG: 325 TABLET ORAL at 17:13

## 2022-04-24 RX ADMIN — ACETAMINOPHEN 650 MG: 325 TABLET ORAL at 05:47

## 2022-04-24 RX ADMIN — BUDESONIDE AND FORMOTEROL FUMARATE DIHYDRATE 2 PUFF: 80; 4.5 AEROSOL RESPIRATORY (INHALATION) at 08:07

## 2022-04-24 RX ADMIN — ENOXAPARIN SODIUM 30 MG: 100 INJECTION SUBCUTANEOUS at 07:57

## 2022-04-24 RX ADMIN — LOSARTAN POTASSIUM 25 MG: 25 TABLET, FILM COATED ORAL at 07:58

## 2022-04-24 RX ADMIN — DORZOLAMIDE HYDROCHLORIDE 1 DROP: 20 SOLUTION/ DROPS OPHTHALMIC at 20:12

## 2022-04-24 RX ADMIN — LOSARTAN POTASSIUM 25 MG: 25 TABLET, FILM COATED ORAL at 20:23

## 2022-04-24 RX ADMIN — Medication 10 ML: at 20:14

## 2022-04-24 RX ADMIN — TIMOLOL MALEATE 1 DROP: 5 SOLUTION/ DROPS OPHTHALMIC at 20:08

## 2022-04-24 RX ADMIN — METOPROLOL TARTRATE 12.5 MG: 25 TABLET, FILM COATED ORAL at 07:58

## 2022-04-24 RX ADMIN — BRIMONIDINE TARTRATE 1 DROP: 2 SOLUTION OPHTHALMIC at 08:01

## 2022-04-24 RX ADMIN — SENNOSIDES 8.6 MG: 8.6 TABLET, FILM COATED ORAL at 20:24

## 2022-04-24 RX ADMIN — POLYETHYLENE GLYCOL 3350 17 G: 17 POWDER, FOR SOLUTION ORAL at 07:58

## 2022-04-24 RX ADMIN — ATORVASTATIN CALCIUM 20 MG: 20 TABLET, FILM COATED ORAL at 07:58

## 2022-04-24 RX ADMIN — Medication 10 ML: at 07:58

## 2022-04-24 RX ADMIN — DORZOLAMIDE HYDROCHLORIDE 1 DROP: 20 SOLUTION/ DROPS OPHTHALMIC at 08:01

## 2022-04-24 RX ADMIN — LATANOPROST 1 DROP: 50 SOLUTION OPHTHALMIC at 20:19

## 2022-04-24 RX ADMIN — BRIMONIDINE TARTRATE 1 DROP: 2 SOLUTION OPHTHALMIC at 20:15

## 2022-04-24 RX ADMIN — BUDESONIDE AND FORMOTEROL FUMARATE DIHYDRATE 2 PUFF: 80; 4.5 AEROSOL RESPIRATORY (INHALATION) at 19:25

## 2022-04-24 RX ADMIN — METOPROLOL TARTRATE 12.5 MG: 25 TABLET, FILM COATED ORAL at 20:24

## 2022-04-24 RX ADMIN — TIMOLOL MALEATE 1 DROP: 5 SOLUTION/ DROPS OPHTHALMIC at 08:01

## 2022-04-24 ASSESSMENT — PAIN SCALES - GENERAL
PAINLEVEL_OUTOF10: 0
PAINLEVEL_OUTOF10: 1
PAINLEVEL_OUTOF10: 5
PAINLEVEL_OUTOF10: 3

## 2022-04-24 ASSESSMENT — PAIN DESCRIPTION - ORIENTATION: ORIENTATION: RIGHT

## 2022-04-24 ASSESSMENT — PAIN DESCRIPTION - DESCRIPTORS: DESCRIPTORS: ACHING

## 2022-04-24 ASSESSMENT — PAIN DESCRIPTION - LOCATION: LOCATION: ABDOMEN

## 2022-04-24 NOTE — PROGRESS NOTES
EMERGENCY GENERAL SURGERY DAILY PROGRESS NOTE      Patient Name: Argentina Villeda  Admission Date 4/14/2022    Hospital Day: 10  Patient seen and examined on 4/24/2022    INTERVAL HISTORY/EVENTS     Background:  Argentina Villeda is a 80 y.o. female with past medical history of COPD, HTN, FRED presents to [facility] [place] with 1 week history of intermittent RUQ pain without nausea or vomiting. Patient is a poor historian and history was obtained from her two daughters. Patient is denying any pain at this time. Patient is tolerating PO but is NPO at this time. Patient is voiding spontaneously. She states that she has not had a bowel movement in a \"few days. \" She states she is passing gas. Hospital Course:  4/14/2022: Presented to ED with 1 week history of abdominal pain. CT concerning for acute cholecystitis. 4/15/2022: Daughters opted for perc drain over surgery. Pigtail placed by Dr. Bruno Brower. Admitted to [facility] for acute cholecystitis s/p percutaneous cholecystostomy tube placement  4/16/2022: Improving abdominal pain. Tolerated clear liquid diet. Advanced to regular diet. 4/17/2022: Tolerating regular diet. Medically cleared for discharge from surgical standpoint. PT today for dispo rec's. 4/18/2022: Remains medically cleared for discharge. 4/19/2022: Remains medically cleared for discharge. Pending pre-cert to acute rehab  4/20/2022: Acute rehab denied. Pre-cert started for SNF. Remains med clear  4/21/2022: Remains med clear. Pending pre-cert  3/40/3196: Pending SNF precert. 4/23/2022: remains medically cleared pending precert    24 Hour Events:  No acute events overnight per patient or nursing. HDS on RA with acceptable saturations. Patient feeling well, denies abdominal pain. Drain patent and with bilious output. Tolerating diet, voiding and having BMs. Pain well controlled with current regimen. No new labs today.      Output:  UOP: 500 cc  Drains: 35 cc  BM x 2 4/22      PHYSICAL EXAM     Vitals Average, Min and Max for last 24 hours:  Temp: Temp: 97.5 °F (36.4 °C); Temp  Av.8 °F (36.6 °C)  Min: 97.5 °F (36.4 °C)  Max: 98 °F (36.7 °C)  Respirations: Resp  Av.7  Min: 16  Max: 18  Pulse: Pulse  Av  Min: 70  Max: 72  Blood Pressure: Systolic (29JQC), UP , Min:141 , TLB:260   ; Diastolic (12MGI), VPH:97, Min:57, Max:70  SpO2: SpO2  Av.3 %  Min: 94 %  Max: 95 %        24hr Intake/Output:     Intake/Output Summary (Last 24 hours) at 2022 1348  Last data filed at 2022 0914  Gross per 24 hour   Intake 490 ml   Output 535 ml   Net -45 ml       Vitals: BP (!) 141/57   Pulse 72   Temp 97.5 °F (36.4 °C) (Oral)   Resp 16   Ht 5' 2\" (1.575 m)   Wt 174 lb (78.9 kg)   LMP  (LMP Unknown)   SpO2 94%   BMI 31.83 kg/m²     Physical Exam:  Constitutional: Sitting up in bedside chair. Alert and conversant. No acute distress  HEENT: Atraumatic normocephalic. Cardiovascular: Regular rate and rhythm. Bilateral radial and DP pulses intact   Pulmonary: Breathing comfortably on RA. Abdominal: Soft. Non-distended. Non tender. RUQ IR drain with bilious, sero-sang output, no erythema and no discharge around the drain. Musculoskeletal: BOWEN spontaneously. No edema. Neurological: Alert, awake, and orientated x 2 (AOx1 baseline). Motor and sensory grossly intact.  GCS of 14  Skin: warm, dry    LABORATORY RESULTS (LAST 24 HOURS)     CBC with Differential:    Lab Results   Component Value Date    WBC 9.5 2022    RBC 4.44 2022    HGB 12.6 2022    HCT 38.3 2022     2022    MCV 86.3 2022    MCH 28.3 2022    MCHC 32.8 2022    RDW 14.2 2022    BANDSPCT 12 10/28/2020    METASPCT 1 2020    LYMPHOPCT 8.7 2022    MONOPCT 9.1 2022    BASOPCT 0.7 2022    MONOSABS 1.4 2022    LYMPHSABS 1.3 2022    EOSABS 0.1 2022    BASOSABS 0.1 2022     CMP:    Lab Results   Component Value Date     2022 K 3.8 04/21/2022    K 4.0 11/06/2020     04/21/2022    CO2 20 04/21/2022    BUN 15 04/21/2022    CREATININE 0.87 04/21/2022    GFRAA >60.0 04/21/2022    LABGLOM >60.0 04/21/2022    GLUCOSE 166 04/21/2022    PROT 6.4 04/21/2022    LABALBU 3.1 04/21/2022    CALCIUM 8.8 04/21/2022    BILITOT 0.4 04/21/2022    ALKPHOS 250 04/21/2022    AST 18 04/21/2022    ALT 17 04/21/2022     Magnesium:    Lab Results   Component Value Date    MG 2.4 12/26/2021     PT/INR:    Lab Results   Component Value Date    PROTIME 16.4 04/15/2022    INR 1.3 04/15/2022     PTT:    Lab Results   Component Value Date    APTT 29.7 04/15/2022       IMAGING RESULTS (PERSONALLY REVIEWED)     Admission imaging reviewed. No new imaging. ASSESSMENT & PLAN     Diagnoses:  1. Acute cholecystitis s/p percutaneous cholecystostomy tube placement with Dr. Mable Thomas on 4/15  2. Post-operative pain    PMHx: HTN, COPD, FRED    Incidental Findings: none (reviewed by MG, 4/19/2022)      ASSESSMENT/PLAN:  Neurological: Acute abdominal pain, hx dementia   - Continue Tylenol q6 scheduled  - Will hold off on opioid pain medication as patient is not requesting pain meds. Cardiovascular: HDS. Hx HTN, HLD  - Continue vital signs per unit protocol  - Continue home lipitor 20mg daily,  Losartan 25mg q12hrs, metoprolol 12.5mg BID  - d/c hzn as pt has not needed    Respiratory: Hx COPD, pt on home O2 but does not use, per daughter home O2 sats around 90%. No acute issues at this time   - Continue home albuterol q4 PRN for wheezing  - Continue home Symbicort BID  - Continue home Duoneb q6 PRN  - Maintain O2 sat >88%, encourage IS     GI/Diet: Acute cholecystitis s/p perc adenike drain placement, having bowel movements and no abdominal pain, tolerating PO  - Continue regular diet  - Bowel regimen of senna, Miralax, PRN dulcolax     Renal/Electrolytes: No electrolyte abnormalities, RICHARD on admission now resolved  - HLIV  - BMP as needed     ID: Leukocytosis resolved. Admitting urinalysis and culture without signs of recurrent UTI-  will follow clinically but would expect for Zosyn to cover E. Coli as seen on past urine cultures. - Zosyn course completed. No additional abx necessary  - Cultures from 4/15 showing E. Coli, with sensitivity to zosyn.   - CBC as needed      Heme: No acute issues  - No indication for transfusion  - CBC as needed     Endocrine: No acute issues     MSK: No acute issues  - Activity: out of bed as tolerated     Prophylaxis:   - SCD, lovenox 30mg daily for DVT ppx         Dispo: Remains medically cleared for discharge from surgical standpoint. Remain on [facility] for dispo planning. Pre-cert pending for SNF. Follow up with IR every 2 weeks for drain/wound check. Plan for cholangiogram in 8 weeks with IR. Will follow with general surgery/IR for drain removal following results of cholangiogram.  Follow up with PCP for recent hospitalization.      JULIO Smith - NP  Trauma/Critical Care/General Surgery  [phone] (1X-1J)  [phone]     This patient's plan of care was discussed and made in collaboration with Trauma Attending physician, Daneen Scheuermann, MD.

## 2022-04-24 NOTE — PLAN OF CARE
improve  Outcome: Adequate for Discharge     Problem: Pain:  Goal: Pain level will decrease  Outcome: Adequate for Discharge  Goal: Control of acute pain  Outcome: Adequate for Discharge  Goal: Control of chronic pain  Outcome: Adequate for Discharge     Problem: Nutrition  Goal: Optimal nutrition therapy  Outcome: Adequate for Discharge     Problem: IP BALANCE  Goal: LTG - Patient will maintain balance to allow for safe/functional mobility  Outcome: Adequate for Discharge     Problem: IP DRESSINGS LOWER EXTREMITIES  Goal: LTG - patient will dress lower body with or without assistive device  Outcome: Adequate for Discharge

## 2022-04-24 NOTE — PROGRESS NOTES
Assessment documented. Vital signs stable. Meds taken well. Denies any discomfort or pain. Call light within reach.

## 2022-04-24 NOTE — FLOWSHEET NOTE
A&Ox4, No fever or chills. Respirations are even and unlabored, lungs clear bilaterally. Denies any chest pain. Abdomen is soft. , non-tender to touch, bowels active x4, no nausea or vomiting, bharath drain in place, dressing clean and intact. +1 Non-pitting edema to BLE. Denies any pain with urination. Up with standby assist using walker, steady gait. Pt is up to chair, all needs addressed at this time. Call light within reach. Will continue to monitor.

## 2022-04-25 PROCEDURE — 1210000000 HC MED SURG R&B

## 2022-04-25 PROCEDURE — 6360000002 HC RX W HCPCS: Performed by: STUDENT IN AN ORGANIZED HEALTH CARE EDUCATION/TRAINING PROGRAM

## 2022-04-25 PROCEDURE — 97116 GAIT TRAINING THERAPY: CPT

## 2022-04-25 PROCEDURE — 97535 SELF CARE MNGMENT TRAINING: CPT

## 2022-04-25 PROCEDURE — 94640 AIRWAY INHALATION TREATMENT: CPT

## 2022-04-25 PROCEDURE — 94761 N-INVAS EAR/PLS OXIMETRY MLT: CPT

## 2022-04-25 PROCEDURE — 6370000000 HC RX 637 (ALT 250 FOR IP): Performed by: STUDENT IN AN ORGANIZED HEALTH CARE EDUCATION/TRAINING PROGRAM

## 2022-04-25 PROCEDURE — 99233 SBSQ HOSP IP/OBS HIGH 50: CPT | Performed by: NURSE PRACTITIONER

## 2022-04-25 PROCEDURE — 2580000003 HC RX 258: Performed by: PHYSICIAN ASSISTANT

## 2022-04-25 RX ADMIN — ACETAMINOPHEN 650 MG: 325 TABLET ORAL at 12:35

## 2022-04-25 RX ADMIN — DORZOLAMIDE HYDROCHLORIDE 1 DROP: 20 SOLUTION/ DROPS OPHTHALMIC at 20:26

## 2022-04-25 RX ADMIN — TIMOLOL MALEATE 1 DROP: 5 SOLUTION/ DROPS OPHTHALMIC at 20:26

## 2022-04-25 RX ADMIN — LATANOPROST 1 DROP: 50 SOLUTION OPHTHALMIC at 20:26

## 2022-04-25 RX ADMIN — DORZOLAMIDE HYDROCHLORIDE 1 DROP: 20 SOLUTION/ DROPS OPHTHALMIC at 09:18

## 2022-04-25 RX ADMIN — METOPROLOL TARTRATE 12.5 MG: 25 TABLET, FILM COATED ORAL at 09:14

## 2022-04-25 RX ADMIN — BRIMONIDINE TARTRATE 1 DROP: 2 SOLUTION OPHTHALMIC at 09:18

## 2022-04-25 RX ADMIN — SENNOSIDES 8.6 MG: 8.6 TABLET, FILM COATED ORAL at 20:26

## 2022-04-25 RX ADMIN — Medication 10 ML: at 20:26

## 2022-04-25 RX ADMIN — POLYETHYLENE GLYCOL 3350 17 G: 17 POWDER, FOR SOLUTION ORAL at 09:14

## 2022-04-25 RX ADMIN — ACETAMINOPHEN 650 MG: 325 TABLET ORAL at 18:29

## 2022-04-25 RX ADMIN — METOPROLOL TARTRATE 12.5 MG: 25 TABLET, FILM COATED ORAL at 20:25

## 2022-04-25 RX ADMIN — ENOXAPARIN SODIUM 30 MG: 100 INJECTION SUBCUTANEOUS at 09:14

## 2022-04-25 RX ADMIN — BRIMONIDINE TARTRATE 1 DROP: 2 SOLUTION OPHTHALMIC at 20:26

## 2022-04-25 RX ADMIN — LOSARTAN POTASSIUM 25 MG: 25 TABLET, FILM COATED ORAL at 09:14

## 2022-04-25 RX ADMIN — ATORVASTATIN CALCIUM 20 MG: 20 TABLET, FILM COATED ORAL at 09:14

## 2022-04-25 RX ADMIN — LOSARTAN POTASSIUM 25 MG: 25 TABLET, FILM COATED ORAL at 20:26

## 2022-04-25 RX ADMIN — Medication 10 ML: at 09:29

## 2022-04-25 RX ADMIN — ACETAMINOPHEN 650 MG: 325 TABLET ORAL at 06:04

## 2022-04-25 RX ADMIN — BUDESONIDE AND FORMOTEROL FUMARATE DIHYDRATE 2 PUFF: 80; 4.5 AEROSOL RESPIRATORY (INHALATION) at 19:30

## 2022-04-25 RX ADMIN — BUDESONIDE AND FORMOTEROL FUMARATE DIHYDRATE 2 PUFF: 80; 4.5 AEROSOL RESPIRATORY (INHALATION) at 07:16

## 2022-04-25 RX ADMIN — TIMOLOL MALEATE 1 DROP: 5 SOLUTION/ DROPS OPHTHALMIC at 09:18

## 2022-04-25 ASSESSMENT — PAIN SCALES - GENERAL
PAINLEVEL_OUTOF10: 0

## 2022-04-25 ASSESSMENT — PAIN DESCRIPTION - LOCATION: LOCATION: ABDOMEN

## 2022-04-25 NOTE — PROGRESS NOTES
Occupational Therapy  Facility/Department: [facility] TELE  Daily Treatment Note  NAME: Kristie Pandya  : 1928  MRN: [id number]    Date of Service: 2022    Discharge Recommendations:  Continue to assess pending progress         Patient Diagnosis(es): The primary encounter diagnosis was Calculus of gallbladder with acute cholecystitis without obstruction. Diagnoses of Right upper quadrant abdominal pain and Nausea were also pertinent to this visit. Assessment  Pt required increased time for task completion. Plan continue OT treatment to increase ability to participate in and complete ADL. Subjective :\"my daughter helps me. \"  Pt reports 0/10 pain during session. Cognition  Overall Cognitive Status: Exceptions  Arousal/Alertness: Delayed responses to stimuli  Following Commands: Follows one step commands with increased time; Follows multistep commands consistently        Objective    ADL  Grooming: Setup; Increased time to complete (Verbal cue required to initiate task. Therapist applied tooth paste to brush.   Patient able to place cap back on tube.)  LE Dressing: Maximum assistance (Pt completed doffing soiled socks and donning clean socks while seated in bedside chair and therapist assist to cross and hold leg at knee for task completion)                Goals  Patient Goals   Patient goals : \"I want to get home\"       Therapy Time   Individual Concurrent Group Co-treatment   Time In 1135         Time Out 1150         Minutes 15           ADL/IADL training: 15 minutes       [address], OTR/L Electronically signed by [address], OTR/L on  at 12:46 PM EDT

## 2022-04-25 NOTE — PROGRESS NOTES
Shift assessment complete, VSS, Pt is A&OX3, calm, afebrile, breathes are equal and unlabored, denies N/V/D and pain at this time, up in chair call light with in reach. 1600 Pt resting in chair family at bedside, no distress noted, will continue to monitor.     1832 drained 30 mls from drain bag, flushed, drain patent, Pt tolerated well

## 2022-04-25 NOTE — PROGRESS NOTES
EMERGENCY GENERAL SURGERY DAILY PROGRESS NOTE      Patient Name: Dashawn Potts  Admission Date 4/14/2022    Hospital Day: 11  Patient seen and examined on 4/25/2022    INTERVAL HISTORY/EVENTS     Background:  Dashawn Potts is a 80 y.o. female with past medical history of COPD, HTN, FRED presents to [facility] with 1 week history of intermittent RUQ pain without nausea or vomiting. Patient is a poor historian and history was obtained from her two daughters. Patient is denying any pain at this time. Patient is tolerating PO but is NPO at this time. Patient is voiding spontaneously. She states that she has not had a bowel movement in a \"few days. \" She states she is passing gas. Hospital Course:  4/14/2022: Presented to ED with 1 week history of abdominal pain. CT concerning for acute cholecystitis. 4/15/2022: Daughters opted for perc drain over surgery. Pigtail placed by Dr. Srinivas Duff. Admitted to [facility] for acute cholecystitis s/p percutaneous cholecystostomy tube placement  4/16/2022: Improving abdominal pain. Tolerated clear liquid diet. Advanced to regular diet. 4/17/2022: Tolerating regular diet. Medically cleared for discharge from surgical standpoint. PT today for dispo rec's. 4/18/2022: Remains medically cleared for discharge. 4/19/2022: Remains medically cleared for discharge. Pending pre-cert to acute rehab  4/20/2022: Acute rehab denied. Pre-cert started for SNF. Remains med clear  4/21/2022: Remains med clear. Pending pre-cert  4/99/0483: Pending SNF precert. 4/23/2022: remains medically cleared pending precert  4/39/1042:  remains medically cleared pending precert    24 Hour Events:  No acute events overnight per patient or nursing. HDS on RA with acceptable saturations. Patient feeling well, mild intermittent periumbilical abdominal pain but unchanged since initial presentation. Drain patent and with bilious/serosang minimal output. Tolerating diet, voiding and having BMs.   No N/V per patient or nursing. Pain well controlled with current regimen. Awaiting SNF precert. No new labs today. Output:  UOP: 5 occurences  Drains: 50 cc  BM x 2       PHYSICAL EXAM     Vitals Average, Min and Max for last 24 hours:  Temp: Temp: 98.2 °F (36.8 °C); Temp  Av.5 °F (36.9 °C)  Min: 98.2 °F (36.8 °C)  Max: 98.7 °F (37.1 °C)  Respirations: Resp  Av  Min: 18  Max: 18  Pulse: Pulse  Av  Min: 65  Max: 75  Blood Pressure: Systolic (60OMF), OOI:350 , Min:130 , ELU:982   ; Diastolic (40MZU), KHI:66, Min:53, Max:53  SpO2: SpO2  Av.8 %  Min: 93 %  Max: 95 %        24hr Intake/Output:     Intake/Output Summary (Last 24 hours) at 2022 1231  Last data filed at 2022 0604  Gross per 24 hour   Intake 480 ml   Output 50 ml   Net 430 ml       Vitals: BP (!) 130/53   Pulse 75   Temp 98.2 °F (36.8 °C) (Oral)   Resp 18   Ht 5' 2\" (1.575 m)   Wt 174 lb (78.9 kg)   LMP  (LMP Unknown)   SpO2 94%   BMI 31.83 kg/m²     Physical Exam:  Constitutional: Sitting up in bedside chair. Alert and conversant. No acute distress  HEENT: Atraumatic normocephalic. Cardiovascular: Regular rate and rhythm. Bilateral radial and DP pulses intact   Pulmonary: Breathing comfortably on RA. Abdominal: Soft. Non-distended. Non tender (mild intermittent periumbilical tenderness). RUQ IR drain with bilious, sero-sang output, no erythema and no discharge around the drain. Musculoskeletal: BOWEN spontaneously. No edema. Neurological: Alert, awake, and orientated x 2 (AOx1 baseline). Motor and sensory grossly intact.  GCS of 14  Skin: warm, dry    LABORATORY RESULTS (LAST 24 HOURS)     CBC with Differential:    Lab Results   Component Value Date    WBC 9.5 2022    RBC 4.44 2022    HGB 12.6 2022    HCT 38.3 2022     2022    MCV 86.3 2022    MCH 28.3 2022    MCHC 32.8 2022    RDW 14.2 2022    BANDSPCT 12 10/28/2020    METASPCT 1 2020    LYMPHOPCT 8.7 04/14/2022    MONOPCT 9.1 04/14/2022    BASOPCT 0.7 04/14/2022    MONOSABS 1.4 04/14/2022    LYMPHSABS 1.3 04/14/2022    EOSABS 0.1 04/14/2022    BASOSABS 0.1 04/14/2022     CMP:    Lab Results   Component Value Date     04/21/2022    K 3.8 04/21/2022    K 4.0 11/06/2020     04/21/2022    CO2 20 04/21/2022    BUN 15 04/21/2022    CREATININE 0.87 04/21/2022    GFRAA >60.0 04/21/2022    LABGLOM >60.0 04/21/2022    GLUCOSE 166 04/21/2022    PROT 6.4 04/21/2022    LABALBU 3.1 04/21/2022    CALCIUM 8.8 04/21/2022    BILITOT 0.4 04/21/2022    ALKPHOS 250 04/21/2022    AST 18 04/21/2022    ALT 17 04/21/2022     Magnesium:    Lab Results   Component Value Date    MG 2.4 12/26/2021     PT/INR:    Lab Results   Component Value Date    PROTIME 16.4 04/15/2022    INR 1.3 04/15/2022     PTT:    Lab Results   Component Value Date    APTT 29.7 04/15/2022       IMAGING RESULTS (PERSONALLY REVIEWED)     Admission imaging reviewed. No new imaging. ASSESSMENT & PLAN     Diagnoses:  1. Acute cholecystitis s/p percutaneous cholecystostomy tube placement with Dr. Sav Young on 4/15  2. Post-operative pain    PMHx: HTN, COPD, FRED    Incidental Findings: none (reviewed by MG, 4/19/2022)      ASSESSMENT/PLAN:  Neurological: Acute abdominal pain, hx dementia   - Continue Tylenol q6 scheduled  - Will hold off on opioid pain medication as patient is not requesting pain meds. Cardiovascular: HDS. Hx HTN, HLD  - Continue vital signs per unit protocol  - Continue home lipitor 20mg daily,  Losartan 25mg q12hrs, metoprolol 12.5mg BID    Respiratory: Hx COPD, pt on home O2 but does not use, per daughter home O2 sats around 90%.  No acute issues at this time   - Continue home albuterol q4 PRN for wheezing  - Continue home Symbicort BID  - Continue home Duoneb q6 PRN  - Maintain O2 sat >88%, encourage IS     GI/Diet: Acute cholecystitis s/p perc adenike drain placement, having bowel movements and no abdominal pain, tolerating PO  - Continue regular diet  - Bowel regimen of senna, Miralax, PRN dulcolax     Renal/Electrolytes: No electrolyte abnormalities, RICHARD on admission now resolved  - HLIV  - BMP as needed     ID: Leukocytosis resolved. Admitting urinalysis and culture without signs of recurrent UTI-  will follow clinically but would expect for Zosyn to cover E. Coli as seen on past urine cultures. - Zosyn course completed. No additional abx necessary  - Cultures from 4/15 showing E. Coli, with sensitivity to zosyn.   - CBC as needed      Heme: No acute issues  - No indication for transfusion  - CBC as needed     Endocrine: No acute issues     MSK: No acute issues  - Activity: out of bed as tolerated     Prophylaxis:   - SCD, lovenox 30mg daily for DVT ppx         Dispo: Remains medically cleared for discharge from surgical standpoint. Remain on [facility] for dispo planning. Pre-cert pending for SNF. Follow up with IR every 2 weeks for drain/wound check. Plan for cholangiogram in 8 weeks with IR. Will follow with general surgery/IR for drain removal following results of cholangiogram.  Follow up with PCP for recent hospitalization.      JULIO Valencia - NP  Trauma/Critical Care/General Surgery  [phone] (3N-8L)  [phone]     This patient's plan of care was discussed and made in collaboration with Trauma Attending physician, Rain Guevara MD.

## 2022-04-25 NOTE — CARE COORDINATION
WE HAVE PRECERT APPROVAL FOR THE PT TO TRANSFER TO [facility] TODAY. [facility] SET FOR 9:00 .

## 2022-04-25 NOTE — PROGRESS NOTES
Physical Therapy Med Surg Daily Treatment Note  Facility/Department: [facility]  [id number]       NAME: Carlyn Campos  : 1928 (9 y.o.)  MRN: [id number]  CODE STATUS: Full Code    Date of Service: 2022    Patient Diagnosis(es): Acute cholecystitis [K81.0]  Nausea [R11.0]  Calculus of gallbladder with acute cholecystitis without obstruction [K80.00]  Right upper quadrant abdominal pain [R10.11]   Chief Complaint   Patient presents with    Abnormal Lab     Sent by Dr Yaron Barahona for CRP>200     Patient Active Problem List    Diagnosis Date Noted    Muscle wasting and atrophy, not elsewhere classified, left hand 2022    Osteoarthritis of cervical spine with myelopathy 2022    Post-op pain 2022    Acute cholecystitis 2022    Impaired mobility and activities of daily living-due to cervical myelopathy 2021    Venous stasis dermatitis of both lower extremities 08/10/2021    Aphasia     Chest pain 2020    Obesity (BMI 30-39.9) 2019    Obstructive sleep apnea 2019    COPD exacerbation ([address]     COPD (chronic obstructive pulmonary disease) with acute bronchitis ([address] 2018    Anxiety 2017    Essential hypertension         Past Medical History:   Diagnosis Date    RICHARD (acute kidney injury) ([address] 4/15/2022    Anxiety     Cancer ([address]     Glaucoma     Gout     left knee    Hypertension     Lung disease     Obesity (BMI 30-39. 9) 2019    Obstructive sleep apnea 2019    Osteoarthritis of cervical spine with myelopathy 2022     Past Surgical History:   Procedure Laterality Date    CATARACT REMOVAL WITH IMPLANT Bilateral 2016    COLON SURGERY  2009    polyps    COLONOSCOPY  2009    GASTROSTOMY TUBE PLACEMENT N/A 2020    EGD PEG TUBE PLACEMENT performed by Analy Lozano MD at [address] [place] [address] Right 04/15/2022    Flexima APDL Locking Pigtail Draiange Catheter inserted by Dr. Zoe Walker (Lot# [id number] / Exp: 12/07/2024)    IR CHOLECYSTOSTOMY PERCUTANEOUS COMPLETE  4/15/2022    IR CHOLECYSTOSTOMY PERCUTANEOUS COMPLETE 4/15/2022 [facility] SPECIAL PROCEDURE    SKIN CANCER EXCISION  2002    graft from neck            Restrictions: fall risk       SUBJECTIVE:   Subjective: pt initially declined to get up but agreeable after discussion    Pain  Pain: denies pain at the moment. Denies pain post tx  OBJECTIVE:   Orientation  Overall Orientation Status: Within Functional Limits  Cognition  Overall Cognitive Status: Exceptions  Arousal/Alertness: Delayed responses to stimuli  Following Commands: Follows one step commands with increased time; Follows multistep commands consistently    Bed Mobility Training  Bed Mobility Training: No    Transfer Training  Transfer Training: Yes  Overall Level of Assistance: Stand-by assistance  Interventions: Tactile cues  Sit to Stand: Stand-by assistance  Stand to Sit: Stand-by assistance  Duration: 5    Gait Training: Yes  Overall Level of Assistance: Stand-by assistance;Contact-guard assistance  Distance (ft): 8 Feet  Assistive Device: Walker, rolling  Interventions: Tactile cues  Base of Support: Narrowed  Speed/Maria Esther: Slow  Step Length: Right shortened;Left shortened  Gait Abnormalities: Decreased step clearance;Lurching  Right Side Weight Bearing: As tolerated  Left Side Weight Bearing: As tolerated                              Vitals  SpO2: 94 %  O2 Device: None (Room air)          ASSESSMENT pt initially was slow to respond to me so I started to write things in large print. Pt states she had a hard time seeing and I explained why I was there. Pt agreeable to take short walk in room with ww and return to recliner chair. Pt asked if she could have a snack and states she is very hungry. Notified RN João Elias who said he is going to look into this.          Discharge Recommendations:  Continue to assess pending progress         Goals  Long Term Goals  Long term goal 1: Pt will demonstrate bed mobility supervision  Long term goal 2: Pt will demonstrate transfers with safest AD SBA  Long term goal 3: Pt will demonstrate amb >/= 100ft with safest AD SBA  Long term goal 4: Pt will demonstrate stair negotiation 3 steps with 1 rail CGA to allow pt to safely enter and exit her home. Long term goal 5: Pt will demonstrate HEP with SBA    PLAN    Times per week: 3-6        AMPAC (6 CLICK) BASIC MOBILITY  AM-PAC Inpatient Mobility Raw Score : 17     Therapy Time   Individual   Time In  1015   Time Out 1025   Minutes 10   2 minutes transfer  8 minutes gt          Octavia Garza PTA, 04/25/22 at 10:40 AM         Definitions for assistance levels  Independent = pt does not require any physical supervision or assistance from another person for activity completion. Device may be needed.   Stand by assistance = pt requires verbal cues or instructions from another person, close to but not touching, to perform the activity  Minimal assistance= pt performs 75% or more of the activity; assistance is required to complete the activity  Moderate assistance= pt performs 50% of the activity; assistance is required to complete the activity  Maximal assistance = pt performs 25% of the activity; assistance is required to complete the activity  Dependent = pt requires total physical assistance to accomplish the task

## 2022-04-26 VITALS
SYSTOLIC BLOOD PRESSURE: 147 MMHG | WEIGHT: 174 LBS | TEMPERATURE: 98.2 F | BODY MASS INDEX: 32.02 KG/M2 | OXYGEN SATURATION: 95 % | HEART RATE: 77 BPM | DIASTOLIC BLOOD PRESSURE: 62 MMHG | RESPIRATION RATE: 18 BRPM | HEIGHT: 62 IN

## 2022-04-26 PROCEDURE — 6360000002 HC RX W HCPCS: Performed by: STUDENT IN AN ORGANIZED HEALTH CARE EDUCATION/TRAINING PROGRAM

## 2022-04-26 PROCEDURE — 2580000003 HC RX 258: Performed by: PHYSICIAN ASSISTANT

## 2022-04-26 PROCEDURE — 6370000000 HC RX 637 (ALT 250 FOR IP): Performed by: STUDENT IN AN ORGANIZED HEALTH CARE EDUCATION/TRAINING PROGRAM

## 2022-04-26 PROCEDURE — 99239 HOSP IP/OBS DSCHRG MGMT >30: CPT | Performed by: NURSE PRACTITIONER

## 2022-04-26 PROCEDURE — 94640 AIRWAY INHALATION TREATMENT: CPT

## 2022-04-26 PROCEDURE — 97116 GAIT TRAINING THERAPY: CPT

## 2022-04-26 RX ADMIN — Medication 10 ML: at 09:35

## 2022-04-26 RX ADMIN — LOSARTAN POTASSIUM 25 MG: 25 TABLET, FILM COATED ORAL at 08:25

## 2022-04-26 RX ADMIN — METOPROLOL TARTRATE 12.5 MG: 25 TABLET, FILM COATED ORAL at 08:25

## 2022-04-26 RX ADMIN — DORZOLAMIDE HYDROCHLORIDE 1 DROP: 20 SOLUTION/ DROPS OPHTHALMIC at 08:25

## 2022-04-26 RX ADMIN — ATORVASTATIN CALCIUM 20 MG: 20 TABLET, FILM COATED ORAL at 08:25

## 2022-04-26 RX ADMIN — ACETAMINOPHEN 650 MG: 325 TABLET ORAL at 16:57

## 2022-04-26 RX ADMIN — TIMOLOL MALEATE 1 DROP: 5 SOLUTION/ DROPS OPHTHALMIC at 08:25

## 2022-04-26 RX ADMIN — BRIMONIDINE TARTRATE 1 DROP: 2 SOLUTION OPHTHALMIC at 08:25

## 2022-04-26 RX ADMIN — BUDESONIDE AND FORMOTEROL FUMARATE DIHYDRATE 2 PUFF: 80; 4.5 AEROSOL RESPIRATORY (INHALATION) at 07:24

## 2022-04-26 RX ADMIN — POLYETHYLENE GLYCOL 3350 17 G: 17 POWDER, FOR SOLUTION ORAL at 08:25

## 2022-04-26 RX ADMIN — ENOXAPARIN SODIUM 30 MG: 100 INJECTION SUBCUTANEOUS at 08:25

## 2022-04-26 ASSESSMENT — PAIN SCALES - GENERAL
PAINLEVEL_OUTOF10: 2
PAINLEVEL_OUTOF10: 0

## 2022-04-26 NOTE — DISCHARGE INSTR - DIET
Good nutrition is important when healing from an illness, injury, or surgery. Follow any nutrition recommendations given to you during your hospital stay. If you were given an oral nutrition supplement while in the hospital, continue to take this supplement at home. You can take it with meals, in-between meals, and/or before bedtime. These supplements can be purchased at most local grocery stores, pharmacies, and chain [employer]-stores.    If you have any questions about your diet or nutrition, call the hospital and ask for the dietitian.  - Regular Diet/ Thin liquids

## 2022-04-26 NOTE — CARE COORDINATION
TRANSPORT ARRANGED FOR AFTER 5PM PER [facility]. THAT WAS THE FIRST AVAILABLE . VM LEFT FOR [facility]. CALL TO SISTER MARS AND UPDATED HER. IMM REVIEWED AND VERBALIZED UNDERSTANDING.

## 2022-04-26 NOTE — PROGRESS NOTES
Physical Therapy Med Surg Daily Treatment Note  [facility]       NAME: Minetta Litten  : 1928 (64 y.o.)  MRN: [id number]  CODE STATUS: Full Code    Date of Service: 2022    Patient Diagnosis(es): Acute cholecystitis [K81.0]  Nausea [R11.0]  Calculus of gallbladder with acute cholecystitis without obstruction [K80.00]  Right upper quadrant abdominal pain [R10.11]   Chief Complaint   Patient presents with    Abnormal Lab     Sent by Dr Lesley Curling for CRP>200     Patient Active Problem List    Diagnosis Date Noted    Muscle wasting and atrophy, not elsewhere classified, left hand 2022    Osteoarthritis of cervical spine with myelopathy 2022    Post-op pain 2022    Acute cholecystitis 2022    Impaired mobility and activities of daily living-due to cervical myelopathy 2021    Venous stasis dermatitis of both lower extremities 08/10/2021    Aphasia     Chest pain 2020    Obesity (BMI 30-39.9) 2019    Obstructive sleep apnea 2019    COPD exacerbation ([address]     COPD (chronic obstructive pulmonary disease) with acute bronchitis ([address] 2018    Anxiety 2017    Essential hypertension         Past Medical History:   Diagnosis Date    RICHARD (acute kidney injury) ([address] 4/15/2022    Anxiety     Cancer ([address]     Glaucoma     Gout     left knee    Hypertension     Lung disease     Obesity (BMI 30-39. 9) 2019    Obstructive sleep apnea 2019    Osteoarthritis of cervical spine with myelopathy 2022     Past Surgical History:   Procedure Laterality Date    CATARACT REMOVAL WITH IMPLANT Bilateral 2016    COLON SURGERY  2009    polyps    COLONOSCOPY  2009    GASTROSTOMY TUBE PLACEMENT N/A 2020    EGD PEG TUBE PLACEMENT performed by Bereket Griffith MD at [address] [place] [address] Right 04/15/2022    Flexima APDL Locking Pigtail Draiange Catheter inserted by Dr. Mari Santiago (Lot# [id number] / Exp: 12/07/2024)    IR CHOLECYSTOSTOMY PERCUTANEOUS COMPLETE  4/15/2022    IR CHOLECYSTOSTOMY PERCUTANEOUS COMPLETE 4/15/2022 [facility] SPECIAL PROCEDURE    SKIN CANCER EXCISION  2002    graft from neck            Restrictions:  Restrictions/Precautions: Fall Risk    SUBJECTIVE:   Subjective: \"I have to use the bathroom. \"    Pain  Pain: Pt denies pain pre/post tx    OBJECTIVE:     Transfer Training  Transfer Training: Yes  Interventions: Verbal cues  Sit to Stand: Stand-by assistance  Stand to Sit: Stand-by assistance (vc's for hand placement on chair vs holding onto ww)  Stand Pivot Transfers: Contact-guard assistance (toilet trsf, CGA for safety, vc's for hand placement on grab bars)    Gait Training: Yes  Overall Level of Assistance: Stand-by assistance;Contact-guard assistance  Distance (ft): 64 Feet (64' total, 12' x2, 40')  Assistive Device: Walker, rolling  Interventions: Verbal cues  Speed/Maria Esther: Slow  Step Length: Right shortened;Left shortened  Gait Abnormalities: Decreased step clearance (ff posture, decreased majo heel strike, lateral sway)     ASSESSMENT   Assessment: Pt demonstrates progression toward goals this session secondary to increasing overall ambulatory distance. Pt requiring occ safety cues during trsfs for hand placement to improve overall safety and quality. Discharge Recommendations:  Continue to assess pending progress         Goals  Long Term Goals  Long term goal 1: Pt will demonstrate bed mobility supervision  Long term goal 2: Pt will demonstrate transfers with safest AD SBA  Long term goal 3: Pt will demonstrate amb >/= 100ft with safest AD SBA  Long term goal 4: Pt will demonstrate stair negotiation 3 steps with 1 rail CGA to allow pt to safely enter and exit her home. Long term goal 5: Pt will demonstrate HEP with SBA    PLAN    Times per week: 3-6  Plan Comment: Cont. POC  Safety Devices  Type of Devices:  All fall risk precautions in place,Call light within reach,Chair alarm in place,Left in chair     [facility] (6 CLICK) [facility] Inpatient Mobility Raw Score : 17     Therapy Time   Individual   Time In 1512   Time Out 1522   Minutes 10      BM/Trsf: 2  Gait:8       Marlyn Gambino PTA, 04/26/22 at 3:48 PM         Definitions for assistance levels  Independent = pt does not require any physical supervision or assistance from another person for activity completion. Device may be needed.   Stand by assistance = pt requires verbal cues or instructions from another person, close to but not touching, to perform the activity  Minimal assistance= pt performs 75% or more of the activity; assistance is required to complete the activity  Moderate assistance= pt performs 50% of the activity; assistance is required to complete the activity  Maximal assistance = pt performs 25% of the activity; assistance is required to complete the activity  Dependent = pt requires total physical assistance to accomplish the task

## 2022-04-26 NOTE — FLOWSHEET NOTE
Shift assessment completed, pt denies pain, up with walker, drain care completed and patent, waiting for pt to go to [facility] called to [address] [facility] [address]     [facility]    Electronically signed by Dio Casarez RN on 4/26/2022

## 2022-04-26 NOTE — PLAN OF CARE
Problem: Falls - Risk of:  Goal: Will remain free from falls  Description: Will remain free from falls  Outcome: Progressing  Goal: Absence of physical injury  Description: Absence of physical injury  Outcome: Progressing     Problem: Skin Integrity:  Goal: Will show no infection signs and symptoms  Description: Will show no infection signs and symptoms  Outcome: Progressing  Goal: Absence of new skin breakdown  Description: Absence of new skin breakdown  Outcome: Progressing     Problem: Activity:  Goal: Risk for activity intolerance will decrease  Description: Risk for activity intolerance will decrease  Outcome: Progressing     Problem:  Bowel/Gastric:  Goal: Bowel function will improve  Description: Bowel function will improve  Outcome: Progressing  Goal: Diagnostic test results will improve  Description: Diagnostic test results will improve  Outcome: Progressing  Goal: Occurrences of nausea will decrease  Description: Occurrences of nausea will decrease  Outcome: Progressing  Goal: Occurrences of vomiting will decrease  Description: Occurrences of vomiting will decrease  Outcome: Progressing     Problem: Fluid Volume:  Goal: Maintenance of adequate hydration will improve  Description: Maintenance of adequate hydration will improve  Outcome: Progressing     Problem: Health Behavior:  Goal: Ability to state signs and symptoms to report to health care provider will improve  Description: Ability to state signs and symptoms to report to health care provider will improve  Outcome: Progressing     Problem: Physical Regulation:  Goal: Complications related to the disease process, condition or treatment will be avoided or minimized  Description: Complications related to the disease process, condition or treatment will be avoided or minimized  Outcome: Progressing  Goal: Ability to maintain clinical measurements within normal limits will improve  Description: Ability to maintain clinical measurements within normal limits will improve  Outcome: Progressing     Problem: Sensory:  Goal: Ability to identify factors that increase the pain will improve  Description: Ability to identify factors that increase the pain will improve  Outcome: Progressing  Goal: Ability to notify healthcare provider of pain before it becomes unmanageable or unbearable will improve  Description: Ability to notify healthcare provider of pain before it becomes unmanageable or unbearable will improve  Outcome: Progressing  Goal: Pain level will decrease  Description: Pain level will decrease  Outcome: Progressing     Problem: Discharge Planning:  Goal: Participates in care planning  Description: Participates in care planning  Outcome: Progressing  Goal: Discharged to appropriate level of care  Description: Discharged to appropriate level of care  Outcome: Progressing     Problem:  Activity Intolerance:  Goal: Ability to tolerate increased activity will improve  Description: Ability to tolerate increased activity will improve  Outcome: Progressing     Problem: Pain:  Goal: Pain level will decrease  Description: Pain level will decrease  Outcome: Progressing  Goal: Control of acute pain  Description: Control of acute pain  Outcome: Progressing  Goal: Control of chronic pain  Description: Control of chronic pain  Outcome: Progressing

## 2022-04-27 ENCOUNTER — OFFICE VISIT (OUTPATIENT)
Dept: GERIATRIC MEDICINE | Age: 87
End: 2022-04-27
Payer: MEDICARE

## 2022-04-27 VITALS
WEIGHT: 173.1 LBS | HEART RATE: 79 BPM | TEMPERATURE: 98.3 F | SYSTOLIC BLOOD PRESSURE: 148 MMHG | HEIGHT: 62 IN | BODY MASS INDEX: 31.86 KG/M2 | RESPIRATION RATE: 16 BRPM | DIASTOLIC BLOOD PRESSURE: 68 MMHG

## 2022-04-27 DIAGNOSIS — Z78.9 IMPAIRED MOBILITY AND ACTIVITIES OF DAILY LIVING: ICD-10-CM

## 2022-04-27 DIAGNOSIS — R41.81 AGE-RELATED COGNITIVE DECLINE: ICD-10-CM

## 2022-04-27 DIAGNOSIS — K80.00 CALCULUS OF GALLBLADDER WITH ACUTE CHOLECYSTITIS WITHOUT OBSTRUCTION: Primary | ICD-10-CM

## 2022-04-27 DIAGNOSIS — Z74.09 IMPAIRED MOBILITY AND ACTIVITIES OF DAILY LIVING: ICD-10-CM

## 2022-04-27 PROBLEM — G89.18 POST-OP PAIN: Status: RESOLVED | Noted: 2022-04-19 | Resolved: 2022-04-27

## 2022-04-27 PROBLEM — R07.9 CHEST PAIN: Status: RESOLVED | Noted: 2020-07-08 | Resolved: 2022-04-27

## 2022-04-27 PROCEDURE — 99304 1ST NF CARE SF/LOW MDM 25: CPT | Performed by: INTERNAL MEDICINE

## 2022-04-27 RX ORDER — NYSTATIN 100000 [USP'U]/G
POWDER TOPICAL
Qty: 30 G | Refills: 0
Start: 2022-04-27 | End: 2022-04-28

## 2022-04-27 NOTE — PROGRESS NOTES
Physical Therapy  Facility/Department: [facility] MED SURG Q582/P188-95  Physical Therapy Discharge      NAME: Rosa Street    : 1928 (3 y.o.)  MRN: [id number]    Account: [de-identified]  Gender: female      Patient has been discharged from acute care hospital. DC patient from current PT program.      Electronically signed by Krystle Angel PT on 22 at 9:52 AM EDT

## 2022-04-27 NOTE — PROGRESS NOTES
Carlyn Campos is a 80 y.o. female with age-related cognitive decline, hypertension, sleep apnea, osteoarthrosis,  whom I am seeing at [facility] for initial evaluation for the admission of 04/26/2022, after hospitalization at [facility] [place] from April 14 till April 26, 2022, due to acute cholecystitis, s/p percutaneous cholecystostomy tube placement. The patient was seen with his/her consent in his/her room at the facility. I also spoke with the nurse, patient's daughter, and reviewed the hospital and nursing home records. Chief Complaint   Patient presents with    Follow-Up from Hospital    Extremity Weakness    Memory Loss    Hypertension           BRIEF HOSPITALIZATION EVENTS:    \"HOSPITAL COURSE:  4/14/2022: Presented to ED with 1 week history of abdominal pain. CT concerning for acute cholecystitis. 4/15/2022: Daughters opted for perc drain over surgery. Pigtail placed by Dr. Mamta Weber to [facility] for acute cholecystitis s/p percutaneous cholecystostomy tube placement  4/16/2022: Improving abdominal pain. Tolerated clear liquid diet. Advanced to regular diet. 4/17/2022: Tolerating regular diet. Medically cleared for discharge from surgical standpoint. PT today for dispo rec's. 4/18/2022: Remains medically cleared for discharge to home with home health care. 4/19/2022: Remains medically cleared for discharge. Pending pre-cert to acute rehab  4/20/2022: Acute rehab denied. Pre-cert started for SNF. Remains med clear  4/21/2022: Remains med clear. Pending pre-cert  8/47/9056: Pending SNF precert. 4/23/2022: remains medically cleared pending precert  8/87/1787:  [facility] medically cleared pending precert  7/02/7821: [facility], remains medically cleared for discharged, pending placement  4/26/2022: No AEON. Patient tolerating PO well. Pain well controlled and improved. Voiding spontaneously adequate UOP. RUQ IR drain emptied for 30ml serous-pink tinged drainage.  No evidence of infection. BM yesterday. GCS 14 (baseline). Patient medically cleared for discharge to SNF today     At time of discharge, Arlnee Hannon was tolerating a regular diet, having bowel movements, and had adequate analgesia on oral pain medications. Pt's activity level was as tolerated with assistance. The patient had no  signs or symptoms of complications. Patient was determined stable for discharge to: [address] [facility] [address] \"         EVENTS SINCE SNF ADMISSION:    Since admission to nursing facility, patient's vital signs remained stable. There were no particular concerns today from the nursing staff. Patient's daughter was concerned about some perineal rash with superficial excoriations, and about bruises on the lower abdomen, which she noted while her mother was in the hospital.  Also, she is concerned about mother's gradual loss of memory which started several months ago. Laboratory and imaging studies reports reviewed included (but were not limited to) the following:      EXAM:  CT ABDOMEN PELVIS W IV CONTRAST 04/15/2022       History: Right upper quadrant pain. Lower quadrant pain.       Technique: Multiple contiguous axial images were obtained of the abdomen and pelvis from the level of the lung bases through the ischial tuberosities with contrast. Multiplanar reformats were obtained.       Comparison: CT abdomen pelvis October 28, 2020       Findings:        Bibasilar infiltrate and/or atelectasis.       The gallbladder is distended and contains multiple small gallstones. There is gallbladder wall thickening and pericholecystic inflammation. The liver, spleen, stomach, pancreas, and adrenal glands are within normal limits. Small hiatal hernia.       The kidneys enhance uniformly. Millimeter calculus within the right renal pelvis. No right-sided hydronephrosis. No left-sided urinary tract calculi or hydronephrosis. Urinary bladder is well distended.  The uterus is present.       Abdominal aorta is nonaneurysmal  and demonstrates atherosclerotic calcification . No retroperitoneal or abdominal/pelvic lymphadenopathy.       No small bowel obstruction. No overt colonic mass or pericolonic inflammation. The appendix is not definitively visualized. No free fluid, loculated fluid collection, or pneumoperitoneum.       Likely chronic mild compression deformity of T11. Degenerative changes of the spine. Chronic right rib deformities.           Impression       Cholelithiasis with gallbladder distention, gallbladder wall thickening, pericholecystic fluid most concerning for acute cholecystitis.       Bibasilar atelectasis/scarring and/or infiltrate. Impression:   1. Successful placement of a 10 [place]an cholecystostomy drain. 2. Cholangiogram demonstrated a gallbladder which is entirely filled with stones. The common bile duct did not opacify suggesting possible occlusion of the cystic duct. Purulent pus like yellow-green bile was aspirated and sent for microbiology analysis.       Additional clinical data:   Acute cholecystitis          Interventional radiology note:     Procedure: 04/15/2022   1. Ultrasound evaluation of gallbladder and liver. Ultrasound-guided needle placement into the gallbladder. Ultrasound images saved to PACS. 2. Fluoroscopy time for cholangiogram up to 1 hour. 3. Antegrade cholangiogram through catheter. 4. Placement of 10 [place]an cholecystostomy tube. 5. IV conscious sedation 45 minutes in duration.       Body of Report:   Pre-procedure evaluation confirmed that the patient was an appropriate candidate for conscious sedation. Adequate sedation was maintained during the entire procedure. Vital signs, pulse oximetry, and response to verbal commands were monitored and    recorded by the nurse throughout the procedure and the recovery period. The flow sheet was placed in the medical record including the medications and dosages used.  The patient returned to baseline neurologic and physiologic status prior to leaving the    department. No immediate sedation related complications were noted. Medication for conscious sedation was administered via IV route. 45 minutes of conscious sedation was provided. Informed consent was obtained from the patient following discussion of risks, benefits and alternatives to this procedure. The patient was placed supine on the angiographic table. The patient's right abdomen was then prepped and draped in normal sterile    fashion. Ultrasound was used to evaluate the gallbladder and liver which demonstrated an entirely stone filled gallbladder with wall thickening. Ultrasound was used for guidance of a 21-gauge micropuncture access needle which was placed under direct    ultrasound imaging into the gallbladder. A microaccess wire was advanced under fluoroscopic imaging. A transition sheath was then advanced. Through the transition sheath, contrast was injected and a cystosco-cholangiogram was performed which demonstrated    the gallbladder lumen is entirely filled with stones. The cystic duct did not opacify neither did the common bile duct. An Amplatz wire was advanced through the transition sheath. The tract was serially dilated with 7, 8, and 9 dilators respectively. Following that a 8 [place]yan cholecystostomy tube was advanced over the Amplatz wire into the gallbladder and the loop was formed. The wire and internal stiffener were removed. The cholecystostomy tube was used to irrigate the gallbladder and aspirate    thick bile. Bile was sent for microbiology analysis. The cholecystostomy tube was secured in place with suture and Percufix device and sterile dressing applied. Patient tolerated the procedure well without complications.  Patient was transferred to    recovery room in normal and stable condition and back to hospital floor.       Signed Dr Danny Moreno      Admission on 04/14/2022, Discharged on 04/26/2022   Component Date Value Ref Range Status    Sodium 04/14/2022 136  135 - 144 mEq/L Final    Potassium 04/14/2022 4.3  3.4 - 4.9 mEq/L Final    Chloride 04/14/2022 99  95 - 107 mEq/L Final    CO2 04/14/2022 22  20 - 31 mEq/L Final    Anion Gap 04/14/2022 15  9 - 15 mEq/L Final    Glucose 04/14/2022 106* 70 - 99 mg/dL Final    BUN 04/14/2022 26* 8 - 23 mg/dL Final    CREATININE 04/14/2022 1.35* 0.50 - 0.90 mg/dL Final    GFR Non- 04/14/2022 36.5* >60 Final    Comment: >60 mL/min/1.73m2 EGFR, calc. for ages 25 and older using the  MDRD formula (not corrected for weight), is valid for stable  renal function.  GFR  04/14/2022 44.2* >60 Final    Comment: >60 mL/min/1.73m2 EGFR, calc. for ages 25 and older using the  MDRD formula (not corrected for weight), is valid for stable  renal function.       Calcium 04/14/2022 8.5  8.5 - 9.9 mg/dL Final    Total Protein 04/14/2022 6.6  6.3 - 8.0 g/dL Final    Albumin 04/14/2022 3.3* 3.5 - 4.6 g/dL Final    Total Bilirubin 04/14/2022 0.6  0.2 - 0.7 mg/dL Final    Alkaline Phosphatase 04/14/2022 224* 40 - 130 U/L Final    ALT 04/14/2022 19  0 - 33 U/L Final    AST 04/14/2022 12  0 - 35 U/L Final    Globulin 04/14/2022 3.3  2.3 - 3.5 g/dL Final    WBC 04/14/2022 15.0* 4.8 - 10.8 K/uL Final    RBC 04/14/2022 4.30  4.20 - 5.40 M/uL Final    Hemoglobin 04/14/2022 12.2  12.0 - 16.0 g/dL Final    Hematocrit 04/14/2022 36.9* 37.0 - 47.0 % Final    MCV 04/14/2022 85.8  82.0 - 100.0 fL Final    MCH 04/14/2022 28.5  27.0 - 31.3 pg Final    MCHC 04/14/2022 33.2  33.0 - 37.0 % Final    RDW 04/14/2022 14.0  11.5 - 14.5 % Final    Platelets 76/48/7783 307  130 - 400 K/uL Final    Neutrophils % 04/14/2022 80.5  % Final    Lymphocytes % 04/14/2022 8.7  % Final    Monocytes % 04/14/2022 9.1  % Final    Eosinophils % 04/14/2022 1.0  % Final    Basophils % 04/14/2022 0.7  % Final    Neutrophils Absolute 04/14/2022 12.1* 1.4 - 6.5 K/uL Final    Lymphocytes Absolute 04/14/2022 1.3  1.0 - 4.8 K/uL Final    Monocytes Absolute 04/14/2022 1.4* 0.2 - 0.8 K/uL Final    Eosinophils Absolute 04/14/2022 0.1  0.0 - 0.7 K/uL Final    Basophils Absolute 04/14/2022 0.1  0.0 - 0.2 K/uL Final    Lactic Acid 04/14/2022 1.2  0.5 - 2.2 mmol/L Final    Procalcitonin 04/14/2022 0.27* 0.00 - 0.15 ng/mL Final    Comment: Suspected Sepsis:  Low likelihood of sepsis  <.50 ng/mL    Increased likelihood of sepsis 0.50-2.00 ng/mL  Antibiotics encouraged    High risk of sepsis/shock   >2.00 ng/mL  Antibiotics strongly encouraged    Suspected Lower Respiratory Tract Infections:  Low likelihood of bacterial infection  <0.24 ng/mL    Increased likelihood of bacterial infection >0.24 ng/mL  Antibiotics encouraged    With successful antibiotic therapy, PCT levels should decrease  rapidly. (Half-life of 24 to 36 hours.)    Procalcitonin values from samples collected within the first  6 hours of systemic infection may still be low. Retesting may be indicated. Values from day 1 and day 4 can be entered into the Change in  Procalcitonin Calculator to determine the patient's  Mortality Risk Prognosis  ([email or web address])    In healthy neonates, plasma Procalcitonin (PCT) concentrations  increase gradually after birth, reaching peak values at about  24 hours of age then decrease to normal values below 0.5                            ng/mL  by 48-72 hours of age.  POC CREATININE WHOLE BLOOD 04/14/2022 1.5   Final    POC Creatinine 04/14/2022 1.5* 0.6 - 1.2 mg/dL Final    GFR Non- 04/14/2022 32* >60 Final    Comment: >60 mL/min/1.73m2 EGFR, calc. for ages 25 and older using the  MDRD formula (not corrected for weight), is valid for stable  renal function.  GFR  04/14/2022 39* >60 Final    Comment: >60 mL/min/1.73m2 EGFR, calc. for ages 25 and older using the  MDRD formula (not corrected for weight), is valid for stable  renal function.       Sample Type 04/14/2022 COLLIN   Final 04/17/2022 4.14* 4.20 - 5.40 M/uL Final    Hemoglobin 04/17/2022 11.7* 12.0 - 16.0 g/dL Final    Hematocrit 04/17/2022 35.6* 37.0 - 47.0 % Final    MCV 04/17/2022 86.1  82.0 - 100.0 fL Final    MCH 04/17/2022 28.4  27.0 - 31.3 pg Final    MCHC 04/17/2022 33.0  33.0 - 37.0 % Final    RDW 04/17/2022 14.1  11.5 - 14.5 % Final    Platelets 81/74/9014 258  130 - 400 K/uL Final    Sodium 04/17/2022 140  135 - 144 mEq/L Final    Potassium 04/17/2022 4.0  3.4 - 4.9 mEq/L Final    Chloride 04/17/2022 107  95 - 107 mEq/L Final    CO2 04/17/2022 21  20 - 31 mEq/L Final    Anion Gap 04/17/2022 12  9 - 15 mEq/L Final    Glucose 04/17/2022 100* 70 - 99 mg/dL Final    BUN 04/17/2022 15  8 - 23 mg/dL Final    CREATININE 04/17/2022 0.93* 0.50 - 0.90 mg/dL Final    GFR Non- 04/17/2022 56.2* >60 Final    Comment: >60 mL/min/1.73m2 EGFR, calc. for ages 25 and older using the  MDRD formula (not corrected for weight), is valid for stable  renal function.  GFR  04/17/2022 >60.0  >60 Final    Comment: >60 mL/min/1.73m2 EGFR, calc. for ages 25 and older using the  MDRD formula (not corrected for weight), is valid for stable  renal function.       Calcium 04/17/2022 8.2* 8.5 - 9.9 mg/dL Final    WBC 04/18/2022 7.7  4.8 - 10.8 K/uL Final    RBC 04/18/2022 4.14* 4.20 - 5.40 M/uL Final    Hemoglobin 04/18/2022 11.9* 12.0 - 16.0 g/dL Final    Hematocrit 04/18/2022 36.1* 37.0 - 47.0 % Final    MCV 04/18/2022 87.3  82.0 - 100.0 fL Final    MCH 04/18/2022 28.7  27.0 - 31.3 pg Final    MCHC 04/18/2022 32.9* 33.0 - 37.0 % Final    RDW 04/18/2022 14.0  11.5 - 14.5 % Final    Platelets 46/54/3891 273  130 - 400 K/uL Final    Sodium 04/18/2022 141  135 - 144 mEq/L Final    Potassium 04/18/2022 3.9  3.4 - 4.9 mEq/L Final    Chloride 04/18/2022 107  95 - 107 mEq/L Final    CO2 04/18/2022 21  20 - 31 mEq/L Final    Anion Gap 04/18/2022 13  9 - 15 mEq/L Final    Glucose 04/18/2022 107* 70 - 99 mg/dL Final    BUN 04/18/2022 15  8 - 23 mg/dL Final    CREATININE 04/18/2022 0.87  0.50 - 0.90 mg/dL Final    GFR Non- 04/18/2022 >60.0  >60 Final    Comment: >60 mL/min/1.73m2 EGFR, calc. for ages 25 and older using the  MDRD formula (not corrected for weight), is valid for stable  renal function.  GFR  04/18/2022 >60.0  >60 Final    Comment: >60 mL/min/1.73m2 EGFR, calc. for ages 25 and older using the  MDRD formula (not corrected for weight), is valid for stable  renal function.  Calcium 04/18/2022 8.4* 8.5 - 9.9 mg/dL Final    WBC 04/21/2022 9.5  4.8 - 10.8 K/uL Final    RBC 04/21/2022 4.44  4.20 - 5.40 M/uL Final    Hemoglobin 04/21/2022 12.6  12.0 - 16.0 g/dL Final    Hematocrit 04/21/2022 38.3  37.0 - 47.0 % Final    MCV 04/21/2022 86.3  82.0 - 100.0 fL Final    MCH 04/21/2022 28.3  27.0 - 31.3 pg Final    MCHC 04/21/2022 32.8* 33.0 - 37.0 % Final    RDW 04/21/2022 14.2  11.5 - 14.5 % Final    Platelets 57/13/4910 324  130 - 400 K/uL Final    Sodium 04/21/2022 138  135 - 144 mEq/L Final    Potassium 04/21/2022 3.8  3.4 - 4.9 mEq/L Final    Chloride 04/21/2022 103  95 - 107 mEq/L Final    CO2 04/21/2022 20  20 - 31 mEq/L Final    Anion Gap 04/21/2022 15  9 - 15 mEq/L Final    Glucose 04/21/2022 166* 70 - 99 mg/dL Final    BUN 04/21/2022 15  8 - 23 mg/dL Final    CREATININE 04/21/2022 0.87  0.50 - 0.90 mg/dL Final    GFR Non- 04/21/2022 >60.0  >60 Final    Comment: >60 mL/min/1.73m2 EGFR, calc. for ages 25 and older using the  MDRD formula (not corrected for weight), is valid for stable  renal function.  GFR  04/21/2022 >60.0  >60 Final    Comment: >60 mL/min/1.73m2 EGFR, calc. for ages 25 and older using the  MDRD formula (not corrected for weight), is valid for stable  renal function.       Calcium 04/21/2022 8.8  8.5 - 9.9 mg/dL Final    Total Protein 04/21/2022 6.4  6.3 - 8.0 g/dL Final    Albumin 04/21/2022 3.1* 3.5 - 4.6 g/dL Final    Total Bilirubin 04/21/2022 0.4  0.2 - 0.7 mg/dL Final    Alkaline Phosphatase 04/21/2022 250* 40 - 130 U/L Final    ALT 04/21/2022 17  0 - 33 U/L Final    AST 04/21/2022 18  0 - 35 U/L Final    Globulin 04/21/2022 3.3  2.3 - 3.5 g/dL Final   Hospital Outpatient Visit on 04/15/2022   Component Date Value Ref Range Status    CULTURE WOUND 04/15/2022 *  Final                    Value:Direct Exam:  MODERATE NEUTROPHILS  Direct Exam:  NO BACTERIA SEEN  Cult,Aerobe/Anaerobe:  No anaerobic organisms isolated at 5 days.   Performed at [address], [address] [place] [address]  [phone]      Organism 04/15/2022 Escherichia coli*  Final    CULTURE WOUND 04/15/2022 MODERATE GROWTH   Final   Orders Only on 04/15/2022   Component Date Value Ref Range Status    Hyaline Casts, UA 04/15/2022 0-1  0 - 5 /LPF Final    Bacteria, UA 04/15/2022 FEW* Negative /HPF Final    WBC, UA 04/15/2022 3-5  0 - 5 /HPF Final    RBC, UA 04/15/2022 0-2  0 - 5 /HPF Final    Epithelial Cells, UA 04/15/2022 6-10  0 - 5 /HPF Final   Orders Only on 04/15/2022   Component Date Value Ref Range Status    Color, UA 04/15/2022 DARK YELLOW* Straw/Yellow Final    Clarity, UA 04/15/2022 TURBID* Clear Final    Glucose, Ur 04/15/2022 Negative  Negative mg/dL Final    Bilirubin Urine 04/15/2022 SMALL* Negative Final    Ketones, Urine 04/15/2022 TRACE* Negative mg/dL Final    Specific Gravity, UA 04/15/2022 1.029  1.005 - 1.030 Final    Blood, Urine 04/15/2022 Negative  Negative Final    pH, UA 04/15/2022 5.0  5.0 - 9.0 Final    Protein, UA 04/15/2022 30* Negative mg/dL Final    Urobilinogen, Urine 04/15/2022 1.0  <2.0 E.U./dL Final    Nitrite, Urine 04/15/2022 Negative  Negative Final    Leukocyte Esterase, Urine 04/15/2022 TRACE* Negative Final   Orders Only on 04/14/2022   Component Date Value Ref Range Status    Urine Culture, Routine 04/14/2022    Final Value:Cult,Urine:  NO SIGNIFICANT GROWTH  Performed at [facility], [address] [place] [address]  [phone]     Orders Only on 04/14/2022   Component Date Value Ref Range Status    CRP 04/14/2022 212.9* 0.0 - 5.0 mg/L Final   Orders Only on 04/14/2022   Component Date Value Ref Range Status    Rejected Test 04/14/2022 pt   Final    Reason for Rejection 04/14/2022 see below   Final    Comment: Unable to perform testing; specimen collected in incorrect  collection container. To perform testing the specimen will  need to be recollected. Wr tube     Orders Only on 03/31/2022   Component Date Value Ref Range Status    Urine Culture, Routine 03/31/2022 *  Final                    Value:Performed at [facility], [address] [place] [address]  [phone]      Organism 03/31/2022 Escherichia coli*  Final    Urine Culture, Routine 03/31/2022 >[id number] CFU/ML   Final       HPI:    More detail above in the chiefcomplaint(s), interim history and below in the review of systems. Extremity Weakness  This is a chronic problem. The problem has been unchanged. Associated symptoms include a rash and weakness. Pertinent negatives include no abdominal pain, change in bowel habit, chest pain, congestion, coughing, fever, joint swelling, nausea, urinary symptoms or vomiting. The symptoms are aggravated by exertion. The treatment provided mild relief.          Past Medical History:   Diagnosis Date    Age-related cognitive decline     Cancer ([facility] [address]     Cerebrovascular small vessel disease     Chronic nonspecific lung disease 2021    nodules R lung, fibrosis L apex    Glaucoma     Gout     left knee    Hypertension     Obesity (BMI 30-39.9) 07/11/2019    Obstructive sleep apnea 07/11/2019    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    Vitamin D deficiency 2019       Past Surgical History:   Procedure Laterality Date    CATARACT REMOVAL WITH IMPLANT Bilateral 2016    COLON SURGERY  2009    polyps    COLONOSCOPY  02/2009    GASTROSTOMY TUBE PLACEMENT N/A 11/04/2020    EGD PEG TUBE PLACEMENT performed by Randi Zamora MD at [address] [facility] [address] Right 04/15/2022    Flexima APDL Locking Pigtail Draiange Catheter inserted by Dr. Shraath Blancas (Lot# [id number] / Exp: 12/07/2024)    IR CHOLECYSTOSTOMY PERCUTANEOUS COMPLETE  4/15/2022    IR CHOLECYSTOSTOMY PERCUTANEOUS COMPLETE 4/15/2022 MLOZ SPECIAL PROCEDURE    SKIN CANCER EXCISION  2002    graft from neck       Social History     Socioeconomic History    Marital status:      Spouse name: Not on file    Number of children: Not on file    Years of education: Not on file    Highest education level: Not on file   Occupational History    Not on file   Tobacco Use    Smoking status: Never Smoker    Smokeless tobacco: Never Used   Vaping Use    Vaping Use: Never used   Substance and Sexual Activity    Alcohol use: No    Drug use: No    Sexual activity: Not Currently   Other Topics Concern    Not on file   Social History Narrative    ,  Lives with her dtr Montano Copper- Daughter (dtr does not work and is able to assist pt as needed), Son Paola Mott is in the area    Home: [place]- in [address]: Two level,Able to Live on Main level with bedroom/bathroom    Home Access: Stairs to enter with rails- Number of Steps: 3    Bathroom Shower/Tub: Tub/Shower unit,  Shower chair,Grab bars in shower,Hand-held shower    Home Equipment: Rolling walker    ADL Assistance: Needs assistance    Homemaking Assistance: Needs assistance    Homemaking Responsibilities: No    Ambulation Assistance: Independent (used [employer]),  BSC, LIFT CHAIR, WALKER, CANE,     Transfer Assistance: Independent    Active : No    Additional Comments: pt is questionable historian- question decreased recall versus unable to accurately hear the question; pt reports no recent falls at home.                               Social Determinants of Health     Financial Resource Strain:     Difficulty of Paying Living Expenses: Not on file   Food Insecurity:     Worried About Running Out of Food in the Last Year: Not on file    Deborah of Food in the Last Year: Not on file   Transportation Needs:     Lack of Transportation (Medical): Not on file    Lack of Transportation (Non-Medical): Not on file   Physical Activity:     Days of Exercise per Week: Not on file    Minutes of Exercise per Session: Not on file   Stress:     Feeling of Stress : Not on file   Social Connections:     Frequency of Communication with Friends and Family: Not on file    Frequency of Social Gatherings with Friends and Family: Not on file    Attends Jain Services: Not on file    Active Member of [address] [place] [address] [email or web address] or Organizations: Not on file    Attends Club or Organization Meetings: Not on file    Marital Status: Not on file   Intimate Partner Violence:     Fear of Current or Ex-Partner: Not on file    Emotionally Abused: Not on file    Physically Abused: Not on file    Sexually Abused: Not on file   Housing Stability:     Unable to Pay for Housing in the Last Year: Not on file    Number of [place] in the Last Year: Not on file    Unstable Housing in the Last Year: Not on file       No family history on file. Family and socialhistory were reviewed and pertinent changes documented.     ALLERGIES    Norvasc [amlodipine besylate]    Current Outpatient Medications on File Prior to Visit   Medication Sig Dispense Refill    acetaminophen (TYLENOL) 325 MG tablet Take 2 tablets by mouth every 6 hours as needed for Pain (Patient taking differently: Take 650 mg by mouth every 4 hours as needed for Pain or Fever ) 120 tablet 0    dorzolamide-timolol (COSOPT) 22.3-6.8 MG/ML ophthalmic solution PLACE 1 DROP INTO BOTH EYES 2 TIMES DAILY INDICATIONS: GLAUCOMA 10 mL 5    latanoprost (XALATAN) 0.005 % ophthalmic solution Place 1 drop into both eyes daily intolerance and heat intolerance. Genitourinary: Negative for difficulty urinating and hematuria. Musculoskeletal: Negative for joint swelling. Skin: Positive for rash. Neurological: Positive for weakness. Negative for tremors, speech difficulty and light-headedness. Hematological: Bruises/bleeds easily. Psychiatric/Behavioral: Positive for decreased concentration. The patient is not nervous/anxious. Vitals:    04/27/22 1050   BP: (!) 148/68   Pulse: 79   Resp: 16   Temp: 98.3 °F (36.8 °C)   Weight: 173 lb 1.6 oz (78.5 kg)   Height: 5' 2\" (1.575 m)       Physical Exam  Constitutional:       General: She is not in acute distress. Appearance: She is not ill-appearing. Comments: Age-appropriate female, seated in recliner, calm, making eye contact, answering simple questions. HENT:      Head: Atraumatic. Nose: No rhinorrhea. Mouth/Throat:      Mouth: Mucous membranes are moist.   Eyes:      General: No scleral icterus. Cardiovascular:      Rate and Rhythm: Normal rate and regular rhythm. Pulses: Normal pulses. Heart sounds: No gallop. Pulmonary:      Effort: No respiratory distress. Breath sounds: No wheezing or rhonchi. Chest:      Chest wall: No tenderness. Abdominal:      General: Bowel sounds are normal. There is no distension. Palpations: Abdomen is soft. Tenderness: There is no abdominal tenderness. There is no guarding or rebound. Comments: The cholecystostomy drain is in place, under dressing, there is a small, 10 cc amount of sanguinolent fluid in the bag   Musculoskeletal:      Cervical back: No rigidity. Skin:     General: Skin is warm. Coloration: Skin is pale. Findings: Bruising (two 3 cm ecchymoses on the lower abdomen, no hematoma ) and rash (please see nurse's notes) present. Neurological:      General: No focal deficit present. Mental Status: She is alert. Motor: Weakness present.       Coordination: Coordination normal.      Comments: For full evaluation of strength, functional mobility, please refer to the PT/OT notes   Psychiatric:         Attention and Perception: She is attentive. Mood and Affect: Mood is not anxious or depressed. Affect is flat. Affect is not labile. Speech: Speech is delayed. Behavior: Behavior is slowed. Behavior is not withdrawn. Thought Content: Thought content is not delusional.         Cognition and Memory: Memory is impaired. Comments: Oriented to self and place         Assessment:    Fabricio Ahn was seen today for follow-up from hospital, extremity weakness, memory loss and hypertension. Diagnoses and all orders for this visit:    Calculus of gallbladder with acute cholecystitis without obstruction             S/p percutaneous cholecystostomy drain placement. Stable, continue nursing care in the facility, resume activities as tolerated. Close follow-up with GI and with primary care as outpatient. Impaired mobility and activities of daily living              Contributing factors include age-related physical deconditioning, cervical spinal stenosis age-related cognitive decline. Continue PT/OT in the nursing facility. Age-related cognitive decline              To continue follow-up as outpatient with PCP and decide whether to initiate treatment. Other orders  -     nystatin (MYCOSTATIN) 299754 UNIT/GM powder; Apply daily to perineum x 3 weeks        Plan:    See all orders and comments in the assessment section. Reviewed with the patient/nurse today's diagnosis and associated problems, treatment plans, prognosis, questions answered. Orders for repeat laboratories, medication, placed in the nursing home chart.        Orders Placed This Encounter   Medications    nystatin (MYCOSTATIN) 280516 UNIT/GM powder     Sig: Apply daily to perineum x 3 weeks     Dispense:  30 g     Refill:  0       Close follow up needed with the geriatric team (CNP or with MD) in one week. I have reviewed the patient's medical and surgical, family and social history, health maintenance schedule, and updated the computerized patient record. Please note this report has been partially produced by using speech recognition hardware. It may contain errors related to the system, including grammar, punctuation and spelling as well as words and phrases that may seem inaccurate. For anyquestions or concerns, please feel free to contact me for clarification.         Electronically signed by Mirian Almaraz MD

## 2022-04-28 ASSESSMENT — ENCOUNTER SYMPTOMS
NAUSEA: 0
EYE REDNESS: 0
COUGH: 0
CHANGE IN BOWEL HABIT: 0
ABDOMINAL PAIN: 0
VOMITING: 0

## 2022-05-04 ENCOUNTER — PREP FOR PROCEDURE (OUTPATIENT)
Dept: INTERVENTIONAL RADIOLOGY/VASCULAR | Age: 87
End: 2022-05-04

## 2022-05-04 ENCOUNTER — OFFICE VISIT (OUTPATIENT)
Dept: GERIATRIC MEDICINE | Age: 87
End: 2022-05-04
Payer: MEDICARE

## 2022-05-04 ENCOUNTER — TELEPHONE (OUTPATIENT)
Dept: INTERVENTIONAL RADIOLOGY/VASCULAR | Age: 87
End: 2022-05-04

## 2022-05-04 ENCOUNTER — OFFICE VISIT (OUTPATIENT)
Dept: INTERVENTIONAL RADIOLOGY/VASCULAR | Age: 87
End: 2022-05-04
Payer: MEDICARE

## 2022-05-04 VITALS
BODY MASS INDEX: 31.83 KG/M2 | OXYGEN SATURATION: 96 % | HEIGHT: 62 IN | DIASTOLIC BLOOD PRESSURE: 70 MMHG | TEMPERATURE: 97.6 F | HEART RATE: 77 BPM | SYSTOLIC BLOOD PRESSURE: 138 MMHG | WEIGHT: 173 LBS

## 2022-05-04 DIAGNOSIS — E78.5 HYPERLIPIDEMIA, UNSPECIFIED HYPERLIPIDEMIA TYPE: ICD-10-CM

## 2022-05-04 DIAGNOSIS — K81.0 ACUTE CHOLECYSTITIS: Primary | ICD-10-CM

## 2022-05-04 DIAGNOSIS — Z43.4 CHOLECYSTOSTOMY CARE (HCC): ICD-10-CM

## 2022-05-04 PROCEDURE — 99214 OFFICE O/P EST MOD 30 MIN: CPT | Performed by: NURSE PRACTITIONER

## 2022-05-04 PROCEDURE — 99308 SBSQ NF CARE LOW MDM 20: CPT | Performed by: PHYSICIAN ASSISTANT

## 2022-05-04 PROCEDURE — 1123F ACP DISCUSS/DSCN MKR DOCD: CPT | Performed by: PHYSICIAN ASSISTANT

## 2022-05-04 RX ORDER — 0.9 % SODIUM CHLORIDE 0.9 %
250 INTRAVENOUS SOLUTION INTRAVENOUS
Status: CANCELLED | OUTPATIENT
Start: 2022-05-04

## 2022-05-04 RX ORDER — FENTANYL CITRATE 50 UG/ML
50 INJECTION, SOLUTION INTRAMUSCULAR; INTRAVENOUS
Status: CANCELLED | OUTPATIENT
Start: 2022-05-04

## 2022-05-04 RX ORDER — 0.9 % SODIUM CHLORIDE 0.9 %
500 INTRAVENOUS SOLUTION INTRAVENOUS
Status: CANCELLED | OUTPATIENT
Start: 2022-05-04

## 2022-05-04 RX ORDER — LIDOCAINE HYDROCHLORIDE 20 MG/ML
10 INJECTION, SOLUTION INFILTRATION; PERINEURAL
Status: CANCELLED | OUTPATIENT
Start: 2022-05-04

## 2022-05-04 RX ORDER — MIDAZOLAM HYDROCHLORIDE 1 MG/ML
0.5 INJECTION INTRAMUSCULAR; INTRAVENOUS
Status: CANCELLED | OUTPATIENT
Start: 2022-05-04

## 2022-05-04 ASSESSMENT — ENCOUNTER SYMPTOMS
SHORTNESS OF BREATH: 0
WHEEZING: 0
BACK PAIN: 0
NAUSEA: 0
DIARRHEA: 0
GASTROINTESTINAL NEGATIVE: 1
VOMITING: 0
ABDOMINAL PAIN: 0
TROUBLE SWALLOWING: 0
COUGH: 0
SORE THROAT: 0
EYES NEGATIVE: 1
COLOR CHANGE: 0
RESPIRATORY NEGATIVE: 1

## 2022-05-04 NOTE — PROGRESS NOTES
ZVASCULAR MEDICINE AND INTERVENTIONAL RADIOLOGY DEPARTMENT:         Chantale León, a female of 80 y.o. came to the office 5/4/2022. Chief Complaint   Patient presents with    Follow-up     2wk F/U Rosa M drain check      PROGRESS NOTE:       5/4/2022: Chantale León is here today for hospital procedure follow up S/P percutaneous placement of cholecystostomy tube for acute cholecystitis. Present here with her daughter Nikky Raymond. In a wheelchair. She is at [facility] for rehab with plans to eventually go home with daughter with [facility]. Tube is draining pink/orange colored fluid. She denies any discomfort from drain and denies any abdominal pain or nausea/vomiting. Eating foods without difficulty. She has some fatigue and generalized weakness. No family history on file. Past Surgical History:   Procedure Laterality Date    CATARACT REMOVAL WITH IMPLANT Bilateral 2016    COLON SURGERY  2009    polyps    COLONOSCOPY  02/2009    GASTROSTOMY TUBE PLACEMENT N/A 11/04/2020    EGD PEG TUBE PLACEMENT performed by Danie Marquez MD at [address] [place] [address] Right 04/15/2022    Flexima APDL Locking Pigtail Draiange Catheter inserted by Dr. Nanda Luque (Lot# [id number] / Exp: 12/07/2024)    IR CHOLECYSTOSTOMY PERCUTANEOUS COMPLETE  4/15/2022    IR CHOLECYSTOSTOMY PERCUTANEOUS COMPLETE 4/15/2022 [facility] SPECIAL PROCEDURE    SKIN CANCER EXCISION  2002    graft from neck        Past Medical History:   Diagnosis Date    Age-related cognitive decline     Cancer ([facility] [address]     Cerebrovascular small vessel disease     Chronic nonspecific lung disease 2021    nodules R lung, fibrosis L apex    Glaucoma     Gout     left knee    Hypertension     Obesity (BMI 30-39.9) 07/11/2019    Obstructive sleep apnea 07/11/2019    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    Vitamin D deficiency 2019       Social History     Socioeconomic History    Marital status:       Spouse name: None    Number of children: None    Years of education: None    Highest education level: None   Occupational History    None   Tobacco Use    Smoking status: Never Smoker    Smokeless tobacco: Never Used   Vaping Use    Vaping Use: Never used   Substance and Sexual Activity    Alcohol use: No    Drug use: No    Sexual activity: Not Currently   Other Topics Concern    None   Social History Narrative    ,  Lives with her dtr Wendy West- Daughter (dtr does not work and is able to assist pt as needed), Son Satinder Sal is in the area    Home: [place]- in [place] [address] [address] [facility]: Two level,Able to Live on Main level with bedroom/bathroom    Home Access: Stairs to enter with rails- Number of Steps: 3    Bathroom Shower/Tub: Tub/Shower unit,  Shower chair,Grab bars in shower,Hand-held shower    Home Equipment: Rolling walker    ADL Assistance: Needs assistance    Homemaking Assistance: Needs assistance    Homemaking Responsibilities: No    Ambulation Assistance: Independent (used [address] [place] [address]),  BSC, LIFT CHAIR, WALKER, CANE,     Transfer Assistance: Independent    Active : No    Additional Comments: pt is questionable historian- question decreased recall versus unable to accurately hear the question; pt reports no recent falls at home. Social Determinants of Health     Financial Resource Strain:     Difficulty of Paying Living Expenses: Not on file   Food Insecurity:     Worried About Running Out of Food in the Last Year: Not on file    Deborah of Food in the Last Year: Not on file   Transportation Needs:     Lack of Transportation (Medical): Not on file    Lack of Transportation (Non-Medical):  Not on file   Physical Activity:     Days of Exercise per Week: Not on file    Minutes of Exercise per Session: Not on file   Stress:     Feeling of Stress : Not on file   Social Connections:     Frequency of Communication with Friends and Family: Not on file    Frequency of Social Gatherings with Friends and Family: Not on file    Attends Yazdanism Services: Not on file    Active Member of Clubs or Organizations: Not on file    Attends Club or Organization Meetings: Not on file    Marital Status: Not on file   Intimate Partner Violence:     Fear of Current or Ex-Partner: Not on file    Emotionally Abused: Not on file    Physically Abused: Not on file    Sexually Abused: Not on file   Housing Stability:     Unable to Pay for Housing in the Last Year: Not on file    Number of [place] in the Last Year: Not on file    Unstable Housing in the Last Year: Not on file       Allergies   Allergen Reactions    Norvasc [Amlodipine Besylate] Swelling       Current Outpatient Medications on File Prior to Visit   Medication Sig Dispense Refill    acetaminophen (TYLENOL) 325 MG tablet Take 2 tablets by mouth every 6 hours as needed for Pain (Patient taking differently: Take 650 mg by mouth every 4 hours as needed for Pain or Fever ) 120 tablet 0    dorzolamide-timolol (COSOPT) 22.3-6.8 MG/ML ophthalmic solution PLACE 1 DROP INTO BOTH EYES 2 TIMES DAILY INDICATIONS: GLAUCOMA 10 mL 5    latanoprost (XALATAN) 0.005 % ophthalmic solution Place 1 drop into both eyes daily Indications: Glaucoma (Patient taking differently: Place 1 drop into both eyes nightly Indications: Glaucoma ) 2.5 mL 5    metoprolol tartrate (LOPRESSOR) 25 MG tablet Take 0.5 tablets by mouth 2 times daily Indications: High Blood Pressure Disorder 90 tablet 3    albuterol sulfate HFA (PROAIR HFA) 108 (90 Base) MCG/ACT inhaler Inhale 1 puff into the lungs every 6 hours as needed for Wheezing or Shortness of Breath 3 each 3    brimonidine (ALPHAGAN P) 0.15 % ophthalmic solution Place 1 drop into both eyes 2 times daily Indications: Glaucoma 1 each 1    zoster recombinant adjuvanted vaccine (SHINGRIX) 50 MCG/0.5ML SUSR injection Inject 0.5 mLs into the muscle See Admin Instructions 1 dose now and repeat in 2-6 months 0.5 mL 0    atorvastatin (LIPITOR) 20 MG tablet Indications: High Amount of Fats in the Blood Take one tablet Po qhs (Patient taking differently: Take 20 mg by mouth nightly Indications: High Amount of Fats in the Blood Take one tablet Po qhs) 90 tablet 0    losartan (COZAAR) 25 MG tablet Take 1 tablet by mouth every 12 hours (Patient taking differently: Take 25 mg by mouth every 12 hours Indications: High Blood Pressure Disorder ) 180 tablet 3    Elastic Bandages & Supports (MEDICAL COMPRESSION STOCKINGS) MISC Knee high, 20 to 30 mm hg, remove at night, pharmacy to fit. 1 each 0    OXYGEN Inhale 2 L into the lungs Indications: PRN at bedtime      montelukast (SINGULAIR) 10 MG tablet Take 1 tablet by mouth nightly as needed (allergies) 90 tablet 3     No current facility-administered medications on file prior to visit. Review of Systems   Constitutional: Positive for fatigue. Negative for chills and fever. HENT: Negative. Negative for congestion, ear pain, sore throat and trouble swallowing. Eyes: Negative. Negative for visual disturbance. Respiratory: Negative. Negative for cough, shortness of breath and wheezing. Cardiovascular: Negative. Negative for chest pain, palpitations and leg swelling. Gastrointestinal: Negative. Negative for abdominal pain, diarrhea, nausea and vomiting. Endocrine: Negative. Genitourinary: Negative. Negative for difficulty urinating, dysuria and hematuria. Musculoskeletal: Negative. Negative for back pain. Skin: Negative. Negative for color change, rash and wound. Neurological: Positive for weakness. Negative for dizziness, light-headedness, numbness and headaches. Hematological: Negative. Does not bruise/bleed easily. Psychiatric/Behavioral: Negative. The patient is not nervous/anxious. All other systems reviewed and are negative.       OBJECTIVE:  /70   Pulse 77   Temp 97.6 °F (36.4 °C) (Tympanic)   Ht 5' 2\" (1.575 m) Wt 173 lb (78.5 kg) Comment: per patient wheel chair  LMP  (LMP Unknown)   SpO2 96%   BMI 31.64 kg/m²     Physical Exam  Constitutional:       General: She is not in acute distress. Appearance: Normal appearance. She is well-developed. She is not ill-appearing. HENT:      Head: Normocephalic and atraumatic. Nose: Nose normal. No congestion. Neck:      Thyroid: No thyromegaly. Vascular: No JVD. Trachea: No tracheal deviation. Cardiovascular:      Rate and Rhythm: Normal rate and regular rhythm. Pulses: Normal pulses. Dorsalis pedis pulses are 2+ on the right side and 2+ on the left side. Posterior tibial pulses are 2+ on the right side and 2+ on the left side. Heart sounds: Normal heart sounds. No murmur heard. Pulmonary:      Effort: Pulmonary effort is normal.   Abdominal:      General: Bowel sounds are normal. There is no distension. Palpations: Abdomen is soft. Comments: cholecystostomy tube draining pink/orange fluid. Site with suture intact without drainage or erythema. Musculoskeletal:         General: No tenderness or signs of injury. Normal range of motion. Cervical back: Normal range of motion. Right lower leg: No edema. Left lower leg: No edema. Skin:     General: Skin is warm and dry. Capillary Refill: Capillary refill takes 2 to 3 seconds. Findings: No bruising, erythema or lesion. Neurological:      Mental Status: She is alert and oriented to person, place, and time. Psychiatric:         Mood and Affect: Mood normal.         Behavior: Behavior normal.         4/15/2022:   Impression   Impression:   1. Successful placement of a 10 [place] cholecystostomy drain. 2. Cholangiogram demonstrated a gallbladder which is entirely filled with stones. The common bile duct did not opacify suggesting possible occlusion of the cystic duct.  Purulent pus like yellow-green bile was aspirated and sent for microbiology analysis.       Additional clinical data:   Acute cholecystitis         ASSESSMENT AND PLAN:  Chart, medications, lab work reviewed. Diagnosis Orders   1. Acute cholecystitis  IR INJ PERC CHOLA EXIST ACCESS W IMAGING       --  Stayfix dressing applied to site by me. Patient tolerated well without any complications. Plan:     Orders Placed This Encounter   Procedures    IR INJ PERC CHOLA EXIST ACCESS W IMAGING     Standing Status:   Future     Standing Expiration Date:   5/4/2023      No orders of the defined types were placed in this encounter. --  Plan is for cholangiogram at request of Trauma team at 8 weeks post drain placement. They will then evaluate from results if drain can be removed. In mean time will continue to see in clinic every two weeks for dressing change, tube care and functional evaluation. --  Will also plan for tube exchange every 8 weeks until removed. --  She still has scopolamine patch left on from hospital admission behind left ear. Instructed daughter to have nursing home evaluate if can be removed as I am unsure if this is a routine medication for her.        Nathaly Thompson, APRN - CNP

## 2022-05-04 NOTE — TELEPHONE ENCOUNTER
PATIENT WILL BE GIVEN THIS INFORMATION AT NEXT APPT    >  YOUR PROCEDURE IS SCHEDULED ON: 6/15/2022 @ 1:00 pm  >  You will need to arrive at 12:00 pm from [facility] and check in at the [facility] [address] desk.   >  Make arrangements for transportation, as you should not drive immediately after.

## 2022-05-10 DIAGNOSIS — R07.9 CHEST PAIN, UNSPECIFIED: ICD-10-CM

## 2022-05-10 RX ORDER — ATORVASTATIN CALCIUM 20 MG/1
TABLET, FILM COATED ORAL
Qty: 90 TABLET | Refills: 0 | Status: SHIPPED | OUTPATIENT
Start: 2022-05-10 | End: 2022-09-29 | Stop reason: SDUPTHER

## 2022-05-18 NOTE — TELEPHONE ENCOUNTER
Future Appointments    This patient does not currently have any appointments scheduled.   Recent Visits    08/04/2021 Dermatitis   [facility] AT [facility] and Master Turner MD The patient is Watcher - Medium risk of patient condition declining or worsening    Shift Goals  Clinical Goals: pain control  Patient Goals: pain control  Family Goals: N/A    Progress made toward(s) clinical / shift goals:    Problem: Knowledge Deficit - Standard  Goal: Patient and family/care givers will demonstrate understanding of plan of care, disease process/condition, diagnostic tests and medications  Outcome: Progressing     Problem: Hemodynamics  Goal: Patient's hemodynamics, fluid balance and neurologic status will be stable or improve  Outcome: Progressing     Problem: Fluid Volume  Goal: Fluid volume balance will be maintained  Outcome: Progressing     Problem: Urinary - Renal Perfusion  Goal: Ability to achieve and maintain adequate renal perfusion and functioning will improve  Outcome: Progressing     Problem: Respiratory  Goal: Patient will achieve/maintain optimum respiratory ventilation and gas exchange  Outcome: Progressing     Problem: Physical Regulation  Goal: Diagnostic test results will improve  Outcome: Progressing  Goal: Signs and symptoms of infection will decrease  Outcome: Progressing     Problem: Pain - Standard  Goal: Alleviation of pain or a reduction in pain to the patient’s comfort goal  Outcome: Progressing     Problem: Fall Risk  Goal: Patient will remain free from falls  Outcome: Progressing     Problem: Mobility  Goal: Patient's capacity to carry out activities will improve  Outcome: Progressing     Problem: Infection - Standard  Goal: Patient will remain free from infection  Outcome: Progressing     Problem: Skin Integrity  Goal: Skin integrity is maintained or improved  Outcome: Progressing     Problem: Mechanical Ventilation  Goal: Safe management of artificial airway and ventilation  Outcome: Met  Goal: Successful weaning off mechanical ventilator, spontaneously maintains adequate gas exchange  Outcome: Met  Goal: Patient will be able to express needs and understand  communication  Outcome: Met       Patient is not progressing towards the following goals:

## 2022-05-19 ENCOUNTER — OFFICE VISIT (OUTPATIENT)
Dept: INTERVENTIONAL RADIOLOGY/VASCULAR | Age: 87
End: 2022-05-19
Payer: MEDICARE

## 2022-05-19 VITALS
SYSTOLIC BLOOD PRESSURE: 122 MMHG | RESPIRATION RATE: 12 BRPM | HEART RATE: 76 BPM | HEIGHT: 62 IN | OXYGEN SATURATION: 98 % | DIASTOLIC BLOOD PRESSURE: 64 MMHG | WEIGHT: 173 LBS | BODY MASS INDEX: 31.83 KG/M2

## 2022-05-19 DIAGNOSIS — Z43.4 CHOLECYSTOSTOMY CARE (HCC): ICD-10-CM

## 2022-05-19 DIAGNOSIS — K81.0 ACUTE CHOLECYSTITIS: Primary | ICD-10-CM

## 2022-05-19 PROCEDURE — 99213 OFFICE O/P EST LOW 20 MIN: CPT | Performed by: NURSE PRACTITIONER

## 2022-05-19 ASSESSMENT — ENCOUNTER SYMPTOMS
VOMITING: 0
TROUBLE SWALLOWING: 0
BACK PAIN: 0
SHORTNESS OF BREATH: 0
GASTROINTESTINAL NEGATIVE: 1
NAUSEA: 0
SORE THROAT: 0
ABDOMINAL PAIN: 0
EYES NEGATIVE: 1
RESPIRATORY NEGATIVE: 1
DIARRHEA: 0
COUGH: 0
WHEEZING: 0
COLOR CHANGE: 0

## 2022-05-19 NOTE — PROGRESS NOTES
ZVASCULAR MEDICINE AND INTERVENTIONAL RADIOLOGY DEPARTMENT:         Argentina Villeda, a female of 80 y.o. came to the office 5/19/2022. Chief Complaint   Patient presents with    Follow-up     2 week follow up for adenike tube check     PROGRESS NOTE:     5/19/2022: Argenitna Villeda S/P week 4 percutaneous placement of cholecystostomy tube for acute cholecystitis. Here for adenike tube function evaluation, site check and dressing change. Present here with her daughter Kendall Dowell. In a wheelchair. Now living at home with her daughter. Tube is draining small amount of light pink/tan fluid. She denies any discomfort from drain and denies any abdominal pain or nausea/vomiting. Eating foods without difficulty. Having fatigue and generalized weakness. 5/4/2022: Argentina Villeda is here today for hospital procedure follow up S/P percutaneous placement of cholecystostomy tube for acute cholecystitis. Present here with her daughter Kendall Dowell. In a wheelchair. She is at [facility] for rehab with plans to eventually go home with daughter with Amanda 78. Tube is draining pink/orange colored fluid. She denies any discomfort from drain and denies any abdominal pain or nausea/vomiting. Eating foods without difficulty. She has some fatigue and generalized weakness. No family history on file.     Past Surgical History:   Procedure Laterality Date    CATARACT REMOVAL WITH IMPLANT Bilateral 2016    COLON SURGERY  2009    polyps    COLONOSCOPY  02/2009    GASTROSTOMY TUBE PLACEMENT N/A 11/04/2020    EGD PEG TUBE PLACEMENT performed by Nicolas Zavala MD at [address] [place] [address] Right 04/15/2022    Flexima APDL Locking Pigtail Draiange Catheter inserted by Dr. Liam Perez (Lot# [id number] / Exp: 12/07/2024)    IR CHOLECYSTOSTOMY PERCUTANEOUS COMPLETE  4/15/2022    IR CHOLECYSTOSTOMY PERCUTANEOUS COMPLETE 4/15/2022 [facility] SPECIAL PROCEDURE    SKIN CANCER EXCISION  2002    graft from neck        Past Medical History:   Diagnosis Date    Age-related cognitive decline     Cancer [facility] [address]     Cerebrovascular small vessel disease     Chronic nonspecific lung disease 2021    nodules R lung, fibrosis L apex    Glaucoma     Gout     left knee    Hypertension     Obesity (BMI 30-39.9) 07/11/2019    Obstructive sleep apnea 07/11/2019    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    Vitamin D deficiency 2019       Social History     Socioeconomic History    Marital status:      Spouse name: None    Number of children: None    Years of education: None    Highest education level: None   Occupational History    None   Tobacco Use    Smoking status: Never Smoker    Smokeless tobacco: Never Used   Vaping Use    Vaping Use: Never used   Substance and Sexual Activity    Alcohol use: No    Drug use: No    Sexual activity: Not Currently   Other Topics Concern    None   Social History Narrative    ,  Lives with her dtr [place]- Daughter (dtr does not work and is able to assist pt as needed), Son Satinder Sal is in the area    Home: [place]- in [address]: Two level,Able to Live on Main level with bedroom/bathroom    Home Access: Stairs to enter with rails- Number of Steps: 3    Bathroom Shower/Tub: Tub/Shower unit,  Shower chair,Grab bars in shower,Hand-held shower    Home Equipment: Rolling walker    ADL Assistance: Needs assistance    Homemaking Assistance: Needs assistance    Homemaking Responsibilities: No    Ambulation Assistance: Independent (used [employer]),  BSC, LIFT CHAIR, WALKER, CANE,     Transfer Assistance: Independent    Active : No    Additional Comments: pt is questionable historian- question decreased recall versus unable to accurately hear the question; pt reports no recent falls at home.                               Social Determinants of Health     Financial Resource Strain:     Difficulty of Paying Living Expenses: Not on file   Food Insecurity:     Worried About Running Out of Food in the Last Year: Not on file    Deborah of Food in the Last Year: Not on file   Transportation Needs:     Lack of Transportation (Medical): Not on file    Lack of Transportation (Non-Medical):  Not on file   Physical Activity:     Days of Exercise per Week: Not on file    Minutes of Exercise per Session: Not on file   Stress:     Feeling of Stress : Not on file   Social Connections:     Frequency of Communication with Friends and Family: Not on file    Frequency of Social Gatherings with Friends and Family: Not on file    Attends Pentecostal Services: Not on file    Active Member of [address] [place] [address] [employer] or Organizations: Not on file    Attends Club or Organization Meetings: Not on file    Marital Status: Not on file   Intimate Partner Violence:     Fear of Current or Ex-Partner: Not on file    Emotionally Abused: Not on file    Physically Abused: Not on file    Sexually Abused: Not on file   Housing Stability:     Unable to Pay for Housing in the Last Year: Not on file    Number of [place] in the Last Year: Not on file    Unstable Housing in the Last Year: Not on file       Allergies   Allergen Reactions    Norvasc [Amlodipine Besylate] Swelling       Current Outpatient Medications on File Prior to Visit   Medication Sig Dispense Refill    atorvastatin (LIPITOR) 20 MG tablet TAKE 1 TABLET BY MOUTH DAILY AT BEDTIME 90 tablet 0    acetaminophen (TYLENOL) 325 MG tablet Take 2 tablets by mouth every 6 hours as needed for Pain (Patient taking differently: Take 650 mg by mouth every 4 hours as needed for Pain or Fever ) 120 tablet 0    dorzolamide-timolol (COSOPT) 22.3-6.8 MG/ML ophthalmic solution PLACE 1 DROP INTO BOTH EYES 2 TIMES DAILY INDICATIONS: GLAUCOMA 10 mL 5    latanoprost (XALATAN) 0.005 % ophthalmic solution Place 1 drop into both eyes daily Indications: Glaucoma (Patient taking differently: Place 1 drop into both eyes nightly Indications: Glaucoma ) 2.5 mL 5    metoprolol tartrate (LOPRESSOR) 25 MG tablet Take 0.5 tablets by mouth 2 times daily Indications: High Blood Pressure Disorder 90 tablet 3    albuterol sulfate HFA (PROAIR HFA) 108 (90 Base) MCG/ACT inhaler Inhale 1 puff into the lungs every 6 hours as needed for Wheezing or Shortness of Breath 3 each 3    brimonidine (ALPHAGAN P) 0.15 % ophthalmic solution Place 1 drop into both eyes 2 times daily Indications: Glaucoma 1 each 1    zoster recombinant adjuvanted vaccine (SHINGRIX) 50 MCG/0.5ML SUSR injection Inject 0.5 mLs into the muscle See Admin Instructions 1 dose now and repeat in 2-6 months 0.5 mL 0    losartan (COZAAR) 25 MG tablet Take 1 tablet by mouth every 12 hours (Patient taking differently: Take 25 mg by mouth every 12 hours Indications: High Blood Pressure Disorder ) 180 tablet 3    Elastic Bandages & Supports (MEDICAL COMPRESSION STOCKINGS) MISC Knee high, 20 to 30 mm hg, remove at night, pharmacy to fit. 1 each 0    OXYGEN Inhale 2 L into the lungs Indications: PRN at bedtime      montelukast (SINGULAIR) 10 MG tablet Take 1 tablet by mouth nightly as needed (allergies) 90 tablet 3     No current facility-administered medications on file prior to visit. Review of Systems   Constitutional: Positive for fatigue. Negative for chills and fever. HENT: Negative. Negative for congestion, ear pain, sore throat and trouble swallowing. Eyes: Negative. Negative for visual disturbance. Respiratory: Negative. Negative for cough, shortness of breath and wheezing. Cardiovascular: Negative. Negative for chest pain, palpitations and leg swelling. Gastrointestinal: Negative. Negative for abdominal pain, diarrhea, nausea and vomiting. Endocrine: Negative. Genitourinary: Negative. Negative for difficulty urinating, dysuria and hematuria. Musculoskeletal: Negative. Negative for back pain. Skin: Negative. Negative for color change, rash and wound. 4/15/2022:   Impression   Impression:   1. Successful placement of a 10 Spanish cholecystostomy drain. 2. Cholangiogram demonstrated a gallbladder which is entirely filled with stones. The common bile duct did not opacify suggesting possible occlusion of the cystic duct. Purulent pus like yellow-green bile was aspirated and sent for microbiology analysis.       Additional clinical data:   Acute cholecystitis         ASSESSMENT AND PLAN:  Chart, medications, lab work reviewed. Diagnosis Orders   1. Acute cholecystitis     2. Cholecystostomy care [facility])         --  Stayfix dressing applied to site by me. Patient tolerated well without any complications. Plan:     No orders of the defined types were placed in this encounter. No orders of the defined types were placed in this encounter. --  Plan is for cholangiogram at request of Trauma team at 8 weeks post drain placement. They will then evaluate from results if drain can be removed. In mean time will continue to see in clinic every two weeks for dressing change, tube care and functional evaluation. --  Will also plan for tube exchange every 8 weeks until removed.     JULIO Mendoza - CNP

## 2022-05-25 ASSESSMENT — ENCOUNTER SYMPTOMS
NAUSEA: 0
VOMITING: 0
FACIAL SWELLING: 0
EYE REDNESS: 0
ABDOMINAL PAIN: 0
COUGH: 0

## 2022-05-25 NOTE — PROGRESS NOTES
Subjective:      Patient ID: Jimenez Manriquez is a pleasant 80 y.o. female who presents today for:  No chief complaint on file. [facility]ly [facility]    Patient seen status post recent cholecystectomy. Patient is doing well overall denies any acute abdominal pain or worsening complaints with eating or otherwise. BMs within normal limits. Some very mild tenderness to palpation over the right upper quadrant, however patient states not significant enough to warrant concern. Patient drain effective, no new issues per patient or nurse. Scant pink/light orange fluid noted with scant amount in bag currently. Patient appointment with Dr. Silvino Garcia in the next 8 weeks. Patient on low-dose atorvastatin. Unsure if still needed at patient's age, but due to her recent cholecystectomy we will leave on medication list.  We will revisit and DC statin in the next 2 weeks if patient maintains stability to clean up her med list and reduce pill burden. Otherwise patient eating and drinking well without new issues. Overall stable today. Patient Active Problem List   Diagnosis    Essential hypertension    Anxiety    COPD (chronic obstructive pulmonary disease) with acute bronchitis (HCC)    Obesity (BMI 30-39. 9)    Obstructive sleep apnea    Venous stasis dermatitis of both lower extremities    Impaired mobility and activities of daily living-due to cervical myelopathy    Acute cholecystitis    Muscle wasting and atrophy, not elsewhere classified, left hand    Osteoarthritis of cervical spine with myelopathy     Past Medical History:   Diagnosis Date    Age-related cognitive decline     Cancer ([facility] [address]     Cerebrovascular small vessel disease     Chronic nonspecific lung disease 2021    nodules R lung, fibrosis L apex    Glaucoma     Gout     left knee    Hypertension     Obesity (BMI 30-39.9) 07/11/2019    Obstructive sleep apnea 07/11/2019    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    Vitamin D deficiency 2019     Past Surgical History:   Procedure Laterality Date    CATARACT REMOVAL WITH IMPLANT Bilateral 2016    COLON SURGERY  2009    polyps    COLONOSCOPY  02/2009    GASTROSTOMY TUBE PLACEMENT N/A 11/04/2020    EGD PEG TUBE PLACEMENT performed by Jonathan Nance MD at [address] [place] [address] Right 04/15/2022    Flexima APDL Locking Pigtail Draiange Catheter inserted by Dr. Deon Bender (Lot# [id number] / Exp: 12/07/2024)    IR CHOLECYSTOSTOMY PERCUTANEOUS COMPLETE  4/15/2022    IR CHOLECYSTOSTOMY PERCUTANEOUS COMPLETE 4/15/2022 [facility] SPECIAL PROCEDURE    SKIN CANCER EXCISION  2002    graft from neck     Social History     Socioeconomic History    Marital status:       Spouse name: Not on file    Number of children: Not on file    Years of education: Not on file    Highest education level: Not on file   Occupational History    Not on file   Tobacco Use    Smoking status: Never Smoker    Smokeless tobacco: Never Used   Vaping Use    Vaping Use: Never used   Substance and Sexual Activity    Alcohol use: No    Drug use: No    Sexual activity: Not Currently   Other Topics Concern    Not on file   Social History Narrative    ,  Lives with her dtr Chacha Valderrama- Daughter (dtr does not work and is able to assist pt as needed), Son Harmony Dewitt is in the area    Home: House- in [address]: Two level,Able to Live on Main level with bedroom/bathroom    Home Access: Stairs to enter with rails- Number of Steps: 3    Bathroom Shower/Tub: Tub/Shower unit,  Shower chair,Grab bars in shower,Hand-held shower    Home Equipment: Rolling walker    ADL Assistance: Needs assistance    Homemaking Assistance: Needs assistance    Homemaking Responsibilities: No    Ambulation Assistance: Independent (used [facility]),  BSC, LIFT CHAIR, WALKER, CANE,     Transfer Assistance: Independent    Active : No    Additional Comments: pt is questionable historian- question decreased recall versus unable to accurately hear the question; pt reports no recent falls at home. Social Determinants of Health     Financial Resource Strain:     Difficulty of Paying Living Expenses: Not on file   Food Insecurity:     Worried About Running Out of Food in the Last Year: Not on file    Deborah of Food in the Last Year: Not on file   Transportation Needs:     Lack of Transportation (Medical): Not on file    Lack of Transportation (Non-Medical): Not on file   Physical Activity:     Days of Exercise per Week: Not on file    Minutes of Exercise per Session: Not on file   Stress:     Feeling of Stress : Not on file   Social Connections:     Frequency of Communication with Friends and Family: Not on file    Frequency of Social Gatherings with Friends and Family: Not on file    Attends Confucianism Services: Not on file    Active Member of [address] [place] [address] [employer] or Organizations: Not on file    Attends Club or Organization Meetings: Not on file    Marital Status: Not on file   Intimate Partner Violence:     Fear of Current or Ex-Partner: Not on file    Emotionally Abused: Not on file    Physically Abused: Not on file    Sexually Abused: Not on file   Housing Stability:     Unable to Pay for Housing in the Last Year: Not on file    Number of [place] in the Last Year: Not on file    Unstable Housing in the Last Year: Not on file     No family history on file. Allergies   Allergen Reactions    Norvasc [Amlodipine Besylate] Swelling         Review of Systems   Constitutional: Negative for activity change, appetite change (fair appetite) and fever. HENT: Positive for hearing loss. Negative for congestion, ear discharge, ear pain and facial swelling. Eyes: Negative for redness. Respiratory: Negative for cough. Cardiovascular: Negative for chest pain and leg swelling. Gastrointestinal: Negative for abdominal pain, nausea and vomiting.    Endocrine: Negative for cold intolerance and heat intolerance. Genitourinary: Negative for difficulty urinating and hematuria. Musculoskeletal: Negative for joint swelling. Neurological: Positive for weakness. Negative for tremors, speech difficulty and light-headedness. Hematological: Bruises/bleeds easily. Psychiatric/Behavioral: Positive for confusion (mild) and decreased concentration. Negative for agitation. The patient is not nervous/anxious. Objective:   LMP  (LMP Unknown)     Physical Exam  Constitutional:       General: She is not in acute distress. Appearance: She is obese. She is not ill-appearing. Comments: Patient quiet demeanor sitting at bedside in her recliner appears comfortable and in no acute distress. HENT:      Head: Atraumatic. Nose: No rhinorrhea. Mouth/Throat:      Mouth: Mucous membranes are moist.   Eyes:      General: No scleral icterus. Cardiovascular:      Rate and Rhythm: Normal rate and regular rhythm. Pulses: Normal pulses. Heart sounds: No gallop. Pulmonary:      Effort: No respiratory distress. Breath sounds: No wheezing or rhonchi. Chest:      Chest wall: No tenderness. Abdominal:      General: Bowel sounds are normal. There is no distension. Palpations: Abdomen is soft. Tenderness: There is no abdominal tenderness. There is no guarding or rebound. Comments: The cholecystostomy drain is in place; pink/light orange fluid noted scant amount of bag   Musculoskeletal:      Cervical back: No rigidity. Skin:     General: Skin is warm and dry. Coloration: Skin is pale (General). Findings: Bruising ( ) present. Neurological:      General: No focal deficit present. Mental Status: She is alert. Motor: Weakness present. Coordination: Coordination normal.      Comments:  For full evaluation of strength, functional mobility, please refer to the PT/OT notes   Psychiatric:         Attention and Perception: She is attentive. Mood and Affect: Mood is not anxious or depressed. Affect is flat. Affect is not labile. Speech: Speech is delayed. Behavior: Behavior is slowed. Behavior is not withdrawn. Thought Content: Thought content is not delusional.         Cognition and Memory: Memory is impaired. Comments: Oriented to self and place         Assessment:       Diagnosis Orders   1. Acute cholecystitis     2. Cholecystostomy care ([facility] [address]     3. Hyperlipidemia, unspecified hyperlipidemia type           Plan:        1. Patient to follow-up with Dr. Michael Ramírez within the next several weeks. We will have appoint with interventional radiology as well in 8 weeks. 2.  No new sequelae noted. Draining well continue current orders. 3.  We will await follow-up with GI doc, will send to the DC of atorvastatin due to patient age and pill burden in the coming weeks prior to discharge. No follow-ups on file. Side effects, adverse effects of the medication prescribed today, as well as treatment plan and result expectations have been discussed withthe patient who expresses understanding and desires to proceed.     Kimi Mancia

## 2022-06-02 ENCOUNTER — PREP FOR PROCEDURE (OUTPATIENT)
Dept: INTERVENTIONAL RADIOLOGY/VASCULAR | Age: 87
End: 2022-06-02

## 2022-06-02 ENCOUNTER — OFFICE VISIT (OUTPATIENT)
Dept: INTERVENTIONAL RADIOLOGY/VASCULAR | Age: 87
End: 2022-06-02
Payer: MEDICARE

## 2022-06-02 VITALS
WEIGHT: 173 LBS | SYSTOLIC BLOOD PRESSURE: 131 MMHG | DIASTOLIC BLOOD PRESSURE: 66 MMHG | BODY MASS INDEX: 31.64 KG/M2 | HEART RATE: 87 BPM

## 2022-06-02 DIAGNOSIS — K81.0 ACUTE CHOLECYSTITIS: Primary | ICD-10-CM

## 2022-06-02 DIAGNOSIS — Z43.4 CHOLECYSTOSTOMY CARE (HCC): ICD-10-CM

## 2022-06-02 DIAGNOSIS — K80.37 CALCULUS OF BILE DUCT WITH ACUTE AND CHRONIC CHOLANGITIS WITH OBSTRUCTION: ICD-10-CM

## 2022-06-02 PROCEDURE — 1123F ACP DISCUSS/DSCN MKR DOCD: CPT | Performed by: NURSE PRACTITIONER

## 2022-06-02 PROCEDURE — 99214 OFFICE O/P EST MOD 30 MIN: CPT | Performed by: NURSE PRACTITIONER

## 2022-06-02 ASSESSMENT — ENCOUNTER SYMPTOMS
EYES NEGATIVE: 1
VOMITING: 0
COUGH: 0
ABDOMINAL PAIN: 0
TROUBLE SWALLOWING: 0
WHEEZING: 0
COLOR CHANGE: 0
BACK PAIN: 0
SORE THROAT: 0
SHORTNESS OF BREATH: 0
RESPIRATORY NEGATIVE: 1
GASTROINTESTINAL NEGATIVE: 1
DIARRHEA: 0
NAUSEA: 0

## 2022-06-02 NOTE — PROGRESS NOTES
ZVASCULAR MEDICINE AND INTERVENTIONAL RADIOLOGY DEPARTMENT:         Mason Lira, a female of 80 y.o. came to the office 6/2/2022. Chief Complaint   Patient presents with    Post-Op Check     4 week adenike tube check      PROGRESS NOTE:     6/2/2022: Mason Lira S/P 6 weeks percutaneous placement of cholecystostomy tube for acute cholecystitis. Here for adenike tube function evaluation, site check and dressing change. Present here with her daughter Khurram Johnston who cares for her at home. In a wheelchair. Daughter states she is doing somewhat better than at discharge but not much improvement. Tube is draining small amount of light pink fluid. Reports occasional RUQ pain, not daily. Continues to have generalized fatigue and weakness, and forgetfulness. 5/19/2022: Mason Lira S/P week 4 percutaneous placement of cholecystostomy tube for acute cholecystitis. Here for adenike tube function evaluation, site check and dressing change. Present here with her daughter Khurram Johnston. In a wheelchair. Now living at home with her daughter. Tube is draining small amount of light pink/tan fluid. She denies any discomfort from drain and denies any abdominal pain or nausea/vomiting. Eating foods without difficulty. Having fatigue and generalized weakness. 5/4/2022: Mason Lira is here today for hospital procedure follow up S/P percutaneous placement of cholecystostomy tube for acute cholecystitis. Present here with her daughter Khurram Johnston. In a wheelchair. She is at [facility] for rehab with plans to eventually go home with daughter with Amanda Griffin. Tube is draining pink/orange colored fluid. She denies any discomfort from drain and denies any abdominal pain or nausea/vomiting. Eating foods without difficulty. She has some fatigue and generalized weakness. No family history on file.     Past Surgical History:   Procedure Laterality Date    CATARACT REMOVAL WITH IMPLANT Bilateral 2016    COLON SURGERY  2009    polyps    COLONOSCOPY  02/2009    GASTROSTOMY TUBE PLACEMENT N/A 11/04/2020    EGD PEG TUBE PLACEMENT performed by Jennifer Saleh MD at [address] Right 04/15/2022    Flexima APDL Locking Pigtail Draiange Catheter inserted by Dr. Rosa Elena Nelson (Lot# [id number] / Exp: 12/07/2024)    IR CHOLECYSTOSTOMY PERCUTANEOUS COMPLETE  4/15/2022    IR CHOLECYSTOSTOMY PERCUTANEOUS COMPLETE 4/15/2022 [facility] SPECIAL PROCEDURE    SKIN CANCER EXCISION  2002    graft from neck        Past Medical History:   Diagnosis Date    Age-related cognitive decline     Cancer ([address]     Cerebrovascular small vessel disease     Chronic nonspecific lung disease 2021    nodules R lung, fibrosis L apex    Glaucoma     Gout     left knee    Hypertension     Obesity (BMI 30-39.9) 07/11/2019    Obstructive sleep apnea 07/11/2019    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    Vitamin D deficiency 2019       Social History     Socioeconomic History    Marital status:       Spouse name: Not on file    Number of children: Not on file    Years of education: Not on file    Highest education level: Not on file   Occupational History    Not on file   Tobacco Use    Smoking status: Never Smoker    Smokeless tobacco: Never Used   Vaping Use    Vaping Use: Never used   Substance and Sexual Activity    Alcohol use: No    Drug use: No    Sexual activity: Not Currently   Other Topics Concern    Not on file   Social History Narrative    ,  Lives with her dtr Tamera Kendrick- Daughter (dtr does not work and is able to assist pt as needed), Son Albina Heads is in the area    Home: [place]- in [address]: Two level,Able to Live on Main level with bedroom/bathroom    Home Access: Stairs to enter with rails- Number of Steps: 3    Bathroom Shower/Tub: Tub/Shower unit,  Shower chair,Grab bars in shower,Hand-held shower    Home Equipment: Rolling walker    ADL Assistance: Needs assistance    Homemaking Assistance: Needs assistance    Homemaking Responsibilities: No    Ambulation Assistance: Independent (used [employer]),  BSC, LIFT CHAIR, WALKER, CANE,     Transfer Assistance: Independent    Active : No    Additional Comments: pt is questionable historian- question decreased recall versus unable to accurately hear the question; pt reports no recent falls at home. Social Determinants of Health     Financial Resource Strain:     Difficulty of Paying Living Expenses: Not on file   Food Insecurity:     Worried About Running Out of Food in the Last Year: Not on file    Deborah of Food in the Last Year: Not on file   Transportation Needs:     Lack of Transportation (Medical): Not on file    Lack of Transportation (Non-Medical):  Not on file   Physical Activity:     Days of Exercise per Week: Not on file    Minutes of Exercise per Session: Not on file   Stress:     Feeling of Stress : Not on file   Social Connections:     Frequency of Communication with Friends and Family: Not on file    Frequency of Social Gatherings with Friends and Family: Not on file    Attends Yarsani Services: Not on file    Active Member of [address] [place] [address] [employer] or Organizations: Not on file    Attends Club or Organization Meetings: Not on file    Marital Status: Not on file   Intimate Partner Violence:     Fear of Current or Ex-Partner: Not on file    Emotionally Abused: Not on file    Physically Abused: Not on file    Sexually Abused: Not on file   Housing Stability:     Unable to Pay for Housing in the Last Year: Not on file    Number of [place] in the Last Year: Not on file    Unstable Housing in the Last Year: Not on file       Allergies   Allergen Reactions    Norvasc [Amlodipine Besylate] Swelling       Current Outpatient Medications on File Prior to Visit   Medication Sig Dispense Refill    atorvastatin (LIPITOR) 20 MG tablet TAKE 1 TABLET BY MOUTH DAILY AT BEDTIME 90 tablet 0    acetaminophen (TYLENOL) 325 MG tablet Take 2 tablets by mouth every 6 hours as needed for Pain (Patient taking differently: Take 650 mg by mouth every 4 hours as needed for Pain or Fever ) 120 tablet 0    dorzolamide-timolol (COSOPT) 22.3-6.8 MG/ML ophthalmic solution PLACE 1 DROP INTO BOTH EYES 2 TIMES DAILY INDICATIONS: GLAUCOMA 10 mL 5    latanoprost (XALATAN) 0.005 % ophthalmic solution Place 1 drop into both eyes daily Indications: Glaucoma (Patient taking differently: Place 1 drop into both eyes nightly Indications: Glaucoma ) 2.5 mL 5    metoprolol tartrate (LOPRESSOR) 25 MG tablet Take 0.5 tablets by mouth 2 times daily Indications: High Blood Pressure Disorder 90 tablet 3    albuterol sulfate HFA (PROAIR HFA) 108 (90 Base) MCG/ACT inhaler Inhale 1 puff into the lungs every 6 hours as needed for Wheezing or Shortness of Breath 3 each 3    brimonidine (ALPHAGAN P) 0.15 % ophthalmic solution Place 1 drop into both eyes 2 times daily Indications: Glaucoma 1 each 1    zoster recombinant adjuvanted vaccine (SHINGRIX) 50 MCG/0.5ML SUSR injection Inject 0.5 mLs into the muscle See Admin Instructions 1 dose now and repeat in 2-6 months 0.5 mL 0    losartan (COZAAR) 25 MG tablet Take 1 tablet by mouth every 12 hours (Patient taking differently: Take 25 mg by mouth every 12 hours Indications: High Blood Pressure Disorder ) 180 tablet 3    Elastic Bandages & Supports (MEDICAL COMPRESSION STOCKINGS) MISC Knee high, 20 to 30 mm hg, remove at night, pharmacy to fit. 1 each 0    montelukast (SINGULAIR) 10 MG tablet Take 1 tablet by mouth nightly as needed (allergies) 90 tablet 3    OXYGEN Inhale 2 L into the lungs Indications: PRN at bedtime       No current facility-administered medications on file prior to visit. Review of Systems   Constitutional: Positive for fatigue. Negative for chills and fever. HENT: Negative.   Negative for congestion, ear pain, sore throat and trouble swallowing. Eyes: Negative. Negative for visual disturbance. Respiratory: Negative. Negative for cough, shortness of breath and wheezing. Cardiovascular: Negative. Negative for chest pain, palpitations and leg swelling. Gastrointestinal: Negative. Negative for abdominal pain, diarrhea, nausea and vomiting. Endocrine: Negative. Genitourinary: Negative. Negative for difficulty urinating, dysuria and hematuria. Musculoskeletal: Negative. Negative for back pain. Skin: Negative. Negative for color change, rash and wound. Neurological: Positive for weakness. Negative for dizziness, light-headedness, numbness and headaches. Hematological: Negative. Does not bruise/bleed easily. Psychiatric/Behavioral: Negative for confusion. The patient is not nervous/anxious. Forgetful   All other systems reviewed and are negative. OBJECTIVE:  /66   Pulse 87   Wt 173 lb (78.5 kg)   LMP  (LMP Unknown)   BMI 31.64 kg/m²     Physical Exam  Constitutional:       General: She is not in acute distress. Appearance: Normal appearance. She is well-developed. She is not ill-appearing. HENT:      Head: Normocephalic and atraumatic. Nose: Nose normal. No congestion. Neck:      Thyroid: No thyromegaly. Vascular: No JVD. Trachea: No tracheal deviation. Cardiovascular:      Rate and Rhythm: Normal rate and regular rhythm. Pulses: Normal pulses. Dorsalis pedis pulses are 2+ on the right side and 2+ on the left side. Posterior tibial pulses are 2+ on the right side and 2+ on the left side. Heart sounds: Normal heart sounds. No murmur heard. Pulmonary:      Effort: Pulmonary effort is normal.   Abdominal:      General: Bowel sounds are normal. There is no distension. Palpations: Abdomen is soft. Comments: cholecystostomy tube draining pink/orange fluid. Site with suture intact without drainage or erythema.     Musculoskeletal: General: No tenderness or signs of injury. Normal range of motion. Cervical back: Normal range of motion. Right lower leg: No edema. Left lower leg: No edema. Skin:     General: Skin is warm and dry. Capillary Refill: Capillary refill takes 2 to 3 seconds. Findings: No bruising, erythema or lesion. Neurological:      Mental Status: She is alert and oriented to person, place, and time. Psychiatric:         Mood and Affect: Mood normal.         Behavior: Behavior normal.         4/15/2022:   Impression   Impression:   1. Successful placement of a 10 [place] cholecystostomy drain. 2. Cholangiogram demonstrated a gallbladder which is entirely filled with stones. The common bile duct did not opacify suggesting possible occlusion of the cystic duct. Purulent pus like yellow-green bile was aspirated and sent for microbiology analysis.       Additional clinical data:   Acute cholecystitis     Contains abnormal data CBC + DIFF  Order: [id number]   Ref Range & Units 5/4/22 0445   WBC 3.70 - 11.00 k/uL 7.00    RBC 3.90 - 5.20 m/uL 4.03    Hemoglobin 11.5 - 15.5 g/dL 11.7    Hematocrit 36.0 - 46.0 % 35.6 Low     MCV 80.0 - 100.0 fL 88.3    MCH 26.0 - 34.0 pg 29.0    MCHC 30.5 - 36.0 g/dL 32.9    RDW-CV 11.5 - 15.0 % 14.4    Platelet Count 264 - 400 k/uL 197      COMP METABOLIC PANEL  Order: [id number]   Ref Range & Units 5/4/22 0445   Protein, Total 6.3 - 8.0 g/dL 6.0 Low     Albumin 3.9 - 4.9 g/dL 3.5 Low     Calcium, Total 8.5 - 10.2 mg/dL 9.0    Bilirubin, Total 0.2 - 1.3 mg/dL 0.6    Alkaline Phosphatase 34 - 123 U/L 125 High     AST 13 - 35 U/L 12 Low     ALT 7 - 38 U/L 8    Glucose 74 - 99 mg/dL 90    Comment: The American Diabetes Association (ADA) provides guidance for cutoff values for fasting glucose and random glucose. The ADA defines fasting as no caloric intake for at least 8 hours.  Fasting plasma glucose results between 100 to 125 mg/dL indicate increased risk for diabetes (prediabetes). Fasting plasma glucose results greater than or equal to 126 mg/dL meet the criteria for diagnosis of diabetes. In the absence of unequivocal hyperglycemia, results should be confirmed by repeat testing. In a patient with classic symptoms of hyperglycemia or hyperglycemic crisis, random plasma glucose results greater than or equal to 200 mg/dL meet the criteria for diagnosis of diabetes. Reference: Standards of Medical Care in Diabetes 2016, American Diabetes Association. Diabetes Care. 2016.39(Suppl 1). BUN 7 - 21 mg/dL 28 High     Creatinine 0.58 - 0.96 mg/dL 1.01 High     Sodium 136 - 144 mmol/L 142    Potassium 3.7 - 5.1 mmol/L 4.2    Chloride 97 - 105 mmol/L 106 High     CO2 22 - 30 mmol/L 20 Low     Anion Gap 9 - 18 mmol/L 16    Estimated Glomerular Filtration Rate >=60 mL/min/1.73m² 52 Low           ASSESSMENT AND PLAN:  Above lab work reviewed. Diagnosis Orders   1. Acute cholecystitis  IR TUBE/CATH CHANGE W CONTRAST    Protime-INR   2. Cholecystostomy care ([address]  IR TUBE/CATH CHANGE W CONTRAST    Protime-INR   3. 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 Patient tolerated well without any complications. No noted s/sx infection to site. Plan:     Orders Placed This Encounter   Procedures    IR TUBE/CATH CHANGE W CONTRAST     Standing Status:   Future     Standing Expiration Date:   6/2/2023    Protime-INR     Standing Status:   Future     Standing Expiration Date:   6/2/2023     Order Specific Question:   Daily Coumadin Dose? Answer:   . ............... No orders of the defined types were placed in this encounter. --  Plan is for cholangiogram PTC at request of Trauma team at 8 weeks post drain placement. They will then evaluate from results if drain can be removed.  In mean time will continue to see in clinic every two weeks for dressing change, tube care and functional evaluation. --  Will also plan for tube exchange every 8 weeks until removed. --  In two weeks, cholangiogram with cholecystostomy tube exchange with conscious sedation. Procedure with risks including infection, bleeding, pain, bacteremia, hemobilia, pancreatitis, allergic reaction. Total time spent for this encounter: 35 minutes.         Oseas Elaine, JULIO - CNP

## 2022-06-08 LAB
INR BLD: 1.1
PROTHROMBIN TIME: 14.2 SEC (ref 12.3–14.9)

## 2022-06-09 NOTE — PROGRESS NOTES
Pt had daughters at bedside throughout the day. Pt without complaints today ambulated to br with walker tolerated well with standby assist. Pt had 175cc of drainage to her drainage  Bag, which was emptied and flushed. Awaiting pre-cert for Olaf Barrett per care coordinator today. 4 = No assist / stand by assistance

## 2022-06-16 ENCOUNTER — HOSPITAL ENCOUNTER (OUTPATIENT)
Dept: INTERVENTIONAL RADIOLOGY/VASCULAR | Age: 87
Discharge: HOME | End: 2022-06-18
Payer: MEDICARE

## 2022-06-16 ENCOUNTER — HOSPITAL ENCOUNTER (OUTPATIENT)
Dept: CARDIAC CATH/INVASIVE PROCEDURES | Age: 87
Discharge: HOME | End: 2022-06-16
Payer: MEDICARE

## 2022-06-16 VITALS
RESPIRATION RATE: 13 BRPM | HEART RATE: 79 BPM | SYSTOLIC BLOOD PRESSURE: 115 MMHG | DIASTOLIC BLOOD PRESSURE: 55 MMHG | OXYGEN SATURATION: 92 %

## 2022-06-16 DIAGNOSIS — Z43.4 CHOLECYSTOSTOMY CARE (HCC): ICD-10-CM

## 2022-06-16 DIAGNOSIS — K81.0 ACUTE CHOLECYSTITIS: ICD-10-CM

## 2022-06-16 PROCEDURE — C1729 CATH, DRAINAGE: HCPCS

## 2022-06-16 PROCEDURE — 6360000002 HC RX W HCPCS: Performed by: NURSE PRACTITIONER

## 2022-06-16 PROCEDURE — 87186 SC STD MICRODIL/AGAR DIL: CPT

## 2022-06-16 PROCEDURE — 87070 CULTURE OTHR SPECIMN AEROBIC: CPT

## 2022-06-16 PROCEDURE — 6360000004 HC RX CONTRAST MEDICATION: Performed by: NURSE PRACTITIONER

## 2022-06-16 PROCEDURE — 2500000003 HC RX 250 WO HCPCS

## 2022-06-16 PROCEDURE — 6360000002 HC RX W HCPCS

## 2022-06-16 PROCEDURE — 47531 INJECTION FOR CHOLANGIOGRAM: CPT | Performed by: RADIOLOGY

## 2022-06-16 PROCEDURE — 47536 EXCHANGE BILIARY DRG CATH: CPT

## 2022-06-16 PROCEDURE — 75984 XRAY CONTROL CATHETER CHANGE: CPT | Performed by: RADIOLOGY

## 2022-06-16 PROCEDURE — 47531 INJECTION FOR CHOLANGIOGRAM: CPT

## 2022-06-16 PROCEDURE — 2580000003 HC RX 258: Performed by: NURSE PRACTITIONER

## 2022-06-16 PROCEDURE — 99152 MOD SED SAME PHYS/QHP 5/>YRS: CPT | Performed by: RADIOLOGY

## 2022-06-16 PROCEDURE — 2500000003 HC RX 250 WO HCPCS: Performed by: NURSE PRACTITIONER

## 2022-06-16 PROCEDURE — 2580000003 HC RX 258

## 2022-06-16 PROCEDURE — 86403 PARTICLE AGGLUT ANTBDY SCRN: CPT

## 2022-06-16 RX ORDER — LIDOCAINE HYDROCHLORIDE 20 MG/ML
10 INJECTION, SOLUTION INFILTRATION; PERINEURAL
Status: DISCONTINUED | OUTPATIENT
Start: 2022-06-16 | End: 2022-06-19 | Stop reason: HOSPADM

## 2022-06-16 RX ORDER — FENTANYL CITRATE 50 UG/ML
50 INJECTION, SOLUTION INTRAMUSCULAR; INTRAVENOUS
Status: DISCONTINUED | OUTPATIENT
Start: 2022-06-16 | End: 2022-06-19 | Stop reason: HOSPADM

## 2022-06-16 RX ORDER — 0.9 % SODIUM CHLORIDE 0.9 %
250 INTRAVENOUS SOLUTION INTRAVENOUS
Status: DISCONTINUED | OUTPATIENT
Start: 2022-06-16 | End: 2022-06-19 | Stop reason: HOSPADM

## 2022-06-16 RX ORDER — 0.9 % SODIUM CHLORIDE 0.9 %
500 INTRAVENOUS SOLUTION INTRAVENOUS
Status: DISCONTINUED | OUTPATIENT
Start: 2022-06-16 | End: 2022-06-19 | Stop reason: HOSPADM

## 2022-06-16 RX ORDER — MIDAZOLAM HYDROCHLORIDE 2 MG/2ML
0.5 INJECTION, SOLUTION INTRAMUSCULAR; INTRAVENOUS
Status: DISCONTINUED | OUTPATIENT
Start: 2022-06-16 | End: 2022-06-19 | Stop reason: HOSPADM

## 2022-06-16 RX ADMIN — SODIUM CHLORIDE 500 ML: 9 INJECTION, SOLUTION INTRAVENOUS at 13:19

## 2022-06-16 RX ADMIN — CEFAZOLIN 1000 MG: 1 INJECTION, POWDER, FOR SOLUTION INTRAMUSCULAR; INTRAVENOUS at 13:15

## 2022-06-16 RX ADMIN — FENTANYL CITRATE 50 MCG: 50 INJECTION, SOLUTION INTRAMUSCULAR; INTRAVENOUS at 13:39

## 2022-06-16 RX ADMIN — MIDAZOLAM HYDROCHLORIDE 0.5 MG: 1 INJECTION, SOLUTION INTRAMUSCULAR; INTRAVENOUS at 13:40

## 2022-06-16 RX ADMIN — IOPAMIDOL 15 ML: 755 INJECTION, SOLUTION INTRAVENOUS at 13:30

## 2022-06-16 RX ADMIN — LIDOCAINE HYDROCHLORIDE 7 ML: 20 INJECTION, SOLUTION INFILTRATION; PERINEURAL at 13:40

## 2022-06-16 ASSESSMENT — PAIN - FUNCTIONAL ASSESSMENT
PAIN_FUNCTIONAL_ASSESSMENT: 0-10
PAIN_FUNCTIONAL_ASSESSMENT: NONE - DENIES PAIN

## 2022-06-16 ASSESSMENT — PAIN SCALES - GENERAL: PAINLEVEL_OUTOF10: 1

## 2022-06-16 NOTE — PROGRESS NOTES
1400 Patient to CT holding room. Skin is pale pink, warm, dry. Respirations are even and non labored. Vital signs stable. Patient resting on cart with eyes closed. No complaints of pain post cholangiogram and exchange of  Cholecystostomy tube. Cholecystostomy tube has bright red drainage present in tubing. Dressing over the tube site is clean, dry, intact. 1420 Patient is resting on cart. Patient awake. Patient denies pain. Vital signs stable. Dressing  On right upper quadrant where cholecystostomy tube is placed remains clean,dry,intact. 1440 Dressing on adenike tube is clean,dry,intact. Patient denies pain currently. Daughters at bedside. Vitals remain stable. Patient helped to get dressed and assisted to wheelchair. Discharge instructions printed and reviewed with the patient and daughters. Patient and daughters  verbalizes understanding of discharge instructions. Copy of discharge instructions given to the patient. IV removed. IV end intact. IV site non red or hard. Patient taken to front of hospital. Son in law  to drive them home.

## 2022-06-16 NOTE — PROGRESS NOTES
Pt brought to holding room via w/c. daughters at side. Assisted pt onto cart and into a gown. pt denies pain or sob. choley tube intact with dressing and scant red drainage noted in the drain bag. Placed onto monitor IV inserted. History, allergies and medications reviewed. Pt has been npo except med with sips this morning. 1 Dr Bindu Maldonado here to evaluate pt. Consent signed after procedure explained by pt daughter. verbalize understanding of choloey tube evaluation and then DR Bindu Maldonado to speak with the trauma surgery team regarding the tube. 1305 pt taken to cath lab 3 via cart.

## 2022-06-16 NOTE — OR NURSING
SEDATION            Pt to cath lab 3 via cart from CT holding. Pt transferred onto the table and placed in prone position with staff assistance x3. Monitors and 2L NC oxygen/end tidal CO2 applied. Consent verified. VSS. Pt A&Ox3, respirations even and unlabored, skin pale/warm/dry and pt [place]. Existing right nephrostomy bag disconnected and discarded and then existing site dressing removed. Site appears with purulent tan drainage at exit site, cultures obtained and orders rec'd. Area generously prepped with chlorhexidine per [facility]. Pt draped with full body drape in sterile fashion. Time out completed for Cholangiogram with cholecystotomy tube exchange with conscious sedation. Conscious sedation medication given per Dr. Zoe Walker verbal order, see eMar.       Lidocaine 2 % given by Dr. Zoe Walker at the marked site, see eMar.     Dr. Zoe Walker hand injected 50/50 mixture of contrast/saline via existing cholecystostomy tube and images obtained, see eMar. Dr. Zoe Walker reviewed images and plan to exchange cholecytostomy tube. Dr. Zoe Walker unlocks pigtail of cholecystostomy tube and then inserted Amplatz guidewire via existing cholecystostomy tube using fluoro guidance and then cholecystostomy tube removed intact. [address] catheter system prepared for insertion by [facility]. Using fluoro guidance, Dr. Zoe Walker placed [address] catheter 10F by 25cm (Lot #[id number], expires 04/01/25) over guidewire and wire removed. Dr. Zoe Walker hand injected 50/50 mixture of contrast/saline under fluoro guidance to confirm placement. Image obtained. Dr. Zoe Walker suturing around the drain to secure the drain to the skin with 2-O prolene. Digital pressure held to exit site for 5 minutes, for small amount of oozing. Site c/d/i. Stay fix dressing applied, guaze, and tegaderm. No bleeding noted. VSS. [place] drain bag applied and draining serosanguinous fluid.       Pt transferred back to cart with staff assist x 3 and to return to CT holding for recovery for 1 hour. Pt tolerated well. Informed NANCY Whitfield in Dr. Debbi Nieves office pt needs follow up in office for dressing change and/or tube change. Electronically signed by Mohan Kothari RN on 6/16/2022 at 1:57 PM            TOTAL CONTRAST: 15 ml     TOTAL SEDATION:  50 mcg FENTANYL                                     0.5 mg VERSED

## 2022-06-20 ENCOUNTER — HOSPITAL ENCOUNTER (OUTPATIENT)
Dept: ULTRASOUND IMAGING | Age: 87
Discharge: HOME | End: 2022-06-22
Payer: MEDICARE

## 2022-06-20 ENCOUNTER — OFFICE VISIT (OUTPATIENT)
Dept: FAMILY MEDICINE CLINIC | Age: 87
End: 2022-06-20
Payer: MEDICARE

## 2022-06-20 ENCOUNTER — TELEPHONE (OUTPATIENT)
Dept: FAMILY MEDICINE CLINIC | Age: 87
End: 2022-06-20

## 2022-06-20 VITALS
TEMPERATURE: 97.4 F | OXYGEN SATURATION: 96 % | DIASTOLIC BLOOD PRESSURE: 71 MMHG | HEART RATE: 78 BPM | SYSTOLIC BLOOD PRESSURE: 136 MMHG

## 2022-06-20 DIAGNOSIS — R09.89 SWOLLEN VEIN: ICD-10-CM

## 2022-06-20 DIAGNOSIS — Z87.19 HISTORY OF CHOLECYSTITIS: Primary | ICD-10-CM

## 2022-06-20 DIAGNOSIS — I82.890 SUPERFICIAL VEIN THROMBOSIS: ICD-10-CM

## 2022-06-20 DIAGNOSIS — R79.82 ELEVATED C-REACTIVE PROTEIN (CRP): ICD-10-CM

## 2022-06-20 DIAGNOSIS — M79.89 ARM SWELLING: ICD-10-CM

## 2022-06-20 DIAGNOSIS — R94.31 ABNORMAL ELECTROCARDIOGRAM (ECG) (EKG): ICD-10-CM

## 2022-06-20 DIAGNOSIS — Z09 HOSPITAL DISCHARGE FOLLOW-UP: ICD-10-CM

## 2022-06-20 DIAGNOSIS — Z87.440 HISTORY OF UTI: ICD-10-CM

## 2022-06-20 DIAGNOSIS — Z87.19 HISTORY OF CHOLECYSTITIS: ICD-10-CM

## 2022-06-20 DIAGNOSIS — R93.89 ABNORMAL CXR: ICD-10-CM

## 2022-06-20 DIAGNOSIS — I50.9 CONGESTIVE HEART FAILURE, UNSPECIFIED HF CHRONICITY, UNSPECIFIED HEART FAILURE TYPE (HCC): ICD-10-CM

## 2022-06-20 LAB
ALBUMIN SERPL-MCNC: 4.3 G/DL (ref 3.5–4.6)
ALP BLD-CCNC: 148 U/L (ref 40–130)
ALT SERPL-CCNC: 10 U/L (ref 0–33)
ANION GAP SERPL CALCULATED.3IONS-SCNC: 16 MEQ/L (ref 9–15)
AST SERPL-CCNC: 12 U/L (ref 0–35)
BASOPHILS ABSOLUTE: 0 K/UL (ref 0–0.2)
BASOPHILS RELATIVE PERCENT: 0.6 %
BILIRUB SERPL-MCNC: 0.4 MG/DL (ref 0.2–0.7)
BUN BLDV-MCNC: 17 MG/DL (ref 8–23)
C-REACTIVE PROTEIN: 20.2 MG/L (ref 0–5)
CALCIUM SERPL-MCNC: 9.2 MG/DL (ref 8.5–9.9)
CHLORIDE BLD-SCNC: 106 MEQ/L (ref 95–107)
CO2: 23 MEQ/L (ref 20–31)
CREAT SERPL-MCNC: 0.88 MG/DL (ref 0.5–0.9)
EOSINOPHILS ABSOLUTE: 0.2 K/UL (ref 0–0.7)
EOSINOPHILS RELATIVE PERCENT: 2.4 %
GFR AFRICAN AMERICAN: >60
GFR NON-AFRICAN AMERICAN: 59.8
GLOBULIN: 2.8 G/DL (ref 2.3–3.5)
GLUCOSE BLD-MCNC: 99 MG/DL (ref 70–99)
HCT VFR BLD CALC: 37.2 % (ref 37–47)
HEMOGLOBIN: 12 G/DL (ref 12–16)
LYMPHOCYTES ABSOLUTE: 1.2 K/UL (ref 1–4.8)
LYMPHOCYTES RELATIVE PERCENT: 17 %
MCH RBC QN AUTO: 28.1 PG (ref 27–31.3)
MCHC RBC AUTO-ENTMCNC: 32.2 % (ref 33–37)
MCV RBC AUTO: 87 FL (ref 82–100)
MONOCYTES ABSOLUTE: 0.5 K/UL (ref 0.2–0.8)
MONOCYTES RELATIVE PERCENT: 7.2 %
NEUTROPHILS ABSOLUTE: 5 K/UL (ref 1.4–6.5)
NEUTROPHILS RELATIVE PERCENT: 72.8 %
ORGANISM: ABNORMAL
PDW BLD-RTO: 16.9 % (ref 11.5–14.5)
PLATELET # BLD: 212 K/UL (ref 130–400)
POTASSIUM SERPL-SCNC: 4.7 MEQ/L (ref 3.4–4.9)
PROCALCITONIN: 0.06 NG/ML (ref 0–0.15)
RBC # BLD: 4.28 M/UL (ref 4.2–5.4)
SODIUM BLD-SCNC: 145 MEQ/L (ref 135–144)
TOTAL PROTEIN: 7.1 G/DL (ref 6.3–8)
WBC # BLD: 6.8 K/UL (ref 4.8–10.8)
WOUND/ABSCESS: ABNORMAL
WOUND/ABSCESS: ABNORMAL

## 2022-06-20 PROCEDURE — 99215 OFFICE O/P EST HI 40 MIN: CPT | Performed by: INTERNAL MEDICINE

## 2022-06-20 PROCEDURE — 1111F DSCHRG MED/CURRENT MED MERGE: CPT | Performed by: INTERNAL MEDICINE

## 2022-06-20 PROCEDURE — 1123F ACP DISCUSS/DSCN MKR DOCD: CPT | Performed by: INTERNAL MEDICINE

## 2022-06-20 PROCEDURE — 93931 UPPER EXTREMITY STUDY: CPT

## 2022-06-20 PROCEDURE — 93971 EXTREMITY STUDY: CPT

## 2022-06-20 SDOH — ECONOMIC STABILITY: FOOD INSECURITY: WITHIN THE PAST 12 MONTHS, THE FOOD YOU BOUGHT JUST DIDN'T LAST AND YOU DIDN'T HAVE MONEY TO GET MORE.: NEVER TRUE

## 2022-06-20 SDOH — ECONOMIC STABILITY: FOOD INSECURITY: WITHIN THE PAST 12 MONTHS, YOU WORRIED THAT YOUR FOOD WOULD RUN OUT BEFORE YOU GOT MONEY TO BUY MORE.: NEVER TRUE

## 2022-06-20 ASSESSMENT — ENCOUNTER SYMPTOMS
BACK PAIN: 0
EYE PAIN: 0
SHORTNESS OF BREATH: 0
ABDOMINAL PAIN: 0

## 2022-06-20 ASSESSMENT — PATIENT HEALTH QUESTIONNAIRE - PHQ9
2. FEELING DOWN, DEPRESSED OR HOPELESS: 0
SUM OF ALL RESPONSES TO PHQ QUESTIONS 1-9: 0
1. LITTLE INTEREST OR PLEASURE IN DOING THINGS: 0
SUM OF ALL RESPONSES TO PHQ9 QUESTIONS 1 & 2: 0
SUM OF ALL RESPONSES TO PHQ QUESTIONS 1-9: 0

## 2022-06-20 ASSESSMENT — SOCIAL DETERMINANTS OF HEALTH (SDOH): HOW HARD IS IT FOR YOU TO PAY FOR THE VERY BASICS LIKE FOOD, HOUSING, MEDICAL CARE, AND HEATING?: NOT HARD AT ALL

## 2022-06-20 NOTE — TELEPHONE ENCOUNTER
Patient has fu with you on July 5th  Do you want drain out sooner?    Or do you want to see her first?

## 2022-06-20 NOTE — PROGRESS NOTES
Subjective:      Patient ID: Rosa Street is a 80 y.o. female who presents today with:  Chief Complaint   Patient presents with    Follow-Up from Hospital       HPI  Follow up from the hospital.   Past Medical History:   Diagnosis Date    Age-related cognitive decline     Cancer ([address]     Cerebrovascular small vessel disease     Chronic nonspecific lung disease 2021    nodules R lung, fibrosis L apex    Glaucoma     Gout     left knee    Hypertension     Obesity (BMI 30-39.9) 07/11/2019    Obstructive sleep apnea 07/11/2019    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    Vitamin D deficiency 2019     Past Surgical History:   Procedure Laterality Date    CATARACT EXTRACTION W/  INTRAOCULAR LENS IMPLANT Bilateral 2016    COLON SURGERY  2009    polyps    COLONOSCOPY  02/2009    GASTROSTOMY TUBE PLACEMENT N/A 11/04/2020    EGD PEG TUBE PLACEMENT performed by Saranya Yap MD at [address] [place] [address] Right 04/15/2022    Flexima APDL Locking Pigtail Draiange Catheter inserted by Dr. Russell Tafoya (Lot# [id number] / Exp: 12/07/2024)    IR CHOLECYSTOSTOMY PERCUTANEOUS COMPLETE  4/15/2022    IR CHOLECYSTOSTOMY PERCUTANEOUS COMPLETE 4/15/2022 [facility] SPECIAL PROCEDURE    SKIN CANCER EXCISION  2002    graft from neck     Social History     Socioeconomic History    Marital status:       Spouse name: Not on file    Number of children: Not on file    Years of education: Not on file    Highest education level: Not on file   Occupational History    Not on file   Tobacco Use    Smoking status: Never Smoker    Smokeless tobacco: Never Used   Vaping Use    Vaping Use: Never used   Substance and Sexual Activity    Alcohol use: No    Drug use: No    Sexual activity: Not Currently   Other Topics Concern    Not on file   Social History Narrative    ,  Lives with her dtr Angelica Bhat- Daughter (dtr does not work and is able to assist pt as needed), Son Rico Sewell is in the area    Home: House- in [address]: Two level,Able to Live on Main level with bedroom/bathroom    Home Access: Stairs to enter with rails- Number of Steps: 3    Bathroom Shower/Tub: Tub/Shower unit,  Shower chair,Grab bars in shower,Hand-held shower    Home Equipment: Rolling walker    ADL Assistance: Needs assistance    Homemaking Assistance: Needs assistance    Homemaking Responsibilities: No    Ambulation Assistance: Independent (used [employer]),  BSC, LIFT CHAIR, WALKER, CANE,     Transfer Assistance: Independent    Active : No    Additional Comments: pt is questionable historian- question decreased recall versus unable to accurately hear the question; pt reports no recent falls at home. Social Determinants of Health     Financial Resource Strain: Low Risk     Difficulty of Paying Living Expenses: Not hard at all   Food Insecurity: No Food Insecurity    Worried About Running Out of Food in the Last Year: Never true    Deborah of Food in the Last Year: Never true   Transportation Needs:     Lack of Transportation (Medical): Not on file    Lack of Transportation (Non-Medical):  Not on file   Physical Activity:     Days of Exercise per Week: Not on file    Minutes of Exercise per Session: Not on file   Stress:     Feeling of Stress : Not on file   Social Connections:     Frequency of Communication with Friends and Family: Not on file    Frequency of Social Gatherings with Friends and Family: Not on file    Attends Mandaeism Services: Not on file    Active Member of Clubs or Organizations: Not on file    Attends Club or Organization Meetings: Not on file    Marital Status: Not on file   Intimate Partner Violence:     Fear of Current or Ex-Partner: Not on file    Emotionally Abused: Not on file    Physically Abused: Not on file    Sexually Abused: Not on file   Housing Stability:     Unable to Pay for Housing in the Last Year: Not on file    Number of Places Lived in the Last Year: Not on file    Unstable Housing in the Last Year: Not on file     Allergies   Allergen Reactions    Norvasc [Amlodipine Besylate] Swelling     Current Outpatient Medications on File Prior to Visit   Medication Sig Dispense Refill    atorvastatin (LIPITOR) 20 MG tablet TAKE 1 TABLET BY MOUTH DAILY AT BEDTIME 90 tablet 0    acetaminophen (TYLENOL) 325 MG tablet Take 2 tablets by mouth every 6 hours as needed for Pain (Patient taking differently: Take 650 mg by mouth every 4 hours as needed for Pain or Fever ) 120 tablet 0    dorzolamide-timolol (COSOPT) 22.3-6.8 MG/ML ophthalmic solution PLACE 1 DROP INTO BOTH EYES 2 TIMES DAILY INDICATIONS: GLAUCOMA 10 mL 5    latanoprost (XALATAN) 0.005 % ophthalmic solution Place 1 drop into both eyes daily Indications: Glaucoma (Patient taking differently: Place 1 drop into both eyes nightly Indications: Glaucoma ) 2.5 mL 5    metoprolol tartrate (LOPRESSOR) 25 MG tablet Take 0.5 tablets by mouth 2 times daily Indications: High Blood Pressure Disorder 90 tablet 3    albuterol sulfate HFA (PROAIR HFA) 108 (90 Base) MCG/ACT inhaler Inhale 1 puff into the lungs every 6 hours as needed for Wheezing or Shortness of Breath 3 each 3    brimonidine (ALPHAGAN P) 0.15 % ophthalmic solution Place 1 drop into both eyes 2 times daily Indications: Glaucoma 1 each 1    zoster recombinant adjuvanted vaccine (SHINGRIX) 50 MCG/0.5ML SUSR injection Inject 0.5 mLs into the muscle See Admin Instructions 1 dose now and repeat in 2-6 months 0.5 mL 0    losartan (COZAAR) 25 MG tablet Take 1 tablet by mouth every 12 hours (Patient taking differently: Take 25 mg by mouth every 12 hours Indications: High Blood Pressure Disorder ) 180 tablet 3    Elastic Bandages & Supports (MEDICAL COMPRESSION STOCKINGS) MISC Knee high, 20 to 30 mm hg, remove at night, pharmacy to fit. 1 each 0    OXYGEN Inhale 2 L into the lungs Indications: PRN at bedtime      She is alert. Psychiatric:         Mood and Affect: Mood normal.           Assessment:       Diagnosis Orders   1. History of cholecystitis  C-Reactive Protein    CBC with Auto Differential    Comprehensive Metabolic Panel    Procalcitonin   2. Elevated C-reactive protein (CRP)  C-Reactive Protein    CBC with Auto Differential    Comprehensive Metabolic Panel    Procalcitonin   3. History of UTI  Procalcitonin   4. Abnormal CXR  XR CHEST (2 VW)    Procalcitonin    ECHO 2D WO Color Doppler Complete   5. Congestive heart failure, unspecified HF chronicity, unspecified heart failure type ([address]  ECHO 2D WO Color Doppler Complete   6. Swollen vein  US DUP UPPER EXTREMITY LEFT VENOUS    US DUP UPPER EXTREMITY LEFT ARTERIES    ECHO 2D WO Color Doppler Complete   7. Arm swelling  US DUP UPPER EXTREMITY LEFT VENOUS    US DUP UPPER EXTREMITY LEFT ARTERIES    ECHO 2D WO Color Doppler Complete   8. Abnormal electrocardiogram (ECG) (EKG)   ECHO 2D WO Color Doppler Complete   9. Superficial vein thrombosis  Melisa Pavon NP, Interventional Radiology, [place]         Plan:     vc.   keep IR follow up  Keep trauma fu, likely cholecystomy.                  Orders Placed This Encounter   Procedures    XR CHEST (2 VW)     Standing Status:   Future     Number of Occurrences:   1     Standing Expiration Date:   6/20/2023    US DUP UPPER EXTREMITY LEFT VENOUS     Standing Status:   Future     Number of Occurrences:   1     Standing Expiration Date:   6/20/2023     Order Specific Question:   Reason for exam:     Answer:   swelling left upper extremity    US DUP UPPER EXTREMITY LEFT ARTERIES     Standing Status:   Future     Number of Occurrences:   1     Standing Expiration Date:   6/20/2023     Order Specific Question:   Reason for exam:     Answer:   swelling left upper extremity    C-Reactive Protein     Standing Status:   Future     Number of Occurrences:   1     Standing Expiration Date:   6/20/2023    CBC with Auto Differential     Standing Status:   Future     Number of Occurrences:   1     Standing Expiration Date:   6/20/2023    Comprehensive Metabolic Panel     Standing Status:   Future     Number of Occurrences:   1     Standing Expiration Date:   6/20/2023    Procalcitonin     Standing Status:   Future     Number of Occurrences:   1     Standing Expiration Date:   6/20/2023   [facility] - Aristeo Knowles, NP, Interventional Radiology, [place]     Referral Priority:   Routine     Referral Type:   Eval and Treat     Referral Reason:   Specialty Services Required     Referred to Provider:   JULIO Abraham CNP     Requested Specialty:   Nurse Practitioner     Number of Visits Requested:   1    ECHO 2D WO Color Doppler Complete     Standing Status:   Future     Standing Expiration Date:   6/20/2023     Order Specific Question:   Reason for exam:     Answer:   Heart failure     No orders of the defined types were placed in this encounter. Patient gave me permission to speak in front of friends/family, etc.   If anything should change or worsen call ASAP, don't wait for next scheduled appointment. [address]   No follow-ups on file.       Antoni Perez MD

## 2022-06-21 ENCOUNTER — TELEPHONE (OUTPATIENT)
Dept: FAMILY MEDICINE CLINIC | Age: 87
End: 2022-06-21

## 2022-06-21 ENCOUNTER — TELEPHONE (OUTPATIENT)
Dept: INTERVENTIONAL RADIOLOGY/VASCULAR | Age: 87
End: 2022-06-21

## 2022-06-21 NOTE — TELEPHONE ENCOUNTER
She has Superficial vein thrombosis on left  Can you please review as well(you have an appt with her soon)   If you need anything from be before please let me know

## 2022-06-21 NOTE — TELEPHONE ENCOUNTER
Spoke with [place] with [facility] and she will arrange follow up appt for patient to be seen in trauma office.

## 2022-06-22 LAB
BILIRUBIN URINE: NEGATIVE
BLOOD, URINE: NEGATIVE
CLARITY: ABNORMAL
COLOR: YELLOW
GLUCOSE URINE: NEGATIVE MG/DL
KETONES, URINE: NEGATIVE MG/DL
LEUKOCYTE ESTERASE, URINE: NEGATIVE
NITRITE, URINE: NEGATIVE
PH UA: 6 (ref 5–9)
PROTEIN UA: NEGATIVE MG/DL
SPECIFIC GRAVITY UA: 1.02 (ref 1–1.03)
URINE REFLEX TO CULTURE: ABNORMAL
UROBILINOGEN, URINE: 0.2 E.U./DL

## 2022-06-23 NOTE — TELEPHONE ENCOUNTER
Thank you for the information. I will address the UMUE superficial thrombosis as well at her next OV.

## 2022-06-23 NOTE — TELEPHONE ENCOUNTER
I did discuss the POC at each office visit with the daughter who usually comes with the patient so she should be aware and knowing that Trauma team will be contacting her and that Trauma is ultimately managing the outcome. I also instructed her she can also call me if she has any concerns or questions. I spoke with Terrence Fiore with Trauma a few days ago and looks like the f/u appt with trauma is set for 6/29. Please call or message if you have any further concerns/questions.    Kim Smith

## 2022-06-23 NOTE — TELEPHONE ENCOUNTER
I will continue to see her every two weeks in clinic as long as she has the adenike tube in. I spoke with the Trauma team a few days ago and they are supposed to set her up for follow up appt in their clinic to determine if they want the drain out. However after review of the cholangiogram, it is not recommended the tube come out now and she will most likely need surgery. Has multiple stones blocking duct still.

## 2022-06-23 NOTE — TELEPHONE ENCOUNTER
Please let patient/dtr (hipaa contact)  Know to do warm compresses a few times a day, if any progressive swelling or pain her left arm call asap even if after hours  She has a clot in a superificial vessel in her arm and ratna will discuss as well when she sees her

## 2022-06-23 NOTE — TELEPHONE ENCOUNTER
See below ratna  Make sure family is aware of below message  Thanks,    Call immediately should something change.      Kelley Blevins

## 2022-06-24 NOTE — TELEPHONE ENCOUNTER
Spoke w Khurram Johsnton. She does have trauma appt set but Khurram Johnston would like to know if you can simplify Jonis message regarding the tube  removal or potential surgery.

## 2022-06-29 ENCOUNTER — OFFICE VISIT (OUTPATIENT)
Dept: SURGERY | Age: 87
End: 2022-06-29
Payer: MEDICARE

## 2022-06-29 VITALS
TEMPERATURE: 97.5 F | WEIGHT: 173 LBS | HEIGHT: 62 IN | OXYGEN SATURATION: 96 % | BODY MASS INDEX: 31.83 KG/M2 | HEART RATE: 85 BPM

## 2022-06-29 DIAGNOSIS — K80.20 CALCULUS OF GALLBLADDER WITHOUT CHOLECYSTITIS WITHOUT OBSTRUCTION: Primary | ICD-10-CM

## 2022-06-29 PROCEDURE — 99215 OFFICE O/P EST HI 40 MIN: CPT | Performed by: SURGERY

## 2022-06-29 PROCEDURE — 1123F ACP DISCUSS/DSCN MKR DOCD: CPT | Performed by: SURGERY

## 2022-06-29 NOTE — PROGRESS NOTES
ACUTE CARE SURGERY CLINIC NOTE:    Antonio Moreland a 80 y. o. female with past medical history of COPD, HTN, FRED presents to [facility] EM[facility] [place] with 1 week history of intermittent RUQ pain without nausea or vomiting and was admitted on 4/14/22. Due to her underlying medical issues daughters opted for percutaneous cholecystostomy tube by IR on 4/15/22. She was discharged to SNF on 4/26/22. She had cholangiogram done on 6/16/22 which showed patent cystic duct with contrast in CBD and duodenum. However gallbladder was completely full of stones and the contrast took some time to get into cystic duct. Currently per daughters she doesn't have any abdominal pain or nausea/vomiting. Tolerating PO diet. She has some discomfort due to the IR tube. O/E  General - in no acute distress  CVS RRR  Lungs CTA b/l  Abd soft ND NT; IR catheter in place with clear bile tinged fluid  Ext - +edema  Skin - warm    Dx:  Acute cholecystitis s/p IR percutaneous cholecystostomy tube    Plan:  I had lengthy discussion with the daughters regarding the cholecystostomy tube. We talked about several options 1) removing tube and pursuing cholecystectomy 2) removing tube and watching to see if issue recurs then potentially placing another one back in 3) keeping in tube for good. I explained risk vs benefit for each of the options. They were hesitant to operate while in the hospital due to their mothers underlying COPD, age etc. So they pursued the IR drainage. However I feel her risk factors are still the same as before. She has underlying COPD on home oxygen which per daughter says she does not use. She has an echo pending that was ordered by PCP. At baseline she is fairly frail needed assist device at home and full care from daughters. She likely will end up needing rehab vs SNF post op. She also had surgery in the past including PEG tube so there is always risk of need to convert to open cholecystectomy.    I explained that if we remove the tube and watch she is at higher risk of having recurrence since her gallbladder is completely full of stones and likely to cause obstruction. My worry also is if the stones are small enough she can also have choledocholithiasis. If her echo is normal and after evaluation by her PCP/pulmonologist, if she is an acceptable risk then we can pursue surgical option for cholecystectomy if the daughters are willing. However, if there is concern due to her age, frailty and medical issues my recommendation would be to keep tube in indefinitely vs removing it and watching to see if cholecystitis recurs and then replacing tube. F/u after PCP visit and echo.     Jose Sarah MD, FACS  Acute Care Surgery

## 2022-06-29 NOTE — PATIENT INSTRUCTIONS
- Continue forward with echocardiogram  - Follow up with pulmonary for evaluation of COPD  - Follow up in 1 month with surgery for discussion of tube removal or surgical planning

## 2022-06-30 ENCOUNTER — TELEPHONE (OUTPATIENT)
Dept: FAMILY MEDICINE CLINIC | Age: 87
End: 2022-06-30

## 2022-06-30 NOTE — TELEPHONE ENCOUNTER
Daughter Joon Gillis called requesting the echo order be changed to stat because scheduling can't get patient in until 10/11. She stated patient isn't able have her cholecystostomy tube removed until she has this echo completed. Please advise. Thank you.

## 2022-07-01 NOTE — TELEPHONE ENCOUNTER
Spoke kvng Dillon, she wants to know if you want her mom to have the stress test now or do the STAT (if you approve STAT) echo 1st or both?

## 2022-07-01 NOTE — TELEPHONE ENCOUNTER
Would you be able to see this patient next week to discuss preop testing and which modality is best for her  She has cholystitis with a tube and surgery would like to do cholysectomy if they can

## 2022-07-01 NOTE — TELEPHONE ENCOUNTER
This is from the surgeons note\"    If her echo is normal and after evaluation by her PCP/pulmonologist, if she is an acceptable risk then we can pursue surgical option for cholecystectomy if the daughters are willing. However, if there is concern due to her age, frailty and medical issues my recommendation would be to keep tube in indefinitely vs removing it and watching to see if cholecystitis recurs and then replacing tube.    F/u after PCP visit and echo\"

## 2022-07-03 RX ORDER — MONTELUKAST SODIUM 10 MG/1
TABLET ORAL
Qty: 90 TABLET | Refills: 3 | Status: SHIPPED | OUTPATIENT
Start: 2022-07-03 | End: 2022-09-29

## 2022-07-03 NOTE — TELEPHONE ENCOUNTER
7/18/2022 Gina Vo MD [facility] AT [facility] to go over labs   10/11/2022 [facility] ECHO RM 1 Echocardiogram epic sw pt daughter        Past Visits    Date Provider Specialty Visit Type Primary Dx   06/29/2022 Cici Awad MD Trauma Surgery Office Visit Calculus of gallbladder without cholecystitis without obstruction   06/20/2022 Gina Vo MD Family Medicine Office Visit History of cholecystitis

## 2022-07-05 ENCOUNTER — OFFICE VISIT (OUTPATIENT)
Dept: INTERVENTIONAL RADIOLOGY/VASCULAR | Age: 87
End: 2022-07-05
Payer: MEDICARE

## 2022-07-05 VITALS
BODY MASS INDEX: 31.64 KG/M2 | HEART RATE: 79 BPM | DIASTOLIC BLOOD PRESSURE: 64 MMHG | WEIGHT: 173 LBS | SYSTOLIC BLOOD PRESSURE: 125 MMHG

## 2022-07-05 DIAGNOSIS — R09.89 SWOLLEN VEIN: ICD-10-CM

## 2022-07-05 DIAGNOSIS — Z43.4 CHOLECYSTOSTOMY CARE (HCC): ICD-10-CM

## 2022-07-05 DIAGNOSIS — K81.0 ACUTE CHOLECYSTITIS: Primary | ICD-10-CM

## 2022-07-05 DIAGNOSIS — I80.8 THROMBOPHLEBITIS OF ARM, LEFT: ICD-10-CM

## 2022-07-05 PROCEDURE — 99214 OFFICE O/P EST MOD 30 MIN: CPT | Performed by: NURSE PRACTITIONER

## 2022-07-05 PROCEDURE — 1123F ACP DISCUSS/DSCN MKR DOCD: CPT | Performed by: NURSE PRACTITIONER

## 2022-07-05 ASSESSMENT — ENCOUNTER SYMPTOMS
RESPIRATORY NEGATIVE: 1
TROUBLE SWALLOWING: 0
NAUSEA: 0
GASTROINTESTINAL NEGATIVE: 1
VOMITING: 0
EYES NEGATIVE: 1
COLOR CHANGE: 0
SHORTNESS OF BREATH: 0
WHEEZING: 0
BACK PAIN: 0
SORE THROAT: 0
DIARRHEA: 0
COUGH: 0
ABDOMINAL PAIN: 0

## 2022-07-05 NOTE — PROGRESS NOTES
ZVASCULAR MEDICINE AND INTERVENTIONAL RADIOLOGY DEPARTMENT:         Selvin Petersen, a female of 80 y.o. came to the office 7/5/2022. Chief Complaint   Patient presents with    Post-COVID Symptoms     2 wk f/u on adenike tube     Follow-up     vein thrombosis      PROGRESS NOTE:     7/5/2022: Angelikaolivier Nicola S/P 2 weeks cholangiogram at the request of trauma team to evaluate if any further cholecystitis secondary to cholelithiasis. She is here for evaluation of tube functioning and site check and dressing change. She has since seen trauma with recommendations for cholecystectomy, keeping adenike tube in or removing adenike tube and wait and see if cholecystitis returns. Results of cholangiogram show there remains multiple stones. She is present with her two daughters who are deciding on plan. She presently denies any LUQ pain or pain from drain. Drain is draining small amounts of pink/tan fluid. She is here also at request of her PCP for evaluation of LUE varicose vein at level of wrist.   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 Has had at least 20+ years completely asymptomatic. Has gotten slightly larger over years. Does not wear compression sleeve. US shows thrombosed VV. H/O BLE removal of VV's. 6/2/2022: Selvin Petersen S/P 6 weeks percutaneous placement of cholecystostomy tube for acute cholecystitis. Here for adenike tube function evaluation, site check and dressing change. Present here with her daughter John Ashton who cares for her at home. In a wheelchair. Daughter states she is doing somewhat better than at discharge but not much improvement. Tube is draining small amount of light pink fluid. Reports occasional RUQ pain, not daily. Continues to have generalized fatigue and weakness, and forgetfulness. 5/19/2022: Selvin Petersen S/P week 4 percutaneous placement of cholecystostomy tube for acute cholecystitis.  Here for adenike tube function evaluation, site check and dressing change. Present here with her daughter Jermain Wilson In a wheelchair. Now living at home with her daughter. Tube is draining small amount of light pink/tan fluid. She denies any discomfort from drain and denies any abdominal pain or nausea/vomiting. Eating foods without difficulty. Having fatigue and generalized weakness. 5/4/2022: Kei Simon is here today for hospital procedure follow up S/P percutaneous placement of cholecystostomy tube for acute cholecystitis. Present here with her daughter Jermain Wilson In a wheelchair. She is at [facility] for rehab with plans to eventually go home with daughter with [facility]. Tube is draining pink/orange colored fluid. She denies any discomfort from drain and denies any abdominal pain or nausea/vomiting. Eating foods without difficulty. She has some fatigue and generalized weakness. No family history on file.     Past Surgical History:   Procedure Laterality Date    CATARACT REMOVAL WITH IMPLANT Bilateral 2016    COLON SURGERY  2009    polyps    COLONOSCOPY  02/2009    GASTROSTOMY TUBE PLACEMENT N/A 11/04/2020    EGD PEG TUBE PLACEMENT performed by Irma Reyes MD at [address] [place] [address] Right 04/15/2022    Flexima APDL Locking Pigtail Draiange Catheter inserted by Dr. Erika Choudhury (Lot# [id number] / Exp: 12/07/2024)    IR CHOLECYSTOSTOMY PERCUTANEOUS COMPLETE  4/15/2022    IR CHOLECYSTOSTOMY PERCUTANEOUS COMPLETE 4/15/2022 [facility] SPECIAL PROCEDURE    SKIN CANCER EXCISION  2002    graft from neck        Past Medical History:   Diagnosis Date    Age-related cognitive decline     Cancer ([facility] [address]     Cerebrovascular small vessel disease     Chronic nonspecific lung disease 2021    nodules R lung, fibrosis L apex    Glaucoma     Gout     left knee    Hypertension     Obesity (BMI 30-39.9) 07/11/2019    Obstructive sleep apnea 07/11/2019    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    Vitamin D deficiency 2019       Social History     Socioeconomic History    Marital status:      Spouse name: Not on file    Number of children: Not on file    Years of education: Not on file    Highest education level: Not on file   Occupational History    Not on file   Tobacco Use    Smoking status: Never Smoker    Smokeless tobacco: Never Used   Vaping Use    Vaping Use: Never used   Substance and Sexual Activity    Alcohol use: No    Drug use: No    Sexual activity: Not Currently   Other Topics Concern    Not on file   Social History Narrative    ,  Lives with her dtr Rebekah Duane- Daughter (dtr does not work and is able to assist pt as needed), Son Ramos Fletcher is in the area    Home[place]- [place] [facility]: Two level,Able to Live on Main level with bedroom/bathroom    Home Access: Stairs to enter with rails- Number of Steps: 3    Bathroom Shower/Tub: Tub/Shower unit,  Shower chair,Grab bars in shower,Hand-held shower    Home Equipment: Rolling walker    ADL Assistance: Needs assistance    Homemaking Assistance: Needs assistance    Homemaking Responsibilities: No    Ambulation Assistance: Independent (used [employer]),  BSC, LIFT CHAIR, WALKER, CANE,     Transfer Assistance: Independent    Active : No    Additional Comments: pt is questionable historian- question decreased recall versus unable to accurately hear the question; pt reports no recent falls at home. Social Determinants of Health     Financial Resource Strain: Low Risk     Difficulty of Paying Living Expenses: Not hard at all   Food Insecurity: No Food Insecurity    Worried About Running Out of Food in the Last Year: Never true    Deborah of Food in the Last Year: Never true   Transportation Needs:     Lack of Transportation (Medical): Not on file    Lack of Transportation (Non-Medical):  Not on file   Physical Activity:     Days of Exercise per Week: Not on file    Minutes of Exercise per Session: Not on file   Stress:     Feeling of Stress : Not on file   Social Connections:     Frequency of Communication with Friends and Family: Not on file    Frequency of Social Gatherings with Friends and Family: Not on file    Attends Restorationist Services: Not on file    Active Member of Clubs or Organizations: Not on file    Attends Club or Organization Meetings: Not on file    Marital Status: Not on file   Intimate Partner Violence:     Fear of Current or Ex-Partner: Not on file    Emotionally Abused: Not on file    Physically Abused: Not on file    Sexually Abused: Not on file   Housing Stability:     Unable to Pay for Housing in the Last Year: Not on file    Number of Places Lived in the Last Year: Not on file    Unstable Housing in the Last Year: Not on file       Allergies   Allergen Reactions    Norvasc [Amlodipine Besylate] Swelling       Current Outpatient Medications on File Prior to Visit   Medication Sig Dispense Refill    montelukast (SINGULAIR) 10 MG tablet TAKE 1 TABLET BY MOUTH NIGHTLY AS NEEDED FOR ALLERGIES 90 tablet 3    atorvastatin (LIPITOR) 20 MG tablet TAKE 1 TABLET BY MOUTH DAILY AT BEDTIME 90 tablet 0    acetaminophen (TYLENOL) 325 MG tablet Take 2 tablets by mouth every 6 hours as needed for Pain (Patient taking differently: Take 650 mg by mouth every 4 hours as needed for Pain or Fever ) 120 tablet 0    dorzolamide-timolol (COSOPT) 22.3-6.8 MG/ML ophthalmic solution PLACE 1 DROP INTO BOTH EYES 2 TIMES DAILY INDICATIONS: GLAUCOMA 10 mL 5    latanoprost (XALATAN) 0.005 % ophthalmic solution Place 1 drop into both eyes daily Indications: Glaucoma (Patient taking differently: Place 1 drop into both eyes nightly Indications: Glaucoma ) 2.5 mL 5    metoprolol tartrate (LOPRESSOR) 25 MG tablet Take 0.5 tablets by mouth 2 times daily Indications: High Blood Pressure Disorder 90 tablet 3    albuterol sulfate HFA (PROAIR HFA) 108 (90 Base) MCG/ACT occlusion of the cystic duct. Purulent pus like yellow-green bile was aspirated and sent for microbiology analysis.       Additional clinical data:   Acute cholecystitis       6/16/2022:   Impression   Impression:   1. Successful cholangiogram and exchange of a cholecystostomy drain. 2. Cholangiogram demonstrates the gallbladder lumen to be entirely filled with stones with no lumen to be seen. Contrast does trickle slowly between the stones to the cystic duct which is patent. Opacification of the common bile duct demonstrates common    bile duct to be patent down to the midportion. It is not recommended for this cholecystostomy tube to be removed due to completely filled gallbladder lumen with stones which would likely cause cholecystitis if the tube was removed.       Additional clinical data:   Patient with prior acute cholecystitis       Procedure:   1. Cholangiogram of the gallbladder and cystic duct and intrahepatic and extrahepatic bile duct with injection of contrast under fluoroscopy through existing cholecystostomy tube which was placed previously for cholecystitis. 2. Fluoroscopy time for cholangiogram up to 1 hour. 3. Exchange of cholecystostomy tube over wire with a new cholecystostomy tube   4. IV conscious sedation 30 minutes in duration. 6/20/2022:   Narrative   LEFT UPPER EXTREMITY DEEP VENOUS DUPLEX DOPPLER ULTRASOUND        CLINICAL HISTORY:  R09.89 Swollen vein ICD10 left arm pain and swelling.       COMPARISON: None       TECHNIQUE:     Gray scale ultrasound with compression maneuvers where accessible, color and spectral Doppler of the LEFT internal jugular, subclavian, axillary, brachial, radial, and ulnar veins was performed.  Grey scale with and without compression of the LEFT    basilic and cephalic veins was performed.  Images were obtained and stored in a permanent archive.           RESULT:       LEFT UPPER EXTREMITY DEEP VEINS:   Internal Jugular vein: Normal compression, normal spontaneous flow,     Subclavian vein:  Normal, spontaneous flow, normal response to augmentation. Axillary vein:  Normal compression, normal spontaneous flow,      Brachial vein:  Normal compression, normal spontaneous flow,     Radial vein: Normal compression, normal spontaneous flow,     Ulnar vein: Normal compression, normal spontaneous flow, proximally not well seen more distally due to small caliber         LEFT UPPER EXTREMITY SUPERFICIAL VEINS:   Basilic vein: Normal compression in the visualized portions with portions within the forearm not well-visualized due to small caliber. Cephalic vein:  Small caliber with normal compression of the visualized portions.        At the area of the lump near the wrist, there is a noncompressible subcutaneous vessel suggestive of superficial thrombophlebitis. No distinct connection to radial or cephalic veins.           Impression       NEGATIVE STUDY FOR ACUTE DVT IN THE LEFT UPPER EXTREMITY.       POSITIVE STUDY FOR SUPERFICIAL THROMBOPHLEBITIS IN THE LEFT UPPER EXTREMITY involving a superficial vessel at the wrist at the area of lump.                   ASSESSMENT AND PLAN:  Above 3 diagnostic reports reviewed personally by myself and discussed with patient. Diagnosis Orders   1. Acute cholecystitis     2. Cholecystostomy care ([facility] [address]     3. Thrombophlebitis of arm, left     4. Swollen vein         --  Stayfix dressing applied to site by me. Patient tolerated well without any complications. No noted s/sx infection to site. Plan:     No orders of the defined types were placed in this encounter. No orders of the defined types were placed in this encounter. --  See in clinic every two weeks for dressing change, tube care and functional evaluation. --  Drain needs exchanged every 8 weeks.    Suggested to leave Stayfix off and patient would only need to come in once every 4 weeks to assist with family not having to come into clinic as often but Daughters chose to have another Stayfix dressing applied and return in 2 weeks. --  Daughters are currently going to decide on either having adenike tube removed, stay in, or have cholecystectomy. If having a cholecystectomy trauma is requesting pulmonary and cardiac clearance first and if drain is removed she runs risk of developing again cholecystitis due to large amount of stones. For LUE Varicose Vein:   As she is not having and adverse symptoms to LUE VV, she can wear compression sleeve with gauntlet for management however this may cause her discomfort, therefore no further recommendations. If in future VV becomes troublesome, can return for further discussion to remove VV. Total time spent for this encounter: 38 minutes.       Ankita Larry, APRN - CNP

## 2022-07-06 ENCOUNTER — OFFICE VISIT (OUTPATIENT)
Dept: CARDIOLOGY CLINIC | Age: 87
End: 2022-07-06
Payer: MEDICARE

## 2022-07-06 VITALS
HEART RATE: 83 BPM | BODY MASS INDEX: 31.64 KG/M2 | RESPIRATION RATE: 16 BRPM | SYSTOLIC BLOOD PRESSURE: 128 MMHG | HEIGHT: 62 IN | OXYGEN SATURATION: 96 % | DIASTOLIC BLOOD PRESSURE: 76 MMHG

## 2022-07-06 DIAGNOSIS — I10 ESSENTIAL HYPERTENSION: Primary | ICD-10-CM

## 2022-07-06 DIAGNOSIS — Z01.810 PREOP CARDIOVASCULAR EXAM: ICD-10-CM

## 2022-07-06 DIAGNOSIS — I10 HYPERTENSION, UNSPECIFIED TYPE: ICD-10-CM

## 2022-07-06 DIAGNOSIS — E78.5 DYSLIPIDEMIA: ICD-10-CM

## 2022-07-06 PROCEDURE — 93000 ELECTROCARDIOGRAM COMPLETE: CPT | Performed by: INTERNAL MEDICINE

## 2022-07-06 PROCEDURE — 1123F ACP DISCUSS/DSCN MKR DOCD: CPT | Performed by: INTERNAL MEDICINE

## 2022-07-06 PROCEDURE — 99214 OFFICE O/P EST MOD 30 MIN: CPT | Performed by: INTERNAL MEDICINE

## 2022-07-06 ASSESSMENT — ENCOUNTER SYMPTOMS
RESPIRATORY NEGATIVE: 1
GASTROINTESTINAL NEGATIVE: 1
CHEST TIGHTNESS: 0
WHEEZING: 0
COUGH: 0
EYES NEGATIVE: 1
SHORTNESS OF BREATH: 0
NAUSEA: 0
STRIDOR: 0
BLOOD IN STOOL: 0

## 2022-07-06 NOTE — PROGRESS NOTES
Subsequent Progress Note  Patient: Beckie Fermin  YOB: 1928  MRN: [id number]    Chief Complaint: SVT  Chief Complaint   Patient presents with    Cardiac Clearance       CV Data:  10/20 Echo  70     Subjective/HPI: Dr. Amando Delacruz pt her for [address] [facility] Pt is predominantly in a wheel chair but can use walker at home. Denies CP nor SOB no recent falls no bleed. Eats well     EKG: SR 78    Life long nonsmoker  No etoh  Lives w daughter    Past Medical History:   Diagnosis Date    Age-related cognitive decline     Cancer ([address]     Cerebrovascular small vessel disease     Chronic nonspecific lung disease 2021    nodules R lung, fibrosis L apex    Glaucoma     Gout     left knee    Hypertension     Obesity (BMI 30-39.9) 07/11/2019    Obstructive sleep apnea 07/11/2019    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    Vitamin D deficiency 2019       Past Surgical History:   Procedure Laterality Date    CATARACT REMOVAL WITH IMPLANT Bilateral 2016    COLON SURGERY  2009    polyps    COLONOSCOPY  02/2009    GASTROSTOMY TUBE PLACEMENT N/A 11/04/2020    EGD PEG TUBE PLACEMENT performed by Shayla Sorto MD at [address] [place] [address] Right 04/15/2022    Flexima APDL Locking Pigtail Draiange Catheter inserted by Dr. Zaira Henry (Lot# [id number] / Exp: 12/07/2024)    IR CHOLECYSTOSTOMY PERCUTANEOUS COMPLETE  4/15/2022    IR CHOLECYSTOSTOMY PERCUTANEOUS COMPLETE 4/15/2022 [facility] SPECIAL PROCEDURE    SKIN CANCER EXCISION  2002    graft from neck       No family history on file. Social History     Socioeconomic History    Marital status:       Spouse name: Not on file    Number of children: Not on file    Years of education: Not on file    Highest education level: Not on file   Occupational History    Not on file   Tobacco Use    Smoking status: Never Smoker    Smokeless tobacco: Never Used   Vaping Use    Vaping Use: Never used   Substance and Sexual Activity    Alcohol use: No    Drug use: No    Sexual activity: Not Currently   Other Topics Concern    Not on file   Social History Narrative    ,  Lives with her dtr Nick High- Daughter (dtr does not work and is able to assist pt as needed), Son Constance Hoffman is in the area    Home: [place]- in [place] [id number]: Two level,Able to Live on Main level with bedroom/bathroom    Home Access: Stairs to enter with rails- Number of Steps: 3    Bathroom Shower/Tub: Tub/Shower unit,  Shower chair,Grab bars in shower,Hand-held shower    Home Equipment: Rolling walker    ADL Assistance: Needs assistance    Homemaking Assistance: Needs assistance    Homemaking Responsibilities: No    Ambulation Assistance: Independent (used [employer]),  BSC, LIFT CHAIR, WALKER, CANE,     Transfer Assistance: Independent    Active : No    Additional Comments: pt is questionable historian- question decreased recall versus unable to accurately hear the question; pt reports no recent falls at home. Social Determinants of Health     Financial Resource Strain: Low Risk     Difficulty of Paying Living Expenses: Not hard at all   Food Insecurity: No Food Insecurity    Worried About Running Out of Food in the Last Year: Never true    Deborah of Food in the Last Year: Never true   Transportation Needs:     Lack of Transportation (Medical): Not on file    Lack of Transportation (Non-Medical):  Not on file   Physical Activity:     Days of Exercise per Week: Not on file    Minutes of Exercise per Session: Not on file   Stress:     Feeling of Stress : Not on file   Social Connections:     Frequency of Communication with Friends and Family: Not on file    Frequency of Social Gatherings with Friends and Family: Not on file    Attends Lutheran Services: Not on file    Active Member of Clubs or Organizations: Not on file    Attends Club or Organization Meetings: Not on file    Marital Status: Not on file   Intimate Partner Violence:     Fear of Current or Ex-Partner: Not on file    Emotionally Abused: Not on file    Physically Abused: Not on file    Sexually Abused: Not on file   Housing Stability:     Unable to Pay for Housing in the Last Year: Not on file    Number of Places Lived in the Last Year: Not on file    Unstable Housing in the Last Year: Not on file       Allergies   Allergen Reactions    Norvasc [Amlodipine Besylate] Swelling       Current Outpatient Medications   Medication Sig Dispense Refill    montelukast (SINGULAIR) 10 MG tablet TAKE 1 TABLET BY MOUTH NIGHTLY AS NEEDED FOR ALLERGIES 90 tablet 3    atorvastatin (LIPITOR) 20 MG tablet TAKE 1 TABLET BY MOUTH DAILY AT BEDTIME 90 tablet 0    acetaminophen (TYLENOL) 325 MG tablet Take 2 tablets by mouth every 6 hours as needed for Pain (Patient taking differently: Take 650 mg by mouth every 4 hours as needed for Pain or Fever ) 120 tablet 0    dorzolamide-timolol (COSOPT) 22.3-6.8 MG/ML ophthalmic solution PLACE 1 DROP INTO BOTH EYES 2 TIMES DAILY INDICATIONS: GLAUCOMA 10 mL 5    latanoprost (XALATAN) 0.005 % ophthalmic solution Place 1 drop into both eyes daily Indications: Glaucoma (Patient taking differently: Place 1 drop into both eyes nightly Indications: Glaucoma ) 2.5 mL 5    metoprolol tartrate (LOPRESSOR) 25 MG tablet Take 0.5 tablets by mouth 2 times daily Indications: High Blood Pressure Disorder 90 tablet 3    albuterol sulfate HFA (PROAIR HFA) 108 (90 Base) MCG/ACT inhaler Inhale 1 puff into the lungs every 6 hours as needed for Wheezing or Shortness of Breath 3 each 3    brimonidine (ALPHAGAN P) 0.15 % ophthalmic solution Place 1 drop into both eyes 2 times daily Indications: Glaucoma 1 each 1    zoster recombinant adjuvanted vaccine (SHINGRIX) 50 MCG/0.5ML SUSR injection Inject 0.5 mLs into the muscle See Admin Instructions 1 dose now and repeat in 2-6 months 0.5 mL 0    losartan (COZAAR) 25 MG tablet Take 1 tablet by mouth every 12 hours (Patient taking differently: Take 25 mg by mouth every 12 hours Indications: High Blood Pressure Disorder ) 180 tablet 3    Elastic Bandages & Supports (MEDICAL COMPRESSION STOCKINGS) MISC Knee high, 20 to 30 mm hg, remove at night, pharmacy to fit. 1 each 0    OXYGEN Inhale 2 L into the lungs Indications: PRN at bedtime       No current facility-administered medications for this visit. Review of Systems:   Review of Systems   Constitutional: Negative. Negative for diaphoresis and fatigue. HENT: Negative. Eyes: Negative. Respiratory: Negative. Negative for cough, chest tightness, shortness of breath, wheezing and stridor. Cardiovascular: Negative. Negative for chest pain, palpitations and leg swelling. Gastrointestinal: Negative. Negative for blood in stool and nausea. Genitourinary: Negative. Musculoskeletal: Negative. Skin: Negative. Neurological: Negative. Negative for dizziness, syncope, weakness and light-headedness. Hematological: Negative. Psychiatric/Behavioral: Negative. Physical Examination:    /76 (Site: Right Upper Arm, Position: Sitting, Cuff Size: Small Adult)   Pulse 83   Resp 16   Ht 5' 2\" (1.575 m)   LMP  (LMP Unknown)   SpO2 96%   BMI 31.64 kg/m²    Physical Exam   Constitutional: She appears healthy. No distress. HENT:   Normal cephalic and Atraumatic   Eyes: Pupils are equal, round, and reactive to light. Neck: Thyroid normal. No JVD present. No neck adenopathy. No thyromegaly present. Cardiovascular: Normal rate, regular rhythm, normal heart sounds, intact distal pulses and normal pulses. Pulmonary/Chest: Effort normal and breath sounds normal. She has no wheezes. She has no rales. She exhibits no tenderness. Abdominal: Soft. Bowel sounds are normal. There is no abdominal tenderness. Musculoskeletal:         General: No tenderness or edema. Normal range of motion.       Cervical back: Normal range of motion and neck supple. Neurological: She is alert and oriented to person, place, and time. Skin: Skin is warm. No cyanosis. Nails show no clubbing.        LABS:  CBC:   Lab Results   Component Value Date/Time    WBC 6.8 06/20/2022 02:56 PM    RBC 4.28 06/20/2022 02:56 PM    HGB 12.0 06/20/2022 02:56 PM    HCT 37.2 06/20/2022 02:56 PM    MCV 87.0 06/20/2022 02:56 PM    MCH 28.1 06/20/2022 02:56 PM    MCHC 32.2 06/20/2022 02:56 PM    RDW 16.9 06/20/2022 02:56 PM     06/20/2022 02:56 PM    MPV 11.6 06/17/2014 02:01 PM     Lipids:  Lab Results   Component Value Date    CHOL 198 07/09/2020    CHOL 199 10/29/2019    CHOL 193 12/26/2018     Lab Results   Component Value Date    TRIG 224 (H) 07/09/2020    TRIG 139 10/29/2019    TRIG 96 12/26/2018     Lab Results   Component Value Date    HDL 34 (L) 07/09/2020    HDL 43 10/29/2019    HDL 46 12/26/2018     Lab Results   Component Value Date    LDLCALC 119 07/09/2020    LDLCALC 128 10/29/2019    LDLCALC 128 12/26/2018     No results found for: LABVLDL, VLDL  No results found for: CHOLHDLRATIO  CMP:    Lab Results   Component Value Date/Time     06/20/2022 02:56 PM    K 4.7 06/20/2022 02:56 PM    K 4.0 11/06/2020 05:59 AM     06/20/2022 02:56 PM    CO2 23 06/20/2022 02:56 PM    BUN 17 06/20/2022 02:56 PM    CREATININE 0.88 06/20/2022 02:56 PM    GFRAA >60.0 06/20/2022 02:56 PM    LABGLOM 59.8 06/20/2022 02:56 PM    GLUCOSE 99 06/20/2022 02:56 PM    PROT 7.1 06/20/2022 02:56 PM    LABALBU 4.3 06/20/2022 02:56 PM    CALCIUM 9.2 06/20/2022 02:56 PM    BILITOT 0.4 06/20/2022 02:56 PM    ALKPHOS 148 06/20/2022 02:56 PM    AST 12 06/20/2022 02:56 PM    ALT 10 06/20/2022 02:56 PM     BMP:    Lab Results   Component Value Date/Time     06/20/2022 02:56 PM    K 4.7 06/20/2022 02:56 PM    K 4.0 11/06/2020 05:59 AM     06/20/2022 02:56 PM    CO2 23 06/20/2022 02:56 PM    BUN 17 06/20/2022 02:56 PM    LABALBU 4.3 06/20/2022 02:56 PM    CREATININE 0.88 06/20/2022 02:56 PM    CALCIUM 9.2 06/20/2022 02:56 PM    GFRAA >60.0 06/20/2022 02:56 PM    LABGLOM 59.8 06/20/2022 02:56 PM    GLUCOSE 99 06/20/2022 02:56 PM     Magnesium:    Lab Results   Component Value Date/Time    MG 2.4 12/26/2021 01:15 PM     TSH:  Lab Results   Component Value Date    TSH 1.180 12/26/2021       Patient Active Problem List   Diagnosis    Essential hypertension    Anxiety    COPD (chronic obstructive pulmonary disease) with acute bronchitis (HCC)    Obesity (BMI 30-39. 9)    Obstructive sleep apnea    Venous stasis dermatitis of both lower extremities    Impaired mobility and activities of daily living-due to cervical myelopathy    Acute cholecystitis    Muscle wasting and atrophy, not elsewhere classified, left hand    Osteoarthritis of cervical spine with myelopathy       There are no discontinued medications. Modified Medications    No medications on file       No orders of the defined types were placed in this encounter. Assessment/Plan:    1. Essential hypertension  Stable. Continue meds. Low salt diet. - EKG 12 lead    2. Hypertension, unspecified type        3. Dyslipidemia  Statin Rx - low fat diet        4. Preop cardiovascular exam  She is cleared. Last Echo EF 70%. ECG SR. No injury. Pt free of angina. No additional CV testing required preop. Counseling:  Heart Healthy Lifestyle, Low Salt Diet, Take Precautions to Prevent Falls and Walk Daily    Return in about 6 months (around 1/6/2023).       Electronically signed by Galilea Willson MD on 7/6/2022 at 4:06 PM

## 2022-07-07 NOTE — TELEPHONE ENCOUNTER
Kendall Dowell called and stated that Dr Malcolm Quintero cleared her for the surgery w/o need of echo. (appt fina from Dr Malcolm Quintero). Do you want her to be cleared by you or pulm/Dr Iqra Nogueira now?

## 2022-07-08 NOTE — TELEPHONE ENCOUNTER
I would call as well and put in referral  Given her age and risk I want to do everything possible to lower her risk of a bad outcome around the time of surgery  Please put referral in referral, let her know, if dr El Mojica can't see her next week let me know dr Yola Richard or z may be able to

## 2022-07-12 ENCOUNTER — OFFICE VISIT (OUTPATIENT)
Dept: PULMONOLOGY | Age: 87
End: 2022-07-12
Payer: MEDICARE

## 2022-07-12 VITALS
WEIGHT: 173 LBS | HEART RATE: 76 BPM | SYSTOLIC BLOOD PRESSURE: 114 MMHG | TEMPERATURE: 98 F | RESPIRATION RATE: 16 BRPM | DIASTOLIC BLOOD PRESSURE: 54 MMHG | BODY MASS INDEX: 31.83 KG/M2 | OXYGEN SATURATION: 95 % | HEIGHT: 62 IN

## 2022-07-12 DIAGNOSIS — Z01.811 PREOPERATIVE RESPIRATORY EXAMINATION: Primary | ICD-10-CM

## 2022-07-12 DIAGNOSIS — G47.34 NOCTURNAL HYPOXIA: ICD-10-CM

## 2022-07-12 DIAGNOSIS — J20.9 COPD (CHRONIC OBSTRUCTIVE PULMONARY DISEASE) WITH ACUTE BRONCHITIS (HCC): ICD-10-CM

## 2022-07-12 DIAGNOSIS — J44.0 COPD (CHRONIC OBSTRUCTIVE PULMONARY DISEASE) WITH ACUTE BRONCHITIS (HCC): ICD-10-CM

## 2022-07-12 PROCEDURE — 1123F ACP DISCUSS/DSCN MKR DOCD: CPT | Performed by: INTERNAL MEDICINE

## 2022-07-12 PROCEDURE — 99203 OFFICE O/P NEW LOW 30 MIN: CPT | Performed by: INTERNAL MEDICINE

## 2022-07-12 RX ORDER — FLUTICASONE PROPIONATE AND SALMETEROL 250; 50 UG/1; UG/1
1 POWDER RESPIRATORY (INHALATION) EVERY 12 HOURS
COMMUNITY
End: 2022-09-29 | Stop reason: SDUPTHER

## 2022-07-12 ASSESSMENT — ENCOUNTER SYMPTOMS
RHINORRHEA: 0
WHEEZING: 0
SHORTNESS OF BREATH: 0
COUGH: 0
CHEST TIGHTNESS: 0
SINUS PRESSURE: 0
NAUSEA: 0
SORE THROAT: 0
VOMITING: 0
VOICE CHANGE: 0
EYE ITCHING: 0
DIARRHEA: 0
ABDOMINAL PAIN: 0
EYE DISCHARGE: 0
TROUBLE SWALLOWING: 0

## 2022-07-12 NOTE — PROGRESS NOTES
History reviewed. No pertinent family history. Social History     Socioeconomic History    Marital status:      Spouse name: Not on file    Number of children: Not on file    Years of education: Not on file    Highest education level: Not on file   Occupational History    Not on file   Tobacco Use    Smoking status: Never Smoker    Smokeless tobacco: Never Used   Vaping Use    Vaping Use: Never used   Substance and Sexual Activity    Alcohol use: No    Drug use: No    Sexual activity: Not Currently   Other Topics Concern    Not on file   Social History Narrative    ,  Lives with her dtr Bryn Orozco- Daughter (dtr does not work and is able to assist pt as needed), Son Isra Mode is in the area    Home[place] [facility] [facility]: Two level,Able to Live on Main level with bedroom/bathroom    Home Access: Stairs to enter with rails- Number of Steps: 3    Bathroom Shower/Tub: Tub/Shower unit,  Shower chair,Grab bars in shower,Hand-held shower    Home Equipment: Rolling walker    ADL Assistance: Needs assistance    Homemaking Assistance: Needs assistance    Homemaking Responsibilities: No    Ambulation Assistance: Independent (used [employer]),  BSC, LIFT CHAIR, WALKER, CANE,     Transfer Assistance: Independent    Active : No    Additional Comments: pt is questionable historian- question decreased recall versus unable to accurately hear the question; pt reports no recent falls at home. Social Determinants of Health     Financial Resource Strain: Low Risk     Difficulty of Paying Living Expenses: Not hard at all   Food Insecurity: No Food Insecurity    Worried About Running Out of Food in the Last Year: Never true    Deborah of Food in the Last Year: Never true   Transportation Needs:     Lack of Transportation (Medical): Not on file    Lack of Transportation (Non-Medical):  Not on file   Physical Activity:     Days of Exercise per Week: Not on file  Minutes of Exercise per Session: Not on file   Stress:     Feeling of Stress : Not on file   Social Connections:     Frequency of Communication with Friends and Family: Not on file    Frequency of Social Gatherings with Friends and Family: Not on file    Attends Rastafari Services: Not on file    Active Member of Clubs or Organizations: Not on file    Attends Club or Organization Meetings: Not on file    Marital Status: Not on file   Intimate Partner Violence:     Fear of Current or Ex-Partner: Not on file    Emotionally Abused: Not on file    Physically Abused: Not on file    Sexually Abused: Not on file   Housing Stability:     Unable to Pay for Housing in the Last Year: Not on file    Number of [place] in the Last Year: Not on file    Unstable Housing in the Last Year: Not on file         Review of Systems   Constitutional: Negative for chills, diaphoresis, fatigue and fever. HENT: Negative for congestion, mouth sores, nosebleeds, postnasal drip, rhinorrhea, sinus pressure, sneezing, sore throat, trouble swallowing and voice change. Eyes: Negative for discharge, itching and visual disturbance. Respiratory: Negative for cough, chest tightness, shortness of breath and wheezing. Cardiovascular: Negative for chest pain, palpitations and leg swelling. Gastrointestinal: Negative for abdominal pain, diarrhea, nausea and vomiting. Genitourinary: Negative for difficulty urinating and hematuria. Musculoskeletal: Negative for arthralgias, joint swelling and myalgias. Skin: Negative for rash. Allergic/Immunologic: Negative for environmental allergies and food allergies. Neurological: Negative for dizziness, tremors, weakness and headaches.    Psychiatric/Behavioral: Negative for behavioral problems and sleep disturbance.         :     Vitals:    07/12/22 0951   BP: (!) 114/54   Site: Left Upper Arm   Position: Sitting   Cuff Size: Large Adult   Pulse: 76   Resp: 16   Temp: 98 °F (36.7 °C)   SpO2: 95%   Weight: 173 lb (78.5 kg)   Height: 5' 2\" (1.575 m)     Wt Readings from Last 3 Encounters:   07/12/22 173 lb (78.5 kg)   07/05/22 173 lb (78.5 kg)   06/29/22 173 lb (78.5 kg)         Physical Exam  Constitutional:       General: She is not in acute distress. Appearance: She is well-developed. She is not diaphoretic. HENT:      Head: Normocephalic and atraumatic. Nose: Nose normal.   Eyes:      Pupils: Pupils are equal, round, and reactive to light. Neck:      Thyroid: No thyromegaly. Vascular: No JVD. Trachea: No tracheal deviation. Cardiovascular:      Rate and Rhythm: Normal rate and regular rhythm. Heart sounds: No murmur heard. No friction rub. No gallop. Pulmonary:      Effort: No respiratory distress. Breath sounds: No wheezing or rales. Chest:      Chest wall: No tenderness. Abdominal:      General: There is no distension. Tenderness: There is no abdominal tenderness. There is no rebound. Musculoskeletal:         General: Normal range of motion. Lymphadenopathy:      Cervical: No cervical adenopathy. Skin:     General: Skin is warm and dry. Neurological:      Mental Status: She is alert and oriented to person, place, and time.       Coordination: Coordination normal.         Current Outpatient Medications   Medication Sig Dispense Refill    fluticasone-salmeterol (ADVAIR DISKUS) 250-50 MCG/ACT AEPB diskus inhaler Inhale 1 puff into the lungs every 12 hours      montelukast (SINGULAIR) 10 MG tablet TAKE 1 TABLET BY MOUTH NIGHTLY AS NEEDED FOR ALLERGIES 90 tablet 3    atorvastatin (LIPITOR) 20 MG tablet TAKE 1 TABLET BY MOUTH DAILY AT BEDTIME 90 tablet 0    acetaminophen (TYLENOL) 325 MG tablet Take 2 tablets by mouth every 6 hours as needed for Pain (Patient taking differently: Take 650 mg by mouth every 4 hours as needed for Pain or Fever ) 120 tablet 0    dorzolamide-timolol (COSOPT) 22.3-6.8 MG/ML ophthalmic solution PLACE 1 DROP INTO BOTH EYES 2 TIMES DAILY INDICATIONS: GLAUCOMA 10 mL 5    latanoprost (XALATAN) 0.005 % ophthalmic solution Place 1 drop into both eyes daily Indications: Glaucoma (Patient taking differently: Place 1 drop into both eyes nightly Indications: Glaucoma ) 2.5 mL 5    metoprolol tartrate (LOPRESSOR) 25 MG tablet Take 0.5 tablets by mouth 2 times daily Indications: High Blood Pressure Disorder 90 tablet 3    albuterol sulfate HFA (PROAIR HFA) 108 (90 Base) MCG/ACT inhaler Inhale 1 puff into the lungs every 6 hours as needed for Wheezing or Shortness of Breath 3 each 3    brimonidine (ALPHAGAN P) 0.15 % ophthalmic solution Place 1 drop into both eyes 2 times daily Indications: Glaucoma 1 each 1    zoster recombinant adjuvanted vaccine (SHINGRIX) 50 MCG/0.5ML SUSR injection Inject 0.5 mLs into the muscle See Admin Instructions 1 dose now and repeat in 2-6 months 0.5 mL 0    losartan (COZAAR) 25 MG tablet Take 1 tablet by mouth every 12 hours (Patient taking differently: Take 25 mg by mouth every 12 hours Indications: High Blood Pressure Disorder ) 180 tablet 3    Elastic Bandages & Supports (MEDICAL COMPRESSION STOCKINGS) MISC Knee high, 20 to 30 mm hg, remove at night, pharmacy to fit. 1 each 0    OXYGEN Inhale 2 L into the lungs Indications: PRN at bedtime       No current facility-administered medications for this visit. Results for orders placed in visit on 06/20/22    XR CHEST (2 VW)    Narrative  DIAGNOSIS:R93.89 Abnormal CXR ICD10    COMPARISON: April 14, 2022    TECHNIQUE: PA and lateral chest    FINDINGS: The lungs are hyperinflated and there is coarsening of the interstitium. There are mild increased markings in the bases similar to previous study. The cardiac silhouette is enlarged. Calcifications are seen in the thoracic aorta. Increased AP  diameter of the chest is seen and also flattening of the diaphragm. Degenerative changes are seen in the spine at multiple levels.  There is increase kyphosis. A compression fracture is again seen at about the T12 level. Impression  Findings are suggestive of COPD and also there may be mild CHF. Results for orders placed during the hospital encounter of 04/14/22    XR CHEST (2 VW)    Narrative  Exam: XR CHEST (2 VW)    CLINICAL HISTORY:  abnormal labs    COMPARISON: None    CHEST X-ray, 2 VIEWS    FINDINGS:      The cardiac silhouette is at the upper limits of normal in size. There are no infiltrates, consolidations or effusions. There are mild increased markings throughout both lungs. Bones of the thorax appear intact. Impression  No radiographic evidence of acute intrathoracic process. Prominent cardiac silhouette and increased pulmonary markings may be secondary to chronic CHF. Results for orders placed during the hospital encounter of 07/08/20    XR CHEST STANDARD (2 VW)    Narrative  EXAMINATION: Chest x-ray, 2 view    HISTORY: Chest pain. Shortness of breath. TECHNIQUE: Frontal and lateral views of the chest    COMPARISON: Chest x-rays from June 18, 2019    FINDINGS:    Atherosclerotic calcification of the thoracic aorta. Cardiomediastinal silhouette is within normal limits. No pneumothorax, pleural effusion, or consolidation. The lungs are hyperinflated. Coarsening of the pulmonary interstitium. Degenerative changes of  the spine. Impression  No radiographic evidence of acute intrathoracic process. Findings suggesting COPD.  ]  Results for orders placed during the hospital encounter of 12/26/21    XR CHEST PORTABLE    Narrative  EXAMINATION: XR CHEST PORTABLE    CLINICAL HISTORY: FEVER    COMPARISONS: CT CHEST, DECEMBER 21, 2021, CHEST RADIOGRAPH, OCTOBER 31, 2020    FINDINGS: Osseous structures intact. Cardiopericardial silhouette normal. Ill-defined areas of increased opacity are found in the right upper and middle lung, as well as in the left upper, and left mid and lower lung.  Blunting left costophrenic angle  identified. Impression  BILATERAL ATELECTASIS/PNEUMONIA. LEFT PLEURAL EFFUSION. Results for orders placed during the hospital encounter of 10/27/20    XR CHEST PORTABLE    Narrative  Exam: XR CHEST PORTABLE    History:  Bibasilar pneumonia    Technique: AP portable view of the chest obtained. Comparison: Chest x-ray from October 30, 2020 and chest CT from October 27, 2020    Findings: There is an NG tube coursing towards the stomach. The patient remains intubated, and there is dilatation of the trachea towards the right side by the ectatic aorta known from the CT angiogram from October 27, 2020. The cardiomediastinal silhouette is within normal limits. There are mild increased markings throughout both lungs without areas of consolidation likely due to chronic interstitial pulmonary disease. There are small bilateral pleural effusions. Bones of the thorax appear intact. Impression  There are small bilateral pleural effusions and likely underlying atelectasis versus infiltrate. XR CHEST PORTABLE    Narrative  Exam: XR CHEST PORTABLE    History: Respiratory failure    Technique: AP portable view of the chest obtained. Comparison: Portable chest radiograph and CT chest from October 27, 2020    Findings:    Suboptimal inspiration. Right upper extremity PICC is present and terminates in the SVC. Endotracheal tube and enteric tube remain. Atherosclerotic calcification of the thoracic aorta. Heart size is at the upper limits of normal. No significant interval  change in bilateral infiltrate and/or atelectasis given suboptimal inspiration. No acute osseous abnormality. Impression  No significant interval change in bibasilar infiltrate and/or atelectasis. Results for orders placed during the hospital encounter of 12/21/21    CT CHEST W CONTRAST    Narrative  CT of the Chest with intravenous contrast medium. HISTORY: Fall. Right rib pain.       TECHNICAL FACTORS:    CT imaging of the chest was obtained and formatted as 5 mm contiguous axial images from the thoracic inlet through the adrenal glands. Sagittal and coronal reconstructions obtained during postprocessing. Intravenous contrast medium:  100 ml of Isovue-300    Comparison:  CT chest, October 27, 2020. FINDINGS:      Right lung: Motion artifact. Noncalcified nonpleural-based 7.1 x 7.0 mm pulmonary nodule found laterally and posteriorly right upper lobe (series 2, image 18). Partially pleural-based noncalcified right lower lobe nodule, measuring 8 x 12 mm (series 2,  image 29), not present on prior study. Finding not present on examination October, 2020. Dependent subsegmental consolidation right posterior lung base. No pleural effusion. Left lung: Fibrotic change, left lung apex. No nodules, masses, pleural effusion. Dependent subsegmental atelectatic change left lung base. Lymph nodes/mediastinum asymmetric enlargement left lobe of thyroid with low-attenuation thyroid nodules are identified, largest measuring 7 x 12 mm. No hilar, mediastinal, or axillary lymph node enlargement. Thoracic aorta: AP and transverse dimension, 3.8 x 3.9 cm, the pulmonary arterial bifurcation. Cardiac Size: Upper limits normal.    Pericardial effusion: None. Upper abdomen:Limited imaging upper abdomen small hiatal hernia. Musculoskeletal:No osteoblastic, and no osteolytic lesions. No acute fracture    Impression  Interval development of right lung nodules, largest measuring 7 x 12 mm. Recommendations below. Bilateral lower lobe dependent subsegmental atelectasis. Dilatation, ascending thoracic aorta.     Other findings discussed      ____________________________________________________________    Fleischner Society 2017 Guidelines for Management of Incidentally Detected Pulmonary Nodules in Adults    A: Solid Nodules*    Single    Low risk**  <6 mm     No routine follow-up  6-8 mm     CT at 6-12 months, then consider CT at 18-24 months  >8 mm     Consider CT at 3 months, PET/CT, or tissue sampling  Nodules <6 mm do not require routine follow-up, but certain patients at high risk with suspicious nodule morphology, upper lobe location, or both may warrant 12-month follow-up (recommendation 1A). High risk**  <6 mm     Optional CT at 12 months  6-8 mm     CT at 6-12 months, then CT at 18-24 months  >8 mm     Consider CT at 3 months, PET/CT, or tissue sampling  Nodules <6 mm do not require routine follow-up, but certain patients at high risk with suspicious nodule morphology, upper lobe location, or both may warrant 12-month follow-up (recommendation 1A). Multiple    Low risk**  <6 mm     No routine follow-up  6-8 mm     CT at 3-6 months, then consider CT at 18-24 months  >8 mm     CT at 3-6 months, then  consider CT at 18-24 months  Use most suspicious nodule as guide to management. Follow-up intervals may vary according to size and risk (recommendation 2A). High risk**  <6 mm     Optional CT at 12 months  6-8 mm     CT at 3-6 months, then at 18-24 months  >8 mm     CT at 3-6 months, then at 18-24 months  Use most suspicious nodule as guide to management. Follow-up intervals may vary according to size and risk (recommendation 2A). B: Subsolid Nodules*    Single    Ground glass  <6 mm     No routine follow-up  >=6 mm     CT at 6-12 months to confirm persistence, then CT  every 2 years until 5 years  In certain suspicious nodules <6 mm, consider follow-up at 2 and 4 years. If solid component(s) or growth develops, consider resection. (Recommendations 3A and 4A). Part solid  <6 mm     No routine follow-up  >=6 mm     CT at 3-6 months to confirm persistence. If unchanged and solid component remains <6 mm, annual CT  should be performed for 5 years. In practice, part-solid nodules cannot be defined as such until >=6 mm, and nodules <6 mm do not usually require follow-up.  Persistent part-solid nodules with solid components >=6 mm should be considered highly suspicious (recommendations 4A-4C)    Multiple  <6 mm     CT at 3-6 months. If stable, consider CT at 2 and 4 years. >=6 mm     CT at 3-6 months. Subsequent management based  on the most suspicious nodule(s). Multiple <6 mm pure ground-glass nodules are usually benign, but consider follow-up in selected patients at high risk at 2 and 4 years (recommendation 5A). Note. --These recommendations do not apply to lung cancer screening, patients with immunosuppression, or patients with known primary cancer. * Dimensions are average of long and short axes, rounded to the nearest millimeter. ** Consider all relevant risk factors (see Risk Factors). ____________________________________________________________      All CT scans at this facility use dose modulation, iterative reconstruction, and/or weight based dosing when appropriate to reduce radiation dose to as low as reasonably achievable. Assessment/Plan:     1. Preoperative respiratory examination  She is going for gall bladder surgery. She need pulmonary clearance. She  is on 2 lit with sleep but does not use it . She is on  bronchodilator with she is on advair Discus and proair HFA . PFT show no obstruction or restriction , decreased DLCO 2019 CXR recent 6/22 show COPD and mild CHF. She is moderate but acceptable risk for post ope  Pulmonary complication . She is clear for surgery with above risk . 2. COPD (chronic obstructive pulmonary disease) with acute bronchitis ([address]   she is on advair discus and albuterol HFA prn . Recommend to have neb with albuterol QID  During post ope period    3. Nocturnal hypoxia  Recommend to use 2 lit O2 with sleep       Return in about 3 months (around 10/12/2022) for COPD, hypoxia on O2.       Jake Cardenas MD

## 2022-07-13 NOTE — TELEPHONE ENCOUNTER
See note from 06/29/2022  Sorry this is fairly new  I don't know their number   I believe [facility] are [facility] staff it

## 2022-07-18 ENCOUNTER — TELEPHONE (OUTPATIENT)
Dept: FAMILY MEDICINE CLINIC | Age: 87
End: 2022-07-18

## 2022-07-18 ENCOUNTER — OFFICE VISIT (OUTPATIENT)
Dept: FAMILY MEDICINE CLINIC | Age: 87
End: 2022-07-18
Payer: MEDICARE

## 2022-07-18 VITALS
DIASTOLIC BLOOD PRESSURE: 86 MMHG | OXYGEN SATURATION: 99 % | HEART RATE: 72 BPM | TEMPERATURE: 97.6 F | SYSTOLIC BLOOD PRESSURE: 187 MMHG

## 2022-07-18 DIAGNOSIS — R74.8 ELEVATED ALKALINE PHOSPHATASE LEVEL: ICD-10-CM

## 2022-07-18 DIAGNOSIS — K81.1 CHRONIC CHOLECYSTITIS: Primary | ICD-10-CM

## 2022-07-18 DIAGNOSIS — I10 PRIMARY HYPERTENSION: ICD-10-CM

## 2022-07-18 DIAGNOSIS — Z23 ENCOUNTER FOR IMMUNIZATION: ICD-10-CM

## 2022-07-18 PROCEDURE — 99215 OFFICE O/P EST HI 40 MIN: CPT | Performed by: INTERNAL MEDICINE

## 2022-07-18 PROCEDURE — 1123F ACP DISCUSS/DSCN MKR DOCD: CPT | Performed by: INTERNAL MEDICINE

## 2022-07-18 RX ORDER — ZOSTER VACCINE RECOMBINANT, ADJUVANTED 50 MCG/0.5
0.5 KIT INTRAMUSCULAR SEE ADMIN INSTRUCTIONS
Qty: 0.5 ML | Refills: 0 | Status: SHIPPED | OUTPATIENT
Start: 2022-07-18 | End: 2023-01-14

## 2022-07-18 ASSESSMENT — ENCOUNTER SYMPTOMS
EYE PAIN: 0
BACK PAIN: 0
ABDOMINAL PAIN: 0
SHORTNESS OF BREATH: 0

## 2022-07-18 NOTE — PROGRESS NOTES
Subjective:      Patient ID: Linda Whitfield is a 80 y.o. female who presents today with:  Chief Complaint   Patient presents with    Discuss Labs     PT IS HERE TODAY TO  [id number] [place] [address]    Here to discuss labs     Past Medical History:   Diagnosis Date    Age-related cognitive decline     Cancer ([address]     Cerebrovascular small vessel disease     Chronic nonspecific lung disease 2021    nodules R lung, fibrosis L apex    Glaucoma     Gout     left knee    Hypertension     Obesity (BMI 30-39.9) 07/11/2019    Obstructive sleep apnea 07/11/2019    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    Vitamin D deficiency 2019     Past Surgical History:   Procedure Laterality Date    CATARACT REMOVAL WITH IMPLANT Bilateral 2016    COLON SURGERY  2009    polyps    COLONOSCOPY  02/2009    GASTROSTOMY TUBE PLACEMENT N/A 11/04/2020    EGD PEG TUBE PLACEMENT performed by Radha Menjivar MD at [address] Right 04/15/2022    Flexima APDL Locking Pigtail Draiange Catheter inserted by Dr. Herbert Severin (Lot# [id number] / Exp: 12/07/2024)    IR CHOLECYSTOSTOMY PERCUTANEOUS COMPLETE  4/15/2022    IR CHOLECYSTOSTOMY PERCUTANEOUS COMPLETE 4/15/2022 [facility] SPECIAL PROCEDURE    SKIN CANCER EXCISION  2002    graft from neck     Social History     Socioeconomic History    Marital status:       Spouse name: Not on file    Number of children: Not on file    Years of education: Not on file    Highest education level: Not on file   Occupational History    Not on file   Tobacco Use    Smoking status: Never    Smokeless tobacco: Never   Vaping Use    Vaping Use: Never used   Substance and Sexual Activity    Alcohol use: No    Drug use: No    Sexual activity: Not Currently   Other Topics Concern    Not on file   Social History Narrative    ,  Lives with her dtr Marysol Smith- Daughter (dtr does not work and is able to assist pt as needed), Son Smith Stone is in the area    Home: House- in [place]-[address] Layout: Two level,Able to Live on Main level with bedroom/bathroom    Home Access: Stairs to enter with rails- Number of Steps: 3    Bathroom Shower/Tub: Tub/Shower unit,  Shower chair,Grab bars in shower,Hand-held shower    Home Equipment: Rolling walker    ADL Assistance: Needs assistance    Homemaking Assistance: Needs assistance    Homemaking Responsibilities: No    Ambulation Assistance: Independent (used [employer]),  BSC, LIFT CHAIR, WALKER, CANE,     Transfer Assistance: Independent    Active : No    Additional Comments: pt is questionable historian- question decreased recall versus unable to accurately hear the question; pt reports no recent falls at home. Social Determinants of Health     Financial Resource Strain: Low Risk     Difficulty of Paying Living Expenses: Not hard at all   Food Insecurity: No Food Insecurity    Worried About Running Out of Food in the Last Year: Never true    Ran Out of Food in the Last Year: Never true   Transportation Needs: Not on file   Physical Activity: Not on file   Stress: Not on file   Social Connections: Not on file   Intimate Partner Violence: Not on file   Housing Stability: Not on file     Allergies   Allergen Reactions    Norvasc [Amlodipine Besylate] Swelling     No current facility-administered medications on file prior to visit.      Current Outpatient Medications on File Prior to Visit   Medication Sig Dispense Refill    fluticasone-salmeterol (ADVAIR) 250-50 MCG/ACT AEPB diskus inhaler Inhale 1 puff into the lungs every 12 hours      montelukast (SINGULAIR) 10 MG tablet TAKE 1 TABLET BY MOUTH NIGHTLY AS NEEDED FOR ALLERGIES 90 tablet 3    atorvastatin (LIPITOR) 20 MG tablet TAKE 1 TABLET BY MOUTH DAILY AT BEDTIME 90 tablet 0    acetaminophen (TYLENOL) 325 MG tablet Take 2 tablets by mouth every 6 hours as needed for Pain (Patient taking differently: Take 650 mg by mouth every 4 hours as needed for Pain or Fever) 120 tablet 0    dorzolamide-timolol (COSOPT) 22.3-6.8 MG/ML ophthalmic solution PLACE 1 DROP INTO BOTH EYES 2 TIMES DAILY INDICATIONS: GLAUCOMA 10 mL 5    latanoprost (XALATAN) 0.005 % ophthalmic solution Place 1 drop into both eyes daily Indications: Glaucoma (Patient taking differently: Place 1 drop into both eyes nightly Indications: Glaucoma) 2.5 mL 5    metoprolol tartrate (LOPRESSOR) 25 MG tablet Take 0.5 tablets by mouth 2 times daily Indications: High Blood Pressure Disorder 90 tablet 3    albuterol sulfate HFA (PROAIR HFA) 108 (90 Base) MCG/ACT inhaler Inhale 1 puff into the lungs every 6 hours as needed for Wheezing or Shortness of Breath 3 each 3    brimonidine (ALPHAGAN P) 0.15 % ophthalmic solution Place 1 drop into both eyes 2 times daily Indications: Glaucoma 1 each 1    losartan (COZAAR) 25 MG tablet Take 1 tablet by mouth every 12 hours (Patient taking differently: Take 25 mg by mouth in the morning and 25 mg in the evening. Indications: High Blood Pressure Disorder.) 180 tablet 3    Elastic Bandages & Supports (MEDICAL COMPRESSION STOCKINGS) MISC Knee high, 20 to 30 mm hg, remove at night, pharmacy to fit. 1 each 0    OXYGEN Inhale 2 L into the lungs Indications: PRN at bedtime         I have personally reviewed the ROS, PMH, PFH, and social history     Review of Systems   Constitutional:  Negative for chills and fever. HENT:  Negative for congestion. Eyes:  Negative for pain. Respiratory:  Negative for shortness of breath. Cardiovascular:  Negative for chest pain. Gastrointestinal:  Negative for abdominal pain. Genitourinary:  Negative for hematuria. Musculoskeletal:  Negative for back pain. Allergic/Immunologic: Negative for immunocompromised state. Neurological:  Negative for headaches. Psychiatric/Behavioral:  Negative for hallucinations.       Objective:   BP (!) 187/86 (Site: Right Upper Arm, Position: Sitting)   Pulse 72   Temp 97.6 °F (36.4 °C) (Temporal)   LMP  (LMP Unknown)   SpO2 99%     Physical Exam  Constitutional:       General: She is not in acute distress. Appearance: Normal appearance. She is not ill-appearing, toxic-appearing or diaphoretic. HENT:      Head: Normocephalic. Neck:      Vascular: No carotid bruit. Cardiovascular:      Rate and Rhythm: Normal rate and regular rhythm. Pulses: Normal pulses. Heart sounds: Normal heart sounds. No murmur heard. No friction rub. No gallop. Pulmonary:      Effort: Pulmonary effort is normal. No respiratory distress. Breath sounds: Normal breath sounds. No wheezing, rhonchi or rales. Abdominal:      General: Abdomen is flat. There is no distension. Palpations: Abdomen is soft. Tenderness: There is no abdominal tenderness. There is no right CVA tenderness, left CVA tenderness, guarding or rebound. Musculoskeletal:      Cervical back: Neck supple. Right lower leg: No edema. Left lower leg: No edema. Skin:     General: Skin is warm. Findings: No erythema or rash. Neurological:      Mental Status: She is alert. Psychiatric:         Mood and Affect: Mood normal.         Assessment:       Diagnosis Orders   1. Chronic cholecystitis        2. Encounter for immunization  zoster recombinant adjuvanted vaccine [facility]) 50 MCG/0.5ML SUSR injection      3. Elevated alkaline phosphatase level  Alkaline Phosphatase, Isoenzymes      4.  Primary hypertension              Plan:     vc.  Has surgery follow up this week (likely surgery)  Saw cardiology and pulmonary  Fu ir  Schedule 2nd covid booster when due   Referred to speech therapy 2/10/2022   ORDERS from 2/10/2022 not done  Referred to uro (02/2022) nephrolithiasis   Us thyroid not done   Referred to derm                Orders Placed This Encounter   Procedures    Alkaline Phosphatase, Isoenzymes     Standing Status:   Future     Standing Expiration Date:   7/18/2023       Orders Placed This Encounter   Medications    zoster recombinant adjuvanted vaccine [facility]) 50 MCG/0.5ML SUSR injection     Sig: Inject 0.5 mLs into the muscle See Admin Instructions 1 dose now and repeat in 2-6 months     Dispense:  0.5 mL     Refill:  0     Patient gave me permission to speak in front of friends/family, etc.    Continue chronic medications  Keep trauma fu, likely cholecystomy. 31 Minutes spent with patient, approximately 7 minutes spent on phone with dtr after visit regarding htn, additional 10 minutes reviewing old notes, labs, imaging, etc.   If anything should change or worsen call ASAP, don't wait for next scheduled appointment. Addendum gjd 8/12/2022  Patient was contacted for the below  Patient has a medical condition(s) including pain, arthritis  which requires positioning of the body in ways not feasible with an ordinary bed. Patient requires positioning of the body in ways not feasible with an ordinary bed in order to alleviate pain. Patient requires the head of the bed to be elevated more than 30 degrees most of the time due to \"too difficult for her to raise herself\"  Return in about 3 months (around 10/18/2022) for Chronic condition management/appointment, worsening symptoms, call ASAP for appointment.       Gina Vo MD

## 2022-07-19 NOTE — TELEPHONE ENCOUNTER
Spoke with patients dtr at approx 915 pm   Documented now  Bp down to 140/75 to call me tomorrow am if not down further  No changes to meds at this time  Thanks,    Call immediately should something change.      Gifty Gonzalez

## 2022-07-20 ENCOUNTER — ANESTHESIA EVENT (OUTPATIENT)
Dept: OPERATING ROOM | Age: 87
DRG: 419 | End: 2022-07-20
Payer: MEDICARE

## 2022-07-20 ENCOUNTER — OFFICE VISIT (OUTPATIENT)
Dept: SURGERY | Age: 87
End: 2022-07-20
Payer: MEDICARE

## 2022-07-20 VITALS
OXYGEN SATURATION: 92 % | HEIGHT: 62 IN | BODY MASS INDEX: 31.83 KG/M2 | TEMPERATURE: 97.1 F | WEIGHT: 173 LBS | HEART RATE: 78 BPM

## 2022-07-20 DIAGNOSIS — Z01.818 PRE-OP TESTING: Primary | ICD-10-CM

## 2022-07-20 PROBLEM — K80.20 CHOLELITHIASIS: Status: ACTIVE | Noted: 2022-07-20

## 2022-07-20 PROCEDURE — 1123F ACP DISCUSS/DSCN MKR DOCD: CPT | Performed by: SURGERY

## 2022-07-20 PROCEDURE — 99215 OFFICE O/P EST HI 40 MIN: CPT | Performed by: SURGERY

## 2022-07-20 NOTE — PROGRESS NOTES
ACUTE CARE SURGERY CLINIC NOTE:    Peter Altamirano is a 80 y.o. female with past medical history of COPD, HTN, FRED presents to [facility] with 1 week history of intermittent RUQ pain without nausea or vomiting and was admitted on 4/14/22. Due to her underlying medical issues daughters opted for percutaneous cholecystostomy tube by IR on 4/15/22. She was discharged to SNF on 4/26/22. She had cholangiogram done on 6/16/22 which showed patent cystic duct with contrast in CBD and duodenum. However gallbladder was completely full of stones and the contrast took some time to get into cystic duct. Currently per daughters she doesn't have any abdominal pain or nausea/vomiting. Tolerating PO diet. She has some discomfort due to the IR tube. O/E  General - in no acute distress  CVS RRR  Lungs CTA b/l  Abd soft ND NT; IR catheter in place with clear bile tinged fluid  Ext - +edema  Skin - warm    Dx:  Acute cholecystitis s/p IR percutaneous cholecystostomy tube    Plan:  I had lengthy discussion with the daughters regarding the cholecystostomy tube. We talked about several options 1) removing tube and pursuing cholecystectomy 2) removing tube and watching to see if issue recurs then potentially placing another one back in 3) keeping in tube for good. I explained risk vs benefit for each of the options (same as from 2 weeks ago). She has seen her cardiologist and pulmonologist who states she is an acceptable risk for proceeding with surgery without any further testing. After discussion with the daughters they felt that surgical option would be best due to risk of recurrence with her significant amount of stones in the gallbladder. They are aware that due to her age, co-morbidities she is at risk of perioperative complications including cardiac event, respiratory failure, pneumonia to name a few.  They wanted to proceed with surgery  I explained risk and benefits of surgery as well as complications including those above, bleeding, infection, bile leak, CBD or bowel injury requiring a bigger operation which may also increase her risk for cardiac/pulmonary complications. OR Friday for laparoscopic cholecystectomy. I discussed removing IR drain but the family were hesitant and wanted to wait until day of surgery to remove it.      Tony Rivera MD, FACS  Acute Care Surgery

## 2022-07-20 NOTE — PATIENT INSTRUCTIONS
- Scheduled for surgery 7/22/2022. Do not eat or drink anything after midnight on the day of surgery  - You may take your medications the morning as usual on the morning of surgery except for Losartan.  Please do not take Losartan the morning of surgery  - You will get a call from the OR  about when to arrival from surgery

## 2022-07-22 ENCOUNTER — HOSPITAL ENCOUNTER (INPATIENT)
Age: 87
LOS: 4 days | Discharge: NURSING, REHAB OR LONG-TERM CARE | DRG: 419 | End: 2022-07-26
Attending: SURGERY | Admitting: SURGERY
Payer: MEDICARE

## 2022-07-22 ENCOUNTER — ANESTHESIA (OUTPATIENT)
Dept: OPERATING ROOM | Age: 87
DRG: 419 | End: 2022-07-22
Payer: MEDICARE

## 2022-07-22 DIAGNOSIS — K80.20 CHOLELITHIASIS: ICD-10-CM

## 2022-07-22 DIAGNOSIS — G89.18 ACUTE POST-OPERATIVE PAIN: ICD-10-CM

## 2022-07-22 DIAGNOSIS — Z90.49 S/P LAPAROSCOPIC CHOLECYSTECTOMY: Primary | ICD-10-CM

## 2022-07-22 LAB
ANION GAP SERPL CALCULATED.3IONS-SCNC: 11 MEQ/L (ref 9–15)
BUN BLDV-MCNC: 28 MG/DL (ref 8–23)
CALCIUM SERPL-MCNC: 9.2 MG/DL (ref 8.5–9.9)
CHLORIDE BLD-SCNC: 110 MEQ/L (ref 95–107)
CO2: 23 MEQ/L (ref 20–31)
CREAT SERPL-MCNC: 0.93 MG/DL (ref 0.5–0.9)
GFR AFRICAN AMERICAN: >60
GFR NON-AFRICAN AMERICAN: 56.1
GLUCOSE BLD-MCNC: 102 MG/DL (ref 70–99)
HCT VFR BLD CALC: 37.1 % (ref 37–47)
HEMOGLOBIN: 12.4 G/DL (ref 12–16)
MCH RBC QN AUTO: 28.8 PG (ref 27–31.3)
MCHC RBC AUTO-ENTMCNC: 33.4 % (ref 33–37)
MCV RBC AUTO: 86.2 FL (ref 82–100)
PDW BLD-RTO: 16.4 % (ref 11.5–14.5)
PLATELET # BLD: 186 K/UL (ref 130–400)
POTASSIUM SERPL-SCNC: 4.3 MEQ/L (ref 3.4–4.9)
RBC # BLD: 4.31 M/UL (ref 4.2–5.4)
SODIUM BLD-SCNC: 144 MEQ/L (ref 135–144)
TROPONIN: <0.01 NG/ML (ref 0–0.01)
WBC # BLD: 7.7 K/UL (ref 4.8–10.8)

## 2022-07-22 PROCEDURE — 3600000014 HC SURGERY LEVEL 4 ADDTL 15MIN: Performed by: SURGERY

## 2022-07-22 PROCEDURE — 6370000000 HC RX 637 (ALT 250 FOR IP): Performed by: SURGERY

## 2022-07-22 PROCEDURE — 2709999900 HC NON-CHARGEABLE SUPPLY: Performed by: SURGERY

## 2022-07-22 PROCEDURE — 3600000004 HC SURGERY LEVEL 4 BASE: Performed by: SURGERY

## 2022-07-22 PROCEDURE — 2720000010 HC SURG SUPPLY STERILE: Performed by: SURGERY

## 2022-07-22 PROCEDURE — 85027 COMPLETE CBC AUTOMATED: CPT

## 2022-07-22 PROCEDURE — 0FT44ZZ RESECTION OF GALLBLADDER, PERCUTANEOUS ENDOSCOPIC APPROACH: ICD-10-PCS | Performed by: SURGERY

## 2022-07-22 PROCEDURE — 2580000003 HC RX 258: Performed by: PHYSICIAN ASSISTANT

## 2022-07-22 PROCEDURE — 84484 ASSAY OF TROPONIN QUANT: CPT

## 2022-07-22 PROCEDURE — 6360000002 HC RX W HCPCS: Performed by: ANESTHESIOLOGIST ASSISTANT

## 2022-07-22 PROCEDURE — 2580000003 HC RX 258: Performed by: SURGERY

## 2022-07-22 PROCEDURE — A4217 STERILE WATER/SALINE, 500 ML: HCPCS | Performed by: SURGERY

## 2022-07-22 PROCEDURE — 93005 ELECTROCARDIOGRAM TRACING: CPT | Performed by: SURGERY

## 2022-07-22 PROCEDURE — 7100000001 HC PACU RECOVERY - ADDTL 15 MIN: Performed by: SURGERY

## 2022-07-22 PROCEDURE — 94150 VITAL CAPACITY TEST: CPT

## 2022-07-22 PROCEDURE — 6370000000 HC RX 637 (ALT 250 FOR IP): Performed by: PHYSICIAN ASSISTANT

## 2022-07-22 PROCEDURE — 2580000003 HC RX 258: Performed by: ANESTHESIOLOGIST ASSISTANT

## 2022-07-22 PROCEDURE — 1210000000 HC MED SURG R&B

## 2022-07-22 PROCEDURE — 6360000002 HC RX W HCPCS: Performed by: STUDENT IN AN ORGANIZED HEALTH CARE EDUCATION/TRAINING PROGRAM

## 2022-07-22 PROCEDURE — 80048 BASIC METABOLIC PNL TOTAL CA: CPT

## 2022-07-22 PROCEDURE — 3700000001 HC ADD 15 MINUTES (ANESTHESIA): Performed by: SURGERY

## 2022-07-22 PROCEDURE — 88304 TISSUE EXAM BY PATHOLOGIST: CPT

## 2022-07-22 PROCEDURE — 3E0T3BZ INTRODUCTION OF ANESTHETIC AGENT INTO PERIPHERAL NERVES AND PLEXI, PERCUTANEOUS APPROACH: ICD-10-PCS | Performed by: SURGERY

## 2022-07-22 PROCEDURE — 0DNU4ZZ RELEASE OMENTUM, PERCUTANEOUS ENDOSCOPIC APPROACH: ICD-10-PCS | Performed by: SURGERY

## 2022-07-22 PROCEDURE — 64488 TAP BLOCK BI INJECTION: CPT | Performed by: STUDENT IN AN ORGANIZED HEALTH CARE EDUCATION/TRAINING PROGRAM

## 2022-07-22 PROCEDURE — 2500000003 HC RX 250 WO HCPCS: Performed by: STUDENT IN AN ORGANIZED HEALTH CARE EDUCATION/TRAINING PROGRAM

## 2022-07-22 PROCEDURE — 7100000000 HC PACU RECOVERY - FIRST 15 MIN: Performed by: SURGERY

## 2022-07-22 PROCEDURE — 3700000000 HC ANESTHESIA ATTENDED CARE: Performed by: SURGERY

## 2022-07-22 PROCEDURE — 2500000003 HC RX 250 WO HCPCS: Performed by: ANESTHESIOLOGIST ASSISTANT

## 2022-07-22 PROCEDURE — 36415 COLL VENOUS BLD VENIPUNCTURE: CPT

## 2022-07-22 PROCEDURE — 6360000002 HC RX W HCPCS: Performed by: SURGERY

## 2022-07-22 RX ORDER — OXYCODONE HYDROCHLORIDE 5 MG/1
5 TABLET ORAL EVERY 4 HOURS PRN
Status: DISCONTINUED | OUTPATIENT
Start: 2022-07-22 | End: 2022-07-26 | Stop reason: HOSPADM

## 2022-07-22 RX ORDER — OXYCODONE HYDROCHLORIDE 5 MG/1
5 TABLET ORAL PRN
Status: DISCONTINUED | OUTPATIENT
Start: 2022-07-22 | End: 2022-07-22

## 2022-07-22 RX ORDER — LIDOCAINE HYDROCHLORIDE 10 MG/ML
INJECTION, SOLUTION EPIDURAL; INFILTRATION; INTRACAUDAL; PERINEURAL PRN
Status: DISCONTINUED | OUTPATIENT
Start: 2022-07-22 | End: 2022-07-22 | Stop reason: SDUPTHER

## 2022-07-22 RX ORDER — LABETALOL 20 MG/4 ML (5 MG/ML) INTRAVENOUS SYRINGE
PRN
Status: DISCONTINUED | OUTPATIENT
Start: 2022-07-22 | End: 2022-07-22 | Stop reason: SDUPTHER

## 2022-07-22 RX ORDER — SODIUM CHLORIDE, SODIUM LACTATE, POTASSIUM CHLORIDE, CALCIUM CHLORIDE 600; 310; 30; 20 MG/100ML; MG/100ML; MG/100ML; MG/100ML
INJECTION, SOLUTION INTRAVENOUS CONTINUOUS PRN
Status: DISCONTINUED | OUTPATIENT
Start: 2022-07-22 | End: 2022-07-22 | Stop reason: SDUPTHER

## 2022-07-22 RX ORDER — SENNA AND DOCUSATE SODIUM 50; 8.6 MG/1; MG/1
1 TABLET, FILM COATED ORAL 2 TIMES DAILY
Status: DISCONTINUED | OUTPATIENT
Start: 2022-07-22 | End: 2022-07-26 | Stop reason: HOSPADM

## 2022-07-22 RX ORDER — MAGNESIUM HYDROXIDE 1200 MG/15ML
LIQUID ORAL CONTINUOUS PRN
Status: DISCONTINUED | OUTPATIENT
Start: 2022-07-22 | End: 2022-07-22 | Stop reason: HOSPADM

## 2022-07-22 RX ORDER — OXYCODONE HYDROCHLORIDE 5 MG/1
5 TABLET ORAL PRN
Status: DISCONTINUED | OUTPATIENT
Start: 2022-07-22 | End: 2022-07-22 | Stop reason: HOSPADM

## 2022-07-22 RX ORDER — BISACODYL 10 MG
10 SUPPOSITORY, RECTAL RECTAL DAILY PRN
Status: DISCONTINUED | OUTPATIENT
Start: 2022-07-22 | End: 2022-07-26 | Stop reason: HOSPADM

## 2022-07-22 RX ORDER — OXYCODONE HYDROCHLORIDE 5 MG/1
2.5 TABLET ORAL PRN
Status: DISCONTINUED | OUTPATIENT
Start: 2022-07-22 | End: 2022-07-22 | Stop reason: HOSPADM

## 2022-07-22 RX ORDER — LIDOCAINE HYDROCHLORIDE 10 MG/ML
1 INJECTION, SOLUTION EPIDURAL; INFILTRATION; INTRACAUDAL; PERINEURAL
Status: DISCONTINUED | OUTPATIENT
Start: 2022-07-22 | End: 2022-07-22 | Stop reason: HOSPADM

## 2022-07-22 RX ORDER — ONDANSETRON 4 MG/1
4 TABLET, ORALLY DISINTEGRATING ORAL EVERY 8 HOURS PRN
Status: DISCONTINUED | OUTPATIENT
Start: 2022-07-22 | End: 2022-07-23

## 2022-07-22 RX ORDER — SODIUM CHLORIDE 9 MG/ML
INJECTION, SOLUTION INTRAVENOUS PRN
Status: DISCONTINUED | OUTPATIENT
Start: 2022-07-22 | End: 2022-07-26 | Stop reason: HOSPADM

## 2022-07-22 RX ORDER — OXYCODONE HYDROCHLORIDE 5 MG/1
10 TABLET ORAL PRN
Status: DISCONTINUED | OUTPATIENT
Start: 2022-07-22 | End: 2022-07-22

## 2022-07-22 RX ORDER — SODIUM CHLORIDE 0.9 % (FLUSH) 0.9 %
10 SYRINGE (ML) INJECTION PRN
Status: DISCONTINUED | OUTPATIENT
Start: 2022-07-22 | End: 2022-07-26 | Stop reason: HOSPADM

## 2022-07-22 RX ORDER — BUPIVACAINE HYDROCHLORIDE 5 MG/ML
INJECTION, SOLUTION EPIDURAL; INTRACAUDAL
Status: COMPLETED | OUTPATIENT
Start: 2022-07-22 | End: 2022-07-22

## 2022-07-22 RX ORDER — OXYCODONE HYDROCHLORIDE 5 MG/1
2.5 TABLET ORAL EVERY 4 HOURS PRN
Status: DISCONTINUED | OUTPATIENT
Start: 2022-07-22 | End: 2022-07-26 | Stop reason: HOSPADM

## 2022-07-22 RX ORDER — FENTANYL CITRATE 50 UG/ML
50 INJECTION, SOLUTION INTRAMUSCULAR; INTRAVENOUS EVERY 10 MIN PRN
Status: DISCONTINUED | OUTPATIENT
Start: 2022-07-22 | End: 2022-07-22 | Stop reason: HOSPADM

## 2022-07-22 RX ORDER — HYDRALAZINE HYDROCHLORIDE 20 MG/ML
INJECTION INTRAMUSCULAR; INTRAVENOUS PRN
Status: DISCONTINUED | OUTPATIENT
Start: 2022-07-22 | End: 2022-07-22 | Stop reason: SDUPTHER

## 2022-07-22 RX ORDER — PROPOFOL 10 MG/ML
INJECTION, EMULSION INTRAVENOUS PRN
Status: DISCONTINUED | OUTPATIENT
Start: 2022-07-22 | End: 2022-07-22 | Stop reason: SDUPTHER

## 2022-07-22 RX ORDER — METOCLOPRAMIDE HYDROCHLORIDE 5 MG/ML
10 INJECTION INTRAMUSCULAR; INTRAVENOUS
Status: COMPLETED | OUTPATIENT
Start: 2022-07-22 | End: 2022-07-22

## 2022-07-22 RX ORDER — ACETAMINOPHEN 325 MG/1
650 TABLET ORAL EVERY 6 HOURS SCHEDULED
Status: DISCONTINUED | OUTPATIENT
Start: 2022-07-22 | End: 2022-07-26 | Stop reason: HOSPADM

## 2022-07-22 RX ORDER — WOUND DRESSING ADHESIVE - LIQUID
LIQUID MISCELLANEOUS PRN
Status: DISCONTINUED | OUTPATIENT
Start: 2022-07-22 | End: 2022-07-22 | Stop reason: HOSPADM

## 2022-07-22 RX ORDER — ROCURONIUM BROMIDE 10 MG/ML
INJECTION, SOLUTION INTRAVENOUS PRN
Status: DISCONTINUED | OUTPATIENT
Start: 2022-07-22 | End: 2022-07-22 | Stop reason: SDUPTHER

## 2022-07-22 RX ORDER — LIDOCAINE HYDROCHLORIDE AND EPINEPHRINE 20; 5 MG/ML; UG/ML
INJECTION, SOLUTION EPIDURAL; INFILTRATION; INTRACAUDAL; PERINEURAL PRN
Status: DISCONTINUED | OUTPATIENT
Start: 2022-07-22 | End: 2022-07-22 | Stop reason: SDUPTHER

## 2022-07-22 RX ORDER — DIPHENHYDRAMINE HYDROCHLORIDE 50 MG/ML
12.5 INJECTION INTRAMUSCULAR; INTRAVENOUS
Status: DISCONTINUED | OUTPATIENT
Start: 2022-07-22 | End: 2022-07-22 | Stop reason: HOSPADM

## 2022-07-22 RX ORDER — SODIUM CHLORIDE 0.9 % (FLUSH) 0.9 %
10 SYRINGE (ML) INJECTION EVERY 12 HOURS SCHEDULED
Status: DISCONTINUED | OUTPATIENT
Start: 2022-07-22 | End: 2022-07-26 | Stop reason: HOSPADM

## 2022-07-22 RX ORDER — ONDANSETRON 2 MG/ML
4 INJECTION INTRAMUSCULAR; INTRAVENOUS
Status: COMPLETED | OUTPATIENT
Start: 2022-07-22 | End: 2022-07-22

## 2022-07-22 RX ORDER — FENTANYL CITRATE 50 UG/ML
INJECTION, SOLUTION INTRAMUSCULAR; INTRAVENOUS PRN
Status: DISCONTINUED | OUTPATIENT
Start: 2022-07-22 | End: 2022-07-22 | Stop reason: SDUPTHER

## 2022-07-22 RX ORDER — ONDANSETRON 2 MG/ML
INJECTION INTRAMUSCULAR; INTRAVENOUS PRN
Status: DISCONTINUED | OUTPATIENT
Start: 2022-07-22 | End: 2022-07-22 | Stop reason: SDUPTHER

## 2022-07-22 RX ORDER — ONDANSETRON 2 MG/ML
4 INJECTION INTRAMUSCULAR; INTRAVENOUS EVERY 6 HOURS PRN
Status: DISCONTINUED | OUTPATIENT
Start: 2022-07-22 | End: 2022-07-23

## 2022-07-22 RX ORDER — SODIUM CHLORIDE, SODIUM LACTATE, POTASSIUM CHLORIDE, CALCIUM CHLORIDE 600; 310; 30; 20 MG/100ML; MG/100ML; MG/100ML; MG/100ML
INJECTION, SOLUTION INTRAVENOUS CONTINUOUS
Status: DISCONTINUED | OUTPATIENT
Start: 2022-07-22 | End: 2022-07-23

## 2022-07-22 RX ORDER — MAGNESIUM SULFATE 1 G/100ML
1000 INJECTION INTRAVENOUS PRN
Status: DISCONTINUED | OUTPATIENT
Start: 2022-07-22 | End: 2022-07-23

## 2022-07-22 RX ADMIN — LABETALOL 20 MG/4 ML (5 MG/ML) INTRAVENOUS SYRINGE 10 MG: at 09:31

## 2022-07-22 RX ADMIN — SODIUM CHLORIDE, POTASSIUM CHLORIDE, SODIUM LACTATE AND CALCIUM CHLORIDE: 600; 310; 30; 20 INJECTION, SOLUTION INTRAVENOUS at 08:43

## 2022-07-22 RX ADMIN — FENTANYL CITRATE 25 MCG: 50 INJECTION, SOLUTION INTRAMUSCULAR; INTRAVENOUS at 12:28

## 2022-07-22 RX ADMIN — ROCURONIUM BROMIDE 10 MG: 10 INJECTION INTRAVENOUS at 10:36

## 2022-07-22 RX ADMIN — FENTANYL CITRATE 25 MCG: 50 INJECTION, SOLUTION INTRAMUSCULAR; INTRAVENOUS at 08:46

## 2022-07-22 RX ADMIN — FENTANYL CITRATE 50 MCG: 50 INJECTION, SOLUTION INTRAMUSCULAR; INTRAVENOUS at 13:33

## 2022-07-22 RX ADMIN — BUPIVACAINE HYDROCHLORIDE 30 ML: 5 INJECTION, SOLUTION EPIDURAL; INTRACAUDAL at 08:49

## 2022-07-22 RX ADMIN — LIDOCAINE HYDROCHLORIDE 30 MG: 10 INJECTION, SOLUTION EPIDURAL; INFILTRATION; INTRACAUDAL; PERINEURAL at 08:46

## 2022-07-22 RX ADMIN — ROCURONIUM BROMIDE 50 MG: 10 INJECTION INTRAVENOUS at 08:46

## 2022-07-22 RX ADMIN — ROCURONIUM BROMIDE 10 MG: 10 INJECTION INTRAVENOUS at 09:55

## 2022-07-22 RX ADMIN — ONDANSETRON 4 MG: 2 INJECTION INTRAMUSCULAR; INTRAVENOUS at 13:06

## 2022-07-22 RX ADMIN — LABETALOL 20 MG/4 ML (5 MG/ML) INTRAVENOUS SYRINGE 5 MG: at 09:28

## 2022-07-22 RX ADMIN — SENNOSIDES AND DOCUSATE SODIUM 1 TABLET: 50; 8.6 TABLET ORAL at 20:39

## 2022-07-22 RX ADMIN — ONDANSETRON 4 MG: 2 INJECTION INTRAMUSCULAR; INTRAVENOUS at 12:12

## 2022-07-22 RX ADMIN — LIDOCAINE HYDROCHLORIDE,EPINEPHRINE BITARTRATE 10 ML: 20; .005 INJECTION, SOLUTION EPIDURAL; INFILTRATION; INTRACAUDAL; PERINEURAL at 08:51

## 2022-07-22 RX ADMIN — ROCURONIUM BROMIDE 20 MG: 10 INJECTION INTRAVENOUS at 09:18

## 2022-07-22 RX ADMIN — METOCLOPRAMIDE HYDROCHLORIDE 10 MG: 5 INJECTION INTRAMUSCULAR; INTRAVENOUS at 13:24

## 2022-07-22 RX ADMIN — PROPOFOL 100 MG: 10 INJECTION, EMULSION INTRAVENOUS at 08:46

## 2022-07-22 RX ADMIN — HYDRALAZINE HYDROCHLORIDE 10 MG: 20 INJECTION, SOLUTION INTRAMUSCULAR; INTRAVENOUS at 10:10

## 2022-07-22 RX ADMIN — SODIUM CHLORIDE, POTASSIUM CHLORIDE, SODIUM LACTATE AND CALCIUM CHLORIDE: 600; 310; 30; 20 INJECTION, SOLUTION INTRAVENOUS at 11:19

## 2022-07-22 RX ADMIN — OXYCODONE 5 MG: 5 TABLET ORAL at 18:12

## 2022-07-22 RX ADMIN — SODIUM CHLORIDE, POTASSIUM CHLORIDE, SODIUM LACTATE AND CALCIUM CHLORIDE: 600; 310; 30; 20 INJECTION, SOLUTION INTRAVENOUS at 18:13

## 2022-07-22 RX ADMIN — CEFAZOLIN 2 G: 10 INJECTION, POWDER, FOR SOLUTION INTRAVENOUS at 09:00

## 2022-07-22 RX ADMIN — FENTANYL CITRATE 25 MCG: 50 INJECTION, SOLUTION INTRAMUSCULAR; INTRAVENOUS at 09:26

## 2022-07-22 RX ADMIN — FENTANYL CITRATE 50 MCG: 50 INJECTION, SOLUTION INTRAMUSCULAR; INTRAVENOUS at 13:06

## 2022-07-22 RX ADMIN — FENTANYL CITRATE 25 MCG: 50 INJECTION, SOLUTION INTRAMUSCULAR; INTRAVENOUS at 11:58

## 2022-07-22 RX ADMIN — SUGAMMADEX 200 MG: 100 INJECTION, SOLUTION INTRAVENOUS at 12:38

## 2022-07-22 ASSESSMENT — PAIN DESCRIPTION - LOCATION: LOCATION: ABDOMEN

## 2022-07-22 ASSESSMENT — PAIN SCALES - GENERAL
PAINLEVEL_OUTOF10: 0
PAINLEVEL_OUTOF10: 9
PAINLEVEL_OUTOF10: 0

## 2022-07-22 NOTE — PROGRESS NOTES
to bedside to examine patient's abdomen in PACU. States patient to be admitted and to call him so he can re-examine patient prior to transfer.

## 2022-07-22 NOTE — PROGRESS NOTES
Kahlil Dalton saw patient in PACU at this time. Patient okay to go to room. Patient resting comfortably, no pain or nausea currently.

## 2022-07-22 NOTE — ANESTHESIA POSTPROCEDURE EVALUATION
Department of Anesthesiology  Postprocedure Note    Patient: Dunia Amato  MRN: [id number]  YOB: 1928  Date of evaluation: 7/22/2022      Procedure Summary     Date: 07/22/22 Room / Location: [address]    Anesthesia Start: 2686 Anesthesia Stop: 6335    Procedure: LAPAROSCOPIC CHOLECYSTECTOMY Diagnosis:       Cholelithiasis      (Cholelithiasis [K80.20])    Surgeons: Ethlyn Aase, MD Responsible Provider: Carmen Neil DO    Anesthesia Type: general, regional ASA Status: 3          Anesthesia Type: No value filed.     Renee Phase I: Renee Score: 10    Renee Phase II:        Anesthesia Post Evaluation    Patient location during evaluation: bedside  Patient participation: complete - patient participated  Level of consciousness: awake  Pain score: 5  Airway patency: patent  Nausea & Vomiting: no nausea and no vomiting  Complications: no  Cardiovascular status: blood pressure returned to baseline and hemodynamically stable  Respiratory status: acceptable  Hydration status: euvolemic

## 2022-07-22 NOTE — PROGRESS NOTES
Admission and assessment complete and stable. Medicated for pain per emar. Pt c/o chest pain, ekg obtained dr. Keysha Mcleod aware, order for trop obtained, dr. Keysha Mcleod aware of that also. Pt denies needs. Abd incisions x5 dry and intact, old drainage noted. Pt hard of hearing.  Philly Solares in place

## 2022-07-22 NOTE — OP NOTE
Operative Note      Patient: Batsheva Altamirano  YOB: 1928  MRN: [id number]    Date of Procedure: 7/22/2022    Pre-Op Diagnosis: Cholelithiasis [K80.20], chronic cholecystitis    Post-Op Diagnosis: Same; extensive adhesions       Procedure(s):  LAPAROSCOPIC CHOLECYSTECTOMY  Laparoscopic lysis of adhesions  Modifier 22 - difficult dissection due to significant adhesions    Surgeon(s):  Leah Mcbride MD    Assistant:   WALLY Ramsay    Anesthesia: General    Estimated Blood Loss (mL): less than 440     Complications: Other: stone spillage (copious stones)    Specimens:   ID Type Source Tests Collected by Time Destination   A : Gallbladder and stones  Tissue Gallbladder SURGICAL PATHOLOGY Leah Mcbride MD 7/22/2022 1128        Implants:  * No implants in log *      Drains:   Gastrostomy/Enterostomy/Jejunostomy PEG-Jejunostomy LLQ (Active)       Urinary Catheter [address] (Active)       [REMOVED] Closed/Suction Drain RUQ Other (Comment) (Removed)       Findings:     chronically inflamed gallbladder with significant omental/colon adhesions, gallbladder full of 100s of stones      Indications:     81 y/o female with past medical history of COPD, HTN, FRED presents to [facility] [place] with 1 week history of intermittent RUQ pain without nausea or vomiting and was admitted on 4/14/22. Due to her underlying medical issues daughters opted for percutaneous cholecystostomy tube by IR on 4/15/22. She was discharged to SNF on 4/26/22. She had cholangiogram done on 6/16/22 which showed patent cystic duct with contrast in CBD and duodenum. However gallbladder was completely full of stones and the contrast took some time to get into cystic duct. I had lengthy discussion with the daughters regarding options operative vs non operative. After discussion daughters decided to proceed with surgery.  I explained risk of perioperative cardiac events, bowel and CBD injury, respiratory issues, bleeding and infection amongst other complications. They understood and consented for surgery. Detailed Description of Procedure:     Patient was properly identified and brought to the operative suite and placed in supine position with foot board. General anesthesia was initiated and she was intubated successfully. SCDS applied bilaterally. Ancef 2 gm IV given. Patient's RUQ percutaneous cholecystostomy tube was removed. Abdomen was then prepped and draped in usual fashion. We started by making a vertical supraumbilical midline incision. This was carried down through subcutaneous tissue using cautery down to fascia. This was identified and lifted with Kocher clamps on either side. Incision was made using scalpel and entry into peritoneum was visually confirmed. We placed 0-vicryl stay sutures on either side of the fascia and placed Dawson cannula. We then insufflated to 15 mmHg which patient tolerated. We placed patient in reverse trendelenburg and Left side down. We then placed our secondary port in epigastric, and 2 in the RUQ under direct visualization. Quick inspection of abdomen noted no blood or bowel content under the supraumbilical port. The colon and omentum was draped over the distended stone filled gallbladder. Very carefully I teased some of the omentum off bluntly taking care not to injure the bowel. This took some time due to the chronic nature of the adhesions. Off note there was significant adhesions over the liver bed and the omentum to the liver as well as anterior abdominal wall which were carefully and bluntly taken down. We swept the colon down and took down the filmy attachments. Once we were able to take the adhesions down. We identified the gallbladder which was distended full of stones and difficult to grasp due to the numerous stones that was jam packed into gallbladder. This made the case extremely difficult.  We were able to retract gallbladder a bit over the liver and laterally however this did tear the gallbladder and numerous small stones spilled out. This exposed Calot's triangle and I was able to dissect and identify the cystic duct. I circumferentially dissected that. We dissected the peritoneum off the gallbladder/liver junction using cautery both anterior and posterior. We continued to dissect and identified the cystic artery. This was circumferentially dissected. The cystic plate was then dissected and were able to get our critical view. We placed two 5 mm clips distally and one proximal near gallbladder on the cystic artery and duct and incised using laparoscopic scissors. We then dissected the gallbladder off the liver. Once the gallbladder was dissected off I placed in an endoscopic retrieval bag and pulled it out of the umbilical port site. We placed another endocatch bag and scooped up significant number of stones and removed them. Once this was removed we used the large grasper and removed as many of the remaining stones as possible from the abdomen from gallbladder fossa as well as above liver. There likely were a few smaller stones left intra-abdominal because they were too difficult to retrieve. Once this was done we then irrigated the abdomen copiously and obtained hemostasis using combination of cautery. Satisfied we obtained hemostasis we then removed secondary ports and noted no bleeding from the port sites. We then removed umbilical port and desufflated abdomen. We closed the umbilical port site with 0 vicryl in figure of eight fashion approximately 3 of these were placed. We then irrigated wound copiously (block was performed preoperatively so we did not put additional local). We then closed the wound with 3-0 vicyl in deep subcutaneous and 4-0 monocryl for skin closure of all the port sites. Steristrip and band aid applied. All instruments and needles, lap pads were accounted for at the beginning and end of the case.   The patient was extubated and taken to post-anesthesia care unit in stable condition.      Electronically signed by Doug Kaminski MD on 7/22/2022 at 3:47 PM

## 2022-07-22 NOTE — ANESTHESIA PROCEDURE NOTES
Peripheral Block    Patient location during procedure: OR  Reason for block: post-op pain management and at surgeon's request  Start time: 7/22/2022 8:49 AM  End time: 7/22/2022 8:55 AM  Staffing  Performed: anesthesiologist   Anesthesiologist: Brown Mckeon DO  Preanesthetic Checklist  Completed: patient identified, IV checked, site marked, risks and benefits discussed, surgical/procedural consents, equipment checked, pre-op evaluation, timeout performed, anesthesia consent given, oxygen available and monitors applied/VS acknowledged  Peripheral Block   Patient position: supine  Prep: ChloraPrep  Provider prep: mask and sterile gloves (Sterile probe cover)  Patient monitoring: cardiac monitor, continuous pulse ox, frequent blood pressure checks and IV access  Block type: TAP  Laterality: bilateral  Injection technique: single-shot  Guidance: ultrasound guided  Local infiltration: bupivacaine  Infiltration strength: 0.5 %  Local infiltration: bupivacaine  Dose: 30 mL    Needle   Needle type: combined needle/nerve stimulator   Needle gauge: 22 G  Needle localization: anatomical landmarks and ultrasound guidance  Needle length: 10 cm  Assessment   Injection assessment: negative aspiration for heme, no paresthesia on injection and local visualized surrounding nerve on ultrasound  Paresthesia pain: immediately resolved  Slow fractionated injection: yes  Hemodynamics: stable  Real-time US image taken/store: yes    Additional Notes  Ultrasound image printed and saved in patient chart.     Sterile probe cover used    Bupivacaine 0.5% 30 ml + lidocaine 2% with epi1:200 10 ml    20 ml bilateral  Medications Administered  bupivacaine (PF) 0.5 % - Perineural   30 mL - 7/22/2022 8:49:00 AM

## 2022-07-22 NOTE — ANESTHESIA PRE PROCEDURE
Department of Anesthesiology  Preprocedure Note       Name:  Kaia Rai   Age:  80 y.o.  :  1928                                          MRN:  [id number]         Date:  2022      Surgeon: Marcelino Martines):  Bolivar Triplett MD    Procedure: Procedure(s):  LAPAROSCOPIC CHOLECYSTECTOMY  (PAT ON ADMIT)    Medications prior to admission:   Prior to Admission medications    Medication Sig Start Date End Date Taking?  Authorizing Provider   zoster recombinant adjuvanted vaccine [facility]) 50 MCG/0.5ML SUSR injection Inject 0.5 mLs into the muscle See Admin Instructions 1 dose now and repeat in 2-6 months 22  Medina Clement MD   fluticasone-salmeterol (ADVAIR) 250-50 MCG/ACT AEPB diskus inhaler Inhale 1 puff into the lungs every 12 hours    Historical Provider, MD   montelukast (SINGULAIR) 10 MG tablet TAKE 1 TABLET BY MOUTH NIGHTLY AS NEEDED FOR ALLERGIES 7/3/22   Medina Clement MD   atorvastatin (LIPITOR) 20 MG tablet TAKE 1 TABLET BY MOUTH DAILY AT BEDTIME 5/10/22   Medina Clement MD   acetaminophen (TYLENOL) 325 MG tablet Take 2 tablets by mouth every 6 hours as needed for Pain  Patient taking differently: Take 650 mg by mouth every 4 hours as needed for Pain or Fever 22   Alta Jolley PA-C   dorzolamide-timolol (COSOPT) 22.3-6.8 MG/ML ophthalmic solution PLACE 1 DROP INTO BOTH EYES 2 TIMES DAILY INDICATIONS: GLAUCOMA 3/11/22   Medina Clement MD   latanoprost (XALATAN) 0.005 % ophthalmic solution Place 1 drop into both eyes daily Indications: Glaucoma  Patient taking differently: Place 1 drop into both eyes nightly Indications: Glaucoma 3/11/22   Medina Clement MD   metoprolol tartrate (LOPRESSOR) 25 MG tablet Take 0.5 tablets by mouth 2 times daily Indications: High Blood Pressure Disorder 22   Medina Clement MD   albuterol sulfate HFA (PROAIR HFA) 108 (90 Base) MCG/ACT inhaler Inhale 1 puff into the lungs every 6 hours as needed for Wheezing or Shortness of Breath 2/22/22   Loren Veloz MD   brimonidine (ALPHAGAN P) 0.15 % ophthalmic solution Place 1 drop into both eyes 2 times daily Indications: Glaucoma 2/10/22   Loren Veloz MD   losartan (COZAAR) 25 MG tablet Take 1 tablet by mouth every 12 hours  Patient taking differently: Take 25 mg by mouth in the morning and 25 mg in the evening. Indications: High Blood Pressure Disorder. 11/17/21   Loren Veloz MD   Elastic Bandages & Supports (MEDICAL COMPRESSION STOCKINGS) MISC Knee high, 20 to 30 mm hg, remove at night, pharmacy to fit. 8/4/21   Loren Veloz MD   OXYGEN Inhale 2 L into the lungs Indications: PRN at bedtime    Historical Provider, MD       Current medications:    Current Facility-Administered Medications   Medication Dose Route Frequency Provider Last Rate Last Admin    ceFAZolin (ANCEF) 2000 mg in dextrose 5 % 100 mL IVPB  2,000 mg IntraVENous On Call to Lovely Galeano MD           Allergies: Allergies   Allergen Reactions    Norvasc [Amlodipine Besylate] Swelling       Problem List:    Patient Active Problem List   Diagnosis Code    Essential hypertension I10    Anxiety F41.9    COPD (chronic obstructive pulmonary disease) with acute bronchitis (HCC) J44.0, J20.9    Nocturnal hypoxia G47.34    Obesity (BMI 30-39. 9) E66.9    Obstructive sleep apnea G47.33    Venous stasis dermatitis of both lower extremities I87.2    Impaired mobility and activities of daily living-due to cervical myelopathy Z74.09, Z78.9    Acute cholecystitis K81.0    Muscle wasting and atrophy, not elsewhere classified, left hand M62.542    Osteoarthritis of cervical spine with myelopathy M47.12    Preoperative respiratory examination Z01.811    Cholelithiasis K80.20       Past Medical History:        Diagnosis Date    Age-related cognitive decline     Cancer ([facility] [address]     Cerebrovascular small vessel disease     Chronic nonspecific lung disease 2021    nodules R lung, fibrosis L apex    Glaucoma     Gout     left knee    Hypertension     Obesity (BMI 30-39.9) 07/11/2019    Obstructive sleep apnea 07/11/2019    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    Vitamin D deficiency 2019       Past Surgical History:        Procedure Laterality Date    CATARACT REMOVAL WITH IMPLANT Bilateral 2016    COLON SURGERY  2009    polyps    COLONOSCOPY  02/2009    GASTROSTOMY TUBE PLACEMENT N/A 11/04/2020    EGD PEG TUBE PLACEMENT performed by Radha Batista MD at [address] [place] [address] Right 04/15/2022    Flexima APDL Locking Pigtail Draiange Catheter inserted by Dr. Karl Stephenson (Lot# [id number] / Exp: 12/07/2024)    IR CHOLECYSTOSTOMY PERCUTANEOUS COMPLETE  4/15/2022    IR CHOLECYSTOSTOMY PERCUTANEOUS COMPLETE 4/15/2022 [facility] SPECIAL PROCEDURE    SKIN CANCER EXCISION  2002    graft from neck       Social History:    Social History     Tobacco Use    Smoking status: Never    Smokeless tobacco: Never   Substance Use Topics    Alcohol use: No                                Counseling given: Not Answered      Vital Signs (Current):   Vitals:    07/22/22 0730   BP: (!) 149/70   Pulse: 71   Resp: 18   Temp: 97.2 °F (36.2 °C)   TempSrc: Temporal   SpO2: 98%   Weight: 178 lb (80.7 kg)   Height: 5' 2\" (1.575 m)                                              BP Readings from Last 3 Encounters:   07/22/22 (!) 149/70   07/18/22 (!) 187/86   07/12/22 (!) 114/54       NPO Status: Time of last liquid consumption: 0000                        Time of last solid consumption: 0000                        Date of last liquid consumption: 07/22/22                        Date of last solid food consumption: 07/22/22    BMI:   Wt Readings from Last 3 Encounters:   07/22/22 178 lb (80.7 kg)   07/20/22 173 lb (78.5 kg)   07/12/22 173 lb (78.5 kg)     Body mass index is 32.56 kg/m².     CBC:   Lab Results   Component Value Date/Time    WBC 6.8 06/20/2022 02:56 PM    RBC 4.28 06/20/2022 02:56 PM HGB 12.0 06/20/2022 02:56 PM    HCT 37.2 06/20/2022 02:56 PM    MCV 87.0 06/20/2022 02:56 PM    RDW 16.9 06/20/2022 02:56 PM     06/20/2022 02:56 PM       CMP:   Lab Results   Component Value Date/Time     06/20/2022 02:56 PM    K 4.7 06/20/2022 02:56 PM    K 4.0 11/06/2020 05:59 AM     06/20/2022 02:56 PM    CO2 23 06/20/2022 02:56 PM    BUN 17 06/20/2022 02:56 PM    CREATININE 0.88 06/20/2022 02:56 PM    GFRAA >60.0 06/20/2022 02:56 PM    LABGLOM 59.8 06/20/2022 02:56 PM    GLUCOSE 99 06/20/2022 02:56 PM    PROT 7.1 06/20/2022 02:56 PM    CALCIUM 9.2 06/20/2022 02:56 PM    BILITOT 0.4 06/20/2022 02:56 PM    ALKPHOS 148 06/20/2022 02:56 PM    AST 12 06/20/2022 02:56 PM    ALT 10 06/20/2022 02:56 PM       POC Tests: No results for input(s): POCGLU, POCNA, POCK, POCCL, POCBUN, POCHEMO, POCHCT in the last 72 hours.     Coags:   Lab Results   Component Value Date/Time    PROTIME 14.2 06/08/2022 02:01 PM    INR 1.1 06/08/2022 02:01 PM    APTT 29.7 04/15/2022 01:34 PM       HCG (If Applicable): No results found for: PREGTESTUR, PREGSERUM, HCG, HCGQUANT     ABGs:   Lab Results   Component Value Date/Time    PHART 7.413 11/01/2020 09:03 AM    PO2ART 82 11/01/2020 09:03 AM    RDO3RBI 40 11/01/2020 09:03 AM    KHK3VPF 25.5 11/01/2020 09:03 AM    BEART 1 11/01/2020 09:03 AM    S2NILHCG 96 11/01/2020 09:03 AM        Type & Screen (If Applicable):  No results found for: LABABO, LABRH    Drug/Infectious Status (If Applicable):  No results found for: HIV, HEPCAB    COVID-19 Screening (If Applicable):   Lab Results   Component Value Date/Time    COVID19 Not Detected 01/05/2022 06:12 PM           Anesthesia Evaluation  Patient summary reviewed and Nursing notes reviewed no history of anesthetic complications:   Airway: Mallampati: II  TM distance: >3 FB   Neck ROM: full  Mouth opening: > = 3 FB   Dental: normal exam         Pulmonary:normal exam    (+) COPD:  sleep apnea: Cardiovascular:  Exercise tolerance: good (>4 METS),   (+) hypertension:, hyperlipidemia      ECG reviewed      Echocardiogram reviewed         Beta Blocker:  Dose within 24 Hrs      ROS comment: Sinus  Rhythm    Normal left ventricular systolic function, no regional wall motion   abnormalities, estimated ejection fraction of 70%. Normal left ventricular size and function. Mild concentric left ventricular hypertrophy. Impaired relaxation compatible with diastolic dysfunction. ( reversed E/A   ratio)   No evidence of mitral regurgitation. No evidence of mitral valve stenosis. No evidence of aortic valve regurgitation . No evidence of aortic valve stenosis. 2020  Normal left ventricular systolic function, no regional wall motion   abnormalities, estimated ejection fraction of 70%. Normal left ventricular size and function. Mild concentric left ventricular hypertrophy. Impaired relaxation compatible with diastolic dysfunction. ( reversed E/A   ratio)   No evidence of mitral regurgitation. No evidence of mitral valve stenosis. No evidence of aortic valve regurgitation . No evidence of aortic valve stenosis. Neuro/Psych:   (+) neuromuscular disease:,             GI/Hepatic/Renal: Neg GI/Hepatic/Renal ROS            Endo/Other:    (+) : arthritis: OA., . Pt had PAT visit. Abdominal:             Vascular: negative vascular ROS. Other Findings:           Anesthesia Plan      general and regional     ASA 3     (ETT  TAP)  Induction: intravenous. MIPS: Postoperative opioids intended and Prophylactic antiemetics administered. Anesthetic plan and risks discussed with patient. Plan discussed with CRNA.     Attending anesthesiologist reviewed and agrees with Karen Izquierdo DO   7/22/2022

## 2022-07-23 PROBLEM — Z90.49 S/P LAPAROSCOPIC CHOLECYSTECTOMY: Status: ACTIVE | Noted: 2022-07-23

## 2022-07-23 LAB
ALBUMIN SERPL-MCNC: 3.2 G/DL (ref 3.5–4.6)
ALP BLD-CCNC: 103 U/L (ref 40–130)
ALT SERPL-CCNC: 23 U/L (ref 0–33)
ANION GAP SERPL CALCULATED.3IONS-SCNC: 11 MEQ/L (ref 9–15)
AST SERPL-CCNC: 25 U/L (ref 0–35)
BASOPHILS ABSOLUTE: 0 K/UL (ref 0–0.2)
BASOPHILS RELATIVE PERCENT: 0.2 %
BILIRUB SERPL-MCNC: 0.8 MG/DL (ref 0.2–0.7)
BILIRUBIN DIRECT: 0.3 MG/DL (ref 0–0.4)
BILIRUBIN, INDIRECT: 0.5 MG/DL (ref 0–0.6)
BUN BLDV-MCNC: 18 MG/DL (ref 8–23)
CALCIUM SERPL-MCNC: 8.5 MG/DL (ref 8.5–9.9)
CHLORIDE BLD-SCNC: 109 MEQ/L (ref 95–107)
CO2: 23 MEQ/L (ref 20–31)
CREAT SERPL-MCNC: 0.88 MG/DL (ref 0.5–0.9)
EOSINOPHILS ABSOLUTE: 0 K/UL (ref 0–0.7)
EOSINOPHILS RELATIVE PERCENT: 0.1 %
GFR AFRICAN AMERICAN: >60
GFR NON-AFRICAN AMERICAN: 59.8
GLUCOSE BLD-MCNC: 105 MG/DL (ref 70–99)
GLUCOSE BLD-MCNC: 112 MG/DL (ref 70–99)
GLUCOSE BLD-MCNC: 114 MG/DL (ref 70–99)
GLUCOSE BLD-MCNC: 126 MG/DL (ref 70–99)
HCT VFR BLD CALC: 36.1 % (ref 37–47)
HEMOGLOBIN: 11.7 G/DL (ref 12–16)
LYMPHOCYTES ABSOLUTE: 1 K/UL (ref 1–4.8)
LYMPHOCYTES RELATIVE PERCENT: 6.1 %
MCH RBC QN AUTO: 28.5 PG (ref 27–31.3)
MCHC RBC AUTO-ENTMCNC: 32.5 % (ref 33–37)
MCV RBC AUTO: 87.8 FL (ref 82–100)
MONOCYTES ABSOLUTE: 1.2 K/UL (ref 0.2–0.8)
MONOCYTES RELATIVE PERCENT: 7.6 %
NEUTROPHILS ABSOLUTE: 13.8 K/UL (ref 1.4–6.5)
NEUTROPHILS RELATIVE PERCENT: 86 %
PDW BLD-RTO: 16.3 % (ref 11.5–14.5)
PERFORMED ON: ABNORMAL
PLATELET # BLD: 194 K/UL (ref 130–400)
POTASSIUM REFLEX MAGNESIUM: 4.5 MEQ/L (ref 3.4–4.9)
RBC # BLD: 4.11 M/UL (ref 4.2–5.4)
SODIUM BLD-SCNC: 143 MEQ/L (ref 135–144)
TOTAL PROTEIN: 5.6 G/DL (ref 6.3–8)
WBC # BLD: 16.1 K/UL (ref 4.8–10.8)

## 2022-07-23 PROCEDURE — 94664 DEMO&/EVAL PT USE INHALER: CPT

## 2022-07-23 PROCEDURE — 6370000000 HC RX 637 (ALT 250 FOR IP): Performed by: STUDENT IN AN ORGANIZED HEALTH CARE EDUCATION/TRAINING PROGRAM

## 2022-07-23 PROCEDURE — 6360000002 HC RX W HCPCS: Performed by: STUDENT IN AN ORGANIZED HEALTH CARE EDUCATION/TRAINING PROGRAM

## 2022-07-23 PROCEDURE — 2580000003 HC RX 258: Performed by: PHYSICIAN ASSISTANT

## 2022-07-23 PROCEDURE — 94640 AIRWAY INHALATION TREATMENT: CPT

## 2022-07-23 PROCEDURE — 6370000000 HC RX 637 (ALT 250 FOR IP): Performed by: PHYSICIAN ASSISTANT

## 2022-07-23 PROCEDURE — 80048 BASIC METABOLIC PNL TOTAL CA: CPT

## 2022-07-23 PROCEDURE — 6370000000 HC RX 637 (ALT 250 FOR IP): Performed by: SURGERY

## 2022-07-23 PROCEDURE — 36415 COLL VENOUS BLD VENIPUNCTURE: CPT

## 2022-07-23 PROCEDURE — 85025 COMPLETE CBC W/AUTO DIFF WBC: CPT

## 2022-07-23 PROCEDURE — 94761 N-INVAS EAR/PLS OXIMETRY MLT: CPT

## 2022-07-23 PROCEDURE — 80076 HEPATIC FUNCTION PANEL: CPT

## 2022-07-23 PROCEDURE — 2700000000 HC OXYGEN THERAPY PER DAY

## 2022-07-23 PROCEDURE — 99024 POSTOP FOLLOW-UP VISIT: CPT | Performed by: STUDENT IN AN ORGANIZED HEALTH CARE EDUCATION/TRAINING PROGRAM

## 2022-07-23 PROCEDURE — 1210000000 HC MED SURG R&B

## 2022-07-23 RX ORDER — DORZOLAMIDE HYDROCHLORIDE AND TIMOLOL MALEATE 20; 5 MG/ML; MG/ML
1 SOLUTION/ DROPS OPHTHALMIC 2 TIMES DAILY
Status: DISCONTINUED | OUTPATIENT
Start: 2022-07-23 | End: 2022-07-23 | Stop reason: RX

## 2022-07-23 RX ORDER — ENOXAPARIN SODIUM 100 MG/ML
40 INJECTION SUBCUTANEOUS DAILY
Status: DISCONTINUED | OUTPATIENT
Start: 2022-07-23 | End: 2022-07-26 | Stop reason: HOSPADM

## 2022-07-23 RX ORDER — INSULIN LISPRO 100 [IU]/ML
0-8 INJECTION, SOLUTION INTRAVENOUS; SUBCUTANEOUS
Status: DISCONTINUED | OUTPATIENT
Start: 2022-07-23 | End: 2022-07-24

## 2022-07-23 RX ORDER — LATANOPROST 50 UG/ML
1 SOLUTION/ DROPS OPHTHALMIC NIGHTLY
Status: DISCONTINUED | OUTPATIENT
Start: 2022-07-23 | End: 2022-07-26 | Stop reason: HOSPADM

## 2022-07-23 RX ORDER — ALBUTEROL SULFATE 90 UG/1
1 AEROSOL, METERED RESPIRATORY (INHALATION) EVERY 6 HOURS PRN
Status: DISCONTINUED | OUTPATIENT
Start: 2022-07-23 | End: 2022-07-26 | Stop reason: HOSPADM

## 2022-07-23 RX ORDER — DORZOLAMIDE HCL 20 MG/ML
1 SOLUTION/ DROPS OPHTHALMIC 2 TIMES DAILY
Status: DISCONTINUED | OUTPATIENT
Start: 2022-07-23 | End: 2022-07-26 | Stop reason: HOSPADM

## 2022-07-23 RX ORDER — INSULIN LISPRO 100 [IU]/ML
0-4 INJECTION, SOLUTION INTRAVENOUS; SUBCUTANEOUS NIGHTLY
Status: DISCONTINUED | OUTPATIENT
Start: 2022-07-23 | End: 2022-07-24

## 2022-07-23 RX ORDER — TIMOLOL MALEATE 5 MG/ML
1 SOLUTION/ DROPS OPHTHALMIC 2 TIMES DAILY
Status: DISCONTINUED | OUTPATIENT
Start: 2022-07-23 | End: 2022-07-26 | Stop reason: HOSPADM

## 2022-07-23 RX ORDER — LOSARTAN POTASSIUM 25 MG/1
25 TABLET ORAL EVERY 12 HOURS
Status: DISCONTINUED | OUTPATIENT
Start: 2022-07-23 | End: 2022-07-26 | Stop reason: HOSPADM

## 2022-07-23 RX ORDER — ATORVASTATIN CALCIUM 20 MG/1
20 TABLET, FILM COATED ORAL NIGHTLY
Status: DISCONTINUED | OUTPATIENT
Start: 2022-07-23 | End: 2022-07-26 | Stop reason: HOSPADM

## 2022-07-23 RX ORDER — BRIMONIDINE TARTRATE 2 MG/ML
1 SOLUTION/ DROPS OPHTHALMIC 2 TIMES DAILY
Refills: 1 | Status: DISCONTINUED | OUTPATIENT
Start: 2022-07-23 | End: 2022-07-26 | Stop reason: HOSPADM

## 2022-07-23 RX ORDER — BUDESONIDE AND FORMOTEROL FUMARATE DIHYDRATE 160; 4.5 UG/1; UG/1
2 AEROSOL RESPIRATORY (INHALATION) 2 TIMES DAILY
Status: DISCONTINUED | OUTPATIENT
Start: 2022-07-23 | End: 2022-07-26 | Stop reason: HOSPADM

## 2022-07-23 RX ORDER — DEXTROSE MONOHYDRATE 100 MG/ML
INJECTION, SOLUTION INTRAVENOUS CONTINUOUS PRN
Status: DISCONTINUED | OUTPATIENT
Start: 2022-07-23 | End: 2022-07-24

## 2022-07-23 RX ADMIN — METOPROLOL TARTRATE 25 MG: 25 TABLET, FILM COATED ORAL at 12:42

## 2022-07-23 RX ADMIN — SODIUM CHLORIDE, POTASSIUM CHLORIDE, SODIUM LACTATE AND CALCIUM CHLORIDE: 600; 310; 30; 20 INJECTION, SOLUTION INTRAVENOUS at 11:29

## 2022-07-23 RX ADMIN — Medication 10 ML: at 10:39

## 2022-07-23 RX ADMIN — OXYCODONE 5 MG: 5 TABLET ORAL at 12:24

## 2022-07-23 RX ADMIN — BRIMONIDINE TARTRATE 1 DROP: 2 SOLUTION/ DROPS OPHTHALMIC at 22:33

## 2022-07-23 RX ADMIN — SENNOSIDES AND DOCUSATE SODIUM 1 TABLET: 50; 8.6 TABLET ORAL at 22:23

## 2022-07-23 RX ADMIN — SENNOSIDES AND DOCUSATE SODIUM 1 TABLET: 50; 8.6 TABLET ORAL at 10:39

## 2022-07-23 RX ADMIN — ENOXAPARIN SODIUM 40 MG: 100 INJECTION SUBCUTANEOUS at 15:10

## 2022-07-23 RX ADMIN — Medication 10 ML: at 22:22

## 2022-07-23 RX ADMIN — ATORVASTATIN CALCIUM 20 MG: 20 TABLET, FILM COATED ORAL at 22:23

## 2022-07-23 RX ADMIN — TIMOLOL MALEATE 1 DROP: 5 SOLUTION OPHTHALMIC at 22:33

## 2022-07-23 RX ADMIN — ACETAMINOPHEN 650 MG: 325 TABLET ORAL at 17:56

## 2022-07-23 RX ADMIN — BUDESONIDE AND FORMOTEROL FUMARATE DIHYDRATE 2 PUFF: 160; 4.5 AEROSOL RESPIRATORY (INHALATION) at 19:30

## 2022-07-23 RX ADMIN — LOSARTAN POTASSIUM 25 MG: 25 TABLET, FILM COATED ORAL at 15:10

## 2022-07-23 RX ADMIN — SODIUM CHLORIDE, POTASSIUM CHLORIDE, SODIUM LACTATE AND CALCIUM CHLORIDE: 600; 310; 30; 20 INJECTION, SOLUTION INTRAVENOUS at 07:48

## 2022-07-23 RX ADMIN — ACETAMINOPHEN 650 MG: 325 TABLET ORAL at 05:36

## 2022-07-23 RX ADMIN — DORZOLAMIDE HYDROCHLORIDE 1 DROP: 20 SOLUTION/ DROPS OPHTHALMIC at 22:33

## 2022-07-23 ASSESSMENT — PAIN DESCRIPTION - DESCRIPTORS
DESCRIPTORS: ACHING
DESCRIPTORS: ACHING

## 2022-07-23 ASSESSMENT — PAIN SCALES - GENERAL
PAINLEVEL_OUTOF10: 4
PAINLEVEL_OUTOF10: 10
PAINLEVEL_OUTOF10: 10
PAINLEVEL_OUTOF10: 0

## 2022-07-23 ASSESSMENT — PAIN DESCRIPTION - LOCATION
LOCATION: ABDOMEN
LOCATION: ABDOMEN
LOCATION: BACK

## 2022-07-23 NOTE — CARE COORDINATION
[facility] Case Management Initial Discharge Assessment    Met with Patient and Family to discuss discharge plan. PCP: Obdulio Saenz MD     Date of Last Visit: \"last week\"    [facility] Patient: No        VA Notified: no    If no PCP, list provided? N/A    Discharge Planning    Living Arrangements: at home dependent on family care    Who do you live with? Daughter    Who helps you with your care:  self or family    If lives at home:     Do you have any barriers navigating in your home? no    Patient can perform ADL? No    Current Services (outpatient and in home) :   None at present. Per daughter, patient had [facility] RN, PT/OT prior to admission. Dialysis: No    Is transportation available to get to your appointments? Yes    DME Equipment:  yes - Wheeled walker and rollator    Respiratory equipment: None    Respiratory provider:  no     Pharmacy:  yes [facility] [address] with Medication Assistance Program?  No      Patient agreeable to [facility]? Yes, [employer] due to insurance    Patient agreeable to SNF/Rehab? Yes, [facility]    Other discharge needs identified? N/A    Initial Discharge Plan? (Note: please see concurrent daily documentation for any updates after initial note). INITIAL D/C PLAN IS TO GO HOME WITH DAUGHTER AND [facility] [address] [facility] AWAITING ON PT/OT [place] TO DETERMINE IF PATIENT NEEDS TO GO TO SNF PRIOR TO RETURNING HOME.     Readmission Risk              Risk of Unplanned Readmission:  [id number]         Electronically signed by Ramirez Kathleen RN on 7/23/2022 at 2:41 PM  [address]

## 2022-07-23 NOTE — PROGRESS NOTES
Max: 99 %      Vitals: BP (!) 117/54   Pulse (!) 103   Temp 98.8 °F (37.1 °C)   Resp 16   Ht 5' 2\" (1.575 m)   Wt 181 lb 11.2 oz (82.4 kg)   LMP  (LMP Unknown)   SpO2 96%   BMI 33.23 kg/m²     Physical Exam:  Constitutional: Lying in bed, no acute distress  HEENT: Atraumatic normocephalic. Cardiovascular: Regular rate and rhythm. Pulmonary: Breathing comfortably on RA  Abdominal: Soft. Non-distended. Non-tender. Sites c/d/I. Musculoskeletal: Moves all extremities spontaneously. Baseline contractor of right hand. Neurological: Alert, awake, and orientated x 2. Motor and sensory grossly intact. GCS of 15.    Skin: warm, dry    LABORATORY RESULTS (LAST 24 HOURS)     CBC with Differential:    Lab Results   Component Value Date/Time    WBC 16.1 07/23/2022 06:18 AM    RBC 4.11 07/23/2022 06:18 AM    HGB 11.7 07/23/2022 06:18 AM    HCT 36.1 07/23/2022 06:18 AM     07/23/2022 06:18 AM    MCV 87.8 07/23/2022 06:18 AM    MCH 28.5 07/23/2022 06:18 AM    MCHC 32.5 07/23/2022 06:18 AM    RDW 16.3 07/23/2022 06:18 AM    BANDSPCT 12 10/28/2020 06:00 AM    METASPCT 1 11/05/2020 06:00 AM    LYMPHOPCT 6.1 07/23/2022 06:18 AM    MONOPCT 7.6 07/23/2022 06:18 AM    BASOPCT 0.2 07/23/2022 06:18 AM    MONOSABS 1.2 07/23/2022 06:18 AM    LYMPHSABS 1.0 07/23/2022 06:18 AM    EOSABS 0.0 07/23/2022 06:18 AM    BASOSABS 0.0 07/23/2022 06:18 AM     CMP:    Lab Results   Component Value Date/Time     07/23/2022 06:18 AM    K 4.5 07/23/2022 06:18 AM     07/23/2022 06:18 AM    CO2 23 07/23/2022 06:18 AM    BUN 18 07/23/2022 06:18 AM    CREATININE 0.88 07/23/2022 06:18 AM    GFRAA >60.0 07/23/2022 06:18 AM    LABGLOM 59.8 07/23/2022 06:18 AM    GLUCOSE 126 07/23/2022 06:18 AM    PROT 5.6 07/23/2022 06:18 AM    LABALBU 3.2 07/23/2022 06:18 AM    CALCIUM 8.5 07/23/2022 06:18 AM    BILITOT 0.8 07/23/2022 06:18 AM    ALKPHOS 103 07/23/2022 06:18 AM    AST 25 07/23/2022 06:18 AM    ALT 23 07/23/2022 06:18 AM Magnesium:    Lab Results   Component Value Date/Time    MG 2.4 12/26/2021 01:15 PM     PT/INR:    Lab Results   Component Value Date/Time    PROTIME 14.2 06/08/2022 02:01 PM    INR 1.1 06/08/2022 02:01 PM     PTT:    Lab Results   Component Value Date/Time    APTT 29.7 04/15/2022 01:34 PM       IMAGING RESULTS (PERSONALLY REVIEWED)     No new imaging to review today. ASSESSMENT & PLAN     Diagnoses:  S/p lap adenike  Acute post op pain  Cholelithiasis     PMHx: Dementia, T2DM, HTN, HLD, COPD    Incidental Findings: None      ASSESSMENT/PLAN:  Neurological: Acute post op pain  - Continue tylenol 650mg q6  - Continue oxycodone 2.5/5mg mod/severe pain     Cardiovascular: HX HTN, HLD  - Continue vital signs per unit protocol  - Continue telemetry  - Continue home atorvastatin 20mg HS, losartan 25mg BID, metoprolol 25mg BID     Respiratory: Hx COPD, sating well on 3L NC  - Maintain O2 sats > 92%, encourage IS.  - Wean O2 as tolerated   - Continue home symbicort BID, albuterol PRN     GI/Diet: S/p lap adenike with Dr. Rosemary Saucedo 7/22  - Advance to CLD. Advance today if tolerated  - Bowel regimen: Senokot BID, PRN MOM/dulcolax  - AM hepatic panel     Renal/Electrolytes: No electrolyte abnormalities, BUN/Cr WNL  - HLIV  - AM BMP     ID: Leukocytosis 16, afebrile, not tachycardic. Likely reactive in setting of surgery yesterday  - No indication for abx  - AM CBC     Heme: Mild down trend in H&H, HDS  - No indication for transfusion  - AM CBC     Endocrine: T2DM  - Glucose AC/HS  - SSI AC/HS     MSK: No acute issues  - Spines: clear  - Weight Bearing Restrictions: WBAT  - Activity: Out of bed with assist  - PT/OT: consulted     Prophylaxis:   - SCD, lovenox 40mg daily     Lines/Tubes/Drains:  - Maintain PIVs  -No indication for driver catheter, monitor for urinary retention    Dispo: Remain in [facility] for continued post-op care and dispo planning. Anticipate patient being medically clear for discharge tomorrow.    Accom Status: General Status      Follow up with General Surgery in 2 weeks for post-op check       Please call for any questions or concerns. Kathy Youssef Treatment Team Sticky Note for contact information]      Marleny Zhang PA-C  Trauma/Critical Care  [see Treatment Team Sticky Note for contact information]     This patient's plan of care was discussed and made in collaboration with Trauma Attending physician, Ariadna Bobo MD.      Teaching Physician Note:  I saw and evaluated the patient. I personally obtained the key and critical portions of the history and physical exam.  I reviewed the NICK's documentation and discussed the patient with the NICK. I agree with the NICK's medical decision making as documented in the NICK note. Patient doing well overall. No nausea/vomiting. AVSS. Abd soft mild distended mild incisional tenderness, no peritonitis. Labs reviewed.      Plan:  Advance to clear liquid diet  PT/OT  Encourage IS  PRN pain meds  Repeat labs in am    Wenceslao Roberts MD

## 2022-07-23 NOTE — PROGRESS NOTES
[facility] EMERGENCY [facility] AT [place] Respiratory Therapy Evaluation   Current Order:  albuterol mdi q6prn      Home Regimen: prn      Ordering Physician: green  Re-evaluation Date:  -     Diagnosis: cholelithasis      Patient Status: Stable / Unstable + Physician notified    The following MDI Criteria must be met in order to convert aerosol to MDI with spacer. If unable to meet, MDI will be converted to aerosol:  []  Patient able to demonstrate the ability to use MDI effectively  []  Patient alert and cooperative  []  Patient able to take deep breath with 5-10 second hold  []  Medication(s) available in this delivery method   []  Peak flow greater than or equal to 200 ml/min            Current Order Substituted To  (same drug, same frequency)   Aerosol to MDI [] Albuterol Sulfate 0.083% unit dose by aerosol Albuterol Sulfate MDI 2 puffs by inhalation with spacer    [] Levalbuterol 1.25 mg unit dose by aerosol Levalbuterol MDI 2 puffs by inhalation with spacer    [] Levalbuterol 0.63 mg unit dose by aerosol Levalbuterol MDI 2 puffs by inhalation with spacer    [] Ipratropium Bromide 0.02% unit dose by aerosol Ipratropium Bromide MDI 2 puffs by inhalation with spacer    [] Duoneb (Ipratropium + Albuterol) unit dose by aerosol Ipratropium MDI + Albuterol MDI 2 puffs by inhalation w/spacer   MDI to Aerosol [] Albuterol Sulfate MDI Albuterol Sulfate 0.083% unit dose by aerosol    [] Levalbuterol MDI 2 puffs by inhalation Levalbuterol 1.25 mg unit dose by aerosol    [] Ipratropium Bromide MDI by inhalation Ipratropium Bromide 0.02% unit dose by aerosol    [] Combivent (Ipratropium + Albuterol) MDI by inhalation Duoneb (Ipratropium + Albuterol) unit dose by aerosol       Treatment Assessment [Frequency/Schedule]:  Change frequency to: _____________no changes_____________________________________per Protocol, P&T, MEC      Points 0 1 2 3 4   Pulmonary Status  Non-Smoker  []   Smoking history   < 20 pack years  []   Smoking history  ?  20 pack

## 2022-07-23 NOTE — PROGRESS NOTES
Shift assessments completed and documented, see flowsheets. A&OX1, states she does not remember her own birthday. Very hard of hearing, but able to hear when voice raised loud enough and communicate well though she does have moments of confusion. Medications administered per MAR. Call light within reach. No further needs verbalized at this time by pt. 1243: This RN received a call from monitor room stating pt went up 140s HR for 15 seconds. Vitals checked, WNL. Attempted to contact trauma team via telephones provided multiple times but only answering machines were found.  placed out message to contact 4W. Dr Zachery Lewis was then reached and ordered for pt lopressor to be restarted, and if this happened again, for an EKG to be done. Orders placed per telephone order with readback and acknowledged.

## 2022-07-23 NOTE — PROGRESS NOTES
1045: /42-Scheduled Metoprolol held for now. Will continue to monitor. 1224: PRN Dinah administered per [facility] for pain. 1242: /52- Scheduled Metoprolol administered at this time. Assessment complete. VSS on 1L NC sats at 93%. Pt is being weaned off oxygen. Pt was at 3 L NC at the beginning of shift. Patient alert, oriented, calm, and cooperative, very hard of hearing. +2 non-pitting generalized edema noted. Lap abd incisions x5 dry and intact, old drainage noted. Diet advanced to clear liquids. Pt encouraged frequently to drink fluids but only takes sips of water intermittently. LR's running at 75 mL/hr. Purewick in place. Safety maintained, call light within reach. Daughters at bedside. No complaints at this time.      Electronically signed by Ildefonso Holman RN on 7/23/22 at 3:25 PM EDT

## 2022-07-24 LAB
ALBUMIN SERPL-MCNC: 2.5 G/DL (ref 3.5–4.6)
ALP BLD-CCNC: 128 U/L (ref 40–130)
ALT SERPL-CCNC: 18 U/L (ref 0–33)
ANION GAP SERPL CALCULATED.3IONS-SCNC: 11 MEQ/L (ref 9–15)
AST SERPL-CCNC: 33 U/L (ref 0–35)
BILIRUB SERPL-MCNC: 1.2 MG/DL (ref 0.2–0.7)
BILIRUBIN DIRECT: <0.2 MG/DL (ref 0–0.4)
BILIRUBIN, INDIRECT: ABNORMAL MG/DL (ref 0–0.6)
BUN BLDV-MCNC: 18 MG/DL (ref 8–23)
CALCIUM SERPL-MCNC: 8.3 MG/DL (ref 8.5–9.9)
CHLORIDE BLD-SCNC: 104 MEQ/L (ref 95–107)
CO2: 23 MEQ/L (ref 20–31)
CREAT SERPL-MCNC: 0.81 MG/DL (ref 0.5–0.9)
GFR AFRICAN AMERICAN: >60
GFR NON-AFRICAN AMERICAN: >60
GLUCOSE BLD-MCNC: 110 MG/DL (ref 70–99)
GLUCOSE BLD-MCNC: 114 MG/DL (ref 70–99)
HCT VFR BLD CALC: 35.3 % (ref 37–47)
HEMOGLOBIN: 11.5 G/DL (ref 12–16)
MAGNESIUM: 2.1 MG/DL (ref 1.7–2.4)
MCH RBC QN AUTO: 28.9 PG (ref 27–31.3)
MCHC RBC AUTO-ENTMCNC: 32.5 % (ref 33–37)
MCV RBC AUTO: 88.8 FL (ref 82–100)
PDW BLD-RTO: 16.4 % (ref 11.5–14.5)
PERFORMED ON: ABNORMAL
PLATELET # BLD: 177 K/UL (ref 130–400)
POTASSIUM SERPL-SCNC: 4.9 MEQ/L (ref 3.4–4.9)
RBC # BLD: 3.97 M/UL (ref 4.2–5.4)
SODIUM BLD-SCNC: 138 MEQ/L (ref 135–144)
TOTAL PROTEIN: 5.7 G/DL (ref 6.3–8)
WBC # BLD: 15 K/UL (ref 4.8–10.8)

## 2022-07-24 PROCEDURE — 97167 OT EVAL HIGH COMPLEX 60 MIN: CPT

## 2022-07-24 PROCEDURE — 2700000000 HC OXYGEN THERAPY PER DAY

## 2022-07-24 PROCEDURE — 6370000000 HC RX 637 (ALT 250 FOR IP): Performed by: STUDENT IN AN ORGANIZED HEALTH CARE EDUCATION/TRAINING PROGRAM

## 2022-07-24 PROCEDURE — 80076 HEPATIC FUNCTION PANEL: CPT

## 2022-07-24 PROCEDURE — 36415 COLL VENOUS BLD VENIPUNCTURE: CPT

## 2022-07-24 PROCEDURE — 94761 N-INVAS EAR/PLS OXIMETRY MLT: CPT

## 2022-07-24 PROCEDURE — 80048 BASIC METABOLIC PNL TOTAL CA: CPT

## 2022-07-24 PROCEDURE — 97163 PT EVAL HIGH COMPLEX 45 MIN: CPT

## 2022-07-24 PROCEDURE — 83735 ASSAY OF MAGNESIUM: CPT

## 2022-07-24 PROCEDURE — 6370000000 HC RX 637 (ALT 250 FOR IP): Performed by: PHYSICIAN ASSISTANT

## 2022-07-24 PROCEDURE — 1210000000 HC MED SURG R&B

## 2022-07-24 PROCEDURE — 6360000002 HC RX W HCPCS: Performed by: STUDENT IN AN ORGANIZED HEALTH CARE EDUCATION/TRAINING PROGRAM

## 2022-07-24 PROCEDURE — 2580000003 HC RX 258: Performed by: PHYSICIAN ASSISTANT

## 2022-07-24 PROCEDURE — 99024 POSTOP FOLLOW-UP VISIT: CPT | Performed by: STUDENT IN AN ORGANIZED HEALTH CARE EDUCATION/TRAINING PROGRAM

## 2022-07-24 PROCEDURE — 94640 AIRWAY INHALATION TREATMENT: CPT

## 2022-07-24 PROCEDURE — 85027 COMPLETE CBC AUTOMATED: CPT

## 2022-07-24 RX ADMIN — SENNOSIDES AND DOCUSATE SODIUM 1 TABLET: 50; 8.6 TABLET ORAL at 22:41

## 2022-07-24 RX ADMIN — ACETAMINOPHEN 650 MG: 325 TABLET ORAL at 05:09

## 2022-07-24 RX ADMIN — ACETAMINOPHEN 650 MG: 325 TABLET ORAL at 11:55

## 2022-07-24 RX ADMIN — OXYCODONE 5 MG: 5 TABLET ORAL at 13:50

## 2022-07-24 RX ADMIN — LOSARTAN POTASSIUM 25 MG: 25 TABLET, FILM COATED ORAL at 00:49

## 2022-07-24 RX ADMIN — ENOXAPARIN SODIUM 40 MG: 100 INJECTION SUBCUTANEOUS at 10:21

## 2022-07-24 RX ADMIN — SENNOSIDES AND DOCUSATE SODIUM 1 TABLET: 50; 8.6 TABLET ORAL at 10:20

## 2022-07-24 RX ADMIN — BUDESONIDE AND FORMOTEROL FUMARATE DIHYDRATE 2 PUFF: 160; 4.5 AEROSOL RESPIRATORY (INHALATION) at 07:52

## 2022-07-24 RX ADMIN — METOPROLOL TARTRATE 25 MG: 25 TABLET, FILM COATED ORAL at 10:20

## 2022-07-24 RX ADMIN — Medication 10 ML: at 22:42

## 2022-07-24 RX ADMIN — BRIMONIDINE TARTRATE 1 DROP: 2 SOLUTION/ DROPS OPHTHALMIC at 22:40

## 2022-07-24 RX ADMIN — BUDESONIDE AND FORMOTEROL FUMARATE DIHYDRATE 2 PUFF: 160; 4.5 AEROSOL RESPIRATORY (INHALATION) at 19:16

## 2022-07-24 RX ADMIN — TIMOLOL MALEATE 1 DROP: 5 SOLUTION OPHTHALMIC at 22:40

## 2022-07-24 RX ADMIN — BRIMONIDINE TARTRATE 1 DROP: 2 SOLUTION/ DROPS OPHTHALMIC at 10:22

## 2022-07-24 RX ADMIN — ACETAMINOPHEN 650 MG: 325 TABLET ORAL at 18:30

## 2022-07-24 RX ADMIN — OXYCODONE 5 MG: 5 TABLET ORAL at 22:40

## 2022-07-24 RX ADMIN — TIMOLOL MALEATE 1 DROP: 5 SOLUTION OPHTHALMIC at 10:22

## 2022-07-24 RX ADMIN — ATORVASTATIN CALCIUM 20 MG: 20 TABLET, FILM COATED ORAL at 22:40

## 2022-07-24 RX ADMIN — DORZOLAMIDE HYDROCHLORIDE 1 DROP: 20 SOLUTION/ DROPS OPHTHALMIC at 10:22

## 2022-07-24 RX ADMIN — ACETAMINOPHEN 650 MG: 325 TABLET ORAL at 00:49

## 2022-07-24 RX ADMIN — METOPROLOL TARTRATE 25 MG: 25 TABLET, FILM COATED ORAL at 22:41

## 2022-07-24 RX ADMIN — Medication 10 ML: at 10:21

## 2022-07-24 RX ADMIN — LOSARTAN POTASSIUM 25 MG: 25 TABLET, FILM COATED ORAL at 13:17

## 2022-07-24 RX ADMIN — DORZOLAMIDE HYDROCHLORIDE 1 DROP: 20 SOLUTION/ DROPS OPHTHALMIC at 22:40

## 2022-07-24 ASSESSMENT — PAIN DESCRIPTION - DESCRIPTORS
DESCRIPTORS: ACHING

## 2022-07-24 ASSESSMENT — PAIN DESCRIPTION - ORIENTATION: ORIENTATION: RIGHT

## 2022-07-24 ASSESSMENT — PAIN SCALES - GENERAL
PAINLEVEL_OUTOF10: 7
PAINLEVEL_OUTOF10: 8
PAINLEVEL_OUTOF10: 8
PAINLEVEL_OUTOF10: 0

## 2022-07-24 ASSESSMENT — PAIN DESCRIPTION - LOCATION
LOCATION: ABDOMEN

## 2022-07-24 NOTE — PROGRESS NOTES
Physical Therapy Med Surg Initial Assessment  Facility/Department: [facility]  [facility]       NAME: Derek Blancas  : 1928 (89 y.o.)  MRN: [id number]  CODE STATUS: Full Code    Date of Service: 2022    Patient Diagnosis(es): Cholelithiasis [K80.20]  Acute cholecystitis [K81.0]   No chief complaint on file.     Patient Active Problem List    Diagnosis Date Noted    S/P laparoscopic cholecystectomy 2022    Preoperative respiratory examination 2022    Muscle wasting and atrophy, not elsewhere classified, left hand 2022    Cholelithiasis 2022    Osteoarthritis of cervical spine with myelopathy 2022    Acute post-operative pain 2022    Impaired mobility and activities of daily living-due to cervical myelopathy 2021    Venous stasis dermatitis of both lower extremities 08/10/2021    Obesity (BMI 30-39.9) 2019    Obstructive sleep apnea 2019    Nocturnal hypoxia 2019    COPD (chronic obstructive pulmonary disease) with acute bronchitis ([address] 2018    Anxiety 2017    Essential hypertension         Past Medical History:   Diagnosis Date    Age-related cognitive decline     Cancer ([address]     Cerebrovascular small vessel disease     Chronic nonspecific lung disease     nodules R lung, fibrosis L apex    Glaucoma     Gout     left knee    Hypertension     Obesity (BMI 30-39.9) 2019    Obstructive sleep apnea 2019    Osteoarthritis of cervical spine with myelopathy 2022    Vitamin D deficiency      Past Surgical History:   Procedure Laterality Date    CATARACT REMOVAL WITH IMPLANT Bilateral 2016    COLON SURGERY  2009    polyps    COLONOSCOPY  2009    GASTROSTOMY TUBE PLACEMENT N/A 2020    EGD PEG TUBE PLACEMENT performed by Sherri Rdz MD at [address] Right 04/15/2022    Flexima APDL Locking Pigtail Draiange Catheter inserted by Dr. Brook Ho (Lot# [id number] / Exp: 12/07/2024)    IR CHOLECYSTOSTOMY PERCUTANEOUS COMPLETE  4/15/2022    IR CHOLECYSTOSTOMY PERCUTANEOUS COMPLETE 4/15/2022 [facility] SPECIAL PROCEDURE    SKIN CANCER EXCISION  2002    graft from neck       Patient assessed for rehabilitation services?: Yes  Family / Caregiver Present: No    Restrictions:  Restrictions/Precautions: Fall Risk (high cole score)     SUBJECTIVE:   Subjective: \"my abdomen hurts\"    Pain  Pain: 8/10 abdomen    Prior Level of Function:  Social/Functional History  Lives With: Daughter (Patient states she is never alone)  Type of Home: House  Home Layout: One level  Home Access: Stairs to enter without rails  Entrance Stairs - Number of Steps: 2  Bathroom Shower/Tub: Tub/Shower unit  Bathroom Equipment: Grab bars in shower, Shower chair  Home Equipment: [address] [facility] Help From: Family  ADL Assistance: Independent  Homemaking Assistance: Needs assistance (Daughter performs)  Homemaking Responsibilities: No (Daughter performs)  Ambulation Assistance: Independent (Rollator)  Transfer Assistance: Independent  Active : No  Patient's  Info: Daughter    OBJECTIVE:   Vision  Vision: Impaired  Vision Exceptions: Wears glasses for reading  Hearing: Exceptions to [facility]  Hearing Exceptions: Hard of hearing/hearing concerns; No hearing aid    Cognition:  Overall Orientation Status: Impaired  Orientation Level: Oriented to person, Disoriented to time, Disoriented to situation, Disoriented to place  Follows Commands: Within Functional Limits  Overall Cognitive Status: Exceptions    Observation/Palpation  Posture: Fair  Observation: On 1 L O2; no signs of distress; abdomen pain with movement    ROM:  RLE AROM: WFL  LLE AROM : WFL    Strength:  Strength RLE  Comment: grossly 3+/5  Strength LLE  Comment: grossly 3+/5    Neuro:  Balance  Sitting - Static: Fair  Sitting - Dynamic: Fair  Standing - Static: Poor  Standing - Dynamic: Poor    Sensation: Intact    Bed mobility  Supine to Sit: 2 Person assistance;Maximum assistance  Sit to Supine: 2 Person assistance;Maximum assistance  Bed Mobility Comments: HOB elevated    Transfers  Sit to Stand: Moderate Assistance;2 Person Assistance  Stand to sit: Moderate Assistance;2 Person Assistance  Comment: HHA; no AD used    Ambulation  Surface: level tile  Device: No Device;Hand-Held Assist  Assistance: Moderate assistance;2 Person assistance  Distance: side step toward HOB X 4    Stairs/Curb  Stairs?: No    Activity Tolerance  Activity Tolerance: Patient limited by fatigue;Patient limited by endurance; Patient limited by pain  Activity Tolerance Comments: Pt limited by strength and balance deficits    Patient Education  Education Given To: Patient  Education Provided: Role of Therapy;Plan of Care (general safety)  Education Method: Verbal  Education Outcome: Continued education needed       ASSESSMENT:   Body Structures, Functions, Activity Limitations Requiring Skilled Therapeutic Intervention: Decreased functional mobility ; Decreased strength;Decreased cognition;Decreased endurance;Decreased balance; Increased pain;Decreased posture  Decision Making: High Complexity  History: high  Exam: high  Clinical Presentation: high    Therapy Prognosis: Good    DISCHARGE RECOMMENDATIONS:  Discharge Recommendations: Continue to assess pending progress    Assessment: Pt is 80 y.o. female s/p laproscopic cholecystecomty. Pt exhibits increased abdominal pain, decreased strength, balance, and activity tolerance and requiring assistance of two people for all mobility at this time. Continued PT is required for safe d/c home with dtr's assistance.   Requires PT Follow-Up: Yes      PLAN OF CARE:  Plan  Plan: 1 time a day 3-6 times a week  Current Treatment Recommendations: Strengthening, ROM, Balance training, Functional mobility training, Transfer training, Endurance training, Gait training, Stair training, Neuromuscular re-education, Pain management, Home exercise program, Safety education & training, Patient/Caregiver education & training, Equipment evaluation, education, & procurement, Positioning, Therapeutic activities    Safety Devices  Type of Devices: Bed alarm in place, Call light within reach, Left in bed    Goals:  Long Term Goals  Long term goal 1: Pt will demonstrate bed mobility SBA  Long term goal 2: Pt will demonstrate transfers SBA with safest AD  Long term goal 3: Pt will demonstrate amb >/= 50ft SBA with safest AD  Long term goal 4: Pt will demonstrate stair negotation 2 steps with 1 rail CGA    AMPAC (6 CLICK) BASIC MOBILITY  AM-PAC Inpatient Mobility Raw Score : 7     Therapy Time:   Individual   Time In 1147   Time Out 1202   Minutes 15       Eval X 15 min    Julisa Storm PT, 07/24/22 at 1:06 PM         Definitions for assistance levels  Independent = pt does not require any physical supervision or assistance from another person for activity completion. Device may be needed.   Stand by assistance = pt requires verbal cues or instructions from another person, close to but not touching, to perform the activity  Minimal assistance= pt performs 75% or more of the activity; assistance is required to complete the activity  Moderate assistance= pt performs 50% of the activity; assistance is required to complete the activity  Maximal assistance = pt performs 25% of the activity; assistance is required to complete the activity  Dependent = pt requires total physical assistance to accomplish the task

## 2022-07-24 NOTE — DISCHARGE SUMMARY
[address]  [address]  [place], 1980 [facility]  MRN: [id number]  YOB: 1928  80 y. o.female      Attending  Elissa Pace MD ?   Date of Admission  7/22/2022 Date of Discharge  7/26/2022      ? DIAGNOSES:  Principal Problem (Resolved): Cholelithiasis  Active Problems:    S/P laparoscopic cholecystectomy    Acute post-operative pain         PROCEDURES:  7/22/2022: Laparoscopic Cholecystectomy by Dr. Tatyana Wen  ? DISCHARGE MEDICATIONS:  Current Discharge Medication List             Details   oxyCODONE (ROXICODONE) 5 MG immediate release tablet Take 1 tablet by mouth every 6 hours as needed for Pain for up to 3 days. Qty: 12 tablet, Refills: 0    Comments: Reduce doses taken as pain becomes manageable  Associated Diagnoses: S/P laparoscopic cholecystectomy; Acute post-operative pain      sennosides-docusate sodium (SENOKOT-S) 8.6-50 MG tablet Take 1 tablet by mouth in the morning and 1 tablet before bedtime. Do all this for 7 days.   Qty: 14 tablet, Refills: 0                Details   zoster recombinant adjuvanted vaccine (SHINGRIX) 50 MCG/0.5ML SUSR injection Inject 0.5 mLs into the muscle See Admin Instructions 1 dose now and repeat in 2-6 months  Qty: 0.5 mL, Refills: 0    Associated Diagnoses: Encounter for immunization      fluticasone-salmeterol (ADVAIR) 250-50 MCG/ACT AEPB diskus inhaler Inhale 1 puff into the lungs every 12 hours      montelukast (SINGULAIR) 10 MG tablet TAKE 1 TABLET BY MOUTH NIGHTLY AS NEEDED FOR ALLERGIES  Qty: 90 tablet, Refills: 3      atorvastatin (LIPITOR) 20 MG tablet TAKE 1 TABLET BY MOUTH DAILY AT BEDTIME  Qty: 90 tablet, Refills: 0    Associated Diagnoses: Chest pain, unspecified      acetaminophen (TYLENOL) 325 MG tablet Take 2 tablets by mouth every 6 hours as needed for Pain  Qty: 120 tablet, Refills: 0      dorzolamide-timolol (COSOPT) 22.3-6.8 MG/ML ophthalmic solution PLACE 1 DROP INTO BOTH EYES 2 TIMES DAILY INDICATIONS: GLAUCOMA  Qty: 10 mL, Refills: 5      latanoprost (XALATAN) 0.005 % ophthalmic solution Place 1 drop into both eyes daily Indications: Glaucoma  Qty: 2.5 mL, Refills: 5      metoprolol tartrate (LOPRESSOR) 25 MG tablet Take 0.5 tablets by mouth 2 times daily Indications: High Blood Pressure Disorder  Qty: 90 tablet, Refills: 3    Associated Diagnoses: Intermittent chest pain      albuterol sulfate HFA (PROAIR HFA) 108 (90 Base) MCG/ACT inhaler Inhale 1 puff into the lungs every 6 hours as needed for Wheezing or Shortness of Breath  Qty: 3 each, Refills: 3    Associated Diagnoses: Diastolic dysfunction; Essential hypertension; Anxiety; Hypokalemia; FRED (obstructive sleep apnea); Encounter for immunization      brimonidine (ALPHAGAN P) 0.15 % ophthalmic solution Place 1 drop into both eyes 2 times daily Indications: Glaucoma  Qty: 1 each, Refills: 1      losartan (COZAAR) 25 MG tablet Take 1 tablet by mouth every 12 hours  Qty: 180 tablet, Refills: 3      Elastic Bandages & Supports (MEDICAL COMPRESSION STOCKINGS) MISC Knee high, 20 to 30 mm hg, remove at night, pharmacy to fit. Qty: 1 each, Refills: 0      OXYGEN Inhale 2 L into the lungs Indications: PRN at bedtime              REASON FOR HOSPITALIZATION:  Smith Diaz is a 80 y.o. female with past medical history of COPD, HTN, FRED presents to [facility] EM[facility] [place] with 1 week history of intermittent RUQ pain without nausea or vomiting and was admitted on 4/14/22. Due to her underlying medical issues daughters opted for percutaneous cholecystostomy tube by IR on 4/15/22. She was discharged to SNF on 4/26/22. She had cholangiogram done on 6/16/22 which showed patent cystic duct with contrast in CBD and duodenum. However gallbladder was completely full of stones and the contrast took some time to get into cystic duct.  Patient and daughters agreed to elective cholecystectomy     Patient underwent lap adenike with Dr. Jenna Jimenez on 7/22/2022 and was admitted to the [facility] post-op. SIGNIFICANT FINDINGS:  Diagnoses:  S/p lap adenike  Acute post op pain  Cholelithiasis (s/p laparoscopic cholecystectomy with Dr. Zachery Lewis on 7/22)  Leukocytosis (resolved)     PMHx: Dementia, T2DM, HTN, HLD, COPD     Incidental Findings: None         HOSPITAL COURSE:  7/22/2022: S/p lap adenike with Dr. Zachery Lewis. Admitted to [facility] post-op  7/23/2022: advanced to CLD. Tbili elevated to 0.8.  7/24/2022: uptrending Tbili. Started on regular diet. 7/25/2022: T-stiven normalized. Tolerating PO intake. Medically cleared for discharge. 7/26/2022: No acute events overnight per patient or nursing. Reports continued incisional tenderness. \"I want to go home\". Sitting upright in bedside chair. Tolerating regular diet. Voiding spontaneously. No bowel movement since admission. Remains medically cleared for discharge. Pt's activity level was ambulatory with assist. The patient had no signs or symptoms of complications. Patient was determined stable for discharge to: [address]    The patient was seen and examined on the day of discharge with the following findings:  Constitutional: Sitting upright in bedside chair. Alert. Conversant. \"I want to go home\". HEENT: Atraumatic normocephalic. Cardiovascular: Regular rate and rhythm. Pulmonary: Breathing comfortably on RA  Abdominal: Soft. Non-distended. Appropriately tender over incisions. Sites c/d/I. No peritonitis  Musculoskeletal: Moves all extremities spontaneously. Baseline contractor of right hand. Neurological: Alert, awake, and orientated x 1-2. Motor and sensory grossly intact. GCS of 15. Skin: warm, dry        ANTICIPATED FOLLOW UP:  Future Appointments   Date Time Provider Lovely Perdomo   10/11/2022 11:15 AM [facility] ECHO  S. Lay   1/10/2023 12:30 PM Charline Levy MD [facility]     No discharge procedures on file.     Other indicated follow up and instructions for scheduling:  - Follow up with General Surgery in 2 weeks for post-op check      VTE RISK AT DISCHARGE:  Per trauma program protocol, the patient does not require post-discharge VTE prophylaxis due to: NWB single LE or BLE fractures limiting mobility. --  Kimberly Knox PA-C  Trauma/Critical Care  Emergency General Surgery      >30 minutes were spent on the discharge of this patient including final examination of the patient, discussion of the hospital stay, instructions for continuing care to all relevant caregivers, preparation of discharge records, prescriptions and referral forms, and clear identification of reasons to return to clinic or to emergency room.

## 2022-07-24 NOTE — CARE COORDINATION
D/C plan is either home with daughter and resumption of [facility] vs SNF. PT/OT working with patient at present time. Recommended SNF. Spoke with daughter, Keily Gil, to update with recommendation. FOC offered and would like referral sent to [facility]. States patient has been there a few times in the past.  Steven Deleon, liaison, with referral.    CM/SW to continue to follow for d/c planning needs.

## 2022-07-24 NOTE — PROGRESS NOTES
1030: Assessment complete. VSS on 1L NC sats at 93%-94%. Attempted to wean off O2 completely but pt sats drop to 87% intermittently when she falls asleep (Hx of COPD). Pt is alert and oriented to person and place, disoriented to date/time and situation. Pt calm, and cooperative, very hard of hearing. +2 non-pitting generalized edema noted. Lap abd incisions x5 dry and intact, old drainage noted on steri-strips. CLD advanced to regular diet starting at lunch time. Pt encouraged frequently to drink fluids and use incentive spirometer-needs reinforcement. Joseph Almonte in place for urinary incontinence. Safety maintained, call light within reach. No complaints voiced at this time. 1100: Attempted to use IS at this time. Pt not able breath in deeply enough for adequate use but does attempt. 1230: Pt's daughter at bedside assisting pt with feeds. Pt eating mashed potatoes, meatloaf tolerating well. Daughter educated on use IS and the need for encouragement. 1155: Scheduled Tylenol Q6H administered. 1252: /53. Low grade temp of 100ºF (Oral) noted. WALLY Dave with the trauma team notified. OK given to continue administration of scheduled tylenol and losartan. 1645: Family at bedside feeding patient her dinner. Daughters reported that pt only took a few bites and a couple of sips of water. Also that pt got upset when they were trying to get her to use the IS.      Electronically signed by Stephanie Rojas RN on 7/24/22 at 12:49 PM EDT

## 2022-07-24 NOTE — PLAN OF CARE
See OT evaluation for all goals and OT POC.  Electronically signed by CHRIS Marks/L on 7/24/2022 at 12:23 PM

## 2022-07-24 NOTE — DISCHARGE INSTRUCTIONS
Discharge Instructions    Date of admission: 7/22/2022  Date of discharge: 7/26/2022    You are being discharged to SNF    Follow up:  - Please call to schedule your follow-up appointments - information provided separately. - You will need to follow up with:  - General Surgery in 2 weeks (8/3/2022) for post-op check  - See below for information regarding contacting your primary care physician or establishing care with [facility]) if you do not already have one. Restrictions:  - Do not lift, push, pull, or drag anything heavier than 25 pounds for 4 weeks  - Maintain these restrictions until you are cleared by your provider at your follow up appointment. Pain control:  - Take Tylenol 325 mg, 1-2 tablets every 4 hours as needed for mild to moderate pain. Mild to moderate pain is characterized by pain 1-6 out of 10 on a pain scale. - Take Oxycodone 5 mg, 1 tablet every 6 hours for as needed for severe pain. Severe pain is characterized as pain of 7-10 out of 10 on a pain scale. - It is okay to take Oxycodone and Tylenol together if needed. - Please begin to wean off of your pain medications as soon as possible. - To do this, start taking Oxycodone every 8 hours instead of every 6, then every 12 hours, then only once per day if needed. Then stop. - You may use Motrin and Tylenol until your pain is diminished enough for you to tolerate your pain. - Your pain medication may be adjusted or discontinued in follow-up appointments, or by the supervising physician in an inpatient rehab setting.   - Please do not drive within 24 hours of taking Oxycodone or any Opiate medication. Wound Care and Showering/Bathing:  - Okay to shower daily -- allow soap and water to run down any incisions sites. Do not scrub the area. - If you cannot maintain your balance in the shower, then you should not shower.  Sponge baths are the best way to maintain hygiene while your are healing.  - To sponge bath, wet a washcloth with soapy water and gently wash body with the washcloth. Then use a dry washcloth to wipe off.   - No submerging the wound in water or pools until cleared by our office.  - If you notice any increased redness swelling or drainage from your wound call our office immediately. - You may ice your incisions, which is especially useful to minimize swelling. Make sure that the ice is not in direct contact with your skin, and that the ice does not leak out of it's bag. Double-bagging ice is an effective technique. - If you begin to experience progressive and rapidly increasing pain that seems out of proportion to what you normally have been experiencing from your baseline pain after surgery - you NEED TO CALL US IMMEDIATELY. Alternatively, you may come into the [facility] IMMEDIATELY for an emergent evaluation by the Trauma team.      Update your primary care physician or establish care:  Please see your primary care physician at the next available appointment for follow up. Please call either on the day of your discharge, or the day after, to make the appointment. If you are followed by a managed care company or if your insurance requires, call your physician for authorization to be seen in a specialty clinic. If you do not have a primary physician please call [phone] for guidance on finding a [facility]) provider.      If you have questions or concerns, if your condition worsens or you  develop new symptoms please call the [address] [facility] at [phone].    ---------------------------------------------------------------------------------------------------------------------

## 2022-07-24 NOTE — PROGRESS NOTES
AST 33 07/24/2022 05:58 AM    ALT 18 07/24/2022 05:58 AM     Magnesium:    Lab Results   Component Value Date/Time    MG 2.1 07/24/2022 05:58 AM     PT/INR:    Lab Results   Component Value Date/Time    PROTIME 14.2 06/08/2022 02:01 PM    INR 1.1 06/08/2022 02:01 PM     PTT:    Lab Results   Component Value Date/Time    APTT 29.7 04/15/2022 01:34 PM       IMAGING RESULTS (PERSONALLY REVIEWED)     No new imaging to review today. ASSESSMENT & PLAN     Diagnoses:  S/p lap adenike  Acute post op pain  Cholelithiasis   Leukocytosis    PMHx: Dementia, T2DM, HTN, HLD, COPD    Incidental Findings: None      ASSESSMENT/PLAN:  Neurological: Acute post op pain  - Continue tylenol 650mg q6  - Continue oxycodone 2.5/5mg mod/severe pain     Cardiovascular: HX HTN, HLD  - Continue vital signs per unit protocol  - Continue telemetry  - Continue home atorvastatin 20mg HS, losartan 25mg BID, metoprolol 25mg BID     Respiratory: Hx COPD, sating well on 3L NC  - Maintain O2 sats > 88%, encourage IS.  - Wean O2 as tolerated   - Continue home symbicort BID, albuterol PRN     GI/Diet: S/p lap adenike with Dr. Emery Pike 7/22. Tolerating CLD. NO bowel movement since admission. Tbili elevated to 1.2  - Advance to regular diet  - Bowel regimen: Senokot BID, PRN MOM/dulcolax  - AM hepatic panel     Renal/Electrolytes: No electrolyte abnormalities, BUN/Cr WNL  - HLIV  - AM BMP     ID: Leukocytosis down trending, afebrile, not tachycardic. Likely reactive in setting of surgery  - No indication for abx  - AM CBC     Heme: H&H stable, HDS  - No indication for transfusion  - AM CBC     Endocrine: No acute or chronic issues.  -114 over last 24 hours     MSK: No acute issues  - Spines: clear  - Weight Bearing Restrictions: WBAT  - Activity: Out of bed with assist  - PT/OT: consulted, pending recs    Prophylaxis:   - SCD, lovenox 40mg daily     Lines/Tubes/Drains:  - Maintain PIVs  -No indication for driver catheter, monitor for urinary retention    Dispo: Remain in RNF for continued post-op care, PT/OT eval and trending of hepatic function panel. Not yet medically clear for d/c. Accom Status: General Status      Follow up with General Surgery in 2 weeks for post-op check       Please call for any questions or concerns. Kristin Chavez Treatment Team Sticky Note for contact information]      Kristy More PA-C  Trauma/Critical Care  [see Treatment Team Sticky Note for contact information]     This patient's plan of care was discussed and made in collaboration with Trauma Attending physician, Frantz Acevedo MD.      Teaching Physician Note:  I saw and evaluated the patient. I personally obtained the key and critical portions of the history and physical exam.  I reviewed the NICK's documentation and discussed the patient with the NICK. I agree with the NICK's medical decision making as documented in the NICK note.         Jaswinder Lockwood MD

## 2022-07-24 NOTE — PROGRESS NOTES
10:30: /49 MAP: 61. Metoprolol held at this time, will continue to monitor. Assessment complete. VSS on 3L NC sats at 95%. Pt is only oriented to person, but does not know where she is. Pt is being weaned off oxygen. Started on 3, was taken down to 1L NC, but then put back up to 3 as of now. Pat calm, and cooperative, very hard of hearing. +2 non-pitting generalized edema noted. Lap abd incisions x5 dry and intact, old drainage noted. Diet advanced to clear liquids. Pt encouraged frequently to drink fluids but only takes sips of water intermittently. Pure wick in place. Safety maintained, call light within reach.

## 2022-07-24 NOTE — PROGRESS NOTES
[facility]ALUATION - ACUTE     NAME: Lord Jaime  : 1928 (58 y.o.)  MRN: [id number]  CODE STATUS: Full Code  Room: X637/H302-46    Date of Service: 2022    Patient Diagnosis(es): Cholelithiasis [K80.20]  Acute cholecystitis [K81.0]   Patient Active Problem List    Diagnosis Date Noted    S/P laparoscopic cholecystectomy 2022    Preoperative respiratory examination 2022    Muscle wasting and atrophy, not elsewhere classified, left hand 2022    Cholelithiasis 2022    Osteoarthritis of cervical spine with myelopathy 2022    Acute post-operative pain 2022    Impaired mobility and activities of daily living-due to cervical myelopathy 2021    Venous stasis dermatitis of both lower extremities 08/10/2021    Obesity (BMI 30-39.9) 2019    Obstructive sleep apnea 2019    Nocturnal hypoxia 2019    COPD (chronic obstructive pulmonary disease) with acute bronchitis ([address] 2018    Anxiety 2017    Essential hypertension         Past Medical History:   Diagnosis Date    Age-related cognitive decline     Cancer ([address]     Cerebrovascular small vessel disease     Chronic nonspecific lung disease     nodules R lung, fibrosis L apex    Glaucoma     Gout     left knee    Hypertension     Obesity (BMI 30-39.9) 2019    Obstructive sleep apnea 2019    Osteoarthritis of cervical spine with myelopathy 2022    Vitamin D deficiency      Past Surgical History:   Procedure Laterality Date    CATARACT REMOVAL WITH IMPLANT Bilateral 2016    COLON SURGERY  2009    polyps    COLONOSCOPY  2009    GASTROSTOMY TUBE PLACEMENT N/A 2020    EGD PEG TUBE PLACEMENT performed by Ngoc Coates MD at [address] Right 04/15/2022    Flexima APDL Locking Pigtail Draiange Catheter inserted by Dr. Julissa Carranza (Lot# [id number] / Exp: 2024)    IR CHOLECYSTOSTOMY PERCUTANEOUS COMPLETE 4/15/2022    IR CHOLECYSTOSTOMY PERCUTANEOUS COMPLETE 4/15/2022 [facility] SPECIAL PROCEDURE    SKIN CANCER EXCISION  2002    graft from neck        Restrictions  Restrictions/Precautions: Fall Risk (high cole score)              Safety Devices: Safety Devices  Type of Devices: Bed alarm in place;Call light within reach; Left in bed     Patient's date of birth confirmed: Yes    General:  Chart Reviewed: Yes  Patient assessed for rehabilitation services?: Yes  Family / Caregiver Present: No    Subjective  Subjective: Patient agreeable to OT evaluation       Pain at start of treatment: Patient does not rate    Pain at end of treatment: Patient does not rate    Location: Abdomen  Nursing notified: Yes  RN: Christi Crouch  Intervention: RN provided pain medication    Prior Level of Function:  Social/Functional History  Lives With: Daughter (Patient states she is never alone)  Type of Home: House  Home Layout: One level  Home Access: Stairs to enter without rails  Entrance Stairs - Number of Steps: 2  Bathroom Shower/Tub: Tub/Shower unit  Bathroom Equipment: Grab bars in shower, Shower chair  Home Equipment: Rollator  Receives Help From: Family  ADL Assistance: Independent  Homemaking Assistance: Needs assistance (Daughter performs)  Homemaking Responsibilities: No (Daughter performs)  Ambulation Assistance: Independent (Rollator)  Transfer Assistance: Independent  Active : No  Patient's  Info: Daughter    OBJECTIVE:     Orientation Status:  Orientation  Orientation Level: Oriented to person;Disoriented to time;Disoriented to situation;Disoriented to place    Observation:  Observation/Palpation  Observation: Patient pleasant and cooperative, no acute distress    Cognition Status:  Cognition  Overall Cognitive Status: Exceptions  Arousal/Alertness: Inconsistent responses to stimuli  Following Commands: Follows one step commands with increased time; Follows one step commands with repetition  Attention Span: Attends with cues to redirect  Memory: Decreased recall of biographical Information;Decreased recall of precautions;Decreased short term memory;Decreased recall of recent events  Safety Judgement: Decreased awareness of need for assistance;Decreased awareness of need for safety  Problem Solving: Assistance required to implement solutions;Assistance required to generate solutions;Assistance required to correct errors made;Assistance required to identify errors made  Insights: Decreased awareness of deficits  Initiation: Requires cues for some  Sequencing: Requires cues for some    Perception Status:  Perception  Overall Perceptual Status: WFL    Vision and Hearing Status:  Vision  Vision Exceptions: Wears glasses for reading  Hearing  Hearing: Exceptions to [facility]  Hearing Exceptions: Hard of hearing/hearing concerns; No hearing aid        GROSS ASSESSMENT AROM/PROM:  AROM: Generally decreased, functional  PROM: Generally decreased, functional    ROM:   LUE AROM (degrees)  LUE General AROM: Decreased overall  Left Hand AROM (degrees)  Left Hand AROM: WFL  RUE AROM (degrees)  RUE General AROM: Decreased overall  Right Hand PROM (degrees)  Right Hand General PROM: 2nd digit able to passively extend  Right Hand AROM (degrees)  Right Hand General AROM: 2nd and 3rd digit rests in flexion    UE STRENGTH:  Strength: Grossly decreased, non-functional (BUE: 3-/5)    UE COORDINATION:  Coordination: Grossly decreased, non-functional    UE TONE:  Tone: Normal    UE SENSATION:  Sensation: Intact    Hand Dominance:  Hand Dominance  Hand Dominance: Right    ADL Status:  ADL  Feeding: Setup  Grooming: Moderate assistance  UE Bathing: Moderate assistance; Increased time to complete;Verbal cueing  LE Bathing: Maximum assistance; Increased time to complete;Verbal cueing  UE Dressing: Moderate assistance;Verbal cueing; Increased time to complete  LE Dressing: Maximum assistance;Verbal cueing; Increased time to complete  Toileting: Maximum assistance  Additional Comments: All ADLs simulated as above  Toilet Transfers  Toilet Transfers Comments: Not formally assessed, anticipate Min A x2  Tub Transfers  Tub Transfers: Not tested    Functional Mobility:  Patient ambulated to head of bed with Handheld assist at [facility] level x2 people. Bed Mobility  Bed mobility  Supine to Sit: 2 Person assistance;Maximum assistance  Sit to Supine: 2 Person assistance;Maximum assistance  Bed Mobility Comments: HOB elevated    Seated and Standing Balance:  Balance  Sitting:  (CGA)  Standing:  (Min A)    Functional Endurance:  Activity Tolerance  Activity Tolerance: Patient limited by pain    D/C Recommendations:  OT D/C RECOMMENDATIONS  REQUIRES OT FOLLOW-UP: Yes    Equipment Recommendations:  OT Equipment Recommendations  Other: Continue to assess    OT Education:   Patient Education  Education Given To: Patient; Family  Education Provided: Role of Therapy;Plan of Care  Education Method: Verbal  Barriers to Learning: Cognition  Education Outcome: Continued education needed    OT Follow Up:   OT D/C RECOMMENDATIONS  REQUIRES OT FOLLOW-UP: Yes       Assessment/Discharge Disposition:  Assessment: Patient is a 80year old female who presents to [facility] from home with family with the above deficits. Patient would benefit from continued occupational therapy to maximize safety and participation in self-care skills.   Performance deficits / Impairments: Decreased functional mobility , Decreased ADL status, Decreased ROM, Decreased strength, Decreased endurance, Decreased cognition, Decreased safe awareness, Decreased balance, Decreased coordination  Prognosis: Good  Discharge Recommendations: Continue to assess pending progress  Decision Making: High Complexity  History: Multiple comorbidities  Exam: 9 performance deficits  Assistance / Modification: Max A    [facility] ([employer]) Self care Score   How much help for putting on and taking off regular lower body clothing?: A Lot  How much help for Bathing?: A Lot  How much help for Toileting?: A Lot  How much help for putting on and taking off regular upper body clothing?: A Little  How much help for taking care of personal grooming?: A Little  How much help for eating meals?: A Little  AM-[facility] Inpatient Daily Activity Raw Score: 15  AM-PAC Inpatient ADL T-Scale Score : 34.69  ADL Inpatient CMS 0-100% Score: 56.46    Therapy key for assistance levels -   Independent/Mod I = Pt. is able to perform task with no assistance but may require a device   Stand by assistance = Pt. does not perform task at an independent level but does not need physical assistance, requires verbal cues  Minimal, Moderate, Maximal Assistance = Pt. requires physical assistance (25%, 50%, 75% assist from helper) for task but is able to actively participate in task   Dependent = Pt. requires total assistance with task and is not able to actively participate with task completion     Plan:  Plan  Times per Week: 1-4x/week  Plan Weeks: Length of acute care stay  Current Treatment Recommendations: Strengthening, ROM, Balance training, Functional mobility training, Endurance training, Safety education & training, Cognitive reorientation, Neuromuscular re-education, Patient/Caregiver education & training, Equipment evaluation, education, & procurement, Positioning, Self-Care / ADL, Cognitive/Perceptual training, Coordination training    Goals:   Patient will:    - Improve functional endurance to tolerate/complete 30 mins of ADL's  - Be Set Up in UB ADLs   - Be Min A in LB ADLs  - Be Min A in ADL transfers without LOB  - Be Min A in toileting tasks  - Improve bilateral UE strength and endurance to 3+/5 in order to participate in self-care activities as projected. - Sequence self-care tasks with minimal cues for safety awareness  - Other :  Improve sitting/standing balance to complete ADLs as projected.      Patient Goal: Patient goals : Unable to state     Discussed and agreed upon: Yes Comments: Therapy Time:   Individual   Time In 1150   Time Out 1202   Minutes 12          Eval: 12 minutes     Electronically signed by:    CHRIS Gaona/JONATHON,   7/24/2022, 12:21 PM

## 2022-07-25 LAB
ALBUMIN SERPL-MCNC: 2.7 G/DL (ref 3.5–4.6)
ALP BLD-CCNC: 229 U/L (ref 40–130)
ALT SERPL-CCNC: 13 U/L (ref 0–33)
ANION GAP SERPL CALCULATED.3IONS-SCNC: 9 MEQ/L (ref 9–15)
AST SERPL-CCNC: 10 U/L (ref 0–35)
BACTERIA: NEGATIVE /HPF
BILIRUB SERPL-MCNC: 0.7 MG/DL (ref 0.2–0.7)
BILIRUBIN DIRECT: 0.4 MG/DL (ref 0–0.4)
BILIRUBIN URINE: ABNORMAL
BILIRUBIN, INDIRECT: 0.3 MG/DL (ref 0–0.6)
BLOOD, URINE: NEGATIVE
BUN BLDV-MCNC: 14 MG/DL (ref 8–23)
CALCIUM SERPL-MCNC: 8.5 MG/DL (ref 8.5–9.9)
CHLORIDE BLD-SCNC: 106 MEQ/L (ref 95–107)
CLARITY: ABNORMAL
CO2: 25 MEQ/L (ref 20–31)
COLOR: ABNORMAL
CREAT SERPL-MCNC: 0.75 MG/DL (ref 0.5–0.9)
EKG ATRIAL RATE: 101 BPM
EKG P AXIS: 86 DEGREES
EKG P-R INTERVAL: 182 MS
EKG Q-T INTERVAL: 376 MS
EKG QRS DURATION: 82 MS
EKG QTC CALCULATION (BAZETT): 487 MS
EKG R AXIS: 5 DEGREES
EKG T AXIS: 3 DEGREES
EKG VENTRICULAR RATE: 101 BPM
EPITHELIAL CELLS, UA: NORMAL /HPF (ref 0–5)
GFR AFRICAN AMERICAN: >60
GFR NON-AFRICAN AMERICAN: >60
GLUCOSE BLD-MCNC: 108 MG/DL (ref 70–99)
GLUCOSE URINE: NEGATIVE MG/DL
HCT VFR BLD CALC: 31.4 % (ref 37–47)
HEMOGLOBIN: 10.5 G/DL (ref 12–16)
HYALINE CASTS: NORMAL /HPF (ref 0–5)
KETONES, URINE: ABNORMAL MG/DL
LEUKOCYTE ESTERASE, URINE: NEGATIVE
MAGNESIUM: 2.1 MG/DL (ref 1.7–2.4)
MCH RBC QN AUTO: 29 PG (ref 27–31.3)
MCHC RBC AUTO-ENTMCNC: 33.4 % (ref 33–37)
MCV RBC AUTO: 86.7 FL (ref 82–100)
NITRITE, URINE: POSITIVE
PDW BLD-RTO: 15.9 % (ref 11.5–14.5)
PH UA: 5.5 (ref 5–9)
PLATELET # BLD: 158 K/UL (ref 130–400)
POTASSIUM SERPL-SCNC: 3.8 MEQ/L (ref 3.4–4.9)
PROTEIN UA: 100 MG/DL
RBC # BLD: 3.62 M/UL (ref 4.2–5.4)
RBC UA: NORMAL /HPF (ref 0–2)
SODIUM BLD-SCNC: 140 MEQ/L (ref 135–144)
SPECIFIC GRAVITY UA: 1.03 (ref 1–1.03)
TOTAL PROTEIN: 5.6 G/DL (ref 6.3–8)
URINE REFLEX TO CULTURE: ABNORMAL
UROBILINOGEN, URINE: 1 E.U./DL
WBC # BLD: 11.5 K/UL (ref 4.8–10.8)
WBC UA: NORMAL /HPF (ref 0–5)

## 2022-07-25 PROCEDURE — 94640 AIRWAY INHALATION TREATMENT: CPT

## 2022-07-25 PROCEDURE — 94761 N-INVAS EAR/PLS OXIMETRY MLT: CPT

## 2022-07-25 PROCEDURE — 83735 ASSAY OF MAGNESIUM: CPT

## 2022-07-25 PROCEDURE — 6370000000 HC RX 637 (ALT 250 FOR IP): Performed by: STUDENT IN AN ORGANIZED HEALTH CARE EDUCATION/TRAINING PROGRAM

## 2022-07-25 PROCEDURE — 80048 BASIC METABOLIC PNL TOTAL CA: CPT

## 2022-07-25 PROCEDURE — 97535 SELF CARE MNGMENT TRAINING: CPT

## 2022-07-25 PROCEDURE — 2700000000 HC OXYGEN THERAPY PER DAY

## 2022-07-25 PROCEDURE — 6370000000 HC RX 637 (ALT 250 FOR IP): Performed by: PHYSICIAN ASSISTANT

## 2022-07-25 PROCEDURE — 1210000000 HC MED SURG R&B

## 2022-07-25 PROCEDURE — 99024 POSTOP FOLLOW-UP VISIT: CPT | Performed by: PHYSICIAN ASSISTANT

## 2022-07-25 PROCEDURE — 85027 COMPLETE CBC AUTOMATED: CPT

## 2022-07-25 PROCEDURE — 2580000003 HC RX 258: Performed by: PHYSICIAN ASSISTANT

## 2022-07-25 PROCEDURE — 81001 URINALYSIS AUTO W/SCOPE: CPT

## 2022-07-25 PROCEDURE — 36415 COLL VENOUS BLD VENIPUNCTURE: CPT

## 2022-07-25 PROCEDURE — 80076 HEPATIC FUNCTION PANEL: CPT

## 2022-07-25 RX ADMIN — LATANOPROST 1 DROP: 50 SOLUTION OPHTHALMIC at 00:45

## 2022-07-25 RX ADMIN — DORZOLAMIDE HYDROCHLORIDE 1 DROP: 20 SOLUTION/ DROPS OPHTHALMIC at 21:03

## 2022-07-25 RX ADMIN — SENNOSIDES AND DOCUSATE SODIUM 1 TABLET: 50; 8.6 TABLET ORAL at 07:59

## 2022-07-25 RX ADMIN — BUDESONIDE AND FORMOTEROL FUMARATE DIHYDRATE 2 PUFF: 160; 4.5 AEROSOL RESPIRATORY (INHALATION) at 19:39

## 2022-07-25 RX ADMIN — SENNOSIDES AND DOCUSATE SODIUM 1 TABLET: 50; 8.6 TABLET ORAL at 20:56

## 2022-07-25 RX ADMIN — ACETAMINOPHEN 650 MG: 325 TABLET ORAL at 08:00

## 2022-07-25 RX ADMIN — TIMOLOL MALEATE 1 DROP: 5 SOLUTION OPHTHALMIC at 08:02

## 2022-07-25 RX ADMIN — Medication 10 ML: at 20:56

## 2022-07-25 RX ADMIN — Medication 10 ML: at 07:59

## 2022-07-25 RX ADMIN — ACETAMINOPHEN 650 MG: 325 TABLET ORAL at 15:14

## 2022-07-25 RX ADMIN — BUDESONIDE AND FORMOTEROL FUMARATE DIHYDRATE 2 PUFF: 160; 4.5 AEROSOL RESPIRATORY (INHALATION) at 07:53

## 2022-07-25 RX ADMIN — BRIMONIDINE TARTRATE 1 DROP: 2 SOLUTION/ DROPS OPHTHALMIC at 21:05

## 2022-07-25 RX ADMIN — METOPROLOL TARTRATE 25 MG: 25 TABLET, FILM COATED ORAL at 08:00

## 2022-07-25 RX ADMIN — LOSARTAN POTASSIUM 25 MG: 25 TABLET, FILM COATED ORAL at 15:14

## 2022-07-25 RX ADMIN — ACETAMINOPHEN 650 MG: 325 TABLET ORAL at 01:05

## 2022-07-25 RX ADMIN — DORZOLAMIDE HYDROCHLORIDE 1 DROP: 20 SOLUTION/ DROPS OPHTHALMIC at 08:01

## 2022-07-25 RX ADMIN — METOPROLOL TARTRATE 25 MG: 25 TABLET, FILM COATED ORAL at 20:56

## 2022-07-25 RX ADMIN — BRIMONIDINE TARTRATE 1 DROP: 2 SOLUTION/ DROPS OPHTHALMIC at 08:01

## 2022-07-25 RX ADMIN — ATORVASTATIN CALCIUM 20 MG: 20 TABLET, FILM COATED ORAL at 20:56

## 2022-07-25 RX ADMIN — TIMOLOL MALEATE 1 DROP: 5 SOLUTION OPHTHALMIC at 21:04

## 2022-07-25 ASSESSMENT — PAIN SCALES - GENERAL
PAINLEVEL_OUTOF10: 2
PAINLEVEL_OUTOF10: 0

## 2022-07-25 NOTE — PROGRESS NOTES
0120: /44 MAP 59, HR 83. Doc Marva notified, and he gave orders to hold all BP meds for tonight. Scheduled Losartan was held. Scheduled Lopressor was given earlier this evening, PRN Oxy was administered as well for a pain level of 8. Pt is A&O to person, place and situation. Pt has been resting comfortably and is drinking water when asked to. She is a feed assist at this time. Pt's o2 stats have been in the 90's for me and she has left her nasal canula in. Will continue to monitor Pts pain, oxygen, heart rate and BP.

## 2022-07-25 NOTE — CARE COORDINATION
This LSW met with patient at bedside this afternoon. Patient is alert, pleasant in conversation but is a poor historian. I spoke with dtr., Fang Drake via phone to confirm discharge plans. Patient will return to [facility] when medically ready and pre-cert has been obtained. LSW /CM to follow.   Electronically signed by HORACIO Mckee, АННА on 7/25/22 at 3:44 PM EDT

## 2022-07-25 NOTE — DISCHARGE INSTR - COC
Continuity of Care Form    Patient Name: Nakia Spivey   :  1928  MRN:  [id number]    Admit date:  2022  Discharge date:  22    Code Status Order: Full Code   Advance Directives:   [facility] Documentation       Date/Time Healthcare Directive Type of Healthcare Directive Copy in [address] Agent's Name Healthcare Agent's Phone Number    [phone] No, patient does not have an advance directive for healthcare treatment -- -- -- -- --            Admitting Physician:  Jaswinder Lockwood MD  PCP: Gwenetta Meckel, MD    Discharging Nurse: CHLOE STEPHENS [facility] Unit/Room#: X817/A540-28  Discharging Unit Phone Number: [phone]    Emergency Contact:   Extended Emergency Contact Information  Primary Emergency Contact: [facility] Phone: [phone]  Work Phone: [phone]  Relation: Child  Secondary Emergency Contact:  Marlyn Aguilar  Address: [address] [place] [address],[address], [id number] [place] [address] [address]  Phone: [phone]  Mobile Phone: [phone]  Relation: Child    Past Surgical History:  Past Surgical History:   Procedure Laterality Date    CATARACT REMOVAL WITH IMPLANT Bilateral 2016    CHOLECYSTECTOMY, LAPAROSCOPIC N/A 2022    LAPAROSCOPIC CHOLECYSTECTOMY performed by Jaswinder Lockwood MD at [address] [place] [address]  2009    polyps    COLONOSCOPY  2009    GASTROSTOMY TUBE PLACEMENT N/A 2020    EGD PEG TUBE PLACEMENT performed by Grey Gonsalez MD at [address] [facility] Right 04/15/2022    Flexima APDL Locking Pigtail Draiange Catheter inserted by Dr. Michael Hernandez (Lot# [id number] / Exp: 2024)    IR CHOLECYSTOSTOMY PERCUTANEOUS COMPLETE  4/15/2022    IR CHOLECYSTOSTOMY PERCUTANEOUS COMPLETE 4/15/2022 [facility] SPECIAL PROCEDURE    SKIN CANCER EXCISION      graft from neck       Immunization History:   Immunization History   Administered Date(s) Administered COVID-19, PFIZER GRAY top, DO NOT Dilute, (age 15 y+), IM, 30 mcg/0.3 mL 02/11/2022    COVID-19, PFIZER PURPLE top, DILUTE for use, (age 15 y+), 30mcg/0.3mL 02/03/2021, 02/23/2021    Influenza, High Dose (Fluzone 65 yrs and older) 09/18/2017, 10/09/2018, 01/10/2021    Influenza, Quadv, IM, PF (6 mo and older Fluzone, Flulaval, Fluarix, and 3 yrs and older Afluria) 11/06/2020    Influenza, Triv, inactivated, subunit, adjuvanted, IM (Fluad 65 yrs and older) 10/14/2019    Pneumococcal Conjugate 13-valent ([id number]) 09/18/2017    Pneumococcal Polysaccharide ([id number]) 10/14/2019       Active Problems:  Patient Active Problem List   Diagnosis Code    Essential hypertension I10    Anxiety F41.9    COPD (chronic obstructive pulmonary disease) with acute bronchitis (HCC) J44.0, J20.9    Nocturnal hypoxia G47.34    Obesity (BMI 30-39. 9) E66.9    Obstructive sleep apnea G47.33    Venous stasis dermatitis of both lower extremities I87.2    Impaired mobility and activities of daily living-due to cervical myelopathy Z74.09, Z78.9    Acute post-operative pain G89.18    Muscle wasting and atrophy, not elsewhere classified, left hand M62.542    Osteoarthritis of cervical spine with myelopathy M47.12    Preoperative respiratory examination Z01.811    Cholelithiasis K80.20    S/P laparoscopic cholecystectomy Z90.49       Isolation/Infection:   Isolation            No Isolation          Patient Infection Status       Infection Onset Added Last Indicated Last Indicated By Review Planned Expiration Resolved Resolved By    None active    Resolved    COVID-19 (Rule Out) 12/26/21 12/26/21 12/26/21 COVID-19, Rapid (Ordered)   12/26/21 Rule-Out Test Resulted    COVID-19 (Rule Out) 12/21/21 12/21/21 12/21/21 COVID-19, Rapid (Ordered)   12/21/21 Rule-Out Test Resulted    COVID-19 (Rule Out) 11/05/20 11/06/20 11/06/20 COVID-19 (Ordered)   11/06/20 Rule-Out Test Resulted    COVID-19 (Rule Out) 10/28/20 10/28/20 10/28/20 COVID-19 (Ordered) 10/28/20 Celine Pereyra RN    2 negative COVID's. Electronically signed by Celine Pereyra RN on 10/28/20 at 3:48 PM EDT       COVID-19 (Rule Out) 10/27/20 10/27/20 10/27/20 COVID-19 (Ordered)   10/27/20 Rule-Out Test Resulted            Nurse Assessment:  Last Vital Signs: BP (!) 133/57   Pulse 86   Temp 98.1 °F (36.7 °C) (Oral)   Resp 18   Ht 5' 2\" (1.575 m)   Wt 190 lb 9.6 oz (86.5 kg)   LMP  (LMP Unknown)   SpO2 94%   BMI 34.86 kg/m²     Last documented pain score (0-10 scale): Pain Level: 0  Last Weight:   Wt Readings from Last 1 Encounters:   07/24/22 190 lb 9.6 oz (86.5 kg)     Mental Status:  oriented and alert to person and place    IV Access:  - None    Nursing Mobility/ADLs:  Walking   Assisted  Transfer  Assisted  Bathing  Assisted  Dressing  Assisted  Toileting  Assisted  Feeding  Assisted  Med Admin  Assisted  Med Delivery   whole with water    Wound Care Documentation and Therapy:  Wound 04/15/22 Sacrum scabbed (Active)   Number of days: 101       Incision 04/99/60 Umbilicus (Active)   Dressing Status Clean;Dry; Intact 07/25/22 0950   Number of days: 3       Incision 11/04/20 Abdomen Medial;Upper (Active)   Number of days: 628        Elimination:  Continence: Bowel: No and intermittent incontinence  Bladder: No and intermittent incontinence  Urinary Catheter: None   Colostomy/Ileostomy/Ileal Conduit: No       Date of Last BM: 7/24/22    Intake/Output Summary (Last 24 hours) at 7/25/2022 1207  Last data filed at 7/25/2022 0950  Gross per 24 hour   Intake 460 ml   Output 650 ml   Net -190 ml     I/O last 3 completed shifts: In: 370 [P.O.:370]  Out: [address] [Urine:1050]    Safety Concerns: At Risk for Falls    Impairments/Disabilities:      [place]    Nutrition Therapy:  Current Nutrition Therapy: Regular diet    Routes of Feeding: Oral  Liquids:  Thin Liquids  Daily Fluid Restriction: no  Last Modified Barium Swallow with Video (Video Swallowing Test): not done    Treatments at the Time of Hospital Discharge:   Respiratory Treatments: 2L NC  Oxygen Therapy:  is on oxygen at 2 L/min per nasal cannula. Ventilator:    - No ventilator support    Rehab Therapies: Physical Therapy and Occupational Therapy  Weight Bearing Status/Restrictions: No weight bearing restrictions  Other Medical Equipment (for information only, NOT a DME order):  walker  Other Treatments: N/A    Patient's personal belongings (please select all that are sent with patient):  None    RN SIGNATURE:  Electronically signed by Shayy Molina RN on 7/26/22 at 11:23 AM EDT    CASE MANAGEMENT/SOCIAL WORK SECTION    Inpatient Status Date: ***    Readmission Risk Assessment Score:  Readmission Risk              Risk of Unplanned Readmission:  [id number]           Discharging to Facility/ Agency   Name: Gasper Santiago   Address:  Phone: [phone]  Fax:    Dialysis Facility (if applicable)   Name:  Address:  Dialysis Schedule:  Phone:  Fax:    / signature: Electronically signed by HORACIO Kc, REGGIEW on 7/26/22 at 11:14 AM EDT     PHYSICIAN SECTION    Prognosis: Good    Condition at Discharge: Stable    Rehab Potential (if transferring to Rehab): N/A    Recommended Labs or Other Treatments After Discharge:  - Follow up with General Surgery (aka Trauma Surgery) on 8/3 for post-op check. Physician Certification: I certify the above information and transfer of Lord Jaime  is necessary for the continuing treatment of the diagnosis listed and that she requires Jason Grandauel for less 30 days.      Update Admission H&P: No change in H&P    PHYSICIAN SIGNATURE:  Electronically signed by Solo Clark MD on 7/26/22 at 9:23 AM EDT

## 2022-07-25 NOTE — PROGRESS NOTES
Physical Therapy Med Surg Daily Treatment Note  [facility]ility/Department[facility]  [facility]       NAME: Yeny Browne  : 1928 (61 y.o.)  MRN: [id number]  CODE STATUS: Full Code    Date of Service: 2022    Patient Diagnosis(es): Cholelithiasis [K80.20]  Acute cholecystitis [K81.0]   No chief complaint on file.     Patient Active Problem List    Diagnosis Date Noted    S/P laparoscopic cholecystectomy 2022    Preoperative respiratory examination 2022    Muscle wasting and atrophy, not elsewhere classified, left hand 2022    Cholelithiasis 2022    Osteoarthritis of cervical spine with myelopathy 2022    Acute post-operative pain 2022    Impaired mobility and activities of daily living-due to cervical myelopathy 2021    Venous stasis dermatitis of both lower extremities 08/10/2021    Obesity (BMI 30-39.9) 2019    Obstructive sleep apnea 2019    Nocturnal hypoxia 2019    COPD (chronic obstructive pulmonary disease) with acute bronchitis ([address] 2018    Anxiety 2017    Essential hypertension         Past Medical History:   Diagnosis Date    Age-related cognitive decline     Cancer ([address]     Cerebrovascular small vessel disease     Chronic nonspecific lung disease     nodules R lung, fibrosis L apex    Glaucoma     Gout     left knee    Hypertension     Obesity (BMI 30-39.9) 2019    Obstructive sleep apnea 2019    Osteoarthritis of cervical spine with myelopathy 2022    Vitamin D deficiency      Past Surgical History:   Procedure Laterality Date    CATARACT REMOVAL WITH IMPLANT Bilateral 2016    COLON SURGERY  2009    polyps    COLONOSCOPY  2009    GASTROSTOMY TUBE PLACEMENT N/A 2020    EGD PEG TUBE PLACEMENT performed by Lang Rice MD at [address] Right 04/15/2022    Flexima APDL Locking Pigtail Draiange Catheter inserted by Dr. Chris Meraz (Lot# [id number] / Exp: 12/07/2024)    IR CHOLECYSTOSTOMY PERCUTANEOUS COMPLETE  4/15/2022    IR CHOLECYSTOSTOMY PERCUTANEOUS COMPLETE 4/15/2022 [facility] SPECIAL PROCEDURE    SKIN CANCER EXCISION  2002    graft from neck            Restrictions:  Restrictions/Precautions: Fall Risk (high cole score)    SUBJECTIVE:   Subjective: Pt agreeable to tx, reports no pain with activity today. Pain  Pain: Denies pain pre and post session     OBJECTIVE:   Orientation  Overall Orientation Status: Within Functional Limits    Bed mobility  Rolling to Left: Moderate assistance  Supine to Sit: Moderate assistance;Maximum assistance  Scooting: Contact guard assistance  Bed Mobility Comments: HOB slightly elevated; vc's for log roll technique. heavy trunk lifting assist required. Pt reaching out for therapist's hand; vc's for more independent technique    Transfers  Sit to Stand: Minimal Assistance  Stand to sit: Minimal Assistance  Bed to Chair: Minimal assistance  Comment: vc's for hand placement with [employer]; good follow through    Ambulation  Device: [address]: Minimal assistance  Quality of Gait: small shuffling step pattern with crouched posture; vc's for improved upright posture and increased step length  Gait Deviations: Slow Maria Esther  Distance: 3' to chair                   PT Exercises  Dynamic Standing Balance Exercises: STS x5 - vc's for hand placement initially. good follow through          Activity Tolerance  Activity Tolerance: Patient tolerated treatment well          ASSESSMENT   Assessment: Pt with good participation. Bed mobility improving however she still required cuing and lifting assist to complete. Pt ambulated to chair with Min A and was able to maintain SpO2 - 93%.      Discharge Recommendations:  Continue to assess pending progress         Goals  Long Term Goals  Long term goal 1: Pt will demonstrate bed mobility SBA  Long term goal 2: Pt will demonstrate transfers SBA with safest AD  Long term goal 3: Pt will demonstrate amb >/= 50ft SBA with safest AD  Long term goal 4: Pt will demonstrate stair negotation 2 steps with 1 rail CGA    PLAN    Plan: 1 time a day 3-6 times a week  Safety Devices  Type of Devices: All fall risk precautions in place, Call light within reach, Chair alarm in place, Left in chair     [facility] (6 CLICK) [address] Inpatient Mobility Raw Score : 13      Therapy Time   Individual   Time In 0849   Time Out 0907   Minutes 18      Bm/Trsf - 10 mins  Therex 8 mins       Yeny Watters PTA, 07/25/22 at 9:14 AM         Definitions for assistance levels  Independent = pt does not require any physical supervision or assistance from another person for activity completion. Device may be needed.   Stand by assistance = pt requires verbal cues or instructions from another person, close to but not touching, to perform the activity  Minimal assistance= pt performs 75% or more of the activity; assistance is required to complete the activity  Moderate assistance= pt performs 50% of the activity; assistance is required to complete the activity  Maximal assistance = pt performs 25% of the activity; assistance is required to complete the activity  Dependent = pt requires total physical assistance to accomplish the task

## 2022-07-25 NOTE — PROGRESS NOTES
1000: Assessment complete. Alert to person and place--[place]. VSS on 2L NC. Pt up to chair with alarm and lights on. Nursing communication order in place stating to \"optimize natural sleep wake cycle. \" No complaints of pain/signs of discomfort. 3 lap sites dry and intact. Pt is a feed. Takes pills whole in pudding. Safety maintained. Call light within reach. 1025: Urinalysis sent at this time via clean supplies for [employer]. 1230: Pt remains in chair. Pt is a feed-- ate ~50% tray. Pleasantly confused at this time. 1520: Pt spent duration of afternoon up in chair. Currently in bed-- repositioned. Lights remain on/television on to promote stimulation to decrease hospital delirium. 1700: Family at bedside. Resting comfortably. Safety maintained. Call light within reach.     Electronically signed by Federico Tompkins RN on 7/25/2022 at 10:00 AM

## 2022-07-25 NOTE — PROGRESS NOTES
TRAUMA DAILY PROGRESS NOTE      Patient Name: William Hi  Admission Date 2022    Hospital Day: 3  Patient seen and examined on 2022    INTERVAL HISTORY/EVENTS     Background:  William Hi is a 80 y.o. female with past medical history of COPD, HTN, FRED presents to [facility] [place] with 1 week history of intermittent RUQ pain without nausea or vomiting and was admitted on 22. Due to her underlying medical issues daughters opted for percutaneous cholecystostomy tube by IR on 4/15/22. She was discharged to SNF on 22. She had cholangiogram done on 22 which showed patent cystic duct with contrast in CBD and duodenum. However gallbladder was completely full of stones and the contrast took some time to get into cystic duct. Patient and daughters agreed to elective cholecystectomy    Patient underwent lap adenike with Dr. Abe Harvey on 2022 and was admitted to the [facility] post-op. Hospital Course:  2022: S/p lap adenike with Dr. Abe Harvey. Admitted to [facility] post-op  2022: advanced to CLD. Tbili elevated to 0.8.  2022: uptrending Tbili. Started on regular diet      24 Hour Events:  No acute events overnight per patient or nursing. Patient is endorsing pain to her abdomen. Tolerating regular diet. Voiding spontaneously. No bowel movement since admission. Concerns for hypoactive delirium noted during rounds this morning. Vital signs reviewed. Afebrile, not tachycardic, normotensive, sating 88-94% on 3L NC. Lab work reviewed and significant for normalized T-Octavio. No electrolyte abnormality, BUN/Cr within normal limits. Leukocytosis down trending 16.1-> 15.1-> 11.5, H&H remains stable, platelets normal.    UOP: 450cc, dark  BM x 0 since admit      PHYSICAL EXAM     Vitals Average, Min and Max for last 24 hours:  Temp: Temp: 98.1 °F (36.7 °C);  Temp  Av.7 °F (37.1 °C)  Min: 97.7 °F (36.5 °C)  Max: 100 °F (37.8 °C)  Respirations: Resp  Av  Min: 18  Max: 28  Pulse: Pulse Av.1  Min: 85  Max: 101  Blood Pressure: Systolic (86QYL), EYS:461 , Min:107 , FDW:255   ; Diastolic (59WNO), LHY:88, Min:45, Max:57  SpO2: SpO2  Av.8 %  Min: 87 %  Max: 98 %      Vitals: BP (!) 133/57   Pulse 86   Temp 98.1 °F (36.7 °C) (Oral)   Resp 18   Ht 5' 2\" (1.575 m)   Wt 190 lb 9.6 oz (86.5 kg)   LMP  (LMP Unknown)   SpO2 94%   BMI 34.86 kg/m²     Physical Exam:  Constitutional: Lying in bed, no acute distress, minimally interactive but alert. HEENT: Atraumatic normocephalic. Cardiovascular: Regular rate and rhythm. Pulmonary: Breathing comfortably on RA  Abdominal: Soft. Non-distended. Appropriately tender over incisions. Sites c/d/I. No peritonitis  Musculoskeletal: Moves all extremities spontaneously. Baseline contractor of right hand. Neurological: Alert, awake, and orientated x 1. Motor and sensory grossly intact. GCS of 15.    Skin: warm, dry    LABORATORY RESULTS (LAST 24 HOURS)     CBC with Differential:    Lab Results   Component Value Date/Time    WBC 11.5 2022 06:35 AM    RBC 3.62 2022 06:35 AM    HGB 10.5 2022 06:35 AM    HCT 31.4 2022 06:35 AM     2022 06:35 AM    MCV 86.7 2022 06:35 AM    MCH 29.0 2022 06:35 AM    MCHC 33.4 2022 06:35 AM    RDW 15.9 2022 06:35 AM    BANDSPCT 12 10/28/2020 06:00 AM    METASPCT 1 2020 06:00 AM    LYMPHOPCT 6.1 2022 06:18 AM    MONOPCT 7.6 2022 06:18 AM    BASOPCT 0.2 2022 06:18 AM    MONOSABS 1.2 2022 06:18 AM    LYMPHSABS 1.0 2022 06:18 AM    EOSABS 0.0 2022 06:18 AM    BASOSABS 0.0 2022 06:18 AM     CMP:    Lab Results   Component Value Date/Time     2022 06:35 AM    K 3.8 2022 06:35 AM    K 4.5 2022 06:18 AM     2022 06:35 AM    CO2 25 2022 06:35 AM    BUN 14 2022 06:35 AM    CREATININE 0.75 2022 06:35 AM    GFRAA >60.0 2022 06:35 AM    LABGLOM >60.0 2022 06:35 AM GLUCOSE 108 07/25/2022 06:35 AM    PROT 5.6 07/25/2022 06:35 AM    LABALBU 2.7 07/25/2022 06:35 AM    CALCIUM 8.5 07/25/2022 06:35 AM    BILITOT 0.7 07/25/2022 06:35 AM    ALKPHOS 229 07/25/2022 06:35 AM    AST 10 07/25/2022 06:35 AM    ALT 13 07/25/2022 06:35 AM     Magnesium:    Lab Results   Component Value Date/Time    MG 2.1 07/25/2022 06:35 AM     PT/INR:    Lab Results   Component Value Date/Time    PROTIME 14.2 06/08/2022 02:01 PM    INR 1.1 06/08/2022 02:01 PM     PTT:    Lab Results   Component Value Date/Time    APTT 29.7 04/15/2022 01:34 PM       IMAGING RESULTS (PERSONALLY REVIEWED)     No new imaging to review today. ASSESSMENT & PLAN     Diagnoses:  S/p lap adenike  Acute post op pain  Cholelithiasis (s/p laparoscopic cholecystectomy with Dr. Nando Black on 7/22)  Leukocytosis (resolved)    PMHx: Dementia, T2DM, HTN, HLD, COPD    Incidental Findings: None      ASSESSMENT/PLAN:  Neurological: Acute post op pain  - Continue tylenol 650mg q6  - Continue oxycodone 2.5/5mg mod/severe pain     Cardiovascular: HX HTN, HLD  - Continue vital signs per unit protocol  - Continue telemetry  - Continue home atorvastatin 20mg HS, losartan 25mg BID, metoprolol 25mg BID     Respiratory: Hx COPD, sating well on 3L NC  - Maintain O2 sats > 88%, encourage IS.  - Wean O2 as tolerated   - Continue home symbicort BID, albuterol PRN     GI/Diet: S/p lap adenike with Dr. Nando Black 7/22. Tolerating CLD. NO bowel movement since admission. Tbili normalized on today's labs. - Advance to regular diet  - Bowel regimen: Senokot BID, PRN MOM/dulcolax  - hepatic panel as needed     Renal/Electrolytes: No electrolyte abnormalities, BUN/Cr WNL  - HLIV  - AM BMP     ID: Leukocytosis down trending, afebrile, not tachycardic. Likely reactive in setting of surgery  - No indication for abx  - AM CBC     Heme: H&H stable, HDS  - No indication for transfusion  - AM CBC     Endocrine: No acute or chronic issues.  -114 over last 24 hours MSK: No acute issues  - Spines: clear  - Weight Bearing Restrictions: WBAT  - Activity: Out of bed with assist  - PT/OT: consulted, pending recs    Prophylaxis:   - SCD, lovenox 40mg daily     Lines/Tubes/Drains:  - Maintain PIVs  -No indication for driver catheter, monitor for urinary retention    Dispo: Remain in RNF for continued post-op care, anticipate medical clearance today or tomorrow. Accom Status: General Status      Follow up with General Surgery in 2 weeks for post-op check       Please call for any questions or concerns. [see Treatment Team Sticky Note for contact information]      Cain Camacho PA-C  Trauma/Critical Care  [see Treatment Team Sticky Note for contact information]     This patient's plan of care was discussed and made in collaboration with Trauma Attending physician, Ilsa Quevedo MD.    Teaching Physician Note:  I saw and evaluated the patient. I personally obtained the key and critical portions of the history and physical exam.  I reviewed the NICK's documentation and discussed the patient with the NICK. I agree with the NICK's medical decision making as documented in the NICK note.       Ilsa Quevedo MD

## 2022-07-26 VITALS
RESPIRATION RATE: 18 BRPM | SYSTOLIC BLOOD PRESSURE: 111 MMHG | OXYGEN SATURATION: 97 % | TEMPERATURE: 97.9 F | BODY MASS INDEX: 34.83 KG/M2 | HEART RATE: 84 BPM | HEIGHT: 62 IN | WEIGHT: 189.3 LBS | DIASTOLIC BLOOD PRESSURE: 68 MMHG

## 2022-07-26 PROBLEM — K80.20 CHOLELITHIASIS: Status: RESOLVED | Noted: 2022-07-20 | Resolved: 2022-07-26

## 2022-07-26 LAB — SARS-COV-2, NAAT: NOT DETECTED

## 2022-07-26 PROCEDURE — 6370000000 HC RX 637 (ALT 250 FOR IP): Performed by: PHYSICIAN ASSISTANT

## 2022-07-26 PROCEDURE — 2580000003 HC RX 258: Performed by: PHYSICIAN ASSISTANT

## 2022-07-26 PROCEDURE — 99024 POSTOP FOLLOW-UP VISIT: CPT | Performed by: PHYSICIAN ASSISTANT

## 2022-07-26 PROCEDURE — 94761 N-INVAS EAR/PLS OXIMETRY MLT: CPT

## 2022-07-26 PROCEDURE — 2700000000 HC OXYGEN THERAPY PER DAY

## 2022-07-26 PROCEDURE — 6370000000 HC RX 637 (ALT 250 FOR IP): Performed by: STUDENT IN AN ORGANIZED HEALTH CARE EDUCATION/TRAINING PROGRAM

## 2022-07-26 PROCEDURE — 97530 THERAPEUTIC ACTIVITIES: CPT

## 2022-07-26 PROCEDURE — 97535 SELF CARE MNGMENT TRAINING: CPT

## 2022-07-26 PROCEDURE — 94640 AIRWAY INHALATION TREATMENT: CPT

## 2022-07-26 PROCEDURE — 87635 SARS-COV-2 COVID-19 AMP PRB: CPT

## 2022-07-26 RX ORDER — OXYCODONE HYDROCHLORIDE 5 MG/1
5 TABLET ORAL EVERY 6 HOURS PRN
Qty: 12 TABLET | Refills: 0 | Status: SHIPPED | OUTPATIENT
Start: 2022-07-26 | End: 2022-07-29

## 2022-07-26 RX ORDER — SENNA AND DOCUSATE SODIUM 50; 8.6 MG/1; MG/1
1 TABLET, FILM COATED ORAL 2 TIMES DAILY
Qty: 14 TABLET | Refills: 0 | Status: SHIPPED | OUTPATIENT
Start: 2022-07-26 | End: 2022-08-02

## 2022-07-26 RX ADMIN — LOSARTAN POTASSIUM 25 MG: 25 TABLET, FILM COATED ORAL at 11:55

## 2022-07-26 RX ADMIN — SENNOSIDES AND DOCUSATE SODIUM 1 TABLET: 50; 8.6 TABLET ORAL at 08:20

## 2022-07-26 RX ADMIN — LOSARTAN POTASSIUM 25 MG: 25 TABLET, FILM COATED ORAL at 00:44

## 2022-07-26 RX ADMIN — Medication 10 ML: at 11:02

## 2022-07-26 RX ADMIN — ACETAMINOPHEN 650 MG: 325 TABLET ORAL at 00:44

## 2022-07-26 RX ADMIN — ACETAMINOPHEN 650 MG: 325 TABLET ORAL at 05:57

## 2022-07-26 RX ADMIN — ACETAMINOPHEN 650 MG: 325 TABLET ORAL at 11:55

## 2022-07-26 RX ADMIN — METOPROLOL TARTRATE 25 MG: 25 TABLET, FILM COATED ORAL at 08:20

## 2022-07-26 RX ADMIN — MAGNESIUM HYDROXIDE 30 ML: 400 SUSPENSION ORAL at 14:07

## 2022-07-26 RX ADMIN — BUDESONIDE AND FORMOTEROL FUMARATE DIHYDRATE 2 PUFF: 160; 4.5 AEROSOL RESPIRATORY (INHALATION) at 07:23

## 2022-07-26 ASSESSMENT — PAIN SCALES - GENERAL
PAINLEVEL_OUTOF10: 5
PAINLEVEL_OUTOF10: 2

## 2022-07-26 NOTE — PROGRESS NOTES
TRAUMA DAILY PROGRESS NOTE      Patient Name: Selvin Petersen  Admission Date 2022    Hospital Day: 4  Patient seen and examined on 2022    INTERVAL HISTORY/EVENTS     Background:  Selvin Petersen is a 80 y.o. female with past medical history of COPD, HTN, FRED presents to [facility] [place] with 1 week history of intermittent RUQ pain without nausea or vomiting and was admitted on 22. Due to her underlying medical issues daughters opted for percutaneous cholecystostomy tube by IR on 4/15/22. She was discharged to SNF on 22. She had cholangiogram done on 22 which showed patent cystic duct with contrast in CBD and duodenum. However gallbladder was completely full of stones and the contrast took some time to get into cystic duct. Patient and daughters agreed to elective cholecystectomy    Patient underwent lap adenike with Dr. Jeremiah Bernardo on 2022 and was admitted to the [facility] post-op. Hospital Course:  2022: S/p lap adenike with Dr. Jeremiah Bernardo. Admitted to [facility] post-op  2022: advanced to CLD. Tbili elevated to 0.8.  2022: uptrending Tbili. Started on regular diet. 2022: T-stiven normalized. Tolerating PO intake. Medically cleared for discharge. 24 Hour Events:  No acute events overnight per patient or nursing. Reports continued incisional tenderness. \"I want to go home\". Sitting upright in bedside chair. Tolerating regular diet. Voiding spontaneously. No bowel movement since admission. Remains medically cleared for discharge. Vital signs reviewed. Afebrile, not tachycardic, normotensive, sating 88-94% on room air. No new labs or imaging obtained for review. UOP: 1,250cc  BM x 0 since admit      PHYSICAL EXAM     Vitals Average, Min and Max for last 24 hours:  Temp: Temp: 97.9 °F (36.6 °C);  Temp  Av.9 °F (36.6 °C)  Min: 97.9 °F (36.6 °C)  Max: 97.9 °F (36.6 °C)  Respirations: Resp  Av  Min: 18  Max: 18  Pulse: Pulse  Av  Min: 79  Max: 88  Blood Pressure: Systolic (79GKO), PV , Min:125 , JGV:832   ; Diastolic (39JET), WHE:28, Min:53, Max:59  SpO2: SpO2  Av.5 %  Min: 96 %  Max: 97 %    Vitals: BP (!) 125/53   Pulse 79   Temp 97.9 °F (36.6 °C) (Oral)   Resp 18   Ht 5' 2\" (1.575 m)   Wt 190 lb 9.6 oz (86.5 kg)   LMP  (LMP Unknown)   SpO2 97%   BMI 34.86 kg/m²     Physical Exam:  Constitutional: Sitting upright in bedside chair. Alert. Conversant. \"I want to go home\". HEENT: Atraumatic normocephalic. Cardiovascular: Regular rate and rhythm. Pulmonary: Breathing comfortably on RA  Abdominal: Soft. Non-distended. Appropriately tender over incisions. Sites c/d/I. No peritonitis  Musculoskeletal: Moves all extremities spontaneously. Baseline contractor of right hand. Neurological: Alert, awake, and orientated x 1-2. Motor and sensory grossly intact. GCS of 15.   Skin: warm, dry    LABORATORY RESULTS (LAST 24 HOURS)     CBC with Differential:    Lab Results   Component Value Date/Time    WBC 11.5 2022 06:35 AM    RBC 3.62 2022 06:35 AM    HGB 10.5 2022 06:35 AM    HCT 31.4 2022 06:35 AM     2022 06:35 AM    MCV 86.7 2022 06:35 AM    MCH 29.0 2022 06:35 AM    MCHC 33.4 2022 06:35 AM    RDW 15.9 2022 06:35 AM    BANDSPCT 12 10/28/2020 06:00 AM    METASPCT 1 2020 06:00 AM    LYMPHOPCT 6.1 2022 06:18 AM    MONOPCT 7.6 2022 06:18 AM    BASOPCT 0.2 2022 06:18 AM    MONOSABS 1.2 2022 06:18 AM    LYMPHSABS 1.0 2022 06:18 AM    EOSABS 0.0 2022 06:18 AM    BASOSABS 0.0 2022 06:18 AM     CMP:    Lab Results   Component Value Date/Time     2022 06:35 AM    K 3.8 2022 06:35 AM    K 4.5 2022 06:18 AM     2022 06:35 AM    CO2 25 2022 06:35 AM    BUN 14 2022 06:35 AM    CREATININE 0.75 2022 06:35 AM    GFRAA >60.0 2022 06:35 AM    LABGLOM >60.0 2022 06:35 AM    GLUCOSE 108 2022 06:35 AM    PROT 5.6 07/25/2022 06:35 AM    LABALBU 2.7 07/25/2022 06:35 AM    CALCIUM 8.5 07/25/2022 06:35 AM    BILITOT 0.7 07/25/2022 06:35 AM    ALKPHOS 229 07/25/2022 06:35 AM    AST 10 07/25/2022 06:35 AM    ALT 13 07/25/2022 06:35 AM     Magnesium:    Lab Results   Component Value Date/Time    MG 2.1 07/25/2022 06:35 AM     PT/INR:    Lab Results   Component Value Date/Time    PROTIME 14.2 06/08/2022 02:01 PM    INR 1.1 06/08/2022 02:01 PM     PTT:    Lab Results   Component Value Date/Time    APTT 29.7 04/15/2022 01:34 PM       IMAGING RESULTS (PERSONALLY REVIEWED)     No new imaging to review today. ASSESSMENT & PLAN     Diagnoses:  S/p lap adenike  Acute post op pain  Cholelithiasis (s/p laparoscopic cholecystectomy with Dr. Hakeem Love on 7/22)  Leukocytosis (resolved)    PMHx: Dementia, T2DM, HTN, HLD, COPD    Incidental Findings: None      ASSESSMENT/PLAN:  Neurological: Acute post op pain  - Continue tylenol 650mg q6  - Continue oxycodone 2.5/5mg mod/severe pain     Cardiovascular: HX HTN, HLD  - Continue vital signs per unit protocol  - Continue telemetry  - Continue home atorvastatin 20mg HS, losartan 25mg BID, metoprolol 25mg BID     Respiratory: Hx COPD, sating well on room air  - Maintain O2 sats > 92%, encourage IS.  - Wean O2 as tolerated   - Continue home symbicort BID, albuterol PRN     GI/Diet: S/p lap adenike with Dr. Hakeem Love 7/22. Tolerating CLD. NO bowel movement since admission. Tbili normalized. - Advance to regular diet  - Bowel regimen: Senokot BID, PRN MOM/dulcolax  - hepatic panel as needed     Renal/Electrolytes: No electrolyte abnormalities, BUN/Cr WNL  - HLIV  - BMP as needed     ID: No leukocytosis, afebrile, not tachycardic.  - No indication for abx  - CBC as needed     Heme: H&H stable, HDS  - No indication for transfusion  - CBC as needed     Endocrine:  - No acute or chronic issues.      MSK: No acute issues  - Spines: Clear  - Weight Bearing Restrictions: WBAT  - Activity: Out of bed with assist  - PT/OT: consulted, eval and rec's for SNF    Prophylaxis:   - SCD, lovenox 40mg daily     Lines/Tubes/Drains:  - Maintain PIVs  - No indication for driver catheter, monitor for urinary retention    Dispo: Medically cleared for discharge to SNF. Accom Status: General Status      - Follow up with General Surgery in 2 weeks for post-op check       Omero Sprague PA-C  Trauma/Critical Care  [see Treatment Team Sticky Note for contact information]     This patient's plan of care was discussed and made in collaboration with Trauma Attending physician, Sara Clarke MD.    Teaching Physician Note:  I saw and evaluated the patient. I personally obtained the key and critical portions of the history and physical exam.  I reviewed the NICK's documentation and discussed the patient with the NICK. I agree with the NICK's medical decision making as documented in the NICK note.       Sara Clarke MD

## 2022-07-26 NOTE — CARE COORDINATION
PATIENT WILL BE TRANSFERRED TO [facility], 7/26/2022 VIA [facility] AT 3:00PM.  7000 AND IMM COMPLETED.   Electronically signed by HORACIO Vaughan, АННА on 7/26/22 at 3:07 PM EDT

## 2022-07-26 NOTE — PROGRESS NOTES
[facility] OCCUPATIONAL THERAPY MED SURG TREATMENT NOTE     Date: 2022  Patient Name: Peter Altamirano        MRN: [id number]  Account: [de-identified]   : 1928  (80 y.o.)  [facility]    Chart Review:    Restrictions  Restrictions/Precautions  Restrictions/Precautions: Fall Risk     Safety:  Safety Devices  Type of Devices: All fall risk precautions in place;Call light within reach; Chair alarm in place; Left in chair    Patient's birthday verified: Yes    Subjective:    Subjective: \"I'm tired today. \"       Pain at start of treatment: No    Pain at end of treatment: No        Objective:    ADL Status:  ADL  Equipment Provided: Reacher;Sock aid  LE Dressing: Moderate assistance;Maximum assistance;Verbal cueing; Increased time to complete; Adaptive equipment  LE Dressing Skilled Clinical Factors: AE training provided on sock aid and reacher to assist with LE dressing. Pt min-modA to don socks with use of sock aid while seated in chair. Pt modA to initiate donning pants over feet with use of reacher while seated in chair then mod-maxA to pull pants up over hips while standing at [facility] for support. Verbal and visual cues given for AE instruction/use, sequencing, technique, and safety. Toileting: Maximum assistance  Toileting Skilled Clinical Factors: Pt maxA to complete LB clothing management and toilet hygiene while standing at FWW/grab bar for support/balance. Verbal cues given for sequencing and safety. Toilet Transfers  Toilet - Technique: Stand step  Equipment Used: Raised toilet seat with rails  Toilet Transfer: Minimal assistance  Toilet Transfers Comments: Extended time needed to complete. Verbal and visual cues given for proper hand/foot placement when facilitating STSs, AD management during turn with transfer, sequencing, and safety precautions for fall prevention. Pt required increased time for toileting due to c/o constipation.  Pt's nurse Pretty Arechiga notified of pt's constipation and stated she would look into meds to alleviate. Therapeutic Activity:   Pt SBA to participate in seated functional reach/item retrieval task with and without use of reacher requiring pt to weight shift, cross midline, and lean/reach out of SHARON alternating between UE to improve seated balance/coordination, trunk support/strength, AE utilization, motor control, and functional reach for ADLs and functional transfers. Verbal cues given for instruction, AE education/use, sequencing, and safety. Therapy key for assistance levels -   Independent/Mod I = Pt. is able to perform task with no assistance but may require a device   Stand by assistance = Pt. does not perform task at an independent level but does not need physical assistance, requires verbal cues  Minimal, Moderate, Maximal Assistance = Pt. requires physical assistance (25%, 50%, 75% assist from helper) for task but is able to actively participate in task   Dependent = Pt. requires total assistance with task and is not able to actively participate with task completion    Orientation Status:  Orientation  Overall Orientation Status: Within Functional Limits  Orientation Level: Oriented to person;Oriented to place; Disoriented to time;Disoriented to situation    Observation:  Observation/Palpation  Observation: Pt demonstrated good willingness to participate in OT session this afternoon. Cognition Status:  Cognition  Overall Cognitive Status: Exceptions  Arousal/Alertness: Inconsistent responses to stimuli  Following Commands: Follows one step commands with increased time; Follows one step commands with repetition  Attention Span: Attends with cues to redirect  Memory: Decreased short term memory;Decreased recall of recent events;Decreased recall of precautions  Safety Judgement: Decreased awareness of need for assistance;Decreased awareness of need for safety  Insights: Decreased awareness of deficits  Initiation: Requires cues for some  Sequencing: Requires cues for some    Perception Status:  Perception  Overall Perceptual Status: WFL    Vision and Hearing Status:  Hard of hearing. Functional Mobility:  Patient ambulated to/from bathroom with [employer] at [address] level. Verbal and visual cues given for AD management during directional changes/turns, O2 tubing management, sequencing, and safety precautions for fall prevention. Transfers:  Transfers  Stand Step Transfers: Contact guard assistance;Minimal assistance  Sit to stand: Minimal assistance  Stand to sit: Minimal assistance;Contact guard assistance  Transfer Comments: Pt Aleks-close CGA to complete STSs then close CGA-Aleks to complete functional transfer back to chair with use of FWW requiring verbal and visual cues for proper hand/foot placement when facilitating STSs, weight shifting forward to initiate stands d/t slight difficulty facilitating lifts to stand, AD management, sequencing, O2 tubing management, and safety precautions for fall prevention. Toilet Transfers  Toilet - Technique: Stand step  Equipment Used: Raised toilet seat with rails  Toilet Transfer: Minimal assistance  Toilet Transfers Comments: Extended time needed to complete. Verbal and visual cues given for proper hand/foot placement when facilitating STSs, AD management during turn with transfer, sequencing, and safety precautions for fall prevention.       Seated and Standing Balance:  Seated balance: fair+/good with SBA  Standing balance: fair with Aleks utilizing FWW for support        Functional Endurance:  Activity Tolerance  Activity Tolerance: Patient limited by fatigue    Treatment consisted of:    ADL training  Therapeutic activities    Assessment/Discharge Disposition:  Assessment  Discharge Recommendations: Continue to assess pending progress       [employer]  How much help for putting on and taking off regular lower body clothing?: A Lot  How much help for Bathing?: A Lot  How much help for Toileting?: A Lot  How much help for putting on and

## 2022-07-26 NOTE — PROGRESS NOTES
0840: Assessment complete. Alert to person and place. VSS on 2L NC. Pt up to chair at this time with daughter at bedside. Eating with assistance from daughter-- tolerating well. Update provided. Pt using bedpan, calling appropriately. Bowel sounds active with lap sites dry and intact. Sheer ba size of coin on R shoulder-- mepilex applied. Chair alarm on. Safety maintained. Call light within reach. 0945: Pt 97% room iar-- O2 removed By Dr. Sebastian Andrea. D/c order in with paper scripts in chart. Awaiting precert from [facility]. 4016: Precert back at this time and pt may go to [facility]. Covid sent. Pt worked with PT-- on 2L NC following. Trauma team notified via provided phone number that pt will be leaving today. All d/c info/orders in.    1200: Family aware pt is to go to [facility] today. Covid negative.  1500.    1400: Attempt to call report at this time to [facility]. Telephone number and this Rns name given to [facility]-- awaiting call. Pt and family requesting PRN constipation medication-- given milk of magnesia PRN order per [facility].    2214: Report called to Swetha at [facility]-- paper scripts and AVS given to  for packet upon transport. Belongings gathered at this time. Tele pack removed.     Electronically signed by Lamonte Yepez RN on 7/26/2022 at 8:52 AM

## 2022-07-26 NOTE — PROGRESS NOTES
Physical Therapy Med Surg Daily Treatment Note  Facility/Department: [facility]  [id number]       NAME: Maurizio Leger  : 1928 (44 y.o.)  MRN: [id number]  CODE STATUS: Full Code    Date of Service: 2022    Patient Diagnosis(es): Cholelithiasis [K80.20]  Acute cholecystitis [K81.0]   No chief complaint on file.     Patient Active Problem List    Diagnosis Date Noted    S/P laparoscopic cholecystectomy 2022    Preoperative respiratory examination 2022    Muscle wasting and atrophy, not elsewhere classified, left hand 2022    Osteoarthritis of cervical spine with myelopathy 2022    Acute post-operative pain 2022    Impaired mobility and activities of daily living-due to cervical myelopathy 2021    Venous stasis dermatitis of both lower extremities 08/10/2021    Obesity (BMI 30-39.9) 2019    Obstructive sleep apnea 2019    Nocturnal hypoxia 2019    COPD (chronic obstructive pulmonary disease) with acute bronchitis ([address] 2018    Anxiety 2017    Essential hypertension         Past Medical History:   Diagnosis Date    Age-related cognitive decline     Cancer ([address]     Cerebrovascular small vessel disease     Chronic nonspecific lung disease     nodules R lung, fibrosis L apex    Glaucoma     Gout     left knee    Hypertension     Obesity (BMI 30-39.9) 2019    Obstructive sleep apnea 2019    Osteoarthritis of cervical spine with myelopathy 2022    Vitamin D deficiency      Past Surgical History:   Procedure Laterality Date    CATARACT REMOVAL WITH IMPLANT Bilateral 2016    CHOLECYSTECTOMY, LAPAROSCOPIC N/A 2022    LAPAROSCOPIC CHOLECYSTECTOMY performed by Wilder Renteria MD at [address]  2009    polyps    COLONOSCOPY  2009    GASTROSTOMY TUBE PLACEMENT N/A 2020    EGD PEG TUBE PLACEMENT performed by Lazarus Moats, MD at [address] [facility] 04/15/2022    Flexima APDL Locking Pigtail Draiange Catheter inserted by Dr. Cornelius Barajas (Lot# [id number] / Exp: 12/07/2024)    IR CHOLECYSTOSTOMY PERCUTANEOUS COMPLETE  4/15/2022    IR CHOLECYSTOSTOMY PERCUTANEOUS COMPLETE 4/15/2022 [facility] SPECIAL PROCEDURE    SKIN CANCER EXCISION  2002    graft from neck       Chart Reviewed: Yes  Family / Caregiver Present: No    Restrictions:  Restrictions/Precautions: Fall Risk (high cole score)    SUBJECTIVE:   Subjective: pt in chair before and after tx, agreeable to participate. Pain  Pain: Denies pain pre and post session     OBJECTIVE:             Transfers  Sit to Stand: Contact guard assistance  Stand to sit: Contact guard assistance  Comment: vc's for hand placement with WW; good follow through STS x5 for improved technique and BLE strengthening. Ambulation  Surface: level tile  Device: Rolling Walker  Assistance: Minimal assistance  Quality of Gait: small shuffling step pattern with crouched posture; vc's for improved upright posture and increased step length  Distance: 5' FWD/BWD  Comments: declines further distance. Activity Tolerance  Activity Tolerance: Patient tolerated treatment well          ASSESSMENT   Assessment: pt limited by endurance declines further tx and requests to rest in chair to await her lunch tray. Discharge Recommendations:  Continue to assess pending progress         Goals  Long Term Goals  Long term goal 1: Pt will demonstrate bed mobility SBA  Long term goal 2: Pt will demonstrate transfers SBA with safest AD  Long term goal 3: Pt will demonstrate amb >/= 50ft SBA with safest AD  Long term goal 4: Pt will demonstrate stair negotation 2 steps with 1 rail CGA    PLAN    Plan: 1 time a day 3-6 times a week  Safety Devices  Type of Devices:  All fall risk precautions in place, Chair alarm in place, Call light within reach, Left in chair     [facility] (6 CLICK) [address] Mobility Raw Score : 14      Therapy Time Individual   Time In 1050   Time Out 1101   Minutes 11      Trsf: 8  Gait: 3        Jaqueline Breaux PTA, 07/26/22 at 11:55 AM         Definitions for assistance levels  Independent = pt does not require any physical supervision or assistance from another person for activity completion. Device may be needed.   Stand by assistance = pt requires verbal cues or instructions from another person, close to but not touching, to perform the activity  Minimal assistance= pt performs 75% or more of the activity; assistance is required to complete the activity  Moderate assistance= pt performs 50% of the activity; assistance is required to complete the activity  Maximal assistance = pt performs 25% of the activity; assistance is required to complete the activity  Dependent = pt requires total physical assistance to accomplish the task

## 2022-07-27 LAB
ALBUMIN SERPL-MCNC: 3 G/DL
ALP BLD-CCNC: 239 U/L
ALT SERPL-CCNC: 12 U/L
ANION GAP SERPL CALCULATED.3IONS-SCNC: NORMAL MMOL/L
AST SERPL-CCNC: 12 U/L
BASOPHILS ABSOLUTE: ABNORMAL
BASOPHILS RELATIVE PERCENT: ABNORMAL
BILIRUB SERPL-MCNC: 0.6 MG/DL (ref 0.1–1.4)
BUN BLDV-MCNC: 14 MG/DL
CALCIUM SERPL-MCNC: 8.2 MG/DL
CHLORIDE BLD-SCNC: 103 MMOL/L
CO2: 27 MMOL/L
CREAT SERPL-MCNC: 0.9 MG/DL
EOSINOPHILS ABSOLUTE: ABNORMAL
EOSINOPHILS RELATIVE PERCENT: ABNORMAL
GFR CALCULATED: NORMAL
GLUCOSE BLD-MCNC: 110 MG/DL
HCT VFR BLD CALC: 32.1 % (ref 36–46)
HEMOGLOBIN: 10.7 G/DL (ref 12–16)
LYMPHOCYTES ABSOLUTE: ABNORMAL
LYMPHOCYTES RELATIVE PERCENT: ABNORMAL
MCH RBC QN AUTO: 29.4 PG
MCHC RBC AUTO-ENTMCNC: 33.4 G/DL
MCV RBC AUTO: 88.2 FL
MONOCYTES ABSOLUTE: ABNORMAL
MONOCYTES RELATIVE PERCENT: ABNORMAL
NEUTROPHILS ABSOLUTE: ABNORMAL
NEUTROPHILS RELATIVE PERCENT: ABNORMAL
PLATELET # BLD: 223 K/ΜL
PMV BLD AUTO: 10.1 FL
POTASSIUM SERPL-SCNC: 4 MMOL/L
RBC # BLD: 3.64 10^6/ΜL
SODIUM BLD-SCNC: 143 MMOL/L
TOTAL PROTEIN: 5.5
WBC # BLD: 9 10^3/ML

## 2022-08-03 NOTE — DISCHARGE INSTR - COC
Continuity of Care Form    Patient Name: Belen Siegel   :  1928  MRN:  [id number]    Admit date:  2021  Discharge date:  ***    Code Status Order: Limited   Advance Directives:      Admitting Physician:  No admitting provider for patient encounter. PCP: Jimmie Dash MD    Discharging Nurse: [facility] Unit/Room#: I503/U745-38  Discharging Unit Phone Number: ***    Emergency Contact:   Extended Emergency Contact Information  Primary Emergency Contact: Reny Crane [address] Phone: [phone]  Relation: Child  Secondary Emergency Contact: Marlyn Aguilar  Address: [address], [address] [address] Phone: [phone]  Mobile Phone: [phone]  Relation: Child    Past Surgical History:  Past Surgical History:   Procedure Laterality Date    CATARACT REMOVAL WITH IMPLANT Bilateral 2016    COLON SURGERY  2009    polyps    COLONOSCOPY  2009    GASTROSTOMY TUBE PLACEMENT N/A 2020    EGD PEG TUBE PLACEMENT performed by Breanna Palomino MD at [facility]  2002    graft from neck       Immunization History:   Immunization History   Administered Date(s) Administered    COVID-19, Pfizer, PF, 30mcg/0.3mL 2021, 2021    Influenza, High Dose (Fluzone 65 yrs and older) 2017, 10/09/2018    Influenza, Quadv, IM, PF (6 mo and older Fluzone, Flulaval, Fluarix, and 3 yrs and older Afluria) 2020    Influenza, Triv, inactivated, subunit, adjuvanted, IM (Fluad 65 yrs and older) 10/14/2019    Pneumococcal Conjugate 13-valent (Nditisu22) 2017    Pneumococcal Polysaccharide (Pjryqvogx88) 10/14/2019       Active Problems:  Patient Active Problem List   Diagnosis Code    Essential hypertension I10    Anxiety F41.9    COPD (chronic obstructive pulmonary disease) with acute bronchitis (HCC) J44.0, J20.9    COPD exacerbation (HCC) J44.1    Obesity (BMI 30-39. 9) E66.9    Obstructive sleep Render In Strict Bullet Format?: No apnea G47.33    Chest pain R07.9    Aphasia R47.01    Venous stasis dermatitis of both lower extremities I87.2    UTI (urinary tract infection) N39.0    Impaired mobility and activities of daily living Z74.09, Z78.9       Isolation/Infection:   Isolation            No Isolation          Patient Infection Status       Infection Onset Added Last Indicated Last Indicated By Review Planned Expiration Resolved Resolved By    None active    Resolved    COVID-19 (Rule Out) 12/26/21 12/26/21 12/26/21 COVID-19, Rapid (Ordered)   12/26/21 Rule-Out Test Resulted    COVID-19 (Rule Out) 12/21/21 12/21/21 12/21/21 COVID-19, Rapid (Ordered)   12/21/21 Rule-Out Test Resulted    COVID-19 (Rule Out) 11/05/20 11/06/20 11/06/20 COVID-19 (Ordered)   11/06/20 Rule-Out Test Resulted    COVID-19 (Rule Out) 10/28/20 10/28/20 10/28/20 COVID-19 (Ordered)   10/28/20 Joaquim Rutledge RN    2 negative COVID's. Electronically signed by Joaquim Rutledge RN on 10/28/20 at 3:48 PM EDT       COVID-19 (Rule Out) 10/27/20 10/27/20 10/27/20 COVID-19 (Ordered)   10/27/20 Rule-Out Test Resulted            Nurse Assessment:  Last Vital Signs: BP (!) 153/70   Pulse 82   Temp 99.3 °F (37.4 °C) (Oral)   Resp 18   Ht 5' 3\" (1.6 m)   Wt 180 lb (81.6 kg)   LMP  (LMP Unknown)   SpO2 96%   BMI 31.89 kg/m²     Last documented pain score (0-10 scale): Pain Level: 0  Last Weight:   Wt Readings from Last 1 Encounters:   12/26/21 180 lb (81.6 kg)     Mental Status:  disoriented, oriented, and alert    IV Access:  - None    Nursing Mobility/ADLs:  Walking   Dependent  Transfer  Dependent  Bathing  Dependent  Dressing  Dependent  Toileting  Dependent  Feeding  Assisted  Med Admin  Assisted  Med Delivery   whole    Wound Care Documentation and Therapy:        Elimination:  Continence:    Bowel: No  Bladder: No  Urinary Catheter: None   Colostomy/Ileostomy/Ileal Conduit: No       Date of Last BM: 1/4/22    Intake/Output Summary (Last 24 hours) at 1/4/2022 1135  Last data filed at 1/4/2022 0519  Gross per 24 hour   Intake 60 ml   Output --   Net 60 ml     I/O last 3 completed shifts: In: 61 [P.O.:60]  Out: -     Safety Concerns: At Risk for Falls    Impairments/Disabilities:      Hearing    Nutrition Therapy:  Current Nutrition Therapy:   - Oral Diet:  Dysphagia 1 pureed    Routes of Feeding: Oral  Liquids: Thin Liquids  Daily Fluid Restriction: no  Last Modified Barium Swallow with Video (Video Swallowing Test): not done    Treatments at the Time of Hospital Discharge:   Respiratory Treatments: 0  Oxygen Therapy:  is on oxygen at 3 L/min per nasal cannula.   Ventilator:    - No ventilator support    Rehab Therapies: Physical Therapy and Occupational Therapy  Weight Bearing Status/Restrictions: No weight bearing restirctions  Other Medical Equipment (for information only, NOT a DME order):  bedside commode and hospital bed  Other Treatments: 0    Patient's personal belongings (please select all that are sent with patient):  None    RN SIGNATURE:  Electronically signed by Lili Enciso RN on 1/5/22 at 6:14 PM EST    CASE MANAGEMENT/SOCIAL WORK SECTION    Inpatient Status Date: ***    Readmission Risk Assessment Score:  Readmission Risk              Risk of Unplanned Readmission:  16           Discharging to Facility/ Agency   Name: [facility]   Address:  Phone:  [phone]  Fax:    Dialysis Facility (if applicable)   Name:  Address:  Dialysis Schedule:  Phone:  Fax:    / signature: Electronically signed by HORACIO Bird, REGGIEW on 1/4/22 at 11:37 AM EST     PHYSICIAN SECTION    Prognosis: Good    Condition at Discharge: Stable    Rehab Potential (if transferring to Rehab):     Recommended Labs or Other Treatments After Discharge: Close follow up with PCP; follow up with palliative care; BMP in 3 days; continue cipro per ID recommendations    Physician Certification: I certify the above information and transfer of Nilesh Pappas  is necessary Continue Regimen: Triamcinolone 0.1% ointment applied to the AA BID PRN flares for the continuing treatment of the diagnosis listed and that she requires [place] for less 30 days.      Update Admission H&P: No change in H&P    PHYSICIAN SIGNATURE:  Electronically signed by David Orantes MD on 1/5/22 at 3:57 PM EST Detail Level: Zone

## 2022-08-09 LAB
BILIRUBIN, URINE: NEGATIVE
BLOOD, URINE: NEGATIVE
CLARITY: ABNORMAL
COLOR: YELLOW
GLUCOSE URINE: ABNORMAL
KETONES, URINE: NEGATIVE
LEUKOCYTE ESTERASE, URINE: NEGATIVE
NITRITE, URINE: NEGATIVE
PH UA: 6 (ref 4.5–8)
PROTEIN UA: NEGATIVE
SPECIFIC GRAVITY, URINE: 1.02
UROBILINOGEN, URINE: NORMAL

## 2022-08-10 ENCOUNTER — OFFICE VISIT (OUTPATIENT)
Dept: SURGERY | Age: 87
End: 2022-08-10

## 2022-08-10 VITALS
WEIGHT: 189 LBS | BODY MASS INDEX: 34.78 KG/M2 | OXYGEN SATURATION: 97 % | HEIGHT: 62 IN | HEART RATE: 88 BPM | TEMPERATURE: 97.9 F

## 2022-08-10 DIAGNOSIS — Z90.49 S/P LAPAROSCOPIC CHOLECYSTECTOMY: Primary | ICD-10-CM

## 2022-08-10 LAB
A/G RATIO: 1.1
ALBUMIN SERPL-MCNC: 3.4 G/DL
ALP BLD-CCNC: 161 U/L
ALT SERPL-CCNC: 28 U/L
AST SERPL-CCNC: 18 U/L
BASOPHILS ABSOLUTE: ABNORMAL
BASOPHILS RELATIVE PERCENT: ABNORMAL
BILIRUB SERPL-MCNC: 0.5 MG/DL (ref 0.1–1.4)
BILIRUBIN DIRECT: 0.1 MG/DL
BILIRUBIN, INDIRECT: NORMAL
BUN BLDV-MCNC: 16 MG/DL
CALCIUM SERPL-MCNC: 8.2 MG/DL
CHLORIDE BLD-SCNC: 102 MMOL/L
CO2: 24 MMOL/L
CREAT SERPL-MCNC: 0.8 MG/DL
EOSINOPHILS ABSOLUTE: ABNORMAL
EOSINOPHILS RELATIVE PERCENT: ABNORMAL
GFR CALCULATED: 67
GLOBULIN: NORMAL
GLUCOSE BLD-MCNC: 130 MG/DL
HCT VFR BLD CALC: 32.9 % (ref 36–46)
HEMOGLOBIN: 10.8 G/DL (ref 12–16)
LYMPHOCYTES ABSOLUTE: ABNORMAL
LYMPHOCYTES RELATIVE PERCENT: ABNORMAL
MCH RBC QN AUTO: 28.1 PG
MCHC RBC AUTO-ENTMCNC: 32.8 G/DL
MCV RBC AUTO: 85.5 FL
MONOCYTES ABSOLUTE: ABNORMAL
MONOCYTES RELATIVE PERCENT: ABNORMAL
NEUTROPHILS ABSOLUTE: ABNORMAL
NEUTROPHILS RELATIVE PERCENT: ABNORMAL
PLATELET # BLD: 388 K/ΜL
PMV BLD AUTO: 10.6 FL
POTASSIUM SERPL-SCNC: 4.3 MMOL/L
PROTEIN TOTAL: 6.4 G/DL
RBC # BLD: 3.85 10^6/ΜL
SODIUM BLD-SCNC: 141 MMOL/L
WBC # BLD: 10.4 10^3/ML

## 2022-08-10 PROCEDURE — 99024 POSTOP FOLLOW-UP VISIT: CPT | Performed by: SURGERY

## 2022-08-10 NOTE — PROGRESS NOTES
TRAUMA/EMERGENCY GENERAL SURGERY CLINIC NOTE    Subjective:  Stan Christie is a 80 y.o. female who is here for follow up for post-op check. Pt had percutaneous drain placed by IR on 4/15/2022, and then had cholecystectomy done on 7/22/2022 after cardiology clearance was completed. Pt is POD#19 s/p abdi jeong with Dr. Liz Zhang. Daughters present and providing HPI. Current symptoms include increased sleeping/napping and decreased PO intake. Per daughters, patient is eating, but small amounts stating she is full after only a few small bites. She is also taking more naps and sleeping longer than she was prior to surgery. Pt and daughters reporting redness around area where percutaneous drain was previously in place. No drainage from area. They state steri-strips have remained in place over incisions and no redness or drainage reported from those sites. Pt and family deny patient having any nausea, emesis, fevers, night sweats, chills, chest pain, or shortness of breath. Pt is still at SNF, and working with rehab daily. Plan is for her to eventually return back home with her daughters. Vitals:    08/10/22 1404   Pulse: 88   Temp: 97.9 °F (36.6 °C)   SpO2: 97%       Physical Exam:  Gen: Sitting up in wheelchair. Alert, well developed, NAD. In good spirits. CV: RRR. Normal pulses. Pulm: Non labored respirations, on room air. ABD: Soft, nontender, nondistended. Area of erythema surrounding healing percutaneous drain site. Blanching. No fluctuance, no induration noted. No drainage from healing drain wound. Incisions c/d/i with steri-strips in place. No bleeding or drainage from incisional wounds. Neuro: No focal deficits, Ox3, appropriate  Ext: Erythema to RLQ skin as described above.  No edema, warm and dry       Medications:  Current Outpatient Medications on File Prior to Visit   Medication Sig Dispense Refill    zoster recombinant adjuvanted vaccine [facility]) 50 MCG/0.5ML SUSR injection Inject 0.5 mLs into the muscle See Admin Instructions 1 dose now and repeat in 2-6 months (Patient taking differently: Inject 0.5 mLs into the muscle See Admin Instructions Indications: Vaccination 1 dose now and repeat in 2-6 months  Scheduled 7/28/22 and 2nd scheduled 1/25/2023.) 0.5 mL 0    fluticasone-salmeterol (ADVAIR) 250-50 MCG/ACT AEPB diskus inhaler Inhale 1 puff into the lungs every 12 hours Indications: Chronic Obstructive Lung Disease      montelukast (SINGULAIR) 10 MG tablet TAKE 1 TABLET BY MOUTH NIGHTLY AS NEEDED FOR ALLERGIES (Patient taking differently: 10 mg by PEG Tube route nightly Indications: Allergy) 90 tablet 3    atorvastatin (LIPITOR) 20 MG tablet TAKE 1 TABLET BY MOUTH DAILY AT BEDTIME (Patient taking differently: 20 mg by PEG Tube route in the morning. Indications: High Amount of Fats in the Blood.  TAKE 1 TABLET BY MOUTH DAILY AT BEDTIME.) 90 tablet 0    acetaminophen (TYLENOL) 325 MG tablet Take 2 tablets by mouth every 6 hours as needed for Pain (Patient taking differently: Take 650 mg by mouth every 4 hours as needed for Pain or Fever) 120 tablet 0    dorzolamide-timolol (COSOPT) 22.3-6.8 MG/ML ophthalmic solution PLACE 1 DROP INTO BOTH EYES 2 TIMES DAILY INDICATIONS: GLAUCOMA 10 mL 5    latanoprost (XALATAN) 0.005 % ophthalmic solution Place 1 drop into both eyes daily Indications: Glaucoma (Patient taking differently: Place 1 drop into both eyes nightly Indications: Glaucoma) 2.5 mL 5    metoprolol tartrate (LOPRESSOR) 25 MG tablet Take 0.5 tablets by mouth 2 times daily Indications: High Blood Pressure Disorder 90 tablet 3    albuterol sulfate HFA (PROAIR HFA) 108 (90 Base) MCG/ACT inhaler Inhale 1 puff into the lungs every 6 hours as needed for Wheezing or Shortness of Breath 3 each 3    brimonidine (ALPHAGAN P) 0.15 % ophthalmic solution Place 1 drop into both eyes 2 times daily Indications: Glaucoma 1 each 1    losartan (COZAAR) 25 MG tablet Take 1 tablet by mouth every 12 hours (Patient taking differently: 25 mg by PEG Tube route in the morning and 25 mg in the evening. Indications: High Blood Pressure Disorder.) 180 tablet 3    Elastic Bandages & Supports (MEDICAL COMPRESSION STOCKINGS) MISC Knee high, 20 to 30 mm hg, remove at night, pharmacy to fit. 1 each 0    OXYGEN Inhale 2 L into the lungs Indications: PRN at bedtime       No current facility-administered medications on file prior to visit.        Labs:    CBC:   Component      Latest Ref Rng & Units 8/10/2022          12:00 AM   WBC      10:3/mL 10.4   RBC      10:6/µL 3.85   Hemoglobin Quant      12.0 - 16.0 g/dL 10.8 (A)   Hematocrit      36 - 46 % 32.9 (A)   MCV      fL 85.5   MCH      pg 28.1   MCHC      g/dL 32.8   RDW      11.5 - 14.5 %    Platelet Count      K/µL 388   PLATELET SLIDE REVIEW          Neutrophils %      %    Lymphocyte %      %    Monocytes %      %    Eosinophils %      %    Basophils %      %    Neutrophils Absolute      1.4 - 6.5 K/uL    Lymphocytes Absolute      1.0 - 4.8 K/uL    Monocytes Absolute      0.2 - 0.8 K/uL    Eosinophils Absolute      0.0 - 0.7 K/uL    Basophils Absolute      0.0 - 0.2 K/uL    Bands Relative      5 - 11 %    Metamyelocytes Relative      %    RBC Morphology          SLIDE REVIEW          Anisocytosis          Microcytes          POC Sodium      136 - 145 mEq/L    POC Potassium      3.5 - 5.1 mEq/L    POC Chloride      99 - 110 mEq/L    POC Glucose      60 - 115 mg/dl    POC Creatinine      0.6 - 1.2 mg/dL    GFR Non-African American      >60    GFR       >60    Calcium, Ionized      1.12 - 1.32 mmol/L    pH, Arterial      7.350 - 7.450    pCO2, Arterial      35 - 45 mm Hg    pO2, Arterial      75 - 108 mm Hg    HCO3, Arterial      21.0 - 29.0 mmol/L    Base Excess, Arterial      -3 - 3    O2 Sat, Arterial      93 - 100 %    TCO2 (calc), Art      22 - 29    Lactate, ser/plas      0.40 - 2.00 mmol/L    POC Hematocrit      36 - 48 %    FIO2          Sample Type Performed on          MPV      fL 10.6       BMP:  Component      Latest Ref Rng & Units 8/10/2022          12:00 AM   Sodium      mmol/L 141   Potassium      mmol/L 4.3   Chloride      mmol/L 102   CO2      mmol/L 24   Anion Gap      9 - 15 mEq/L    GLUCOSE - FASTING      mg/dL 130   BUN,BUNPL      mg/dL 16   Creatinine       0.8   GFR Non-      >60    GFR       >60    CALCIUM, SERUM, [id number]      mg/dL 8.2   Total Protein          Albumin          Bilirubin      0.1 - 1.4 mg/dL    Alk Phos      U/L    ALT      U/L    AST      U/L    Globulin      2.3 - 3.5 g/dL    Gfr Calculated       67     LFT:  Component      Latest Ref Rng & Units 8/10/2022          12:00 AM   Sodium      mmol/L    Potassium      mmol/L    Chloride      mmol/L    CO2      mmol/L    Anion Gap      9 - 15 mEq/L    GLUCOSE - FASTING      mg/dL    BUN,BUNPL      mg/dL    Creatinine          GFR Non-      >60    GFR       >60    CALCIUM, SERUM, [id number]      mg/dL    Total Protein          Albumin       3.4   Bilirubin      0.1 - 1.4 mg/dL 0.5   Alk Phos      U/L 161   ALT      U/L 28   AST      U/L 18   Globulin      2.3 - 3.5 g/dL    Bilirubin, Direct      mg/dL 0.1   Bilirubin, Indirect      0.0 - 0.6 mg/dL    PROTEIN TOTAL      g/dL 6.4   Albumin/Globulin Ratio       1.1         Surgical Pathology:  FINAL DIAGNOSIS:   GALLBLADDER- FOCALLY HEMORRHAGIC SEVERE ACUTE SUPPURATIVE AND CHRONIC CHOLECYSTITIS WITH CHOLELITHIASIS. Studies:  No new imaging studies      Assessment:  [facility] is a 80 y.o. female who is here for follow up for post-op check. Pt had percutaneous drain placed by IR on 4/15/2022, and then had cholecystectomy done on 7/22/2022 after cardiology clearance was completed. Pt is POD#19 s/p lap adenike with Dr. Emma Bey. Patient is healing appropriately from surgery.  Reported decreased PO intake and excessive sleeping is part of healing process for this patient who is elderly and had complex course prior to surgery and a complex intraoperative course. Labs completed today are all within acceptable levels. There is no suspicion for post-op complication and no suspected intraabdominal or systemic infection. Skin irritation appears to be early cellulitis around where percutaneous drain was in place. Plan:  - Surgical pathology report given to patient and patient's daughters  - Line drawn around area of erythema on right abdomen. SNF to closely monitor and call if redness worsens. Orders (on SNF sheet) given to start Augmentin 875 BID x5 days to treat early cellulitis. - Pt to start high protein high calorie supplements TID if she is not eating well. However poor appetite and fatigue is part of recovery course for this patient  -- this was explained to the family.  - No further labs or imaging needed. No follow up needed. However pt can follow up PRN if cellulitis worsens or does not resolve, or if abdominal symptoms worsen or do not resolve. Lico Jacobson PA-C  Trauma/Critical Care  Emergency General Surgery  [See treatment team sticky note for contact information]    Patient discussed with attending Trauma Physician, Dr. Dequan Daugherty MD who was also present during the clinic visit.

## 2022-08-11 ENCOUNTER — TELEPHONE (OUTPATIENT)
Dept: FAMILY MEDICINE CLINIC | Age: 87
End: 2022-08-11

## 2022-08-11 PROBLEM — Z01.811 PREOPERATIVE RESPIRATORY EXAMINATION: Status: RESOLVED | Noted: 2022-07-12 | Resolved: 2022-08-11

## 2022-08-11 NOTE — TELEPHONE ENCOUNTER
Verify below questions apply     First would be arthritis  Latter would be to relieve pain  Patient has a medical condition(s) including  which requires positioning of the body in ways not feasible with an ordinary bed. Patient requires positioning of the body in ways not feasible with an ordinary bed in order to alleviate pain.     Patient requires the head of the bed to be elevated more than 30 degrees most of the time due to

## 2022-08-11 NOTE — TELEPHONE ENCOUNTER
John Ashton, patient daughter called, asking if you can order a hospital bed for the patient. She said the patient is having increasing difficulty getting in and out of bed and needs to be able to be propped up in bed. If ok for bed please addend last office note and I will prepare order for [facility] to send.

## 2022-08-12 ENCOUNTER — OFFICE VISIT (OUTPATIENT)
Dept: GERIATRIC MEDICINE | Age: 87
End: 2022-08-12
Payer: MEDICARE

## 2022-08-12 DIAGNOSIS — J44.0 COPD (CHRONIC OBSTRUCTIVE PULMONARY DISEASE) WITH ACUTE BRONCHITIS (HCC): ICD-10-CM

## 2022-08-12 DIAGNOSIS — J20.9 COPD (CHRONIC OBSTRUCTIVE PULMONARY DISEASE) WITH ACUTE BRONCHITIS (HCC): ICD-10-CM

## 2022-08-12 DIAGNOSIS — G89.18 ACUTE POST-OPERATIVE PAIN: ICD-10-CM

## 2022-08-12 DIAGNOSIS — I10 ESSENTIAL HYPERTENSION: ICD-10-CM

## 2022-08-12 DIAGNOSIS — Z90.49 S/P LAPAROSCOPIC CHOLECYSTECTOMY: Primary | ICD-10-CM

## 2022-08-12 PROCEDURE — 99315 NF DSCHRG MGMT 30 MIN/LESS: CPT | Performed by: PHYSICIAN ASSISTANT

## 2022-08-15 LAB
BASOPHILS ABSOLUTE: 0.1 /ΜL
BASOPHILS RELATIVE PERCENT: 1.1 %
EOSINOPHILS ABSOLUTE: 0.3 /ΜL
EOSINOPHILS RELATIVE PERCENT: 3.4 %
HCT VFR BLD CALC: 33.7 % (ref 36–46)
HEMOGLOBIN: 10.9 G/DL (ref 12–16)
LYMPHOCYTES ABSOLUTE: 1 /ΜL
LYMPHOCYTES RELATIVE PERCENT: 12.2 %
MCH RBC QN AUTO: 27.7 PG
MCHC RBC AUTO-ENTMCNC: 32.4 G/DL
MCV RBC AUTO: 85.7 FL
MONOCYTES ABSOLUTE: 0.7 /ΜL
MONOCYTES RELATIVE PERCENT: 8.7 %
NEUTROPHILS ABSOLUTE: 5.8 /ΜL
NEUTROPHILS RELATIVE PERCENT: 74.6 %
PLATELET # BLD: 281 K/ΜL
PMV BLD AUTO: 10.2 FL
RBC # BLD: 3.93 10^6/ΜL
WBC # BLD: 7.8 10^3/ML

## 2022-08-16 NOTE — TELEPHONE ENCOUNTER
Abeba Palacios is calling from [facility]. She did a Start of Care today for patient. Trinidad Davidson will have PT, OT, and [facility].
Update
ok
none

## 2022-08-19 ENCOUNTER — TELEPHONE (OUTPATIENT)
Dept: FAMILY MEDICINE CLINIC | Age: 87
End: 2022-08-19

## 2022-08-19 NOTE — TELEPHONE ENCOUNTER
----- Message from [facility] sent at 8/15/2022  3:50 PM EDT -----  Subject: Message to Provider    QUESTIONS  Information for Provider? Migue Mariscalton ([facility]) called in regards to a   request for a hospital bed for the patient. She was told she would would   receive a call back from Anoop fountain in the office but she still has not heard   anything back. Please contact to further assist.   ---------------------------------------------------------------------------  --------------  Ky Fraction INFO  [phone]; OK to leave message on voicemail  ---------------------------------------------------------------------------  --------------  SCRIPT ANSWERS  Relationship to Patient? Other  Representative Name? Migue Colby  Is the Representative on the appropriate HIPAA document in Epic?  Yes

## 2022-08-22 ASSESSMENT — ENCOUNTER SYMPTOMS
ABDOMINAL PAIN: 0
FACIAL SWELLING: 0
EYE REDNESS: 0
VOMITING: 0
COUGH: 0
NAUSEA: 0

## 2022-08-22 NOTE — PROGRESS NOTES
Subjective:      Patient ID: Frank Ashton is a pleasant 80 y.o. female who presents today for:  No chief complaint on file. REASON FOR VISIT: Discharge summary report s/p cholecystectomy (7/22/2022); recovery in SNF since 7/22/2022. SUBJECTIVE: Patient seen today for dc visit after recovering from laparoscopic cholecystectomy on 7/22/2022. Patient does have underlying medical history of hypertension, FRED, COPD, and venous stasis of bilateral lower extremities, impaired mobility as well. Patient has made adequate recovery and is ready for discharge. She is eating and drinking well. Did have increased lethargy/fatigue on admission, has improved significantly. Very minor pain to right upper quadrant, however much improved since first admission. Will require variable height hospital bed, preferably electric if possible due to her inability to adjust bed on her own independently, and need for ability to manipulate bed for her assistance to safely and effectively transfer in and out of wheelchair./To walker. Standard bed would be ineffective. Patient has no new acute complaints today. Will discharge home with PT/OT/HHC as needed. Patient Active Problem List   Diagnosis    Essential hypertension    Anxiety    COPD (chronic obstructive pulmonary disease) with acute bronchitis (HCC)    Nocturnal hypoxia    Obesity (BMI 30-39. 9)    Obstructive sleep apnea    Venous stasis dermatitis of both lower extremities    Impaired mobility and activities of daily living-due to cervical myelopathy    Acute post-operative pain    Muscle wasting and atrophy, not elsewhere classified, left hand    Osteoarthritis of cervical spine with myelopathy    S/P laparoscopic cholecystectomy     Past Medical History:   Diagnosis Date    Age-related cognitive decline     Cancer (HCC)     Cerebrovascular small vessel disease     Chronic nonspecific lung disease 2021    nodules R lung, fibrosis L apex    Glaucoma     Gout Needs assistance    Homemaking Responsibilities: No    Ambulation Assistance: Independent (used [employer]),  BSC, LIFT CHAIR, WALKER, CANE,     Transfer Assistance: Independent    Active : No    Additional Comments: pt is questionable historian- question decreased recall versus unable to accurately hear the question; pt reports no recent falls at home. Social Determinants of Health     Financial Resource Strain: Low Risk     Difficulty of Paying Living Expenses: Not hard at all   Food Insecurity: No Food Insecurity    Worried About Running Out of Food in the Last Year: Never true    Ran Out of Food in the Last Year: Never true   Transportation Needs: Not on file   Physical Activity: Not on file   Stress: Not on file   Social Connections: Not on file   Intimate Partner Violence: Not on file   Housing Stability: Not on file     No family history on file. Allergies   Allergen Reactions    Norvasc [Amlodipine Besylate] Swelling     CURRENT MEDICATIONS: APAP 650 mg p.o. every 4 hours as needed, Cosopt 22.3-6.8 mg/mL 1 GTS every morning nightly, albuterol sulfate /90 base MCG/ACT 1 puff inhaled every 6 hours as needed, monitor Sodium 10 mg p.o. every 24 hours as needed, brimonidine tartrate 0.2% solution 1G TTS bilaterally every morning nightly, Advair Diskus 250/50 MCG/dose 1 inhalation p.o. every 12 hours, Theragran-M 1 tab p.o. every morning, atorvastatin 40 mg p.o. every morning, metoprolol titrate 25 mg p.o. every morning and nightly, losartan 25 mg p.o. every morning and nightly    Review of Systems   Constitutional:  Negative for activity change, appetite change (good appetite), fatigue and fever. HENT:  Positive for hearing loss (?presbycusis; no signficant cerumen). Negative for congestion, ear discharge, ear pain and facial swelling. Eyes:  Negative for redness. Respiratory:  Negative for cough. Cardiovascular:  Negative for chest pain and leg swelling. Gastrointestinal:  Negative for abdominal pain, nausea and vomiting. Endocrine: Negative for cold intolerance and heat intolerance. Genitourinary:  Negative for difficulty urinating and hematuria. Musculoskeletal:  Negative for joint swelling. Neurological:  Positive for weakness. Negative for tremors, speech difficulty and light-headedness. Hematological:  Bruises/bleeds easily. Psychiatric/Behavioral:  Negative for agitation, confusion (mild) and decreased concentration. The patient is not nervous/anxious. Objective:   LMP  (LMP Unknown)     Physical Exam  Constitutional:       General: She is not in acute distress. Appearance: She is obese. She is not ill-appearing. Comments: Patient quiet demeanor sitting at bedside in her recliner appears comfortable and in no acute distress. HENT:      Head: Atraumatic. Nose: No rhinorrhea. Mouth/Throat:      Mouth: Mucous membranes are moist.   Eyes:      General: No scleral icterus. Cardiovascular:      Rate and Rhythm: Normal rate and regular rhythm. Pulses: Normal pulses. Heart sounds:     No gallop. Pulmonary:      Effort: No respiratory distress. Breath sounds: No wheezing or rhonchi. Chest:      Chest wall: No tenderness. Abdominal:      General: Bowel sounds are normal. There is no distension. Palpations: Abdomen is soft. Tenderness: There is no abdominal tenderness. There is no guarding or rebound. Comments: The cholecystostomy drain is in place; pink/light orange fluid noted scant amount of bag   Musculoskeletal:      Cervical back: No rigidity. Skin:     General: Skin is warm and dry. Coloration: Skin is not pale (General). Findings: No bruising ( ). Neurological:      General: No focal deficit present. Mental Status: She is alert. Motor: Weakness present. Coordination: Coordination normal.      Comments:  For full evaluation of strength, functional mobility,

## 2022-08-23 ENCOUNTER — TELEPHONE (OUTPATIENT)
Dept: FAMILY MEDICINE CLINIC | Age: 87
End: 2022-08-23

## 2022-08-23 NOTE — TELEPHONE ENCOUNTER
Patient got D[facility]C [facility] Jason Em is asking that you will follow pt   pt was last seen 7/22/22  They will send orders to you    sarah

## 2022-09-15 RX ORDER — FLUTICASONE PROPIONATE AND SALMETEROL 250; 50 UG/1; UG/1
POWDER RESPIRATORY (INHALATION)
Qty: 180 EACH | Refills: 3 | OUTPATIENT
Start: 2022-09-15

## 2022-09-29 ENCOUNTER — OFFICE VISIT (OUTPATIENT)
Dept: FAMILY MEDICINE CLINIC | Age: 87
End: 2022-09-29
Payer: MEDICARE

## 2022-09-29 VITALS
HEART RATE: 80 BPM | DIASTOLIC BLOOD PRESSURE: 64 MMHG | OXYGEN SATURATION: 98 % | SYSTOLIC BLOOD PRESSURE: 122 MMHG | TEMPERATURE: 98.1 F

## 2022-09-29 DIAGNOSIS — R74.8 ELEVATED ALKALINE PHOSPHATASE LEVEL: Primary | ICD-10-CM

## 2022-09-29 DIAGNOSIS — Z00.00 ROUTINE ADULT HEALTH MAINTENANCE: ICD-10-CM

## 2022-09-29 DIAGNOSIS — R79.1 ELEVATED INR: ICD-10-CM

## 2022-09-29 DIAGNOSIS — D64.9 ANEMIA, UNSPECIFIED TYPE: ICD-10-CM

## 2022-09-29 DIAGNOSIS — J44.9 CHRONIC OBSTRUCTIVE PULMONARY DISEASE, UNSPECIFIED COPD TYPE (HCC): ICD-10-CM

## 2022-09-29 DIAGNOSIS — M19.90 ARTHRITIS: ICD-10-CM

## 2022-09-29 DIAGNOSIS — R79.82 ELEVATED C-REACTIVE PROTEIN (CRP): ICD-10-CM

## 2022-09-29 DIAGNOSIS — Z23 ENCOUNTER FOR IMMUNIZATION: ICD-10-CM

## 2022-09-29 DIAGNOSIS — I71.20 THORACIC AORTIC ANEURYSM WITHOUT RUPTURE: ICD-10-CM

## 2022-09-29 DIAGNOSIS — R79.1 ELEVATED PARTIAL THROMBOPLASTIN TIME (PTT): ICD-10-CM

## 2022-09-29 PROCEDURE — 99215 OFFICE O/P EST HI 40 MIN: CPT | Performed by: INTERNAL MEDICINE

## 2022-09-29 PROCEDURE — 1123F ACP DISCUSS/DSCN MKR DOCD: CPT | Performed by: INTERNAL MEDICINE

## 2022-09-29 RX ORDER — MONTELUKAST SODIUM 10 MG/1
10 TABLET ORAL NIGHTLY PRN
Qty: 90 TABLET | Refills: 3 | Status: SHIPPED | OUTPATIENT
Start: 2022-09-29

## 2022-09-29 RX ORDER — DORZOLAMIDE HYDROCHLORIDE AND TIMOLOL MALEATE 20; 5 MG/ML; MG/ML
1 SOLUTION/ DROPS OPHTHALMIC 2 TIMES DAILY
Qty: 10 ML | Refills: 5 | Status: SHIPPED | OUTPATIENT
Start: 2022-09-29

## 2022-09-29 RX ORDER — ATORVASTATIN CALCIUM 20 MG/1
TABLET, FILM COATED ORAL
Qty: 90 TABLET | Refills: 1 | Status: SHIPPED | OUTPATIENT
Start: 2022-09-29

## 2022-09-29 RX ORDER — BRIMONIDINE TARTRATE 0.15 %
1 DROPS OPHTHALMIC (EYE) 2 TIMES DAILY
Qty: 1 EACH | Refills: 1 | Status: SHIPPED | OUTPATIENT
Start: 2022-09-29

## 2022-09-29 RX ORDER — LATANOPROST 50 UG/ML
1 SOLUTION/ DROPS OPHTHALMIC DAILY
Qty: 2.5 ML | Refills: 5 | Status: SHIPPED | OUTPATIENT
Start: 2022-09-29

## 2022-09-29 RX ORDER — FLUTICASONE PROPIONATE AND SALMETEROL 250; 50 UG/1; UG/1
1 POWDER RESPIRATORY (INHALATION) EVERY 12 HOURS
Qty: 180 EACH | Refills: 1 | Status: SHIPPED | OUTPATIENT
Start: 2022-09-29

## 2022-09-29 ASSESSMENT — ENCOUNTER SYMPTOMS
ABDOMINAL PAIN: 0
SHORTNESS OF BREATH: 0
BACK PAIN: 0
EYE PAIN: 0

## 2022-09-29 ASSESSMENT — PATIENT HEALTH QUESTIONNAIRE - PHQ9
SUM OF ALL RESPONSES TO PHQ9 QUESTIONS 1 & 2: 0
2. FEELING DOWN, DEPRESSED OR HOPELESS: 0
SUM OF ALL RESPONSES TO PHQ QUESTIONS 1-9: 0
1. LITTLE INTEREST OR PLEASURE IN DOING THINGS: 0
SUM OF ALL RESPONSES TO PHQ QUESTIONS 1-9: 0

## 2022-09-29 NOTE — PROGRESS NOTES
Subjective:      Patient ID: Ryan Salcido is a 80 y.o. female who presents today with:  Chief Complaint   Patient presents with    Follow-up       HPI      Here for follow up  Past Medical History:   Diagnosis Date    Age-related cognitive decline     Cancer ([address]     Cerebrovascular small vessel disease     Chronic nonspecific lung disease 2021    nodules R lung, fibrosis L apex    Glaucoma     Gout     left knee    Hypertension     Obesity (BMI 30-39.9) 07/11/2019    Obstructive sleep apnea 07/11/2019    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    Vitamin D deficiency 2019     Past Surgical History:   Procedure Laterality Date    CATARACT REMOVAL WITH IMPLANT Bilateral 2016    CHOLECYSTECTOMY, LAPAROSCOPIC N/A 7/22/2022    LAPAROSCOPIC CHOLECYSTECTOMY performed by Lindsey Kay MD at [address]  2009    polyps    COLONOSCOPY  02/2009    GASTROSTOMY TUBE PLACEMENT N/A 11/04/2020    EGD PEG TUBE PLACEMENT performed by Daniela Coello MD at [address] Right 04/15/2022    Flexima APDL Locking Pigtail Draiange Catheter inserted by Dr. Denise Knowles (Lot# [id number] / Exp: 12/07/2024)    IR CHOLECYSTOSTOMY PERCUTANEOUS COMPLETE  4/15/2022    IR CHOLECYSTOSTOMY PERCUTANEOUS COMPLETE 4/15/2022 [facility] SPECIAL PROCEDURE    SKIN CANCER EXCISION  2002    graft from neck     Social History     Socioeconomic History    Marital status:       Spouse name: Not on file    Number of children: Not on file    Years of education: Not on file    Highest education level: Not on file   Occupational History    Not on file   Tobacco Use    Smoking status: Never    Smokeless tobacco: Never   Vaping Use    Vaping Use: Never used   Substance and Sexual Activity    Alcohol use: No    Drug use: No    Sexual activity: Not Currently   Other Topics Concern    Not on file   Social History Narrative    ,  Lives with her dtr Julia Candelaria- Daughter (dtr does not work and is able to assist pt as needed), Son Alexander Fletcher is in the area    Home: [place] [facility]: Two level,Able to Live on Main level with bedroom/bathroom    Home Access: Stairs to enter with rails- Number of Steps: 3    Bathroom Shower/Tub: Tub/Shower unit,  Shower chair,Grab bars in shower,Hand-held shower    Home Equipment: Rolling walker    ADL Assistance: Needs assistance    Homemaking Assistance: Needs assistance    Homemaking Responsibilities: No    Ambulation Assistance: Independent (used [employer]),  BSC, LIFT CHAIR, WALKER, CANE,     Transfer Assistance: Independent    Active : No    Additional Comments: pt is questionable historian- question decreased recall versus unable to accurately hear the question; pt reports no recent falls at home.                               Social Determinants of Health     Financial Resource Strain: Low Risk     Difficulty of Paying Living Expenses: Not hard at all   Food Insecurity: No Food Insecurity    Worried About Running Out of Food in the Last Year: Never true    Ran Out of Food in the Last Year: Never true   Transportation Needs: Not on file   Physical Activity: Not on file   Stress: Not on file   Social Connections: Not on file   Intimate Partner Violence: Not on file   Housing Stability: Not on file     Allergies   Allergen Reactions    Norvasc [Amlodipine Besylate] Swelling     Current Outpatient Medications on File Prior to Visit   Medication Sig Dispense Refill    zoster recombinant adjuvanted vaccine [facility]) 50 MCG/0.5ML SUSR injection Inject 0.5 mLs into the muscle See Admin Instructions 1 dose now and repeat in 2-6 months (Patient taking differently: Inject 0.5 mLs into the muscle See Admin Instructions Indications: Vaccination 1 dose now and repeat in 2-6 months  Scheduled 7/28/22 and 2nd scheduled 1/25/2023.) 0.5 mL 0    acetaminophen (TYLENOL) 325 MG tablet Take 2 tablets by mouth every 6 hours as needed for Pain (Patient taking differently: Take 650 mg by mouth every 4 hours as needed for Pain or Fever) 120 tablet 0    albuterol sulfate HFA (PROAIR HFA) 108 (90 Base) MCG/ACT inhaler Inhale 1 puff into the lungs every 6 hours as needed for Wheezing or Shortness of Breath 3 each 3    losartan (COZAAR) 25 MG tablet Take 1 tablet by mouth every 12 hours (Patient taking differently: 25 mg by PEG Tube route in the morning and 25 mg in the evening. Indications: High Blood Pressure Disorder.) 180 tablet 3    Elastic Bandages & Supports (MEDICAL COMPRESSION STOCKINGS) MISC Knee high, 20 to 30 mm hg, remove at night, pharmacy to fit. 1 each 0    OXYGEN Inhale 2 L into the lungs Indications: PRN at bedtime       No current facility-administered medications on file prior to visit. I have personally reviewed the ROS, PMH, PFH, and social history     Review of Systems   Constitutional:  Negative for chills and fever. HENT:  Negative for congestion. Eyes:  Negative for pain. Respiratory:  Negative for shortness of breath. Cardiovascular:  Negative for chest pain. Gastrointestinal:  Negative for abdominal pain. Genitourinary:  Negative for hematuria. Musculoskeletal:  Negative for back pain. Allergic/Immunologic: Negative for immunocompromised state. Neurological:  Negative for headaches. Psychiatric/Behavioral:  Negative for hallucinations. Objective:   /64   Pulse 80   Temp 98.1 °F (36.7 °C) (Temporal)   LMP  (LMP Unknown)   SpO2 98%     Physical Exam  Constitutional:       Appearance: She is well-developed. HENT:      Head: Normocephalic. Eyes:      Pupils: Pupils are equal, round, and reactive to light. Neck:      Trachea: No tracheal deviation. Cardiovascular:      Rate and Rhythm: Normal rate and regular rhythm. Heart sounds: Normal heart sounds. No murmur heard. No friction rub. No gallop. Pulmonary:      Effort: No respiratory distress.    Abdominal:      General: Bowel sounds are normal. There is no distension. Palpations: Abdomen is soft. Tenderness: There is no rebound. Skin:     General: Skin is warm and dry. Neurological:      Mental Status: She is oriented to person, place, and time. Assessment:       Diagnosis Orders   1. Elevated alkaline phosphatase level  Alkaline Phosphatase, Isoenzymes    Comprehensive Metabolic Panel    Lipid Panel    TSH with Reflex      2. Anemia, unspecified type  CBC with Auto Differential    Comprehensive Metabolic Panel    Lipid Panel    TSH with Reflex      3. Thoracic aortic aneurysm without rupture (HCC)  Comprehensive Metabolic Panel    Lipid Panel    TSH with Reflex      4. Chronic obstructive pulmonary disease, unspecified COPD type ([facility] [address]  Comprehensive Metabolic Panel    Lipid Panel    TSH with Reflex    fluticasone-salmeterol (ADVAIR) 250-50 MCG/ACT AEPB diskus inhaler      5. Elevated C-reactive protein (CRP)  C-Reactive Protein      6. Elevated partial thromboplastin time (PTT)        7. Elevated INR  Protime-INR      8. Arthritis  Handicap Placard MISC      9. Routine adult health maintenance  Influenza, FLUAD, (age 72 y+), IM, Preservative Free, 0.5 mL    Comprehensive Metabolic Panel    Lipid Panel    TSH with Reflex      10. Encounter for immunization  Influenza, FLUAD, (age 72 y+), IM, Preservative Free, 0.5 mL    Comprehensive Metabolic Panel    Lipid Panel    TSH with Reflex            Plan:     vc.  Saw cardiology and pulmonary  Dilatation, ascending thoracic aorta. -(Doesn't want fu, understands risk of rupture/fatal)   Continue chronic medications  Referred to uro (02/2022) nephrolithiasis   US thyroid doesn't want to pursue risk of cancer explained.                   Orders Placed This Encounter   Procedures    Influenza, FLUAD, (age 72 y+), IM, Preservative Free, 0.5 mL    Alkaline Phosphatase, Isoenzymes     Standing Status:   Future     Standing Expiration Date:   9/29/2023    CBC with Auto Differential     Standing Status:   Future Standing Expiration Date:   9/29/2023    Comprehensive Metabolic Panel     Standing Status:   Future     Standing Expiration Date:   9/29/2023    Lipid Panel     Standing Status:   Future     Standing Expiration Date:   9/29/2023     Order Specific Question:   Is Patient Fasting?/# of Hours     Answer:   10    TSH with Reflex     Standing Status:   Future     Standing Expiration Date:   9/29/2023    C-Reactive Protein     Standing Status:   Future     Standing Expiration Date:   9/29/2023    Protime-INR     Standing Status:   Future     Standing Expiration Date:   9/29/2023     Order Specific Question:   Daily Coumadin Dose? Answer:   NA     Orders Placed This Encounter   Medications    fluticasone-salmeterol (ADVAIR) 250-50 MCG/ACT AEPB diskus inhaler     Sig: Inhale 1 puff into the lungs in the morning and 1 puff in the evening. Indications: Chronic Obstructive Lung Disease.      Dispense:  180 each     Refill:  1    Handicap Placard MISC     Sig: by Does not apply route Good for 2 years     Dispense:  1 each     Refill:  0    atorvastatin (LIPITOR) 20 MG tablet     Sig: TAKE 1 TABLET BY MOUTH DAILY AT BEDTIME     Dispense:  90 tablet     Refill:  1    dorzolamide-timolol (COSOPT) 22.3-6.8 MG/ML ophthalmic solution     Sig: Place 1 drop into both eyes 2 times daily Indications: Glaucoma     Dispense:  10 mL     Refill:  5    latanoprost (XALATAN) 0.005 % ophthalmic solution     Sig: Place 1 drop into both eyes daily Indications: Glaucoma     Dispense:  2.5 mL     Refill:  5    brimonidine (ALPHAGAN P) 0.15 % ophthalmic solution     Sig: Place 1 drop into both eyes 2 times daily Indications: Glaucoma     Dispense:  1 each     Refill:  1    metoprolol tartrate (LOPRESSOR) 25 MG tablet     Sig: Take 0.5 tablets by mouth 2 times daily Indications: High Blood Pressure Disorder     Dispense:  90 tablet     Refill:  3    montelukast (SINGULAIR) 10 MG tablet     Sig: Take 1 tablet by mouth nightly as needed (ALLERGIES)     Dispense:  90 tablet     Refill:  3   40 minutes spent. Risk of lung cancer explained  RISK OF MENTAL health disorders explained with singuliar . Family and patient doesn't want to pursue any further work up, diagnostic testing, etc.   If anything should change or worsen call ASAP, don't wait for next scheduled appointment. Return in about 4 months (around 1/29/2023) for Chronic condition management/appointment, worsening symptoms, call ASAP for appointment.       Bill Coronel MD

## 2022-10-06 LAB
ALBUMIN SERPL-MCNC: 4.1 G/DL (ref 3.5–4.6)
ALP BLD-CCNC: 122 U/L (ref 40–130)
ALT SERPL-CCNC: 7 U/L (ref 0–33)
ANION GAP SERPL CALCULATED.3IONS-SCNC: 11 MEQ/L (ref 9–15)
AST SERPL-CCNC: 10 U/L (ref 0–35)
BASOPHILS ABSOLUTE: 0 K/UL (ref 0–0.2)
BASOPHILS RELATIVE PERCENT: 0.5 %
BILIRUB SERPL-MCNC: 0.4 MG/DL (ref 0.2–0.7)
BUN BLDV-MCNC: 24 MG/DL (ref 8–23)
C-REACTIVE PROTEIN: 8.8 MG/L (ref 0–5)
CALCIUM SERPL-MCNC: 9.3 MG/DL (ref 8.5–9.9)
CHLORIDE BLD-SCNC: 107 MEQ/L (ref 95–107)
CHOLESTEROL, TOTAL: 109 MG/DL (ref 0–199)
CO2: 25 MEQ/L (ref 20–31)
CREAT SERPL-MCNC: 0.86 MG/DL (ref 0.5–0.9)
EOSINOPHILS ABSOLUTE: 0.2 K/UL (ref 0–0.7)
EOSINOPHILS RELATIVE PERCENT: 2.1 %
GFR AFRICAN AMERICAN: >60
GFR NON-AFRICAN AMERICAN: >60
GLOBULIN: 3.1 G/DL (ref 2.3–3.5)
GLUCOSE BLD-MCNC: 101 MG/DL (ref 70–99)
HCT VFR BLD CALC: 35.8 % (ref 37–47)
HDLC SERPL-MCNC: 47 MG/DL (ref 40–59)
HEMOGLOBIN: 11.5 G/DL (ref 12–16)
INR BLD: 1.1
LDL CHOLESTEROL CALCULATED: 52 MG/DL (ref 0–129)
LYMPHOCYTES ABSOLUTE: 1.4 K/UL (ref 1–4.8)
LYMPHOCYTES RELATIVE PERCENT: 17.8 %
MCH RBC QN AUTO: 27.4 PG (ref 27–31.3)
MCHC RBC AUTO-ENTMCNC: 32.2 % (ref 33–37)
MCV RBC AUTO: 84.9 FL (ref 82–100)
MONOCYTES ABSOLUTE: 0.7 K/UL (ref 0.2–0.8)
MONOCYTES RELATIVE PERCENT: 8.6 %
NEUTROPHILS ABSOLUTE: 5.6 K/UL (ref 1.4–6.5)
NEUTROPHILS RELATIVE PERCENT: 71 %
PDW BLD-RTO: 17.5 % (ref 11.5–14.5)
PLATELET # BLD: 192 K/UL (ref 130–400)
POTASSIUM SERPL-SCNC: 4.2 MEQ/L (ref 3.4–4.9)
PROTHROMBIN TIME: 14 SEC (ref 12.3–14.9)
RBC # BLD: 4.21 M/UL (ref 4.2–5.4)
SODIUM BLD-SCNC: 143 MEQ/L (ref 135–144)
TOTAL PROTEIN: 7.2 G/DL (ref 6.3–8)
TRIGL SERPL-MCNC: 51 MG/DL (ref 0–150)
TSH REFLEX: 0.61 UIU/ML (ref 0.44–3.86)
WBC # BLD: 7.9 K/UL (ref 4.8–10.8)

## 2022-10-10 LAB
ALK PHOS OTHER CALC: 0 U/L
ALK PHOSPHATASE: 120 U/L (ref 40–120)
ALKALINE PHOSPHATASE BONE FRACTION: 26 U/L (ref 0–55)
ALKALINE PHOSPHATASE LIVER FRACTION: 94 U/L (ref 0–94)

## 2022-10-20 ENCOUNTER — TELEPHONE (OUTPATIENT)
Dept: FAMILY MEDICINE CLINIC | Age: 87
End: 2022-10-20

## 2022-10-20 NOTE — TELEPHONE ENCOUNTER
Giovanni Marquis from [facility] called to report that patient has had a steady cough for 2 weeks. Today it is thicker and she is wheezing in all lobes. There is a crackling in the bottom lobes. She has no fever and does not seem to be in any distress. Her bp is a little high at 142/74. Her pulse ox is 95-97. Giovanni Marquis has noticed a difference from her visit last week. Thank you.

## 2022-10-20 NOTE — TELEPHONE ENCOUNTER
Spoke to pt's daughter Ganesh Reynolds). She states she will be bringing TAUCHERS tomorrow in the morning.

## 2022-10-21 ENCOUNTER — OFFICE VISIT (OUTPATIENT)
Dept: FAMILY MEDICINE CLINIC | Age: 87
End: 2022-10-21
Payer: MEDICARE

## 2022-10-21 VITALS
OXYGEN SATURATION: 98 % | DIASTOLIC BLOOD PRESSURE: 74 MMHG | WEIGHT: 179 LBS | TEMPERATURE: 98 F | HEART RATE: 68 BPM | RESPIRATION RATE: 17 BRPM | HEIGHT: 62 IN | BODY MASS INDEX: 32.94 KG/M2 | SYSTOLIC BLOOD PRESSURE: 124 MMHG

## 2022-10-21 DIAGNOSIS — J44.0 COPD (CHRONIC OBSTRUCTIVE PULMONARY DISEASE) WITH ACUTE BRONCHITIS (HCC): Primary | ICD-10-CM

## 2022-10-21 DIAGNOSIS — J20.9 COPD (CHRONIC OBSTRUCTIVE PULMONARY DISEASE) WITH ACUTE BRONCHITIS (HCC): Primary | ICD-10-CM

## 2022-10-21 DIAGNOSIS — R05.1 ACUTE COUGH: ICD-10-CM

## 2022-10-21 LAB
INFLUENZA A ANTIBODY: NORMAL
INFLUENZA B ANTIBODY: NORMAL
Lab: NORMAL
PERFORMING INSTRUMENT: NORMAL
QC PASS/FAIL: NORMAL
SARS-COV-2, POC: NORMAL

## 2022-10-21 PROCEDURE — 87804 INFLUENZA ASSAY W/OPTIC: CPT | Performed by: NURSE PRACTITIONER

## 2022-10-21 PROCEDURE — 87426 SARSCOV CORONAVIRUS AG IA: CPT | Performed by: NURSE PRACTITIONER

## 2022-10-21 PROCEDURE — 1123F ACP DISCUSS/DSCN MKR DOCD: CPT | Performed by: NURSE PRACTITIONER

## 2022-10-21 PROCEDURE — 99214 OFFICE O/P EST MOD 30 MIN: CPT | Performed by: NURSE PRACTITIONER

## 2022-10-21 RX ORDER — PREDNISONE 10 MG/1
TABLET ORAL
Qty: 10 TABLET | Refills: 0 | Status: SHIPPED | OUTPATIENT
Start: 2022-10-21

## 2022-10-21 RX ORDER — BENZONATATE 100 MG/1
100 CAPSULE ORAL 3 TIMES DAILY PRN
Qty: 30 CAPSULE | Refills: 0 | Status: SHIPPED | OUTPATIENT
Start: 2022-10-21 | End: 2022-10-31

## 2022-10-21 RX ORDER — AZITHROMYCIN 250 MG/1
250 TABLET, FILM COATED ORAL SEE ADMIN INSTRUCTIONS
Qty: 6 TABLET | Refills: 0 | Status: SHIPPED | OUTPATIENT
Start: 2022-10-21 | End: 2022-10-26

## 2022-10-21 ASSESSMENT — ENCOUNTER SYMPTOMS
RHINORRHEA: 0
SHORTNESS OF BREATH: 0
COUGH: 1
WHEEZING: 0
DIARRHEA: 0
NAUSEA: 0
VOMITING: 0
SORE THROAT: 0

## 2022-10-21 NOTE — PROGRESS NOTES
Subjective:      Patient ID: Edin Roth is a 80 y.o. female who presents today for:  Chief Complaint   Patient presents with    Cough     For few weeks       Cough  Pertinent negatives include no chills, fever, headaches, myalgias, rash, rhinorrhea (chronic), sore throat, shortness of breath or wheezing.        Last couple days a light yellow tinge to the phlegm   Coughing the last 3 weeks   Not excessively coughing   Coughs a lot when wakes up and then gets better   The nurse came yesterday and said she heard wheezing and crackles in the lungs yesterday  Started as like a cold   The cough lingers   She has COPD   Suppose to wear oxygen at night   No covid testing done in the beginning   Cough has waxed and waned   Breathing treatments shes getting   More tired   She doesn't walk a whole lot   So really not more SOB   Clearing the throat a lot   Almost Every year she gets pneumonia   No fever   Eating well       Past Medical History:   Diagnosis Date    Age-related cognitive decline     Cancer ([address]     Cerebrovascular small vessel disease     Chronic nonspecific lung disease 2021    nodules R lung, fibrosis L apex    Glaucoma     Gout     left knee    Hypertension     Obesity (BMI 30-39.9) 07/11/2019    Obstructive sleep apnea 07/11/2019    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    Vitamin D deficiency 2019     Past Surgical History:   Procedure Laterality Date    CATARACT REMOVAL WITH IMPLANT Bilateral 2016    CHOLECYSTECTOMY, LAPAROSCOPIC N/A 7/22/2022    LAPAROSCOPIC CHOLECYSTECTOMY performed by Consuelo Ferrera MD at [address]  2009    polyps    COLONOSCOPY  02/2009    GASTROSTOMY TUBE PLACEMENT N/A 11/04/2020    EGD PEG TUBE PLACEMENT performed by Scooter Schumacher MD at [address] Right 04/15/2022    Flexima APDL Locking Pigtail Draiange Catheter inserted by Dr. Naz Hughes (Lot# [id number] / Exp: 12/07/2024)    IR CHOLECYSTOSTOMY PERCUTANEOUS COMPLETE  4/15/2022    IR CHOLECYSTOSTOMY PERCUTANEOUS COMPLETE 4/15/2022 [facility] SPECIAL PROCEDURE    SKIN CANCER EXCISION  2002    graft from neck     Social History     Socioeconomic History    Marital status:      Spouse name: Not on file    Number of children: Not on file    Years of education: Not on file    Highest education level: Not on file   Occupational History    Not on file   Tobacco Use    Smoking status: Never    Smokeless tobacco: Never   Vaping Use    Vaping Use: Never used   Substance and Sexual Activity    Alcohol use: No    Drug use: No    Sexual activity: Not Currently   Other Topics Concern    Not on file   Social History Narrative    ,  Lives with her dtr Lizette Brenner- Daughter (dtr does not work and is able to assist pt as needed), Son Hermelindo Bhat is in the area    Home: House- in [address] [facility]: Two level,Able to Live on Main level with bedroom/bathroom    Home Access: Stairs to enter with rails- Number of Steps: 3    Bathroom Shower/Tub: Tub/Shower unit,  Shower chair,Grab bars in shower,Hand-held shower    Home Equipment: Rolling walker    ADL Assistance: Needs assistance    Homemaking Assistance: Needs assistance    Homemaking Responsibilities: No    Ambulation Assistance: Independent (used [employer]),  BSC, LIFT CHAIR, WALKER, CANE,     Transfer Assistance: Independent    Active : No    Additional Comments: pt is questionable historian- question decreased recall versus unable to accurately hear the question; pt reports no recent falls at home.                               Social Determinants of Health     Financial Resource Strain: Low Risk     Difficulty of Paying Living Expenses: Not hard at all   Food Insecurity: No Food Insecurity    Worried About Running Out of Food in the Last Year: Never true    Ran Out of Food in the Last Year: Never true   Transportation Needs: Not on file   Physical Activity: Not on file   Stress: Not on file   Social Connections: Not on file   Intimate Partner Violence: Not on file   Housing Stability: Not on file     History reviewed. No pertinent family history. Allergies   Allergen Reactions    Norvasc [Amlodipine Besylate] Swelling     Current Outpatient Medications   Medication Sig Dispense Refill    azithromycin (ZITHROMAX) 250 MG tablet Take 1 tablet by mouth See Admin Instructions for 5 days 500mg on day 1 followed by 250mg on days 2 - 5 6 tablet 0    predniSONE (DELTASONE) 10 MG tablet Take 4 tabs daily for 4 days then 3 tabs daily for 3 days then 2 tabs daily for 2 days then 1 tab daily once 10 tablet 0    benzonatate (TESSALON) 100 MG capsule Take 1 capsule by mouth 3 times daily as needed for Cough 30 capsule 0    fluticasone-salmeterol (ADVAIR) 250-50 MCG/ACT AEPB diskus inhaler Inhale 1 puff into the lungs in the morning and 1 puff in the evening. Indications: Chronic Obstructive Lung Disease.  180 each 1    Handicap Placard MISC by Does not apply route Good for 2 years 1 each 0    atorvastatin (LIPITOR) 20 MG tablet TAKE 1 TABLET BY MOUTH DAILY AT BEDTIME 90 tablet 1    dorzolamide-timolol (COSOPT) 22.3-6.8 MG/ML ophthalmic solution Place 1 drop into both eyes 2 times daily Indications: Glaucoma 10 mL 5    latanoprost (XALATAN) 0.005 % ophthalmic solution Place 1 drop into both eyes daily Indications: Glaucoma 2.5 mL 5    brimonidine (ALPHAGAN P) 0.15 % ophthalmic solution Place 1 drop into both eyes 2 times daily Indications: Glaucoma 1 each 1    metoprolol tartrate (LOPRESSOR) 25 MG tablet Take 0.5 tablets by mouth 2 times daily Indications: High Blood Pressure Disorder 90 tablet 3    montelukast (SINGULAIR) 10 MG tablet Take 1 tablet by mouth nightly as needed (ALLERGIES) 90 tablet 3    zoster recombinant adjuvanted vaccine (SHINGRIX) 50 MCG/0.5ML SUSR injection Inject 0.5 mLs into the muscle See Admin Instructions 1 dose now and repeat in 2-6 months (Patient taking differently: Inject 0.5 mLs into the muscle See Admin Instructions Indications: Vaccination 1 dose now and repeat in 2-6 months  Scheduled 7/28/22 and 2nd scheduled 1/25/2023.) 0.5 mL 0    acetaminophen (TYLENOL) 325 MG tablet Take 2 tablets by mouth every 6 hours as needed for Pain (Patient taking differently: Take 650 mg by mouth every 4 hours as needed for Pain or Fever) 120 tablet 0    albuterol sulfate HFA (PROAIR HFA) 108 (90 Base) MCG/ACT inhaler Inhale 1 puff into the lungs every 6 hours as needed for Wheezing or Shortness of Breath 3 each 3    losartan (COZAAR) 25 MG tablet Take 1 tablet by mouth every 12 hours (Patient taking differently: 25 mg by PEG Tube route in the morning and 25 mg in the evening. Indications: High Blood Pressure Disorder.) 180 tablet 3    Elastic Bandages & Supports (MEDICAL COMPRESSION STOCKINGS) MISC Knee high, 20 to 30 mm hg, remove at night, pharmacy to fit. 1 each 0    OXYGEN Inhale 2 L into the lungs Indications: PRN at bedtime       No current facility-administered medications for this visit. Review of Systems   Constitutional:  Positive for fatigue. Negative for appetite change, chills and fever. HENT:  Negative for congestion, rhinorrhea (chronic) and sore throat. Respiratory:  Positive for cough. Negative for shortness of breath and wheezing. Gastrointestinal:  Negative for diarrhea, nausea and vomiting. Musculoskeletal:  Negative for myalgias. Skin:  Negative for rash. Neurological:  Negative for headaches. Psychiatric/Behavioral:  Negative for agitation, confusion and hallucinations. Objective:   /74 (Site: Left Upper Arm, Position: Sitting, Cuff Size: Medium Adult)   Pulse 68   Temp 98 °F (36.7 °C) (Temporal)   Resp 17   Ht 5' 2\" (1.575 m)   Wt 179 lb (81.2 kg)   LMP  (LMP Unknown)   SpO2 98%   BMI 32.74 kg/m²     Physical Exam  Vitals reviewed. Constitutional:       Appearance: Normal appearance. HENT:      Head: Normocephalic and atraumatic.       Right Ear: Hearing and external ear normal.      Left Ear: Hearing and external ear normal.      Ears:      Comments: Hearing aids in      Nose: Nose normal. No congestion or rhinorrhea. Right Nostril: No foreign body. Left Nostril: No foreign body. Right Turbinates: Not enlarged. Left Turbinates: Not enlarged. Right Sinus: No maxillary sinus tenderness or frontal sinus tenderness. Left Sinus: No maxillary sinus tenderness or frontal sinus tenderness. Mouth/Throat:      Lips: Pink. Mouth: Mucous membranes are moist.      Pharynx: Oropharynx is clear. Uvula midline. No pharyngeal swelling, oropharyngeal exudate, posterior oropharyngeal erythema or uvula swelling. Tonsils: No tonsillar exudate or tonsillar abscesses. Eyes:      General: Lids are normal.         Right eye: No foreign body. Left eye: No foreign body. Extraocular Movements: Extraocular movements intact. Conjunctiva/sclera: Conjunctivae normal.   Cardiovascular:      Rate and Rhythm: Normal rate and regular rhythm. Heart sounds: Normal heart sounds. Pulmonary:      Effort: Pulmonary effort is normal. No tachypnea, accessory muscle usage or respiratory distress. Breath sounds: Decreased breath sounds present. No wheezing or rhonchi. Comments: Cackles throughout but very tight sounding   Abdominal:      Tenderness: There is no abdominal tenderness. There is no guarding. Musculoskeletal:      Cervical back: Normal range of motion. Comments: In a wheelchair    Lymphadenopathy:      Cervical: No cervical adenopathy. Upper Body:      Right upper body: No supraclavicular adenopathy. Left upper body: No supraclavicular adenopathy. Skin:     General: Skin is warm and dry. Capillary Refill: Capillary refill takes less than 2 seconds. Neurological:      General: No focal deficit present. Mental Status: She is alert and oriented to person, place, and time.       Gait: Gait is intact. Psychiatric:         Mood and Affect: Mood normal.         Speech: Speech normal.         Behavior: Behavior normal. Behavior is cooperative. Thought Content: Thought content normal.         Judgment: Judgment normal.       Assessment:       Diagnosis Orders   1. COPD (chronic obstructive pulmonary disease) with acute bronchitis (HCC)  POCT COVID-19, Antigen    POCT Influenza A/B    XR CHEST (2 VW)    azithromycin (ZITHROMAX) 250 MG tablet    predniSONE (DELTASONE) 10 MG tablet    benzonatate (TESSALON) 100 MG capsule      2. Acute cough  POCT COVID-19, Antigen    POCT Influenza A/B    XR CHEST (2 VW)        No results found for this visit on 10/21/22. Plan:   She has mucinex at home and will continue that. Assessment & Plan   Mackenzie Hernandez was seen today for cough. Diagnoses and all orders for this visit:    COPD (chronic obstructive pulmonary disease) with acute bronchitis (HCC)  -     POCT COVID-19, Antigen  -     POCT Influenza A/B  -     XR CHEST (2 VW); Future  -     azithromycin (ZITHROMAX) 250 MG tablet; Take 1 tablet by mouth See Admin Instructions for 5 days 500mg on day 1 followed by 250mg on days 2 - 5  -     predniSONE (DELTASONE) 10 MG tablet; Take 4 tabs daily for 4 days then 3 tabs daily for 3 days then 2 tabs daily for 2 days then 1 tab daily once  -     benzonatate (TESSALON) 100 MG capsule; Take 1 capsule by mouth 3 times daily as needed for Cough    Acute cough  -     POCT COVID-19, Antigen  -     POCT Influenza A/B  -     XR CHEST (2 VW); Future    Orders Placed This Encounter   Procedures    XR CHEST (2 VW)     Standing Status:   Future     Standing Expiration Date:   10/21/2023     Order Specific Question:   Reason for exam:     Answer:   lingering cough, diminshed and crackles, COPD    POCT COVID-19, Antigen     Order Specific Question:   Is this test for diagnosis or screening?      Answer:   Screening     Order Specific Question:   Symptomatic for COVID-19 as defined by CDC?     Answer:   No     Order Specific Question:   Date of Symptom Onset     Answer:   N/A     Order Specific Question:   Hospitalized for COVID-19? Answer:   No     Order Specific Question:   Admitted to ICU for COVID-19? Answer:   No     Order Specific Question:   Employed in healthcare setting? Answer:   No     Order Specific Question:   Resident in a congregate (group) care setting? Answer:   No     Order Specific Question:   Pregnant? Answer:   No     Order Specific Question:   Previously tested for COVID-19? Answer:   Unknown    POCT Influenza A/B     Orders Placed This Encounter   Medications    azithromycin (ZITHROMAX) 250 MG tablet     Sig: Take 1 tablet by mouth See Admin Instructions for 5 days 500mg on day 1 followed by 250mg on days 2 - 5     Dispense:  6 tablet     Refill:  0    predniSONE (DELTASONE) 10 MG tablet     Sig: Take 4 tabs daily for 4 days then 3 tabs daily for 3 days then 2 tabs daily for 2 days then 1 tab daily once     Dispense:  10 tablet     Refill:  0    benzonatate (TESSALON) 100 MG capsule     Sig: Take 1 capsule by mouth 3 times daily as needed for Cough     Dispense:  30 capsule     Refill:  0       There are no discontinued medications. Return if symptoms worsen or fail to improve. Reviewed with the patient/family: current clinical status & medications. Side effects of the medication prescribed today, as well as treatment plan/rationale and result expectations have been discussed with the patient/family who expresses understanding. Patient will be discharged home in stable condition. Follow up with PCP to evaluate treatment results or return if symptoms worsen or fail to improve. Discussed signs and symptoms which require immediate follow-up in ED/call to 911. Understanding verbalized. I have reviewed the patient's medical history in detail and updated the computerized patient record.     Hero Powell, APRN - CNP

## 2022-11-07 ENCOUNTER — OFFICE VISIT (OUTPATIENT)
Dept: FAMILY MEDICINE CLINIC | Age: 87
End: 2022-11-07
Payer: MEDICARE

## 2022-11-07 VITALS
DIASTOLIC BLOOD PRESSURE: 70 MMHG | HEART RATE: 74 BPM | TEMPERATURE: 98 F | OXYGEN SATURATION: 99 % | BODY MASS INDEX: 32.74 KG/M2 | SYSTOLIC BLOOD PRESSURE: 134 MMHG | HEIGHT: 62 IN

## 2022-11-07 DIAGNOSIS — L30.9 DERMATITIS: Primary | ICD-10-CM

## 2022-11-07 DIAGNOSIS — L30.9 DERMATITIS: ICD-10-CM

## 2022-11-07 LAB
BASOPHILS ABSOLUTE: 0 K/UL (ref 0–0.2)
BASOPHILS RELATIVE PERCENT: 0.5 %
EOSINOPHILS ABSOLUTE: 0.3 K/UL (ref 0–0.7)
EOSINOPHILS RELATIVE PERCENT: 4 %
HCT VFR BLD CALC: 38.9 % (ref 37–47)
HEMOGLOBIN: 12.5 G/DL (ref 12–16)
LYMPHOCYTES ABSOLUTE: 1.1 K/UL (ref 1–4.8)
LYMPHOCYTES RELATIVE PERCENT: 14.9 %
MCH RBC QN AUTO: 27.3 PG (ref 27–31.3)
MCHC RBC AUTO-ENTMCNC: 32.2 % (ref 33–37)
MCV RBC AUTO: 84.7 FL (ref 79.4–94.8)
MONOCYTES ABSOLUTE: 0.7 K/UL (ref 0.2–0.8)
MONOCYTES RELATIVE PERCENT: 9.2 %
NEUTROPHILS ABSOLUTE: 5.5 K/UL (ref 1.4–6.5)
NEUTROPHILS RELATIVE PERCENT: 71.4 %
PDW BLD-RTO: 16.7 % (ref 11.5–14.5)
PLATELET # BLD: 159 K/UL (ref 130–400)
RBC # BLD: 4.59 M/UL (ref 4.2–5.4)
WBC # BLD: 7.7 K/UL (ref 4.8–10.8)

## 2022-11-07 PROCEDURE — 99213 OFFICE O/P EST LOW 20 MIN: CPT | Performed by: PHYSICIAN ASSISTANT

## 2022-11-07 PROCEDURE — 1123F ACP DISCUSS/DSCN MKR DOCD: CPT | Performed by: PHYSICIAN ASSISTANT

## 2022-11-07 RX ORDER — FAMOTIDINE 20 MG/1
20 TABLET, FILM COATED ORAL 2 TIMES DAILY
Qty: 30 TABLET | Refills: 3 | Status: SHIPPED | OUTPATIENT
Start: 2022-11-07

## 2022-11-07 RX ORDER — METHYLPREDNISOLONE 4 MG/1
TABLET ORAL
Qty: 1 KIT | Refills: 0 | Status: SHIPPED | OUTPATIENT
Start: 2022-11-07 | End: 2022-11-13

## 2022-11-07 RX ORDER — CETIRIZINE HYDROCHLORIDE 10 MG/1
5 TABLET ORAL NIGHTLY PRN
Qty: 30 TABLET | Refills: 2 | Status: SHIPPED | OUTPATIENT
Start: 2022-11-07

## 2022-11-07 NOTE — PROGRESS NOTES
Subjective  St. Helen[place] Robertson, 80 y.o. female presents today with:  Chief Complaint   Patient presents with    Rash     Patient presents today with a rash all over her body. X2 weeks. HPI  Patient is here along with her daughter being seen acutely for diffuse pruritic rash on her trunk for the past 2 weeks  Denies change in soaps detergents travel  Exposure to animals   treated with Zithromax and steroids for upper respiratory infection - had rash prior -not sure if meds had a pos or neg affect    Review of Systems   All other systems reviewed and are negative. Past Medical History:   Diagnosis Date    Age-related cognitive decline     [address] [address]     Cerebrovascular small vessel disease     Chronic nonspecific lung disease 2021    nodules R lung, fibrosis L apex    Glaucoma     Gout     left knee    Hypertension     Obesity (BMI 30-39.9) 07/11/2019    Obstructive sleep apnea 07/11/2019    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    Vitamin D deficiency 2019     Past Surgical History:   Procedure Laterality Date    CATARACT REMOVAL WITH IMPLANT Bilateral 2016    CHOLECYSTECTOMY, LAPAROSCOPIC N/A 7/22/2022    LAPAROSCOPIC CHOLECYSTECTOMY performed by Albino Rojas MD at [address]  2009    polyps    COLONOSCOPY  02/2009    GASTROSTOMY TUBE PLACEMENT N/A 11/04/2020    EGD PEG TUBE PLACEMENT performed by Saw Knutson MD at [address] Right 04/15/2022    Flexima APDL Locking Pigtail Draiange Catheter inserted by Dr. Claudette Cumming (Lot# [id number] / Exp: 12/07/2024)    IR CHOLECYSTOSTOMY PERCUTANEOUS COMPLETE  4/15/2022    IR CHOLECYSTOSTOMY PERCUTANEOUS COMPLETE 4/15/2022 [facility] SPECIAL PROCEDURE    SKIN CANCER EXCISION  2002    graft from neck     Social History     Socioeconomic History    Marital status:       Spouse name: Not on file    Number of children: Not on file    Years of education: Not on file    Highest education level: Not on file Occupational History    Not on file   Tobacco Use    Smoking status: Never    Smokeless tobacco: Never   Vaping Use    Vaping Use: Never used   Substance and Sexual Activity    Alcohol use: No    Drug use: No    Sexual activity: Not Currently   Other Topics Concern    Not on file   Social History Narrative    ,  Lives with her dtr Rylie Krishna- Daughter (dtr does not work and is able to assist pt as needed), Son Fabián Springer is in the area    Home: House- in [place] [address]: Two level,Able to Live on Main level with bedroom/bathroom    Home Access: Stairs to enter with rails- Number of Steps: 3    Bathroom Shower/Tub: Tub/Shower unit,  Shower chair,Grab bars in shower,Hand-held shower    Home Equipment: Rolling walker    ADL Assistance: Needs assistance    Homemaking Assistance: Needs assistance    Homemaking Responsibilities: No    Ambulation Assistance: Independent (used [employer]),  BSC, LIFT CHAIR, WALKER, CANE,     Transfer Assistance: Independent    Active : No    Additional Comments: pt is questionable historian- question decreased recall versus unable to accurately hear the question; pt reports no recent falls at home. Social Determinants of Health     Financial Resource Strain: Low Risk     Difficulty of Paying Living Expenses: Not hard at all   Food Insecurity: No Food Insecurity    Worried About Running Out of Food in the Last Year: Never true    Ran Out of Food in the Last Year: Never true   Transportation Needs: Not on file   Physical Activity: Not on file   Stress: Not on file   Social Connections: Not on file   Intimate Partner Violence: Not on file   Housing Stability: Not on file     No family history on file. Allergies   Allergen Reactions    Norvasc [Amlodipine Besylate] Swelling     Current Outpatient Medications   Medication Sig Dispense Refill    methylPREDNISolone (MEDROL DOSEPACK) 4 MG tablet Take by mouth.  1 kit 0    famotidine (PEPCID) 20 MG tablet Take 1 tablet by mouth 2 times daily 30 tablet 3    cetirizine (ZYRTEC) 10 MG tablet Take 0.5 tablets by mouth nightly as needed for Allergies 30 tablet 2    predniSONE (DELTASONE) 10 MG tablet Take 4 tabs daily for 4 days then 3 tabs daily for 3 days then 2 tabs daily for 2 days then 1 tab daily once 10 tablet 0    fluticasone-salmeterol (ADVAIR) 250-50 MCG/ACT AEPB diskus inhaler Inhale 1 puff into the lungs in the morning and 1 puff in the evening. Indications: Chronic Obstructive Lung Disease.  180 each 1    Handicap Placard MISC by Does not apply route Good for 2 years 1 each 0    atorvastatin (LIPITOR) 20 MG tablet TAKE 1 TABLET BY MOUTH DAILY AT BEDTIME 90 tablet 1    dorzolamide-timolol (COSOPT) 22.3-6.8 MG/ML ophthalmic solution Place 1 drop into both eyes 2 times daily Indications: Glaucoma 10 mL 5    latanoprost (XALATAN) 0.005 % ophthalmic solution Place 1 drop into both eyes daily Indications: Glaucoma 2.5 mL 5    brimonidine (ALPHAGAN P) 0.15 % ophthalmic solution Place 1 drop into both eyes 2 times daily Indications: Glaucoma 1 each 1    metoprolol tartrate (LOPRESSOR) 25 MG tablet Take 0.5 tablets by mouth 2 times daily Indications: High Blood Pressure Disorder 90 tablet 3    montelukast (SINGULAIR) 10 MG tablet Take 1 tablet by mouth nightly as needed (ALLERGIES) 90 tablet 3    zoster recombinant adjuvanted vaccine (SHINGRIX) 50 MCG/0.5ML SUSR injection Inject 0.5 mLs into the muscle See Admin Instructions 1 dose now and repeat in 2-6 months (Patient taking differently: Inject 0.5 mLs into the muscle See Admin Instructions Indications: Vaccination 1 dose now and repeat in 2-6 months  Scheduled 7/28/22 and 2nd scheduled 1/25/2023.) 0.5 mL 0    acetaminophen (TYLENOL) 325 MG tablet Take 2 tablets by mouth every 6 hours as needed for Pain (Patient taking differently: Take 650 mg by mouth every 4 hours as needed for Pain or Fever) 120 tablet 0    albuterol sulfate HFA (PROAIR HFA) 108 (90 Base) MCG/ACT inhaler Inhale 1 puff into the lungs every 6 hours as needed for Wheezing or Shortness of Breath 3 each 3    losartan (COZAAR) 25 MG tablet Take 1 tablet by mouth every 12 hours (Patient taking differently: 25 mg by PEG Tube route in the morning and 25 mg in the evening. Indications: High Blood Pressure Disorder.) 180 tablet 3    Elastic Bandages & Supports (MEDICAL COMPRESSION STOCKINGS) MISC Knee high, 20 to 30 mm hg, remove at night, pharmacy to fit. 1 each 0    OXYGEN Inhale 2 L into the lungs Indications: PRN at bedtime       No current facility-administered medications for this visit. Objective    Vitals:    11/07/22 1213   BP: 134/70   Site: Right Upper Arm   Position: Sitting   Cuff Size: Medium Adult   Pulse: 74   Temp: 98 °F (36.7 °C)   TempSrc: Temporal   SpO2: 99%   Height: 5' 2\" (1.575 m)     Physical Exam  Constitutional:       General: She is not in acute distress. Appearance: She is obese. She is not ill-appearing. HENT:      Head: Normocephalic and atraumatic. Eyes:      Extraocular Movements: Extraocular movements intact. Conjunctiva/sclera: Conjunctivae normal.      Pupils: Pupils are equal, round, and reactive to light. Neck:      Thyroid: No thyromegaly. Pulmonary:      Effort: Pulmonary effort is normal. No respiratory distress. Abdominal:      General: Bowel sounds are normal.      Palpations: Abdomen is soft. Tenderness: There is no abdominal tenderness. Musculoskeletal:      Cervical back: Normal range of motion and neck supple. Lymphadenopathy:      Cervical: No cervical adenopathy. Skin:     General: Skin is warm and dry. Coloration: Skin is not jaundiced. Findings: Erythema and rash present. Comments: Diffuse excoriated macular rash on trunk and lower  extremities   Neurological:      General: No focal deficit present. Mental Status: She is alert and oriented to person, place, and time.       Cranial Nerves: No cranial nerve

## 2022-11-15 ENCOUNTER — TELEPHONE (OUTPATIENT)
Dept: FAMILY MEDICINE CLINIC | Age: 87
End: 2022-11-15

## 2022-11-15 DIAGNOSIS — L30.9 DERMATITIS: Primary | ICD-10-CM

## 2022-11-29 DIAGNOSIS — L30.9 DERMATITIS: ICD-10-CM

## 2022-11-29 RX ORDER — FAMOTIDINE 20 MG/1
TABLET, FILM COATED ORAL
Qty: 30 TABLET | Refills: 3 | OUTPATIENT
Start: 2022-11-29

## 2022-12-04 NOTE — TELEPHONE ENCOUNTER
Linda Kaufman is calling from [facility]. Patient is being discharged from [facility] today.   Linda Kaufman is requesting an order for [address] [phone]    LOV 8/4/2021     Future Appointments   Date Time Provider Lovely Perdomo   2/10/2022 11:00 AM Alexandra Godwin  [place], [place] [address]
Okay to order  Please do
Referral placed
24

## 2022-12-06 NOTE — TELEPHONE ENCOUNTER
Patient requesting medication refill. Please approve or deny this request.    Rx requested:  Requested Prescriptions     Pending Prescriptions Disp Refills    losartan (COZAAR) 25 MG tablet 180 tablet 3     Sig: Take 1 tablet by mouth in the morning and 1 tablet in the evening.          Next Visit Date:  Future Appointments   Date Time Provider Lovely Perdomo   1/13/2023  2:00 PM Anastacio Lemus  [place], [place]

## 2022-12-07 RX ORDER — LOSARTAN POTASSIUM 25 MG/1
25 TABLET ORAL EVERY 12 HOURS
Qty: 180 TABLET | Refills: 3 | Status: SHIPPED | OUTPATIENT
Start: 2022-12-07

## 2022-12-13 DIAGNOSIS — L30.9 DERMATITIS: ICD-10-CM

## 2022-12-13 NOTE — TELEPHONE ENCOUNTER
Rx requested:  Requested Prescriptions     Pending Prescriptions Disp Refills    famotidine (PEPCID) 20 MG tablet [Pharmacy Med Name: FAMOTIDINE 20 MG TABLET] 30 tablet 3     Sig: TAKE 1 TABLET BY MOUTH TWICE A DAY         Last Office Visit:   11/7/2022 with SUJATHA Rubio      Next Visit Date:  Future Appointments   Date Time Provider Lovely Perdomo   1/13/2023  2:00 PM Sumit Bermeo MD [address] [place] [address] 7

## 2022-12-14 RX ORDER — FAMOTIDINE 20 MG/1
TABLET, FILM COATED ORAL
Qty: 30 TABLET | Refills: 3 | Status: SHIPPED | OUTPATIENT
Start: 2022-12-14

## 2022-12-16 ENCOUNTER — TELEPHONE (OUTPATIENT)
Dept: FAMILY MEDICINE CLINIC | Age: 87
End: 2022-12-16

## 2022-12-16 NOTE — TELEPHONE ENCOUNTER
Patient's Daughter German Velazquez called stating the patient was seen at [facility] on 12/14/2022 and said that [facility] from [facility] said her mothers rash could be from the blood pressure medication she is on and is requesting a call back at [phone]

## 2022-12-21 ENCOUNTER — TELEMEDICINE (OUTPATIENT)
Dept: FAMILY MEDICINE CLINIC | Age: 87
End: 2022-12-21
Payer: MEDICARE

## 2022-12-21 DIAGNOSIS — Z74.09 IMPAIRED MOBILITY AND ACTIVITIES OF DAILY LIVING: ICD-10-CM

## 2022-12-21 DIAGNOSIS — Z78.9 IMPAIRED MOBILITY AND ACTIVITIES OF DAILY LIVING: ICD-10-CM

## 2022-12-21 DIAGNOSIS — E55.9 VITAMIN D DEFICIENCY: Primary | ICD-10-CM

## 2022-12-21 DIAGNOSIS — L30.9 DERMATITIS: ICD-10-CM

## 2022-12-21 DIAGNOSIS — I10 PRIMARY HYPERTENSION: ICD-10-CM

## 2022-12-21 PROCEDURE — 99214 OFFICE O/P EST MOD 30 MIN: CPT | Performed by: NURSE PRACTITIONER

## 2022-12-21 PROCEDURE — 1123F ACP DISCUSS/DSCN MKR DOCD: CPT | Performed by: NURSE PRACTITIONER

## 2022-12-21 RX ORDER — PERMETHRIN 50 MG/G
CREAM TOPICAL ONCE
COMMUNITY

## 2022-12-21 RX ORDER — IVERMECTIN 3 MG/1
TABLET ORAL ONCE
COMMUNITY

## 2022-12-21 RX ORDER — LISINOPRIL 10 MG/1
10 TABLET ORAL DAILY
Qty: 30 TABLET | Refills: 2 | Status: SHIPPED | OUTPATIENT
Start: 2022-12-21

## 2022-12-21 RX ORDER — ERGOCALCIFEROL 1.25 MG/1
50000 CAPSULE ORAL WEEKLY
Qty: 12 CAPSULE | Refills: 1 | Status: SHIPPED | OUTPATIENT
Start: 2022-12-21

## 2022-12-21 ASSESSMENT — ENCOUNTER SYMPTOMS
SHORTNESS OF BREATH: 0
COLOR CHANGE: 1
COUGH: 0
WHEEZING: 0

## 2022-12-21 NOTE — PROGRESS NOTES
Subjective:      Patient ID: Emanuel Robertson is a 80 y.o. female who presents today for:     Chief Complaint   Patient presents with    Rash     Patient presents today with daughter Jeanie Lee to follow up on rash that is all over her body. Patient saw Brandon on 12/14/2022 and was told it could be from her BP medication. Patient has not taken BP medication since last night. HPI Pt has had rash for the last 2 months. Derm is concerned it may be related to losartan. Pt has had biopsy done and scrapped without any specific results. Reports it is diffuse on trunk and breast. Kenalog topical has helped some. Patient still taking losartan for a few years now without any issues. Derm is concerned that it may just be starting up. Patient has allergy to amlodipine causes her to have some swelling. Patient also has a difficult time getting around and is unable to come into the office. She was getting home health care that just ended yesterday. The nurse was coming and checking vitals and helping to make sure everything was okay with her.     Past Medical History:   Diagnosis Date    Age-related cognitive decline     Cancer ([facility] [address]     Cerebrovascular small vessel disease     Chronic nonspecific lung disease 2021    nodules R lung, fibrosis L apex    Glaucoma     Gout     left knee    Hypertension     Obesity (BMI 30-39.9) 07/11/2019    Obstructive sleep apnea 07/11/2019    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    Vitamin D deficiency 2019     Past Surgical History:   Procedure Laterality Date    CATARACT REMOVAL WITH IMPLANT Bilateral 2016    CHOLECYSTECTOMY, LAPAROSCOPIC N/A 7/22/2022    LAPAROSCOPIC CHOLECYSTECTOMY performed by Albino Rojas MD at [address]  2009    polyps    COLONOSCOPY  02/2009    GASTROSTOMY TUBE PLACEMENT N/A 11/04/2020    EGD PEG TUBE PLACEMENT performed by Saw Knutson MD at [address] [facility] Right 04/15/2022    [address] [place] [address] Locking Pigtail Draiange Catheter inserted by Dr. Whitley Villegas (Lot# [id number] / Exp: 12/07/2024)    IR CHOLECYSTOSTOMY PERCUTANEOUS COMPLETE  4/15/2022    IR CHOLECYSTOSTOMY PERCUTANEOUS COMPLETE 4/15/2022 MLOZ SPECIAL PROCEDURE    SKIN CANCER EXCISION  2002    graft from neck     No family history on file. Social History     Socioeconomic History    Marital status:      Spouse name: Not on file    Number of children: Not on file    Years of education: Not on file    Highest education level: Not on file   Occupational History    Not on file   Tobacco Use    Smoking status: Never    Smokeless tobacco: Never   Vaping Use    Vaping Use: Never used   Substance and Sexual Activity    Alcohol use: No    Drug use: No    Sexual activity: Not Currently   Other Topics Concern    Not on file   Social History Narrative    ,  Lives with her dtr Alfonso Voss- Daughter (dtr does not work and is able to assist pt as needed), Son Jens Jo is in the area    Home: House- in [address]: Two level,Able to Live on Main level with bedroom/bathroom    Home Access: Stairs to enter with rails- Number of Steps: 3    Bathroom Shower/Tub: Tub/Shower unit,  Shower chair,Grab bars in shower,Hand-held shower    Home Equipment: Rolling walker    ADL Assistance: Needs assistance    Homemaking Assistance: Needs assistance    Homemaking Responsibilities: No    Ambulation Assistance: Independent (used [facility]),  BSC, LIFT CHAIR, WALKER, CANE,     Transfer Assistance: Independent    Active : No    Additional Comments: pt is questionable historian- question decreased recall versus unable to accurately hear the question; pt reports no recent falls at home.                               Social Determinants of Health     Financial Resource Strain: Low Risk     Difficulty of Paying Living Expenses: Not hard at all   Food Insecurity: No Food Insecurity    Worried About [address] in the Last Year: Never true    Ran Out of Food in the Last Year: Never true   Transportation Needs: Not on file   Physical Activity: Not on file   Stress: Not on file   Social Connections: Not on file   Intimate Partner Violence: Not on file   Housing Stability: Not on file     Current Outpatient Medications on File Prior to Visit   Medication Sig Dispense Refill    permethrin (ELIMITE) 5 % cream once Apply topically as directed      ivermectin 3 MG tablet Take by mouth once Given on 12/15/2022      famotidine (PEPCID) 20 MG tablet TAKE 1 TABLET BY MOUTH TWICE A DAY 30 tablet 3    fluticasone-salmeterol (ADVAIR) 250-50 MCG/ACT AEPB diskus inhaler Inhale 1 puff into the lungs in the morning and 1 puff in the evening. Indications: Chronic Obstructive Lung Disease. 180 each 1    Handicap Placard MISC by Does not apply route Good for 2 years 1 each 0    atorvastatin (LIPITOR) 20 MG tablet TAKE 1 TABLET BY MOUTH DAILY AT BEDTIME 90 tablet 1    dorzolamide-timolol (COSOPT) 22.3-6.8 MG/ML ophthalmic solution Place 1 drop into both eyes 2 times daily Indications: Glaucoma 10 mL 5    latanoprost (XALATAN) 0.005 % ophthalmic solution Place 1 drop into both eyes daily Indications: Glaucoma 2.5 mL 5    brimonidine (ALPHAGAN P) 0.15 % ophthalmic solution Place 1 drop into both eyes 2 times daily Indications: Glaucoma 1 each 1    metoprolol tartrate (LOPRESSOR) 25 MG tablet Take 0.5 tablets by mouth 2 times daily Indications: High Blood Pressure Disorder 90 tablet 3    montelukast (SINGULAIR) 10 MG tablet Take 1 tablet by mouth nightly as needed (ALLERGIES) 90 tablet 3    acetaminophen (TYLENOL) 325 MG tablet Take 2 tablets by mouth every 6 hours as needed for Pain 120 tablet 0    albuterol sulfate HFA (PROAIR HFA) 108 (90 Base) MCG/ACT inhaler Inhale 1 puff into the lungs every 6 hours as needed for Wheezing or Shortness of Breath 3 each 3    Elastic Bandages & Supports (MEDICAL COMPRESSION STOCKINGS) MISC Knee high, 20 to 30 mm hg, remove at night, pharmacy to fit.  1 each 0    OXYGEN Inhale 2 L into the lungs Indications: PRN at bedtime      cetirizine (ZYRTEC) 10 MG tablet Take 0.5 tablets by mouth nightly as needed for Allergies (Patient not taking: Reported on 12/21/2022) 30 tablet 2    zoster recombinant adjuvanted vaccine [facility]) 50 MCG/0.5ML SUSR injection Inject 0.5 mLs into the muscle See Admin Instructions 1 dose now and repeat in 2-6 months (Patient not taking: Reported on 12/21/2022) 0.5 mL 0     No current facility-administered medications on file prior to visit. Allergies:  Norvasc [amlodipine besylate]    Review of Systems   Constitutional:  Negative for chills, fatigue and fever. HENT:  Negative for congestion. Respiratory:  Negative for cough, shortness of breath and wheezing. Cardiovascular:  Negative for chest pain and palpitations. Skin:  Positive for color change and rash. Psychiatric/Behavioral:  Negative for self-injury and suicidal ideas. Objective:   LMP  (LMP Unknown)     Physical Exam  Constitutional:       General: She is awake. She is not in acute distress. Neurological:      Mental Status: She is alert. Spoke mostly with patient's daughter. Assessment:          Diagnosis Orders   1. Vitamin D deficiency  vitamin D (ERGOCALCIFEROL) 1.25 MG (05554 UT) CAPS capsule      2. Primary hypertension  lisinopril (PRINIVIL;ZESTRIL) 10 MG tablet    External Referral to Home Health      3.  Impaired mobility and activities of daily living-due to cervical myelopathy  External Referral to Home Health      4. Dermatitis      Hypoallergenic soaps and detergents          Plan:      Orders Placed This Encounter   Procedures    External Referral to Home Health     Referral Priority:   Routine     Referral Type:   Eval and Treat     Referral Reason:   Specialty Services Required     Requested Specialty:   [address]     Number of Visits Requested:   1          Orders Placed This Encounter   Medications    vitamin D (ERGOCALCIFEROL) 1.25 MG (17772 UT) CAPS capsule     Sig: Take 1 capsule by mouth once a week     Dispense:  12 capsule     Refill:  1    lisinopril (PRINIVIL;ZESTRIL) 10 MG tablet     Sig: Take 1 tablet by mouth daily     Dispense:  30 tablet     Refill:  2       Return in about 3 weeks (around 1/11/2023). 1. Vitamin D deficiency    - vitamin D (ERGOCALCIFEROL) 1.25 MG (65735 UT) CAPS capsule; Take 1 capsule by mouth once a week  Dispense: 12 capsule; Refill: 1    2. Primary hypertension  Stop losartan. Start lisinopril daily. - lisinopril (PRINIVIL;ZESTRIL) 10 MG tablet; Take 1 tablet by mouth daily  Dispense: 30 tablet; Refill: 2  - External Referral to Home Health    3. Impaired mobility and activities of daily living-due to cervical myelopathy  Will benefit from home health continuing on specifically with vital sign monitoring since patient is unable to come to office.  - External Referral to [address] [place] [address] Juan Calabrese    4. Dermatitis  Continue with dermatology and change hypertension medications. Reviewed with the patient: current clinicalstatus, medications, activities and diet. Side effects, adverse effects of the medication prescribedtoday, as well as treatment plan/ rationale and result expectations have been discussedwith the patient who expresses understanding and desires to proceed. Close follow upto evaluate treatment results and for coordination of care. I have reviewedthe patient's medical history in detail and updated the computerized patient record. TELEHEALTH EVALUATION -- Audio/Visual (During NGOJE-58 public health emergency)    -   Rosana Aguilar is a 80 y.o. female being evaluated by a Virtual Visit (video visit) encounter to address concerns as mentioned above. A caregiver was present when appropriate.  Due to this being a TeleHealth encounter (During IBQIQ-36 public health emergency), evaluation of the following organ systems was limited: Vitals/Constitutional/EENT/Resp/CV/GI//MS/Neuro/Skin/Heme-Lymph-Imm. Pursuant to the emergency declaration under the [address] [place] [address], [address] [place] [address] and the [employer] Act, this Virtual Visit was conducted with patient's (and/or legal guardian's) consent, to reduce the patient's risk of exposure to COVID-19 and provide necessary medical care. The patient (and/or legal guardian) has also been advised to contact this office for worsening conditions or problems, and seek emergency medical treatment and/or call 911 if deemed necessary. Services were provided through a video synchronous discussion virtually to substitute for in-person clinic visit. Type of encounter was _X_ Doxy __ MyChart ___Facetime    Patient was located at their home. Provider was located at their ___ home or        ___X_ office. --JULIO Purdy CNP on 12/21/2022 at 1:22 PM    An electronic signature was used to authenticate this note.

## 2022-12-22 DIAGNOSIS — L30.9 DERMATITIS: ICD-10-CM

## 2022-12-22 RX ORDER — FAMOTIDINE 20 MG/1
TABLET, FILM COATED ORAL
Qty: 30 TABLET | Refills: 3 | OUTPATIENT
Start: 2022-12-22

## 2022-12-29 DIAGNOSIS — L30.9 DERMATITIS: ICD-10-CM

## 2022-12-29 RX ORDER — CETIRIZINE HYDROCHLORIDE 10 MG/1
5 TABLET ORAL NIGHTLY PRN
Qty: 30 TABLET | Refills: 2 | Status: SHIPPED | OUTPATIENT
Start: 2022-12-29

## 2023-01-09 ENCOUNTER — TELEPHONE (OUTPATIENT)
Dept: FAMILY MEDICINE CLINIC | Age: 88
End: 2023-01-09

## 2023-01-09 NOTE — TELEPHONE ENCOUNTER
----- Message from [place] sent at 1/6/2023  2:46 PM EST -----  Subject: Referral Request    Reason for referral request? [facility]  Provider patient wants to be referred to(if known):     Provider Phone Number(if known):     Additional Information for Provider?   ---------------------------------------------------------------------------  --------------  [id number] [employer]    [phone]; OK to leave message on voicemail  ---------------------------------------------------------------------------  --------------

## 2023-01-10 DIAGNOSIS — L30.9 DERMATITIS: ICD-10-CM

## 2023-01-10 RX ORDER — FAMOTIDINE 20 MG/1
TABLET, FILM COATED ORAL
Qty: 30 TABLET | Refills: 3 | Status: SHIPPED | OUTPATIENT
Start: 2023-01-10

## 2023-01-12 DIAGNOSIS — I10 PRIMARY HYPERTENSION: ICD-10-CM

## 2023-01-12 RX ORDER — LISINOPRIL 10 MG/1
TABLET ORAL
Qty: 30 TABLET | Refills: 0 | Status: SHIPPED | OUTPATIENT
Start: 2023-01-12

## 2023-01-13 ENCOUNTER — TELEPHONE (OUTPATIENT)
Dept: FAMILY MEDICINE CLINIC | Age: 88
End: 2023-01-13

## 2023-01-13 NOTE — TELEPHONE ENCOUNTER
----- Message from Ruddy De La Torre sent at 1/13/2023  1:09 PM EST -----  Subject: Appointment Request    Reason for Call: Established Patient Appointment needed: New Patient   Request Appointment    QUESTIONS    Reason for appointment request? No appointments available during search     Additional Information for Provider?  PT NEEDS A VIRTUAL VISIT ,PT HAD ONE   ON 12- AND PTNEEDS A F/U FOR BLOOD PRESSURE AND EST HER AS A NEW PT   ,AND TO CHECK ON A VISITING NURSE FOR PATIENT ,PT NEEDS CARLA AS HER   PRIMARY CARE PROVIDER   ---------------------------------------------------------------------------  --------------  Zoe GILBERT  [phone]; OK to leave message on voicemail  ---------------------------------------------------------------------------  --------------  SCRIPT ANSWERS  COVID Screen: Geri Soto

## 2023-01-13 NOTE — TELEPHONE ENCOUNTER
Called pt, spoke with pt's daughter, scheduled an OV for 01/27/23  Pt's daughter also stated [facility] did not receive fax sent on 12/22/22.  Fax resent 01/13/23

## 2023-01-13 NOTE — TELEPHONE ENCOUNTER
Called patient per message below, left VM to call office to schedule appointment. Reason for Call: New Patient/New to Provider Appointment needed: New   Patient Request Appointment     QUESTIONS     Reason for appointment request? Available appointments did not meet   patient need       Additional Information for Provider? Daughter called inquiring when the   next avail new pt appt would be with Brenton Portillo. They are also   requesting due to inclement weather changes that if this appt can be a   virtual. No VV appts populated for Brenton Portillo. Please return call to   Sadia Lovell to schedule.

## 2023-01-21 DIAGNOSIS — L30.9 DERMATITIS: ICD-10-CM

## 2023-01-23 RX ORDER — FAMOTIDINE 20 MG/1
TABLET, FILM COATED ORAL
Qty: 30 TABLET | Refills: 3 | OUTPATIENT
Start: 2023-01-23

## 2023-01-27 ENCOUNTER — OFFICE VISIT (OUTPATIENT)
Dept: FAMILY MEDICINE CLINIC | Age: 88
End: 2023-01-27
Payer: MEDICARE

## 2023-01-27 VITALS
DIASTOLIC BLOOD PRESSURE: 68 MMHG | HEIGHT: 62 IN | SYSTOLIC BLOOD PRESSURE: 132 MMHG | BODY MASS INDEX: 32.74 KG/M2 | HEART RATE: 56 BPM | OXYGEN SATURATION: 97 % | TEMPERATURE: 97.3 F

## 2023-01-27 DIAGNOSIS — M25.572 CHRONIC PAIN OF BOTH ANKLES: Primary | ICD-10-CM

## 2023-01-27 DIAGNOSIS — M79.89 LEG SWELLING: ICD-10-CM

## 2023-01-27 DIAGNOSIS — E78.5 DYSLIPIDEMIA: ICD-10-CM

## 2023-01-27 DIAGNOSIS — M25.571 CHRONIC PAIN OF BOTH ANKLES: Primary | ICD-10-CM

## 2023-01-27 DIAGNOSIS — I10 PRIMARY HYPERTENSION: ICD-10-CM

## 2023-01-27 DIAGNOSIS — J44.9 CHRONIC OBSTRUCTIVE PULMONARY DISEASE, UNSPECIFIED COPD TYPE (HCC): ICD-10-CM

## 2023-01-27 DIAGNOSIS — G89.29 CHRONIC PAIN OF BOTH ANKLES: Primary | ICD-10-CM

## 2023-01-27 PROCEDURE — 99214 OFFICE O/P EST MOD 30 MIN: CPT | Performed by: NURSE PRACTITIONER

## 2023-01-27 PROCEDURE — 1123F ACP DISCUSS/DSCN MKR DOCD: CPT | Performed by: NURSE PRACTITIONER

## 2023-01-27 RX ORDER — FLUTICASONE PROPIONATE AND SALMETEROL 250; 50 UG/1; UG/1
1 POWDER RESPIRATORY (INHALATION) EVERY 12 HOURS
Qty: 180 EACH | Refills: 1 | Status: SHIPPED | OUTPATIENT
Start: 2023-01-27

## 2023-01-27 RX ORDER — ATORVASTATIN CALCIUM 20 MG/1
TABLET, FILM COATED ORAL
Qty: 90 TABLET | Refills: 1 | Status: SHIPPED | OUTPATIENT
Start: 2023-01-27

## 2023-01-27 RX ORDER — LISINOPRIL 10 MG/1
TABLET ORAL
Qty: 90 TABLET | Refills: 1 | Status: SHIPPED | OUTPATIENT
Start: 2023-01-27

## 2023-01-27 ASSESSMENT — PATIENT HEALTH QUESTIONNAIRE - PHQ9
2. FEELING DOWN, DEPRESSED OR HOPELESS: 0
1. LITTLE INTEREST OR PLEASURE IN DOING THINGS: 0
SUM OF ALL RESPONSES TO PHQ QUESTIONS 1-9: 0
SUM OF ALL RESPONSES TO PHQ9 QUESTIONS 1 & 2: 0
SUM OF ALL RESPONSES TO PHQ QUESTIONS 1-9: 0

## 2023-01-27 NOTE — PROGRESS NOTES
Subjective:      Patient ID: Riaz Jara is a 80 y.o. female who presents today for:     Chief Complaint   Patient presents with    Establish Care     Patient presents today to establish care. Leg Swelling     Patient c/o bilateral leg swelling. HPI Pt in today to establish care. Reports that she has been taking the lisinopril 10mg daily and stopped the ARB. Reports chronic leg swelling if she doesn't keep feet elevated. Did improve with prednisone when she was on it for the rash. Patient daughter reports otherwise she has been doing well. Denies any other concerns or questions at this time. Denies any chest pain, palpitations, shortness of breath, or dizziness. Past Medical History:   Diagnosis Date    Age-related cognitive decline     Cancer ([facility] [address]     Cerebrovascular small vessel disease     Chronic nonspecific lung disease 2021    nodules R lung, fibrosis L apex    Glaucoma     Gout     left knee    Hypertension     Obesity (BMI 30-39.9) 07/11/2019    Obstructive sleep apnea 07/11/2019    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    Vitamin D deficiency 2019     Past Surgical History:   Procedure Laterality Date    CATARACT REMOVAL WITH IMPLANT Bilateral 2016    CHOLECYSTECTOMY, LAPAROSCOPIC N/A 7/22/2022    LAPAROSCOPIC CHOLECYSTECTOMY performed by Libertad Vanegas MD at [address]  2009    polyps    COLONOSCOPY  02/2009    GASTROSTOMY TUBE PLACEMENT N/A 11/04/2020    EGD PEG TUBE PLACEMENT performed by Elisabeth Stevens MD at [address] Right 04/15/2022    Flexima APDL Locking Pigtail Draiange Catheter inserted by Dr. Kanu Osborn (Lot# [id number] / Exp: 12/07/2024)    IR CHOLECYSTOSTOMY PERCUTANEOUS COMPLETE  4/15/2022    IR CHOLECYSTOSTOMY PERCUTANEOUS COMPLETE 4/15/2022 MLOZ SPECIAL PROCEDURE    SKIN CANCER EXCISION  2002    graft from neck     No family history on file.   Social History     Socioeconomic History    Marital status:      Spouse name: Not on file    Number of children: Not on file    Years of education: Not on file    Highest education level: Not on file   Occupational History    Not on file   Tobacco Use    Smoking status: Never    Smokeless tobacco: Never   Vaping Use    Vaping Use: Never used   Substance and Sexual Activity    Alcohol use: No    Drug use: No    Sexual activity: Not Currently   Other Topics Concern    Not on file   Social History Narrative    ,  Lives with her dtr Deanna Cardenas- Daughter (dtr does not work and is able to assist pt as needed), Son Ghislaine is in the area    Home: [place]- [address] [facility]: Two level,Able to Live on Main level with bedroom/bathroom    Home Access: Stairs to enter with rails- Number of Steps: 3    Bathroom Shower/Tub: Tub/Shower unit,  Shower chair,Grab bars in shower,Hand-held shower    Home Equipment: Rolling walker    ADL Assistance: Needs assistance    Homemaking Assistance: Needs assistance    Homemaking Responsibilities: No    Ambulation Assistance: Independent (used [employer]),  BSC, LIFT CHAIR, WALKER, CANE,     Transfer Assistance: Independent    Active : No    Additional Comments: pt is questionable historian- question decreased recall versus unable to accurately hear the question; pt reports no recent falls at home.                               Social Determinants of Health     Financial Resource Strain: Low Risk     Difficulty of Paying Living Expenses: Not hard at all   Food Insecurity: No Food Insecurity    Worried About Running Out of Food in the Last Year: Never true    Ran Out of Food in the Last Year: Never true   Transportation Needs: Not on file   Physical Activity: Not on file   Stress: Not on file   Social Connections: Not on file   Intimate Partner Violence: Not on file   Housing Stability: Not on file     Current Outpatient Medications on File Prior to Visit   Medication Sig Dispense Refill    vitamin D (ERGOCALCIFEROL) 1.25 MG (59043 UT) CAPS capsule Take 1 capsule by mouth once a week 12 capsule 1    Handicap Placard MISC by Does not apply route Good for 2 years 1 each 0    dorzolamide-timolol (COSOPT) 22.3-6.8 MG/ML ophthalmic solution Place 1 drop into both eyes 2 times daily Indications: Glaucoma 10 mL 5    latanoprost (XALATAN) 0.005 % ophthalmic solution Place 1 drop into both eyes daily Indications: Glaucoma 2.5 mL 5    brimonidine (ALPHAGAN P) 0.15 % ophthalmic solution Place 1 drop into both eyes 2 times daily Indications: Glaucoma 1 each 1    metoprolol tartrate (LOPRESSOR) 25 MG tablet Take 0.5 tablets by mouth 2 times daily Indications: High Blood Pressure Disorder 90 tablet 3    montelukast (SINGULAIR) 10 MG tablet Take 1 tablet by mouth nightly as needed (ALLERGIES) 90 tablet 3    albuterol sulfate HFA (PROAIR HFA) 108 (90 Base) MCG/ACT inhaler Inhale 1 puff into the lungs every 6 hours as needed for Wheezing or Shortness of Breath 3 each 3    Elastic Bandages & Supports (MEDICAL COMPRESSION STOCKINGS) MISC Knee high, 20 to 30 mm hg, remove at night, pharmacy to fit. 1 each 0    OXYGEN Inhale 2 L into the lungs Indications: PRN at bedtime       No current facility-administered medications on file prior to visit. Allergies:  Norvasc [amlodipine besylate]    Review of Systems   Constitutional:  Negative for chills, fatigue and fever. HENT:  Negative for congestion. Respiratory:  Negative for cough, shortness of breath and wheezing. Cardiovascular:  Positive for leg swelling. Negative for chest pain and palpitations. Genitourinary:  Negative for difficulty urinating. Neurological:  Negative for dizziness and headaches. Objective:   /68 (Site: Left Upper Arm, Position: Sitting, Cuff Size: Medium Adult)   Pulse 56   Temp 97.3 °F (36.3 °C) (Temporal)   Ht 5' 2\" (1.575 m)   LMP  (LMP Unknown)   SpO2 97%   BMI 32.74 kg/m²     Physical Exam  Constitutional:       Appearance: She is well-developed. HENT:      Head: Normocephalic. Right Ear: Tympanic membrane, ear canal and external ear normal.      Left Ear: Tympanic membrane, ear canal and external ear normal.      Nose: Nose normal.      Mouth/Throat:      Mouth: Mucous membranes are moist.      Pharynx: Oropharynx is clear. Uvula midline. Eyes:      General:         Right eye: No discharge. Left eye: No discharge. Conjunctiva/sclera: Conjunctivae normal.   Cardiovascular:      Rate and Rhythm: Normal rate and regular rhythm. Heart sounds: Normal heart sounds. Pulmonary:      Effort: Pulmonary effort is normal. No respiratory distress. Breath sounds: Normal breath sounds. Musculoskeletal:      Cervical back: Normal range of motion. Right lower le+ Edema present. Left lower le+ Edema present. Lymphadenopathy:      Cervical: No cervical adenopathy. Skin:     General: Skin is warm and dry. Neurological:      Mental Status: She is alert and oriented to person, place, and time. Psychiatric:         Mood and Affect: Mood normal.         Behavior: Behavior normal.       Assessment:          Diagnosis Orders   1. Chronic pain of both ankles  diclofenac sodium (VOLTAREN) 1 % GEL      2. Leg swelling        3. Primary hypertension  lisinopril (PRINIVIL;ZESTRIL) 10 MG tablet      4. Dyslipidemia  atorvastatin (LIPITOR) 20 MG tablet      5. Chronic obstructive pulmonary disease, unspecified [facility]  fluticasone-salmeterol (ADVAIR) 250-50 MCG/ACT AEPB diskus inhaler          Plan:      No orders of the defined types were placed in this encounter.          Orders Placed This Encounter   Medications    diclofenac sodium (VOLTAREN) 1 % GEL     Sig: Apply 4 g topically 4 times daily as needed for Pain     Dispense:  150 g     Refill:  3    lisinopril (PRINIVIL;ZESTRIL) 10 MG tablet     Sig: TAKE 1 TABLET BY MOUTH EVERY DAY     Dispense:  90 tablet     Refill:  1    atorvastatin (LIPITOR) 20 MG tablet Sig: TAKE 1 TABLET BY MOUTH DAILY AT BEDTIME     Dispense:  90 tablet     Refill:  1    fluticasone-salmeterol (ADVAIR) 250-50 MCG/ACT AEPB diskus inhaler     Sig: Inhale 1 puff into the lungs in the morning and 1 puff in the evening. Indications: Chronic Obstructive Lung Disease. Dispense:  180 each     Refill:  1         Return in about 6 months (around 7/27/2023). 1. Chronic pain of both ankles    - diclofenac sodium (VOLTAREN) 1 % GEL; Apply 4 g topically 4 times daily as needed for Pain  Dispense: 150 g; Refill: 3    2. Leg swelling  Keep elevated. Try to reduce inflammation with voltaren gel. 3. Primary hypertension  Chronic, stable. DASH diet. Continue metoprolol 25mg twice daily. - lisinopril (PRINIVIL;ZESTRIL) 10 MG tablet; TAKE 1 TABLET BY MOUTH EVERY DAY  Dispense: 90 tablet; Refill: 1    4. Dyslipidemia  Chronic, stable. - atorvastatin (LIPITOR) 20 MG tablet; TAKE 1 TABLET BY MOUTH DAILY AT BEDTIME  Dispense: 90 tablet; Refill: 1    5. Chronic obstructive pulmonary disease, unspecified COPD type (HCC)  Chronic, stable. - fluticasone-salmeterol (ADVAIR) 250-50 MCG/ACT AEPB diskus inhaler; Inhale 1 puff into the lungs in the morning and 1 puff in the evening. Indications: Chronic Obstructive Lung Disease. Dispense: 180 each; Refill: 1    Reviewed with the patient: current clinicalstatus, medications, activities and diet. Side effects, adverse effects of the medication prescribedtoday, as well as treatment plan/ rationale and result expectations have been discussedwith the patient who expresses understanding and desires to proceed. Close follow upto evaluate treatment results and for coordination of care. I have reviewedthe patient's medical history in detail and updated the computerized patient record.     JULIO Domingo - CNP

## 2023-01-30 ASSESSMENT — ENCOUNTER SYMPTOMS
COUGH: 0
SHORTNESS OF BREATH: 0
WHEEZING: 0

## 2023-06-03 DIAGNOSIS — E55.9 VITAMIN D DEFICIENCY: ICD-10-CM

## 2023-06-05 RX ORDER — ERGOCALCIFEROL 1.25 MG/1
CAPSULE ORAL
Qty: 12 CAPSULE | Refills: 1 | Status: SHIPPED | OUTPATIENT
Start: 2023-06-05

## 2023-06-05 NOTE — TELEPHONE ENCOUNTER
Rx requested:  Requested Prescriptions     Pending Prescriptions Disp Refills    vitamin D (ERGOCALCIFEROL) 1.25 MG (40479 UT) CAPS capsule [Pharmacy Med Name: VITAMIN D2 1.25MG(50,000 UNIT)] 12 capsule 1     Sig: TAKE 1 CAPSULE BY MOUTH ONE TIME PER WEEK         Last Office Visit:   1/27/2023      Next Visit Date:  Future Appointments   Date Time Provider Lovely Perdomo   7/28/2023  2:30 PM [address], [id number]

## 2023-07-28 ENCOUNTER — OFFICE VISIT (OUTPATIENT)
Dept: FAMILY MEDICINE CLINIC | Age: 88
End: 2023-07-28
Payer: MEDICARE

## 2023-07-28 VITALS
DIASTOLIC BLOOD PRESSURE: 57 MMHG | BODY MASS INDEX: 32.74 KG/M2 | OXYGEN SATURATION: 94 % | SYSTOLIC BLOOD PRESSURE: 136 MMHG | HEART RATE: 87 BPM | TEMPERATURE: 97.9 F | HEIGHT: 62 IN

## 2023-07-28 DIAGNOSIS — J20.9 COPD (CHRONIC OBSTRUCTIVE PULMONARY DISEASE) WITH ACUTE BRONCHITIS (HCC): ICD-10-CM

## 2023-07-28 DIAGNOSIS — J44.0 COPD (CHRONIC OBSTRUCTIVE PULMONARY DISEASE) WITH ACUTE BRONCHITIS (HCC): ICD-10-CM

## 2023-07-28 DIAGNOSIS — M79.89 LEG SWELLING: ICD-10-CM

## 2023-07-28 DIAGNOSIS — I10 PRIMARY HYPERTENSION: ICD-10-CM

## 2023-07-28 DIAGNOSIS — I10 PRIMARY HYPERTENSION: Primary | ICD-10-CM

## 2023-07-28 LAB
ALBUMIN SERPL-MCNC: 3.7 G/DL (ref 3.5–4.6)
ALP SERPL-CCNC: 98 U/L (ref 40–130)
ALT SERPL-CCNC: 7 U/L (ref 0–33)
ANION GAP SERPL CALCULATED.3IONS-SCNC: 11 MEQ/L (ref 9–15)
AST SERPL-CCNC: 11 U/L (ref 0–35)
BASOPHILS # BLD: 0 K/UL (ref 0–0.2)
BASOPHILS NFR BLD: 0.4 %
BILIRUB SERPL-MCNC: 0.3 MG/DL (ref 0.2–0.7)
BUN SERPL-MCNC: 18 MG/DL (ref 8–23)
CALCIUM SERPL-MCNC: 9.2 MG/DL (ref 8.5–9.9)
CHLORIDE SERPL-SCNC: 104 MEQ/L (ref 95–107)
CHOLEST SERPL-MCNC: 100 MG/DL (ref 0–199)
CO2 SERPL-SCNC: 27 MEQ/L (ref 20–31)
CREAT SERPL-MCNC: 0.9 MG/DL (ref 0.5–0.9)
EOSINOPHIL # BLD: 0.1 K/UL (ref 0–0.7)
EOSINOPHIL NFR BLD: 1.8 %
ERYTHROCYTE [DISTWIDTH] IN BLOOD BY AUTOMATED COUNT: 15.4 % (ref 11.5–14.5)
GLOBULIN SER CALC-MCNC: 2.9 G/DL (ref 2.3–3.5)
GLUCOSE SERPL-MCNC: 102 MG/DL (ref 70–99)
HCT VFR BLD AUTO: 34.2 % (ref 37–47)
HDLC SERPL-MCNC: 39 MG/DL (ref 40–59)
HGB BLD-MCNC: 11.3 G/DL (ref 12–16)
LDLC SERPL CALC-MCNC: 46 MG/DL (ref 0–129)
LYMPHOCYTES # BLD: 1 K/UL (ref 1–4.8)
LYMPHOCYTES NFR BLD: 11.5 %
MCH RBC QN AUTO: 27.9 PG (ref 27–31.3)
MCHC RBC AUTO-ENTMCNC: 33.1 % (ref 33–37)
MCV RBC AUTO: 84.3 FL (ref 79.4–94.8)
MONOCYTES # BLD: 0.8 K/UL (ref 0.2–0.8)
MONOCYTES NFR BLD: 10.1 %
NEUTROPHILS # BLD: 6.4 K/UL (ref 1.4–6.5)
NEUTS SEG NFR BLD: 76.2 %
PLATELET # BLD AUTO: 228 K/UL (ref 130–400)
POTASSIUM SERPL-SCNC: 4.4 MEQ/L (ref 3.4–4.9)
PROT SERPL-MCNC: 6.6 G/DL (ref 6.3–8)
RBC # BLD AUTO: 4.06 M/UL (ref 4.2–5.4)
SODIUM SERPL-SCNC: 142 MEQ/L (ref 135–144)
TRIGL SERPL-MCNC: 76 MG/DL (ref 0–150)
WBC # BLD AUTO: 8.4 K/UL (ref 4.8–10.8)

## 2023-07-28 PROCEDURE — 99214 OFFICE O/P EST MOD 30 MIN: CPT | Performed by: NURSE PRACTITIONER

## 2023-07-28 PROCEDURE — 1123F ACP DISCUSS/DSCN MKR DOCD: CPT | Performed by: NURSE PRACTITIONER

## 2023-07-28 RX ORDER — FUROSEMIDE 20 MG/1
20 TABLET ORAL DAILY
Qty: 30 TABLET | Refills: 0 | Status: SHIPPED | OUTPATIENT
Start: 2023-07-28

## 2023-07-28 SDOH — ECONOMIC STABILITY: HOUSING INSECURITY
IN THE LAST 12 MONTHS, WAS THERE A TIME WHEN YOU DID NOT HAVE A STEADY PLACE TO SLEEP OR SLEPT IN A SHELTER (INCLUDING NOW)?: NO

## 2023-07-28 SDOH — ECONOMIC STABILITY: FOOD INSECURITY: WITHIN THE PAST 12 MONTHS, THE FOOD YOU BOUGHT JUST DIDN'T LAST AND YOU DIDN'T HAVE MONEY TO GET MORE.: NEVER TRUE

## 2023-07-28 SDOH — ECONOMIC STABILITY: FOOD INSECURITY: WITHIN THE PAST 12 MONTHS, YOU WORRIED THAT YOUR FOOD WOULD RUN OUT BEFORE YOU GOT MONEY TO BUY MORE.: NEVER TRUE

## 2023-07-28 SDOH — ECONOMIC STABILITY: INCOME INSECURITY: HOW HARD IS IT FOR YOU TO PAY FOR THE VERY BASICS LIKE FOOD, HOUSING, MEDICAL CARE, AND HEATING?: NOT HARD AT ALL

## 2023-07-28 NOTE — PROGRESS NOTES
evaluate treatment results and for coordination of care. I have reviewedthe patient's medical history in detail and updated the computerized patient record.     Elizabeth Castillo, APRN - CNP

## 2023-07-31 DIAGNOSIS — I10 PRIMARY HYPERTENSION: ICD-10-CM

## 2023-07-31 NOTE — TELEPHONE ENCOUNTER
Goal Outcome Evaluation:  Plan of Care Reviewed With: patient        Progress: improving  Outcome Evaluation: Pt A&Ox4. Forgetful. Up x1 w/ walker to BR. Urinal. IV Cardizem DC due to pt HR staying in 80s and MD note. Tele. Dsg to head removed by MD. Only gauze remaining. CDI. PPP. Denies any new n/t. C/o of pain in hands and feet due to neuropathy per pt. IV abx cont. Sugar monitored. Call light in reach. Safety maintained. Will cont to monitor.   Last OV: 07/28/2023

## 2023-08-01 RX ORDER — LISINOPRIL 10 MG/1
TABLET ORAL
Qty: 90 TABLET | Refills: 1 | Status: SHIPPED | OUTPATIENT
Start: 2023-08-01

## 2023-08-02 ASSESSMENT — ENCOUNTER SYMPTOMS
WHEEZING: 0
SHORTNESS OF BREATH: 0
ABDOMINAL PAIN: 0
COUGH: 0

## 2023-08-19 DIAGNOSIS — M79.89 LEG SWELLING: ICD-10-CM

## 2023-08-21 RX ORDER — FUROSEMIDE 20 MG/1
TABLET ORAL
Qty: 30 TABLET | Refills: 0 | Status: SHIPPED | OUTPATIENT
Start: 2023-08-21

## 2023-08-21 NOTE — TELEPHONE ENCOUNTER
Rx requested:  Requested Prescriptions     Pending Prescriptions Disp Refills    furosemide (LASIX) 20 MG tablet [Pharmacy Med Name: FUROSEMIDE 20 MG TABLET] 30 tablet 0     Sig: TAKE 1 TABLET BY MOUTH EVERY DAY         Last Office Visit:   7/28/2023      Next Visit Date:  Future Appointments   Date Time Provider [address] [place] [address]   9/6/2023  1:00 PM Sudarshan Panchal Dr   9/6/2023  1:30 PM Melissa Freda Fabry, [address]

## 2023-09-05 SDOH — HEALTH STABILITY: PHYSICAL HEALTH: ON AVERAGE, HOW MANY DAYS PER WEEK DO YOU ENGAGE IN MODERATE TO STRENUOUS EXERCISE (LIKE A BRISK WALK)?: 0 DAYS

## 2023-09-05 ASSESSMENT — PATIENT HEALTH QUESTIONNAIRE - PHQ9
SUM OF ALL RESPONSES TO PHQ9 QUESTIONS 1 & 2: 2
SUM OF ALL RESPONSES TO PHQ QUESTIONS 1-9: 2
1. LITTLE INTEREST OR PLEASURE IN DOING THINGS: 2
SUM OF ALL RESPONSES TO PHQ QUESTIONS 1-9: 2
2. FEELING DOWN, DEPRESSED OR HOPELESS: 0
SUM OF ALL RESPONSES TO PHQ QUESTIONS 1-9: 2
SUM OF ALL RESPONSES TO PHQ QUESTIONS 1-9: 2

## 2023-09-05 ASSESSMENT — LIFESTYLE VARIABLES
HOW MANY STANDARD DRINKS CONTAINING ALCOHOL DO YOU HAVE ON A TYPICAL DAY: 0
HOW MANY STANDARD DRINKS CONTAINING ALCOHOL DO YOU HAVE ON A TYPICAL DAY: PATIENT DOES NOT DRINK
HOW OFTEN DO YOU HAVE SIX OR MORE DRINKS ON ONE OCCASION: 1
HOW OFTEN DO YOU HAVE A DRINK CONTAINING ALCOHOL: 1
HOW OFTEN DO YOU HAVE A DRINK CONTAINING ALCOHOL: NEVER

## 2023-09-06 ENCOUNTER — OFFICE VISIT (OUTPATIENT)
Dept: FAMILY MEDICINE CLINIC | Age: 88
End: 2023-09-06
Payer: MEDICARE

## 2023-09-06 ENCOUNTER — OFFICE VISIT (OUTPATIENT)
Dept: FAMILY MEDICINE CLINIC | Age: 88
End: 2023-09-06

## 2023-09-06 VITALS
SYSTOLIC BLOOD PRESSURE: 136 MMHG | BODY MASS INDEX: 32.94 KG/M2 | OXYGEN SATURATION: 95 % | TEMPERATURE: 97.8 F | HEART RATE: 86 BPM | DIASTOLIC BLOOD PRESSURE: 64 MMHG | HEIGHT: 62 IN | WEIGHT: 179 LBS

## 2023-09-06 DIAGNOSIS — R10.31 RIGHT LOWER QUADRANT ABDOMINAL PAIN: ICD-10-CM

## 2023-09-06 DIAGNOSIS — Z00.00 MEDICARE ANNUAL WELLNESS VISIT, SUBSEQUENT: Primary | ICD-10-CM

## 2023-09-06 DIAGNOSIS — J44.0 COPD (CHRONIC OBSTRUCTIVE PULMONARY DISEASE) WITH ACUTE BRONCHITIS (HCC): ICD-10-CM

## 2023-09-06 DIAGNOSIS — M79.89 LEG SWELLING: ICD-10-CM

## 2023-09-06 DIAGNOSIS — J20.9 COPD (CHRONIC OBSTRUCTIVE PULMONARY DISEASE) WITH ACUTE BRONCHITIS (HCC): ICD-10-CM

## 2023-09-06 DIAGNOSIS — R10.31 RIGHT LOWER QUADRANT ABDOMINAL PAIN: Primary | ICD-10-CM

## 2023-09-06 LAB
ANION GAP SERPL CALCULATED.3IONS-SCNC: 16 MEQ/L (ref 9–15)
BILIRUBIN, POC: NORMAL
BLOOD URINE, POC: NORMAL
BUN SERPL-MCNC: 23 MG/DL (ref 8–23)
CALCIUM SERPL-MCNC: 9.5 MG/DL (ref 8.5–9.9)
CHLORIDE SERPL-SCNC: 103 MEQ/L (ref 95–107)
CLARITY, POC: CLEAR
CO2 SERPL-SCNC: 26 MEQ/L (ref 20–31)
COLOR, POC: YELLOW
CREAT SERPL-MCNC: 1 MG/DL (ref 0.5–0.9)
GLUCOSE SERPL-MCNC: 96 MG/DL (ref 70–99)
GLUCOSE URINE, POC: NORMAL
KETONES, POC: NORMAL
LEUKOCYTE EST, POC: NORMAL
NITRITE, POC: NORMAL
PH, POC: 6
POTASSIUM SERPL-SCNC: 4.7 MEQ/L (ref 3.4–4.9)
PROTEIN, POC: NORMAL
SODIUM SERPL-SCNC: 145 MEQ/L (ref 135–144)
SPECIFIC GRAVITY, POC: 1.02
UROBILINOGEN, POC: NORMAL

## 2023-09-06 PROCEDURE — 81003 URINALYSIS AUTO W/O SCOPE: CPT | Performed by: NURSE PRACTITIONER

## 2023-09-06 PROCEDURE — 1123F ACP DISCUSS/DSCN MKR DOCD: CPT | Performed by: NURSE PRACTITIONER

## 2023-09-06 PROCEDURE — 99214 OFFICE O/P EST MOD 30 MIN: CPT | Performed by: NURSE PRACTITIONER

## 2023-09-06 RX ORDER — BUDESONIDE AND FORMOTEROL FUMARATE DIHYDRATE 160; 4.5 UG/1; UG/1
2 AEROSOL RESPIRATORY (INHALATION) 2 TIMES DAILY
Qty: 10.2 G | Refills: 5 | Status: SHIPPED | OUTPATIENT
Start: 2023-09-06

## 2023-09-06 RX ORDER — BUMETANIDE 1 MG/1
1 TABLET ORAL DAILY
Qty: 30 TABLET | Refills: 1 | Status: SHIPPED | OUTPATIENT
Start: 2023-09-06

## 2023-09-06 NOTE — PROGRESS NOTES
No    Intervention:  Advised to schedule with their dentist    Hearing Screen:  Do you or your family notice any trouble with your hearing that hasn't been managed with hearing aids?: (!) Yes    Interventions:  Patient declines any further evaluation or treatment    Vision Screen:  Do you have difficulty driving, watching TV, or doing any of your daily activities because of your eyesight?: (!) Yes  Have you had an eye exam within the past year?: (!) No  No results found. Interventions:   Patient encouraged to make appointment with their eye specialist - eye doctor said there was nothing more to do at this point due to some glaucoma. ADL's:   Patient reports needing help with:  Select all that apply: (!) Dressing, Grooming, Bathing, Toileting, Walking/Balance  Select all that apply: [employer], Housekeeping, Banking/Finances, Shopping, Telephone Use, Food Preparation, Transportation, Taking Medications  Interventions:  Patient comments: Daughter manages    Advanced Directives:  Do you have a Living Will?: (!) No    Intervention:  Will give paperwork                         Objective   There were no vitals filed for this visit. There is no height or weight on file to calculate BMI. Allergies   Allergen Reactions    Norvasc [Amlodipine Besylate] Swelling     Prior to Visit Medications    Medication Sig Taking?  Authorizing Provider   lisinopril (PRINIVIL;ZESTRIL) 10 MG tablet TAKE 1 TABLET BY MOUTH EVERY DAY Yes JULIO Epps CNP   vitamin D (ERGOCALCIFEROL) 1.25 MG (07472 UT) CAPS capsule TAKE 1 CAPSULE BY MOUTH ONE TIME PER WEEK Yes JULIO Epps CNP   diclofenac sodium (VOLTAREN) 1 % GEL Apply 4 g topically 4 times daily as needed for Pain Yes JULIO Nielsen CNP   atorvastatin (LIPITOR) 20 MG tablet TAKE 1 TABLET BY MOUTH DAILY AT BEDTIME Yes JULIO Epps CNP   fluticasone-salmeterol (ADVAIR) 250-50 MCG/ACT AEPB diskus inhaler Inhale 1 puff into

## 2023-09-06 NOTE — PROGRESS NOTES
Subjective:      Patient ID: Mala Hernandez is a 80 y.o. female who presents today for:     Chief Complaint   Patient presents with    1 Month Follow-Up    Leg Swelling     Patient still has leg swelling. Lasix is not working. Abdominal Pain     Lower right abdominal pain       HPI Pt with daughter to follow-up on leg swelling which has been going on for the last couple of years. Reports that she took Lasix all month and it has not helped at all. Daughter believes that right leg may have went down slightly but overall there is still significant swelling. No pain. She does get up and move around periodically but mostly is sedentary and does not keep her feet elevated. Patient daughter also reports that she did have some right lower abdominal pain earlier today that she was complaining about however she currently reports that it is better and is no longer painful. She denies any issues with urination. She denies any nausea or vomiting. Daughter reports she has been eating well. Bowel movements have been normal.    They would like to see about a different inhaler. She is currently using the Advair discus however it is powder inhaler and she does not do well at inhaling it.     Past Medical History:   Diagnosis Date    Age-related cognitive decline     Cancer ([address])     Cerebrovascular small vessel disease     Chronic nonspecific lung disease 2021    nodules R lung, fibrosis L apex    Glaucoma     Gout     left knee    Hypertension     Obesity (BMI 30-39.9) 07/11/2019    Obstructive sleep apnea 07/11/2019    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    Vitamin D deficiency 2019     Past Surgical History:   Procedure Laterality Date    CATARACT REMOVAL WITH IMPLANT Bilateral 2016    CHOLECYSTECTOMY, LAPAROSCOPIC N/A 7/22/2022    LAPAROSCOPIC CHOLECYSTECTOMY performed by Adrian Calles MD at [address] [place] [address]  2009    polyps    COLONOSCOPY  02/2009    GASTROSTOMY TUBE PLACEMENT

## 2023-09-08 DIAGNOSIS — N30.00 ACUTE CYSTITIS WITHOUT HEMATURIA: Primary | ICD-10-CM

## 2023-09-08 LAB
BACTERIA UR CULT: ABNORMAL
BACTERIA UR CULT: ABNORMAL
ORGANISM: ABNORMAL

## 2023-09-08 RX ORDER — CIPROFLOXACIN 500 MG/1
500 TABLET, FILM COATED ORAL 2 TIMES DAILY
Qty: 14 TABLET | Refills: 0 | Status: SHIPPED | OUTPATIENT
Start: 2023-09-08 | End: 2023-09-15

## 2023-09-08 ASSESSMENT — ENCOUNTER SYMPTOMS
SHORTNESS OF BREATH: 1
WHEEZING: 0
COUGH: 0
ABDOMINAL PAIN: 0

## 2023-09-14 DIAGNOSIS — M79.89 LEG SWELLING: ICD-10-CM

## 2023-09-14 NOTE — TELEPHONE ENCOUNTER
Rx requested:  Requested Prescriptions     Pending Prescriptions Disp Refills    furosemide (LASIX) 20 MG tablet [Pharmacy Med Name: FUROSEMIDE 20 MG TABLET] 30 tablet 0     Sig: TAKE 1 TABLET BY MOUTH EVERY DAY         Last Office Visit:   9/6/2023      Next Visit Date:  Future Appointments   Date Time Provider [address] [place] [address]   10[facility]6/2023  [facility] [address] [place] [address]

## 2023-09-15 RX ORDER — FUROSEMIDE 20 MG/1
TABLET ORAL
Qty: 30 TABLET | Refills: 0 | OUTPATIENT
Start: 2023-09-15

## 2023-09-28 DIAGNOSIS — M79.89 LEG SWELLING: ICD-10-CM

## 2023-09-29 RX ORDER — BUMETANIDE 1 MG/1
1 TABLET ORAL DAILY
Qty: 30 TABLET | Refills: 1 | Status: SHIPPED | OUTPATIENT
Start: 2023-09-29

## 2023-10-02 DIAGNOSIS — E78.5 DYSLIPIDEMIA: ICD-10-CM

## 2023-10-02 RX ORDER — ATORVASTATIN CALCIUM 20 MG/1
TABLET, FILM COATED ORAL
Qty: 90 TABLET | Refills: 1 | Status: SHIPPED | OUTPATIENT
Start: 2023-10-02

## 2023-10-02 NOTE — TELEPHONE ENCOUNTER
Rx requested:  Requested Prescriptions     Pending Prescriptions Disp Refills    atorvastatin (LIPITOR) 20 MG tablet [Pharmacy Med Name: ATORVASTATIN 20 MG TABLET] 90 tablet 1     Sig: TAKE 1 TABLET BY MOUTH EVERYDAY AT BEDTIME         Last Office Visit:   9/6/2023      Next Visit Date:  Future Appointments   Date Time Provider [address] [place] [address]   10/6/2023  1:30 PM Danielle Suárez, [address] [place] [address]

## 2023-10-06 ENCOUNTER — TELEMEDICINE (OUTPATIENT)
Dept: FAMILY MEDICINE CLINIC | Age: 88
End: 2023-10-06
Payer: MEDICARE

## 2023-10-06 DIAGNOSIS — J20.9 COPD (CHRONIC OBSTRUCTIVE PULMONARY DISEASE) WITH ACUTE BRONCHITIS (HCC): Primary | ICD-10-CM

## 2023-10-06 DIAGNOSIS — J44.0 COPD (CHRONIC OBSTRUCTIVE PULMONARY DISEASE) WITH ACUTE BRONCHITIS (HCC): Primary | ICD-10-CM

## 2023-10-06 DIAGNOSIS — M79.89 LEG SWELLING: ICD-10-CM

## 2023-10-06 DIAGNOSIS — N39.0 URINARY TRACT INFECTION WITHOUT HEMATURIA, SITE UNSPECIFIED: ICD-10-CM

## 2023-10-06 PROCEDURE — 99214 OFFICE O/P EST MOD 30 MIN: CPT | Performed by: NURSE PRACTITIONER

## 2023-10-06 PROCEDURE — 1123F ACP DISCUSS/DSCN MKR DOCD: CPT | Performed by: NURSE PRACTITIONER

## 2023-10-06 NOTE — PROGRESS NOTES
Subjective:      Patient ID: Mala Hernandez is a 80 y.o. female who presents today for:     Chief Complaint   Patient presents with    Leg Swelling     Patient presents today to follow up on bilateral leg swelling. Patient's daughter Flora Talbot states her legs are still swollen. HPI pt f/u today with daughter for leg swelling. She has been taking bumex 1mg daily. She has not noticed much improved. She has been using a compressive leg tube sock which is mildly helpful. Admit that she does not lie in bed at night and keep feet up nor does she do it much during the day when she likes to sit in her chair. Past Medical History:   Diagnosis Date    Age-related cognitive decline     Cancer ([address])     Cerebrovascular small vessel disease     Chronic nonspecific lung disease 2021    nodules R lung, fibrosis L apex    Glaucoma     Gout     left knee    Hypertension     Obesity (BMI 30-39.9) 07/11/2019    Obstructive sleep apnea 07/11/2019    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    Vitamin D deficiency 2019     Past Surgical History:   Procedure Laterality Date    CATARACT REMOVAL WITH IMPLANT Bilateral 2016    CHOLECYSTECTOMY, LAPAROSCOPIC N/A 7/22/2022    LAPAROSCOPIC CHOLECYSTECTOMY performed by Adrian Calles MD at [address] [place] [address]  2009    polyps    COLONOSCOPY  02/2009    GASTROSTOMY TUBE PLACEMENT N/A 11/04/2020    EGD PEG TUBE PLACEMENT performed by Manuel Perera MD at [address] [place] [address] Right 04/15/2022    Flexima APDL Locking Pigtail Draiange Catheter inserted by Dr. Mario Damico (Lot# [id number] / Exp: 12/07/2024)    IR CHOLECYSTOSTOMY PERCUTANEOUS COMPLETE  4/15/2022    IR CHOLECYSTOSTOMY PERCUTANEOUS COMPLETE 4/15/2022 [facility]    SKIN CANCER EXCISION  2002    graft from neck     History reviewed. No pertinent family history. Social History     Socioeconomic History    Marital status:       Spouse name: Not on file    Number of children:

## 2023-10-09 ASSESSMENT — ENCOUNTER SYMPTOMS
ABDOMINAL PAIN: 0
COUGH: 0
SHORTNESS OF BREATH: 0
WHEEZING: 0

## 2023-10-11 DIAGNOSIS — N39.0 URINARY TRACT INFECTION WITHOUT HEMATURIA, SITE UNSPECIFIED: ICD-10-CM

## 2023-10-12 DIAGNOSIS — M79.89 LEG SWELLING: ICD-10-CM

## 2023-10-12 RX ORDER — BUMETANIDE 1 MG/1
1 TABLET ORAL DAILY
COMMUNITY
End: 2023-10-13 | Stop reason: SDUPTHER

## 2023-10-12 NOTE — TELEPHONE ENCOUNTER
Rx requested:  Requested Prescriptions     Pending Prescriptions Disp Refills    bumetanide (BUMEX) 1 MG tablet 90 tablet 0     Sig: Take 1 tablet by mouth daily         Last Office Visit:   10/6/2023      Next Visit Date:  No future appointments.

## 2023-10-13 DIAGNOSIS — N30.00 ACUTE CYSTITIS WITHOUT HEMATURIA: Primary | ICD-10-CM

## 2023-10-13 LAB
BACTERIA UR CULT: ABNORMAL
BACTERIA UR CULT: ABNORMAL
ORGANISM: ABNORMAL

## 2023-10-13 RX ORDER — BUMETANIDE 1 MG/1
1 TABLET ORAL DAILY
Qty: 90 TABLET | Refills: 0 | Status: SHIPPED | OUTPATIENT
Start: 2023-10-13

## 2023-10-13 RX ORDER — SULFAMETHOXAZOLE AND TRIMETHOPRIM 800; 160 MG/1; MG/1
1 TABLET ORAL 2 TIMES DAILY
Qty: 14 TABLET | Refills: 0 | Status: SHIPPED | OUTPATIENT
Start: 2023-10-13 | End: 2023-10-20

## 2023-10-13 NOTE — RESULT ENCOUNTER NOTE
Please inform patient her urine culture did turn up positive for MRSA infection. Bactrim sent to the pharmacy. Follow-up with PCP.   Thanks

## 2023-10-23 ENCOUNTER — OFFICE VISIT (OUTPATIENT)
Dept: FAMILY MEDICINE CLINIC | Age: 88
End: 2023-10-23
Payer: MEDICARE

## 2023-10-23 VITALS
SYSTOLIC BLOOD PRESSURE: 118 MMHG | TEMPERATURE: 97.6 F | HEART RATE: 93 BPM | HEIGHT: 62 IN | DIASTOLIC BLOOD PRESSURE: 62 MMHG | WEIGHT: 179 LBS | OXYGEN SATURATION: 92 % | BODY MASS INDEX: 32.94 KG/M2

## 2023-10-23 DIAGNOSIS — J30.9 ALLERGIC RHINITIS, UNSPECIFIED SEASONALITY, UNSPECIFIED TRIGGER: ICD-10-CM

## 2023-10-23 DIAGNOSIS — Z23 NEED FOR INFLUENZA VACCINATION: ICD-10-CM

## 2023-10-23 DIAGNOSIS — N39.0 URINARY TRACT INFECTION WITHOUT HEMATURIA, SITE UNSPECIFIED: Primary | ICD-10-CM

## 2023-10-23 LAB
BILIRUBIN, POC: NORMAL
BLOOD URINE, POC: NORMAL
CLARITY, POC: CLEAR
COLOR, POC: YELLOW
GLUCOSE URINE, POC: NORMAL
KETONES, POC: NORMAL
LEUKOCYTE EST, POC: NORMAL
NITRITE, POC: NORMAL
PH, POC: 5.5
PROTEIN, POC: NORMAL
SPECIFIC GRAVITY, POC: 1.03
UROBILINOGEN, POC: NORMAL

## 2023-10-23 PROCEDURE — 1123F ACP DISCUSS/DSCN MKR DOCD: CPT | Performed by: NURSE PRACTITIONER

## 2023-10-23 PROCEDURE — G0008 ADMIN INFLUENZA VIRUS VAC: HCPCS | Performed by: NURSE PRACTITIONER

## 2023-10-23 PROCEDURE — 99213 OFFICE O/P EST LOW 20 MIN: CPT | Performed by: NURSE PRACTITIONER

## 2023-10-23 PROCEDURE — 90694 VACC AIIV4 NO PRSRV 0.5ML IM: CPT | Performed by: NURSE PRACTITIONER

## 2023-10-23 PROCEDURE — 81003 URINALYSIS AUTO W/O SCOPE: CPT | Performed by: NURSE PRACTITIONER

## 2023-10-23 RX ORDER — LORATADINE 10 MG/1
10 TABLET ORAL DAILY
Qty: 30 TABLET | Refills: 2 | Status: SHIPPED | OUTPATIENT
Start: 2023-10-23

## 2023-10-23 NOTE — PROGRESS NOTES
Subjective:      Patient ID: Jolynn Lopez is a 80 y.o. female who presents today for:     Chief Complaint   Patient presents with    Urinary Tract Infection     Patient sx have improved. Pt daughter states this is a f/up from uti. HPI patient got medication for UTI. She reports she had no further symptoms. No itching burning abdominal pain nausea or lower back pain. She had minimal symptoms previously though. Urine dip today looks clean. We will send for culture. Reports some intermittent anxiety at times. Worse if daughter leaves and her routine is thrown off. Was really thrown off when her friend passed away. She had a panic attack taht day and had squad called but vitals were good and she ended up not going. Past Medical History:   Diagnosis Date    Age-related cognitive decline     Cancer ([address])     Cerebrovascular small vessel disease     Chronic nonspecific lung disease 2021    nodules R lung, fibrosis L apex    Glaucoma     Gout     left knee    Hypertension     Obesity (BMI 30-39.9) 07/11/2019    Obstructive sleep apnea 07/11/2019    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    Vitamin D deficiency 2019     Past Surgical History:   Procedure Laterality Date    CATARACT REMOVAL WITH IMPLANT Bilateral 2016    CHOLECYSTECTOMY, LAPAROSCOPIC N/A 7/22/2022    LAPAROSCOPIC CHOLECYSTECTOMY performed by Allyn Morales MD at [address]  2009    polyps    COLONOSCOPY  02/2009    GASTROSTOMY TUBE PLACEMENT N/A 11/04/2020    EGD PEG TUBE PLACEMENT performed by Elier Siddiqui MD at [address] [place] [address] Right 04/15/2022    Flexima APDL Locking Pigtail Draiange Catheter inserted by Dr. Dennis Olsen (Lot# [id number] / Exp: 12/07/2024)    IR CHOLECYSTOSTOMY PERCUTANEOUS COMPLETE  4/15/2022    IR CHOLECYSTOSTOMY PERCUTANEOUS COMPLETE 4/15/2022 [facility] PROC[facility]    SKIN CANCER EXCISION  2002    graft from neck     History reviewed.  No pertinent family

## 2023-10-25 ASSESSMENT — ENCOUNTER SYMPTOMS
COUGH: 0
WHEEZING: 0
ABDOMINAL PAIN: 0
SHORTNESS OF BREATH: 0

## 2023-10-26 DIAGNOSIS — N30.00 ACUTE CYSTITIS WITHOUT HEMATURIA: Primary | ICD-10-CM

## 2023-10-26 RX ORDER — AMOXICILLIN 875 MG/1
875 TABLET, COATED ORAL 2 TIMES DAILY
Qty: 20 TABLET | Refills: 0 | Status: SHIPPED | OUTPATIENT
Start: 2023-10-26 | End: 2023-11-05

## 2023-11-08 NOTE — TELEPHONE ENCOUNTER
Rx request   Requested Prescriptions     Pending Prescriptions Disp Refills    metoprolol tartrate (LOPRESSOR) 25 MG tablet 90 tablet 3     Sig: Take 0.5 tablets by mouth 2 times daily Indications: High Blood Pressure Disorder     LOV 10/23/2023  Next Visit Date:  No future appointments.

## 2023-11-17 DIAGNOSIS — J30.9 ALLERGIC RHINITIS, UNSPECIFIED SEASONALITY, UNSPECIFIED TRIGGER: ICD-10-CM

## 2023-11-17 RX ORDER — LORATADINE 10 MG/1
10 TABLET ORAL DAILY
Qty: 90 TABLET | Refills: 0 | Status: SHIPPED | OUTPATIENT
Start: 2023-11-17

## 2023-11-17 NOTE — TELEPHONE ENCOUNTER
Rx requested:  Requested Prescriptions     Pending Prescriptions Disp Refills    loratadine (CLARITIN) 10 MG tablet 90 tablet 0     Sig: Take 1 tablet by mouth daily         Last Office Visit:   10/23/2023      Next Visit Date:  No future appointments.

## 2023-11-30 DIAGNOSIS — E55.9 VITAMIN D DEFICIENCY: ICD-10-CM

## 2023-11-30 RX ORDER — ERGOCALCIFEROL 1.25 MG/1
CAPSULE ORAL
Qty: 12 CAPSULE | Refills: 1 | Status: SHIPPED | OUTPATIENT
Start: 2023-11-30

## 2023-12-11 RX ORDER — MONTELUKAST SODIUM 10 MG/1
10 TABLET ORAL NIGHTLY PRN
Qty: 90 TABLET | Refills: 3 | Status: SHIPPED | OUTPATIENT
Start: 2023-12-11

## 2023-12-11 NOTE — TELEPHONE ENCOUNTER
patient requesting medication refill. Please approve or deny this request.    Rx requested:  Requested Prescriptions     Pending Prescriptions Disp Refills    montelukast (SINGULAIR) 10 MG tablet 90 tablet 3     Sig: Take 1 tablet by mouth nightly as needed (ALLERGIES)         Last Office Visit:   10/23/2023      Next Visit Date:  No future appointments.

## 2024-02-08 DIAGNOSIS — M25.571 CHRONIC PAIN OF BOTH ANKLES: ICD-10-CM

## 2024-02-08 DIAGNOSIS — G89.29 CHRONIC PAIN OF BOTH ANKLES: ICD-10-CM

## 2024-02-08 DIAGNOSIS — M25.572 CHRONIC PAIN OF BOTH ANKLES: ICD-10-CM

## 2024-02-08 NOTE — TELEPHONE ENCOUNTER
Rx requested:  Requested Prescriptions     Pending Prescriptions Disp Refills    diclofenac sodium (VOLTAREN) 1 % GEL [Pharmacy Med Name: DICLOFENAC SODIUM 1% GEL] 100 g 3     Sig: APPLY 4 G TOPICALLY 4 TIMES DAILY AS NEEDED FOR PAIN         Last Office Visit:   10/23/2023      Next Visit Date:  No future appointments.

## 2024-02-14 DIAGNOSIS — I10 PRIMARY HYPERTENSION: ICD-10-CM

## 2024-02-14 RX ORDER — LISINOPRIL 10 MG/1
TABLET ORAL
Qty: 90 TABLET | Refills: 1 | Status: SHIPPED | OUTPATIENT
Start: 2024-02-14

## 2024-02-14 NOTE — TELEPHONE ENCOUNTER
Future Appointments    This patient does not currently have any appointments scheduled.  Past Visits    Date Provider Specialty Visit Type Primary Dx   10/23/2023 Danielle Boudreaux, APRN - CNP Family Medicine Office Visit Urinary tract infection without hematuria, site unspecified

## 2024-03-13 DIAGNOSIS — Z23 ENCOUNTER FOR IMMUNIZATION: ICD-10-CM

## 2024-03-13 DIAGNOSIS — F41.9 ANXIETY: ICD-10-CM

## 2024-03-13 DIAGNOSIS — E87.6 HYPOKALEMIA: ICD-10-CM

## 2024-03-13 DIAGNOSIS — I51.89 DIASTOLIC DYSFUNCTION: ICD-10-CM

## 2024-03-13 DIAGNOSIS — G47.33 OSA (OBSTRUCTIVE SLEEP APNEA): ICD-10-CM

## 2024-03-13 DIAGNOSIS — I10 ESSENTIAL HYPERTENSION: ICD-10-CM

## 2024-03-13 RX ORDER — ALBUTEROL SULFATE 90 UG/1
1 AEROSOL, METERED RESPIRATORY (INHALATION) EVERY 6 HOURS PRN
Qty: 3 EACH | Refills: 3 | Status: SHIPPED | OUTPATIENT
Start: 2024-03-13

## 2024-03-13 NOTE — TELEPHONE ENCOUNTER
Rx requested:  Requested Prescriptions     Pending Prescriptions Disp Refills    albuterol sulfate HFA (PROAIR HFA) 108 (90 Base) MCG/ACT inhaler 3 each 3     Sig: Inhale 1 puff into the lungs every 6 hours as needed for Wheezing or Shortness of Breath         Last Office Visit:   10/23/2023      Next Visit Date:  No future appointments.    
This document is complete and the patient is ready for discharge.

## 2024-04-08 DIAGNOSIS — E78.5 DYSLIPIDEMIA: ICD-10-CM

## 2024-04-09 RX ORDER — ATORVASTATIN CALCIUM 20 MG/1
TABLET, FILM COATED ORAL
Qty: 90 TABLET | Refills: 1 | Status: SHIPPED | OUTPATIENT
Start: 2024-04-09

## 2024-04-19 DIAGNOSIS — J44.0 COPD (CHRONIC OBSTRUCTIVE PULMONARY DISEASE) WITH ACUTE BRONCHITIS (HCC): ICD-10-CM

## 2024-04-19 DIAGNOSIS — J20.9 COPD (CHRONIC OBSTRUCTIVE PULMONARY DISEASE) WITH ACUTE BRONCHITIS (HCC): ICD-10-CM

## 2024-04-19 RX ORDER — BUDESONIDE AND FORMOTEROL FUMARATE DIHYDRATE 160; 4.5 UG/1; UG/1
2 AEROSOL RESPIRATORY (INHALATION) 2 TIMES DAILY
Qty: 10.2 G | Refills: 1 | Status: SHIPPED | OUTPATIENT
Start: 2024-04-19

## 2024-04-19 NOTE — TELEPHONE ENCOUNTER
MEDICATION REFILL REQUEST     Rx Requested    Requested Prescriptions     Pending Prescriptions Disp Refills    budesonide-formoterol (SYMBICORT) 160-4.5 MCG/ACT AERO 10.2 g 5     Sig: Inhale 2 puffs into the lungs 2 times daily         Patient's Last Office Visit   10/23/2023      Patient's Next Office Visit   No future appointments.      Other comments   /

## 2024-04-26 ASSESSMENT — PATIENT HEALTH QUESTIONNAIRE - PHQ9
SUM OF ALL RESPONSES TO PHQ9 QUESTIONS 1 & 2: 1
SUM OF ALL RESPONSES TO PHQ9 QUESTIONS 1 & 2: 1
SUM OF ALL RESPONSES TO PHQ QUESTIONS 1-9: 1
2. FEELING DOWN, DEPRESSED OR HOPELESS: NOT AT ALL
SUM OF ALL RESPONSES TO PHQ QUESTIONS 1-9: 1
2. FEELING DOWN, DEPRESSED OR HOPELESS: NOT AT ALL
SUM OF ALL RESPONSES TO PHQ QUESTIONS 1-9: 1
1. LITTLE INTEREST OR PLEASURE IN DOING THINGS: SEVERAL DAYS
SUM OF ALL RESPONSES TO PHQ QUESTIONS 1-9: 1
1. LITTLE INTEREST OR PLEASURE IN DOING THINGS: SEVERAL DAYS

## 2024-04-29 ENCOUNTER — OFFICE VISIT (OUTPATIENT)
Dept: FAMILY MEDICINE CLINIC | Age: 89
End: 2024-04-29
Payer: MEDICARE

## 2024-04-29 VITALS
TEMPERATURE: 97.6 F | HEART RATE: 83 BPM | SYSTOLIC BLOOD PRESSURE: 118 MMHG | HEIGHT: 62 IN | DIASTOLIC BLOOD PRESSURE: 54 MMHG | BODY MASS INDEX: 32.74 KG/M2 | OXYGEN SATURATION: 95 %

## 2024-04-29 DIAGNOSIS — L02.211 ABSCESS OF ABDOMINAL WALL: Primary | ICD-10-CM

## 2024-04-29 DIAGNOSIS — R73.9 HYPERGLYCEMIA: ICD-10-CM

## 2024-04-29 DIAGNOSIS — E55.9 VITAMIN D DEFICIENCY: ICD-10-CM

## 2024-04-29 DIAGNOSIS — I10 PRIMARY HYPERTENSION: ICD-10-CM

## 2024-04-29 DIAGNOSIS — L02.211 ABSCESS OF ABDOMINAL WALL: ICD-10-CM

## 2024-04-29 DIAGNOSIS — Z74.09 IMPAIRED MOBILITY AND ACTIVITIES OF DAILY LIVING: ICD-10-CM

## 2024-04-29 DIAGNOSIS — R68.89 FORGETFULNESS: ICD-10-CM

## 2024-04-29 DIAGNOSIS — M79.89 LEG SWELLING: ICD-10-CM

## 2024-04-29 DIAGNOSIS — Z78.9 IMPAIRED MOBILITY AND ACTIVITIES OF DAILY LIVING: ICD-10-CM

## 2024-04-29 DIAGNOSIS — F41.9 ANXIETY: ICD-10-CM

## 2024-04-29 DIAGNOSIS — J44.9 CHRONIC OBSTRUCTIVE PULMONARY DISEASE, UNSPECIFIED COPD TYPE (HCC): ICD-10-CM

## 2024-04-29 LAB
ALBUMIN SERPL-MCNC: 3.7 G/DL (ref 3.5–4.6)
ALP SERPL-CCNC: 123 U/L (ref 40–130)
ALT SERPL-CCNC: <5 U/L (ref 0–33)
ANION GAP SERPL CALCULATED.3IONS-SCNC: 12 MEQ/L (ref 9–15)
AST SERPL-CCNC: 8 U/L (ref 0–35)
BASOPHILS # BLD: 0 K/UL (ref 0–0.2)
BASOPHILS NFR BLD: 0.4 %
BILIRUB SERPL-MCNC: 0.3 MG/DL (ref 0.2–0.7)
BUN SERPL-MCNC: 26 MG/DL (ref 8–23)
CALCIUM SERPL-MCNC: 9.1 MG/DL (ref 8.5–9.9)
CHLORIDE SERPL-SCNC: 105 MEQ/L (ref 95–107)
CHOLEST SERPL-MCNC: 113 MG/DL (ref 0–199)
CO2 SERPL-SCNC: 28 MEQ/L (ref 20–31)
CREAT SERPL-MCNC: 1.34 MG/DL (ref 0.5–0.9)
EOSINOPHIL # BLD: 0.1 K/UL (ref 0–0.7)
EOSINOPHIL NFR BLD: 1.3 %
ERYTHROCYTE [DISTWIDTH] IN BLOOD BY AUTOMATED COUNT: 16.7 % (ref 11.5–14.5)
GLOBULIN SER CALC-MCNC: 3.2 G/DL (ref 2.3–3.5)
GLUCOSE SERPL-MCNC: 116 MG/DL (ref 70–99)
HCT VFR BLD AUTO: 34.1 % (ref 37–47)
HDLC SERPL-MCNC: 44 MG/DL (ref 40–59)
HGB BLD-MCNC: 10.5 G/DL (ref 12–16)
LDLC SERPL CALC-MCNC: 57 MG/DL (ref 0–129)
LYMPHOCYTES # BLD: 1.2 K/UL (ref 1–4.8)
LYMPHOCYTES NFR BLD: 13.8 %
MCH RBC QN AUTO: 25.2 PG (ref 27–31.3)
MCHC RBC AUTO-ENTMCNC: 30.8 % (ref 33–37)
MCV RBC AUTO: 82 FL (ref 79.4–94.8)
MONOCYTES # BLD: 0.8 K/UL (ref 0.2–0.8)
MONOCYTES NFR BLD: 10 %
NEUTROPHILS # BLD: 6.2 K/UL (ref 1.4–6.5)
NEUTS SEG NFR BLD: 74.1 %
PLATELET # BLD AUTO: 234 K/UL (ref 130–400)
POTASSIUM SERPL-SCNC: 4.3 MEQ/L (ref 3.4–4.9)
PROT SERPL-MCNC: 6.9 G/DL (ref 6.3–8)
RBC # BLD AUTO: 4.16 M/UL (ref 4.2–5.4)
SODIUM SERPL-SCNC: 145 MEQ/L (ref 135–144)
TRIGL SERPL-MCNC: 61 MG/DL (ref 0–150)
WBC # BLD AUTO: 8.4 K/UL (ref 4.8–10.8)

## 2024-04-29 PROCEDURE — 1123F ACP DISCUSS/DSCN MKR DOCD: CPT | Performed by: NURSE PRACTITIONER

## 2024-04-29 PROCEDURE — 99214 OFFICE O/P EST MOD 30 MIN: CPT | Performed by: NURSE PRACTITIONER

## 2024-04-29 RX ORDER — SERTRALINE HYDROCHLORIDE 25 MG/1
25 TABLET, FILM COATED ORAL DAILY
Qty: 30 TABLET | Refills: 3 | Status: SHIPPED | OUTPATIENT
Start: 2024-04-29

## 2024-04-29 RX ORDER — CEPHALEXIN 500 MG/1
500 CAPSULE ORAL 2 TIMES DAILY
Qty: 20 CAPSULE | Refills: 0 | Status: SHIPPED | OUTPATIENT
Start: 2024-04-29

## 2024-04-29 NOTE — PROGRESS NOTES
Subjective:      Patient ID: Trudy William is a 89 y.o. female who presents today for:     Chief Complaint   Patient presents with    Hypertension     Patient presents today to follow up on hypertension.    COPD       HPI  pt in today with daughter. Difficult to get to come in. She has been eating well.  Does not seem to have much interest in things that she has been enjoying such as watching television.  Is not very talkative.    Hypertension has been stable.  She takes lisinopril 10 mg daily metoprolol 12.5 mg twice daily.  She does not complain of any chest pains, palpitations, shortness of breath etc.  Does have continued leg swelling which is stable.    COPD has been stable. No observable sob or dyspnea. She denies any sob or wheezing. Daughter feels the change in inhaler was beneficial. She seems to have an easier time using it.       Past Medical History:   Diagnosis Date    Age-related cognitive decline     Cancer (HCC)     Cerebrovascular small vessel disease     Chronic nonspecific lung disease 2021    nodules R lung, fibrosis L apex    Glaucoma     Gout     left knee    Hypertension     Obesity (BMI 30-39.9) 07/11/2019    Obstructive sleep apnea 07/11/2019    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    Vitamin D deficiency 2019     Past Surgical History:   Procedure Laterality Date    CATARACT REMOVAL WITH IMPLANT Bilateral 2016    CHOLECYSTECTOMY, LAPAROSCOPIC N/A 7/22/2022    LAPAROSCOPIC CHOLECYSTECTOMY performed by Ade Hurtado MD at [facility] [place]    COLON SURGERY  2009    polyps    COLONOSCOPY  02/2009    GASTROSTOMY TUBE PLACEMENT N/A 11/04/2020    EGD PEG TUBE PLACEMENT performed by Dano Rogers MD at [facility] OR    IR BILIARY DRAIN EXTERNAL Right 04/15/2022    Flexima APDL Locking Pigtail Draiange Catheter inserted by Dr. Manning (Lot# [id number] / Exp: 12/07/2024)    IR CHOLECYSTOSTOMY PERCUTANEOUS COMPLETE  4/15/2022    IR CHOLECYSTOSTOMY PERCUTANEOUS COMPLETE 4/15/2022 [facility]

## 2024-04-30 LAB
ESTIMATED AVERAGE GLUCOSE: 114 MG/DL
HBA1C MFR BLD: 5.6 % (ref 4–6)
VITAMIN D 25-HYDROXY: 60.7 NG/ML (ref 30–100)

## 2024-05-02 LAB — BACTERIA SPEC ANAEROBE+AEROBE CULT: NORMAL

## 2024-05-02 ASSESSMENT — ENCOUNTER SYMPTOMS
WHEEZING: 0
COUGH: 0
SHORTNESS OF BREATH: 0
ABDOMINAL PAIN: 0

## 2024-05-03 DIAGNOSIS — N18.32 STAGE 3B CHRONIC KIDNEY DISEASE (HCC): Primary | ICD-10-CM

## 2024-05-06 ENCOUNTER — OFFICE VISIT (OUTPATIENT)
Dept: PALLATIVE CARE | Age: 89
End: 2024-05-06
Payer: MEDICARE

## 2024-05-06 ENCOUNTER — CLINICAL DOCUMENTATION (OUTPATIENT)
Dept: SPIRITUAL SERVICES | Age: 89
End: 2024-05-06

## 2024-05-06 VITALS
DIASTOLIC BLOOD PRESSURE: 68 MMHG | HEART RATE: 79 BPM | SYSTOLIC BLOOD PRESSURE: 132 MMHG | TEMPERATURE: 98.9 F | OXYGEN SATURATION: 98 % | RESPIRATION RATE: 16 BRPM

## 2024-05-06 DIAGNOSIS — J44.9 CHRONIC OBSTRUCTIVE PULMONARY DISEASE, UNSPECIFIED COPD TYPE (HCC): ICD-10-CM

## 2024-05-06 DIAGNOSIS — Z71.89 ADVANCED CARE PLANNING/COUNSELING DISCUSSION: ICD-10-CM

## 2024-05-06 DIAGNOSIS — R41.3 MEMORY LOSS: Primary | ICD-10-CM

## 2024-05-06 DIAGNOSIS — Z51.5 PALLIATIVE CARE ENCOUNTER: ICD-10-CM

## 2024-05-06 DIAGNOSIS — Z71.89 GOALS OF CARE, COUNSELING/DISCUSSION: ICD-10-CM

## 2024-05-06 PROCEDURE — 1123F ACP DISCUSS/DSCN MKR DOCD: CPT

## 2024-05-06 PROCEDURE — 99345 HOME/RES VST NEW HIGH MDM 75: CPT

## 2024-05-06 RX ORDER — IPRATROPIUM BROMIDE AND ALBUTEROL SULFATE 2.5; .5 MG/3ML; MG/3ML
1 SOLUTION RESPIRATORY (INHALATION) EVERY 6 HOURS PRN
COMMUNITY

## 2024-05-06 RX ORDER — LATANOPROST 50 UG/ML
1 SOLUTION/ DROPS OPHTHALMIC NIGHTLY
COMMUNITY

## 2024-05-06 RX ORDER — BRIMONIDINE TARTRATE 2 MG/ML
1 SOLUTION/ DROPS OPHTHALMIC 3 TIMES DAILY
COMMUNITY

## 2024-05-06 RX ORDER — DORZOLAMIDE HYDROCHLORIDE AND TIMOLOL MALEATE 20; 5 MG/ML; MG/ML
1 SOLUTION/ DROPS OPHTHALMIC 2 TIMES DAILY
COMMUNITY

## 2024-05-06 NOTE — PROGRESS NOTES
palliative care as an outpatient due to multiple comorbidities, increased risk for rehospitalization, and high risk for symptom burden.  -Patient's daughter Marlyn signed palliative care consents as patient is only alert and oriented x 1 during interaction.  -Palliative care will continue to follow and work with patient and patient's family.  - Ambulatory Referral to ACP Clinical Specialist      There are no discontinued medications.    Discussed with patient/surrogate: POC, Treatment risks/benefits, and alternatives. All questions were answered. Additionally, a printed copy of the [facility][note 3] medications/opioid safety informational sheet was reviewed and given to patient for reference. Opioid contract signed:No    Due to acuity, symptomatology and high-risk medication management, I advised patient to Return in 2 months (on 7/6/2024).     Thanks for the opportunity you have allowed us to provide palliative care to Trudy. We will be in touch as care progresses. Please feel free to reach out to us should you have any questions or requests.    Total Time  78 mins (Adv. Care planning 17 mins)     Total time includes reviewing labs and tests, reviewing history, medications, and allergies, counseling patient, performing medically appropriate evaluation and examination, ordering medications, and documenting in the medical record.     JULIO Long - CNP    Collaborating physician: Dr. Muñoz     Please note this report is partially produced by using speech recognition hardware.  It may contain errors related to the system, including grammar, punctuation and spelling as well as words and phrases that may seem inaccurate.  For any questions or concerns feel free to contact me for clarification.

## 2024-05-06 NOTE — ACP (ADVANCE CARE PLANNING)
Advance Care Planning   Ambulatory ACP Specialist Patient Outreach    Date:  5/6/2024    ACP Specialist:  АННА Mora    Outreach call to patient in follow-up to ACP Specialist referral from:Rafael Sosa APRN - CNP    [] PCP  [x] Provider   [] Ambulatory Care Management [] Other     For:                  [x] Advance Directive Assistance              [] Complete Portable DNR order              [] Complete POST/POLST/MOST              [] Code Status Discussion             [] Discuss Goals of Care             [] Early ACP Decision-Making              [x] Other (Specify) HCPOA election, pt has not previously elected, cognitive deficit/memory loss    Date Referral Received: 5/6/24    Next Step:   [] ACP scheduled conversation  [x] Outreach again in one week               [] Email / Mail ACP Info Sheets  [] Email / Mail Advance Directive   [] Closing referral.  Routing closure to referring provider/staff and to ACP Specialist .    [] Closure letter mailed to patient with invitation to contact ACP Specialist if / when ready.   [] Other (Specify here):         [x] At this time, Healthcare Decision Maker Is:   Healthcare Decision Maker:    Primary Decision Maker: Tereza Millan - [place] [phone]    Primary Decision Maker: Marlyn Aguilar - Child - [phone]    Primary Decision Maker: Capri Thomas - Child - [phone]            [] Primary agent named in scanned advance directive.    [x] Legal Next of Kin.     [] Unable to determine legal decision maker at this time.    Outreaches:       [x] 1st -  Date:  5/6/24               Intervention:  [] Spoke with Patient   [x] Left Voice mail [] Email / Mail    [x] MyChart  [] Other (Specify) :     Outcomes: ACP Specialist tried to call patient on mobile and home number. Staff left message on both numbers and also sent a [employer] message.           [x] 2nd -  Date:  5/9/24               Intervention:  [] Spoke with Patient  [x] Left Voice mail [] Email /

## 2024-05-10 DIAGNOSIS — E55.9 VITAMIN D DEFICIENCY: ICD-10-CM

## 2024-05-10 RX ORDER — ERGOCALCIFEROL 1.25 MG/1
CAPSULE ORAL
Qty: 12 CAPSULE | Refills: 1 | Status: SHIPPED | OUTPATIENT
Start: 2024-05-10

## 2024-05-10 NOTE — TELEPHONE ENCOUNTER
Rx requested:  Requested Prescriptions     Pending Prescriptions Disp Refills    vitamin D (ERGOCALCIFEROL) 1.25 MG (08366 UT) CAPS capsule [Pharmacy Med Name: VITAMIN D2 1.25MG(50,000 UNIT)] 12 capsule 1     Sig: TAKE 1 CAPSULE BY MOUTH ONE TIME PER WEEK         Last Office Visit:   4/29/2024      Next Visit Date:  Future Appointments   Date Time Provider Department Center   7/1/2024 11:30 AM Rafael Sosa APRN - CNP [facility]   7/29/2024 11:15 AM Danielle Boudreaux APRN - CNP [facility]

## 2024-05-30 ENCOUNTER — TELEPHONE (OUTPATIENT)
Dept: FAMILY MEDICINE CLINIC | Age: 89
End: 2024-05-30

## 2024-05-30 DIAGNOSIS — M10.072 ACUTE IDIOPATHIC GOUT INVOLVING TOE OF LEFT FOOT: ICD-10-CM

## 2024-05-30 RX ORDER — COLCHICINE 0.6 MG/1
CAPSULE ORAL
Qty: 30 CAPSULE | Refills: 0 | Status: SHIPPED | OUTPATIENT
Start: 2024-05-30

## 2024-05-30 NOTE — TELEPHONE ENCOUNTER
Daughter calling because patient is having a gout flare-up on her left knee. Dr. Ward use to give her Mitigare 0.6 capsules. She still has some but they are .    Daughter is asking if you can send in a script for the gout. Patient uses [facility].      Thank you.

## 2024-06-13 DIAGNOSIS — J20.9 COPD (CHRONIC OBSTRUCTIVE PULMONARY DISEASE) WITH ACUTE BRONCHITIS (HCC): ICD-10-CM

## 2024-06-13 DIAGNOSIS — J44.0 COPD (CHRONIC OBSTRUCTIVE PULMONARY DISEASE) WITH ACUTE BRONCHITIS (HCC): ICD-10-CM

## 2024-06-14 RX ORDER — BUDESONIDE AND FORMOTEROL FUMARATE DIHYDRATE 160; 4.5 UG/1; UG/1
2 AEROSOL RESPIRATORY (INHALATION) 2 TIMES DAILY
Qty: 10.2 EACH | Refills: 0 | Status: SHIPPED | OUTPATIENT
Start: 2024-06-14

## 2024-06-14 NOTE — TELEPHONE ENCOUNTER
Rx requested:  Requested Prescriptions     Pending Prescriptions Disp Refills    budesonide-formoterol (SYMBICORT) 160-4.5 MCG/ACT AERO [Pharmacy Med Name: BUDESONIDE-FORMOTEROL 160-4.5] 10.2 each 1     Sig: INHALE 2 PUFFS INTO THE LUNGS TWICE A DAY         Last Office Visit:   4/29/2024      Next Visit Date:  Future Appointments   Date Time Provider Department Center   7/1/2024 11:30 AM Rafael Sosa APRN - CNP [facility]   7/29/2024 11:15 AM Danielle Boudreaux APRN - CNP [facility]

## 2024-07-01 ENCOUNTER — OFFICE VISIT (OUTPATIENT)
Dept: PALLATIVE CARE | Age: 89
End: 2024-07-01
Payer: MEDICARE

## 2024-07-01 VITALS
RESPIRATION RATE: 16 BRPM | SYSTOLIC BLOOD PRESSURE: 122 MMHG | HEART RATE: 80 BPM | TEMPERATURE: 98.6 F | DIASTOLIC BLOOD PRESSURE: 62 MMHG | OXYGEN SATURATION: 97 %

## 2024-07-01 DIAGNOSIS — Z51.5 PALLIATIVE CARE ENCOUNTER: ICD-10-CM

## 2024-07-01 DIAGNOSIS — R41.3 MEMORY LOSS: Primary | ICD-10-CM

## 2024-07-01 DIAGNOSIS — J44.9 CHRONIC OBSTRUCTIVE PULMONARY DISEASE, UNSPECIFIED COPD TYPE (HCC): ICD-10-CM

## 2024-07-01 PROCEDURE — 1123F ACP DISCUSS/DSCN MKR DOCD: CPT

## 2024-07-01 PROCEDURE — 99348 HOME/RES VST EST LOW MDM 30: CPT

## 2024-07-01 NOTE — PROGRESS NOTES
Total time includes reviewing labs and tests, reviewing history, medications, and allergies, counseling patient, performing medically appropriate evaluation and examination, ordering medications, and documenting in the medical record.     JULIO Long - CNP    Collaborating physician: Dr. Muñoz     Please note this report is partially produced by using speech recognition hardware.  It may contain errors related to the system, including grammar, punctuation and spelling as well as words and phrases that may seem inaccurate.  For any questions or concerns feel free to contact me for clarification.

## 2024-07-16 ENCOUNTER — OFFICE VISIT (OUTPATIENT)
Dept: PALLATIVE CARE | Age: 89
End: 2024-07-16
Payer: MEDICARE

## 2024-07-16 VITALS
DIASTOLIC BLOOD PRESSURE: 62 MMHG | RESPIRATION RATE: 18 BRPM | HEART RATE: 84 BPM | OXYGEN SATURATION: 93 % | SYSTOLIC BLOOD PRESSURE: 118 MMHG | TEMPERATURE: 98 F

## 2024-07-16 DIAGNOSIS — Z51.5 PALLIATIVE CARE ENCOUNTER: ICD-10-CM

## 2024-07-16 DIAGNOSIS — R63.8 DECREASED ORAL INTAKE: ICD-10-CM

## 2024-07-16 DIAGNOSIS — R53.83 LETHARGY: Primary | ICD-10-CM

## 2024-07-16 DIAGNOSIS — R09.89 RESPIRATORY CRACKLES OF BOTH LUNGS: ICD-10-CM

## 2024-07-16 DIAGNOSIS — R53.83 LETHARGY: ICD-10-CM

## 2024-07-16 DIAGNOSIS — R06.82 TACHYPNEA: ICD-10-CM

## 2024-07-16 LAB
BACTERIA URNS QL MICRO: NEGATIVE /HPF
BILIRUB UR QL STRIP: NEGATIVE
CLARITY UR: CLEAR
COLOR UR: ABNORMAL
EPI CELLS #/AREA URNS AUTO: NORMAL /HPF (ref 0–5)
GLUCOSE UR STRIP-MCNC: NEGATIVE MG/DL
HGB UR QL STRIP: NEGATIVE
HYALINE CASTS #/AREA URNS AUTO: NORMAL /HPF (ref 0–5)
HYALINE CASTS #/AREA URNS LPF: NORMAL /LPF (ref 0–5)
KETONES UR STRIP-MCNC: NEGATIVE MG/DL
LEUKOCYTE ESTERASE UR QL STRIP: ABNORMAL
NITRITE UR QL STRIP: NEGATIVE
PH UR STRIP: 5.5 [PH] (ref 5–9)
PROT UR STRIP-MCNC: 30 MG/DL
RBC #/AREA URNS AUTO: NORMAL /HPF (ref 0–5)
SP GR UR STRIP: 1.02 (ref 1–1.03)
UROBILINOGEN UR STRIP-ACNC: 1 E.U./DL
WBC #/AREA URNS AUTO: NORMAL /HPF (ref 0–5)

## 2024-07-16 PROCEDURE — 99350 HOME/RES VST EST HIGH MDM 60: CPT

## 2024-07-16 PROCEDURE — 1123F ACP DISCUSS/DSCN MKR DOCD: CPT

## 2024-07-16 RX ORDER — METHYLPREDNISOLONE 4 MG/1
TABLET ORAL
Qty: 1 KIT | Refills: 0 | Status: SHIPPED | OUTPATIENT
Start: 2024-07-16

## 2024-07-16 RX ORDER — DOXYCYCLINE HYCLATE 100 MG/1
100 CAPSULE ORAL 2 TIMES DAILY
Qty: 20 CAPSULE | Refills: 0 | Status: SHIPPED | OUTPATIENT
Start: 2024-07-16 | End: 2024-07-26

## 2024-07-16 NOTE — PROGRESS NOTES
Subjective:      Patient Id: Seen Trudy at  home in [place] , for follow-up palliative Care visit.  She was accompanied to the appointment by: daughter Marlyn & multiple family members.    Chief Complaint   Patient presents with    Fatigue    Follow-up    Sweats      HPI       Trudy William is a 89 y.o. female was seen and evaluated palliative care for symptom management related to COPD, leg swelling impaired ADL and forgetfulness. Trudy has complex medical history that includes COPD, HTN, leg swelling, abdominal wall abscess, age-related cognitive decline with forgetfulness, cancer, FRED, OA of cervical spine, gout (left knee). Home visit is necessary in lieu of office due to significant frailty and high symptom burden from comorbid illnesses.     Patient's family concern/complaint of increased lethargy, increased sweating and history of memory loss and COPD.  Patient follows with PCP.    General: Upon entering the room I find the patient A&O X0, up with walker, calm, cooperative and in NAD. Patient lives at home with her daughter Marlyn and MAEGAN.    Lethargy: Patient has been in bed more, sleeping most of the day, increased sweating and decreased oral nutritional intake for the past week that has been progressively worsening.    COPD: Patient appears presents as making grunting noise and wheezing intermittently during interaction. Patient has oxygen that she will only intermittently use it.  Patient and patient's daughter to my change in sputum, however, patient has a history of dementia and may not be able to properly verbalize or expectorate.    Appetite:  Patient's daughter is concerned as patient's eating habits have drastically changed/decreased. Patient has been experiencing this for at least 1 week. Patient has to be fed food and drink maintain adequate hydration per conversation.     Sleep: Patient has been sleeping more throughout the day for the past week and in bed all day.     Pain: Patient

## 2024-07-17 LAB — BACTERIA UR CULT: NORMAL

## 2024-07-25 ENCOUNTER — OFFICE VISIT (OUTPATIENT)
Dept: PALLATIVE CARE | Age: 89
End: 2024-07-25
Payer: MEDICARE

## 2024-07-25 VITALS
DIASTOLIC BLOOD PRESSURE: 64 MMHG | TEMPERATURE: 99.4 F | SYSTOLIC BLOOD PRESSURE: 106 MMHG | RESPIRATION RATE: 18 BRPM | OXYGEN SATURATION: 94 % | HEART RATE: 92 BPM

## 2024-07-25 DIAGNOSIS — Z76.0 MEDICATION REFILL: ICD-10-CM

## 2024-07-25 DIAGNOSIS — R63.0 DECREASED APPETITE: ICD-10-CM

## 2024-07-25 DIAGNOSIS — R63.8 DECREASED ORAL INTAKE: ICD-10-CM

## 2024-07-25 DIAGNOSIS — Z51.5 PALLIATIVE CARE ENCOUNTER: ICD-10-CM

## 2024-07-25 DIAGNOSIS — J44.9 CHRONIC OBSTRUCTIVE PULMONARY DISEASE, UNSPECIFIED COPD TYPE (HCC): ICD-10-CM

## 2024-07-25 DIAGNOSIS — R53.83 LETHARGY: Primary | ICD-10-CM

## 2024-07-25 PROCEDURE — 1123F ACP DISCUSS/DSCN MKR DOCD: CPT

## 2024-07-25 PROCEDURE — 99349 HOME/RES VST EST MOD MDM 40: CPT

## 2024-07-25 RX ORDER — IPRATROPIUM BROMIDE AND ALBUTEROL SULFATE 2.5; .5 MG/3ML; MG/3ML
1 SOLUTION RESPIRATORY (INHALATION) EVERY 6 HOURS PRN
Qty: 360 ML | Refills: 0 | Status: SHIPPED | OUTPATIENT
Start: 2024-07-25

## 2024-07-25 RX ORDER — MEGESTROL ACETATE 40 MG/ML
200 SUSPENSION ORAL DAILY
Qty: 240 ML | Refills: 0 | Status: SHIPPED | OUTPATIENT
Start: 2024-07-25

## 2024-07-25 NOTE — PROGRESS NOTES
(MEGACE) 40 MG/ML suspension; Take 5 mLs by mouth daily  Dispense: 240 mL; Refill: 0      Medications Discontinued During This Encounter   Medication Reason    methylPREDNISolone (MEDROL DOSEPACK) 4 MG tablet Therapy completed    ipratropium 0.5 mg-albuterol 2.5 mg (DUONEB) 0.5-2.5 (3) MG/3ML SOLN nebulizer solution REORDER       Discussed with patient/surrogate: POC, Treatment risks/benefits, and alternatives. All questions were answered. Additionally, a printed copy of the CDC medications/opioid safety informational sheet was reviewed and given to patient for reference. Opioid contract signed: No    Due to acuity, symptomatology and high-risk medication management, I advised patient to Return in 19 days (on 8/13/2024).     Thanks for the opportunity you have allowed us to provide palliative care to Trudy. We will be in touch as care progresses. Please feel free to reach out to us should you have any questions or requests.     MDM:  JULIO Landon - CNP    Collaborating physician: Dr. Muñoz     Please note this report is partially produced by using speech recognition hardware.  It may contain errors related to the system, including grammar, punctuation and spelling as well as words and phrases that may seem inaccurate.  For any questions or concerns feel free to contact me for clarification.

## 2024-07-26 DIAGNOSIS — J44.0 COPD (CHRONIC OBSTRUCTIVE PULMONARY DISEASE) WITH ACUTE BRONCHITIS (HCC): ICD-10-CM

## 2024-07-26 DIAGNOSIS — I10 PRIMARY HYPERTENSION: ICD-10-CM

## 2024-07-26 DIAGNOSIS — J20.9 COPD (CHRONIC OBSTRUCTIVE PULMONARY DISEASE) WITH ACUTE BRONCHITIS (HCC): ICD-10-CM

## 2024-07-26 NOTE — TELEPHONE ENCOUNTER
Rx request   Requested Prescriptions     Pending Prescriptions Disp Refills    budesonide-formoterol (SYMBICORT) 160-4.5 MCG/ACT AERO 10.2 each 0     Sig: Inhale 2 puffs into the lungs in the morning and at bedtime    lisinopril (PRINIVIL;ZESTRIL) 10 MG tablet 90 tablet 1     Sig: Take 1 tablet by mouth daily     LOV 4/29/2024  Next Visit Date:  Future Appointments   Date Time Provider Department Center   8/7/2024  3:15 PM Danielle Boudreaux APRN - CNP [facility]   8/13/2024  9:00 AM Rafael Sosa APRN - CNP [facility]   9/11/2024 11:00 AM Bursley, Rafael, APRN - CNP [facility]

## 2024-07-28 RX ORDER — LISINOPRIL 10 MG/1
10 TABLET ORAL DAILY
Qty: 90 TABLET | Refills: 1 | Status: SHIPPED | OUTPATIENT
Start: 2024-07-28

## 2024-07-28 RX ORDER — BUDESONIDE AND FORMOTEROL FUMARATE DIHYDRATE 160; 4.5 UG/1; UG/1
2 AEROSOL RESPIRATORY (INHALATION) 2 TIMES DAILY
Qty: 10.2 EACH | Refills: 0 | Status: SHIPPED | OUTPATIENT
Start: 2024-07-28

## 2024-08-07 ENCOUNTER — TELEMEDICINE (OUTPATIENT)
Dept: FAMILY MEDICINE CLINIC | Age: 89
End: 2024-08-07
Payer: MEDICARE

## 2024-08-07 DIAGNOSIS — Z78.9 IMPAIRED MOBILITY AND ACTIVITIES OF DAILY LIVING: ICD-10-CM

## 2024-08-07 DIAGNOSIS — Z74.09 IMPAIRED MOBILITY AND ACTIVITIES OF DAILY LIVING: ICD-10-CM

## 2024-08-07 DIAGNOSIS — J20.9 COPD (CHRONIC OBSTRUCTIVE PULMONARY DISEASE) WITH ACUTE BRONCHITIS (HCC): ICD-10-CM

## 2024-08-07 DIAGNOSIS — F41.9 ANXIETY: Primary | ICD-10-CM

## 2024-08-07 DIAGNOSIS — R68.89 FORGETFULNESS: ICD-10-CM

## 2024-08-07 DIAGNOSIS — I10 PRIMARY HYPERTENSION: ICD-10-CM

## 2024-08-07 DIAGNOSIS — J44.0 COPD (CHRONIC OBSTRUCTIVE PULMONARY DISEASE) WITH ACUTE BRONCHITIS (HCC): ICD-10-CM

## 2024-08-07 PROCEDURE — 99214 OFFICE O/P EST MOD 30 MIN: CPT | Performed by: NURSE PRACTITIONER

## 2024-08-07 PROCEDURE — 1123F ACP DISCUSS/DSCN MKR DOCD: CPT | Performed by: NURSE PRACTITIONER

## 2024-08-07 RX ORDER — SERTRALINE HYDROCHLORIDE 25 MG/1
25 TABLET, FILM COATED ORAL DAILY
Qty: 90 TABLET | Refills: 0 | Status: SHIPPED | OUTPATIENT
Start: 2024-08-07

## 2024-08-07 SDOH — ECONOMIC STABILITY: FOOD INSECURITY: WITHIN THE PAST 12 MONTHS, YOU WORRIED THAT YOUR FOOD WOULD RUN OUT BEFORE YOU GOT MONEY TO BUY MORE.: NEVER TRUE

## 2024-08-07 SDOH — ECONOMIC STABILITY: INCOME INSECURITY: HOW HARD IS IT FOR YOU TO PAY FOR THE VERY BASICS LIKE FOOD, HOUSING, MEDICAL CARE, AND HEATING?: NOT HARD AT ALL

## 2024-08-07 SDOH — ECONOMIC STABILITY: FOOD INSECURITY: WITHIN THE PAST 12 MONTHS, THE FOOD YOU BOUGHT JUST DIDN'T LAST AND YOU DIDN'T HAVE MONEY TO GET MORE.: NEVER TRUE

## 2024-08-07 NOTE — PROGRESS NOTES
Trudy William, was evaluated through a synchronous (real-time) audio-video encounter. The patient (or guardian if applicable) is aware that this is a billable service, which includes applicable co-pays. This Virtual Visit was conducted with patient's (and/or legal guardian's) consent. Patient identification was verified, and a caregiver was present when appropriate.   The patient was located at Home: [address] [place] [id number]  Provider was located at Facility (Appt Dept): [address] [place] [address]  Confirm you are appropriately licensed, registered, or certified to deliver care in the state where the patient is located as indicated above. If you are not or unsure, please re-schedule the visit: Yes, I confirm.     Trudy William (:  1928) is a Established patient, presenting virtually for evaluation of the following:      Below is the assessment and plan developed based on review of pertinent history, physical exam, labs, studies, and medications.     Assessment & Plan  Anxiety    Difficult to assess due to current sickness. Continue zoloft 25mg daily for now and assess again in 2 months.     Orders:    sertraline (ZOLOFT) 25 MG tablet; Take 1 tablet by mouth daily    Primary hypertension    Continue lisinopril 10mg daily and metoprolol 12.5mg twice daily          COPD (chronic obstructive pulmonary disease) with acute bronchitis (HCC)       Orders:    Handicap Placard MISC; by Does not apply route Dx COPD, impaired mobility and ADL Exp 27    Forgetfulness            Impaired mobility and activities of daily living-due to cervical myelopathy       Orders:    Handicap Placard MISC; by Does not apply route Dx COPD, impaired mobility and ADL Exp 27      Return in about 2 months (around 10/7/2024).       Subjective   HPI Pt f/u with daughter. Reports that she has been sick since the beginning of July. Has been being treated with palliative. Has been slowly improving.

## 2024-08-09 ENCOUNTER — TELEMEDICINE (OUTPATIENT)
Dept: FAMILY MEDICINE CLINIC | Age: 89
End: 2024-08-09
Payer: MEDICARE

## 2024-08-09 DIAGNOSIS — Z00.00 MEDICARE ANNUAL WELLNESS VISIT, SUBSEQUENT: Primary | ICD-10-CM

## 2024-08-09 PROCEDURE — G0439 PPPS, SUBSEQ VISIT: HCPCS | Performed by: NURSE PRACTITIONER

## 2024-08-09 PROCEDURE — 1123F ACP DISCUSS/DSCN MKR DOCD: CPT | Performed by: NURSE PRACTITIONER

## 2024-08-09 ASSESSMENT — PATIENT HEALTH QUESTIONNAIRE - PHQ9
SUM OF ALL RESPONSES TO PHQ9 QUESTIONS 1 & 2: 0
SUM OF ALL RESPONSES TO PHQ QUESTIONS 1-9: 0
SUM OF ALL RESPONSES TO PHQ QUESTIONS 1-9: 0
2. FEELING DOWN, DEPRESSED OR HOPELESS: NOT AT ALL
SUM OF ALL RESPONSES TO PHQ QUESTIONS 1-9: 0
1. LITTLE INTEREST OR PLEASURE IN DOING THINGS: NOT AT ALL
SUM OF ALL RESPONSES TO PHQ QUESTIONS 1-9: 0

## 2024-08-09 ASSESSMENT — LIFESTYLE VARIABLES
HOW OFTEN DO YOU HAVE A DRINK CONTAINING ALCOHOL: NEVER
HOW MANY STANDARD DRINKS CONTAINING ALCOHOL DO YOU HAVE ON A TYPICAL DAY: PATIENT DOES NOT DRINK

## 2024-08-09 NOTE — PATIENT INSTRUCTIONS
Learning About Being Active as an Older Adult  Why is being active important as you get older?     Being active is one of the best things you can do for your health. And it's never too late to start. Being active--or getting active, if you aren't already--has definite benefits. It can:  Give you more energy,  Keep your mind sharp.  Improve balance to reduce your risk of falls.  Help you manage chronic illness with fewer medicines.  No matter how old you are, how fit you are, or what health problems you have, there is a form of activity that will work for you. And the more physical activity you can do, the better your overall health will be.  What kinds of activity can help you stay healthy?  Being more active will make your daily activities easier. Physical activity includes planned exercise and things you do in daily life. There are four types of activity:  Aerobic.  Doing aerobic activity makes your heart and lungs strong.  Includes walking, dancing, and gardening.  Aim for at least 2½ hours spread throughout the week.  It improves your energy and can help you sleep better.  Muscle-strengthening.  This type of activity can help maintain muscle and strengthen bones.  Includes climbing stairs, using resistance bands, and lifting or carrying heavy loads.  Aim for at least twice a week.  It can help protect the knees and other joints.  Stretching.  Stretching gives you better range of motion in joints and muscles.  Includes upper arm stretches, calf stretches, and gentle yoga.  Aim for at least twice a week, preferably after your muscles are warmed up from other activities.  It can help you function better in daily life.  Balancing.  This helps you stay coordinated and have good posture.  Includes heel-to-toe walking, carolyne chi, and certain types of yoga.  Aim for at least 3 days a week.  It can reduce your risk of falling.  Even if you have a hard time meeting the recommendations, it's better to be more active

## 2024-08-09 NOTE — PROGRESS NOTES
Medicare Annual Wellness Visit    Trudy William is here for Medicare AWV    Assessment & Plan   Medicare annual wellness visit, subsequent    Recommendations for Preventive Services Due: see orders and patient instructions/AVS.  Recommended screening schedule for the next 5-10 years is provided to the patient in written form: see Patient Instructions/AVS.     Return in 1 year (on 8/9/2025) for Medicare AWV.     Subjective       Patient's complete Health Risk Assessment and screening values have been reviewed and are found in Flowsheets. The following problems were reviewed today and where indicated follow up appointments were made and/or referrals ordered.    Positive Risk Factor Screenings with Interventions:                Inactivity:  On average, how many days per week do you engage in moderate to strenuous exercise (like a brisk walk)?: 0 days (!) Abnormal  On average, how many minutes do you engage in exercise at this level?: 0 min  Interventions:  Patient declined any further interventions or treatment     Abnormal BMI (obese):  There is no height or weight on file to calculate BMI. (!) Abnormal  Interventions:  Patient declines any further evaluation or treatment        Dentist Screen:  Have you seen the dentist within the past year?: (!) No    Intervention:  Advised to schedule with their dentist     Vision Screen:  Do you have difficulty driving, watching TV, or doing any of your daily activities because of your eyesight?: No  Have you had an eye exam within the past year?: (!) No  Interventions:   Patient encouraged to make appointment with their eye specialist     ADL's:   Patient reports needing help with:  Select all that apply: (!) Eating, Dressing, Grooming, Bathing, Toileting, Walking/Balance  Select all that apply: (!) Laundry, Housekeeping, Banking/Finances, Shopping, Food Preparation, Transportation, Taking Medications  Interventions:  Patient declined any further interventions or

## 2024-08-12 NOTE — PROGRESS NOTES
Subjective:      Patient Id: Seen Trudy at  home in [place] , for follow-up palliative Care visit.  She was accompanied to the appointment by: daughter Marlyn.    Chief Complaint   Patient presents with    Follow-up    Dementia      HPI       Trudy William is a 89 y.o. female was seen and evaluated palliative care for symptom management related to COPD, impaired ADL and forgetfulness. Trudy has complex medical history that includes COPD, HTN, leg swelling, abdominal wall abscess, age-related cognitive decline with forgetfulness, cancer, FRED, OA of cervical spine, gout (left knee). Home visit is necessary in lieu of office due to significant frailty and high symptom burden from comorbid illnesses.     Patient's family with continued concern/complaint of continued lethargy, decreased appetite and history of memory loss and COPD.  Patient follows with PCP.    General: Upon entering the room I find the patient A&O X0, laying in bed resting, calm, cooperative and in NAD. Patient lives at home with her daughter Marlyn and MAEGAN.    Lethargy: Patient continues to be in bed more, sleeping most of the day, with increased lethargy and decreased appetite.    Appetite:  Patient's daughter continues to be concerned with Trudy's decreased appetite and patient's daughter now has to feed the patient. Patient's appetite has slightly increased with medication.    COPD: Patient respiratory status has returned to baseline from previous interaction. Patient continues to intermittently make a grunting noise while breathing. Patient's daughter provides supplemental oxygen intermittently per conversation.  Patient's daughter denies any current abnormal sputum color, respiratory distress, fever, chills or increased cough.    Sleep: Patient continues to sleep more throughout the day per conversation.     Pain: Patient denies pain during interaction. Rates the pain at a Pain Score:   0 - No pain on a scale of 0 to 10. Current pain

## 2024-08-13 ENCOUNTER — OFFICE VISIT (OUTPATIENT)
Dept: PALLATIVE CARE | Age: 89
End: 2024-08-13
Payer: MEDICARE

## 2024-08-13 VITALS
HEART RATE: 85 BPM | SYSTOLIC BLOOD PRESSURE: 108 MMHG | RESPIRATION RATE: 20 BRPM | TEMPERATURE: 98.8 F | OXYGEN SATURATION: 96 % | DIASTOLIC BLOOD PRESSURE: 68 MMHG

## 2024-08-13 DIAGNOSIS — R63.8 DECREASED ORAL INTAKE: Primary | ICD-10-CM

## 2024-08-13 DIAGNOSIS — Z71.89 ENCOUNTER FOR HOSPICE CARE DISCUSSION: ICD-10-CM

## 2024-08-13 DIAGNOSIS — Z51.5 PALLIATIVE CARE ENCOUNTER: ICD-10-CM

## 2024-08-13 DIAGNOSIS — Z53.20 ASSESSMENT OF PHYSICAL HEALTH DECLINED: ICD-10-CM

## 2024-08-13 DIAGNOSIS — Z71.89 GOALS OF CARE, COUNSELING/DISCUSSION: ICD-10-CM

## 2024-08-13 PROCEDURE — 99348 HOME/RES VST EST LOW MDM 30: CPT

## 2024-08-13 PROCEDURE — 1123F ACP DISCUSS/DSCN MKR DOCD: CPT

## 2024-08-20 ENCOUNTER — TELEPHONE (OUTPATIENT)
Dept: PALLATIVE CARE | Age: 89
End: 2024-08-20

## 2024-08-20 DIAGNOSIS — J44.0 COPD (CHRONIC OBSTRUCTIVE PULMONARY DISEASE) WITH ACUTE BRONCHITIS (HCC): Primary | ICD-10-CM

## 2024-08-20 DIAGNOSIS — J20.9 COPD (CHRONIC OBSTRUCTIVE PULMONARY DISEASE) WITH ACUTE BRONCHITIS (HCC): Primary | ICD-10-CM

## 2024-08-20 DIAGNOSIS — R62.7 FAILURE TO THRIVE IN ADULT: ICD-10-CM

## 2024-08-20 NOTE — TELEPHONE ENCOUNTER
Called patient's daughter whom the patient lives with per request.  Reviewed patient's current condition, goals of care and CODE STATUS.  Patient's goal is now to seek out additional comfort measures and given [place] of choice patient's daughter Marlyn would like to proceed with hospice care consult with [facility] per telephone conversation.  All questions and concerns were addressed before end of telephone conversation.  Patient's daughter Marlyn was appreciative of additional assistance and telephone conversation.    Palliative care staff will assist with reaching out to patient's PCP and updating provider of hospice care consult request to [facility].    Electronically signed by JULIO Long CNP on 8/20/2024 at 11:47 AM    Collaborating physician Dr. Muñoz    Thanks for the opportunity you have allowed us to provide palliative care to Trudy William. We will be in touch as care progresses. Please feel free to reach out to us should you have any questions or requests.    Please note this report is partially produced by using speech recognition hardware.  It may contain errors related to the system, including grammar, punctuation and spelling as well as words and phrases that may seem inaccurate.  For any questions or concerns feel free to contact me for clarification.

## 2024-08-20 NOTE — TELEPHONE ENCOUNTER
Marlyn, pt's daughter called in concerned and looking to you for advice. Daughter reports that pt's BP is 87/54, no obvious signs on dizziness/lightheaded. She said that she is now hardly eating, but can tolerate her boost. She \"barely gets out of bed\". It takes everything out of Trudy to make it to and from the bathroom. She asked if you could come assess her, I explained that unfortunately you wouldn't be able to make it out till next week. I asked if she wanted to meet with hospice or if she would want to take Trudy in to the ER for further eval and workup. Marlyn is not sure what to do, would you be able to call her to help guide her? [phone].

## 2024-08-21 DIAGNOSIS — J44.9 CHRONIC OBSTRUCTIVE PULMONARY DISEASE, UNSPECIFIED COPD TYPE (HCC): ICD-10-CM

## 2024-08-21 DIAGNOSIS — Z51.5 PALLIATIVE CARE ENCOUNTER: ICD-10-CM

## 2024-08-21 DIAGNOSIS — Z76.0 MEDICATION REFILL: ICD-10-CM

## 2024-08-21 RX ORDER — IPRATROPIUM BROMIDE AND ALBUTEROL SULFATE 2.5; .5 MG/3ML; MG/3ML
1 SOLUTION RESPIRATORY (INHALATION) EVERY 6 HOURS PRN
Qty: 360 ML | Refills: 0 | Status: ON HOLD | OUTPATIENT
Start: 2024-08-21

## 2024-08-28 DIAGNOSIS — J44.0 COPD (CHRONIC OBSTRUCTIVE PULMONARY DISEASE) WITH ACUTE BRONCHITIS (HCC): ICD-10-CM

## 2024-08-28 DIAGNOSIS — J20.9 COPD (CHRONIC OBSTRUCTIVE PULMONARY DISEASE) WITH ACUTE BRONCHITIS (HCC): ICD-10-CM

## 2024-08-28 RX ORDER — BUDESONIDE AND FORMOTEROL FUMARATE DIHYDRATE 160; 4.5 UG/1; UG/1
2 AEROSOL RESPIRATORY (INHALATION) 2 TIMES DAILY
Qty: 10.2 EACH | Refills: 0 | Status: SHIPPED | OUTPATIENT
Start: 2024-08-28

## 2024-09-03 DIAGNOSIS — F41.9 ANXIETY: ICD-10-CM

## 2024-09-03 RX ORDER — SERTRALINE HYDROCHLORIDE 25 MG/1
25 TABLET, FILM COATED ORAL DAILY
Qty: 90 TABLET | Refills: 0 | OUTPATIENT
Start: 2024-09-03

## 2024-10-11 DIAGNOSIS — E78.5 DYSLIPIDEMIA: ICD-10-CM

## 2024-10-11 RX ORDER — ATORVASTATIN CALCIUM 20 MG/1
TABLET, FILM COATED ORAL
Qty: 90 TABLET | Refills: 0 | Status: SHIPPED | OUTPATIENT
Start: 2024-10-11

## 2024-10-11 NOTE — TELEPHONE ENCOUNTER
Rx requested:  Requested Prescriptions     Pending Prescriptions Disp Refills    atorvastatin (LIPITOR) 20 MG tablet [Pharmacy Med Name: ATORVASTATIN 20 MG TABLET] 90 tablet 1     Sig: TAKE 1 TABLET BY MOUTH EVERYDAY AT BEDTIME         Last Office Visit:   8/7/2024      Next Visit Date:  No future appointments.

## 2024-10-25 PROBLEM — I50.9 HEART FAILURE (HCC): Status: ACTIVE | Noted: 2024-10-25

## 2024-11-20 NOTE — PROGRESS NOTES
CM attempted to call DCFS to inquire about the steps needed to assist foster mom with pediatrician follow up.     CM attempted to contact Cydney, foster mom to see if she was able to make an appointment, no answer, no voicemail.   Physical Therapy Missed Treatment   Facility/Department: [facility] MED SURG B580/G527-25    NAME: Chantale León    : 1928 (12 y.o.)  MRN: [id number]    Account: [de-identified]  Gender: female    Chart reviewed, attempted PT at 13:29. Patient unavailable 2° to:    [] Hold per nsg request    [] Pt declined     [] Nsg notified   [] Other notified    [x] Pt. . off floor for test/procedure. Peg Tube    [] Pt. Unavailable        Will attempt PT treatment again at earliest convenience.       Electronically signed by Ariel Galvin PTA on 20 at 1:38 PM EST

## (undated) DEVICE — INTENDED FOR TISSUE SEPARATION, AND OTHER PROCEDURES THAT REQUIRE A SHARP SURGICAL BLADE TO PUNCTURE OR CUT.: Brand: BARD-PARKER ® CARBON RIB-BACK BLADES

## (undated) DEVICE — TOWEL,OR,DSP,ST,BLUE,STD,4/PK,20PK/CS: Brand: MEDLINE

## (undated) DEVICE — COUNTER NDL 40 COUNT HLD 70 FOAM BLK ADH W/ MAG

## (undated) DEVICE — LABEL MED MINI W/ MARKER

## (undated) DEVICE — Z DUPLICATE USE 2431315 SET INSUF TBNG HI FLO W/ SMK EVAC FOR PNEUMOCLEAR

## (undated) DEVICE — SINGLE PORT MANIFOLD: Brand: NEPTUNE 2

## (undated) DEVICE — SUTURE SZ 0 27IN 5/8 CIR UR-6  TAPER PT VIOLET ABSRB VICRYL J603H

## (undated) DEVICE — BANDAGE ADH W2XL4IN NITRL FAB STRP CURAD

## (undated) DEVICE — ELECTRODE PT RET AD L9FT HI MOIST COND ADH HYDRGEL CORDED

## (undated) DEVICE — GAUZE,SPONGE,4"X4",16PLY,XRAY,STRL,LF: Brand: MEDLINE

## (undated) DEVICE — APPLIER CLP M/L SHFT DIA5MM 15 LIG LIGAMAX 5

## (undated) DEVICE — TROCAR: Brand: KII® SLEEVE

## (undated) DEVICE — WARMER SCP 2 ANTIFOG LAP DISP

## (undated) DEVICE — TUBING, SUCTION, 1/4" X 10', STRAIGHT: Brand: MEDLINE

## (undated) DEVICE — Device

## (undated) DEVICE — GOWN,AURORA,NONREINFORCED,LARGE: Brand: MEDLINE

## (undated) DEVICE — APPLICATOR MEDICATED 26 CC SOLUTION HI LT ORNG CHLORAPREP

## (undated) DEVICE — PACK,BASIC: Brand: MEDLINE

## (undated) DEVICE — GLOVE ORANGE PI 7   MSG9070

## (undated) DEVICE — SUTURE MCRYL SZ 4-0 L27IN ABSRB UD L19MM PS-2 1/2 CIR PRIM Y426H

## (undated) DEVICE — NEPTUNE E-SEP SMOKE EVACUATION PENCIL, COATED, 70MM BLADE, PUSH BUTTON SWITCH: Brand: NEPTUNE E-SEP

## (undated) DEVICE — APPLIER CLP M L L11.4IN DIA10MM ENDOSCP ROT MULT FOR LIG

## (undated) DEVICE — SUTURE VCRL SZ 3-0 L27IN ABSRB UD L26MM SH 1/2 CIR J416H

## (undated) DEVICE — TROCARS: Brand: KII® BALLOON BLUNT TIP SYSTEM

## (undated) DEVICE — KIT,ANTI FOG,W/SPONGE & FLUID,SOFT PACK: Brand: MEDLINE

## (undated) DEVICE — TROCAR: Brand: KII SHIELDED BLADED ACCESS SYSTEM

## (undated) DEVICE — GAUZE 2X2IN ST BORDERED

## (undated) DEVICE — COVER LT HNDL BLU PLAS

## (undated) DEVICE — DRAPE,LAP,CHOLE,W/TROUGHS,STERILE: Brand: MEDLINE

## (undated) DEVICE — STRIP,CLOSURE,WOUND,MEDI-STRIP,1/4X3: Brand: MEDLINE